# Patient Record
Sex: MALE | Race: WHITE | NOT HISPANIC OR LATINO | Employment: OTHER | URBAN - METROPOLITAN AREA
[De-identification: names, ages, dates, MRNs, and addresses within clinical notes are randomized per-mention and may not be internally consistent; named-entity substitution may affect disease eponyms.]

---

## 2017-01-05 ENCOUNTER — RESOLUTE PROFESSIONAL BILLING HOSPITAL PROF FEE (OUTPATIENT)
Dept: HOSPITALIST | Facility: MEDICAL CENTER | Age: 57
End: 2017-01-05
Payer: COMMERCIAL

## 2017-01-05 ENCOUNTER — HOSPITAL ENCOUNTER (INPATIENT)
Facility: MEDICAL CENTER | Age: 57
LOS: 4 days | DRG: 853 | End: 2017-01-09
Attending: HOSPITALIST | Admitting: HOSPITALIST
Payer: COMMERCIAL

## 2017-01-05 ENCOUNTER — APPOINTMENT (OUTPATIENT)
Dept: RADIOLOGY | Facility: MEDICAL CENTER | Age: 57
DRG: 853 | End: 2017-01-05
Attending: SURGERY
Payer: COMMERCIAL

## 2017-01-05 LAB
ALBUMIN SERPL BCP-MCNC: 2.7 G/DL (ref 3.2–4.9)
ALBUMIN/GLOB SERPL: 1 G/DL
ALP SERPL-CCNC: 115 U/L (ref 30–99)
ALT SERPL-CCNC: 25 U/L (ref 2–50)
ANION GAP SERPL CALC-SCNC: 6 MMOL/L (ref 0–11.9)
AST SERPL-CCNC: 14 U/L (ref 12–45)
BASOPHILS # BLD AUTO: 0.6 % (ref 0–1.8)
BASOPHILS # BLD: 0.07 K/UL (ref 0–0.12)
BILIRUB SERPL-MCNC: 0.7 MG/DL (ref 0.1–1.5)
BUN SERPL-MCNC: 52 MG/DL (ref 8–22)
CALCIUM SERPL-MCNC: 7.9 MG/DL (ref 8.5–10.5)
CHLORIDE SERPL-SCNC: 107 MMOL/L (ref 96–112)
CO2 SERPL-SCNC: 24 MMOL/L (ref 20–33)
CREAT SERPL-MCNC: 1.67 MG/DL (ref 0.5–1.4)
EOSINOPHIL # BLD AUTO: 0.02 K/UL (ref 0–0.51)
EOSINOPHIL NFR BLD: 0.2 % (ref 0–6.9)
ERYTHROCYTE [DISTWIDTH] IN BLOOD BY AUTOMATED COUNT: 47.8 FL (ref 35.9–50)
GFR SERPL CREATININE-BSD FRML MDRD: 43 ML/MIN/1.73 M 2
GLOBULIN SER CALC-MCNC: 2.8 G/DL (ref 1.9–3.5)
GLUCOSE BLD-MCNC: 158 MG/DL (ref 65–99)
GLUCOSE SERPL-MCNC: 180 MG/DL (ref 65–99)
HCT VFR BLD AUTO: 39 % (ref 42–52)
HGB BLD-MCNC: 13.1 G/DL (ref 14–18)
IMM GRANULOCYTES # BLD AUTO: 0.21 K/UL (ref 0–0.11)
IMM GRANULOCYTES NFR BLD AUTO: 1.8 % (ref 0–0.9)
LYMPHOCYTES # BLD AUTO: 0.54 K/UL (ref 1–4.8)
LYMPHOCYTES NFR BLD: 4.7 % (ref 22–41)
MCH RBC QN AUTO: 30.5 PG (ref 27–33)
MCHC RBC AUTO-ENTMCNC: 33.6 G/DL (ref 33.7–35.3)
MCV RBC AUTO: 90.9 FL (ref 81.4–97.8)
MONOCYTES # BLD AUTO: 0.43 K/UL (ref 0–0.85)
MONOCYTES NFR BLD AUTO: 3.7 % (ref 0–13.4)
NEUTROPHILS # BLD AUTO: 10.29 K/UL (ref 1.82–7.42)
NEUTROPHILS NFR BLD: 89 % (ref 44–72)
NRBC # BLD AUTO: 0 K/UL
NRBC BLD AUTO-RTO: 0 /100 WBC
PLATELET # BLD AUTO: 58 K/UL (ref 164–446)
PMV BLD AUTO: 12.1 FL (ref 9–12.9)
POTASSIUM SERPL-SCNC: 4.2 MMOL/L (ref 3.6–5.5)
PROT SERPL-MCNC: 5.5 G/DL (ref 6–8.2)
RBC # BLD AUTO: 4.29 M/UL (ref 4.7–6.1)
SODIUM SERPL-SCNC: 137 MMOL/L (ref 135–145)
WBC # BLD AUTO: 11.6 K/UL (ref 4.8–10.8)

## 2017-01-05 PROCEDURE — 87040 BLOOD CULTURE FOR BACTERIA: CPT

## 2017-01-05 PROCEDURE — A9270 NON-COVERED ITEM OR SERVICE: HCPCS | Performed by: INTERNAL MEDICINE

## 2017-01-05 PROCEDURE — 82962 GLUCOSE BLOOD TEST: CPT

## 2017-01-05 PROCEDURE — 36415 COLL VENOUS BLD VENIPUNCTURE: CPT

## 2017-01-05 PROCEDURE — 700102 HCHG RX REV CODE 250 W/ 637 OVERRIDE(OP): Performed by: INTERNAL MEDICINE

## 2017-01-05 PROCEDURE — 80053 COMPREHEN METABOLIC PANEL: CPT

## 2017-01-05 PROCEDURE — 770020 HCHG ROOM/CARE - TELE (206)

## 2017-01-05 PROCEDURE — 700112 HCHG RX REV CODE 229: Performed by: INTERNAL MEDICINE

## 2017-01-05 PROCEDURE — 99223 1ST HOSP IP/OBS HIGH 75: CPT | Performed by: INTERNAL MEDICINE

## 2017-01-05 PROCEDURE — 700101 HCHG RX REV CODE 250: Performed by: INTERNAL MEDICINE

## 2017-01-05 PROCEDURE — 700111 HCHG RX REV CODE 636 W/ 250 OVERRIDE (IP): Performed by: INTERNAL MEDICINE

## 2017-01-05 PROCEDURE — 700105 HCHG RX REV CODE 258: Performed by: INTERNAL MEDICINE

## 2017-01-05 PROCEDURE — 85025 COMPLETE CBC W/AUTO DIFF WBC: CPT

## 2017-01-05 RX ORDER — ENEMA 19; 7 G/133ML; G/133ML
1 ENEMA RECTAL
Status: DISCONTINUED | OUTPATIENT
Start: 2017-01-05 | End: 2017-01-09 | Stop reason: HOSPADM

## 2017-01-05 RX ORDER — BISACODYL 10 MG
10 SUPPOSITORY, RECTAL RECTAL
Status: DISCONTINUED | OUTPATIENT
Start: 2017-01-05 | End: 2017-01-09 | Stop reason: HOSPADM

## 2017-01-05 RX ORDER — SODIUM CHLORIDE 9 MG/ML
INJECTION, SOLUTION INTRAVENOUS CONTINUOUS
Status: DISCONTINUED | OUTPATIENT
Start: 2017-01-05 | End: 2017-01-07

## 2017-01-05 RX ORDER — METFORMIN HYDROCHLORIDE 500 MG/1
500 TABLET, EXTENDED RELEASE ORAL DAILY
Status: ON HOLD | COMMUNITY
End: 2017-01-09

## 2017-01-05 RX ORDER — AMOXICILLIN 250 MG
1 CAPSULE ORAL NIGHTLY
Status: DISCONTINUED | OUTPATIENT
Start: 2017-01-05 | End: 2017-01-09 | Stop reason: HOSPADM

## 2017-01-05 RX ORDER — LACTULOSE 20 G/30ML
30 SOLUTION ORAL
Status: DISCONTINUED | OUTPATIENT
Start: 2017-01-05 | End: 2017-01-09 | Stop reason: HOSPADM

## 2017-01-05 RX ORDER — ONDANSETRON 2 MG/ML
4 INJECTION INTRAMUSCULAR; INTRAVENOUS EVERY 4 HOURS PRN
Status: DISCONTINUED | OUTPATIENT
Start: 2017-01-05 | End: 2017-01-09 | Stop reason: HOSPADM

## 2017-01-05 RX ORDER — ONDANSETRON 4 MG/1
4 TABLET, ORALLY DISINTEGRATING ORAL EVERY 4 HOURS PRN
Status: DISCONTINUED | OUTPATIENT
Start: 2017-01-05 | End: 2017-01-09 | Stop reason: HOSPADM

## 2017-01-05 RX ORDER — AMLODIPINE BESYLATE 2.5 MG/1
2.5 TABLET ORAL DAILY
COMMUNITY
End: 2023-11-11

## 2017-01-05 RX ORDER — DOCUSATE SODIUM 100 MG/1
100 CAPSULE, LIQUID FILLED ORAL EVERY MORNING
Status: DISCONTINUED | OUTPATIENT
Start: 2017-01-05 | End: 2017-01-09 | Stop reason: HOSPADM

## 2017-01-05 RX ORDER — PROMETHAZINE HYDROCHLORIDE 25 MG/1
12.5-25 SUPPOSITORY RECTAL EVERY 4 HOURS PRN
Status: DISCONTINUED | OUTPATIENT
Start: 2017-01-05 | End: 2017-01-09 | Stop reason: HOSPADM

## 2017-01-05 RX ORDER — PROMETHAZINE HYDROCHLORIDE 25 MG/1
12.5-25 TABLET ORAL EVERY 4 HOURS PRN
Status: DISCONTINUED | OUTPATIENT
Start: 2017-01-05 | End: 2017-01-09 | Stop reason: HOSPADM

## 2017-01-05 RX ORDER — ATORVASTATIN CALCIUM 20 MG/1
20 TABLET, FILM COATED ORAL NIGHTLY
COMMUNITY
End: 2023-11-11

## 2017-01-05 RX ORDER — ACETAMINOPHEN 325 MG/1
650 TABLET ORAL EVERY 6 HOURS PRN
Status: DISCONTINUED | OUTPATIENT
Start: 2017-01-05 | End: 2017-01-09 | Stop reason: HOSPADM

## 2017-01-05 RX ORDER — AMOXICILLIN 250 MG
1 CAPSULE ORAL
Status: DISCONTINUED | OUTPATIENT
Start: 2017-01-05 | End: 2017-01-09 | Stop reason: HOSPADM

## 2017-01-05 RX ORDER — CIPROFLOXACIN 2 MG/ML
400 INJECTION, SOLUTION INTRAVENOUS EVERY 12 HOURS
Status: DISCONTINUED | OUTPATIENT
Start: 2017-01-05 | End: 2017-01-09

## 2017-01-05 RX ADMIN — STANDARDIZED SENNA CONCENTRATE AND DOCUSATE SODIUM 1 TABLET: 8.6; 5 TABLET, FILM COATED ORAL at 21:32

## 2017-01-05 RX ADMIN — SODIUM CHLORIDE: 9 INJECTION, SOLUTION INTRAVENOUS at 18:51

## 2017-01-05 RX ADMIN — METRONIDAZOLE 500 MG: 500 INJECTION, SOLUTION INTRAVENOUS at 22:39

## 2017-01-05 RX ADMIN — INSULIN HUMAN 2 UNITS: 100 INJECTION, SOLUTION PARENTERAL at 21:32

## 2017-01-05 RX ADMIN — CIPROFLOXACIN 400 MG: 2 INJECTION, SOLUTION INTRAVENOUS at 21:31

## 2017-01-05 RX ADMIN — DOCUSATE SODIUM 100 MG: 100 CAPSULE ORAL at 18:51

## 2017-01-05 ASSESSMENT — PATIENT HEALTH QUESTIONNAIRE - PHQ9
SUM OF ALL RESPONSES TO PHQ9 QUESTIONS 1 AND 2: 0
SUM OF ALL RESPONSES TO PHQ QUESTIONS 1-9: 0
2. FEELING DOWN, DEPRESSED, IRRITABLE, OR HOPELESS: NOT AT ALL
1. LITTLE INTEREST OR PLEASURE IN DOING THINGS: NOT AT ALL

## 2017-01-05 ASSESSMENT — LIFESTYLE VARIABLES
ALCOHOL_USE: NO
DO YOU DRINK ALCOHOL: NO
EVER_SMOKED: NEVER

## 2017-01-05 ASSESSMENT — COPD QUESTIONNAIRES: DURING THE PAST 4 WEEKS HOW MUCH DID YOU FEEL SHORT OF BREATH: NONE/LITTLE OF THE TIME

## 2017-01-05 NOTE — IP AVS SNAPSHOT
Cinexio Access Code: [email or web address]  Expires: 2/8/2017  3:29 PM    Your email address is not on file at [facility].  Email Addresses are required for you to sign up for Cinexio, please contact [phone] to verify your personal information and to provide your email address prior to attempting to register for Cinexio.    Jason MORGAN Tjoa  [address] [facility] [place] [address]    Cinexio  A secure, online tool to manage your health information     [facility]’s Cinexio® is a secure, online tool that connects you to your personalized health information from the privacy of your home -- day or night - making it very easy for you to manage your healthcare. Once the activation process is completed, you can even access your medical information using the Cinexio wilfrido, which is available for free in the Apple Wilfrido store or Google Play store.     To learn more about Cinexio, visit [email or web address]    There are two levels of access available (as shown below):   My Chart Features  [facility] Primary Care Doctor [facility]  Specialists [facility]  Urgent  Care Non-[facility] Primary Care Doctor   Email your healthcare team securely and privately 24/7 X X X    Manage appointments: schedule your next appointment; view details of past/upcoming appointments X      Request prescription refills. X      View recent personal medical records, including lab and immunizations X X X X   View health record, including health history, allergies, medications X X X X   Read reports about your outpatient visits, procedures, consult and ER notes X X X X   See your discharge summary, which is a recap of your hospital and/or ER visit that includes your diagnosis, lab results, and care plan X X  X     How to register for [employer]x Technologiest:  Once your e-mail address has been verified, follow the following steps to sign up for Ad[employer]x Technologiest.     1. Go to  [email or web address]  2. Click on the Sign Up Now box, which takes you to the New Member Sign Up page. You  will need to provide the following information:  a. Enter your [employer]visor Access Code exactly as it appears at the top of this page. (You will not need to use this code after you’ve completed the sign-up process. If you do not sign up before the expiration date, you must request a new code.)   b. Enter your date of birth.   c. Enter your home email address.   d. Click Submit, and follow the next screen’s instructions.  3. Create a Lucky Pait ID. This will be your FutureAdvisor login ID and cannot be changed, so think of one that is secure and easy to remember.  4. Create a FutureAdvisor password. You can change your password at any time.  5. Enter your Password Reset Question and Answer. This can be used at a later time if you forget your password.   6. Enter your e-mail address. This allows you to receive e-mail notifications when new information is available in FutureAdvisor.  7. Click Sign Up. You can now view your health information.    For assistance activating your FutureAdvisor account, call [phone]

## 2017-01-05 NOTE — IP AVS SNAPSHOT
" After Visit Summary                                                                                                                  Name:Jason Lima  Medical Record Number:[id number]  CSN: [id number]    YOB: 1960   Age: 56 y.o.  Sex: male  HT:1.727 m (5' 7.99\") WT: 87.3 kg (192 lb 7.4 oz)          Admit Date: 1/5/2017     Discharge Date:   Today's Date: 1/9/2017  Attending Doctor:  Ezio Cage D.O.                  Allergies:  Penicillins            Discharge Instructions       Discharge Instructions    Discharged to home by car with relative. Discharged via wheelchair, hospital escort: Yes.  Special equipment needed: Not Applicable    Be sure to schedule a follow-up appointment with your primary care doctor or any specialists as instructed.     Discharge Plan:   Diet Plan: Discussed  Activity Level: Discussed  Confirmed Follow up Appointment: Patient to Call and Schedule Appointment  Confirmed Symptoms Management: Discussed  Medication Reconciliation Updated: Yes  Influenza Vaccine Indication: Indicated: 9 to 64 years of age  Influenza Vaccine Given - only chart on this line when given: Influenza Vaccine Given (See MAR)    I understand that a diet low in cholesterol, fat, and sodium is recommended for good health. Unless I have been given specific instructions below for another diet, I accept this instruction as my diet prescription.       Special Instructions: Showers only until surgeon okays bath.     · Is patient discharged on Warfarin / Coumadin?   No     · Is patient Post Blood Transfusion?  No    Depression / Suicide Risk    As you are discharged from this [facility] facility, it is important to learn how to keep safe from harming yourself.    Recognize the warning signs:  · Abrupt changes in personality, positive or negative- including increase in energy   · Giving away possessions  · Change in eating patterns- significant weight changes-  positive or negative  · Change in sleeping patterns- " unable to sleep or sleeping all the time   · Unwillingness or inability to communicate  · Depression  · Unusual sadness, discouragement and loneliness  · Talk of wanting to die  · Neglect of personal appearance   · Rebelliousness- reckless behavior  · Withdrawal from people/activities they love  · Confusion- inability to concentrate     If you or a loved one observes any of these behaviors or has concerns about self-harm, here's what you can do:  · Talk about it- your feelings and reasons for harming yourself  · Remove any means that you might use to hurt yourself (examples: pills, rope, extension cords, firearm)  · Get professional help from the community (Mental Health, Substance Abuse, psychological counseling)  · Do not be alone:Call your Safe Contact- someone whom you trust who will be there for you.  · Call your local CRISIS HOTLINE 101-1483 or 223-781-4715  · Call your local Children's Mobile Crisis Response Team [place] (634) 148-0629 or www.Micropelt  · Call the toll free National Suicide Prevention Hotlines   · National Suicide Prevention Lifeline 439-481-GHHX (4357)  · National Hope Line Network 800-SUICIDE (675-9818)                Follow-up Information     1. Follow up with Humble Garcia D.O.. Schedule an appointment as soon as possible for a visit in 2 weeks.    Specialty:  Family Medicine    Why:  Hospital follow-up appointment with PCP    Contact information    [address] [place] [address]  [phone]          2. Follow up with Garrett Miller M.D. On 1/16/2017.    Specialties:  Surgery, Radiology    Why:  Please arrive at 1:30pm for a 1:45pm appointment. Please bring your photo ID, insurance card and all current medications. Thank you    Contact information    [address] [facility] [address]  [place] [address]  [phone]           Discharge Medication Instructions:    Below are the medications your physician expects you to take upon discharge:    Review all your home medications  and newly ordered medications with your doctor and/or pharmacist. Follow medication instructions as directed by your doctor and/or pharmacist.    Please keep your medication list with you and share with your physician.               Medication List      START taking these medications        Instructions    glipiZIDE SR 2.5 MG Tb24   Commonly known as:  GLUCOTROL    Take 1 Tab by mouth every day.   Dose:  2.5 mg       hydrocodone-acetaminophen 5-325 MG Tabs per tablet   Commonly known as:  NORCO    Take 1 Tab by mouth every 6 hours as needed.   Dose:  1 Tab       levofloxacin 750 MG tablet   Last time this was given:  750 mg on 1/9/2017  2:31 PM   Commonly known as:  LEVAQUIN    Take 1 Tab by mouth every day for 14 days.   Dose:  750 mg       ondansetron 4 MG Tbdp   Commonly known as:  ZOFRAN ODT    Take 1 Tab by mouth every four hours as needed for Nausea/Vomiting (give PO if no IV route available).   Dose:  4 mg         CONTINUE taking these medications        Instructions    amlodipine 2.5 MG Tabs   Commonly known as:  NORVASC    Take 2.5 mg by mouth every day.   Dose:  2.5 mg       atorvastatin 20 MG Tabs   Commonly known as:  LIPITOR    Take 20 mg by mouth every evening.   Dose:  20 mg       cyanocobalamin 500 MCG Tabs   Commonly known as:  VITAMIN B-12    Take 500 mcg by mouth every day.   Dose:  500 mcg       fish oil 1000 MG Caps capsule    Take 1,000 mg by mouth every day.   Dose:  1000 mg       vitamin D 1000 UNIT Tabs   Commonly known as:  cholecalciferol    Take 1,000 Units by mouth every day.   Dose:  1000 Units         STOP taking these medications     metformin  MG Tb24   Commonly known as:  GLUCOPHAGE XR       NON SPECIFIED               Instructions           Diet / Nutrition:    Follow any diet instructions given to you by your doctor or the dietician, including how much salt (sodium) you are allowed each day.    If you are overweight, talk to your doctor about a weight reduction  plan.    Activity:    Remain physically active following your doctor's instructions about exercise and activity.    Rest often.     Any time you become even a little tired or short of breath, SIT DOWN and rest.    Worsening Symptoms:    Report any of the following signs and symptoms to the doctor's office immediately:    *Pain of jaw, arm, or neck  *Chest pain not relieved by medication                               *Dizziness or loss of consciousness  *Difficulty breathing even when at rest   *More tired than usual                                       *Bleeding drainage or swelling of surgical site  *Swelling of feet, ankles, legs or stomach                 *Fever (>100ºF)  *Pink or blood tinged sputum  *Weight gain (3lbs/day or 5lbs /week)           *Shock from internal defibrillator (if applicable)  *Palpitations or irregular heartbeats                *Cool and/or numb extremities    Stroke Awareness    Common Risk Factors for Stroke include:    Age  Atrial Fibrillation  Carotid Artery Stenosis  Diabetes Mellitus  Excessive alcohol consumption  High blood pressure  Overweight   Physical inactivity  Smoking    Warning signs and symptoms of a stroke include:    *Sudden numbness or weakness of the face, arm or leg (especially on one side of the body).  *Sudden confusion, trouble speaking or understanding.  *Sudden trouble seeing in one or both eyes.  *Sudden trouble walking, dizziness, loss of balance or coordination.Sudden severe headache with no known cause.    It is very important to get treatment quickly when a stroke occurs. If you experience any of the above warning signs, call 911 immediately.                   Disclaimer         Quit Smoking / Tobacco Use:    I understand the use of any tobacco products increases my chance of suffering from future heart disease or stroke and could cause other illnesses which may shorten my life. Quitting the use of tobacco products is the single most important thing I can  do to improve my health. For further information on smoking / tobacco cessation call a Toll Free Quit Line at 1-652.792.9072 (*National Cancer [place]) or 1-551.266.4551 (American Lung Association) or you can access the web based program at www.lungusa.org.    [place] Tobacco Users Help Line:  (963) 703-5545       Toll Free: 1-760.133.7110  Quit Tobacco Program [facility] (012)832-1257    Crisis Hotline:    [place] Crisis Hotline:  3-160-WTNBZHL or 1-553.967.7036    [place] Crisis Hotline:    1-825.148.8886 or 799-111-4530    Discharge Survey:   Thank you for choosing [facility]. We hope we did everything we could to make your hospital stay a pleasant one. You may be receiving a phone survey and we would appreciate your time and participation in answering the questions. Your input is very valuable to us in our efforts to improve our service to our patients and their families.        My signature on this form indicates that:    1. I have reviewed and understand the above information.  2. My questions regarding this information have been answered to my satisfaction.  3. I have formulated a plan with my discharge nurse to obtain my prescribed medications for home.                  Disclaimer         __________________________________                     __________       ________                       Patient Signature                                                 Date                    Time

## 2017-01-05 NOTE — IP AVS SNAPSHOT
1/9/2017          Jason Albrightoa  [address]  [place]  [place] [address]    Dear Jason:    [facility] wants to ensure your discharge home is safe and you or your loved ones have had all your questions answered regarding your care after you leave the hospital.    You may receive a telephone call within two days of your discharge.  This call is to make certain you understand your discharge instructions as well as ensure we provided you with the best care possible during your stay with us.     The call will only last approximately 3-5 minutes and will be done by a nurse.    Once again, we want to ensure your discharge home is safe and that you have a clear understanding of any next steps in your care.  If you have any questions or concerns, please do not hesitate to contact us, we are here for you.  Thank you for choosing [facility] for your healthcare needs.    Sincerely,    Jeremy Solis    [facility]

## 2017-01-05 NOTE — IP AVS SNAPSHOT
" <p align=\"LEFT\"><IMG SRC=\"//EMRWB/blob$/[email or web address]\" alt=\"Image\" WIDTH=\"50%\" HEIGHT=\"200\" BORDER=\"\"></p>                   Name:Jason Lima  Medical Record Number:[id number]  CSN: [id number]    YOB: 1960   Age: 56 y.o.  Sex: male  HT:1.727 m (5' 7.99\") WT: 87.3 kg (192 lb 7.4 oz)          Admit Date: 1/5/2017     Discharge Date:   Today's Date: 1/9/2017  Attending Doctor:  Ezio Cage D.O.                  Allergies:  Penicillins          Follow-up Information     1. Follow up with Humble Garcia D.O.. Schedule an appointment as soon as possible for a visit in 2 weeks.    Specialty:  Family Medicine    Why:  Hospital follow-up appointment with PCP    Contact information    [address] [place] [address] [address] [place] [address]  [phone]          2. Follow up with Garrett Miller M.D. On 1/16/2017.    Specialties:  Surgery, Radiology    Why:  Please arrive at 1:30pm for a 1:45pm appointment. Please bring your photo ID, insurance card and all current medications. Thank you    Contact information    [address] [facility] [address]  [place] [address]  [phone]           Medication List      Take these Medications        Instructions    amlodipine 2.5 MG Tabs   Commonly known as:  NORVASC    Take 2.5 mg by mouth every day.   Dose:  2.5 mg       atorvastatin 20 MG Tabs   Commonly known as:  LIPITOR    Take 20 mg by mouth every evening.   Dose:  20 mg       cyanocobalamin 500 MCG Tabs   Commonly known as:  VITAMIN B-12    Take 500 mcg by mouth every day.   Dose:  500 mcg       fish oil 1000 MG Caps capsule    Take 1,000 mg by mouth every day.   Dose:  1000 mg       glipiZIDE SR 2.5 MG Tb24   Commonly known as:  GLUCOTROL    Take 1 Tab by mouth every day.   Dose:  2.5 mg       hydrocodone-acetaminophen 5-325 MG Tabs per tablet   Commonly known as:  NORCO    Take 1 Tab by mouth every 6 hours as needed.   Dose:  1 Tab       levofloxacin 750 MG tablet   Commonly known as:  LEVAQUIN    Take 1 Tab by mouth every day for 14 " days.   Dose:  750 mg       ondansetron 4 MG Tbdp   Commonly known as:  ZOFRAN ODT    Take 1 Tab by mouth every four hours as needed for Nausea/Vomiting (give PO if no IV route available).   Dose:  4 mg       vitamin D 1000 UNIT Tabs   Commonly known as:  cholecalciferol    Take 1,000 Units by mouth every day.   Dose:  1000 Units

## 2017-01-05 NOTE — PROGRESS NOTES
Direct admit from [facility] in [place]. Dr Sears sending; Dr Mayorga accepting.  DX: sepsis.  ADT order entered/held and will need to be released upon arrival to unit.    TC RN received H&P, MAR, labs, CT abd/pelvis from sending facility.  Documents scanned to EPIC media for review and archiving.

## 2017-01-06 ENCOUNTER — APPOINTMENT (OUTPATIENT)
Dept: RADIOLOGY | Facility: MEDICAL CENTER | Age: 57
DRG: 853 | End: 2017-01-06
Attending: SURGERY
Payer: COMMERCIAL

## 2017-01-06 PROBLEM — K81.0 ACUTE CHOLECYSTITIS: Status: ACTIVE | Noted: 2017-01-06

## 2017-01-06 PROBLEM — N28.9 RENAL INSUFFICIENCY: Status: ACTIVE | Noted: 2017-01-06

## 2017-01-06 PROBLEM — D69.6 THROMBOCYTOPENIA (HCC): Status: ACTIVE | Noted: 2017-01-06

## 2017-01-06 PROBLEM — E11.9 DM TYPE 2 (DIABETES MELLITUS, TYPE 2) (HCC): Status: ACTIVE | Noted: 2017-01-06

## 2017-01-06 PROBLEM — R78.81 GRAM-NEGATIVE BACTEREMIA: Status: ACTIVE | Noted: 2017-01-06

## 2017-01-06 PROBLEM — A41.9 SEPSIS (HCC): Status: ACTIVE | Noted: 2017-01-06

## 2017-01-06 PROBLEM — I10 HYPERTENSION: Status: ACTIVE | Noted: 2017-01-06

## 2017-01-06 PROBLEM — E78.5 HYPERLIPIDEMIA: Status: ACTIVE | Noted: 2017-01-06

## 2017-01-06 LAB
ABO GROUP BLD: NORMAL
ABO GROUP BLD: NORMAL
ALBUMIN SERPL BCP-MCNC: 2.4 G/DL (ref 3.2–4.9)
ALBUMIN/GLOB SERPL: 0.9 G/DL
ALP SERPL-CCNC: 104 U/L (ref 30–99)
ALT SERPL-CCNC: 22 U/L (ref 2–50)
ANION GAP SERPL CALC-SCNC: 5 MMOL/L (ref 0–11.9)
AST SERPL-CCNC: 16 U/L (ref 12–45)
BASOPHILS # BLD AUTO: 0.5 % (ref 0–1.8)
BASOPHILS # BLD: 0.05 K/UL (ref 0–0.12)
BILIRUB SERPL-MCNC: 0.7 MG/DL (ref 0.1–1.5)
BLD GP AB SCN SERPL QL: NORMAL
BUN SERPL-MCNC: 48 MG/DL (ref 8–22)
CALCIUM SERPL-MCNC: 7.9 MG/DL (ref 8.5–10.5)
CFT BLD TEG: 7.8 MIN (ref 5–10)
CHLORIDE SERPL-SCNC: 107 MMOL/L (ref 96–112)
CLOT ANGLE BLD TEG: 60.2 DEGREES (ref 53–72)
CLOT LYSIS 30M P MA LENFR BLD TEG: 0 % (ref 0–8)
CO2 SERPL-SCNC: 24 MMOL/L (ref 20–33)
COMPONENT P 8504P: NORMAL
CREAT SERPL-MCNC: 1.58 MG/DL (ref 0.5–1.4)
CT.EXTRINSIC BLD ROTEM: 2.2 MIN (ref 1–3)
EKG IMPRESSION: NORMAL
EOSINOPHIL # BLD AUTO: 0.03 K/UL (ref 0–0.51)
EOSINOPHIL NFR BLD: 0.3 % (ref 0–6.9)
ERYTHROCYTE [DISTWIDTH] IN BLOOD BY AUTOMATED COUNT: 47.8 FL (ref 35.9–50)
GFR SERPL CREATININE-BSD FRML MDRD: 46 ML/MIN/1.73 M 2
GLOBULIN SER CALC-MCNC: 2.6 G/DL (ref 1.9–3.5)
GLUCOSE BLD-MCNC: 123 MG/DL (ref 65–99)
GLUCOSE BLD-MCNC: 130 MG/DL (ref 65–99)
GLUCOSE SERPL-MCNC: 132 MG/DL (ref 65–99)
HCT VFR BLD AUTO: 36.9 % (ref 42–52)
HGB BLD-MCNC: 12.2 G/DL (ref 14–18)
IMM GRANULOCYTES # BLD AUTO: 0.18 K/UL (ref 0–0.11)
IMM GRANULOCYTES NFR BLD AUTO: 1.7 % (ref 0–0.9)
LYMPHOCYTES # BLD AUTO: 0.77 K/UL (ref 1–4.8)
LYMPHOCYTES NFR BLD: 7.3 % (ref 22–41)
MCF BLD TEG: 66.1 MM (ref 50–70)
MCH RBC QN AUTO: 30.4 PG (ref 27–33)
MCHC RBC AUTO-ENTMCNC: 33.1 G/DL (ref 33.7–35.3)
MCV RBC AUTO: 92 FL (ref 81.4–97.8)
MONOCYTES # BLD AUTO: 0.55 K/UL (ref 0–0.85)
MONOCYTES NFR BLD AUTO: 5.2 % (ref 0–13.4)
NEUTROPHILS # BLD AUTO: 8.94 K/UL (ref 1.82–7.42)
NEUTROPHILS NFR BLD: 85 % (ref 44–72)
NRBC # BLD AUTO: 0 K/UL
NRBC BLD AUTO-RTO: 0 /100 WBC
PA AA BLD-ACNC: 9.7 %
PA ADP BLD-ACNC: 59.2 %
PLATELET # BLD AUTO: 55 K/UL (ref 164–446)
PMV BLD AUTO: 12.4 FL (ref 9–12.9)
POTASSIUM SERPL-SCNC: 3.8 MMOL/L (ref 3.6–5.5)
PROT SERPL-MCNC: 5 G/DL (ref 6–8.2)
RBC # BLD AUTO: 4.01 M/UL (ref 4.7–6.1)
RH BLD: NORMAL
SODIUM SERPL-SCNC: 136 MMOL/L (ref 135–145)
TEG ALGORITHM TGALG: NORMAL
WBC # BLD AUTO: 10.5 K/UL (ref 4.8–10.8)

## 2017-01-06 PROCEDURE — A9270 NON-COVERED ITEM OR SERVICE: HCPCS | Performed by: INTERNAL MEDICINE

## 2017-01-06 PROCEDURE — 82962 GLUCOSE BLOOD TEST: CPT | Mod: 91

## 2017-01-06 PROCEDURE — A9270 NON-COVERED ITEM OR SERVICE: HCPCS | Performed by: NURSE PRACTITIONER

## 2017-01-06 PROCEDURE — 86901 BLOOD TYPING SEROLOGIC RH(D): CPT

## 2017-01-06 PROCEDURE — 160048 HCHG OR STATISTICAL LEVEL 1-5: Performed by: SURGERY

## 2017-01-06 PROCEDURE — 160002 HCHG RECOVERY MINUTES (STAT): Performed by: SURGERY

## 2017-01-06 PROCEDURE — 30233R1 TRANSFUSION OF NONAUTOLOGOUS PLATELETS INTO PERIPHERAL VEIN, PERCUTANEOUS APPROACH: ICD-10-PCS | Performed by: INTERNAL MEDICINE

## 2017-01-06 PROCEDURE — 700101 HCHG RX REV CODE 250: Performed by: INTERNAL MEDICINE

## 2017-01-06 PROCEDURE — P9034 PLATELETS, PHERESIS: HCPCS

## 2017-01-06 PROCEDURE — 502594 HCHG SCISSOR HANDLE, HARMONIC ACE: Performed by: SURGERY

## 2017-01-06 PROCEDURE — 502240 HCHG MISC OR SUPPLY RC 0272: Performed by: SURGERY

## 2017-01-06 PROCEDURE — 500697 HCHG HEMOCLIP, LARGE (ORANGE): Performed by: SURGERY

## 2017-01-06 PROCEDURE — 99233 SBSQ HOSP IP/OBS HIGH 50: CPT | Mod: Q6 | Performed by: INTERNAL MEDICINE

## 2017-01-06 PROCEDURE — 700111 HCHG RX REV CODE 636 W/ 250 OVERRIDE (IP)

## 2017-01-06 PROCEDURE — 0F904ZZ DRAINAGE OF LIVER, PERCUTANEOUS ENDOSCOPIC APPROACH: ICD-10-PCS | Performed by: SURGERY

## 2017-01-06 PROCEDURE — 110382 HCHG SHELL REV 271: Performed by: SURGERY

## 2017-01-06 PROCEDURE — 0FT44ZZ RESECTION OF GALLBLADDER, PERCUTANEOUS ENDOSCOPIC APPROACH: ICD-10-PCS | Performed by: SURGERY

## 2017-01-06 PROCEDURE — 80053 COMPREHEN METABOLIC PANEL: CPT

## 2017-01-06 PROCEDURE — 500378 HCHG DRAIN, J-VAC ROUND 19FR: Performed by: SURGERY

## 2017-01-06 PROCEDURE — 36430 TRANSFUSION BLD/BLD COMPNT: CPT

## 2017-01-06 PROCEDURE — 76705 ECHO EXAM OF ABDOMEN: CPT

## 2017-01-06 PROCEDURE — 700101 HCHG RX REV CODE 250

## 2017-01-06 PROCEDURE — 86850 RBC ANTIBODY SCREEN: CPT

## 2017-01-06 PROCEDURE — 86900 BLOOD TYPING SEROLOGIC ABO: CPT

## 2017-01-06 PROCEDURE — 700102 HCHG RX REV CODE 250 W/ 637 OVERRIDE(OP): Performed by: INTERNAL MEDICINE

## 2017-01-06 PROCEDURE — 700111 HCHG RX REV CODE 636 W/ 250 OVERRIDE (IP): Performed by: INTERNAL MEDICINE

## 2017-01-06 PROCEDURE — 93010 ELECTROCARDIOGRAM REPORT: CPT | Performed by: INTERNAL MEDICINE

## 2017-01-06 PROCEDURE — 85025 COMPLETE CBC W/AUTO DIFF WBC: CPT

## 2017-01-06 PROCEDURE — 110371 HCHG SHELL REV 272: Performed by: SURGERY

## 2017-01-06 PROCEDURE — 160039 HCHG SURGERY MINUTES - EA ADDL 1 MIN LEVEL 3: Performed by: SURGERY

## 2017-01-06 PROCEDURE — 700105 HCHG RX REV CODE 258: Performed by: INTERNAL MEDICINE

## 2017-01-06 PROCEDURE — A6402 STERILE GAUZE <= 16 SQ IN: HCPCS | Performed by: SURGERY

## 2017-01-06 PROCEDURE — 500002 HCHG ADHESIVE, DERMABOND: Performed by: SURGERY

## 2017-01-06 PROCEDURE — 500389 HCHG DRAIN, RESERVOIR SUCT JP 100CC: Performed by: SURGERY

## 2017-01-06 PROCEDURE — 93005 ELECTROCARDIOGRAM TRACING: CPT | Performed by: SURGERY

## 2017-01-06 PROCEDURE — 501570 HCHG TROCAR, SEPARATOR: Performed by: SURGERY

## 2017-01-06 PROCEDURE — 770020 HCHG ROOM/CARE - TELE (206)

## 2017-01-06 PROCEDURE — 160035 HCHG PACU - 1ST 60 MINS PHASE I: Performed by: SURGERY

## 2017-01-06 PROCEDURE — 160028 HCHG SURGERY MINUTES - 1ST 30 MINS LEVEL 3: Performed by: SURGERY

## 2017-01-06 PROCEDURE — 501399 HCHG SPECIMAN BAG, ENDO CATC: Performed by: SURGERY

## 2017-01-06 PROCEDURE — 88304 TISSUE EXAM BY PATHOLOGIST: CPT

## 2017-01-06 PROCEDURE — 700102 HCHG RX REV CODE 250 W/ 637 OVERRIDE(OP): Performed by: NURSE PRACTITIONER

## 2017-01-06 PROCEDURE — 502571 HCHG PACK, LAP CHOLE: Performed by: SURGERY

## 2017-01-06 PROCEDURE — 160036 HCHG PACU - EA ADDL 30 MINS PHASE I: Performed by: SURGERY

## 2017-01-06 PROCEDURE — 85384 FIBRINOGEN ACTIVITY: CPT

## 2017-01-06 PROCEDURE — 36415 COLL VENOUS BLD VENIPUNCTURE: CPT

## 2017-01-06 PROCEDURE — 501838 HCHG SUTURE GENERAL: Performed by: SURGERY

## 2017-01-06 PROCEDURE — 700112 HCHG RX REV CODE 229: Performed by: INTERNAL MEDICINE

## 2017-01-06 PROCEDURE — 85347 COAGULATION TIME ACTIVATED: CPT

## 2017-01-06 PROCEDURE — 501583 HCHG TROCAR, THRD CAN&SEAL 5X100: Performed by: SURGERY

## 2017-01-06 PROCEDURE — 501571 HCHG TROCAR, SEPARATOR 12X100: Performed by: SURGERY

## 2017-01-06 PROCEDURE — 500868 HCHG NEEDLE, SURGI(VARES): Performed by: SURGERY

## 2017-01-06 PROCEDURE — 160009 HCHG ANES TIME/MIN: Performed by: SURGERY

## 2017-01-06 PROCEDURE — 85576 BLOOD PLATELET AGGREGATION: CPT | Mod: 91

## 2017-01-06 PROCEDURE — A4606 OXYGEN PROBE USED W OXIMETER: HCPCS | Performed by: SURGERY

## 2017-01-06 RX ORDER — CHOLECALCIFEROL (VITAMIN D3) 125 MCG
500 CAPSULE ORAL DAILY
COMMUNITY
End: 2023-11-11

## 2017-01-06 RX ORDER — OXYCODONE HYDROCHLORIDE AND ACETAMINOPHEN 5; 325 MG/1; MG/1
1 TABLET ORAL EVERY 4 HOURS PRN
Status: DISCONTINUED | OUTPATIENT
Start: 2017-01-06 | End: 2017-01-09 | Stop reason: HOSPADM

## 2017-01-06 RX ORDER — BUPIVACAINE HYDROCHLORIDE AND EPINEPHRINE 5; 5 MG/ML; UG/ML
INJECTION, SOLUTION EPIDURAL; INTRACAUDAL; PERINEURAL
Status: DISCONTINUED | OUTPATIENT
Start: 2017-01-06 | End: 2017-01-06 | Stop reason: HOSPADM

## 2017-01-06 RX ORDER — ONDANSETRON 2 MG/ML
INJECTION INTRAMUSCULAR; INTRAVENOUS
Status: COMPLETED
Start: 2017-01-06 | End: 2017-01-06

## 2017-01-06 RX ORDER — CHLORAL HYDRATE 500 MG
1000 CAPSULE ORAL DAILY
COMMUNITY
End: 2023-11-11

## 2017-01-06 RX ADMIN — ACETAMINOPHEN 650 MG: 325 TABLET, FILM COATED ORAL at 00:24

## 2017-01-06 RX ADMIN — OXYCODONE HYDROCHLORIDE AND ACETAMINOPHEN 1 TABLET: 5; 325 TABLET ORAL at 12:20

## 2017-01-06 RX ADMIN — OXYCODONE HYDROCHLORIDE AND ACETAMINOPHEN 1 TABLET: 5; 325 TABLET ORAL at 21:42

## 2017-01-06 RX ADMIN — METRONIDAZOLE 500 MG: 500 INJECTION, SOLUTION INTRAVENOUS at 05:43

## 2017-01-06 RX ADMIN — METRONIDAZOLE 500 MG: 500 INJECTION, SOLUTION INTRAVENOUS at 22:56

## 2017-01-06 RX ADMIN — STANDARDIZED SENNA CONCENTRATE AND DOCUSATE SODIUM 1 TABLET: 8.6; 5 TABLET, FILM COATED ORAL at 21:42

## 2017-01-06 RX ADMIN — ONDANSETRON 4 MG: 2 INJECTION INTRAMUSCULAR; INTRAVENOUS at 19:00

## 2017-01-06 RX ADMIN — SODIUM CHLORIDE: 9 INJECTION, SOLUTION INTRAVENOUS at 04:40

## 2017-01-06 RX ADMIN — METRONIDAZOLE 500 MG: 500 INJECTION, SOLUTION INTRAVENOUS at 15:11

## 2017-01-06 RX ADMIN — CIPROFLOXACIN 400 MG: 2 INJECTION, SOLUTION INTRAVENOUS at 21:43

## 2017-01-06 RX ADMIN — ONDANSETRON 4 MG: 2 INJECTION, SOLUTION INTRAMUSCULAR; INTRAVENOUS at 19:00

## 2017-01-06 RX ADMIN — OXYCODONE HYDROCHLORIDE AND ACETAMINOPHEN 1 TABLET: 5; 325 TABLET ORAL at 06:30

## 2017-01-06 RX ADMIN — CIPROFLOXACIN 400 MG: 2 INJECTION, SOLUTION INTRAVENOUS at 08:57

## 2017-01-06 RX ADMIN — DOCUSATE SODIUM 100 MG: 100 CAPSULE ORAL at 08:56

## 2017-01-06 RX ADMIN — FENTANYL CITRATE 50 MCG: 50 INJECTION, SOLUTION INTRAMUSCULAR; INTRAVENOUS at 19:15

## 2017-01-06 ASSESSMENT — PATIENT HEALTH QUESTIONNAIRE - PHQ9
2. FEELING DOWN, DEPRESSED, IRRITABLE, OR HOPELESS: NOT AT ALL
SUM OF ALL RESPONSES TO PHQ9 QUESTIONS 1 AND 2: 0
1. LITTLE INTEREST OR PLEASURE IN DOING THINGS: NOT AT ALL
SUM OF ALL RESPONSES TO PHQ QUESTIONS 1-9: 0

## 2017-01-06 ASSESSMENT — PAIN SCALES - GENERAL
PAINLEVEL_OUTOF10: 5
PAINLEVEL_OUTOF10: 6
PAINLEVEL_OUTOF10: 0
PAINLEVEL_OUTOF10: 3
PAINLEVEL_OUTOF10: 4
PAINLEVEL_OUTOF10: 4
PAINLEVEL_OUTOF10: 5
PAINLEVEL_OUTOF10: 3
PAINLEVEL_OUTOF10: 5
PAINLEVEL_OUTOF10: 5

## 2017-01-06 ASSESSMENT — LIFESTYLE VARIABLES: DO YOU DRINK ALCOHOL: NO

## 2017-01-06 ASSESSMENT — ENCOUNTER SYMPTOMS
SHORTNESS OF BREATH: 0
COUGH: 0
NAUSEA: 1

## 2017-01-06 ASSESSMENT — COPD QUESTIONNAIRES: DURING THE PAST 4 WEEKS HOW MUCH DID YOU FEEL SHORT OF BREATH: NONE/LITTLE OF THE TIME

## 2017-01-06 NOTE — CARE PLAN
Problem: Knowledge Deficit  Goal: Knowledge of disease process/condition, treatment plan, diagnostic tests, and medications will improve  Pt and family educated on POC, all questions answered in regards to disease process, treatment and diet. Pt and family verbalize understanding and voice no further questions at this time.

## 2017-01-06 NOTE — PROGRESS NOTES
Report received at bedside from Urbano BOLDEN, patient awake and alert X 4. Lungs clear bilaterally. Heart rate regular. Abdomen with regular bowel sounds. All peripheral pulses are palpable. Skin intact. Left upper arm IV with blood return.

## 2017-01-06 NOTE — PROGRESS NOTES
Pt arrived on floor from [facility] via [facility]. Monitor placed and room called. Rakesh PELAEZ for direct admit. VS taken and weight recorded. Report received and assessment completed. No family at bedside. Pt indicates no distress. Call light placed within reach, bed in lowest position, and bed alarm is set. Will continue to monitor patient.

## 2017-01-06 NOTE — CARE PLAN
Problem: Knowledge Deficit  Goal: Knowledge of disease process/condition, treatment plan, diagnostic tests, and medications will improve  Outcome: PROGRESSING AS EXPECTED  Continue to educate patient regarding low platelet significance    Problem: Pain Management  Goal: Pain level will decrease to patient’s comfort goal  Outcome: PROGRESSING AS EXPECTED  Monitor and manage to keep as free of pain as possible

## 2017-01-06 NOTE — PROGRESS NOTES
Patient c/o pain on back and legs also extending in his right groin Rebecca ARAUJO notified order received for oxycodone 5 mg orally q 4 hours prn.

## 2017-01-06 NOTE — DISCHARGE PLANNING
Care Transition Team Assessment    Information Source  Orientation : Oriented x 4         Elopement Risk  Legal Hold: No  Ambulatory or Self Mobile in Wheelchair: Yes  Disoriented: No  Psychiatric Symptoms: None  History of Wandering: No  Elopement this Admit: No  Vocalizing Wanting to Leave: No  Displays Behaviors, Body Language Wanting to Leave: No-Not at Risk for Elopement  Elopement Risk: Not at Risk for Elopement    Interdisciplinary Discharge Planning  Does Admitting Nurse Feel This Could be a Complex Discharge?: No  Primary Care Physician: Humble Garcia DO  Lives with - Patient's Self Care Capacity: Alone and Able to Care For Self  Patient or legal guardian wants to designate a caregiver (see row info): No  Support Systems: Spouse / Significant Other, Friends / Neighbors  Housing / Facility: [address]  Do You Take your Prescribed Medications Regularly: Yes  Able to Return to Previous ADL's: Yes  Mobility Issues: No  Prior Services: None  Patient Expects to be Discharged to:: home  Assistance Needed: No  Durable Medical Equipment: Not Applicable    Discharge Preparedness  [facility] resources needed?: None  What is your plan after discharge?: Home with help  Who will help you at home?: Other (comment) (significant other)  Do you have concerns about your hospital stay or care after discharge?: Yes  Difficulity with ADLs: None  Difficulity with IADLs: None  Pharmacy: [facility]  Prescription Coverage: Yes         Finances  Source of Income: Employed  Medical Insurance Coverage: Private insurance    Vision / Hearing Impairment  Vision Impairment : Yes  Hearing Impairment : No    Values / Beliefs / Concerns  Values / Beliefs Concerns : No         Domestic Abuse  Have you ever been the victim of abuse or violence?: No  Physical Abuse or Sexual Abuse: No  Verbal Abuse or Emotional Abuse: No  Possible Abuse Reported to:: Not Applicable    Psychological Assessment  Suspect Abuse: No  Suspect Domestic Violence:  No  History of Substance Abuse: None  History of Psychiatric Problems: No  Non-compliant with Treatment: No  Newly Diagnosed Catastrophic Illness: No  Needs Assistance Dealing with Catastrophic Illness: No  Cancer Diagnosis per Medical Record: No  Facing Drastic Life Change: No  No Needs at this Time: No Needs at This Time         Anticipated Discharge Information  Anticipated discharge disposition: Home  Discharge Address: [address] [place] [id number]  Discharge Contact Phone Number: [phone]

## 2017-01-06 NOTE — PROGRESS NOTES
Report received at bedside from Urbano, patient awake , alert and oriented x 4. Denies pain. Family members at bedside. Seen by dr JOYCELYN Miller GI surgeon no surgical intervention planned at this time at this time. Patient stable.   Patient aware of Ultrasound of the gall bladder scheduled in AM and will be NPO after midnight except for meds with sips of water.

## 2017-01-06 NOTE — PROGRESS NOTES
Patient aware of NPO status after he was seen by Dr Garrett Miller early in the night. Medicated for mild back and leg pain with Tylenol with relief expressed.

## 2017-01-06 NOTE — PROGRESS NOTES
Shift report received and assessment completed. No family at bedside. Pt indicates no distress. Call light placed within reach, bed in lowest position, and pt educated to call. Will continue to monitor patient.

## 2017-01-06 NOTE — CONSULTS
"HISTORY AND PHYSICAL UPDATE    CHIEF COMPLAINT: Abdominal pain.     HISTORY OF PRESENT ILLNESS: The patient is a 56 y.o. male, who was previously seen in the [place] for abdominal pain.   The patient had his worst symptoms about 5-6 days ago in [place].    This resolved over two days. He presented to his primary doctor who referred him to the ER.    He was noted by CT scan to have cholecystitis and was transferred to [facility] due to his thrombocytopenia (platelets of 40k).   General surgery was consulted for evaluation and management.    He currently describes no pain.   There are no new labs or radiologic studies at present.               PAST MEDICAL HISTORY:   none reported    PAST SURGICAL HISTORY: patient denies any surgical history     ALLERGIES:   Allergies   Allergen Reactions   • Penicillins         CURRENT MEDICATIONS:   Home Medications     **Home medications have not yet been reviewed for this encounter**          FAMILY HISTORY: non contributory     SOCIAL HISTORY:   Denies smoking or drinking    REVIEW OF SYSTEMS: Comprehensive review of systems was negative aside from the above hpi.     PHYSICAL EXAMINATION:     GENERAL: The patient is in no distress, alert and communicative.   VITAL SIGNS: Blood pressure 120/86, pulse 83, temperature 36.1 °C (96.9 °F), resp. rate 18, height 1.727 m (5' 8\"), weight 84.9 kg (187 lb 2.7 oz), SpO2 91 %.  HEAD AND NECK: Demonstrates symmetric, reactive pupils. Extraocular muscles   are intact. Nares and oropharynx are clear.   NECK: Supple. No adenopathy.  CHEST:no respiratory distress  CARDIOVASCULAR: Regular rate    ABDOMEN: soft.  Nontender at  Present.  No scarring..   EXTREMITIES: Examination of the upper and lower extremities demonstrates no cyanosis edema or clubbing.  NEUROLOGIC: Alert & oriented x 3, Normal motor function, Normal sensory function, No focal deficits noted.    LABORATORY VALUES:                      IMAGING:   No orders to display       IMPRESSION AND " PLAN:     1.   Acute cholecystitis    I have ordered an US, WBC and LFT's to confirm this diagnosis.   Should it be positive, I have counseled the patient for a laparoscopic cholecystectomy.   I have discussed the procedure, potential complications, and expected outcomes at length with the patient today.   His questions were answered and he agreed to proceed fully informed.     Should he become acutely ill prior to correcting his platelet count, would recommend IR drainage due to low platelets.    2.   Thrombocytopenia    Presumably related to sepsis.   I have ordered a repeat CBC tonight and would like it repeated in am.   If trending up, will take to OR when greater than 100k.    Otherwise, recommend work up for thrombocytopenia.   Appreciate medicine assistance in this matter.          ____________________________________   Garrett Miller M.D.    DD: 1/5/2017 DT: 8:05 PM

## 2017-01-06 NOTE — CARE PLAN
Problem: Safety  Goal: Will remain free from injury  Fall risk assessed, fall precautions in place, bed alarm set, pt verbalizes understanding of fall risk.

## 2017-01-06 NOTE — PROGRESS NOTES
Surgery  Platelets still low  Will order TEG  Possible lap sada later today with platelets ready to transfuse in OR  Remain NPO please

## 2017-01-06 NOTE — PROGRESS NOTES
Pt is resting with no indications of distress. Call light placed within reach, bed alarm is active, and bed in the lowest position. Will continue to monitor.

## 2017-01-06 NOTE — H&P
IDENTIFICATION:  The patient is a 56-year-old male patient has transferred   from [facility].    CHIEF COMPLAINT:  Right upper quadrant abdominal pain for 1 week.    HISTORY OF PRESENT ILLNESS:  The patient is a 56-year-old male who states that   a week ago he overate and was having severe abdominal pain and indigestion.    He got some Tums tablets from the store and states that his symptoms seem   better after taking this; however, he has had recurrent episodes   intermittently after eating and states sometimes the pain will be very   constant and sharp in nature.  He will have belching associated with it and   then it will resolve.  He describes acid reflux symptoms with that as well and   has had nausea.  He denies any vomiting.  He did have a couple of episodes of   diarrhea that has since resolved.  He denies any cough or shortness of   breath.  He has had some back pain and fevers and chills associated with it.    Patient was admitted to [facility] on 01/04/2017   and he does have blood cultures positive for Gram-negative rods.  He was   treated with Flagyl, ciprofloxacin and gentamicin and did have acute kidney   injury with creatinine of 3, which was improving during the hospital stay;   however platelets were also noted to be low at 40, so patient was transferred   to our facility for possible surgical intervention.  A CT scan did show acute   cholecystitis.  Currently, patient states he is feeling better.  He denies any   abdominal pain.  He denies any recurrent fevers or chills.  He does feel   diffusely weak however and states this weakness seemed to be getting worse   over the week prior to presentation to the hospital.    REVIEW OF SYSTEMS:  A 10-point review of systems reviewed and otherwise   negative.    PAST MEDICAL HISTORY:  Non-insulin-dependent type 2 diabetes mellitus,   dyslipidemia and essential hypertension.    PAST SURGICAL HISTORY:   None.    DRUG ALLERGIES:  PENICILLIN CAUSES DIFFICULTY BREATHING.    OUTPATIENT MEDICATIONS:  Norvasc 2.5 mg daily, metformin 500 mg daily and   Lipitor 20 mg daily.    SOCIAL HISTORY:  Patient denies any tobacco or alcohol use and lives   independently.    FAMILY HISTORY:  Patient has multiple family members have diabetes.    PHYSICAL EXAMINATION:  VITAL SIGNS:  Temperature 96.9, blood pressure 120/86, heart rate 83,   respiratory rate 18 and oxygen saturation 91% on 2 liters nasal cannula.  GENERAL:  Patient is a very pleasant gentleman.  He is sitting comfortably in   no apparent distress.  HEENT:  Head atraumatic.  Extraocular movements intact.  Pupils equal, round   and reactive to light.  Sclerae clear.  Conjunctivae pink.  Mucous membranes   moist.  NECK:  Supple.  No jugular venous distention.  Trachea midline.  No anterior,   posterior cervical or supraclavicular lymphadenopathy.  HEART:  Regular rate and rhythm.  No murmur.  Point of maximal impulse   nondisplaced.  LUNGS:  Clear to auscultation bilaterally.  Good breath sounds to bases.  CHEST:  Symmetric with respiration.  ABDOMEN:  Soft, nontender and nondistended.  Normoactive bowel sounds.  No   palpable hepatosplenomegaly.  EXTREMITIES:  No clubbing, cyanosis or edema.  Pedal pulses 2+ bilaterally.  NEUROLOGIC:  Patient is alert and oriented x3.  Moving all 4 extremities   equally.    LABORATORY DATA:  From 01/05/2017 WBC is 11.8, hemoglobin 13.1 and platelets   of 46.  Sodium 138, potassium 3.6, chloride 106, BUN 63 and creatinine 2.2.    IMAGING:  CT scan of abdomen and pelvis dos show acute cholecystitis and one   blood culture was positive for Gram-negative rods.  CT scan of abdomen shows   an 11 mm calcified gallstone at the neck with gallbladder wall thickening and   trace amount of free fluid.    ASSESSMENT AND PLAN:  1.  Gram-negative bacteremia.  Patient will be admitted to the hospital, treat   him with ciprofloxacin given his PENICILLIN  allergy as well as Flagyl.  We   will order for repeat blood cultures here.  I did discuss the case with Dr. Miller from general surgery who will see the patien.    The patient does have thrombocytopenia and is at high   risk for bleeding; therefore, if his platelet count on repeat labs is not   improving, he may need to have a drain placed by radiology rather than   surgical intervention.  2.  Type 2 non-insulin dependent diabetes.  We will order for fingerstick   blood sugars and insulin as needed for elevated blood sugar readings.  3.  Patient is full code.  He will need greater than 2 midnight stay for   treatment of his bacteremia.       ____________________________________     MD ABHIJEET VU / QUE    DD:  01/05/2017 19:13:52  DT:  01/05/2017 19:52:16    D#:  [id number]  Job#:  [id number]

## 2017-01-07 LAB
ALBUMIN SERPL BCP-MCNC: 2.5 G/DL (ref 3.2–4.9)
ALBUMIN/GLOB SERPL: 0.9 G/DL
ALP SERPL-CCNC: 107 U/L (ref 30–99)
ALT SERPL-CCNC: 27 U/L (ref 2–50)
ANION GAP SERPL CALC-SCNC: 10 MMOL/L (ref 0–11.9)
AST SERPL-CCNC: 32 U/L (ref 12–45)
BASOPHILS # BLD AUTO: 0.7 % (ref 0–1.8)
BASOPHILS # BLD: 0.12 K/UL (ref 0–0.12)
BILIRUB SERPL-MCNC: 0.8 MG/DL (ref 0.1–1.5)
BUN SERPL-MCNC: 34 MG/DL (ref 8–22)
CALCIUM SERPL-MCNC: 8 MG/DL (ref 8.5–10.5)
CHLORIDE SERPL-SCNC: 106 MMOL/L (ref 96–112)
CO2 SERPL-SCNC: 23 MMOL/L (ref 20–33)
CREAT SERPL-MCNC: 1.43 MG/DL (ref 0.5–1.4)
EOSINOPHIL # BLD AUTO: 0 K/UL (ref 0–0.51)
EOSINOPHIL NFR BLD: 0 % (ref 0–6.9)
ERYTHROCYTE [DISTWIDTH] IN BLOOD BY AUTOMATED COUNT: 44.9 FL (ref 35.9–50)
GFR SERPL CREATININE-BSD FRML MDRD: 51 ML/MIN/1.73 M 2
GLOBULIN SER CALC-MCNC: 2.8 G/DL (ref 1.9–3.5)
GLUCOSE BLD-MCNC: 114 MG/DL (ref 65–99)
GLUCOSE BLD-MCNC: 125 MG/DL (ref 65–99)
GLUCOSE BLD-MCNC: 131 MG/DL (ref 65–99)
GLUCOSE BLD-MCNC: 137 MG/DL (ref 65–99)
GLUCOSE BLD-MCNC: 138 MG/DL (ref 65–99)
GLUCOSE SERPL-MCNC: 181 MG/DL (ref 65–99)
HCT VFR BLD AUTO: 38.2 % (ref 42–52)
HGB BLD-MCNC: 12.8 G/DL (ref 14–18)
IMM GRANULOCYTES # BLD AUTO: 0.28 K/UL (ref 0–0.11)
IMM GRANULOCYTES NFR BLD AUTO: 1.5 % (ref 0–0.9)
LYMPHOCYTES # BLD AUTO: 0.52 K/UL (ref 1–4.8)
LYMPHOCYTES NFR BLD: 2.8 % (ref 22–41)
MCH RBC QN AUTO: 30 PG (ref 27–33)
MCHC RBC AUTO-ENTMCNC: 33.5 G/DL (ref 33.7–35.3)
MCV RBC AUTO: 89.7 FL (ref 81.4–97.8)
MONOCYTES # BLD AUTO: 0.45 K/UL (ref 0–0.85)
MONOCYTES NFR BLD AUTO: 2.4 % (ref 0–13.4)
NEUTROPHILS # BLD AUTO: 17.06 K/UL (ref 1.82–7.42)
NEUTROPHILS NFR BLD: 92.6 % (ref 44–72)
NRBC # BLD AUTO: 0 K/UL
NRBC BLD AUTO-RTO: 0 /100 WBC
PLATELET # BLD AUTO: 109 K/UL (ref 164–446)
PMV BLD AUTO: 11.4 FL (ref 9–12.9)
POTASSIUM SERPL-SCNC: 4.4 MMOL/L (ref 3.6–5.5)
PROT SERPL-MCNC: 5.3 G/DL (ref 6–8.2)
RBC # BLD AUTO: 4.26 M/UL (ref 4.7–6.1)
SODIUM SERPL-SCNC: 139 MMOL/L (ref 135–145)
WBC # BLD AUTO: 18.4 K/UL (ref 4.8–10.8)

## 2017-01-07 PROCEDURE — 700101 HCHG RX REV CODE 250: Performed by: INTERNAL MEDICINE

## 2017-01-07 PROCEDURE — A9270 NON-COVERED ITEM OR SERVICE: HCPCS | Performed by: INTERNAL MEDICINE

## 2017-01-07 PROCEDURE — A9270 NON-COVERED ITEM OR SERVICE: HCPCS | Performed by: NURSE PRACTITIONER

## 2017-01-07 PROCEDURE — 36415 COLL VENOUS BLD VENIPUNCTURE: CPT

## 2017-01-07 PROCEDURE — 82962 GLUCOSE BLOOD TEST: CPT | Mod: 91

## 2017-01-07 PROCEDURE — 700105 HCHG RX REV CODE 258: Performed by: INTERNAL MEDICINE

## 2017-01-07 PROCEDURE — 87070 CULTURE OTHR SPECIMN AEROBIC: CPT

## 2017-01-07 PROCEDURE — 700112 HCHG RX REV CODE 229: Performed by: INTERNAL MEDICINE

## 2017-01-07 PROCEDURE — 85025 COMPLETE CBC W/AUTO DIFF WBC: CPT

## 2017-01-07 PROCEDURE — 700111 HCHG RX REV CODE 636 W/ 250 OVERRIDE (IP): Performed by: INTERNAL MEDICINE

## 2017-01-07 PROCEDURE — 99233 SBSQ HOSP IP/OBS HIGH 50: CPT | Mod: Q6 | Performed by: INTERNAL MEDICINE

## 2017-01-07 PROCEDURE — 700102 HCHG RX REV CODE 250 W/ 637 OVERRIDE(OP): Performed by: INTERNAL MEDICINE

## 2017-01-07 PROCEDURE — 87205 SMEAR GRAM STAIN: CPT

## 2017-01-07 PROCEDURE — 80053 COMPREHEN METABOLIC PANEL: CPT

## 2017-01-07 PROCEDURE — 3E0234Z INTRODUCTION OF SERUM, TOXOID AND VACCINE INTO MUSCLE, PERCUTANEOUS APPROACH: ICD-10-PCS | Performed by: INTERNAL MEDICINE

## 2017-01-07 PROCEDURE — 90686 IIV4 VACC NO PRSV 0.5 ML IM: CPT | Performed by: INTERNAL MEDICINE

## 2017-01-07 PROCEDURE — 90471 IMMUNIZATION ADMIN: CPT

## 2017-01-07 PROCEDURE — 700102 HCHG RX REV CODE 250 W/ 637 OVERRIDE(OP): Performed by: NURSE PRACTITIONER

## 2017-01-07 PROCEDURE — 770020 HCHG ROOM/CARE - TELE (206)

## 2017-01-07 RX ADMIN — SODIUM CHLORIDE: 9 INJECTION, SOLUTION INTRAVENOUS at 13:01

## 2017-01-07 RX ADMIN — METRONIDAZOLE 500 MG: 500 INJECTION, SOLUTION INTRAVENOUS at 16:20

## 2017-01-07 RX ADMIN — STANDARDIZED SENNA CONCENTRATE AND DOCUSATE SODIUM 1 TABLET: 8.6; 5 TABLET, FILM COATED ORAL at 21:01

## 2017-01-07 RX ADMIN — OXYCODONE HYDROCHLORIDE AND ACETAMINOPHEN 1 TABLET: 5; 325 TABLET ORAL at 21:01

## 2017-01-07 RX ADMIN — DOCUSATE SODIUM 100 MG: 100 CAPSULE ORAL at 08:07

## 2017-01-07 RX ADMIN — OXYCODONE HYDROCHLORIDE AND ACETAMINOPHEN 1 TABLET: 5; 325 TABLET ORAL at 02:53

## 2017-01-07 RX ADMIN — METRONIDAZOLE 500 MG: 500 INJECTION, SOLUTION INTRAVENOUS at 05:56

## 2017-01-07 RX ADMIN — CIPROFLOXACIN 400 MG: 2 INJECTION, SOLUTION INTRAVENOUS at 21:02

## 2017-01-07 RX ADMIN — METRONIDAZOLE 500 MG: 500 INJECTION, SOLUTION INTRAVENOUS at 22:10

## 2017-01-07 RX ADMIN — SODIUM CHLORIDE: 9 INJECTION, SOLUTION INTRAVENOUS at 02:53

## 2017-01-07 RX ADMIN — INFLUENZA A VIRUS A/CALIFORNIA/7/2009 X-179A (H1N1) ANTIGEN (FORMALDEHYDE INACTIVATED), INFLUENZA A VIRUS A/HONG KONG/4801/2014 X-263B (H3N2) ANTIGEN (FORMALDEHYDE INACTIVATED), INFLUENZA B VIRUS B/PHUKET/3073/2013 ANTIGEN (FORMALDEHYDE INACTIVATED), AND INFLUENZA B VIRUS B/BRISBANE/60/2008 ANTIGEN (FORMALDEHYDE INACTIVATED) 0.5 ML: 15; 15; 15; 15 INJECTION, SUSPENSION INTRAMUSCULAR at 04:42

## 2017-01-07 RX ADMIN — CIPROFLOXACIN 400 MG: 2 INJECTION, SOLUTION INTRAVENOUS at 08:07

## 2017-01-07 ASSESSMENT — PAIN SCALES - GENERAL
PAINLEVEL_OUTOF10: 7
PAINLEVEL_OUTOF10: 3
PAINLEVEL_OUTOF10: 7
PAINLEVEL_OUTOF10: 3
PAINLEVEL_OUTOF10: 2
PAINLEVEL_OUTOF10: 1
PAINLEVEL_OUTOF10: 3
PAINLEVEL_OUTOF10: 3

## 2017-01-07 ASSESSMENT — PATIENT HEALTH QUESTIONNAIRE - PHQ9
SUM OF ALL RESPONSES TO PHQ9 QUESTIONS 1 AND 2: 0
SUM OF ALL RESPONSES TO PHQ QUESTIONS 1-9: 0
1. LITTLE INTEREST OR PLEASURE IN DOING THINGS: NOT AT ALL
2. FEELING DOWN, DEPRESSED, IRRITABLE, OR HOPELESS: NOT AT ALL

## 2017-01-07 ASSESSMENT — ENCOUNTER SYMPTOMS
NAUSEA: 1
COUGH: 0
SHORTNESS OF BREATH: 0

## 2017-01-07 ASSESSMENT — LIFESTYLE VARIABLES: DO YOU DRINK ALCOHOL: NO

## 2017-01-07 NOTE — PROGRESS NOTES
Shift report received and assessment completed. Family at bedside. Pt indicates no distress. Call light placed within reach, bed in lowest position, and bed alarm is set. Will continue to monitor patient.

## 2017-01-07 NOTE — CARE PLAN
Problem: Safety  Goal: Will remain free from injury  Fall risk assessed, fall precautions in place, bed alarm set, pt verbalizes understanding of fall risk.    Problem: Knowledge Deficit  Goal: Knowledge of disease process/condition, treatment plan, diagnostic tests, and medications will improve  Pt and family educated on POC, all questions answered in regards to disease process, treatment and diet. Pt and family verbalize understanding and voice no further questions at this time.

## 2017-01-07 NOTE — CARE PLAN
Problem: Knowledge Deficit  Goal: Knowledge of disease process/condition, treatment plan, diagnostic tests, and medications will improve  Outcome: PROGRESSING AS EXPECTED  Encourage patient to ambulate

## 2017-01-07 NOTE — OR SURGEON
Immediate Post-Operative Note      PreOp Diagnosis: acute sada    PostOp Diagnosis: gangrenous sada with intrahepatic abscess    Procedure(s):  SADA BY LAPAROSCOPY  Drainage intra hepatic abscess    Surgeon(s):  DYLAN Collier M.D.    Anesthesiologist/Type of Anesthesia:  Anesthesiologist: Mya Helms M.D./* No anesthesia type entered *    Surgical Staff:  Circulator: Mason Heart R.N.  Scrub Person: Jama Hinton Bullard: Deanna Alatorre R.N.    Specimen: gb    Estimated Blood Loss: 50 cc    Findings: abscess, dead gb    Complications: none noted        1/6/2017 6:17 PM Garrett Miller

## 2017-01-07 NOTE — PROGRESS NOTES
Hospital Medicine Progress Note, Adult, Complex               Author: Ezio Cage Date & Time created: 1/6/2017  7:49 PM     Interval History:  Patient reports nausea and abdominal pain.  Patient transfer from [place] to [place] for general surgery evaluation.    Review of Systems:  Review of Systems   Respiratory: Negative for cough and shortness of breath.    Cardiovascular: Negative for chest pain.   Gastrointestinal: Positive for nausea.       Physical Exam:  Physical Exam   Constitutional: He is oriented to person, place, and time. He appears well-developed and well-nourished.   HENT:   Head: Normocephalic.   Eyes: EOM are normal. Pupils are equal, round, and reactive to light.   Neck: Normal range of motion. No JVD present.   Cardiovascular: Normal rate and regular rhythm.    Pulmonary/Chest: Effort normal. No respiratory distress. He exhibits no tenderness.   Abdominal: Soft. Bowel sounds are normal. He exhibits no distension. There is tenderness.   Neurological: He is alert and oriented to person, place, and time. No cranial nerve deficit.   Skin: Skin is warm and dry.   Psychiatric: He has a normal mood and affect.       Labs:        Invalid input(s): [id number]      Recent Labs      01/05/17 2001 01/06/17 0152   SODIUM  137  136   POTASSIUM  4.2  3.8   CHLORIDE  107  107   CO2  24  24   BUN  52*  48*   CREATININE  1.67*  1.58*   CALCIUM  7.9*  7.9*     Recent Labs      01/05/17 2001 01/06/17   0152   ALTSGPT  25  22   ASTSGOT  14  16   ALKPHOSPHAT  115*  104*   TBILIRUBIN  0.7  0.7   GLUCOSE  180*  132*     Recent Labs      01/05/17 2001 01/06/17 0152   RBC  4.29*  4.01*   HEMOGLOBIN  13.1*  12.2*   HEMATOCRIT  39.0*  36.9*   PLATELETCT  58*  55*     Recent Labs      01/05/17 2001 01/06/17   0152   WBC  11.6*  10.5   NEUTSPOLYS  89.00*  85.00*   LYMPHOCYTES  4.70*  7.30*   MONOCYTES  3.70  5.20   EOSINOPHILS  0.20  0.30   BASOPHILS  0.60  0.50   ASTSGOT  14  16   ALTSGPT  25  22   ALKPHOSPHAT   115*  104*   TBILIRUBIN  0.7  0.7           Hemodynamics:  Temp (24hrs), Av.8 °C (98.2 °F), Min:36.2 °C (97.2 °F), Max:37.4 °C (99.3 °F)  Temperature: 36.8 °C (98.2 °F)  Pulse  Av.7  Min: 74  Max: 94Heart Rate (Monitored): 82  Blood Pressure: 136/88 mmHg, NIBP: 122/82 mmHg     Respiratory:    Respiration: 16, Pulse Oximetry: 93 %           Fluids:    Intake/Output Summary (Last 24 hours) at 17  Last data filed at 17 1900   Gross per 24 hour   Intake   3100 ml   Output   2690 ml   Net    410 ml     Weight: 84.9 kg (187 lb 2.7 oz)  GI/Nutrition:  Orders Placed This Encounter   Procedures   • DIET NPO     Standing Status: Standing      Number of Occurrences: 1      Standing Expiration Date:      Order Specific Question:  Restrict to:     Answer:  Sips with Medications [3]     Medical Decision Making, by Problem:  Active Hospital Problems    Diagnosis   • Acute cholecystitis [K81.0]  -- general surgery consulted.  Likely cholecystectomy today.  Continue empiric antibiotic.   • Gram-negative bacteremia [R78.81]  -- blood culture result pending.   • Hypertension [I10]  -- monitor bp.  Npo status.     • Hyperlipidemia [E78.5]  -- resume statin therapy when stable.   • DM type 2 (diabetes mellitus, type 2) (HCC) [E11.9]  -- continue sliding scale insulin therapy   • Renal insufficiency [N28.9]  -- baseline renal function unknown.  Continue hydration.  Check renal ultrasound.   • Thrombocytopenia (HCC) [D69.6]  -- monitor platelet count.  May need platelet transfusion during surgery.    • Sepsis (HCC) [A41.9]  - continue antibiotic and hydration.       Labs reviewed          DVT prophylaxis - mechanical: SCDs            Patient reports that he has no previous lab works during examination.   He states that his pain is controlled.

## 2017-01-07 NOTE — CARE PLAN
Problem: Pain Management  Goal: Pain level will decrease to patient’s comfort goal  Outcome: PROGRESSING AS EXPECTED  Medicate prn for pain as needed ancourage patient out of bed this morning.

## 2017-01-07 NOTE — OR NURSING
Report called to Crystal BOLDEN. Patient to return to T [id number]. Plan of care discussed. Patient currently resting in bed with eyes closed, even and unlabored respirations noted. 4L NC in place. Patient states pain is tolerable at this time. No acute s/s of distress currently noted. VSS at this time.

## 2017-01-07 NOTE — OP REPORT
DATE OF SERVICE:  01/06/2017    PREOPERATIVE DIAGNOSIS:  Acute cholecystitis.    POSTOPERATIVE DIAGNOSES:  1.  Gangrenous cholecystitis.  2.  Intrahepatic abscess.    PROCEDURES PERFORMED:  1.  Laparoscopic cholecystectomy.  2.  Drainage of intrahepatic abscess.    SURGEON:  Garrett Miller M.D.    FIRST ASSISTANT:  Ezio Peralta M.D.    ANESTHESIOLOGIST:  Mya Helms M.D.    INDICATION FOR PROCEDURE:  Patient is a 56-year-old male transferred from [place]   with cholecystitis and thrombocytopenia.  Patient was counseled extensively   as to the risks versus benefits of the surgery and agreed to proceed fully   informed.    DESCRIPTION OF THE PROCEDURE:  Patient was prepped and draped in the standard   sterile surgical fashion after induction of general anesthesia.  An   appropriate timeout had been performed, antibiotics delivered.  An   infraumbilical Veress insertion was performed and the abdomen was insufflated   to 15 mmHg with CO2.  A subxiphoid 12 mm and 2 right upper quadrant 5 mm   trocars were placed under direct visualization.  The right upper quadrant was   inspected.  The gallbladder was cradled by the omentum and liver.  As soon as we   peeled the omentum, there was an obvious pericystic abscess.  We retracted the   gallbladder superiorly and laterally.  The contents of Calot triangle were   then dissected clearly free.  There was significant inflammation and   induration of the gallbladder.  Once critical view of safety was obtained, the   cystic duct was triply clipped proximally, singly clipped distally and   ligated.  Cystic artery was taken with the Harmonic scalpel.  The Harmonic   scalpel was then used to detach the gallbladder from its attachments of liver   bed.  During the course of this dissection, we noted an intrahepatic abscess   in the gallbladder fossa.  This was drained using the suction .  Once   the gallbladder was detached, it was removed via the subxiphoid trocar with    an Endoclose.  I then copiously irrigated the right upper quadrant until   clear.  Secondary to the abscess I elected to place a YOUNG drain, which was   19-[place] in the gallbladder fossa.  This was tied in with 3-0 nylon suture.    An Endoclose was used to close the subxiphoid trocar.  Hemostasis was   meticulously achieved.  The clips were in place.  There was no biliary   spillage.  The skin edges were reapproximated with Monocryl.  Dermabond was   applied as a dressing.  Patient was extubated to recovery in satisfactory   condition.    DISPOSITION:  To the PACU for recovery.    COMPLICATIONS:  None noted.    ESTIMATED BLOOD LOSS:  50 mL.    SPECIMEN TO LAB:  Gallbladder and contents.       ____________________________________     MD GRABIEL Reyes / QUE    DD:  01/06/2017 18:20:58  DT:  01/06/2017 19:23:05    D#:  [id number]  Job#:  [id number]    cc: ARNOL WALDEN MD

## 2017-01-07 NOTE — PROGRESS NOTES
Patient received from PACU via bed on monitor and oxygen; awake, alert and oriented x 4.with a cup of water in hand. Placed on monitor and monitor room notified of patient's arrival.Afebrile sat 98% , /76. Incision around umbilical and mid upper abdominal area with dermabond ; ice pack on right side of abdomen and r mid abd quadrant dressing with YOUNG with serosanguinous drainage and small amount of drainage on dressing will continue to monitor. Family members saw patient

## 2017-01-07 NOTE — PROGRESS NOTES
Hospital Medicine Progress Note, Adult, Complex               Author: Ezio Cage Date & Time created: 1/7/2017  2:39 PM     Interval History:  Patient reports nausea and abdominal pain.  Patient transfer from [place] to [place] for general surgery evaluation.    1/7/17: patient states that he is feeling better.  Patient still has slight right upper quadrant pain.    Review of Systems:  Review of Systems   Respiratory: Negative for cough and shortness of breath.    Cardiovascular: Negative for chest pain.   Gastrointestinal: Positive for nausea.       Physical Exam:  Physical Exam   Constitutional: He is oriented to person, place, and time. He appears well-developed and well-nourished.   HENT:   Head: Normocephalic.   Eyes: EOM are normal. Pupils are equal, round, and reactive to light.   Neck: Normal range of motion. No JVD present.   Cardiovascular: Normal rate and regular rhythm.    Pulmonary/Chest: Effort normal. No respiratory distress. He exhibits no tenderness.   Abdominal: Soft. Bowel sounds are normal. He exhibits no distension. There is no tenderness.   maribeth drain in place.   Musculoskeletal: Normal range of motion. He exhibits no edema.   Neurological: He is alert and oriented to person, place, and time. No cranial nerve deficit.   Skin: Skin is warm and dry.   Psychiatric: He has a normal mood and affect.       Labs:        Invalid input(s): [id number]      Recent Labs      01/05/17 2001 01/06/17 0152 01/07/17 0151   SODIUM  137  136  139   POTASSIUM  4.2  3.8  4.4   CHLORIDE  107  107  106   CO2  24  24  23   BUN  52*  48*  34*   CREATININE  1.67*  1.58*  1.43*   CALCIUM  7.9*  7.9*  8.0*     Recent Labs      01/05/17 2001 01/06/17 0152 01/07/17 0151   ALTSGPT  25  22  27   ASTSGOT  14  16  32   ALKPHOSPHAT  115*  104*  107*   TBILIRUBIN  0.7  0.7  0.8   GLUCOSE  180*  132*  181*     Recent Labs      01/05/17 2001 01/06/17 0152 01/07/17 0151   RBC  4.29*  4.01*  4.26*   HEMOGLOBIN   13.1*  12.2*  12.8*   HEMATOCRIT  39.0*  36.9*  38.2*   PLATELETCT  58*  55*  109*     Recent Labs      17   015   WBC  11.6*  10.5  18.4*   NEUTSPOLYS  89.00*  85.00*  92.60*   LYMPHOCYTES  4.70*  7.30*  2.80*   MONOCYTES  3.70  5.20  2.40   EOSINOPHILS  0.20  0.30  0.00   BASOPHILS  0.60  0.50  0.70   ASTSGOT  14  16  32   ALTSGPT  25  22  27   ALKPHOSPHAT  115*  104*  107*   TBILIRUBIN  0.7  0.7  0.8           Hemodynamics:  Temp (24hrs), Av °C (98.6 °F), Min:36.6 °C (97.8 °F), Max:37.7 °C (99.8 °F)  Temperature: 37 °C (98.6 °F)  Pulse  Av  Min: 74  Max: 97Heart Rate (Monitored): 84  Blood Pressure: 116/88 mmHg, NIBP: 126/80 mmHg     Respiratory:    Respiration: 18, Pulse Oximetry: 96 %           Fluids:    Intake/Output Summary (Last 24 hours) at 17 1439  Last data filed at 17 1400   Gross per 24 hour   Intake   3594 ml   Output   3315 ml   Net    279 ml     Weight: 85 kg (187 lb 6.3 oz)  GI/Nutrition:  Orders Placed This Encounter   Procedures   • DIET ORDER     Standing Status: Standing      Number of Occurrences: 1      Standing Expiration Date:      Order Specific Question:  Diet:     Answer:  Low Fiber(GI Soft) [2]     Order Specific Question:  Macronutrient modifications:     Answer:  Low Fat [5]     Medical Decision Making, by Problem:  Active Hospital Problems    Diagnosis   • Acute cholecystitis [K81.0]  -- status post cholecystectomy Continue empiric antibiotic.   • Gram-negative bacteremia [R78.81]  -- blood culture result pending.   • Hypertension [I10]  -- monitor bp.  Npo status.     • Hyperlipidemia [E78.5]  -- resume statin therapy   • DM type 2 (diabetes mellitus, type 2) (HCC) [E11.9]  -- continue sliding scale insulin therapy   • Renal insufficiency [N28.9]   -- baseline renal function unknown. Renal function improving.   • Thrombocytopenia (HCC) [D69.6]  -- monitor platelet count. Patient platelet much improved.   • Sepsis (HCC)  [A41.9]  - continue antibiotic and hydration.   hepatic abscess drained during surgery   -- collect drain fluid and send for culture studies    Labs reviewed          DVT prophylaxis - mechanical: SCDs

## 2017-01-08 LAB
ANION GAP SERPL CALC-SCNC: 7 MMOL/L (ref 0–11.9)
BASOPHILS # BLD AUTO: 0.2 % (ref 0–1.8)
BASOPHILS # BLD: 0.03 K/UL (ref 0–0.12)
BUN SERPL-MCNC: 31 MG/DL (ref 8–22)
CALCIUM SERPL-MCNC: 8.2 MG/DL (ref 8.5–10.5)
CHLORIDE SERPL-SCNC: 103 MMOL/L (ref 96–112)
CO2 SERPL-SCNC: 24 MMOL/L (ref 20–33)
CREAT SERPL-MCNC: 1.44 MG/DL (ref 0.5–1.4)
EOSINOPHIL # BLD AUTO: 0.08 K/UL (ref 0–0.51)
EOSINOPHIL NFR BLD: 0.5 % (ref 0–6.9)
ERYTHROCYTE [DISTWIDTH] IN BLOOD BY AUTOMATED COUNT: 43.4 FL (ref 35.9–50)
GFR SERPL CREATININE-BSD FRML MDRD: 51 ML/MIN/1.73 M 2
GLUCOSE BLD-MCNC: 112 MG/DL (ref 65–99)
GLUCOSE BLD-MCNC: 113 MG/DL (ref 65–99)
GLUCOSE BLD-MCNC: 121 MG/DL (ref 65–99)
GLUCOSE SERPL-MCNC: 176 MG/DL (ref 65–99)
GRAM STN SPEC: NORMAL
HCT VFR BLD AUTO: 38.8 % (ref 42–52)
HGB BLD-MCNC: 12.7 G/DL (ref 14–18)
IMM GRANULOCYTES # BLD AUTO: 0.39 K/UL (ref 0–0.11)
IMM GRANULOCYTES NFR BLD AUTO: 2.6 % (ref 0–0.9)
LYMPHOCYTES # BLD AUTO: 0.86 K/UL (ref 1–4.8)
LYMPHOCYTES NFR BLD: 5.6 % (ref 22–41)
MCH RBC QN AUTO: 29.3 PG (ref 27–33)
MCHC RBC AUTO-ENTMCNC: 32.7 G/DL (ref 33.7–35.3)
MCV RBC AUTO: 89.4 FL (ref 81.4–97.8)
MONOCYTES # BLD AUTO: 0.55 K/UL (ref 0–0.85)
MONOCYTES NFR BLD AUTO: 3.6 % (ref 0–13.4)
NEUTROPHILS # BLD AUTO: 13.37 K/UL (ref 1.82–7.42)
NEUTROPHILS NFR BLD: 87.5 % (ref 44–72)
NRBC # BLD AUTO: 0 K/UL
NRBC BLD AUTO-RTO: 0 /100 WBC
PLATELET # BLD AUTO: 141 K/UL (ref 164–446)
PMV BLD AUTO: 10.8 FL (ref 9–12.9)
POTASSIUM SERPL-SCNC: 4.2 MMOL/L (ref 3.6–5.5)
RBC # BLD AUTO: 4.34 M/UL (ref 4.7–6.1)
SIGNIFICANT IND 70042: NORMAL
SITE SITE: NORMAL
SODIUM SERPL-SCNC: 134 MMOL/L (ref 135–145)
SOURCE SOURCE: NORMAL
WBC # BLD AUTO: 15.3 K/UL (ref 4.8–10.8)

## 2017-01-08 PROCEDURE — A9270 NON-COVERED ITEM OR SERVICE: HCPCS | Performed by: INTERNAL MEDICINE

## 2017-01-08 PROCEDURE — 770020 HCHG ROOM/CARE - TELE (206)

## 2017-01-08 PROCEDURE — A9270 NON-COVERED ITEM OR SERVICE: HCPCS | Performed by: NURSE PRACTITIONER

## 2017-01-08 PROCEDURE — 85025 COMPLETE CBC W/AUTO DIFF WBC: CPT

## 2017-01-08 PROCEDURE — 700111 HCHG RX REV CODE 636 W/ 250 OVERRIDE (IP): Performed by: INTERNAL MEDICINE

## 2017-01-08 PROCEDURE — 82962 GLUCOSE BLOOD TEST: CPT | Mod: 91

## 2017-01-08 PROCEDURE — 80048 BASIC METABOLIC PNL TOTAL CA: CPT

## 2017-01-08 PROCEDURE — 700102 HCHG RX REV CODE 250 W/ 637 OVERRIDE(OP): Performed by: NURSE PRACTITIONER

## 2017-01-08 PROCEDURE — 36415 COLL VENOUS BLD VENIPUNCTURE: CPT

## 2017-01-08 PROCEDURE — 700112 HCHG RX REV CODE 229: Performed by: INTERNAL MEDICINE

## 2017-01-08 PROCEDURE — 700101 HCHG RX REV CODE 250: Performed by: INTERNAL MEDICINE

## 2017-01-08 PROCEDURE — 99233 SBSQ HOSP IP/OBS HIGH 50: CPT | Mod: Q6 | Performed by: INTERNAL MEDICINE

## 2017-01-08 RX ADMIN — OXYCODONE HYDROCHLORIDE AND ACETAMINOPHEN 1 TABLET: 5; 325 TABLET ORAL at 06:59

## 2017-01-08 RX ADMIN — METRONIDAZOLE 500 MG: 500 INJECTION, SOLUTION INTRAVENOUS at 07:03

## 2017-01-08 RX ADMIN — METRONIDAZOLE 500 MG: 500 INJECTION, SOLUTION INTRAVENOUS at 13:21

## 2017-01-08 RX ADMIN — OXYCODONE HYDROCHLORIDE AND ACETAMINOPHEN 1 TABLET: 5; 325 TABLET ORAL at 02:37

## 2017-01-08 RX ADMIN — DOCUSATE SODIUM 100 MG: 100 CAPSULE ORAL at 09:29

## 2017-01-08 RX ADMIN — CIPROFLOXACIN 400 MG: 2 INJECTION, SOLUTION INTRAVENOUS at 21:17

## 2017-01-08 RX ADMIN — CIPROFLOXACIN 400 MG: 2 INJECTION, SOLUTION INTRAVENOUS at 09:29

## 2017-01-08 RX ADMIN — METRONIDAZOLE 500 MG: 500 INJECTION, SOLUTION INTRAVENOUS at 22:49

## 2017-01-08 ASSESSMENT — ENCOUNTER SYMPTOMS
COUGH: 0
NAUSEA: 0
FEVER: 0
VOMITING: 0
SHORTNESS OF BREATH: 0

## 2017-01-08 ASSESSMENT — PAIN SCALES - GENERAL
PAINLEVEL_OUTOF10: 0

## 2017-01-08 NOTE — CARE PLAN
Problem: Knowledge Deficit  Goal: Knowledge of disease process/condition, treatment plan, diagnostic tests, and medications will improve  Patient educated on uncontrolled blood sugars and their damaging effects on small vessel organs such as his kidneys which at this time his GFR is at 51    Problem: Risk for Impaired Mobility--Activity Intolerance  Goal: Mobilize and/or Transfer Safely with Maximum [place]  Patient ambulated hallway X 15 minutes this shift. Patient tolerated well without any complaints.

## 2017-01-08 NOTE — PROGRESS NOTES
Patient up in chair this morning. Drain emptied with sanguinous drainage. Medicated twice for pain with relief expressed.

## 2017-01-08 NOTE — PROGRESS NOTES
Assessment complete. Patient in bed resting. No C/O discomfort at this time. VSS. Patient requesting to ambulate hallway. Family assisting patient with ambulation as patient is steady on his feet.

## 2017-01-08 NOTE — PROGRESS NOTES
Faxed consent for medical records for culture and sensitivities for Positive culture in [place]. Provided [facility] fax for report from [facility]. Consent is in chart.

## 2017-01-08 NOTE — PROGRESS NOTES
YOUNG sero sang   AVSS   Platelets normalizing   Renal function stable   PO tolerated   Wbc better

## 2017-01-08 NOTE — PROGRESS NOTES
Patient ambulated on room air with significant other and RN around unit; patient became very tired and slightly short of breath upon completion, complained of right leg pain that was resolved with oxy 5 mg and had a good night rest; YOUNG with minimal serosanguinous drainage. Afebrile with stable vital signs and encourage to use incentive spirometer.

## 2017-01-08 NOTE — PROGRESS NOTES
Report at [facility] patient awake, alert and oriented x 4 visiting with family. No complaints at this time.

## 2017-01-08 NOTE — CARE PLAN
Problem: Safety  Goal: Will remain free from injury  Outcome: PROGRESSING AS EXPECTED    Problem: Pain Management  Goal: Pain level will decrease to patient’s comfort goal  Outcome: PROGRESSING AS EXPECTED  Encourage to ambulate tid

## 2017-01-09 ENCOUNTER — APPOINTMENT (OUTPATIENT)
Dept: RADIOLOGY | Facility: MEDICAL CENTER | Age: 57
DRG: 853 | End: 2017-01-09
Attending: INTERNAL MEDICINE
Payer: COMMERCIAL

## 2017-01-09 VITALS
RESPIRATION RATE: 16 BRPM | HEIGHT: 68 IN | TEMPERATURE: 97.4 F | DIASTOLIC BLOOD PRESSURE: 91 MMHG | BODY MASS INDEX: 29.17 KG/M2 | WEIGHT: 192.46 LBS | HEART RATE: 83 BPM | SYSTOLIC BLOOD PRESSURE: 137 MMHG | OXYGEN SATURATION: 95 %

## 2017-01-09 PROBLEM — A41.9 SEPSIS (HCC): Status: RESOLVED | Noted: 2017-01-06 | Resolved: 2017-01-09

## 2017-01-09 PROBLEM — D69.6 THROMBOCYTOPENIA (HCC): Status: RESOLVED | Noted: 2017-01-06 | Resolved: 2017-01-09

## 2017-01-09 PROBLEM — K81.0 ACUTE CHOLECYSTITIS: Status: RESOLVED | Noted: 2017-01-06 | Resolved: 2017-01-09

## 2017-01-09 LAB
ALBUMIN SERPL BCP-MCNC: 2.6 G/DL (ref 3.2–4.9)
ALP SERPL-CCNC: 102 U/L (ref 30–99)
ALT SERPL-CCNC: 24 U/L (ref 2–50)
AST SERPL-CCNC: 23 U/L (ref 12–45)
BASOPHILS # BLD AUTO: 0.3 % (ref 0–1.8)
BASOPHILS # BLD: 0.03 K/UL (ref 0–0.12)
BILIRUB CONJ SERPL-MCNC: 0.2 MG/DL (ref 0.1–0.5)
BILIRUB INDIRECT SERPL-MCNC: 0.6 MG/DL (ref 0–1)
BILIRUB SERPL-MCNC: 0.8 MG/DL (ref 0.1–1.5)
BUN SERPL-MCNC: 26 MG/DL (ref 8–22)
CALCIUM SERPL-MCNC: 8.6 MG/DL (ref 8.5–10.5)
CHLORIDE SERPL-SCNC: 104 MMOL/L (ref 96–112)
CO2 SERPL-SCNC: 25 MMOL/L (ref 20–33)
CREAT SERPL-MCNC: 1.33 MG/DL (ref 0.5–1.4)
EOSINOPHIL # BLD AUTO: 0.1 K/UL (ref 0–0.51)
EOSINOPHIL NFR BLD: 0.8 % (ref 0–6.9)
ERYTHROCYTE [DISTWIDTH] IN BLOOD BY AUTOMATED COUNT: 42 FL (ref 35.9–50)
GFR SERPL CREATININE-BSD FRML MDRD: 56 ML/MIN/1.73 M 2
GLUCOSE BLD-MCNC: 118 MG/DL (ref 65–99)
GLUCOSE BLD-MCNC: 121 MG/DL (ref 65–99)
GLUCOSE BLD-MCNC: 138 MG/DL (ref 65–99)
GLUCOSE SERPL-MCNC: 136 MG/DL (ref 65–99)
HCT VFR BLD AUTO: 38.1 % (ref 42–52)
HGB BLD-MCNC: 12.7 G/DL (ref 14–18)
IMM GRANULOCYTES # BLD AUTO: 0.51 K/UL (ref 0–0.11)
IMM GRANULOCYTES NFR BLD AUTO: 4.3 % (ref 0–0.9)
LV EJECT FRACT  99904: 65
LV EJECT FRACT MOD 2C 99903: 71.98
LV EJECT FRACT MOD 4C 99902: 66.18
LV EJECT FRACT MOD BP 99901: 68.58
LYMPHOCYTES # BLD AUTO: 1.02 K/UL (ref 1–4.8)
LYMPHOCYTES NFR BLD: 8.5 % (ref 22–41)
MCH RBC QN AUTO: 29.7 PG (ref 27–33)
MCHC RBC AUTO-ENTMCNC: 33.3 G/DL (ref 33.7–35.3)
MCV RBC AUTO: 89 FL (ref 81.4–97.8)
MONOCYTES # BLD AUTO: 0.64 K/UL (ref 0–0.85)
MONOCYTES NFR BLD AUTO: 5.4 % (ref 0–13.4)
NEUTROPHILS # BLD AUTO: 9.66 K/UL (ref 1.82–7.42)
NEUTROPHILS NFR BLD: 80.7 % (ref 44–72)
NRBC # BLD AUTO: 0 K/UL
NRBC BLD AUTO-RTO: 0 /100 WBC
PHOSPHATE SERPL-MCNC: 3.8 MG/DL (ref 2.5–4.5)
PLATELET # BLD AUTO: 160 K/UL (ref 164–446)
PMV BLD AUTO: 10.6 FL (ref 9–12.9)
POTASSIUM SERPL-SCNC: 4.2 MMOL/L (ref 3.6–5.5)
PROT SERPL-MCNC: 5.4 G/DL (ref 6–8.2)
RBC # BLD AUTO: 4.28 M/UL (ref 4.7–6.1)
SODIUM SERPL-SCNC: 138 MMOL/L (ref 135–145)
WBC # BLD AUTO: 12 K/UL (ref 4.8–10.8)

## 2017-01-09 PROCEDURE — 80076 HEPATIC FUNCTION PANEL: CPT

## 2017-01-09 PROCEDURE — 82962 GLUCOSE BLOOD TEST: CPT

## 2017-01-09 PROCEDURE — A9270 NON-COVERED ITEM OR SERVICE: HCPCS | Performed by: INTERNAL MEDICINE

## 2017-01-09 PROCEDURE — 93306 TTE W/DOPPLER COMPLETE: CPT

## 2017-01-09 PROCEDURE — 700102 HCHG RX REV CODE 250 W/ 637 OVERRIDE(OP)

## 2017-01-09 PROCEDURE — 85025 COMPLETE CBC W/AUTO DIFF WBC: CPT

## 2017-01-09 PROCEDURE — 80069 RENAL FUNCTION PANEL: CPT

## 2017-01-09 PROCEDURE — 700111 HCHG RX REV CODE 636 W/ 250 OVERRIDE (IP): Performed by: INTERNAL MEDICINE

## 2017-01-09 PROCEDURE — 700102 HCHG RX REV CODE 250 W/ 637 OVERRIDE(OP): Performed by: INTERNAL MEDICINE

## 2017-01-09 PROCEDURE — 76775 US EXAM ABDO BACK WALL LIM: CPT

## 2017-01-09 PROCEDURE — 36415 COLL VENOUS BLD VENIPUNCTURE: CPT

## 2017-01-09 PROCEDURE — 93306 TTE W/DOPPLER COMPLETE: CPT | Mod: 26 | Performed by: INTERNAL MEDICINE

## 2017-01-09 PROCEDURE — 99239 HOSP IP/OBS DSCHRG MGMT >30: CPT | Mod: Q6 | Performed by: INTERNAL MEDICINE

## 2017-01-09 PROCEDURE — 700101 HCHG RX REV CODE 250: Performed by: INTERNAL MEDICINE

## 2017-01-09 PROCEDURE — A9270 NON-COVERED ITEM OR SERVICE: HCPCS

## 2017-01-09 RX ORDER — METRONIDAZOLE 500 MG/1
500 TABLET ORAL EVERY 8 HOURS
Status: DISCONTINUED | OUTPATIENT
Start: 2017-01-09 | End: 2017-01-09 | Stop reason: HOSPADM

## 2017-01-09 RX ORDER — CIPROFLOXACIN 500 MG/1
500 TABLET, FILM COATED ORAL EVERY 12 HOURS
Status: DISCONTINUED | OUTPATIENT
Start: 2017-01-09 | End: 2017-01-09

## 2017-01-09 RX ORDER — LEVOFLOXACIN 750 MG/1
750 TABLET, FILM COATED ORAL DAILY
Qty: 14 TAB | Refills: 0 | Status: SHIPPED | OUTPATIENT
Start: 2017-01-09 | End: 2017-01-23

## 2017-01-09 RX ORDER — HYDROCODONE BITARTRATE AND ACETAMINOPHEN 5; 325 MG/1; MG/1
1 TABLET ORAL EVERY 6 HOURS PRN
Qty: 20 TAB | Refills: 0 | Status: SHIPPED | OUTPATIENT
Start: 2017-01-09 | End: 2023-11-11

## 2017-01-09 RX ORDER — LEVOFLOXACIN 750 MG/1
750 TABLET, FILM COATED ORAL DAILY
Status: DISCONTINUED | OUTPATIENT
Start: 2017-01-09 | End: 2017-01-09 | Stop reason: HOSPADM

## 2017-01-09 RX ORDER — ONDANSETRON 4 MG/1
4 TABLET, ORALLY DISINTEGRATING ORAL EVERY 4 HOURS PRN
Qty: 20 TAB | Refills: 0 | Status: SHIPPED | OUTPATIENT
Start: 2017-01-09 | End: 2023-11-11

## 2017-01-09 RX ORDER — GLIPIZIDE 2.5 MG/1
2.5 TABLET, EXTENDED RELEASE ORAL DAILY
Qty: 30 TAB | Refills: 0 | Status: SHIPPED | OUTPATIENT
Start: 2017-01-09 | End: 2023-11-11

## 2017-01-09 RX ADMIN — CIPROFLOXACIN 400 MG: 2 INJECTION, SOLUTION INTRAVENOUS at 10:03

## 2017-01-09 RX ADMIN — METRONIDAZOLE 500 MG: 500 INJECTION, SOLUTION INTRAVENOUS at 05:41

## 2017-01-09 RX ADMIN — LEVOFLOXACIN 750 MG: 750 TABLET, FILM COATED ORAL at 14:31

## 2017-01-09 RX ADMIN — METRONIDAZOLE 500 MG: 500 TABLET ORAL at 14:31

## 2017-01-09 ASSESSMENT — PAIN SCALES - GENERAL
PAINLEVEL_OUTOF10: 0

## 2017-01-09 NOTE — CARE PLAN
Problem: Safety  Goal: Will remain free from falls  Outcome: PROGRESSING AS EXPECTED  Intervention: Implement fall precautions  Pt educated on safety measures and use of the call light. Call light within reach. Bed in low, locked position.      Problem: Risk for Impaired Mobility--Activity Intolerance  Goal: Mobilize and/or Transfer Safely with [facility]  Outcome: PROGRESSING AS EXPECTED  Pt encouraged to ambulate as tolerated and rest as needed.   Intervention: Pain Management adequate to allow progressive mobilization  Pt assessed for pain and encouraged to ambulate.

## 2017-01-09 NOTE — DOCUMENTATION QUERY
DOCUMENTATION QUERY    PROVIDERS: Please select “Cosign w/ note”to reply to query.    To better represent the severity of illness of your patient, please review the following information and exercise your independent professional judgment in responding to this query.     Per ED documentation patient was transferred to [facility] with a diagnosis of sepsis and per progress notes patient has a diagnosis of acute cholecystitis and sepsis. Based upon the clinical findings, risk factors, and treatment, please specify if this condition was present on admission or developed after admission    Please select all that apply:   • Sepsis present on admission (specify causative organism if known and any associated organ dysfunction if known)  • Sepsis developed after admission  • Other explanation of clinical findings  • Findings of no clinical significance  • Unable to determine      The medical record reflects the following:   Clinical Findings Per ED documentation patient was transferred to [facility] with a diagnosis of sepsis and per progress notes patient has a diagnosis of acute cholecystitis and sepsis; gram negative bacteremia   Treatment Antibiotics; IVF; cholecystectomy   Risk Factors Sepsis; acute cholecystitis; laparoscopic cholecystectomy; bacteremia; thrombocytopenia; diabetes   Location within medical record History and Physical; Progress Notes; ED note      Thank you,   Shanti Ocampo RN  Clinical   [phone]

## 2017-01-09 NOTE — PROGRESS NOTES
Received bedside report. Assumed care of the pt. Pt A&Ox4, denies pain. Pt educated on POC, safety and use of call light. Pt educated about importance of wearing SCDs for DVT prophylaxis, pt refusing to wear at this time. Call light within reach. Bed in low, locked position.

## 2017-01-09 NOTE — PROGRESS NOTES
Order to d/c YOUNG drain. Sutures removed. Attempted to remove YOUNG drain however resistance met. Dr. Miller paged an informed.

## 2017-01-09 NOTE — PROGRESS NOTES
Surgery  Looks and feels well  Had a bm  Wounds healing well  Drain serous  A/P  1.  D/c YOUNG drain  2.  Surgery signing off

## 2017-01-09 NOTE — PROGRESS NOTES
Maryam daughter & mother Leyda (same last names as patient) both need doctor's excuse for flying in so their separate work knows they weren't here on vacation - both will be here tomorrow, will alert pt's primary RN & try to let Dr. Cage know before shift ends

## 2017-01-09 NOTE — PROGRESS NOTES
Hospital Medicine Progress Note, Adult, Complex               Author: Ezio Cage Date & Time created: 1/8/2017  5:06 PM     Interval History:  Patient reports nausea and abdominal pain.  Patient transfer from [place] to [place] for general surgery evaluation.    1/7/17: patient states that he is feeling better.  Patient still has slight right upper quadrant pain.  1/8/17:  Patient states that he does not have much pain any more.  Patient able to tolerate his diet and had a bowel movement.  Patient is actively ambulating in the hallway.    Review of Systems:  Review of Systems   Constitutional: Negative for fever.   Respiratory: Negative for cough and shortness of breath.    Cardiovascular: Negative for chest pain.   Gastrointestinal: Negative for nausea and vomiting.       Physical Exam:  Physical Exam   Constitutional: He is oriented to person, place, and time. He appears well-developed and well-nourished. No distress.   HENT:   Head: Normocephalic.   Eyes: EOM are normal. Pupils are equal, round, and reactive to light. Right eye exhibits no discharge. Left eye exhibits no discharge.   Neck: Normal range of motion. No JVD present.   Cardiovascular: Normal rate and regular rhythm.    No murmur heard.  Pulmonary/Chest: Effort normal. No respiratory distress. He exhibits no tenderness.   Abdominal: Soft. Bowel sounds are normal. He exhibits no distension. There is no tenderness.   maribeth drain in place.   Musculoskeletal: Normal range of motion. He exhibits no edema.   Neurological: He is alert and oriented to person, place, and time. No cranial nerve deficit.   Skin: Skin is warm and dry. He is not diaphoretic.   Psychiatric: He has a normal mood and affect.       Labs:        Invalid input(s): [id number]      Recent Labs      01/06/17   0152  01/07/17   0151  01/08/17   0826   SODIUM  136  139  134*   POTASSIUM  3.8  4.4  4.2   CHLORIDE  107  106  103   CO2  24  23  24   BUN  48*  34*  31*   CREATININE  1.58*  1.43*   1.44*   CALCIUM  7.9*  8.0*  8.2*     Recent Labs      17   ALTSGPT  25  22  27   --    ASTSGOT  14  16  32   --    ALKPHOSPHAT  115*  104*  107*   --    TBILIRUBIN  0.7  0.7  0.8   --    GLUCOSE  180*  132*  181*  176*     Recent Labs      17   RBC  4.01*  4.26*  4.34*   HEMOGLOBIN  12.2*  12.8*  12.7*   HEMATOCRIT  36.9*  38.2*  38.8*   PLATELETCT  55*  109*  141*     Recent Labs      17   WBC  11.6*  10.5  18.4*  15.3*   NEUTSPOLYS  89.00*  85.00*  92.60*  87.50*   LYMPHOCYTES  4.70*  7.30*  2.80*  5.60*   MONOCYTES  3.70  5.20  2.40  3.60   EOSINOPHILS  0.20  0.30  0.00  0.50   BASOPHILS  0.60  0.50  0.70  0.20   ASTSGOT  14  16  32   --    ALTSGPT  25  22  27   --    ALKPHOSPHAT  115*  104*  107*   --    TBILIRUBIN  0.7  0.7  0.8   --            Hemodynamics:  Temp (24hrs), Av.3 °C (97.3 °F), Min:36.1 °C (96.9 °F), Max:36.9 °C (98.5 °F)  Temperature: 36.2 °C (97.2 °F)  Pulse  Av.3  Min: 70  Max: 97   Blood Pressure: 155/92 mmHg     Respiratory:    Respiration: 18, Pulse Oximetry: 96 %           Fluids:    Intake/Output Summary (Last 24 hours) at 17 1706  Last data filed at 17 1300   Gross per 24 hour   Intake   2540 ml   Output   2145 ml   Net    395 ml     Weight: 88.3 kg (194 lb 10.7 oz)  GI/Nutrition:  Orders Placed This Encounter   Procedures   • DIET ORDER     Standing Status: Standing      Number of Occurrences: 1      Standing Expiration Date:      Order Specific Question:  Diet:     Answer:  Low Fiber(GI Soft) [2]     Order Specific Question:  Macronutrient modifications:     Answer:  Low Fat [5]     Medical Decision Making, by Problem:  Active Hospital Problems    Diagnosis   • Acute cholecystitis [K81.0]  -- status post cholecystectomy Continue empiric antibiotics.   • Gram-negative bacteremia [R78.81]  --  Obtained patient's blood cultures result from [facility] in [place].  Per blood culture result, patient has E. Coli which is sensitive to all antibiotics tested. Likely source of infection is from acute cholecystitis. Anticipate 14 days of antibiotic treatment.   • Hypertension [I10]  -- monitor bp.    • Hyperlipidemia [E78.5]  -- resume statin therapy   • DM type 2 (diabetes mellitus, type 2) (HCC) [E11.9]  -- continue sliding scale insulin therapy   • Renal insufficiency [N28.9]   -- baseline renal function unknown. Renal function stabilized. Check renal ultrasound.  Recommend outpatient follow up.   • Thrombocytopenia ([facility]) [D69.6]  -- monitor platelet count. Patient platelet increased.      • Sepsis ([facility]) [A41.9]  - continue antibiotic and hydration.  -- patient bp is not hypotensive.   hepatic abscess drained during surgery   -- fluid from maribeth drain sent for culture studies    Anticipate discharge home in 1 to 2 days, if no worsening of infection and if okay with surgeon.    Labs reviewed          DVT prophylaxis - mechanical: SCDs

## 2017-01-09 NOTE — PROGRESS NOTES
Pt sleeping comfortably, NPO for renal ultrasound in am, no changes in assessment. Call light within reach. Bed in low, locked position.

## 2017-01-09 NOTE — DISCHARGE INSTRUCTIONS
Discharge Instructions    Discharged to home by car with relative. Discharged via wheelchair, hospital escort: Yes.  Special equipment needed: Not Applicable    Be sure to schedule a follow-up appointment with your primary care doctor or any specialists as instructed.     Discharge Plan:   Diet Plan: Discussed  Activity Level: Discussed  Confirmed Follow up Appointment: Patient to Call and Schedule Appointment  Confirmed Symptoms Management: Discussed  Medication Reconciliation Updated: Yes  Influenza Vaccine Indication: Indicated: 9 to 64 years of age  Influenza Vaccine Given - only chart on this line when given: Influenza Vaccine Given (See MAR)    I understand that a diet low in cholesterol, fat, and sodium is recommended for good health. Unless I have been given specific instructions below for another diet, I accept this instruction as my diet prescription.       Special Instructions: Showers only until surgeon okays bath.     · Is patient discharged on Warfarin / Coumadin?   No     · Is patient Post Blood Transfusion?  No    Depression / Suicide Risk    As you are discharged from this [facility] facility, it is important to learn how to keep safe from harming yourself.    Recognize the warning signs:  · Abrupt changes in personality, positive or negative- including increase in energy   · Giving away possessions  · Change in eating patterns- significant weight changes-  positive or negative  · Change in sleeping patterns- unable to sleep or sleeping all the time   · Unwillingness or inability to communicate  · Depression  · Unusual sadness, discouragement and loneliness  · Talk of wanting to die  · Neglect of personal appearance   · Rebelliousness- reckless behavior  · Withdrawal from people/activities they love  · Confusion- inability to concentrate     If you or a loved one observes any of these behaviors or has concerns about self-harm, here's what you can do:  · Talk about it- your feelings and reasons  for harming yourself  · Remove any means that you might use to hurt yourself (examples: pills, rope, extension cords, firearm)  · Get professional help from the community (Mental Health, Substance Abuse, psychological counseling)  · Do not be alone:Call your Safe Contact- someone whom you trust who will be there for you.  · Call your local CRISIS HOTLINE 390-4801 or 061-939-3408  · Call your local Children's Mobile Crisis Response Team [place] (434) 107-8418 or www.[employer]  · Call the toll free National Suicide Prevention Hotlines   · National Suicide Prevention Lifeline 604-939-ETNX (6149)  · National Hope Line Network 800-SUICIDE (331-7842)

## 2017-01-10 LAB
BACTERIA BLD CULT: NORMAL
BACTERIA BLD CULT: NORMAL
BACTERIA WND AEROBE CULT: NORMAL
GRAM STN SPEC: NORMAL
SIGNIFICANT IND 70042: NORMAL
SITE SITE: NORMAL
SOURCE SOURCE: NORMAL

## 2017-01-10 NOTE — DISCHARGE SUMMARY
DISCHARGE DIAGNOSES:  Include the following,  1.  Acute cholecystitis with specifically patient has intrahepatic abscess   that was drained and patient also has gangrenous gallbladder per surgeon's   diagnosis.  2.   Gram-negative bacteremia which turned out to be   E. coli, likely source is acute cholecystitis.  3.  acute thrombocytopenia that resolved, it was thought that   may be secondary to sepsis that the patient was experiencing at the time of   arrival.  4.   diabetes mellitus, type 2, non-insulin dependent.  5.  acute kidney injury and on possible chronic kidney disease.  6.  hypertension.  7.  Hyperlipidemia.    CONSULTANT ON THIS CASE:  General surgeon, Dr. Garrett Miller.    PERTINENT IMAGING AND PROCEDURES:  Here in the hospital, patient had the   following imaging done.  1.  Patient had an ultrasound of the gallbladder that was performed on   01/06/2017, findings were stones lodged within the gallbladder neck and with   gallbladder wall edema, possible pericholecystic fluid consistent with   cholecystitis.  The patient has no common bile duct dilatation.  Patient had   hepatomegaly and hepatic steatosis.  2.  Patient then had a renal ultrasound.  The renal ultrasound findings were,   patient had unremarkable renal ultrasound and there was no hydronephrosis and   no abnormal calcifications.  3.  Patient then had a cardiac echocardiogram done.  The interpretation was   the patient had normal chamber size and normal left ventricular systolic   function, mild concentric left ventricular hypertrophy with prominent sigmoid   septum and please note the patient's left ventricular ejection fraction was   estimated to be 65% and normal regional wall motions, grade I diastolic   dysfunctions.  4.  Pathology report from the cholecystectomy.  Please note that the pathology   report currently is still pending.  5.  Microbiology.  Here, the patient had 2 sets of blood cultures that were   repeated which were both  negative and patient had wound culture that was from   the YOUNG drain and was negative, only showed a few white blood cells.  Please   note that I was able to obtain patient's blood culture result from [facility] and that grew out E. coli which is actually sensitive to all   antibiotics that were tested against the blood culture.    HOSPITAL COURSE:  Patient is a 56-year-old male who came to the hospital as a   transfer from [place], [place] and patient apparently was at the [facility] where patient had the diagnosis of acute   cholecystitis and patient was noted to have gram-negative rods growing in 1   out of 2 set blood cultures and patient there also was noted to have low   platelet count apparently in the 40s and so there is no surgery available at   that facility and so patient was subsequently transported here to [facility] for surgical evaluation and here in the hospital patient was seen and   evaluated by surgeon, then subsequently taken to operating room with platelet   cell transfusion and the procedure the patient had was a cholecystectomy by   laparoscopic method and the patient had drainage of the intrahepatic abscess   and surgeon performed this was Dr. Garrett Miller and the surgery was performed   on 01/06/2017.  Postoperatively, the patient had a very elevated white blood   cell count.  The patient was on empiric antibiotics, levofloxacin and Flagyl   and patient's white blood cell count was gradually trending down.  Please note   that the patient's platelet gordon up to above 100,000 on transfusion and then   gradually kept on going upward, now at the time of discharge, patient's   platelet count was 160,000 .  Please note that the patient was also   evaluated by infectious disease Dr. Nix and recommendations are the patient   continue on antibiotics levofloxacin 750 mg for another 2 weeks and please   note that patient was also cleared by surgery and  patient had YOUNG drain pulled   today and patient is able to tolerate his diet without any difficulty, had   bowel movement and his white blood cell count is trending down.  At the time   of discharge, patient's white blood cell count was 12, neutrophils 8.7% and   patient also had hemoglobin of 12.7, hematocrit 38.1 and platelet count 160.    Of note, please note that patient was noted to have an acute kidney injury and   patient's creatinine on arrival was 1.67 and then with  rehydration, the   patient's creatinine gradually trended down to 1.33.  Patient had renal ultrasound and the   results are as mentioned above.  Given the patient had diabetes mellitus, it   is possible, patient may have a component of chronic kidney disease.  I   advised patient not to take metformin until he follows up with his primary   care provider.  I prescribed patient with glipizide and that is the medication   for hyperglycemic control.  Patient did reveal to me that he does not follow   up with his primary care provider on a regular basis and I advised patient   that given these multiple comorbidities, recommend the patient to have a   regular followup.  Patient expressed understanding.    DISCHARGE CONDITION:  Stable.    DISPOSITION:  Home.    ACTIVITIES:  As tolerated.    DIET:  GI soft diet and cardiac diabetic diet.    DISCHARGE MEDICATIONS:  Including the following:  Patient to continue on   levofloxacin 750 mg p.o. q. daily for 14 days and patient to take pain   medications as needed Norco 5/325 mg 1 tablet by mouth every 6 hours as needed   for pain and only short duration was prescribed to patient and I have   reviewed with patient [place] narcotic database and I have reviewed patient's   data and [place] prescription monitoring program database.    Patient to continue glipizide 2.5 mg sustained release tablets 1 tablet by   mouth q. daily.  Patient has Zofran 4 mg oral disintegrating tablets 1 tablet   by mouth q. 4 hours  p.r.n. for nausea and vomiting.    Resume on home medications:  1.  Lipitor 20 mg 1 tablet at bedtime.  2.  Norvasc 12.5 mg by mouth q. daily.  3.  Vitamin B12 at 500 mcg q. daily.  4.  Omega 3 fatty acid 1000 mg q. daily.  5.  Vitamin D cholecalciferol 1000 units by mouth q. daily.    FOLLOWUP INSTRUCTIONS:  Patient to follow up with his primary care provider   which is Dr. Humble Garcia who is in [place], [place] and patient to follow   up with general surgeon, Dr. Garrett Miller in 1 week.  I have advised patient   not to lift objects more than 15 pounds.    Total discharge time is 32 minutes.       ____________________________________     DO BALJEET Rudd / QUE    DD:  01/09/2017 15:43:39  DT:  01/10/2017 01:08:03    D#:  [id number]  Job#:  [id number]

## 2017-01-10 NOTE — CONSULTS
"DATE OF SERVICE:  01/09/2017    CONSULTING PHYSICIAN:  Ezio Cage DO.    REASON FOR CONSULTATION:  Cholecystitis with biliary abscess.    HISTORY OF PRESENT ILLNESS:  This is a 56-year-old male with history of   non-insulin dependent diabetes who was in his usual state of health until was   admitted on 01/05/2017, after a one-week history of severe abdominal pain and   \"indigestion.\"  He had attempted to self treat with TUMS with some   improvement.  However, due to the chronicity and recurrence of his symptoms he   went to the doctor for further evaluation and management.  Pain was described   as sharp and constant when it occurred.  It was associated with acid reflux   symptoms and nausea, but no vomiting.  He did have a couple episodes of   diarrhea, but those resolved on their own.  Once he developed fever and chills   he went to [facility].  Blood cultures were   positive for E. coli that was pan-susceptible.  He was started on   ciprofloxacin, gentamicin, and Flagyl, sent to [facility] for further   evaluation and management.  On admission there, he was noted to have a   creatinine of 3, but improved with hydration, as well as thrombocytopenia.    Once he was transferred here he was seen by surgery.  He was found to have   gangrenous cholecystitis with associated intrahepatic abscess.  He is status   post cholecystectomy and drainage of the abscess on January 6th.  As of today,   his drain has been removed.  He has been receiving ciprofloxacin and Flagyl,   and infectious disease is consulted for antibiotic recommendations and   management.    REVIEW OF SYSTEMS:  Negative except for stated in the HPI.  He has had   significant improvement in his abdominal pain and is not tolerating p.o. well.    ALLERGIES:  HE IS ALLERGIC TO PENICILLIN, WHICH CAUSES SWELLING.    PAST MEDICAL HISTORY:  Diabetes, hyperlipidemia, and hypertension.    FAMILY HISTORY:  Diabetes.    SOCIAL HISTORY:  " Does not drink, smoke, or use illicit drugs.    PHYSICAL EXAMINATION:  GENERAL:  He is a well-nourished, well-developed male in no acute distress,   pleasant and cooperative.  VITAL SIGNS:  T-max since admission is 99.8, current temperature is 97.4,   blood pressure 137/91, pulse of 83, respiratory rate 16, oxygen saturation 95%   on room air, and he weighs 87 kilos.  HEENT:  Normocephalic and atraumatic.  Pupils equal, round, and reactive to   light.  Extraocular movements intact.  Oropharynx is clear.  NECK:  Supple.  CARDIOVASCULAR:  Regular rate and rhythm.  CHEST:  Grossly clear to auscultation bilaterally, unlabored.  ABDOMEN:  Soft and nontender.  He has multiple laparoscopic, sites which are   well healed.  A drain has been removed.  There is no rebound or guarding.  EXTREMITIES:  Show no cyanosis, clubbing, or edema.  NEUROLOGICAL:  He is awake, alert, and oriented.  Speech is fluent without   dysarthria.  Cranial nerves are intact.    LABORATORY DATA AND DIAGNOSTIC IMAGING:  White blood cell count here is   decreased from 18.4 down to 12, H and H of 12.7 and 38, and platelets of   160,000.  Sodium 138, potassium 4.2, chloride 104, glucose 136, BUN 26, and   creatinine 1.33.  Creatinine on admission was 1.67.  AST 23, ALT 24, and   alkaline phosphatase 102.  Pathology from January 6th is still pending.  Blood   cultures x2 on admission here were negative.  YOUNG drainage showed few WBCs, no   organisms.  Cultures are negative.  He did have a renal ultrasound here that   was negative.  Operative report showed a pericystic abscess and a   significantly inflamed and indurated gallbladder, all of which were removed.    ASSESSMENT AND PLAN:  A 56-year-old male presented with gangrenous   cholecystitis with associated abscess.  He had prior Gram-negative sepsis with   acute renal failure and marked leukocytosis from outlying facility.  Repeat   cultures here were negative from the 5th.  He has been afebrile.  His  drain   has since been removed.  He is tolerating p.o. well.  So, I do recommend that   he should do well on oral quinolone as is bactericidal and 100% oral bioavailable   availability along with Flagyl to complete a 14-day course of antimicrobial   therapy.  Follow closely, potential side effects and complications discussed.    Discussed with internal medicine.    Thank you and we will follow with you.       ____________________________________     MD CASEY CALIXTO / QUE    DD:  01/09/2017 14:42:50  DT:  01/09/2017 17:36:26    D#:  [id number]  Job#:  [id number]

## 2017-01-12 NOTE — DOCUMENTATION QUERY
DOCUMENTATION QUERY    PROVIDERS: Please select “Cosign w/ note”to reply to query.    To better represent the severity of illness of your patient, please review the following information and exercise your independent professional judgment in responding to this query.     Sepsis and acute kidney failure is documented in the Progress Notes and Discharge Summary. Per coding guidelines the clinical indicator for severe sepsis is a major organ dysfunction/failure DUE to the sepsis. Coders are required to query if organ dysfunction/failure is not linked to sepsis.   Based upon the clinical findings, risk factors, and treatment, can the relationship between these two conditions be further specified? Is there a link between Sepsis and organ failure?      • Acute renal failure is related to sepsis  • Acute renal failure is not related to sepsis  • Other explanation of clinical findings  • Unable to determine      The medical record reflects the following:   Clinical Findings  sepsis, cholecystis, acute renal failure   Treatment IV antibiotics, cholecystectomy   Risk Factors     Location within medical record  History and Physical, Progress Notes and Discharge Summary     Thank you,   Malinda Rodriguez, CCS

## 2017-01-20 ENCOUNTER — TELEPHONE (OUTPATIENT)
Dept: INFECTIOUS DISEASES | Facility: MEDICAL CENTER | Age: 57
End: 2017-01-20

## 2017-01-20 NOTE — TELEPHONE ENCOUNTER
Phoned patient to schedule a hospital follow up appointment.  However, he states is doing well, has seen his PCP who says everything is looking good, there is no more infection.  He will continue his care with PCP for now.

## 2019-11-20 NOTE — PROGRESS NOTES
Med rec completed per pt with family at bedside  Allergies reviewed  No ABX in last 30 days      (0) Alert; keenly responsive

## 2021-05-05 ENCOUNTER — APPOINTMENT (RX ONLY)
Dept: URBAN - NONMETROPOLITAN AREA CLINIC 12 | Facility: CLINIC | Age: 61
Setting detail: DERMATOLOGY
End: 2021-05-05

## 2021-05-05 VITALS — TEMPERATURE: 97.7 F

## 2021-05-05 DIAGNOSIS — D22 MELANOCYTIC NEVI: ICD-10-CM

## 2021-05-05 DIAGNOSIS — L82.1 OTHER SEBORRHEIC KERATOSIS: ICD-10-CM

## 2021-05-05 DIAGNOSIS — L82.0 INFLAMED SEBORRHEIC KERATOSIS: ICD-10-CM

## 2021-05-05 DIAGNOSIS — D18.0 HEMANGIOMA: ICD-10-CM

## 2021-05-05 DIAGNOSIS — L81.4 OTHER MELANIN HYPERPIGMENTATION: ICD-10-CM

## 2021-05-05 DIAGNOSIS — Z71.89 OTHER SPECIFIED COUNSELING: ICD-10-CM

## 2021-05-05 DIAGNOSIS — L91.8 OTHER HYPERTROPHIC DISORDERS OF THE SKIN: ICD-10-CM

## 2021-05-05 PROBLEM — D22.39 MELANOCYTIC NEVI OF OTHER PARTS OF FACE: Status: ACTIVE | Noted: 2021-05-05

## 2021-05-05 PROBLEM — D22.5 MELANOCYTIC NEVI OF TRUNK: Status: ACTIVE | Noted: 2021-05-05

## 2021-05-05 PROBLEM — D18.01 HEMANGIOMA OF SKIN AND SUBCUTANEOUS TISSUE: Status: ACTIVE | Noted: 2021-05-05

## 2021-05-05 PROCEDURE — ? LIQUID NITROGEN

## 2021-05-05 PROCEDURE — ? COUNSELING

## 2021-05-05 PROCEDURE — ? OBSERVATION

## 2021-05-05 PROCEDURE — 99203 OFFICE O/P NEW LOW 30 MIN: CPT | Mod: 25

## 2021-05-05 PROCEDURE — ? SKIN TAG REMOVAL (COSMETIC)

## 2021-05-05 PROCEDURE — 17110 DESTRUCTION B9 LES UP TO 14: CPT

## 2021-05-05 ASSESSMENT — LOCATION ZONE DERM
LOCATION ZONE: NECK
LOCATION ZONE: TRUNK
LOCATION ZONE: ARM
LOCATION ZONE: LEG
LOCATION ZONE: FACE

## 2021-05-05 ASSESSMENT — LOCATION SIMPLE DESCRIPTION DERM
LOCATION SIMPLE: RIGHT THIGH
LOCATION SIMPLE: LEFT FOREARM
LOCATION SIMPLE: LEFT UPPER ARM
LOCATION SIMPLE: RIGHT FOREHEAD
LOCATION SIMPLE: CHEST
LOCATION SIMPLE: RIGHT CHEEK
LOCATION SIMPLE: ABDOMEN
LOCATION SIMPLE: POSTERIOR NECK
LOCATION SIMPLE: RIGHT FOREARM

## 2021-05-05 ASSESSMENT — LOCATION DETAILED DESCRIPTION DERM
LOCATION DETAILED: RIGHT LATERAL TRAPEZIAL NECK
LOCATION DETAILED: RIGHT RIB CAGE
LOCATION DETAILED: LEFT PROXIMAL DORSAL FOREARM
LOCATION DETAILED: LEFT ANTERIOR PROXIMAL UPPER ARM
LOCATION DETAILED: RIGHT FOREHEAD
LOCATION DETAILED: RIGHT LATERAL INFERIOR CHEST
LOCATION DETAILED: LEFT LATERAL TRAPEZIAL NECK
LOCATION DETAILED: RIGHT SUPERIOR CENTRAL MALAR CHEEK
LOCATION DETAILED: RIGHT PROXIMAL DORSAL FOREARM
LOCATION DETAILED: RIGHT ANTERIOR PROXIMAL THIGH
LOCATION DETAILED: UPPER STERNUM

## 2021-05-05 NOTE — PROCEDURE: SKIN TAG REMOVAL (COSMETIC)
Consent: Written consent obtained and the risks of skin tag removal was reviewed with the patient including but not limited to bleeding, pigmentary change, infection, pain, and remote possibility of scarring.
Removed With: scissors
Detail Level: Detailed
Anesthesia Volume In Cc: 2
Price (Use Numbers Only, No Special Characters Or $): 125
Hemostasis: Pressure

## 2021-05-05 NOTE — PROCEDURE: LIQUID NITROGEN
Render Post-Care Instructions In Note?: yes
Include Z78.9 (Other Specified Conditions Influencing Health Status) As An Associated Diagnosis?: No
Duration Of Freeze Thaw-Cycle (Seconds): 3
Number Of Freeze-Thaw Cycles: 3 freeze-thaw cycles
Post-Care Instructions: I reviewed with the patient in detail post-care instructions. Patient is to wear sunprotection, and avoid picking at any of the treated lesions. Pt may apply Vaseline to crusted or scabbing areas.
Medical Necessity Clause: This procedure was medically necessary because the lesions that were treated were:
Medical Necessity Information: It is in your best interest to select a reason for this procedure from the list below. All of these items fulfill various CMS LCD requirements except the new and changing color options.
Consent: The patient's consent was obtained including but not limited to risks of crusting, scabbing, blistering, scarring, darker or lighter pigmentary change, recurrence, incomplete removal and infection.
Detail Level: Detailed

## 2021-07-22 ENCOUNTER — APPOINTMENT (RX ONLY)
Dept: URBAN - NONMETROPOLITAN AREA CLINIC 12 | Facility: CLINIC | Age: 61
Setting detail: DERMATOLOGY
End: 2021-07-22

## 2021-07-22 DIAGNOSIS — L57.8 OTHER SKIN CHANGES DUE TO CHRONIC EXPOSURE TO NONIONIZING RADIATION: ICD-10-CM

## 2021-07-22 DIAGNOSIS — L82.0 INFLAMED SEBORRHEIC KERATOSIS: ICD-10-CM

## 2021-07-22 DIAGNOSIS — Z71.89 OTHER SPECIFIED COUNSELING: ICD-10-CM

## 2021-07-22 PROCEDURE — ? COUNSELING

## 2021-07-22 PROCEDURE — 99212 OFFICE O/P EST SF 10 MIN: CPT | Mod: 25

## 2021-07-22 PROCEDURE — ? LIQUID NITROGEN

## 2021-07-22 PROCEDURE — 17111 DESTRUCTION B9 LESIONS 15/>: CPT

## 2021-07-22 PROCEDURE — ? PRESCRIPTION

## 2021-07-22 PROCEDURE — ? MEDICATION COUNSELING

## 2021-07-22 RX ORDER — HYDROQUINONE 4 %
CREAM (GRAM) TOPICAL
Qty: 1 | Refills: 2 | Status: ERX | COMMUNITY
Start: 2021-07-22

## 2021-07-22 RX ADMIN — Medication 1: at 00:00

## 2021-07-22 ASSESSMENT — LOCATION SIMPLE DESCRIPTION DERM
LOCATION SIMPLE: LEFT EYEBROW
LOCATION SIMPLE: RIGHT ZYGOMA
LOCATION SIMPLE: LEFT FOREHEAD
LOCATION SIMPLE: LEFT TEMPLE
LOCATION SIMPLE: LEFT CHEEK
LOCATION SIMPLE: RIGHT CHEEK

## 2021-07-22 ASSESSMENT — LOCATION DETAILED DESCRIPTION DERM
LOCATION DETAILED: LEFT CENTRAL MALAR CHEEK
LOCATION DETAILED: LEFT LATERAL EYEBROW
LOCATION DETAILED: RIGHT SUPERIOR CENTRAL MALAR CHEEK
LOCATION DETAILED: LEFT MEDIAL MALAR CHEEK
LOCATION DETAILED: RIGHT MEDIAL ZYGOMA
LOCATION DETAILED: LEFT SUPERIOR LATERAL MALAR CHEEK
LOCATION DETAILED: LEFT INFERIOR LATERAL MALAR CHEEK
LOCATION DETAILED: LEFT INFERIOR TEMPLE
LOCATION DETAILED: LEFT SUPERIOR FOREHEAD
LOCATION DETAILED: LEFT INFERIOR CENTRAL MALAR CHEEK
LOCATION DETAILED: RIGHT SUPERIOR LATERAL MALAR CHEEK
LOCATION DETAILED: RIGHT CENTRAL ZYGOMA

## 2021-07-22 ASSESSMENT — LOCATION ZONE DERM: LOCATION ZONE: FACE

## 2021-07-22 NOTE — PROCEDURE: LIQUID NITROGEN
Medical Necessity Clause: This procedure was medically necessary because the lesions that were treated were:
Show Topical Anesthesia Variable?: Yes
Medical Necessity Information: It is in your best interest to select a reason for this procedure from the list below. All of these items fulfill various CMS LCD requirements except the new and changing color options.
Post-Care Instructions: I reviewed with the patient in detail post-care instructions. Patient is to wear sunprotection, and avoid picking at any of the treated lesions. Pt may apply Vaseline to crusted or scabbing areas.
Detail Level: Detailed
Consent: The patient's consent was obtained including but not limited to risks of crusting, scabbing, blistering, scarring, darker or lighter pigmentary change, recurrence, incomplete removal and infection.
Number Of Freeze-Thaw Cycles: 3 freeze-thaw cycles
Add 52 Modifier (Optional): no
Duration Of Freeze Thaw-Cycle (Seconds): 3

## 2021-07-22 NOTE — PROCEDURE: MEDICATION COUNSELING
Benzoyl Peroxide Counseling: Patient counseled that medicine may cause skin irritation and bleach clothing.  In the event of skin irritation, the patient was advised to reduce the amount of the drug applied or use it less frequently.   The patient verbalized understanding of the proper use and possible adverse effects of benzoyl peroxide.  All of the patient's questions and concerns were addressed.
Skyrizi Counseling: I discussed with the patient the risks of risankizumab-rzaa including but not limited to immunosuppression, and serious infections.  The patient understands that monitoring is required including a PPD at baseline and must alert us or the primary physician if symptoms of infection or other concerning signs are noted.
Cyclophosphamide Pregnancy And Lactation Text: This medication is Pregnancy Category D and it isn't considered safe during pregnancy. This medication is excreted in breast milk.
Nsaids Counseling: NSAID Counseling: I discussed with the patient that NSAIDs should be taken with food. Prolonged use of NSAIDs can result in the development of stomach ulcers.  Patient advised to stop taking NSAIDs if abdominal pain occurs.  The patient verbalized understanding of the proper use and possible adverse effects of NSAIDs.  All of the patient's questions and concerns were addressed.
Metronidazole Pregnancy And Lactation Text: This medication is Pregnancy Category B and considered safe during pregnancy.  It is also excreted in breast milk.
Cosentyx Counseling:  I discussed with the patient the risks of Cosentyx including but not limited to worsening of Crohn's disease, immunosuppression, allergic reactions and infections.  The patient understands that monitoring is required including a PPD at baseline and must alert us or the primary physician if symptoms of infection or other concerning signs are noted.
Tranexamic Acid Counseling:  Patient advised of the small risk of bleeding problems with tranexamic acid. They were also instructed to call if they developed any nausea, vomiting or diarrhea. All of the patient's questions and concerns were addressed.
Thalidomide Pregnancy And Lactation Text: This medication is Pregnancy Category X and is absolutely contraindicated during pregnancy. It is unknown if it is excreted in breast milk.
Erivedge Counseling- I discussed with the patient the risks of Erivedge including but not limited to nausea, vomiting, diarrhea, constipation, weight loss, changes in the sense of taste, decreased appetite, muscle spasms, and hair loss.  The patient verbalized understanding of the proper use and possible adverse effects of Erivedge.  All of the patient's questions and concerns were addressed.
Topical Sulfur Applications Counseling: Topical Sulfur Counseling: Patient counseled that this medication may cause skin irritation or allergic reactions.  In the event of skin irritation, the patient was advised to reduce the amount of the drug applied or use it less frequently.   The patient verbalized understanding of the proper use and possible adverse effects of topical sulfur application.  All of the patient's questions and concerns were addressed.
Itraconazole Pregnancy And Lactation Text: This medication is Pregnancy Category C and it isn't know if it is safe during pregnancy. It is also excreted in breast milk.
Minoxidil Counseling: Minoxidil is a topical medication which can increase blood flow where it is applied. It is uncertain how this medication increases hair growth. Side effects are uncommon and include stinging and allergic reactions.
Minocycline Counseling: Patient advised regarding possible photosensitivity and discoloration of the teeth, skin, lips, tongue and gums.  Patient instructed to avoid sunlight, if possible.  When exposed to sunlight, patients should wear protective clothing, sunglasses, and sunscreen.  The patient was instructed to call the office immediately if the following severe adverse effects occur:  hearing changes, easy bruising/bleeding, severe headache, or vision changes.  The patient verbalized understanding of the proper use and possible adverse effects of minocycline.  All of the patient's questions and concerns were addressed.
Topical Sulfur Applications Pregnancy And Lactation Text: This medication is Pregnancy Category C and has an unknown safety profile during pregnancy. It is unknown if this topical medication is excreted in breast milk.
Include Pregnancy/Lactation Warning?: No
Minoxidil Pregnancy And Lactation Text: This medication has not been assigned a Pregnancy Risk Category but animal studies failed to show danger with the topical medication. It is unknown if the medication is excreted in breast milk.
Nsaids Pregnancy And Lactation Text: These medications are considered safe up to 30 weeks gestation. It is excreted in breast milk.
Benzoyl Peroxide Pregnancy And Lactation Text: This medication is Pregnancy Category C. It is unknown if benzoyl peroxide is excreted in breast milk.
Tranexamic Acid Pregnancy And Lactation Text: It is unknown if this medication is safe during pregnancy or breast feeding.
Cyclosporine Counseling:  I discussed with the patient the risks of cyclosporine including but not limited to hypertension, gingival hyperplasia,myelosuppression, immunosuppression, liver damage, kidney damage, neurotoxicity, lymphoma, and serious infections. The patient understands that monitoring is required including baseline blood pressure, CBC, CMP, lipid panel and uric acid, and then 1-2 times monthly CMP and blood pressure.
Cosentyx Pregnancy And Lactation Text: This medication is Pregnancy Category B and is considered safe during pregnancy. It is unknown if this medication is excreted in breast milk.
Finasteride Male Counseling: Finasteride Counseling:  I discussed with the patient the risks of use of finasteride including but not limited to decreased libido, decreased ejaculate volume, gynecomastia, and depression. Women should not handle medication.  All of the patient's questions and concerns were addressed.
Dupixent Counseling: I discussed with the patient the risks of dupilumab including but not limited to eye infection and irritation, cold sores, injection site reactions, worsening of asthma, allergic reactions and increased risk of parasitic infection.  Live vaccines should be avoided while taking dupilumab. Dupilumab will also interact with certain medications such as warfarin and cyclosporine. The patient understands that monitoring is required and they must alert us or the primary physician if symptoms of infection or other concerning signs are noted.
Ketoconazole Counseling:   Patient counseled regarding improving absorption with orange juice.  Adverse effects include but are not limited to breast enlargement, headache, diarrhea, nausea, upset stomach, liver function test abnormalities, taste disturbance, and stomach pain.  There is a rare possibility of liver failure that can occur when taking ketoconazole. The patient understands that monitoring of LFTs may be required, especially at baseline. The patient verbalized understanding of the proper use and possible adverse effects of ketoconazole.  All of the patient's questions and concerns were addressed.
Skyrizi Pregnancy And Lactation Text: The risk during pregnancy and breastfeeding is uncertain with this medication.
Cyclosporine Pregnancy And Lactation Text: This medication is Pregnancy Category C and it isn't know if it is safe during pregnancy. This medication is excreted in breast milk.
Stelara Counseling:  I discussed with the patient the risks of ustekinumab including but not limited to immunosuppression, malignancy, posterior leukoencephalopathy syndrome, and serious infections.  The patient understands that monitoring is required including a PPD at baseline and must alert us or the primary physician if symptoms of infection or other concerning signs are noted.
Wartpeel Counseling:  I discussed with the patient the risks of Wartpeel including but not limited to erythema, scaling, itching, weeping, crusting, and pain.
Minocycline Pregnancy And Lactation Text: This medication is Pregnancy Category D and not consider safe during pregnancy. It is also excreted in breast milk.
Mirvaso Counseling: Mirvaso is a topical medication which can decrease superficial blood flow where applied. Side effects are uncommon and include stinging, redness and allergic reactions.
Odomzo Counseling- I discussed with the patient the risks of Odomzo including but not limited to nausea, vomiting, diarrhea, constipation, weight loss, changes in the sense of taste, decreased appetite, muscle spasms, and hair loss.  The patient verbalized understanding of the proper use and possible adverse effects of Odomzo.  All of the patient's questions and concerns were addressed.
Valtrex Counseling: I discussed with the patient the risks of valacyclovir including but not limited to kidney damage, nausea, vomiting and severe allergy.  The patient understands that if the infection seems to be worsening or is not improving, they are to call.
Carac Counseling:  I discussed with the patient the risks of Carac including but not limited to erythema, scaling, itching, weeping, crusting, and pain.
Terbinafine Counseling: Patient counseling regarding adverse effects of terbinafine including but not limited to headache, diarrhea, rash, upset stomach, liver function test abnormalities, itching, taste/smell disturbance, nausea, abdominal pain, and flatulence.  There is a rare possibility of liver failure that can occur when taking terbinafine.  The patient understands that a baseline LFT and kidney function test may be required. The patient verbalized understanding of the proper use and possible adverse effects of terbinafine.  All of the patient's questions and concerns were addressed.
Ketoconazole Pregnancy And Lactation Text: This medication is Pregnancy Category C and it isn't know if it is safe during pregnancy. It is also excreted in breast milk and breast feeding isn't recommended.
Dupixent Pregnancy And Lactation Text: This medication likely crosses the placenta but the risk for the fetus is uncertain. This medication is excreted in breast milk.
Doxepin Counseling:  Patient advised that the medication is sedating and not to drive a car after taking this medication. Patient informed of potential adverse effects including but not limited to dry mouth, urinary retention, and blurry vision.  The patient verbalized understanding of the proper use and possible adverse effects of doxepin.  All of the patient's questions and concerns were addressed.
Carac Pregnancy And Lactation Text: This medication is Pregnancy Category X and contraindicated in pregnancy and in women who may become pregnant. It is unknown if this medication is excreted in breast milk.
Quinolones Counseling:  I discussed with the patient the risks of fluoroquinolones including but not limited to GI upset, allergic reaction, drug rash, diarrhea, dizziness, photosensitivity, yeast infections, liver function test abnormalities, tendonitis/tendon rupture.
Valtrex Pregnancy And Lactation Text: this medication is Pregnancy Category B and is considered safe during pregnancy. This medication is not directly found in breast milk but it's metabolite acyclovir is present.
Methotrexate Counseling:  Patient counseled regarding adverse effects of methotrexate including but not limited to nausea, vomiting, abnormalities in liver function tests. Patients may develop mouth sores, rash, diarrhea, and abnormalities in blood counts. The patient understands that monitoring is required including LFT's and blood counts.  There is a rare possibility of scarring of the liver and lung problems that can occur when taking methotrexate. Persistent nausea, loss of appetite, pale stools, dark urine, cough, and shortness of breath should be reported immediately. Patient advised to discontinue methotrexate treatment at least three months before attempting to become pregnant.  I discussed the need for folate supplements while taking methotrexate.  These supplements can decrease side effects during methotrexate treatment. The patient verbalized understanding of the proper use and possible adverse effects of methotrexate.  All of the patient's questions and concerns were addressed.
Mirvaso Pregnancy And Lactation Text: This medication has not been assigned a Pregnancy Risk Category. It is unknown if the medication is excreted in breast milk.
Gabapentin Counseling: I discussed with the patient the risks of gabapentin including but not limited to dizziness, somnolence, fatigue and ataxia.
Taltz Counseling: I discussed with the patient the risks of ixekizumab including but not limited to immunosuppression, serious infections, worsening of inflammatory bowel disease and drug reactions.  The patient understands that monitoring is required including a PPD at baseline and must alert us or the primary physician if symptoms of infection or other concerning signs are noted.
Zyclara Counseling:  I discussed with the patient the risks of imiquimod including but not limited to erythema, scaling, itching, weeping, crusting, and pain.  Patient understands that the inflammatory response to imiquimod is variable from person to person and was educated regarded proper titration schedule.  If flu-like symptoms develop, patient knows to discontinue the medication and contact us.
Finasteride Pregnancy And Lactation Text: This medication is absolutely contraindicated during pregnancy. It is unknown if it is excreted in breast milk.
Enbrel Counseling:  I discussed with the patient the risks of etanercept including but not limited to myelosuppression, immunosuppression, autoimmune hepatitis, demyelinating diseases, lymphoma, and infections.  The patient understands that monitoring is required including a PPD at baseline and must alert us or the primary physician if symptoms of infection or other concerning signs are noted.
Otezla Counseling: The side effects of Otezla were discussed with the patient, including but not limited to worsening or new depression, weight loss, diarrhea, nausea, upper respiratory tract infection, and headache. Patient instructed to call the office should any adverse effect occur.  The patient verbalized understanding of the proper use and possible adverse effects of Otezla.  All the patient's questions and concerns were addressed.
Doxepin Pregnancy And Lactation Text: This medication is Pregnancy Category C and it isn't known if it is safe during pregnancy. It is also excreted in breast milk and breast feeding isn't recommended.
Picato Counseling:  I discussed with the patient the risks of Picato including but not limited to erythema, scaling, itching, weeping, crusting, and pain.
Calcipotriene Counseling:  I discussed with the patient the risks of calcipotriene including but not limited to erythema, scaling, itching, and irritation.
Gabapentin Pregnancy And Lactation Text: This medication is Pregnancy Category C and isn't considered safe during pregnancy. It is excreted in breast milk.
Methotrexate Pregnancy And Lactation Text: This medication is Pregnancy Category X and is known to cause fetal harm. This medication is excreted in breast milk.
Calcipotriene Pregnancy And Lactation Text: This medication has not been proven safe during pregnancy. It is unknown if this medication is excreted in breast milk.
Prednisone Counseling:  I discussed with the patient the risks of prolonged use of prednisone including but not limited to weight gain, insomnia, osteoporosis, mood changes, diabetes, susceptibility to infection, glaucoma and high blood pressure.  In cases where prednisone use is prolonged, patients should be monitored with blood pressure checks, serum glucose levels and an eye exam.  Additionally, the patient may need to be placed on GI prophylaxis, PCP prophylaxis, and calcium and vitamin D supplementation and/or a bisphosphonate.  The patient verbalized understanding of the proper use and the possible adverse effects of prednisone.  All of the patient's questions and concerns were addressed.
Terbinafine Pregnancy And Lactation Text: This medication is Pregnancy Category B and is considered safe during pregnancy. It is also excreted in breast milk and breast feeding isn't recommended.
Zyclara Pregnancy And Lactation Text: This medication is Pregnancy Category C. It is unknown if this medication is excreted in breast milk.
Cimetidine Counseling:  I discussed with the patient the risks of Cimetidine including but not limited to gynecomastia, headache, diarrhea, nausea, drowsiness, arrhythmias, pancreatitis, skin rashes, psychosis, bone marrow suppression and kidney toxicity.
Hydroxyzine Counseling: Patient advised that the medication is sedating and not to drive a car after taking this medication.  Patient informed of potential adverse effects including but not limited to dry mouth, urinary retention, and blurry vision.  The patient verbalized understanding of the proper use and possible adverse effects of hydroxyzine.  All of the patient's questions and concerns were addressed.
Protopic Counseling: Patient may experience a mild burning sensation during topical application. Protopic is not approved in children less than 2 years of age. There have been case reports of hematologic and skin malignancies in patients using topical calcineurin inhibitors although causality is questionable.
Azithromycin Counseling:  I discussed with the patient the risks of azithromycin including but not limited to GI upset, allergic reaction, drug rash, diarrhea, and yeast infections.
5-Fu Counseling: 5-Fluorouracil Counseling:  I discussed with the patient the risks of 5-fluorouracil including but not limited to erythema, scaling, itching, weeping, crusting, and pain.
Rifampin Counseling: I discussed with the patient the risks of rifampin including but not limited to liver damage, kidney damage, red-orange body fluids, nausea/vomiting and severe allergy.
Otezla Pregnancy And Lactation Text: This medication is Pregnancy Category C and it isn't known if it is safe during pregnancy. It is unknown if it is excreted in breast milk.
Tremfya Counseling: I discussed with the patient the risks of guselkumab including but not limited to immunosuppression, serious infections, worsening of inflammatory bowel disease and drug reactions.  The patient understands that monitoring is required including a PPD at baseline and must alert us or the primary physician if symptoms of infection or other concerning signs are noted.
Humira Counseling:  I discussed with the patient the risks of adalimumab including but not limited to myelosuppression, immunosuppression, autoimmune hepatitis, demyelinating diseases, lymphoma, and serious infections.  The patient understands that monitoring is required including a PPD at baseline and must alert us or the primary physician if symptoms of infection or other concerning signs are noted.
Rifampin Pregnancy And Lactation Text: This medication is Pregnancy Category C and it isn't know if it is safe during pregnancy. It is also excreted in breast milk and should not be used if you are breast feeding.
Hydroxyzine Pregnancy And Lactation Text: This medication is not safe during pregnancy and should not be taken. It is also excreted in breast milk and breast feeding isn't recommended.
Oxybutynin Pregnancy And Lactation Text: This medication is Pregnancy Category B and is considered safe during pregnancy. It is unknown if it is excreted in breast milk.
Azithromycin Pregnancy And Lactation Text: This medication is considered safe during pregnancy and is also secreted in breast milk.
Oxybutynin Counseling:  I discussed with the patient the risks of oxybutynin including but not limited to skin rash, drowsiness, dry mouth, difficulty urinating, and blurred vision.
Opioid Counseling: I discussed with the patient the potential side effects of opioids including but not limited to addiction, altered mental status, and depression. I stressed avoiding alcohol, benzodiazepines, muscle relaxants and sleep aids unless specifically okayed by a physician. The patient verbalized understanding of the proper use and possible adverse effects of opioids. All of the patient's questions and concerns were addressed. They were instructed to flush the remaining pills down the toilet if they did not need them for pain.
Protopic Pregnancy And Lactation Text: This medication is Pregnancy Category C. It is unknown if this medication is excreted in breast milk when applied topically.
Drysol Counseling:  I discussed with the patient the risks of drysol/aluminum chloride including but not limited to skin rash, itching, irritation, burning.
Ilumya Counseling: I discussed with the patient the risks of tildrakizumab including but not limited to immunosuppression, malignancy, posterior leukoencephalopathy syndrome, and serious infections.  The patient understands that monitoring is required including a PPD at baseline and must alert us or the primary physician if symptoms of infection or other concerning signs are noted.
Propranolol Counseling:  I discussed with the patient the risks of propranolol including but not limited to low heart rate, low blood pressure, low blood sugar, restlessness and increased cold sensitivity. They should call the office if they experience any of these side effects.
Albendazole Counseling:  I discussed with the patient the risks of albendazole including but not limited to cytopenia, kidney damage, nausea/vomiting and severe allergy.  The patient understands that this medication is being used in an off-label manner.
Acitretin Counseling:  I discussed with the patient the risks of acitretin including but not limited to hair loss, dry lips/skin/eyes, liver damage, hyperlipidemia, depression/suicidal ideation, photosensitivity.  Serious rare side effects can include but are not limited to pancreatitis, pseudotumor cerebri, bony changes, clot formation/stroke/heart attack.  Patient understands that alcohol is contraindicated since it can result in liver toxicity and significantly prolong the elimination of the drug by many years.
Xeljanz Counseling: I discussed with the patient the risks of Xeljanz therapy including increased risk of infection, liver issues, headache, diarrhea, or cold symptoms. Live vaccines should be avoided. They were instructed to call if they have any problems.
Sarecycline Counseling: Patient advised regarding possible photosensitivity and discoloration of the teeth, skin, lips, tongue and gums.  Patient instructed to avoid sunlight, if possible.  When exposed to sunlight, patients should wear protective clothing, sunglasses, and sunscreen.  The patient was instructed to call the office immediately if the following severe adverse effects occur:  hearing changes, easy bruising/bleeding, severe headache, or vision changes.  The patient verbalized understanding of the proper use and possible adverse effects of sarecycline.  All of the patient's questions and concerns were addressed.
Bactrim Counseling:  I discussed with the patient the risks of sulfa antibiotics including but not limited to GI upset, allergic reaction, drug rash, diarrhea, dizziness, photosensitivity, and yeast infections.  Rarely, more serious reactions can occur including but not limited to aplastic anemia, agranulocytosis, methemoglobinemia, blood dyscrasias, liver or kidney failure, lung infiltrates or desquamative/blistering drug rashes.
Opioid Pregnancy And Lactation Text: These medications can lead to premature delivery and should be avoided during pregnancy. These medications are also present in breast milk in small amounts.
Rhofade Counseling: Rhofade is a topical medication which can decrease superficial blood flow where applied. Side effects are uncommon and include stinging, redness and allergic reactions.
Cephalexin Counseling: I counseled the patient regarding use of cephalexin as an antibiotic for prophylactic and/or therapeutic purposes. Cephalexin (commonly prescribed under brand name Keflex) is a cephalosporin antibiotic which is active against numerous classes of bacteria, including most skin bacteria. Side effects may include nausea, diarrhea, gastrointestinal upset, rash, hives, yeast infections, and in rare cases, hepatitis, kidney disease, seizures, fever, confusion, neurologic symptoms, and others. Patients with severe allergies to penicillin medications are cautioned that there is about a 10% incidence of cross-reactivity with cephalosporins. When possible, patients with penicillin allergies should use alternatives to cephalosporins for antibiotic therapy.
Bactrim Pregnancy And Lactation Text: This medication is Pregnancy Category D and is known to cause fetal risk.  It is also excreted in breast milk.
Albendazole Pregnancy And Lactation Text: This medication is Pregnancy Category C and it isn't known if it is safe during pregnancy. It is also excreted in breast milk.
Drysol Pregnancy And Lactation Text: This medication is considered safe during pregnancy and breast feeding.
Acitretin Pregnancy And Lactation Text: This medication is Pregnancy Category X and should not be given to women who are pregnant or may become pregnant in the future. This medication is excreted in breast milk.
Xelludyz Pregnancy And Lactation Text: This medication is Pregnancy Category D and is not considered safe during pregnancy.  The risk during breast feeding is also uncertain.
Bexarotene Counseling:  I discussed with the patient the risks of bexarotene including but not limited to hair loss, dry lips/skin/eyes, liver abnormalities, hyperlipidemia, pancreatitis, depression/suicidal ideation, photosensitivity, drug rash/allergic reactions, hypothyroidism, anemia, leukopenia, infection, cataracts, and teratogenicity.  Patient understands that they will need regular blood tests to check lipid profile, liver function tests, white blood cell count, thyroid function tests and pregnancy test if applicable.
Infliximab Counseling:  I discussed with the patient the risks of infliximab including but not limited to myelosuppression, immunosuppression, autoimmune hepatitis, demyelinating diseases, lymphoma, and serious infections.  The patient understands that monitoring is required including a PPD at baseline and must alert us or the primary physician if symptoms of infection or other concerning signs are noted.
Xolair Counseling:  Patient informed of potential adverse effects including but not limited to fever, muscle aches, rash and allergic reactions.  The patient verbalized understanding of the proper use and possible adverse effects of Xolair.  All of the patient's questions and concerns were addressed.
Arava Counseling:  Patient counseled regarding adverse effects of Arava including but not limited to nausea, vomiting, abnormalities in liver function tests. Patients may develop mouth sores, rash, diarrhea, and abnormalities in blood counts. The patient understands that monitoring is required including LFTs and blood counts.  There is a rare possibility of scarring of the liver and lung problems that can occur when taking methotrexate. Persistent nausea, loss of appetite, pale stools, dark urine, cough, and shortness of breath should be reported immediately. Patient advised to discontinue Arava treatment and consult with a physician prior to attempting conception. The patient will have to undergo a treatment to eliminate Arava from the body prior to conception.
Solaraze Counseling:  I discussed with the patient the risks of Solaraze including but not limited to erythema, scaling, itching, weeping, crusting, and pain.
Glycopyrrolate Counseling:  I discussed with the patient the risks of glycopyrrolate including but not limited to skin rash, drowsiness, dry mouth, difficulty urinating, and blurred vision.
Cephalexin Pregnancy And Lactation Text: This medication is Pregnancy Category B and considered safe during pregnancy.  It is also excreted in breast milk but can be used safely for shorter doses.
Birth Control Pills Counseling: Birth Control Pill Counseling: I discussed with the patient the potential side effects of OCPs including but not limited to increased risk of stroke, heart attack, thrombophlebitis, deep venous thrombosis, hepatic adenomas, breast changes, GI upset, headaches, and depression.  The patient verbalized understanding of the proper use and possible adverse effects of OCPs. All of the patient's questions and concerns were addressed.
Propranolol Pregnancy And Lactation Text: This medication is Pregnancy Category C and it isn't known if it is safe during pregnancy. It is excreted in breast milk.
Tetracycline Counseling: Patient counseled regarding possible photosensitivity and increased risk for sunburn.  Patient instructed to avoid sunlight, if possible.  When exposed to sunlight, patients should wear protective clothing, sunglasses, and sunscreen.  The patient was instructed to call the office immediately if the following severe adverse effects occur:  hearing changes, easy bruising/bleeding, severe headache, or vision changes.  The patient verbalized understanding of the proper use and possible adverse effects of tetracycline.  All of the patient's questions and concerns were addressed. Patient understands to avoid pregnancy while on therapy due to potential birth defects.
Ivermectin Counseling:  Patient instructed to take medication on an empty stomach with a full glass of water.  Patient informed of potential adverse effects including but not limited to nausea, diarrhea, dizziness, itching, and swelling of the extremities or lymph nodes.  The patient verbalized understanding of the proper use and possible adverse effects of ivermectin.  All of the patient's questions and concerns were addressed.
Elidel Counseling: Patient may experience a mild burning sensation during topical application. Elidel is not approved in children less than 2 years of age. There have been case reports of hematologic and skin malignancies in patients using topical calcineurin inhibitors although causality is questionable.
Clindamycin Counseling: I counseled the patient regarding use of clindamycin as an antibiotic for prophylactic and/or therapeutic purposes. Clindamycin is active against numerous classes of bacteria, including skin bacteria. Side effects may include nausea, diarrhea, gastrointestinal upset, rash, hives, yeast infections, and in rare cases, colitis.
Solaraze Pregnancy And Lactation Text: This medication is Pregnancy Category B and is considered safe. There is some data to suggest avoiding during the third trimester. It is unknown if this medication is excreted in breast milk.
Glycopyrrolate Pregnancy And Lactation Text: This medication is Pregnancy Category B and is considered safe during pregnancy. It is unknown if it is excreted breast milk.
Bexarotene Pregnancy And Lactation Text: This medication is Pregnancy Category X and should not be given to women who are pregnant or may become pregnant. This medication should not be used if you are breast feeding.
Birth Control Pills Pregnancy And Lactation Text: This medication should be avoided if pregnant and for the first 30 days post-partum.
Xolair Pregnancy And Lactation Text: This medication is Pregnancy Category B and is considered safe during pregnancy. This medication is excreted in breast milk.
Rituxan Counseling:  I discussed with the patient the risks of Rituxan infusions. Side effects can include infusion reactions, severe drug rashes including mucocutaneous reactions, reactivation of latent hepatitis and other infections and rarely progressive multifocal leukoencephalopathy.  All of the patient's questions and concerns were addressed.
Clofazimine Counseling:  I discussed with the patient the risks of clofazimine including but not limited to skin and eye pigmentation, liver damage, nausea/vomiting, gastrointestinal bleeding and allergy.
Spironolactone Counseling: Patient advised regarding risks of diarrhea, abdominal pain, hyperkalemia, birth defects (for female patients), liver toxicity and renal toxicity. The patient may need blood work to monitor liver and kidney function and potassium levels while on therapy. The patient verbalized understanding of the proper use and possible adverse effects of spironolactone.  All of the patient's questions and concerns were addressed.
Topical Retinoid counseling:  Patient advised to apply a pea-sized amount only at bedtime and wait 30 minutes after washing their face before applying.  If too drying, patient may add a non-comedogenic moisturizer. The patient verbalized understanding of the proper use and possible adverse effects of retinoids.  All of the patient's questions and concerns were addressed.
Eucrisa Counseling: Patient may experience a mild burning sensation during topical application. Eucrisa is not approved in children less than 2 years of age.
Isotretinoin Counseling: Patient should get monthly blood tests, not donate blood, not drive at night if vision affected, not share medication, and not undergo elective surgery for 6 months after tx completed. Side effects reviewed, pt to contact office should one occur.
Fluconazole Counseling:  Patient counseled regarding adverse effects of fluconazole including but not limited to headache, diarrhea, nausea, upset stomach, liver function test abnormalities, taste disturbance, and stomach pain.  There is a rare possibility of liver failure that can occur when taking fluconazole.  The patient understands that monitoring of LFTs and kidney function test may be required, especially at baseline. The patient verbalized understanding of the proper use and possible adverse effects of fluconazole.  All of the patient's questions and concerns were addressed.
Isotretinoin Pregnancy And Lactation Text: This medication is Pregnancy Category X and is considered extremely dangerous during pregnancy. It is unknown if it is excreted in breast milk.
Rituxan Pregnancy And Lactation Text: This medication is Pregnancy Category C and it isn't know if it is safe during pregnancy. It is unknown if this medication is excreted in breast milk but similar antibodies are known to be excreted.
Hydroxychloroquine Counseling:  I discussed with the patient that a baseline ophthalmologic exam is needed at the start of therapy and every year thereafter while on therapy. A CBC may also be warranted for monitoring.  The side effects of this medication were discussed with the patient, including but not limited to agranulocytosis, aplastic anemia, seizures, rashes, retinopathy, and liver toxicity. Patient instructed to call the office should any adverse effect occur.  The patient verbalized understanding of the proper use and possible adverse effects of Plaquenil.  All the patient's questions and concerns were addressed.
Clindamycin Pregnancy And Lactation Text: This medication can be used in pregnancy if certain situations. Clindamycin is also present in breast milk.
Hydroxychloroquine Pregnancy And Lactation Text: This medication has been shown to cause fetal harm but it isn't assigned a Pregnancy Risk Category. There are small amounts excreted in breast milk.
Doxycycline Counseling:  Patient counseled regarding possible photosensitivity and increased risk for sunburn.  Patient instructed to avoid sunlight, if possible.  When exposed to sunlight, patients should wear protective clothing, sunglasses, and sunscreen.  The patient was instructed to call the office immediately if the following severe adverse effects occur:  hearing changes, easy bruising/bleeding, severe headache, or vision changes.  The patient verbalized understanding of the proper use and possible adverse effects of doxycycline.  All of the patient's questions and concerns were addressed.
Azathioprine Counseling:  I discussed with the patient the risks of azathioprine including but not limited to myelosuppression, immunosuppression, hepatotoxicity, lymphoma, and infections.  The patient understands that monitoring is required including baseline LFTs, Creatinine, possible TPMP genotyping and weekly CBCs for the first month and then every 2 weeks thereafter.  The patient verbalized understanding of the proper use and possible adverse effects of azathioprine.  All of the patient's questions and concerns were addressed.
Colchicine Counseling:  Patient counseled regarding adverse effects including but not limited to stomach upset (nausea, vomiting, stomach pain, or diarrhea).  Patient instructed to limit alcohol consumption while taking this medication.  Colchicine may reduce blood counts especially with prolonged use.  The patient understands that monitoring of kidney function and blood counts may be required, especially at baseline. The patient verbalized understanding of the proper use and possible adverse effects of colchicine.  All of the patient's questions and concerns were addressed.
Siliq Counseling:  I discussed with the patient the risks of Siliq including but not limited to new or worsening depression, suicidal thoughts and behavior, immunosuppression, malignancy, posterior leukoencephalopathy syndrome, and serious infections.  The patient understands that monitoring is required including a PPD at baseline and must alert us or the primary physician if symptoms of infection or other concerning signs are noted. There is also a special program designed to monitor depression which is required with Siliq.
Tazorac Counseling:  Patient advised that medication is irritating and drying.  Patient may need to apply sparingly and wash off after an hour before eventually leaving it on overnight.  The patient verbalized understanding of the proper use and possible adverse effects of tazorac.  All of the patient's questions and concerns were addressed.
Hydroquinone Counseling:  Patient advised that medication may result in skin irritation, lightening (hypopigmentation), dryness, and burning.  In the event of skin irritation, the patient was advised to reduce the amount of the drug applied or use it less frequently.  Rarely, spots that are treated with hydroquinone can become darker (pseudoochronosis).  Should this occur, patient instructed to stop medication and call the office. The patient verbalized understanding of the proper use and possible adverse effects of hydroquinone.  All of the patient's questions and concerns were addressed.
Spironolactone Pregnancy And Lactation Text: This medication can cause feminization of the male fetus and should be avoided during pregnancy. The active metabolite is also found in breast milk.
Griseofulvin Counseling:  I discussed with the patient the risks of griseofulvin including but not limited to photosensitivity, cytopenia, liver damage, nausea/vomiting and severe allergy.  The patient understands that this medication is best absorbed when taken with a fatty meal (e.g., ice cream or french fries).
High Dose Vitamin A Counseling: Side effects reviewed, pt to contact office should one occur.
Azathioprine Pregnancy And Lactation Text: This medication is Pregnancy Category D and isn't considered safe during pregnancy. It is unknown if this medication is excreted in breast milk.
High Dose Vitamin A Pregnancy And Lactation Text: High dose vitamin A therapy is contraindicated during pregnancy and breast feeding.
Cellcept Counseling:  I discussed with the patient the risks of mycophenolate mofetil including but not limited to infection/immunosuppression, GI upset, hypokalemia, hypercholesterolemia, bone marrow suppression, lymphoproliferative disorders, malignancy, GI ulceration/bleed/perforation, colitis, interstitial lung disease, kidney failure, progressive multifocal leukoencephalopathy, and birth defects.  The patient understands that monitoring is required including a baseline creatinine and regular CBC testing. In addition, patient must alert us immediately if symptoms of infection or other concerning signs are noted.
SSKI Counseling:  I discussed with the patient the risks of SSKI including but not limited to thyroid abnormalities, metallic taste, GI upset, fever, headache, acne, arthralgias, paraesthesias, lymphadenopathy, easy bleeding, arrhythmias, and allergic reaction.
Tazorac Pregnancy And Lactation Text: This medication is not safe during pregnancy. It is unknown if this medication is excreted in breast milk.
Doxycycline Pregnancy And Lactation Text: This medication is Pregnancy Category D and not consider safe during pregnancy. It is also excreted in breast milk but is considered safe for shorter treatment courses.
Libtayo Counseling- I discussed with the patient the risks of Libtayo including but not limited to nausea, vomiting, diarrhea, and bone or muscle pain.  The patient verbalized understanding of the proper use and possible adverse effects of Libtayo.  All of the patient's questions and concerns were addressed.
Cimzia Counseling:  I discussed with the patient the risks of Cimzia including but not limited to immunosuppression, allergic reactions and infections.  The patient understands that monitoring is required including a PPD at baseline and must alert us or the primary physician if symptoms of infection or other concerning signs are noted.
Imiquimod Counseling:  I discussed with the patient the risks of imiquimod including but not limited to erythema, scaling, itching, weeping, crusting, and pain.  Patient understands that the inflammatory response to imiquimod is variable from person to person and was educated regarded proper titration schedule.  If flu-like symptoms develop, patient knows to discontinue the medication and contact us.
Simponi Counseling:  I discussed with the patient the risks of golimumab including but not limited to myelosuppression, immunosuppression, autoimmune hepatitis, demyelinating diseases, lymphoma, and serious infections.  The patient understands that monitoring is required including a PPD at baseline and must alert us or the primary physician if symptoms of infection or other concerning signs are noted.
Libtayo Pregnancy And Lactation Text: This medication is contraindicated in pregnancy and when breast feeding.
Erythromycin Counseling:  I discussed with the patient the risks of erythromycin including but not limited to GI upset, allergic reaction, drug rash, diarrhea, increase in liver enzymes, and yeast infections.
Dapsone Counseling: I discussed with the patient the risks of dapsone including but not limited to hemolytic anemia, agranulocytosis, rashes, methemoglobinemia, kidney failure, peripheral neuropathy, headaches, GI upset, and liver toxicity.  Patients who start dapsone require monitoring including baseline LFTs and weekly CBCs for the first month, then every month thereafter.  The patient verbalized understanding of the proper use and possible adverse effects of dapsone.  All of the patient's questions and concerns were addressed.
Sski Pregnancy And Lactation Text: This medication is Pregnancy Category D and isn't considered safe during pregnancy. It is excreted in breast milk.
Topical Clindamycin Counseling: Patient counseled that this medication may cause skin irritation or allergic reactions.  In the event of skin irritation, the patient was advised to reduce the amount of the drug applied or use it less frequently.   The patient verbalized understanding of the proper use and possible adverse effects of clindamycin.  All of the patient's questions and concerns were addressed.
Griseofulvin Pregnancy And Lactation Text: This medication is Pregnancy Category X and is known to cause serious birth defects. It is unknown if this medication is excreted in breast milk but breast feeding should be avoided.
Itraconazole Counseling:  I discussed with the patient the risks of itraconazole including but not limited to liver damage, nausea/vomiting, neuropathy, and severe allergy.  The patient understands that this medication is best absorbed when taken with acidic beverages such as non-diet cola or ginger ale.  The patient understands that monitoring is required including baseline LFTs and repeat LFTs at intervals.  The patient understands that they are to contact us or the primary physician if concerning signs are noted.
Cimzia Pregnancy And Lactation Text: This medication crosses the placenta but can be considered safe in certain situations. Cimzia may be excreted in breast milk.
Metronidazole Counseling:  I discussed with the patient the risks of metronidazole including but not limited to seizures, nausea/vomiting, a metallic taste in the mouth, nausea/vomiting and severe allergy.
Detail Level: Simple
Niacinamide Pregnancy And Lactation Text: These medications are considered safe during pregnancy.
Niacinamide Counseling: I recommended taking niacin or niacinamide, also know as vitamin B3, twice daily. Recent evidence suggests that taking vitamin B3 (500 mg twice daily) can reduce the risk of actinic keratoses and non-melanoma skin cancers. Side effects of vitamin B3 include flushing and headache.
Erythromycin Pregnancy And Lactation Text: This medication is Pregnancy Category B and is considered safe during pregnancy. It is also excreted in breast milk.
Thalidomide Counseling: I discussed with the patient the risks of thalidomide including but not limited to birth defects, anxiety, weakness, chest pain, dizziness, cough and severe allergy.
Dapsone Pregnancy And Lactation Text: This medication is Pregnancy Category C and is not considered safe during pregnancy or breast feeding.
Cyclophosphamide Counseling:  I discussed with the patient the risks of cyclophosphamide including but not limited to hair loss, hormonal abnormalities, decreased fertility, abdominal pain, diarrhea, nausea and vomiting, bone marrow suppression and infection. The patient understands that monitoring is required while taking this medication.

## 2021-07-27 ENCOUNTER — HOSPITAL ENCOUNTER (OUTPATIENT)
Dept: RADIOLOGY | Facility: MEDICAL CENTER | Age: 61
End: 2021-07-27
Payer: COMMERCIAL

## 2021-09-30 ENCOUNTER — APPOINTMENT (RX ONLY)
Dept: URBAN - NONMETROPOLITAN AREA CLINIC 12 | Facility: CLINIC | Age: 61
Setting detail: DERMATOLOGY
End: 2021-09-30

## 2021-09-30 DIAGNOSIS — L57.8 OTHER SKIN CHANGES DUE TO CHRONIC EXPOSURE TO NONIONIZING RADIATION: ICD-10-CM

## 2021-09-30 DIAGNOSIS — Z71.89 OTHER SPECIFIED COUNSELING: ICD-10-CM

## 2021-09-30 DIAGNOSIS — L82.0 INFLAMED SEBORRHEIC KERATOSIS: ICD-10-CM

## 2021-09-30 PROCEDURE — ? COUNSELING

## 2021-09-30 PROCEDURE — ? LIQUID NITROGEN

## 2021-09-30 PROCEDURE — 17110 DESTRUCTION B9 LES UP TO 14: CPT

## 2021-09-30 PROCEDURE — 99212 OFFICE O/P EST SF 10 MIN: CPT | Mod: 25

## 2021-09-30 PROCEDURE — ? MEDICATION COUNSELING

## 2021-09-30 ASSESSMENT — LOCATION DETAILED DESCRIPTION DERM
LOCATION DETAILED: LEFT CENTRAL MALAR CHEEK
LOCATION DETAILED: LEFT SUPERIOR CENTRAL MALAR CHEEK
LOCATION DETAILED: RIGHT INFERIOR CENTRAL MALAR CHEEK
LOCATION DETAILED: RIGHT CENTRAL MALAR CHEEK
LOCATION DETAILED: LEFT SUPERIOR FOREHEAD
LOCATION DETAILED: RIGHT LATERAL MALAR CHEEK
LOCATION DETAILED: RIGHT MEDIAL MALAR CHEEK
LOCATION DETAILED: LEFT INFERIOR CENTRAL MALAR CHEEK
LOCATION DETAILED: RIGHT INFERIOR LATERAL MALAR CHEEK

## 2021-09-30 ASSESSMENT — LOCATION ZONE DERM: LOCATION ZONE: FACE

## 2021-09-30 ASSESSMENT — LOCATION SIMPLE DESCRIPTION DERM
LOCATION SIMPLE: LEFT CHEEK
LOCATION SIMPLE: RIGHT CHEEK
LOCATION SIMPLE: LEFT FOREHEAD

## 2021-09-30 NOTE — PROCEDURE: LIQUID NITROGEN
Medical Necessity Information: It is in your best interest to select a reason for this procedure from the list below. All of these items fulfill various CMS LCD requirements except the new and changing color options.
Consent: The patient's consent was obtained including but not limited to risks of crusting, scabbing, blistering, scarring, darker or lighter pigmentary change, recurrence, incomplete removal and infection.
Detail Level: Detailed
Include Z78.9 (Other Specified Conditions Influencing Health Status) As An Associated Diagnosis?: No
Show Applicator Variable?: Yes
Medical Necessity Clause: This procedure was medically necessary because the lesions that were treated were:
Post-Care Instructions: I reviewed with the patient in detail post-care instructions. Patient is to wear sunprotection, and avoid picking at any of the treated lesions. Pt may apply Vaseline to crusted or scabbing areas.
Number Of Freeze-Thaw Cycles: 3 freeze-thaw cycles
Duration Of Freeze Thaw-Cycle (Seconds): 3

## 2021-12-01 ENCOUNTER — APPOINTMENT (RX ONLY)
Dept: URBAN - NONMETROPOLITAN AREA CLINIC 12 | Facility: CLINIC | Age: 61
Setting detail: DERMATOLOGY
End: 2021-12-01

## 2021-12-01 DIAGNOSIS — D22 MELANOCYTIC NEVI: ICD-10-CM

## 2021-12-01 DIAGNOSIS — L91.8 OTHER HYPERTROPHIC DISORDERS OF THE SKIN: ICD-10-CM

## 2021-12-01 DIAGNOSIS — L81.4 OTHER MELANIN HYPERPIGMENTATION: ICD-10-CM

## 2021-12-01 DIAGNOSIS — L82.0 INFLAMED SEBORRHEIC KERATOSIS: ICD-10-CM

## 2021-12-01 DIAGNOSIS — Z71.89 OTHER SPECIFIED COUNSELING: ICD-10-CM

## 2021-12-01 DIAGNOSIS — L82.1 OTHER SEBORRHEIC KERATOSIS: ICD-10-CM

## 2021-12-01 PROBLEM — D22.9 MELANOCYTIC NEVI, UNSPECIFIED: Status: ACTIVE | Noted: 2021-12-01

## 2021-12-01 PROCEDURE — ? LIQUID NITROGEN

## 2021-12-01 PROCEDURE — ? COUNSELING

## 2021-12-01 PROCEDURE — 17111 DESTRUCTION B9 LESIONS 15/>: CPT

## 2021-12-01 PROCEDURE — 99213 OFFICE O/P EST LOW 20 MIN: CPT | Mod: 25

## 2021-12-01 ASSESSMENT — LOCATION DETAILED DESCRIPTION DERM
LOCATION DETAILED: LEFT SUPERIOR FOREHEAD
LOCATION DETAILED: LEFT MEDIAL MALAR CHEEK
LOCATION DETAILED: LEFT LATERAL MALAR CHEEK
LOCATION DETAILED: RIGHT CENTRAL POSTERIOR NECK
LOCATION DETAILED: RIGHT SUPERIOR LATERAL BUCCAL CHEEK
LOCATION DETAILED: RIGHT CENTRAL MALAR CHEEK
LOCATION DETAILED: LEFT CENTRAL LATERAL NECK
LOCATION DETAILED: RIGHT CENTRAL LATERAL NECK
LOCATION DETAILED: LEFT SUPERIOR LATERAL BUCCAL CHEEK
LOCATION DETAILED: RIGHT INFERIOR LATERAL MALAR CHEEK
LOCATION DETAILED: RIGHT SUPERIOR LATERAL NECK
LOCATION DETAILED: LEFT INFERIOR CENTRAL MALAR CHEEK

## 2021-12-01 ASSESSMENT — LOCATION SIMPLE DESCRIPTION DERM
LOCATION SIMPLE: LEFT CHEEK
LOCATION SIMPLE: RIGHT CHEEK
LOCATION SIMPLE: NECK
LOCATION SIMPLE: LEFT FOREHEAD

## 2021-12-01 ASSESSMENT — LOCATION ZONE DERM
LOCATION ZONE: NECK
LOCATION ZONE: FACE

## 2021-12-01 NOTE — PROCEDURE: LIQUID NITROGEN
Medical Necessity Information: It is in your best interest to select a reason for this procedure from the list below. All of these items fulfill various CMS LCD requirements except the new and changing color options.
Show Applicator Variable?: Yes
Post-Care Instructions: I reviewed with the patient in detail post-care instructions. Patient is to wear sunprotection, and avoid picking at any of the treated lesions. Pt may apply Vaseline to crusted or scabbing areas.
Consent: The patient's consent was obtained including but not limited to risks of crusting, scabbing, blistering, scarring, darker or lighter pigmentary change, recurrence, incomplete removal and infection.
Detail Level: Detailed
Duration Of Freeze Thaw-Cycle (Seconds): 3
Render Note In Bullet Format When Appropriate: No
Number Of Freeze-Thaw Cycles: 3 freeze-thaw cycles
Medical Necessity Clause: This procedure was medically necessary because the lesions that were treated were:

## 2021-12-01 NOTE — PROCEDURE: MIPS QUALITY
Quality 402: Tobacco Use And Help With Quitting Among Adolescents: Patient screened for tobacco and never smoked
Quality 130: Documentation Of Current Medications In The Medical Record: Current Medications Documented
Quality 110: Preventive Care And Screening: Influenza Immunization: Influenza Immunization Administered during Influenza season
Quality 226: Preventive Care And Screening: Tobacco Use: Screening And Cessation Intervention: Patient screened for tobacco use and is an ex/non-smoker
Detail Level: Detailed

## 2023-11-11 ENCOUNTER — APPOINTMENT (OUTPATIENT)
Dept: RADIOLOGY | Facility: MEDICAL CENTER | Age: 63
End: 2023-11-11
Attending: STUDENT IN AN ORGANIZED HEALTH CARE EDUCATION/TRAINING PROGRAM
Payer: COMMERCIAL

## 2023-11-11 ENCOUNTER — HOSPITAL ENCOUNTER (OUTPATIENT)
Dept: RADIOLOGY | Facility: MEDICAL CENTER | Age: 63
End: 2023-11-11

## 2023-11-11 ENCOUNTER — APPOINTMENT (OUTPATIENT)
Dept: CARDIOLOGY | Facility: MEDICAL CENTER | Age: 63
End: 2023-11-11
Attending: STUDENT IN AN ORGANIZED HEALTH CARE EDUCATION/TRAINING PROGRAM
Payer: COMMERCIAL

## 2023-11-11 ENCOUNTER — HOSPITAL ENCOUNTER (OUTPATIENT)
Facility: MEDICAL CENTER | Age: 63
End: 2023-11-12
Attending: STUDENT IN AN ORGANIZED HEALTH CARE EDUCATION/TRAINING PROGRAM | Admitting: STUDENT IN AN ORGANIZED HEALTH CARE EDUCATION/TRAINING PROGRAM
Payer: COMMERCIAL

## 2023-11-11 DIAGNOSIS — R53.1 ACUTE LEFT-SIDED WEAKNESS: ICD-10-CM

## 2023-11-11 DIAGNOSIS — I10 HYPERTENSION, UNSPECIFIED TYPE: ICD-10-CM

## 2023-11-11 DIAGNOSIS — I69.30 SEQUELA, POST-STROKE: ICD-10-CM

## 2023-11-11 DIAGNOSIS — F51.01 PRIMARY INSOMNIA: ICD-10-CM

## 2023-11-11 DIAGNOSIS — I61.0 THALAMIC HEMORRHAGE (HCC): ICD-10-CM

## 2023-11-11 PROBLEM — I16.0 HYPERTENSIVE URGENCY: Status: ACTIVE | Noted: 2023-11-11

## 2023-11-11 PROBLEM — N28.9 RENAL INSUFFICIENCY: Status: RESOLVED | Noted: 2017-01-06 | Resolved: 2023-11-11

## 2023-11-11 PROBLEM — N17.9 AKI (ACUTE KIDNEY INJURY) (HCC): Status: ACTIVE | Noted: 2023-11-11

## 2023-11-11 LAB
ALBUMIN SERPL BCP-MCNC: 4.6 G/DL (ref 3.2–4.9)
ALBUMIN/GLOB SERPL: 1.5 G/DL
ALP SERPL-CCNC: 85 U/L (ref 30–99)
ALT SERPL-CCNC: 30 U/L (ref 2–50)
ANION GAP SERPL CALC-SCNC: 13 MMOL/L (ref 7–16)
APTT PPP: 42.2 SEC (ref 24.7–36)
AST SERPL-CCNC: 18 U/L (ref 12–45)
BASOPHILS # BLD AUTO: 0.3 % (ref 0–1.8)
BASOPHILS # BLD: 0.02 K/UL (ref 0–0.12)
BILIRUB SERPL-MCNC: 0.4 MG/DL (ref 0.1–1.5)
BUN SERPL-MCNC: 81 MG/DL (ref 8–22)
CALCIUM ALBUM COR SERPL-MCNC: 9.3 MG/DL (ref 8.5–10.5)
CALCIUM SERPL-MCNC: 9.8 MG/DL (ref 8.5–10.5)
CHLORIDE SERPL-SCNC: 96 MMOL/L (ref 96–112)
CHOLEST SERPL-MCNC: 178 MG/DL (ref 100–199)
CK SERPL-CCNC: 131 U/L (ref 0–154)
CO2 SERPL-SCNC: 25 MMOL/L (ref 20–33)
CREAT SERPL-MCNC: 3.59 MG/DL (ref 0.5–1.4)
CREAT UR-MCNC: 110.14 MG/DL
EOSINOPHIL # BLD AUTO: 0.08 K/UL (ref 0–0.51)
EOSINOPHIL NFR BLD: 1.3 % (ref 0–6.9)
ERYTHROCYTE [DISTWIDTH] IN BLOOD BY AUTOMATED COUNT: 38.7 FL (ref 35.9–50)
EST. AVERAGE GLUCOSE BLD GHB EST-MCNC: 131 MG/DL
GFR SERPLBLD CREATININE-BSD FMLA CKD-EPI: 18 ML/MIN/1.73 M 2
GLOBULIN SER CALC-MCNC: 3.1 G/DL (ref 1.9–3.5)
GLUCOSE BLD STRIP.AUTO-MCNC: 131 MG/DL (ref 65–99)
GLUCOSE BLD STRIP.AUTO-MCNC: 143 MG/DL (ref 65–99)
GLUCOSE SERPL-MCNC: 149 MG/DL (ref 65–99)
HBA1C MFR BLD: 6.2 % (ref 4–5.6)
HCT VFR BLD AUTO: 42.6 % (ref 42–52)
HDLC SERPL-MCNC: 31 MG/DL
HGB BLD-MCNC: 14.7 G/DL (ref 14–18)
IMM GRANULOCYTES # BLD AUTO: 0.02 K/UL (ref 0–0.11)
IMM GRANULOCYTES NFR BLD AUTO: 0.3 % (ref 0–0.9)
LDLC SERPL CALC-MCNC: 102 MG/DL
LV EJECT FRACT  99904: 60
LYMPHOCYTES # BLD AUTO: 1.1 K/UL (ref 1–4.8)
LYMPHOCYTES NFR BLD: 18.1 % (ref 22–41)
MCH RBC QN AUTO: 30.6 PG (ref 27–33)
MCHC RBC AUTO-ENTMCNC: 34.5 G/DL (ref 32.3–36.5)
MCV RBC AUTO: 88.6 FL (ref 81.4–97.8)
MONOCYTES # BLD AUTO: 0.41 K/UL (ref 0–0.85)
MONOCYTES NFR BLD AUTO: 6.7 % (ref 0–13.4)
NEUTROPHILS # BLD AUTO: 4.46 K/UL (ref 1.82–7.42)
NEUTROPHILS NFR BLD: 73.3 % (ref 44–72)
NRBC # BLD AUTO: 0 K/UL
NRBC BLD-RTO: 0 /100 WBC (ref 0–0.2)
NT-PROBNP SERPL IA-MCNC: 49 PG/ML (ref 0–125)
PLATELET # BLD AUTO: 240 K/UL (ref 164–446)
PMV BLD AUTO: 10.6 FL (ref 9–12.9)
POTASSIUM SERPL-SCNC: 4.8 MMOL/L (ref 3.6–5.5)
PROT SERPL-MCNC: 7.7 G/DL (ref 6–8.2)
PROT UR-MCNC: 15 MG/DL (ref 0–15)
PROT/CREAT UR: 136 MG/G (ref 15–68)
RBC # BLD AUTO: 4.81 M/UL (ref 4.7–6.1)
SODIUM SERPL-SCNC: 134 MMOL/L (ref 135–145)
SODIUM UR-SCNC: 42 MMOL/L
TRIGL SERPL-MCNC: 227 MG/DL (ref 0–149)
WBC # BLD AUTO: 6.1 K/UL (ref 4.8–10.8)

## 2023-11-11 PROCEDURE — 700102 HCHG RX REV CODE 250 W/ 637 OVERRIDE(OP): Performed by: STUDENT IN AN ORGANIZED HEALTH CARE EDUCATION/TRAINING PROGRAM

## 2023-11-11 PROCEDURE — 82962 GLUCOSE BLOOD TEST: CPT | Mod: 91

## 2023-11-11 PROCEDURE — 82570 ASSAY OF URINE CREATININE: CPT

## 2023-11-11 PROCEDURE — 82550 ASSAY OF CK (CPK): CPT

## 2023-11-11 PROCEDURE — 83036 HEMOGLOBIN GLYCOSYLATED A1C: CPT

## 2023-11-11 PROCEDURE — 93306 TTE W/DOPPLER COMPLETE: CPT

## 2023-11-11 PROCEDURE — 85730 THROMBOPLASTIN TIME PARTIAL: CPT

## 2023-11-11 PROCEDURE — 99205 OFFICE O/P NEW HI 60 MIN: CPT | Mod: 25 | Performed by: PHYSICAL MEDICINE & REHABILITATION

## 2023-11-11 PROCEDURE — A9270 NON-COVERED ITEM OR SERVICE: HCPCS | Performed by: NURSE PRACTITIONER

## 2023-11-11 PROCEDURE — G0378 HOSPITAL OBSERVATION PER HR: HCPCS

## 2023-11-11 PROCEDURE — 92523 SPEECH SOUND LANG COMPREHEN: CPT

## 2023-11-11 PROCEDURE — 80061 LIPID PANEL: CPT

## 2023-11-11 PROCEDURE — 99244 OFF/OP CNSLTJ NEW/EST MOD 40: CPT | Performed by: STUDENT IN AN ORGANIZED HEALTH CARE EDUCATION/TRAINING PROGRAM

## 2023-11-11 PROCEDURE — 99285 EMERGENCY DEPT VISIT HI MDM: CPT

## 2023-11-11 PROCEDURE — 97166 OT EVAL MOD COMPLEX 45 MIN: CPT

## 2023-11-11 PROCEDURE — 700102 HCHG RX REV CODE 250 W/ 637 OVERRIDE(OP): Performed by: PHYSICAL MEDICINE & REHABILITATION

## 2023-11-11 PROCEDURE — 97163 PT EVAL HIGH COMPLEX 45 MIN: CPT

## 2023-11-11 PROCEDURE — 700111 HCHG RX REV CODE 636 W/ 250 OVERRIDE (IP): Performed by: STUDENT IN AN ORGANIZED HEALTH CARE EDUCATION/TRAINING PROGRAM

## 2023-11-11 PROCEDURE — 80053 COMPREHEN METABOLIC PANEL: CPT

## 2023-11-11 PROCEDURE — 700102 HCHG RX REV CODE 250 W/ 637 OVERRIDE(OP): Performed by: NURSE PRACTITIONER

## 2023-11-11 PROCEDURE — 93306 TTE W/DOPPLER COMPLETE: CPT | Mod: 26 | Performed by: INTERNAL MEDICINE

## 2023-11-11 PROCEDURE — 85025 COMPLETE CBC W/AUTO DIFF WBC: CPT

## 2023-11-11 PROCEDURE — 99417 PROLNG OP E/M EACH 15 MIN: CPT | Performed by: PHYSICAL MEDICINE & REHABILITATION

## 2023-11-11 PROCEDURE — 76775 US EXAM ABDO BACK WALL LIM: CPT

## 2023-11-11 PROCEDURE — 70450 CT HEAD/BRAIN W/O DYE: CPT

## 2023-11-11 PROCEDURE — 84300 ASSAY OF URINE SODIUM: CPT

## 2023-11-11 PROCEDURE — 84155 ASSAY OF PROTEIN SERUM: CPT

## 2023-11-11 PROCEDURE — 84165 PROTEIN E-PHORESIS SERUM: CPT

## 2023-11-11 PROCEDURE — 99223 1ST HOSP IP/OBS HIGH 75: CPT | Mod: AI | Performed by: STUDENT IN AN ORGANIZED HEALTH CARE EDUCATION/TRAINING PROGRAM

## 2023-11-11 PROCEDURE — 93005 ELECTROCARDIOGRAM TRACING: CPT | Performed by: STUDENT IN AN ORGANIZED HEALTH CARE EDUCATION/TRAINING PROGRAM

## 2023-11-11 PROCEDURE — A9270 NON-COVERED ITEM OR SERVICE: HCPCS | Performed by: STUDENT IN AN ORGANIZED HEALTH CARE EDUCATION/TRAINING PROGRAM

## 2023-11-11 PROCEDURE — 700105 HCHG RX REV CODE 258: Performed by: NURSE PRACTITIONER

## 2023-11-11 PROCEDURE — 83880 ASSAY OF NATRIURETIC PEPTIDE: CPT

## 2023-11-11 PROCEDURE — 96374 THER/PROPH/DIAG INJ IV PUSH: CPT

## 2023-11-11 PROCEDURE — 92610 EVALUATE SWALLOWING FUNCTION: CPT

## 2023-11-11 PROCEDURE — A9270 NON-COVERED ITEM OR SERVICE: HCPCS | Performed by: PHYSICAL MEDICINE & REHABILITATION

## 2023-11-11 PROCEDURE — 84156 ASSAY OF PROTEIN URINE: CPT

## 2023-11-11 PROCEDURE — 36415 COLL VENOUS BLD VENIPUNCTURE: CPT

## 2023-11-11 RX ORDER — ACETAMINOPHEN 650 MG/1
650 SUPPOSITORY RECTAL EVERY 4 HOURS PRN
Status: DISCONTINUED | OUTPATIENT
Start: 2023-11-11 | End: 2023-11-12 | Stop reason: HOSPADM

## 2023-11-11 RX ORDER — HYDRALAZINE HYDROCHLORIDE 25 MG/1
25 TABLET, FILM COATED ORAL 2 TIMES DAILY
Status: DISCONTINUED | OUTPATIENT
Start: 2023-11-11 | End: 2023-11-12 | Stop reason: HOSPADM

## 2023-11-11 RX ORDER — ACETAMINOPHEN 325 MG/1
650 TABLET ORAL EVERY 4 HOURS PRN
Status: DISCONTINUED | OUTPATIENT
Start: 2023-11-11 | End: 2023-11-12 | Stop reason: HOSPADM

## 2023-11-11 RX ORDER — BISACODYL 10 MG
10 SUPPOSITORY, RECTAL RECTAL
Status: DISCONTINUED | OUTPATIENT
Start: 2023-11-11 | End: 2023-11-12 | Stop reason: HOSPADM

## 2023-11-11 RX ORDER — BACLOFEN 5 MG/1
5 TABLET ORAL
Status: DISCONTINUED | OUTPATIENT
Start: 2023-11-11 | End: 2023-11-12 | Stop reason: HOSPADM

## 2023-11-11 RX ORDER — POLYETHYLENE GLYCOL 3350 17 G/17G
1 POWDER, FOR SOLUTION ORAL
Status: DISCONTINUED | OUTPATIENT
Start: 2023-11-11 | End: 2023-11-12 | Stop reason: HOSPADM

## 2023-11-11 RX ORDER — LABETALOL HYDROCHLORIDE 5 MG/ML
10 INJECTION, SOLUTION INTRAVENOUS EVERY 4 HOURS PRN
Status: DISCONTINUED | OUTPATIENT
Start: 2023-11-11 | End: 2023-11-12 | Stop reason: HOSPADM

## 2023-11-11 RX ORDER — POLYETHYLENE GLYCOL 3350 17 G/17G
1 POWDER, FOR SOLUTION ORAL
Status: DISCONTINUED | OUTPATIENT
Start: 2023-11-11 | End: 2023-11-11

## 2023-11-11 RX ORDER — BISACODYL 10 MG
10 SUPPOSITORY, RECTAL RECTAL
Status: DISCONTINUED | OUTPATIENT
Start: 2023-11-11 | End: 2023-11-11

## 2023-11-11 RX ORDER — ATORVASTATIN CALCIUM 20 MG/1
20 TABLET, FILM COATED ORAL NIGHTLY
Status: DISCONTINUED | OUTPATIENT
Start: 2023-11-11 | End: 2023-11-11

## 2023-11-11 RX ORDER — ZOLPIDEM TARTRATE 5 MG/1
5 TABLET ORAL NIGHTLY PRN
Status: DISCONTINUED | OUTPATIENT
Start: 2023-11-11 | End: 2023-11-12 | Stop reason: HOSPADM

## 2023-11-11 RX ORDER — VALSARTAN 160 MG/1
160 TABLET ORAL DAILY
Status: SHIPPED | COMMUNITY
Start: 2023-09-25 | End: 2023-11-11

## 2023-11-11 RX ORDER — VALSARTAN 80 MG/1
160 TABLET ORAL DAILY
Status: DISCONTINUED | OUTPATIENT
Start: 2023-11-11 | End: 2023-11-11

## 2023-11-11 RX ORDER — HYDRALAZINE HYDROCHLORIDE 20 MG/ML
10 INJECTION INTRAMUSCULAR; INTRAVENOUS EVERY 4 HOURS PRN
Status: DISCONTINUED | OUTPATIENT
Start: 2023-11-11 | End: 2023-11-12 | Stop reason: HOSPADM

## 2023-11-11 RX ORDER — SODIUM CHLORIDE 9 MG/ML
INJECTION, SOLUTION INTRAVENOUS CONTINUOUS
Status: DISCONTINUED | OUTPATIENT
Start: 2023-11-11 | End: 2023-11-12 | Stop reason: HOSPADM

## 2023-11-11 RX ORDER — SITAGLIPTIN 100 MG/1
100 TABLET, FILM COATED ORAL DAILY
Status: SHIPPED | COMMUNITY
Start: 2023-09-25 | End: 2023-11-11

## 2023-11-11 RX ORDER — AMLODIPINE BESYLATE 5 MG/1
5 TABLET ORAL DAILY
Status: DISCONTINUED | OUTPATIENT
Start: 2023-11-12 | End: 2023-11-11

## 2023-11-11 RX ORDER — AMOXICILLIN 250 MG
2 CAPSULE ORAL 2 TIMES DAILY
Status: DISCONTINUED | OUTPATIENT
Start: 2023-11-11 | End: 2023-11-11

## 2023-11-11 RX ORDER — LABETALOL HYDROCHLORIDE 5 MG/ML
10 INJECTION, SOLUTION INTRAVENOUS EVERY 4 HOURS PRN
Status: DISCONTINUED | OUTPATIENT
Start: 2023-11-11 | End: 2023-11-11

## 2023-11-11 RX ORDER — AMLODIPINE BESYLATE 10 MG/1
10 TABLET ORAL DAILY
Status: DISCONTINUED | OUTPATIENT
Start: 2023-11-12 | End: 2023-11-12 | Stop reason: HOSPADM

## 2023-11-11 RX ORDER — ONDANSETRON 4 MG/1
4 TABLET, ORALLY DISINTEGRATING ORAL EVERY 4 HOURS PRN
Status: DISCONTINUED | OUTPATIENT
Start: 2023-11-11 | End: 2023-11-12 | Stop reason: HOSPADM

## 2023-11-11 RX ORDER — HYDRALAZINE HYDROCHLORIDE 25 MG/1
25 TABLET, FILM COATED ORAL EVERY 8 HOURS
Status: DISCONTINUED | OUTPATIENT
Start: 2023-11-11 | End: 2023-11-11

## 2023-11-11 RX ORDER — ATORVASTATIN CALCIUM 40 MG/1
40 TABLET, FILM COATED ORAL NIGHTLY
Status: DISCONTINUED | OUTPATIENT
Start: 2023-11-11 | End: 2023-11-12 | Stop reason: HOSPADM

## 2023-11-11 RX ORDER — AMLODIPINE BESYLATE 5 MG/1
2.5 TABLET ORAL DAILY
Status: DISCONTINUED | OUTPATIENT
Start: 2023-11-11 | End: 2023-11-11

## 2023-11-11 RX ORDER — ENALAPRILAT 1.25 MG/ML
1.25 INJECTION INTRAVENOUS EVERY 6 HOURS PRN
Status: DISCONTINUED | OUTPATIENT
Start: 2023-11-11 | End: 2023-11-11

## 2023-11-11 RX ORDER — LORAZEPAM 2 MG/ML
2 INJECTION INTRAMUSCULAR
Status: DISCONTINUED | OUTPATIENT
Start: 2023-11-11 | End: 2023-11-12 | Stop reason: HOSPADM

## 2023-11-11 RX ORDER — AMOXICILLIN 250 MG
2 CAPSULE ORAL 2 TIMES DAILY
Status: DISCONTINUED | OUTPATIENT
Start: 2023-11-11 | End: 2023-11-12 | Stop reason: HOSPADM

## 2023-11-11 RX ORDER — ONDANSETRON 2 MG/ML
4 INJECTION INTRAMUSCULAR; INTRAVENOUS EVERY 4 HOURS PRN
Status: DISCONTINUED | OUTPATIENT
Start: 2023-11-11 | End: 2023-11-12 | Stop reason: HOSPADM

## 2023-11-11 RX ADMIN — DOCUSATE SODIUM 50 MG AND SENNOSIDES 8.6 MG 2 TABLET: 8.6; 5 TABLET, FILM COATED ORAL at 09:41

## 2023-11-11 RX ADMIN — BACLOFEN 5 MG: 5 TABLET ORAL at 22:13

## 2023-11-11 RX ADMIN — DOCUSATE SODIUM 50 MG AND SENNOSIDES 8.6 MG 2 TABLET: 8.6; 5 TABLET, FILM COATED ORAL at 22:13

## 2023-11-11 RX ADMIN — ATORVASTATIN CALCIUM 40 MG: 40 TABLET, FILM COATED ORAL at 22:08

## 2023-11-11 RX ADMIN — LABETALOL HYDROCHLORIDE 10 MG: 5 INJECTION, SOLUTION INTRAVENOUS at 09:54

## 2023-11-11 RX ADMIN — SODIUM CHLORIDE: 9 INJECTION, SOLUTION INTRAVENOUS at 12:41

## 2023-11-11 RX ADMIN — AMLODIPINE BESYLATE 2.5 MG: 5 TABLET ORAL at 09:42

## 2023-11-11 RX ADMIN — ZOLPIDEM TARTRATE 5 MG: 5 TABLET ORAL at 22:12

## 2023-11-11 ASSESSMENT — LIFESTYLE VARIABLES
TOTAL SCORE: 0
SUBSTANCE_ABUSE: 0
EVER FELT BAD OR GUILTY ABOUT YOUR DRINKING: NO
HAVE PEOPLE ANNOYED YOU BY CRITICIZING YOUR DRINKING: NO
HOW MANY TIMES IN THE PAST YEAR HAVE YOU HAD 5 OR MORE DRINKS IN A DAY: 0
ALCOHOL_USE: NO
TOTAL SCORE: 0
AVERAGE NUMBER OF DAYS PER WEEK YOU HAVE A DRINK CONTAINING ALCOHOL: 0
ON A TYPICAL DAY WHEN YOU DRINK ALCOHOL HOW MANY DRINKS DO YOU HAVE: 0
DOES PATIENT WANT TO STOP DRINKING: NO
HAVE YOU EVER FELT YOU SHOULD CUT DOWN ON YOUR DRINKING: NO
TOTAL SCORE: 0
CONSUMPTION TOTAL: NEGATIVE
EVER HAD A DRINK FIRST THING IN THE MORNING TO STEADY YOUR NERVES TO GET RID OF A HANGOVER: NO

## 2023-11-11 ASSESSMENT — ENCOUNTER SYMPTOMS
CHILLS: 0
DEPRESSION: 0
HEARTBURN: 0
NAUSEA: 0
MYALGIAS: 0
SPEECH CHANGE: 1
DOUBLE VISION: 0
ABDOMINAL PAIN: 0
BRUISES/BLEEDS EASILY: 1
WEAKNESS: 1
FOCAL WEAKNESS: 1
VOMITING: 0
SHORTNESS OF BREATH: 0
BLURRED VISION: 0
HEADACHES: 1
DIZZINESS: 0
COUGH: 0
FEVER: 0

## 2023-11-11 ASSESSMENT — PATIENT HEALTH QUESTIONNAIRE - PHQ9
2. FEELING DOWN, DEPRESSED, IRRITABLE, OR HOPELESS: NOT AT ALL
1. LITTLE INTEREST OR PLEASURE IN DOING THINGS: NOT AT ALL
1. LITTLE INTEREST OR PLEASURE IN DOING THINGS: NOT AT ALL
SUM OF ALL RESPONSES TO PHQ9 QUESTIONS 1 AND 2: 0
SUM OF ALL RESPONSES TO PHQ9 QUESTIONS 1 AND 2: 0
2. FEELING DOWN, DEPRESSED, IRRITABLE, OR HOPELESS: NOT AT ALL

## 2023-11-11 ASSESSMENT — COGNITIVE AND FUNCTIONAL STATUS - GENERAL
TURNING FROM BACK TO SIDE WHILE IN FLAT BAD: UNABLE
HELP NEEDED FOR BATHING: A LOT
WALKING IN HOSPITAL ROOM: A LOT
DRESSING REGULAR LOWER BODY CLOTHING: A LOT
DAILY ACTIVITIY SCORE: 14
PERSONAL GROOMING: A LITTLE
SUGGESTED CMS G CODE MODIFIER MOBILITY: CM
TOILETING: A LOT
MOVING FROM LYING ON BACK TO SITTING ON SIDE OF FLAT BED: UNABLE
STANDING UP FROM CHAIR USING ARMS: A LOT
MOBILITY SCORE: 8
SUGGESTED CMS G CODE MODIFIER DAILY ACTIVITY: CK
DRESSING REGULAR UPPER BODY CLOTHING: A LOT
CLIMB 3 TO 5 STEPS WITH RAILING: TOTAL
MOVING TO AND FROM BED TO CHAIR: UNABLE
EATING MEALS: A LITTLE

## 2023-11-11 ASSESSMENT — COPD QUESTIONNAIRES
DO YOU EVER COUGH UP ANY MUCUS OR PHLEGM?: YES, A FEW DAYS A WEEK OR MONTH
HAVE YOU SMOKED AT LEAST 100 CIGARETTES IN YOUR ENTIRE LIFE: NO/DON'T KNOW
DURING THE PAST 4 WEEKS HOW MUCH DID YOU FEEL SHORT OF BREATH: NONE/LITTLE OF THE TIME
COPD SCREENING SCORE: 3

## 2023-11-11 ASSESSMENT — ACTIVITIES OF DAILY LIVING (ADL): TOILETING: INDEPENDENT

## 2023-11-11 ASSESSMENT — GAIT ASSESSMENTS: GAIT LEVEL OF ASSIST: UNABLE TO PARTICIPATE

## 2023-11-11 ASSESSMENT — FIBROSIS 4 INDEX: FIB4 SCORE: 0.84

## 2023-11-11 NOTE — CONSULTS
Hospital Medicine Consultation    Date of Service  11/11/2023    Referring Physician  Larry Key M.D.    Consulting Physician  Ivan Velazquez M.D.    Reason for Consultation  Placement for rehab     History of Presenting Illness  62 y.o. male past medical history of diabetes, hyperlipidemia, hypertension, hemorrhagic CVA 2 weeks ago while in [place], CKD in [place] who presented 11/11/2023 presents here for rehab placement.  Patient currently is alert, awake, oriented x3.  He has residual left-sided facial droop and left sided weakness.  I discussed with patient's soon-to-be Vielka olivo over the phone.  My colleague has discussed with rehab along with Corinne Nichols. PMR order referral has been placed. Dr. Watkins is to evaluate patient.     In the ER, ER physician evaluated patient and we ordered repeat labs and CT of the head without contrast.  CT head without contrast here today showing decreased right thalamic hemorrhage since prior study.  Discussed with case management and ER physician both agreed patient appropriate for ED observation for rehab placement.  PT/OT have been ordered.  I discussed the case with case management.        Review of Systems  Review of Systems   Neurological:  Positive for speech change, focal weakness and weakness.       Past Medical History   has a past medical history of Diabetes (HCC), High cholesterol, Hypertension, and Stroke (HCC).    Surgical History   has a past surgical history that includes sada by laparoscopy (N/A, 1/6/2017) and abdominal abscess drainage (N/A, 1/6/2017).    Family History  family history is not on file.    Social History   reports that he has never smoked. He has never used smokeless tobacco. He reports that he does not drink alcohol and does not use drugs.    Medications  Current Facility-Administered Medications   Medication Dose Route Frequency Provider Last Rate Last Admin    senna-docusate (Pericolace Or Senokot S) 8.6-50 MG per tablet 2 Tablet  2 Tablet  Oral BID Ivan Velazquez M.D.        And    polyethylene glycol/lytes (Miralax) PACKET 1 Packet  1 Packet Oral QDAY PRN Ivan Velazquez M.D.        And    bisacodyl (Dulcolax) suppository 10 mg  10 mg Rectal QDAY PRN Ivan Velazquez M.D.        labetalol (Normodyne/Trandate) injection 10 mg  10 mg Intravenous Q4HRS PRN Ivan Velazquez M.D.        amLODIPine (Norvasc) tablet 2.5 mg  2.5 mg Oral DAILY Ivan Velazquez M.D.        atorvastatin (Lipitor) tablet 20 mg  20 mg Oral Nightly Ivan Velazquez M.D.        valsartan (Diovan) tablet 160 mg  160 mg Oral DAILY Ivan Velazquez M.D.         Current Outpatient Medications   Medication Sig Dispense Refill    valsartan (DIOVAN) 160 MG Tab Take 160 mg by mouth every day.      JANUVIA 100 MG Tab Take 100 mg by mouth every day.      metFORMIN (GLUCOPHAGE) 500 MG Tab Take 1,000 mg by mouth 2 times a day.      ondansetron (ZOFRAN ODT) 4 MG TABLET DISPERSIBLE Take 1 Tab by mouth every four hours as needed for Nausea/Vomiting (give PO if no IV route available). 20 Tab 0    glipiZIDE SR (GLUCOTROL) 2.5 MG TABLET SR 24 HR Take 1 Tab by mouth every day. 30 Tab 0    hydrocodone-acetaminophen (NORCO) 5-325 MG Tab per tablet Take 1 Tab by mouth every 6 hours as needed. 20 Tab 0    vitamin D (CHOLECALCIFEROL) 1000 UNIT Tab Take 1,000 Units by mouth every day.      cyanocobalamin (VITAMIN B-12) 500 MCG Tab Take 500 mcg by mouth every day.      Omega-3 Fatty Acids (FISH OIL) 1000 MG Cap capsule Take 1,000 mg by mouth every day.      amlodipine (NORVASC) 2.5 MG Tab Take 2.5 mg by mouth every day.      atorvastatin (LIPITOR) 20 MG Tab Take 20 mg by mouth every evening.         Allergies  Allergies   Allergen Reactions    Penicillins        Physical Exam  Temp:  [36.3 °C (97.3 °F)] 36.3 °C (97.3 °F)  Pulse:  [86-92] 86  Resp:  [16] 16  BP: (111-159)/() 136/78  SpO2:  [93 %-98 %] 93 %    Physical Exam  Vitals and nursing note reviewed.   Constitutional:       Appearance: Normal  appearance.   HENT:      Head: Normocephalic and atraumatic.      Right Ear: Tympanic membrane normal.      Left Ear: Tympanic membrane normal.      Nose: Nose normal.      Mouth/Throat:      Mouth: Mucous membranes are moist.      Pharynx: Oropharynx is clear.   Eyes:      Extraocular Movements: Extraocular movements intact.      Pupils: Pupils are equal, round, and reactive to light.   Cardiovascular:      Rate and Rhythm: Normal rate and regular rhythm.      Pulses: Normal pulses.      Heart sounds: Normal heart sounds.   Pulmonary:      Effort: Pulmonary effort is normal.      Breath sounds: Normal breath sounds.   Abdominal:      General: Bowel sounds are normal. There is no distension.      Palpations: Abdomen is soft. There is no mass.   Musculoskeletal:      Cervical back: Neck supple.   Skin:     General: Skin is warm.      Capillary Refill: Capillary refill takes less than 2 seconds.   Neurological:      Mental Status: He is alert and oriented to person, place, and time.      Cranial Nerves: Cranial nerve deficit present.      Sensory: Sensory deficit present.      Motor: Weakness present.      Comments: Left facial droop  Left upper extremity weakness 2 out of 5  Left lower extremity 5 out of 5  Right upper extremity 5 out of 5  Right lower extremity 5 out of 5   Psychiatric:         Mood and Affect: Mood normal.         Behavior: Behavior normal.         Fluids      Laboratory  Recent Labs     11/11/23 0220   WBC 6.1   RBC 4.81   HEMOGLOBIN 14.7   HEMATOCRIT 42.6   MCV 88.6   MCH 30.6   MCHC 34.5   RDW 38.7   PLATELETCT 240   MPV 10.6     Recent Labs     11/11/23 0220   SODIUM 134*   POTASSIUM 4.8   CHLORIDE 96   CO2 25   GLUCOSE 149*   BUN 81*   CREATININE 3.59*   CALCIUM 9.8     Recent Labs     11/11/23 0220   APTT 42.2*                 Imaging  CT-HEAD W/O   Final Result         1.  Right thalamic hemorrhage, decreased in density since prior study   2.  Stranding vasogenic edema appears stable.    3.  Slight asymmetric dilatation of the left ventricular system including the left temporal horn, consider component of hydrocephalus.   4.  Low-density fluid collection in the left temporal fossa, appearance most compatible with arachnoid cyst.   5.  Nonspecific white matter changes, commonly associated with small vessel ischemic disease.  Associated mild cerebral atrophy is noted.   6.  Atherosclerosis.         OUTSIDE IMAGES-US ABDOMEN   Final Result      OUTSIDE IMAGES-CT HEAD   Final Result      OUTSIDE IMAGES-CT HEAD   Final Result      OUTSIDE IMAGES-CT HEAD   Final Result          Assessment/Plan  * Thalamic hemorrhage (HCC)  Assessment & Plan  Patient had stroke 3 weeks ago in [place] and has traveled here for rehab placement  Repeat CT imaging showing decrease in thalamic hemorrhage.   Continue with neurochecks every 4 hours  Continue with blood pressure control keep SBP less than 140  IV medications have been ordered  Discussed with case management patient to remain in ED labs with PT/OT and PMNR consultation  Fall precautions     Renal insufficiency- (present on admission)  Assessment & Plan  Has baseline CKD  Avoid nephrotoxic agents  Renally adjust all medications    DM type 2 (diabetes mellitus, type 2) (HCC)- (present on admission)  Assessment & Plan  Continue with sliding scale and Accu-Cheks hypoglycemia protocol    Hyperlipidemia- (present on admission)  Assessment & Plan  Continue with atorvastatin    Hypertension- (present on admission)  Assessment & Plan  Continue with amlodipine and losartan  IV antihypertensives have been ordered         Please note that this dictation was created using voice recognition software. I have made every reasonable attempt to correct obvious errors, but I expect that there are errors of grammar and possibly context that I did not discover before finalizing the note.

## 2023-11-11 NOTE — ED TRIAGE NOTES
Chief Complaint   Patient presents with    Other     Pt had a stroke while in [place] 3 weeks ago and was told to come directly to the hospital when he landed in [place]; left side deficits from stroke     Pt to triage via wheelchair with brother for above complaint. Pt was hospitalized in ICU in [place] x8 days and has paperwork with him but it is all in Mong[place]ian.     Pt is alert/oriented and follows commands. Pt speaking in full sentences and responds appropriately to questions. No acute distress noted in triage and respirations are even and unlabored.     Charge RN notified of pt and room requested. Will transport pt to [facility] via wheelchair.

## 2023-11-11 NOTE — H&P
"Hospital Medicine History & Physical Note    Date of Service  11/11/2023    Primary Care Physician  Humble Garcia D.O.    Consultants  None    Code Status  Full Code    Chief Complaint  Chief Complaint   Patient presents with    Other     Pt had a stroke while in [facility] 3 weeks ago and was told to come directly to the hospital when he landed in [place]; left side deficits from stroke       History of Presenting Illness  Jason Lima is a 62 y.o. male with history of hypertension, diabetes, hyperlipidemia presented 11/11/2023 with evaluation for right-sided thalamic hemorrhagic CVA. History obtained from patient's fiancé who was at bedside as patient is poor historian.  Fiancé showed me hospitalization paperwork from [facility] dated 10/23/2023.  At that time, patient complained of headache which required to lay down and rest, then followed by weakness of left side.  Due to concern of CVA, patient was taken to hospital in [facility].    Included paperwork on 10/23/2023 revealed:  NIHSS score: 7  \"Radiologica:  -TC craneal: Hematoma vee talamo-capsular derecho de 3cm, presumiblemente de origen hipertensivo, abierto al sistema ventricular\"    /100  Glucose 160  Cr 3.2\"    Patient returned to [facility] approximately 01:00 on 11/11/2023, came immediate to [facility] ED. CT head notable right thalamic hemorrhage, decrease in density since prior study, stranding vasogenic edema appears stable.  SBP in emergency room at some point above 180.  Initially plan for ED observation with plan for PMR/rehab admission, however, due to uncontrolled hypertension, admission requested by ERP and SNF provider.  I peripherally discussed with neurology given concern for subacute hemorrhagic CVA, no further recommendation made from neurology standpoint.  OF note, he does have notable ROMÁN with creatinine of 3.59, previously 3.2 when he was in [facility].  Admitted to medicine service for further evaluation and treatment.    I discussed the " plan of care with patient and bedside RN.    Review of Systems  Review of Systems   Constitutional:  Negative for chills and fever.   HENT:  Negative for hearing loss and tinnitus.    Eyes:  Negative for blurred vision and double vision.   Respiratory:  Negative for cough and shortness of breath.    Cardiovascular:  Negative for chest pain and leg swelling.   Gastrointestinal:  Negative for abdominal pain, heartburn, nausea and vomiting.   Genitourinary:  Negative for dysuria and hematuria.   Musculoskeletal:  Negative for joint pain and myalgias.   Skin:  Negative for itching and rash.   Neurological:  Positive for focal weakness (left) and headaches. Negative for dizziness.   Endo/Heme/Allergies:  Negative for environmental allergies. Bruises/bleeds easily.   Psychiatric/Behavioral:  Negative for depression and substance abuse.    All other systems reviewed and are negative.      Past Medical History   has a past medical history of Diabetes (HCC), High cholesterol, Hypertension, and Stroke (HCC).    Surgical History   has a past surgical history that includes sada by laparoscopy (N/A, 1/6/2017) and abdominal abscess drainage (N/A, 1/6/2017).     Family History  family history is not on file.   Family history reviewed with patient. There is family history that is pertinent to the chief complaint.   FH of CVA in sister    Social History   reports that he has never smoked. He has never used smokeless tobacco. He reports that he does not drink alcohol and does not use drugs.    Allergies  Allergies   Allergen Reactions    Penicillins        Medications  None       Physical Exam  Temp:  [36.3 °C (97.3 °F)-36.6 °C (97.9 °F)] 36.6 °C (97.9 °F)  Pulse:  [64-95] 64  Resp:  [16-18] 18  BP: (111-187)/() 129/74  SpO2:  [93 %-99 %] 98 %  Blood Pressure: (!) 180/112   Temperature: 36.3 °C (97.3 °F)   Pulse: 86   Respiration: 16   Pulse Oximetry: 93 %       Physical Exam  Vitals and nursing note reviewed.  "  Constitutional:       General: He is not in acute distress.  HENT:      Head: Normocephalic and atraumatic.      Nose: Nose normal.      Mouth/Throat:      Mouth: Mucous membranes are moist.      Pharynx: Oropharynx is clear.   Eyes:      General: No scleral icterus.     Extraocular Movements: Extraocular movements intact.   Cardiovascular:      Rate and Rhythm: Normal rate and regular rhythm.      Pulses: Normal pulses.      Heart sounds:      No friction rub.   Pulmonary:      Effort: Pulmonary effort is normal. No respiratory distress.      Breath sounds: No wheezing or rales.   Chest:      Chest wall: No tenderness.   Abdominal:      General: There is no distension.      Tenderness: There is no abdominal tenderness. There is no guarding or rebound.   Musculoskeletal:         General: Normal range of motion.      Cervical back: Neck supple. No tenderness.      Right lower leg: No edema.      Left lower leg: No edema.   Skin:     General: Skin is warm.   Neurological:      Mental Status: He is alert and oriented to person, place, and time.      Motor: Weakness (left) present.      Comments: No facial droop  No dysarthria  Notable left-sided hemiparesis   Psychiatric:         Mood and Affect: Mood normal.         Laboratory:  Recent Labs     11/11/23 0220   WBC 6.1   RBC 4.81   HEMOGLOBIN 14.7   HEMATOCRIT 42.6   MCV 88.6   MCH 30.6   MCHC 34.5   RDW 38.7   PLATELETCT 240   MPV 10.6     Recent Labs     11/11/23 0220   SODIUM 134*   POTASSIUM 4.8   CHLORIDE 96   CO2 25   GLUCOSE 149*   BUN 81*   CREATININE 3.59*   CALCIUM 9.8     Recent Labs     11/11/23 0220   ALTSGPT 30   ASTSGOT 18   ALKPHOSPHAT 85   TBILIRUBIN 0.4   GLUCOSE 149*     Recent Labs     11/11/23 0220   APTT 42.2*     Recent Labs     11/11/23 0220   NTPROBNP 49     Recent Labs     11/11/23 0220   TRIGLYCERIDE 227*   HDL 31*   *     No results for input(s): \"TROPONINT\" in the last 72 hours.    Imaging:  CT-HEAD W/O   Final Result       " "  1.  Right thalamic hemorrhage, decreased in density since prior study   2.  Stranding vasogenic edema appears stable.   3.  Slight asymmetric dilatation of the left ventricular system including the left temporal horn, consider component of hydrocephalus.   4.  Low-density fluid collection in the left temporal fossa, appearance most compatible with arachnoid cyst.   5.  Nonspecific white matter changes, commonly associated with small vessel ischemic disease.  Associated mild cerebral atrophy is noted.   6.  Atherosclerosis.         OUTSIDE IMAGES-US ABDOMEN   Final Result      OUTSIDE IMAGES-CT HEAD   Final Result      OUTSIDE IMAGES-CT HEAD   Final Result      OUTSIDE IMAGES-CT HEAD   Final Result      EC-ECHOCARDIOGRAM COMPLETE W/O CONT    (Results Pending)   US-RENAL    (Results Pending)       EKG:  I have personally reviewed the images and compared with prior images.    Assessment/Plan:  Justification for Admission Status  I anticipate this patient is appropriate for observation status at this time      * Thalamic hemorrhage (HCC)  Assessment & Plan  Subacute, likely secondary to uncontrolled hypertension  Presented to hospital in [facility] on 10/23/2023 with headache and left-sided weakness.  NIH score 7, /100  -\"Radiologica:  -TC craneal: Hematoma vee talamo-capsular derecho de 3cm, presumiblemente de origen hipertensivo, abierto al sistema ventricular\"    Target SBP<140  Increase home amlodipine from 5 mg to 10 mg daily  Added hydralazine PO 25 mg twice daily-titrate up as tolerated  As needed IV hydralazine, IV labetalol  Statin  PT/OT/ST  Discussed peripherally with on-call neurology, no further recommendation made given subacute    Acute left-sided weakness  Assessment & Plan  Secondary to hemorrhagic CVA  Plan as above    ROMÁN (acute kidney injury) (HCC)  Assessment & Plan  IVF  Check FeNa, renal US  Minimize nephrotoxic agents, renally dose meds    Hypertensive urgency  Assessment & " Plan  Increased home amlodipine to 10mg  Added hydralazine 25mg PO BID -- titrate up as tolerated  As needed IV labetalol, IV hydralazine    DM (diabetes mellitus) (HCC)- (present on admission)  Assessment & Plan  ISS  statin    Hyperlipidemia- (present on admission)  Assessment & Plan  Statin  Target LDL below 70 or as low as tolerated        VTE prophylaxis: SCDs/TEDs

## 2023-11-11 NOTE — ASSESSMENT & PLAN NOTE
Increased home amlodipine to 10mg  Added hydralazine 25mg PO BID -- titrate up as tolerated  As needed IV labetalol, IV hydralazine

## 2023-11-11 NOTE — ED NOTES
Med rec completed per patient and patient home pharmacy.   Per patient, has not taken any medications in >6 weeks.   Per pharmacy, picked up medications on 09/25 for 90 day supplies.   - Atorvastatin 10mg QD   - Januvia 100mg QD   - Metformin 500mg x2 BID   - Valsartan 160mg QD   Maryam Melendez, Pharmacy Intern

## 2023-11-11 NOTE — DISCHARGE SUMMARY
"  ED Observation Discharge Summary    Patient:Jason Lima  Patient : 1960  Patient MRN: [id number]  Patient PCP: Humble Garcia D.O.    Admit Date: 2023  Discharge Date and Time: 23 11:15 AM  Discharge Diagnosis: Post stroke sequela  Discharge Attending: Matteo Vazquez M.D.  Discharge Service: ED Observation    ED Course  Jason is a 62 y.o. male who was evaluated at [facility] earlier this morning as the patient require placement after he unfortunately had a stroke 2 weeks ago while in [facility].  He just returned to [place] and needs placement for rehabilitation.  He does have left-sided facial droop and left-sided weakness.  Initially he can attempt to get the patient in to rehabilitation from the emergency department but this has been unsuccessful.  They want the patient's blood pressure under better control before acceptance.  The case has been difficult as all the records are from [facility] and will require some translation before he can fully get acceptance into the skilled nursing care facility.  We did repeat a CT scan of the head and there is no interval hemorrhage and it seems to be improving.  PT and OT has been ordered as the patient will require rehabilitation.  At the time my last exam the patient's blood pressure is 120 systolic therefore he would not require any further acute management.    Discharge Exam:  BP (!) 180/112   Pulse 86   Temp 36.3 °C (97.3 °F) (Temporal)   Resp 16   Ht 1.727 m (5' 8\")   Wt 83.9 kg (185 lb)   SpO2 93%   BMI 28.13 kg/m² .    Constitutional: Awake and alert. Nontoxic  HENT:  Grossly normal  Eyes: Grossly normal  Neck: Normal range of motion  Cardiovascular: Normal heart rate   Thorax & Lungs: No respiratory distress  Abdomen: Nontender  Skin:  No pathologic rash.   Extremities: Well perfused  Psychiatric: Affect normal    Labs  Results for orders placed or performed during the hospital encounter of 23   CBC WITH DIFFERENTIAL   Result Value Ref Range "    WBC 6.1 4.8 - 10.8 K/uL    RBC 4.81 4.70 - 6.10 M/uL    Hemoglobin 14.7 14.0 - 18.0 g/dL    Hematocrit 42.6 42.0 - 52.0 %    MCV 88.6 81.4 - 97.8 fL    MCH 30.6 27.0 - 33.0 pg    MCHC 34.5 32.3 - 36.5 g/dL    RDW 38.7 35.9 - 50.0 fL    Platelet Count 240 164 - 446 K/uL    MPV 10.6 9.0 - 12.9 fL    Neutrophils-Polys 73.30 (H) 44.00 - 72.00 %    Lymphocytes 18.10 (L) 22.00 - 41.00 %    Monocytes 6.70 0.00 - 13.40 %    Eosinophils 1.30 0.00 - 6.90 %    Basophils 0.30 0.00 - 1.80 %    Immature Granulocytes 0.30 0.00 - 0.90 %    Nucleated RBC 0.00 0.00 - 0.20 /100 WBC    Neutrophils (Absolute) 4.46 1.82 - 7.42 K/uL    Lymphs (Absolute) 1.10 1.00 - 4.80 K/uL    Monos (Absolute) 0.41 0.00 - 0.85 K/uL    Eos (Absolute) 0.08 0.00 - 0.51 K/uL    Baso (Absolute) 0.02 0.00 - 0.12 K/uL    Immature Granulocytes (abs) 0.02 0.00 - 0.11 K/uL    NRBC (Absolute) 0.00 K/uL   Comp Metabolic Panel   Result Value Ref Range    Sodium 134 (L) 135 - 145 mmol/L    Potassium 4.8 3.6 - 5.5 mmol/L    Chloride 96 96 - 112 mmol/L    Co2 25 20 - 33 mmol/L    Anion Gap 13.0 7.0 - 16.0    Glucose 149 (H) 65 - 99 mg/dL    Bun 81 (H) 8 - 22 mg/dL    Creatinine 3.59 (H) 0.50 - 1.40 mg/dL    Calcium 9.8 8.5 - 10.5 mg/dL    Correct Calcium 9.3 8.5 - 10.5 mg/dL    AST(SGOT) 18 12 - 45 U/L    ALT(SGPT) 30 2 - 50 U/L    Alkaline Phosphatase 85 30 - 99 U/L    Total Bilirubin 0.4 0.1 - 1.5 mg/dL    Albumin 4.6 3.2 - 4.9 g/dL    Total Protein 7.7 6.0 - 8.2 g/dL    Globulin 3.1 1.9 - 3.5 g/dL    A-G Ratio 1.5 g/dL   proBrain Natriuretic Peptide, NT   Result Value Ref Range    NT-proBNP 49 0 - 125 pg/mL   APTT   Result Value Ref Range    APTT 42.2 (H) 24.7 - 36.0 sec   ESTIMATED GFR   Result Value Ref Range    GFR (CKD-EPI) 18 (A) >60 mL/min/1.73 m 2       Radiology  CT-HEAD W/O   Final Result         1.  Right thalamic hemorrhage, decreased in density since prior study   2.  Stranding vasogenic edema appears stable.   3.  Slight asymmetric dilatation of the  left ventricular system including the left temporal horn, consider component of hydrocephalus.   4.  Low-density fluid collection in the left temporal fossa, appearance most compatible with arachnoid cyst.   5.  Nonspecific white matter changes, commonly associated with small vessel ischemic disease.  Associated mild cerebral atrophy is noted.   6.  Atherosclerosis.         OUTSIDE IMAGES-US ABDOMEN   Final Result      OUTSIDE IMAGES-CT HEAD   Final Result      OUTSIDE IMAGES-CT HEAD   Final Result      OUTSIDE IMAGES-CT HEAD   Final Result          Medications:   New Prescriptions    No medications on file       My final assessment includes status post hemorrhagic thalamic CVA  Upon Reevaluation, the patient's condition has: not improved; and will be escalated to hospitalization.    Patient discharged from ED Observation status at 1120 (Time) 11/11/23 (Date).     Total time spent on this ED Observation discharge encounter is > 30 Minutes    Electronically signed by: Matteo Vazquez M.D., 11/11/2023 11:15 AM

## 2023-11-11 NOTE — ED PROVIDER NOTES
ED Provider Note    CHIEF COMPLAINT  Chief Complaint   Patient presents with    Other     Pt had a stroke while in [place] 3 weeks ago and was told to come directly to the hospital when he landed in [place]; left side deficits from stroke       EXTERNAL RECORDS REVIEWED  Inpatient Notes patient with a right-sided neuralgic thalamic stroke 3 weeks ago in [place]    HPI/ROS  LIMITATION TO HISTORY   Select: : None  OUTSIDE HISTORIAN(S):  Family patient's brother traveled with the patient from [facility] after 3-week hospital admission, looking for rehab    Jason Lima is a 62 y.o. male who presents with request for postacute care for hemorrhagic stroke.  Patient denies any runny nose, sore throat, cough, fevers, chills, bodies, sweats, nausea or vomiting, chest pain or shortness of breath.  Patient reportedly had an improving CT scan at the outside hospital prior to transportation here.  Patient was on a flight in between.  Patient denies worsening headache.  Patient remains with deficits to the left upper, left lower extremity, left facial droop, right-sided gaze deviation although all are improving.    PAST MEDICAL HISTORY   has a past medical history of Diabetes (HCC), High cholesterol, Hypertension, and Stroke (HCC).    SURGICAL HISTORY   has a past surgical history that includes sada by laparoscopy (N/A, 1/6/2017) and abdominal abscess drainage (N/A, 1/6/2017).    FAMILY HISTORY  History reviewed. No pertinent family history.    SOCIAL HISTORY  Social History     Tobacco Use    Smoking status: Never    Smokeless tobacco: Never   Vaping Use    Vaping Use: Never used   Substance and Sexual Activity    Alcohol use: Never    Drug use: Never    Sexual activity: Not on file       CURRENT MEDICATIONS  Home Medications       Reviewed by Ayse Joiner R.N. (Registered Nurse) on 11/11/23 at 0105  Med List Status: Not Addressed     Medication Last Dose Status   amlodipine (NORVASC) 2.5 MG Tab  Active   atorvastatin (LIPITOR) 20 MG  "Tab  Active   cyanocobalamin (VITAMIN B-12) 500 MCG Tab  Active   glipiZIDE SR (GLUCOTROL) 2.5 MG TABLET SR 24 HR  Active   hydrocodone-acetaminophen (NORCO) 5-325 MG Tab per tablet  Active   Omega-3 Fatty Acids (FISH OIL) 1000 MG Cap capsule  Active   ondansetron (ZOFRAN ODT) 4 MG TABLET DISPERSIBLE  Active   vitamin D (CHOLECALCIFEROL) 1000 UNIT Tab  Active                    ALLERGIES  Allergies   Allergen Reactions    Penicillins        PHYSICAL EXAM  VITAL SIGNS: BP (!) 156/98   Pulse 90   Temp 36.3 °C (97.3 °F) (Temporal)   Resp 16   Ht 1.727 m (5' 8\")   Wt 83.9 kg (185 lb)   SpO2 94%   BMI 28.13 kg/m²    Vitals and nursing note reviewed.   Constitutional:       Comments: Patient is lying in bed supine, pleasant, conversant, speaking in complete sentences   HENT:      Head: Normocephalic and atraumatic.   No posterior pharyngeal erythema or exudates  Eyes:      Extraocular Movements: Extraocular movements intact.      Conjunctiva/sclera: Conjunctivae normal.      Pupils: Pupils are equal, round, and reactive to light.   Cardiovascular:      Pulses: Normal pulses.      Comments: HR 90  Pulmonary:      Effort: Pulmonary effort is normal. No respiratory distress.   Abdominal:      Comments: Abdomen is soft, non-tender, non-distended, non-rigid, no rebound, guarding, masses, no McBurney's point tenderness, no peritoneal signs, negative Rovsing sign, negative Sheppard sign.  No CVA tenderness to palpation. Benign abdomen.   Musculoskeletal:         General: No swelling. Normal range of motion.      Cervical back: Normal range of motion. No rigidity.   Skin:     General: Skin is warm and dry.      Capillary Refill: Capillary refill takes less than 2 seconds.   Neurological:      Mental Status: Alert.  Left-sided facial droop, right-sided gaze deviation, left upper extremity strength 2 out of 5, left lower extremity strength 3 out of 5.  Decree sensation light touch in the left face, left upper and left lower " extremity.      DIAGNOSTIC STUDIES / PROCEDURES    LABS  Elevated creatinine    RADIOLOGY  I have independently interpreted the diagnostic imaging associated with this visit and am waiting the final reading from the radiologist.   My preliminary interpretation is as follows: CT head pending  Radiologist interpretation: CT head pending    COURSE & MEDICAL DECISION MAKING    INITIAL ASSESSMENT, COURSE AND PLAN  Care Narrative: I do not believe patient is suffering from an acute stroke or decompensation from his previous stroke.  I do believe that the patient is stable at this time.  CT imaging to compare to prior CT to ensure that bleeding is not worsening.  Basic lab work ordered as well.  No evidence of acute infectious etiology at this time.  No evidence of acute cardiopulmonary process at this time patient will be admitted to hospital medicine for management possible placement to rehab or skilled nursing facility.    Electronically signed by: Russ Key M.D., 11/11/2023 2:31 AM    Patient lab work remarkable for significantly elevated creatinine.  Patient without anemia or leukocytosis.  No signs of infection.  BNP within normal limits.  Patient pending CT imaging, will be admitted once CT imaging returns without any acute process.  Based on clinical exam decompensation related to patient's intracranial hemorrhage is highly unlikely.    This dictation has been created using voice recognition software. I am continuously working with the software to minimize the number of voice recognition errors and I have made every attempt to manually correct the errors within my dictation. However errors  related to this voice recognition software may still exist and should be interpreted within the appropriate context.     Electronically signed by: Russ Key M.D., 11/11/2023 4:01 AM        FINAL DIAGNOSIS  1. Sequela, post-stroke           Electronically signed by: Russ Key M.D., 11/11/2023  2:25 AM

## 2023-11-11 NOTE — ED NOTES
Bedside report received from off going RN/tech: Liz, assumed care of patient.  POC discussed with patient. Call light within reach, all needs addressed at this time.       Fall risk interventions in place: Move the patient closer to the nurse's station, Patient's personal possessions are with in their safe reach, Place fall risk sign on patient's door, Give patient urinal if applicable, and Keep floor surfaces clean and dry (all applicable per [facility] Fall risk assessment)   Continuous monitoring: Cardiac Leads, Pulse Ox, or Blood Pressure  IVF/IV medications: Not Applicable   Oxygen: Room Air  Bedside sitter: Not Applicable   Isolation: Not Applicable

## 2023-11-11 NOTE — ED NOTES
Pt assisted with urinal, pt provided with additional blankets and pt resting in bed with call light within reach  Pt denies any additional needs at this moment

## 2023-11-11 NOTE — ED NOTES
Bedside report received from YUAN Arshad. Assumed care of patient and  is resting, denies any pain or needs. Bed locked and in lowest position. Call light available and within reach. No oxygen requirements at this time. Appropriate fall precautions in place. Patient A&Ox4, GCS 15. Patient on cardiac monitoring, automatic BP and pulse oximeter. See MAR for medications and infusions. No distress noted.

## 2023-11-11 NOTE — THERAPY
Physical Therapy   Initial Evaluation     Patient Name: Jason Lima  Age:  62 y.o., Sex:  male  Medical Record #: [id number]  Today's Date: 11/11/2023     Precautions  Precautions: Fall Risk  Comments: left jade    Assessment  Pt presents with impaired activity tolerance, dynamic balance and strength associated with delayed presentation for right thalamic hemorrhage in setting HTN, DM II. Pt is most limited by delayed onset for rehab, per dylon at bedside pt did not received much therapy of any kind and was very difficult to get pt back to [place]. Pt is very motivated with 24/7 support from extensive family and finance is a labor and delivery RN; pt has at least trace movement in all major muscle groups on left side and cognition intact without neglect noted. Good physical therapy prognosis and excellent acute rehab candidate if qualifies; will follow to progress.       Plan    Physical Therapy Initial Treatment Plan   Treatment Plan : Equipment, Bed Mobility, Gait Training, Manual Therapy, Neuro Re-Education / Balance, Self Care / Home Evaluation, Stair Training, Therapeutic Activities, Therapeutic Exercise  Treatment Frequency: 5 Times per Week  Duration: Until Therapy Goals Met    DC Equipment Recommendations: Unable to determine at this time  Discharge Recommendations: acute rehab if qualifies        Subjective/Objective     11/11/23 1032   Prior Living Situation   Prior Services Home-Independent   Housing / Facility 1 Story House   Steps Into Home 1   Bathroom Set up Walk In Shower   Equipment Owned Wheelchair   Lives with - Patient's Self Care Capacity Alone and Able to Care For Self   Comments lives in [place] but brother and sister and dylon will be provideing 24/7 care as needed post rehab; minimal rehab in [facility] x 3 weeks, dylon at bedside was doing 'PT' with him as he only had one session; her and his brother lifted him for transfers on plane etc and EMT almost dropped him in [place]; autumnemmanuel is a labor  and delivery RN   Prior Level of Functional Mobility   Bed Mobility Independent   Transfer Status Independent   Ambulation Independent   Ambulation Distance ~ 60 min morning walks daily   Assistive Devices Used None   Wheelchair Dependent   Stairs Independent   Comments prior to 3weeks ago was independent and 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[employer]; on the day they were to come back had a severe headache and resulting in presentation;   Cognition    Cognition / Consciousness WDL   Level of Consciousness Alert   Comments reports feeling appropriate and intact cognitively; finance at bedside reports possible short term memory deficits; both have traveled extensively over last 3 days   Strength Upper Body   Upper Body Strength  X   Comments left thumb extension against gravity 2-/5 finger extension, bicep 2-/5, triceps 2-/5l shoulder shrug intact;   Upper Body Muscle Tone   Lt Upper Extremity Muscle Tone Hypertonic   Comments flexor tone and grasp tone noted, finance reporting resting with a ball in hand since CVA, discussed neutral positioning   Strength Lower Body   Lower Body Strength  X   Comments left toe extension: 2-/5, DF 2-/5, knee extension 3-/5, hip flexion 1/5   Sensation Lower Body   Lower Extremity Sensation   WDL   Comments altered with tingling but intact to light touch; endorses right facial numbness no tongue bitting   Vision   Vision Comments assessed extensively in OT eval, none reported to this therapist; did have right cervical preference but could easily scan to left field of vision   Balance Assessment   Sitting Balance (Static) Fair -   Sitting Balance (Dynamic) Poor +   Standing Balance (Static) Poor -   Standing Balance (Dynamic) Trace +   Weight Shift Sitting Fair   Weight Shift Standing Fair   Comments right UE support in sitting/standing, needs right hand wide outside of base of support then can hold own balance during MMT; able to pull strongly on rigth side for standindgd and hold weight with  facilitated left knee; able to weigth shift for side stepping with max assist   Bed Mobility    Supine to Sit Minimal Assist  (log rolling to right side)   Sit to Supine Minimal Assist  (for LE managemetn)   Gait Analysis   Gait Level Of Assist Unable to Participate   Functional Mobility   Sit to Stand Moderate Assist   Bed, Chair, Wheelchair Transfer Maximal Assist   Transfer Method Stand Pivot   Short Term Goals    Short Term Goal # 1 Pt will perform supine<>sit from flat HOB/no railing with supervision within 6 visits to progress to independence.   Short Term Goal # 2 Pt will perform sit<>stand with FWW and supervision within 6 visits to ensure independent mobility at home.   Short Term Goal # 3 Pt will ambulate x 50ft with B UE support and min A within 6 visits to progress to independence.   Education Group   Role of Physical Therapist Patient Response Patient;Acceptance;Demonstration;Explanation;Action Demonstration;Verbal Demonstration   Exercises - Supine Patient Response Patient;Acceptance;Explanation;Demonstration;Verbal Demonstration;Action Demonstration  (ankle pumps, heel slides, knee flexion/extension)   Additional Comments protein/water in diet; timeline of recovery

## 2023-11-11 NOTE — THERAPY
"Occupational Therapy   Initial Evaluation     Patient Name: Jason Lima  Age:  62 y.o., Sex:  male  Medical Record #: [id number]  Today's Date: 11/11/2023     Precautions  Precautions: Fall Risk  Comments: left hemiparesis    Assessment  Patient is a 62 y.o. male with a history of diabetes, hyperlipidemia, hypertension, and hemorrhagic stroke 2 weeks ago while vacationing in [facility]. Pt with significant ADL deficits related to left-sided hemiplegia, left UE hypertonicity, impaired balance, and vision impairment. At baseline he is fully independent and working. Pt would benefit from OT services to maximize his function.     Plan    Occupational Therapy Initial Treatment Plan   Treatment Interventions: Self Care / Activities of Daily Living, Adaptive Equipment, Manual Therapy Techniques, Neuro Re-Education / Balance, Therapeutic Exercises, Therapeutic Activity  Treatment Frequency: 4 Times per Week  Duration: Until Therapy Goals Met    DC Equipment Recommendations: Unable to determine at this time  Discharge Recommendations: Recommend post-acute placement for additional occupational therapy services prior to discharge home     Subjective    \"I'm so thirsty. Can you get me water or a cup of tea?\"      Objective       11/11/23 0809   Prior Living Situation   Prior Services Home-Independent   Housing / Facility [facility]   Bathroom Set up Walk In Shower   Equipment Owned Wheelchair   Lives with - Patient's Self Care Capacity Alone and Able to Care For Self   Comments Pt lives in [place]. 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)   Precautions   Precautions Fall Risk   Comments left hemiparesis   Cognition    Cognition / Consciousness WDL   Level of Consciousness Alert   Passive ROM Upper Body   Passive ROM Upper Body X   Comments tightness developing in left elbow flexion, left shoulder ER, flexion and abduction   Active ROM Upper Body   Active ROM Upper Body  X   Dominant Hand Right   Comments RUE WDL, LUE with trace movement in shoulder   Strength Upper Body   Upper Body Strength  X   Comments RUE 5/5, LUE with trace movement in left shoulder   Sensation Upper Body   Comments Pt able to feel light touch to left hand and arm without looking   Upper Body Muscle Tone   Upper Body Muscle Tone  X   Lt Upper Extremity Muscle Tone Hypertonic   Neurological Concerns   Neurological Concerns Yes  (some inattention to the left)   Balance Assessment   Sitting Balance (Static) Poor +   Sitting Balance (Dynamic) Poor -   Weight Shift Sitting Fair   Bed Mobility    Supine to Sit Minimal Assist  (assist for trunk)   Sit to Supine Minimal Assist  (assist for LLE)   Scooting Minimal Assist   Comments extra time and effort required for bed mobility   ADL Assessment   Eating   (pt with no diet ordered, discussed with RN)   Grooming Minimal Assist;Seated  (oral care, set-up and assist for balance when RUE not available for balance)   Upper Body Dressing Moderate Assist   Lower Body Dressing Maximal Assist  (sock, due to impaired balance EOB)   6 Clicks Daily Activity Score 14   mRS Prior to admission   Prior to admission mRS 0   Modified Terell (mRS)   Modified Terell Score 4   Visual Perception   Visual Perception  X   Comments Pt reports he has been experiencing double vision, but during vision assessment with objects in various visual  "fields and with near and far vision he never saw double. Pt also reports blurry vision   Activity Tolerance   Sitting Edge of Bed 10 min for ADLs   Patient / Family Goals   Patient / Family Goal #1 to have something to drink \"I'm so thirsty\"   Short Term Goals   Short Term Goal # 1 Pt will maintain fair balance seated EOB without UE support in preparation for seated ADLs   Short Term Goal # 2 Pt will demonstrate self-PROM to left hand, wrist, forearm and elbow with minimal verbal cues   Short Term Goal # 3 Pt will groom seated with set-up   Short Term Goal # 4 Pt will transfer to/from [facility] with [facility]   Education Group   Education Provided Role of Occupational Therapist   Role of Occupational Therapist Patient Response Patient;Acceptance;Explanation;Verbal Demonstration   Occupational Therapy Initial Treatment Plan    Treatment Interventions Self Care / Activities of Daily Living;Adaptive Equipment;Manual Therapy Techniques;Neuro Re-Education / Balance;Therapeutic Exercises;Therapeutic Activity   Treatment Frequency 4 Times per Week   Duration Until Therapy Goals Met   Problem List   Problem List Decreased Active Daily Living Skills;Decreased Homemaking Skills;Decreased Upper Extremity Strength Left;Decreased Upper Extremity AROM Left;Decreased Upper Extremity PROM Left;Decreased Functional Mobility;Decreased Activity Tolerance;Impaired Upper Extremity Tone Left;Impaired Vision;Impaired Postural Control / Balance                 "

## 2023-11-11 NOTE — ASSESSMENT & PLAN NOTE
"Subacute, likely secondary to uncontrolled hypertension  Presented to hospital in [facility] on 10/23/2023 with headache and left-sided weakness.  NIH score 7, /100  -\"Radiologica:  -TC craneal: Hematoma vee talamo-capsular derecho de 3cm, presumiblemente de origen hipertensivo, abierto al sistema ventricular\"    Target SBP<140  Increase home amlodipine from 5 mg to 10 mg daily  Added hydralazine PO 25 mg twice daily-titrate up as tolerated  As needed IV hydralazine, IV labetalol  Statin  PT/OT/ST  Discussed peripherally with on-call neurology, no further recommendation made given subacute  "

## 2023-11-11 NOTE — DISCHARGE PLANNING
[facility] Transitional Care Coordination    Referral from: Dr Tejada  Insurance Provider on Facesheet: [employer]  Potential Rehab Diagnosis: Stroke    Chart review indicates patient may have on going medical management and may have therapy needs to possibly meet inpatient rehab facility criteria with the goal of returning to community.    D/C support: Brother     Physiatry consultation forwarded per protocol.     Physiatry to review, TCC will follow.     Thank you for the referral.

## 2023-11-11 NOTE — DISCHARGE PLANNING
Hospitalist requests assistance with the expedition of rehab placement for this patient arriving early AM 11/11/2023 from [facility]. Post hemorrhagic CVA while traveling abroad.     Discussed case with Corinne Nichols and hospitalist concurrently. PMR referral placed. Dr. Watkins to evaluate patient tomorrow. We also discuss PT/OT evaluation.     Corinne and I also expediting of PT/OT orders. Those orders will also be placed. Messaged PT Mgr and I am told to reach out to Antonella Gomez. She is messaged re eval and pending PMR referral.  Orders placed per discussion with hospitalist.

## 2023-11-11 NOTE — ED NOTES
Unable to complete med rec at this time.   Patient keeps list on phone but does not have phone at this time.

## 2023-11-11 NOTE — DISCHARGE PLANNING
Per physiatry BP needs to be stable under 140 and cr improved, tentatively plan for admit tmrw. Transport set for Sunday 11/12 @10/1030 [facility] reservation #[phone]. Provided update to CHRISTINE delgado.

## 2023-11-11 NOTE — DISCHARGE PLANNING
MSW received notification from [facility] stating their MD is wanting pt t to have a stable BP.Admission to [facility] tomorrow. Ren[facility] scheduled GMT for tomorrow at  by GMT to admit to [facility]. ERP admitting pt at this time.    COBRA and d/c summary just needs be completed when pt is ready for transfer.     Pt moved to CDU.

## 2023-11-12 ENCOUNTER — HOSPITAL ENCOUNTER (INPATIENT)
Facility: REHABILITATION | Age: 63
LOS: 51 days | DRG: 057 | End: 2024-01-02
Attending: PHYSICAL MEDICINE & REHABILITATION | Admitting: PHYSICAL MEDICINE & REHABILITATION
Payer: COMMERCIAL

## 2023-11-12 VITALS
BODY MASS INDEX: 26.93 KG/M2 | HEART RATE: 78 BPM | WEIGHT: 177.69 LBS | RESPIRATION RATE: 16 BRPM | SYSTOLIC BLOOD PRESSURE: 159 MMHG | DIASTOLIC BLOOD PRESSURE: 96 MMHG | HEIGHT: 68 IN | TEMPERATURE: 97 F | OXYGEN SATURATION: 95 %

## 2023-11-12 DIAGNOSIS — I69.30 SEQUELA, POST-STROKE: ICD-10-CM

## 2023-11-12 DIAGNOSIS — R25.2 SPASTICITY: ICD-10-CM

## 2023-11-12 DIAGNOSIS — F32.9 REACTIVE DEPRESSION (SITUATIONAL): ICD-10-CM

## 2023-11-12 DIAGNOSIS — G47.9 DIFFICULTY SLEEPING: ICD-10-CM

## 2023-11-12 DIAGNOSIS — I10 HYPERTENSION, UNSPECIFIED TYPE: ICD-10-CM

## 2023-11-12 DIAGNOSIS — I61.9 HEMORRHAGIC STROKE (HCC): ICD-10-CM

## 2023-11-12 DIAGNOSIS — H91.93 HEARING PROBLEM OF BOTH EARS: ICD-10-CM

## 2023-11-12 DIAGNOSIS — I63.9 ACUTE CEREBROVASCULAR ACCIDENT (CVA) (HCC): ICD-10-CM

## 2023-11-12 DIAGNOSIS — E11.69 TYPE 2 DIABETES MELLITUS WITH OTHER SPECIFIED COMPLICATION, WITHOUT LONG-TERM CURRENT USE OF INSULIN (HCC): ICD-10-CM

## 2023-11-12 DIAGNOSIS — I61.9 HEMORRHAGIC CEREBROVASCULAR ACCIDENT (CVA) (HCC): ICD-10-CM

## 2023-11-12 DIAGNOSIS — N18.9 CHRONIC KIDNEY DISEASE, UNSPECIFIED CKD STAGE: ICD-10-CM

## 2023-11-12 DIAGNOSIS — H54.7 VISION PROBLEM: ICD-10-CM

## 2023-11-12 DIAGNOSIS — I61.0 THALAMIC HEMORRHAGE (HCC): ICD-10-CM

## 2023-11-12 PROBLEM — N17.9 AKI (ACUTE KIDNEY INJURY) (HCC): Status: RESOLVED | Noted: 2023-11-11 | Resolved: 2023-11-12

## 2023-11-12 PROBLEM — I16.0 HYPERTENSIVE URGENCY: Status: RESOLVED | Noted: 2023-11-11 | Resolved: 2023-11-12

## 2023-11-12 LAB
ALBUMIN SERPL BCP-MCNC: 4.3 G/DL (ref 3.2–4.9)
ANION GAP SERPL CALC-SCNC: 10 MMOL/L (ref 7–16)
BASOPHILS # BLD AUTO: 0.5 % (ref 0–1.8)
BASOPHILS # BLD: 0.03 K/UL (ref 0–0.12)
BUN SERPL-MCNC: 61 MG/DL (ref 8–22)
CALCIUM ALBUM COR SERPL-MCNC: 8.7 MG/DL (ref 8.5–10.5)
CALCIUM SERPL-MCNC: 8.9 MG/DL (ref 8.5–10.5)
CHLORIDE SERPL-SCNC: 103 MMOL/L (ref 96–112)
CO2 SERPL-SCNC: 26 MMOL/L (ref 20–33)
CREAT SERPL-MCNC: 2.84 MG/DL (ref 0.5–1.4)
EKG IMPRESSION: NORMAL
EOSINOPHIL # BLD AUTO: 0.15 K/UL (ref 0–0.51)
EOSINOPHIL NFR BLD: 2.6 % (ref 0–6.9)
ERYTHROCYTE [DISTWIDTH] IN BLOOD BY AUTOMATED COUNT: 38.1 FL (ref 35.9–50)
GFR SERPLBLD CREATININE-BSD FMLA CKD-EPI: 24 ML/MIN/1.73 M 2
GLUCOSE BLD STRIP.AUTO-MCNC: 119 MG/DL (ref 65–99)
GLUCOSE BLD STRIP.AUTO-MCNC: 136 MG/DL (ref 65–99)
GLUCOSE BLD STRIP.AUTO-MCNC: 143 MG/DL (ref 65–99)
GLUCOSE SERPL-MCNC: 124 MG/DL (ref 65–99)
HCT VFR BLD AUTO: 39.9 % (ref 42–52)
HGB BLD-MCNC: 13.5 G/DL (ref 14–18)
IMM GRANULOCYTES # BLD AUTO: 0.02 K/UL (ref 0–0.11)
IMM GRANULOCYTES NFR BLD AUTO: 0.4 % (ref 0–0.9)
LYMPHOCYTES # BLD AUTO: 1.27 K/UL (ref 1–4.8)
LYMPHOCYTES NFR BLD: 22.2 % (ref 22–41)
MAGNESIUM SERPL-MCNC: 2.2 MG/DL (ref 1.5–2.5)
MCH RBC QN AUTO: 29.9 PG (ref 27–33)
MCHC RBC AUTO-ENTMCNC: 33.8 G/DL (ref 32.3–36.5)
MCV RBC AUTO: 88.3 FL (ref 81.4–97.8)
MONOCYTES # BLD AUTO: 0.39 K/UL (ref 0–0.85)
MONOCYTES NFR BLD AUTO: 6.8 % (ref 0–13.4)
NEUTROPHILS # BLD AUTO: 3.85 K/UL (ref 1.82–7.42)
NEUTROPHILS NFR BLD: 67.5 % (ref 44–72)
NRBC # BLD AUTO: 0 K/UL
NRBC BLD-RTO: 0 /100 WBC (ref 0–0.2)
PHOSPHATE SERPL-MCNC: 3.3 MG/DL (ref 2.5–4.5)
PLATELET # BLD AUTO: 225 K/UL (ref 164–446)
PMV BLD AUTO: 10.8 FL (ref 9–12.9)
POTASSIUM SERPL-SCNC: 4.1 MMOL/L (ref 3.6–5.5)
RBC # BLD AUTO: 4.52 M/UL (ref 4.7–6.1)
SODIUM SERPL-SCNC: 139 MMOL/L (ref 135–145)
WBC # BLD AUTO: 5.7 K/UL (ref 4.8–10.8)

## 2023-11-12 PROCEDURE — 700102 HCHG RX REV CODE 250 W/ 637 OVERRIDE(OP): Performed by: STUDENT IN AN ORGANIZED HEALTH CARE EDUCATION/TRAINING PROGRAM

## 2023-11-12 PROCEDURE — 82962 GLUCOSE BLOOD TEST: CPT

## 2023-11-12 PROCEDURE — 700105 HCHG RX REV CODE 258: Performed by: NURSE PRACTITIONER

## 2023-11-12 PROCEDURE — A9270 NON-COVERED ITEM OR SERVICE: HCPCS | Performed by: PHYSICAL MEDICINE & REHABILITATION

## 2023-11-12 PROCEDURE — 83735 ASSAY OF MAGNESIUM: CPT

## 2023-11-12 PROCEDURE — 94760 N-INVAS EAR/PLS OXIMETRY 1: CPT

## 2023-11-12 PROCEDURE — 700111 HCHG RX REV CODE 636 W/ 250 OVERRIDE (IP): Performed by: STUDENT IN AN ORGANIZED HEALTH CARE EDUCATION/TRAINING PROGRAM

## 2023-11-12 PROCEDURE — 36415 COLL VENOUS BLD VENIPUNCTURE: CPT

## 2023-11-12 PROCEDURE — A9270 NON-COVERED ITEM OR SERVICE: HCPCS | Performed by: STUDENT IN AN ORGANIZED HEALTH CARE EDUCATION/TRAINING PROGRAM

## 2023-11-12 PROCEDURE — 85025 COMPLETE CBC W/AUTO DIFF WBC: CPT

## 2023-11-12 PROCEDURE — 93010 ELECTROCARDIOGRAM REPORT: CPT | Performed by: INTERNAL MEDICINE

## 2023-11-12 PROCEDURE — 94660 CPAP INITIATION&MGMT: CPT

## 2023-11-12 PROCEDURE — 770010 HCHG ROOM/CARE - REHAB SEMI PRIVAT*

## 2023-11-12 PROCEDURE — G0378 HOSPITAL OBSERVATION PER HR: HCPCS

## 2023-11-12 PROCEDURE — 99223 1ST HOSP IP/OBS HIGH 75: CPT | Performed by: PHYSICAL MEDICINE & REHABILITATION

## 2023-11-12 PROCEDURE — 700102 HCHG RX REV CODE 250 W/ 637 OVERRIDE(OP): Performed by: PHYSICAL MEDICINE & REHABILITATION

## 2023-11-12 PROCEDURE — 90471 IMMUNIZATION ADMIN: CPT

## 2023-11-12 PROCEDURE — 80069 RENAL FUNCTION PANEL: CPT

## 2023-11-12 PROCEDURE — 99239 HOSP IP/OBS DSCHRG MGMT >30: CPT | Mod: FS | Performed by: INTERNAL MEDICINE

## 2023-11-12 PROCEDURE — 96376 TX/PRO/DX INJ SAME DRUG ADON: CPT

## 2023-11-12 PROCEDURE — 90686 IIV4 VACC NO PRSV 0.5 ML IM: CPT | Performed by: STUDENT IN AN ORGANIZED HEALTH CARE EDUCATION/TRAINING PROGRAM

## 2023-11-12 RX ORDER — HYDRALAZINE HYDROCHLORIDE 25 MG/1
25 TABLET, FILM COATED ORAL EVERY 8 HOURS PRN
Status: DISCONTINUED | OUTPATIENT
Start: 2023-11-12 | End: 2024-01-02 | Stop reason: HOSPADM

## 2023-11-12 RX ORDER — HYDRALAZINE HYDROCHLORIDE 25 MG/1
25 TABLET, FILM COATED ORAL 2 TIMES DAILY
Status: CANCELLED | OUTPATIENT
Start: 2023-11-12

## 2023-11-12 RX ORDER — BACLOFEN 5 MG/1
5 TABLET ORAL
Qty: 90 TABLET | Status: ON HOLD
Start: 2023-11-12 | End: 2024-01-02

## 2023-11-12 RX ORDER — LANOLIN ALCOHOL/MO/W.PET/CERES
3 CREAM (GRAM) TOPICAL
Status: DISCONTINUED | OUTPATIENT
Start: 2023-11-12 | End: 2023-11-13

## 2023-11-12 RX ORDER — AMLODIPINE BESYLATE 5 MG/1
10 TABLET ORAL DAILY
Status: DISCONTINUED | OUTPATIENT
Start: 2023-11-13 | End: 2023-11-29

## 2023-11-12 RX ORDER — ATORVASTATIN CALCIUM 40 MG/1
40 TABLET, FILM COATED ORAL NIGHTLY
Status: DISCONTINUED | OUTPATIENT
Start: 2023-11-12 | End: 2024-01-02 | Stop reason: HOSPADM

## 2023-11-12 RX ORDER — BACLOFEN 10 MG/1
5 TABLET ORAL
Status: DISCONTINUED | OUTPATIENT
Start: 2023-11-12 | End: 2023-11-24

## 2023-11-12 RX ORDER — HYDRALAZINE HYDROCHLORIDE 25 MG/1
25 TABLET, FILM COATED ORAL 2 TIMES DAILY
Qty: 90 TABLET | Status: ON HOLD
Start: 2023-11-12 | End: 2024-01-02

## 2023-11-12 RX ORDER — OMEPRAZOLE 20 MG/1
20 CAPSULE, DELAYED RELEASE ORAL DAILY
Status: DISCONTINUED | OUTPATIENT
Start: 2023-11-12 | End: 2024-01-02 | Stop reason: HOSPADM

## 2023-11-12 RX ORDER — ATORVASTATIN CALCIUM 40 MG/1
40 TABLET, FILM COATED ORAL NIGHTLY
Qty: 30 TABLET | Status: ON HOLD
Start: 2023-11-12 | End: 2024-01-02

## 2023-11-12 RX ORDER — BACLOFEN 5 MG/1
5 TABLET ORAL
Status: CANCELLED | OUTPATIENT
Start: 2023-11-12

## 2023-11-12 RX ORDER — OXYCODONE HYDROCHLORIDE 5 MG/1
2.5 TABLET ORAL
Status: DISCONTINUED | OUTPATIENT
Start: 2023-11-12 | End: 2024-01-02 | Stop reason: HOSPADM

## 2023-11-12 RX ORDER — HYDRALAZINE HYDROCHLORIDE 25 MG/1
25 TABLET, FILM COATED ORAL 2 TIMES DAILY
Status: DISCONTINUED | OUTPATIENT
Start: 2023-11-12 | End: 2023-11-13

## 2023-11-12 RX ORDER — ZOLPIDEM TARTRATE 5 MG/1
5 TABLET ORAL NIGHTLY PRN
Qty: 30 TABLET | Refills: 0 | Status: ON HOLD
Start: 2023-11-12 | End: 2024-01-02

## 2023-11-12 RX ORDER — DEXTROSE MONOHYDRATE 25 G/50ML
25 INJECTION, SOLUTION INTRAVENOUS
Status: DISCONTINUED | OUTPATIENT
Start: 2023-11-12 | End: 2023-11-13

## 2023-11-12 RX ORDER — AMLODIPINE BESYLATE 10 MG/1
10 TABLET ORAL DAILY
Status: CANCELLED | OUTPATIENT
Start: 2023-11-13

## 2023-11-12 RX ORDER — AMLODIPINE BESYLATE 10 MG/1
10 TABLET ORAL DAILY
Qty: 30 TABLET | Status: ON HOLD
Start: 2023-11-13 | End: 2024-01-02

## 2023-11-12 RX ORDER — ECHINACEA PURPUREA EXTRACT 125 MG
2 TABLET ORAL PRN
Status: DISCONTINUED | OUTPATIENT
Start: 2023-11-12 | End: 2024-01-02 | Stop reason: HOSPADM

## 2023-11-12 RX ORDER — BISACODYL 10 MG
10 SUPPOSITORY, RECTAL RECTAL
Status: DISCONTINUED | OUTPATIENT
Start: 2023-11-12 | End: 2024-01-02 | Stop reason: HOSPADM

## 2023-11-12 RX ORDER — OXYCODONE HYDROCHLORIDE 5 MG/1
5 TABLET ORAL
Status: DISCONTINUED | OUTPATIENT
Start: 2023-11-12 | End: 2024-01-02 | Stop reason: HOSPADM

## 2023-11-12 RX ORDER — ATORVASTATIN CALCIUM 40 MG/1
40 TABLET, FILM COATED ORAL NIGHTLY
Status: CANCELLED | OUTPATIENT
Start: 2023-11-12

## 2023-11-12 RX ORDER — ONDANSETRON 4 MG/1
4 TABLET, ORALLY DISINTEGRATING ORAL 4 TIMES DAILY PRN
Status: DISCONTINUED | OUTPATIENT
Start: 2023-11-12 | End: 2024-01-02 | Stop reason: HOSPADM

## 2023-11-12 RX ORDER — POLYETHYLENE GLYCOL 3350 17 G/17G
1 POWDER, FOR SOLUTION ORAL
Status: DISCONTINUED | OUTPATIENT
Start: 2023-11-12 | End: 2024-01-02 | Stop reason: HOSPADM

## 2023-11-12 RX ORDER — TRAZODONE HYDROCHLORIDE 50 MG/1
50 TABLET ORAL
Status: DISCONTINUED | OUTPATIENT
Start: 2023-11-12 | End: 2023-11-28

## 2023-11-12 RX ORDER — ACETAMINOPHEN 500 MG
1000 TABLET ORAL EVERY 6 HOURS
Status: DISPENSED | OUTPATIENT
Start: 2023-11-12 | End: 2023-11-17

## 2023-11-12 RX ORDER — ACETAMINOPHEN 500 MG
1000 TABLET ORAL EVERY 6 HOURS PRN
Status: DISCONTINUED | OUTPATIENT
Start: 2023-11-17 | End: 2024-01-02 | Stop reason: HOSPADM

## 2023-11-12 RX ORDER — ONDANSETRON 2 MG/ML
4 INJECTION INTRAMUSCULAR; INTRAVENOUS 4 TIMES DAILY PRN
Status: DISCONTINUED | OUTPATIENT
Start: 2023-11-12 | End: 2024-01-02 | Stop reason: HOSPADM

## 2023-11-12 RX ORDER — AMOXICILLIN 250 MG
2 CAPSULE ORAL 2 TIMES DAILY
Status: DISCONTINUED | OUTPATIENT
Start: 2023-11-12 | End: 2024-01-02 | Stop reason: HOSPADM

## 2023-11-12 RX ORDER — ALUMINA, MAGNESIA, AND SIMETHICONE 2400; 2400; 240 MG/30ML; MG/30ML; MG/30ML
20 SUSPENSION ORAL
Status: DISCONTINUED | OUTPATIENT
Start: 2023-11-12 | End: 2024-01-02 | Stop reason: HOSPADM

## 2023-11-12 RX ADMIN — ACETAMINOPHEN 1000 MG: 500 TABLET ORAL at 23:56

## 2023-11-12 RX ADMIN — DOCUSATE SODIUM 50 MG AND SENNOSIDES 8.6 MG 2 TABLET: 8.6; 5 TABLET, FILM COATED ORAL at 05:41

## 2023-11-12 RX ADMIN — OMEPRAZOLE 20 MG: 20 CAPSULE, DELAYED RELEASE ORAL at 13:18

## 2023-11-12 RX ADMIN — ATORVASTATIN CALCIUM 40 MG: 40 TABLET, FILM COATED ORAL at 21:42

## 2023-11-12 RX ADMIN — HYDRALAZINE HYDROCHLORIDE 25 MG: 25 TABLET, FILM COATED ORAL at 05:41

## 2023-11-12 RX ADMIN — SENNOSIDES AND DOCUSATE SODIUM 2 TABLET: 50; 8.6 TABLET ORAL at 21:41

## 2023-11-12 RX ADMIN — LABETALOL HYDROCHLORIDE 10 MG: 5 INJECTION, SOLUTION INTRAVENOUS at 08:23

## 2023-11-12 RX ADMIN — Medication 3 MG: at 21:42

## 2023-11-12 RX ADMIN — BACLOFEN 5 MG: 10 TABLET ORAL at 21:41

## 2023-11-12 RX ADMIN — ACETAMINOPHEN 1000 MG: 500 TABLET ORAL at 13:18

## 2023-11-12 RX ADMIN — HYDRALAZINE HYDROCHLORIDE 25 MG: 25 TABLET, FILM COATED ORAL at 21:42

## 2023-11-12 RX ADMIN — AMLODIPINE BESYLATE 10 MG: 10 TABLET ORAL at 05:41

## 2023-11-12 RX ADMIN — SODIUM CHLORIDE: 9 INJECTION, SOLUTION INTRAVENOUS at 00:25

## 2023-11-12 RX ADMIN — INFLUENZA A VIRUS A/VICTORIA/4897/2022 IVR-238 (H1N1) ANTIGEN (FORMALDEHYDE INACTIVATED), INFLUENZA A VIRUS A/DARWIN/9/2021 SAN-010 (H3N2) ANTIGEN (FORMALDEHYDE INACTIVATED), INFLUENZA B VIRUS B/PHUKET/3073/2013 ANTIGEN (FORMALDEHYDE INACTIVATED), AND INFLUENZA B VIRUS B/MICHIGAN/01/2021 ANTIGEN (FORMALDEHYDE INACTIVATED) 0.5 ML: 15; 15; 15; 15 INJECTION, SUSPENSION INTRAMUSCULAR at 08:24

## 2023-11-12 ASSESSMENT — PATIENT HEALTH QUESTIONNAIRE - PHQ9
7. TROUBLE CONCENTRATING ON THINGS, SUCH AS READING THE NEWSPAPER OR WATCHING TELEVISION: NOT AT ALL
6. FEELING BAD ABOUT YOURSELF - OR THAT YOU ARE A FAILURE OR HAVE LET YOURSELF OR YOUR FAMILY DOWN: NOT AL ALL
2. FEELING DOWN, DEPRESSED, IRRITABLE, OR HOPELESS: SEVERAL DAYS
SUM OF ALL RESPONSES TO PHQ9 QUESTIONS 1 AND 2: 1
5. POOR APPETITE OR OVEREATING: NOT AT ALL
8. MOVING OR SPEAKING SO SLOWLY THAT OTHER PEOPLE COULD HAVE NOTICED. OR THE OPPOSITE, BEING SO FIGETY OR RESTLESS THAT YOU HAVE BEEN MOVING AROUND A LOT MORE THAN USUAL: NOT AT ALL
SUM OF ALL RESPONSES TO PHQ QUESTIONS 1-9: 2
4. FEELING TIRED OR HAVING LITTLE ENERGY: SEVERAL DAYS
9. THOUGHTS THAT YOU WOULD BE BETTER OFF DEAD, OR OF HURTING YOURSELF: NOT AT ALL
3. TROUBLE FALLING OR STAYING ASLEEP OR SLEEPING TOO MUCH: NOT AT ALL
1. LITTLE INTEREST OR PLEASURE IN DOING THINGS: NOT AT ALL

## 2023-11-12 ASSESSMENT — FIBROSIS 4 INDEX: FIB4 SCORE: 0.89

## 2023-11-12 ASSESSMENT — LIFESTYLE VARIABLES: EVER_SMOKED: NEVER

## 2023-11-12 NOTE — DISCHARGE SUMMARY
Discharge Summary    CHIEF COMPLAINT ON ADMISSION  Chief Complaint   Patient presents with    Other     Pt had a stroke while in [facility] 3 weeks ago and was told to come directly to the hospital when he landed in [place]; left side deficits from stroke       Reason for Admission  Other     Admission Date  11/11/2023    CODE STATUS  Full Code    HPI & HOSPITAL COURSE    Mr. Jason Lima is a 62 y.o. male with history of hypertension, diabetes, hyperlipidemia presented 11/11/2023 with evaluation for right-sided thalamic hemorrhagic CVA.     History obtained from patient's fiancé who was at bedside as patient is poor historian.  Fiancé showed me hospitalization paperwork from [facility] dated 10/23/2023.  At that time, patient complained of headache which required to lay down and rest, then followed by weakness of left side.  Due to concern of CVA, patient was taken to hospital in [facility].     Patient came back to [place] as he resides in [place].  Patient went straight to ER in our facility for need for reevaluation along with possible placement to rehab for continued therapy.  Rehab physician Dr. Watkins contacted for admission.    Patient seen and examined prior to transfer.  Patient to continue all established medications specifically blood pressure control.  Patient was treated for ROÁMN prior to transfer with IV fluids.  Discussed with patient recommendations and for need to follow-up with outpatient neurology/[facility] postdischarge.  Patient to continue all other home medications.  All questions and concerns answered prior to discharge.  Patient discharged to inpatient rehab.    Therefore, he is discharged in good and stable condition to home with close outpatient follow-up.    The patient recovered much more quickly than anticipated on admission.    Discharge Date  11/12/23      FOLLOW UP ITEMS POST DISCHARGE  Please call [phone] to schedule PCP appointment for patient.    Required  specialty appointments include:       Discharge Instructions per CHITO Dodd    -Transfer to inpatient rehab for continued therapy   -Continue blood pressure management and treatment for ROMÁN at rehab  -Patient to follow-up with stroke [facility] once discharged from rehab for management of stroke postdischarge    DIET: Cardiac    ACTIVITY: As tolerated    DIAGNOSIS: Recent CVA     Return to ER if symptoms persist, chest pain, palpitations, shortness of breath, numbness, tingling, weakness, and high fevers.      DISCHARGE DIAGNOSES  Principal Problem:    Thalamic hemorrhage (HCC) (POA: Unknown)  Active Problems:    Hyperlipidemia (POA: Yes)    DM (diabetes mellitus) (HCC) (POA: Yes)    Acute left-sided weakness (POA: Unknown)  Resolved Problems:    Hypertension (POA: Yes)    Renal insufficiency (POA: Yes)    Hypertensive urgency (POA: Unknown)    ROMÁN (acute kidney injury) (HCC) (POA: Unknown)      FOLLOW UP  No future appointments.  Humble Garcia D.O.  [address] [place] [address] [id number] [phone]    Schedule an appointment as soon as possible for a visit in 1 week(s)        MEDICATIONS ON DISCHARGE     Medication List        START taking these medications        Instructions   baclofen 5 MG Tabs  Commonly known as: Lioresal   Take 1 Tablet by mouth at bedtime.  Dose: 5 mg     hydrALAZINE 25 MG Tabs  Commonly known as: Apresoline   Take 1 Tablet by mouth 2 times a day.  Dose: 25 mg     zolpidem 5 MG Tabs  Commonly known as: Ambien   Take 1 Tablet by mouth at bedtime as needed for Sleep for up to 30 days.  Dose: 5 mg            CHANGE how you take these medications        Instructions   amLODIPine 10 MG Tabs  Start taking on: November 13, 2023  What changed:   medication strength  how much to take  Commonly known as: Norvasc   Take 1 Tablet by mouth every day.  Dose: 10 mg     atorvastatin 40 MG Tabs  What changed:   medication strength  how much to take  Commonly known as: Lipitor    Take 1 Tablet by mouth every evening.  Dose: 40 mg              Allergies  Allergies   Allergen Reactions    Penicillins        DIET  Orders Placed This Encounter   Procedures    Diet Order Diet: Level 6 - Soft and Bite Sized; Liquid level: Level 0 - Thin; Second Modifier: (optional): Consistent CHO (Diabetic)     Standing Status:   Standing     Number of Occurrences:   1     Order Specific Question:   Diet:     Answer:   Level 6 - Soft and Bite Sized [23]     Order Specific Question:   Liquid level     Answer:   Level 0 - Thin     Order Specific Question:   Second Modifier: (optional)     Answer:   Consistent CHO (Diabetic) [4]       ACTIVITY  As tolerated.  Weight bearing as tolerated    CONSULTATIONS  Rehab       PROCEDURES  NONE    IMAGING    EC-ECHOCARDIOGRAM COMPLETE W/O CONT   Final Result      US-RENAL   Final Result      1.  No hydronephrosis.      2.  Increased renal echogenicity could indicate medical renal disease.      CT-HEAD W/O   Final Result         1.  Right thalamic hemorrhage, decreased in density since prior study   2.  Stranding vasogenic edema appears stable.   3.  Slight asymmetric dilatation of the left ventricular system including the left temporal horn, consider component of hydrocephalus.   4.  Low-density fluid collection in the left temporal fossa, appearance most compatible with arachnoid cyst.   5.  Nonspecific white matter changes, commonly associated with small vessel ischemic disease.  Associated mild cerebral atrophy is noted.   6.  Atherosclerosis.         OUTSIDE IMAGES-US ABDOMEN   Final Result      OUTSIDE IMAGES-CT HEAD   Final Result      OUTSIDE IMAGES-CT HEAD   Final Result      OUTSIDE IMAGES-CT HEAD   Final Result            LABORATORY  Lab Results   Component Value Date    SODIUM 134 (L) 11/11/2023    POTASSIUM 4.8 11/11/2023    CHLORIDE 96 11/11/2023    CO2 25 11/11/2023    GLUCOSE 149 (H) 11/11/2023    BUN 81 (H) 11/11/2023    CREATININE 3.59 (H) 11/11/2023        Lab  Results   Component Value Date    WBC 5.7 11/12/2023    HEMOGLOBIN 13.5 (L) 11/12/2023    HEMATOCRIT 39.9 (L) 11/12/2023    PLATELETCT 225 11/12/2023

## 2023-11-12 NOTE — DISCHARGE PLANNING
Case Management Discharge Planning    Admission Date: 11/11/2023  GMLOS:    ALOS: 0    6-Clicks ADL Score: 14  6-Clicks Mobility Score: 8  PT and/or OT Eval ordered: Yes  Post-acute Referrals Ordered: Yes  Post-acute Choice Obtained: Yes  Has referral(s) been sent to post-acute provider:  Yes      Anticipated Discharge Dispo:  [facility]    DME Needed: No    Action(s) Taken: Updated Provider/Nurse on Discharge Plan    Escalations Completed: None    Medically Clear: Yes    Next Steps: Patient scheduled by [facility] for [facility] transport @ 10- 10:30.    Barriers to Discharge: None    Is the patient up for discharge tomorrow: No Today.    COBRA completed, d/c summary in process. Transport packet given to bedside RN.

## 2023-11-12 NOTE — CONSULTS
Physical Medicine and Rehabilitation Consultation              Date of initial consultation: 11/11/2023  Requesting provider: ordered by Manuel Tejada M.D. at 11/11/23 1129    Consulting provider: Peyton Watkins D.O.  Reason for consultation: assess for acute inpatient rehab appropriateness  LOS: 0 Day(s)    Chief complaint: Left-sided weakness    HPI: The patient is a 62 y.o. male with a past medical history of hypertension, diabetes, hyperlipidemia and questionable history of CKD;  who presented on 11/11/2023  1:07 AM with left-sided weakness after stroke in [facility] 3 weeks ago.  Per documentation, patient was visiting [facility] when patient had a sudden headache followed by left-sided weakness on 10/23/2023.  Initial work-up done at the hospital in [place] revealed hyper tension with /100 and creatinine 3.2.  Patient was deemed medically cleared to leave the hospital admitted after the stroke, he completed a flight to [place] and was admitted to the emergency department in preparation for work-up for admit to rehab.  Evaluation completed in the emergency department prior to rehab reveals a CT head notable for right thalamic hemorrhage.  At time of evaluation at the [facility] creatinine 3.59 and hypertension of 187/119, patient required rescue labetalol to decrease blood pressures.  Patient is now on amlodipine and hydralazine.    Patient seen and examined at bedside.  Patient is very fatigued from his journey over the last 2 days.  Patient reports he feels okay he is just fatigued.  Patient reports having muscle spasms on his left side.  Otherwise patient denies shortness of breath, chest pain, any changes in numbness tingling or weakness or lightheadedness.  Discussed plan to start baclofen this evening to reset his sleep cycle, resolve his jet lag and improve his spasticity.    Social Hx:  Patient lives alone in [place] however patient's brother and sister and fiancé will be providing 24/7 care after  rehab  1 АНДРЕЙ  At prior level of function patient was Independent with mobility and ADLs.     Tobacco: Denies  Alcohol: Denies  Drugs: Denies    THERAPY:  Restrictions: Fall risk  PT: Functional mobility   11/11 mod assist sit to stand, max assist transfers, unable to participate in functional gait    OT: ADLs  11/11 max assist lower body dressing, mod assist upper body dressing    SLP:   11/11 soft and bite-size solids with thin liquids    IMAGING:  CT-HEAD W/O  Narrative:   11/11/2023 3:52 AM    HISTORY/REASON FOR EXAM:   Left deficit, history of recent stroke    TECHNIQUE/EXAM DESCRIPTION:  CT of the head without contrast.    Sequential axial images were obtained from the vertex to the skull base without contrast.    Up to date radiation dose reduction adjustments have been utilized to meet ALARA standards for radiation dose reduction.    COMPARISON: November 6, 2023    FINDINGS:    There is slight hazy hyperdensity in the region of the right thalamus with surrounding low-density vasogenic edema. There is mild diffuse parenchymal volume loss observed. Periventricular and subcortical white matter low-attenuation changes are seen,   most commonly associated with small vessel ischemic disease. Slight asymmetric dilatation of the left ventricular system including the left temporal horn. No space occupying lesions or areas of acute vascular territory infarctions are identified.   Low-density fluid collection in the anterior lateral left temporal fossa is seen measuring 4.7 x 2.5 cm.    The visualized paranasal sinuses and mastoid air cells are well aerated bilaterally. Postsurgical changes of right parieto-occipital craniotomy are seen. No depressed calvarial fractures are identified. The visualized globes and retrobulbar soft tissues   appear within normal limits.  Atherosclerotic intracranial calcifications are seen.  Impression: 1.  Right thalamic hemorrhage, decreased in density since prior study  2.  Stranding  vasogenic edema appears stable.  3.  Slight asymmetric dilatation of the left ventricular system including the left temporal horn, consider component of hydrocephalus.  4.  Low-density fluid collection in the left temporal fossa, appearance most compatible with arachnoid cyst.  5.  Nonspecific white matter changes, commonly associated with small vessel ischemic disease.  Associated mild cerebral atrophy is noted.  6.  Atherosclerosis.        PROCEDURES:  None    PMH:  Past Medical History:   Diagnosis Date    Diabetes (HCC)     High cholesterol     Hypertension     Stroke (HCC)     3 weeks ago in [place] (as of 11/11/2023)       PSH:  Past Surgical History:   Procedure Laterality Date    CHELO BY LAPAROSCOPY N/A 1/6/2017    Procedure: CHELO BY LAPAROSCOPY;  Surgeon: Garrett Miller M.D.;  Location: SURGERY [facility];  Service:     ABDOMINAL ABSCESS DRAINAGE N/A 1/6/2017    Procedure: ABDOMINAL ABSCESS DRAINAGE;  Surgeon: Garrett Miller M.D.;  Location: SURGERY [facility];  Service:        FHX:  History reviewed. No pertinent family history.    Medications:  Current Facility-Administered Medications   Medication Dose    Respiratory Therapy Consult      NS infusion      ondansetron (Zofran ODT) dispertab 4 mg  4 mg    Or    ondansetron (Zofran) syringe/vial injection 4 mg  4 mg    acetaminophen (Tylenol) tablet 650 mg  650 mg    Or    acetaminophen (Tylenol) suppository 650 mg  650 mg    LORazepam (Ativan) injection 2 mg  2 mg    senna-docusate (Pericolace Or Senokot S) 8.6-50 MG per tablet 2 Tablet  2 Tablet    And    polyethylene glycol/lytes (Miralax) PACKET 1 Packet  1 Packet    And    bisacodyl (Dulcolax) suppository 10 mg  10 mg    labetalol (Normodyne/Trandate) injection 10 mg  10 mg    hydrALAZINE (Apresoline) injection 10 mg  10 mg    atorvastatin (Lipitor) tablet 40 mg  40 mg    insulin regular (HumuLIN R,NovoLIN R) injection  2-9 Units    And    dextrose 10 % BOLUS 25 g  25 g    hydrALAZINE  "(Apresoline) tablet 25 mg  25 mg    [START ON 11/12/2023] amLODIPine (Norvasc) tablet 10 mg  10 mg    [START ON 11/12/2023] influenza vaccine quad (Fluzone/Fluarix) injection 0.5 mL  0.5 mL    zolpidem (Ambien) tablet 5 mg  5 mg       Allergies:  Allergies   Allergen Reactions    Penicillins        Physical Exam:  Vitals: /74   Pulse 64   Temp 36.6 °C (97.9 °F) (Temporal)   Resp 18   Ht 1.727 m (5' 8\")   Wt 80.6 kg (177 lb 11.1 oz)   SpO2 98%   Gen: NAD, laying comfortably in bed, no visitors present, sleeping but awakens to voice  Head:  NC/AT  Eyes/ Nose/ Mouth: PERRLA, moist mucous membranes  Cardio: RRR, good distal perfusion, warm extremities  Pulm: normal respiratory effort, no cyanosis, on room air  Abd: Soft NTND, negative borborygmi   Ext: No peripheral edema. No calf tenderness. No clubbing.    Mental status: answers questions appropriately follows commands  Speech: fluent, no aphasia or dysarthria    CRANIAL NERVES:  2,3: visual acuity grossly intact, PERRL  3,4,6: EOMI bilaterally, no nystagmus or diplopia  5: sensation intact to light touch bilaterally and symmetric  7: no facial asymmetry  8: hearing grossly intact      Motor:      Upper Extremity  Myotome R L   Shoulder flexion C5 5/5 0/5   Elbow flexion C5 5/5 0/5   Wrist extension C6 5/5 0/5   Elbow extension C7 5/5 0/5   Finger flexion C8 5/5 1/5   Finger abduction T1 5/5 0/5     Lower Extremity Myotome R L   Hip flexion L2 5/5 1/5   Knee extension L3 5/5 2/5   Ankle dorsiflexion L4 5/5 2/5   Toe extension L5 5/5 2/5   Ankle plantarflexion S1 5/5 2/5       Negative Pronator drift bilaterally    Sensory:   intact to light touch through out    2 beat clonus left ankle    Tone: Increased spasticity left bicep and left finger flexors    Coordination:   intact fine motor with right upper extremity      Labs: Reviewed and significant for   Recent Labs     11/11/23  0220   RBC 4.81   HEMOGLOBIN 14.7   HEMATOCRIT 42.6   PLATELETCT 240   APTT " 42.2*     Recent Labs     11/11/23  0220   SODIUM 134*   POTASSIUM 4.8   CHLORIDE 96   CO2 25   GLUCOSE 149*   BUN 81*   CREATININE 3.59*   CALCIUM 9.8     Recent Results (from the past 24 hour(s))   CBC WITH DIFFERENTIAL    Collection Time: 11/11/23  2:20 AM   Result Value Ref Range    WBC 6.1 4.8 - 10.8 K/uL    RBC 4.81 4.70 - 6.10 M/uL    Hemoglobin 14.7 14.0 - 18.0 g/dL    Hematocrit 42.6 42.0 - 52.0 %    MCV 88.6 81.4 - 97.8 fL    MCH 30.6 27.0 - 33.0 pg    MCHC 34.5 32.3 - 36.5 g/dL    RDW 38.7 35.9 - 50.0 fL    Platelet Count 240 164 - 446 K/uL    MPV 10.6 9.0 - 12.9 fL    Neutrophils-Polys 73.30 (H) 44.00 - 72.00 %    Lymphocytes 18.10 (L) 22.00 - 41.00 %    Monocytes 6.70 0.00 - 13.40 %    Eosinophils 1.30 0.00 - 6.90 %    Basophils 0.30 0.00 - 1.80 %    Immature Granulocytes 0.30 0.00 - 0.90 %    Nucleated RBC 0.00 0.00 - 0.20 /100 WBC    Neutrophils (Absolute) 4.46 1.82 - 7.42 K/uL    Lymphs (Absolute) 1.10 1.00 - 4.80 K/uL    Monos (Absolute) 0.41 0.00 - 0.85 K/uL    Eos (Absolute) 0.08 0.00 - 0.51 K/uL    Baso (Absolute) 0.02 0.00 - 0.12 K/uL    Immature Granulocytes (abs) 0.02 0.00 - 0.11 K/uL    NRBC (Absolute) 0.00 K/uL   Comp Metabolic Panel    Collection Time: 11/11/23  2:20 AM   Result Value Ref Range    Sodium 134 (L) 135 - 145 mmol/L    Potassium 4.8 3.6 - 5.5 mmol/L    Chloride 96 96 - 112 mmol/L    Co2 25 20 - 33 mmol/L    Anion Gap 13.0 7.0 - 16.0    Glucose 149 (H) 65 - 99 mg/dL    Bun 81 (H) 8 - 22 mg/dL    Creatinine 3.59 (H) 0.50 - 1.40 mg/dL    Calcium 9.8 8.5 - 10.5 mg/dL    Correct Calcium 9.3 8.5 - 10.5 mg/dL    AST(SGOT) 18 12 - 45 U/L    ALT(SGPT) 30 2 - 50 U/L    Alkaline Phosphatase 85 30 - 99 U/L    Total Bilirubin 0.4 0.1 - 1.5 mg/dL    Albumin 4.6 3.2 - 4.9 g/dL    Total Protein 7.7 6.0 - 8.2 g/dL    Globulin 3.1 1.9 - 3.5 g/dL    A-G Ratio 1.5 g/dL   proBrain Natriuretic Peptide, NT    Collection Time: 11/11/23  2:20 AM   Result Value Ref Range    NT-proBNP 49 0 - 125 pg/mL    APTT    Collection Time: 23  2:20 AM   Result Value Ref Range    APTT 42.2 (H) 24.7 - 36.0 sec   ESTIMATED GFR    Collection Time: 23  2:20 AM   Result Value Ref Range    GFR (CKD-EPI) 18 (A) >60 mL/min/1.73 m 2   Lipid Profile - Fasting    Collection Time: 23  2:20 AM   Result Value Ref Range    Cholesterol,Tot 178 100 - 199 mg/dL    Triglycerides 227 (H) 0 - 149 mg/dL    HDL 31 (A) >=40 mg/dL     (H) <100 mg/dL   HEMOGLOBIN A1C    Collection Time: 23  2:20 AM   Result Value Ref Range    Glycohemoglobin 6.2 (H) 4.0 - 5.6 %    Est Avg Glucose 131 mg/dL   CREATINE KINASE    Collection Time: 23  2:20 AM   Result Value Ref Range    CPK Total 131 0 - 154 U/L   POCT glucose device results    Collection Time: 23 12:40 PM   Result Value Ref Range    POC Glucose, Blood 143 (H) 65 - 99 mg/dL   Urine Sodium Random    Collection Time: 23  1:50 PM   Result Value Ref Range    Sodium, Urine -per volume 42 mmol/L   PROTEIN/CREAT RATIO URINE    Collection Time: 23  1:50 PM   Result Value Ref Range    Total Protein, Urine 15.0 0.0 - 15.0 mg/dL    Creatinine, Random Urine 110.14 mg/dL    Protein Creatinine Ratio 136 (H) 15 - 68 mg/g   EKG    Collection Time: 23  2:03 PM   Result Value Ref Range    Report       [facility]    Test Date:  2023  Pt Name:    MAXIME PIKE                  Department: ER  MRN:        [id number]                      Room:       07  Gender:     Male                         Technician: IRIS  :        1960                   Requested By:FELICIA GARDUNO  Order #:    [id number]                    Reading MD:    Measurements  Intervals                                Axis  Rate:       79                           P:          9  KS:         198                          QRS:        54  QRSD:       102                          T:          1  QT:         380  QTc:        436    Interpretive Statements  Sinus rhythm  Borderline T  abnormalities, inferior leads  Compared to ECG 01/06/2017 14:08:31  T-wave abnormality now present           ASSESSMENT:  Patient is a 62 y.o. male admitted with left-sided weakness, right thalamic hemorrhage    [facility] Code / Diagnosis to Support: 0001.1 - Stroke: Left Body Involvement (Right Brain)    Rehabilitation: Impaired ADLs and mobility  Patient is a good candidate for inpatient rehab based on needs for PT, OT, and speech therapy    Barriers to transfer include: Insurance authorization, TCCs to verify disposition, medical clearance and bed availability     Additional Recommendations:  Right thalamic hemorrhagic stroke  - Originally presented with a headache and left-sided weakness to hospital in [facility]  - Work-up at the outside hospital in [facility] revealed a right thalamic hemorrhagic stroke  - Repeat CT head done after return flight to the , 11/11 CT head shows right thalamic hemorrhage, decrease in density since prior studies from the outside hospital, slight asymmetric dilation of the left ventricular system including the left temporal horn with a possible component of hydrocephalus  - Planning for BP less than 140, avoiding any kind of anticoagulation  - Continue with PT/OT    Spasticity  - Left upper extremity increased tone, initiating baclofen 5 mg nightly to start tonight  - If beneficial, can increase to 3 times daily tomorrow    CKD  -History of creatinine up to 3.2 while in [place]  - Upon evaluation in the emergency department patient's creatinine is 3.59  - On IV fluids  - Primary team monitoring continuation of improvement of creatinine    Hypertension  -Originally found to have blood pressure 170/100 at time of hemorrhagic stroke  - Blood pressure upon evaluation in the emergency department is found to be 187/119  - Has required rescue labetalol  - Goal SBP less than 140  - Home dose amlodipine increased from 5 mg to 10 mg  - Newly on hydralazine 25 mg p.o. twice daily    Hyponatremia  -  11/11 sodium 134  - Primary team monitoring electrolytes    Dispo:  - patient is currently functioning below their level of baseline, recommend post acute rehab  - recommend IRF level therapy with 3hr of therapy 5 days per week  - piror to acceptance to IRF, will need blood pressure improved, and creatinine trending down  -Anticipate admission to rehab tomorrow morning      Medical Complexity:  Right thalamic hemorrhagic stroke  CKD  Hypertension  Hyponatremia  Impaired mobility and ADLs        DVT PPX: SCDs      Thank you for allowing us to participate in the care of this patient.     Patient was seen for 81 minutes on unit/floor of which > 50% of time was spent on counseling and coordination of care regarding the above, including prognosis, risk reduction, benefits of treatment, and options for next stage of care.    Peyton Watkins D.O.   Physical Medicine and Rehabilitation     Please note that this dictation was created using voice recognition software. I have made every reasonable attempt to correct obvious errors, but there may be errors of grammar and possibly content that I did not discover before finalizing the note.

## 2023-11-12 NOTE — CARE PLAN
The patient is Stable - Low risk of patient condition declining or worsening    Shift Goals  Clinical Goals: Monitor v/s & blood sugar, neuro checks  Patient Goals: Sleep comfortably  Family Goals: MARY    Progress made toward(s) clinical / shift goals:    Problem: Optimal Care of the Stroke Patient  Goal: Optimal emergency care for the stroke patient  Outcome: Progressing  Goal: Optimal acute care for the stroke patient  Outcome: Progressing     Problem: Neuro Status  Goal: Neuro status will remain stable or improve  Outcome: Progressing     Problem: Hemodynamic Monitoring  Goal: Patient's hemodynamics, fluid balance and neurologic status will be stable or improve  Outcome: Progressing         No new neuro changes. Discussed stroke education and follow up. PIV removed. Patient changed into clothes .Ready for discharge to [facility].     Patient is not progressing towards the following goals:

## 2023-11-12 NOTE — PROGRESS NOTES
[facility] Follow Up  Note      Total Time 20 minutes, exclusive of time spent during teaching or procedures. Including assessment, review of the available laboratory/radiographic data, multidisciplinary discussion, and the development and documentation of the above assessment and plan, attending to this patient's  care needs for the aforementioned issues with complex decision making and high risk of physiologic decline.       ASSESSMENT and PLAN:     Active Hospital Problems    Diagnosis   • Coronary artery disease involving native coronary artery of native heart with angina pectoris (CMS/HCC)     Continue Beta blocker's and statin Rx and ASA. Last stress test reviewed. Pt informed of notifying MD for exertional CP, SOB, fatigue.       • S/P AVR (aortic valve replacement)     1/8/2021  1. Aortic valve replacement (#23 Inspiris).     • Paroxysmal atrial fibrillation (CMS/HCC)     [id number]  D/W patient of indication risk and benefits of noninvasive evaluation, possible need for invasive w/u PCI, risk of CVA with atrial fibrillation and life threatening bleeding complications with anticoagulation Rx.Indication, risk and benefits of cardioversion including risk of anesthesia and CVA d/w pt. Possible use of Sotalol, Tikosyn and Amiodarone its side effects and limitations were discussed. Anticoagulation medication education done.Possible EP evaluation for paroxysmal uncontrolled Atrial fibrillation ablation informed.       • S/P CABG (coronary artery bypass graft)     1/8/2021   A sequential vein graft was performed by constructing a side-to-side anastomosis to the high diagonal branch and an end-to-side anastomosis to the obtuse marginal branch.  The left internal mammary artery was then anastomosed to the left anterior descending artery.  Note should be made that all of the epicardial vessels were diffusely diseased, and it was somewhat difficult to find a \"soft\" spot.      • Bifascicular block   •  Stroke bridge clinic coordinator called to schedule follow up appointment for patient. Stroke booklet education given to patient and discussed.    Essential hypertension     controlled.   Patient will continue beta-blocker, angiotensin converting enzyme  inhibitor (ACE)/Angiotensin Receptor Blocker (ARB), and statins.Patient will monitor home blood pressure and call us if blood pressure is trending up.       • Mixed hyperlipidemia     Moderate to high dose statin Rx  Diet low cholesterol, Diet for health.    Low Carb/Low Fat/Average protein Dietary education, regular exercise,  continue smoking cessation education done. Regular f/u and lipid f/u to keep LDL ,  with primary M.D.       • Aortic valve stenosis          Kirt Richards MD, FACC, CPE    History and Physical:  No chief complaint on file.     Kamaljit Camacho is a 78 year old male with a history of  has a past medical history of Atrial fibrillation (CMS/HCC), CAD (coronary artery disease), Heart disease, High cholesterol, Hyperlipemia, Hypertension, MI (mitral incompetence), TIA (transient ischemic attack), and Urinary incontinence.  S/p CABG and aortic valve replacement bioprosthetic on 1/8/2021.  Monitor review reveals normal sinus rhythm.  Patient still has significant chest tube drainage.  Blood pressure has been elevated for which amlodipine was started this morning.  Left ventricular ejection fraction 50%.  Chronic dyspnea on exertion with daily activity. No exertional typical chest pain, neck, jaw, or arm pain.  Taking medicine regularly and compliant with diet.    Review Of Symptoms:     CVS: No orthopnea, paroxysmal nocturnal dyspnea, or edema.  No palpitations, dizziness, syncope, or diaphoresis.  Peripheral Vascular disease: Denies intermittent claudications, restless leg syndrome, painful varicose veins.  GI: No history of epigastric pain, nausea, vomiting, hematemesis, or melena. No diarrhea or constipation.  Respiratory: No history of recent fever with chills, purulent expectoration, or hemoptysis.  No history of excessive snoring or day time sleepiness.  CNS: No history of  headache, focal weakness, or visual disturbances.  MS: No joint pain, redness, or swelling.  Constitutional: No history of weight loss, loss of appetite, or weight gain.  : No history of flank pain or hematuria.  Endocrinology: No history of polyuria, polydipsia, polyphagia, or hot flashes.  ENT: No sore throat, difficulty swallowing, or other significant abnormalities.  Psych:  Adult Depression Screening: Result- Negative. Denies feeling down, depressed, or hopeless. Denies feeling little pleasure or interest in doing activities over last 2 weeks.   Skin: no new rashes, lesions, or pruritic.    Cholesterol Status: No recent labs done.      Social History     Tobacco Use   Smoking Status Never Smoker   Smokeless Tobacco Never Used       Current Facility-Administered Medications   Medication Dose Route Frequency Provider Last Rate Last Admin   • amLODIPine (NORVASC) tablet 2.5 mg  2.5 mg Oral Daily Kirt Richards MD       • furosemide (LASIX) tablet 20 mg  20 mg Oral Daily Kirt Richards MD       • melatonin tablet 6 mg  6 mg Oral Nightly Deric Alcazar DO   6 mg at 01/10/21 2047   • QUEtiapine (SEROquel) tablet 25 mg  25 mg Oral QHS PRN Deric Alcazar DO       • losartan (COZAAR) tablet 25 mg  25 mg Oral Daily Ninoska Davis MD   25 mg at 01/10/21 1224   • atorvastatin (LIPITOR) tablet 40 mg  40 mg Oral Nightly Ninoska Davis MD   40 mg at 01/10/21 2047   • metoPROLOL tartrate (LOPRESSOR) tablet 25 mg  25 mg Oral 2 times per day Mirza HERRMANN MD   25 mg at 01/10/21 2047   • sodium chloride 0.9% infusion   Intravenous Continuous PRN Mirza HERRMANN MD       • lactated ringers infusion   Intravenous Continuous Sandeep Ramirez MD   Stopped at 01/08/21 2142   • sodium chloride 0.9% infusion   Intravenous Continuous Sandeep Ramirez MD       • dextrose 5 % infusion   Intravenous Continuous PRN Sandeep Ramirez MD       • PHENYLephrine (DONG-SYNEPHRINE) 50 mg/250 mL in sodium chloride 0.9 % infusion  0-100  mcg/min Intravenous Continuous Mirza HERRMANN MD       • dextrose 50 % injection 25 g  25 g Intravenous PRN Carolynn Goff CNP       • heparin (porcine) injection 5,000 Units  5,000 Units Subcutaneous 2 times per day Carolynn Goff CNP   Stopped at 01/10/21 2100   • acetaminophen (TYLENOL) tablet 650 mg  650 mg Oral Q4H PRN Carolynn Goff CNP   650 mg at 01/10/21 2056   • HYDROcodone-acetaminophen (NORCO) 5-325 MG per tablet 1 tablet  1 tablet Oral Q4H PRN aCrolynn Goff CNP   1 tablet at 01/11/21 0627   • ondansetron (ZOFRAN ODT) disintegrating tablet 4 mg  4 mg Oral Q12H PRN Carolynn Goff CNP        Or   • ondansetron (ZOFRAN) injection 4 mg  4 mg Intravenous Q12H PRN Carolynn Goff CNP       • niCARdipine (CARDENE) 40 mg/200 mL in NaCl 0.83 % infusion  0-15 mg/hr Intravenous Continuous PRN Carolynn Goff CNP   Stopped at 01/11/21 0600   • PHENYLephrine (DONG-SYNEPHRINE) 50 mg/250 mL in sodium chloride 0.9 % infusion  0-200 mcg/min Intravenous Continuous PRN Carolynn Goff CNP       • aspirin (ECOTRIN) EC tablet 325 mg  325 mg Oral Daily Carolynn Goff CNP   325 mg at 01/10/21 0847   • aluminum-magnesium hydroxide-simethicone (MAALOX) 200-200-20 MG/5ML suspension 30 mL  30 mL Oral Q4H PRN Carolynn Goff CNP       • polyethylene glycol (MIRALAX) packet 17 g  17 g Oral Daily PRN Carolynn Goff CNP       • docusate sodium-sennosides (SENOKOT S) 50-8.6 MG 2 tablet  2 tablet Oral BID Carolynn Goff CNP   2 tablet at 01/10/21 0846   • bisacodyl (DULCOLAX) suppository 10 mg  10 mg Rectal Daily PRN Carolynn Goff CNP       • magnesium hydroxide (MILK OF MAGNESIA) 400 MG/5ML suspension 30 mL  30 mL Oral Daily PRN Carolynn Goff CNP       • sodium chloride 0.9 % flush bag 25 mL  25 mL Intravenous PRN Carolynn Goff CNP       • sodium chloride (PF) 0.9 % injection 2 mL  2 mL Intracatheter 2 times per day Carolynn Goff, CNP   2  mL at 01/10/21 2100   • sodium chloride 0.9% infusion   Intravenous Continuous PRN Carolynn Goff CNP 10 mL/hr at 01/11/21 0200 Rate Verify at 01/11/21 0200   • sodium chloride 0.9% infusion   Intravenous Continuous PRN Carolynn Goff CNP   Stopped at 01/09/21 1142   • sodium chloride 0.9% infusion   Intravenous Continuous PRN Carolynn Goff CNP   Stopped at 01/09/21 1142   • Potassium Standard Replacement Protocol   Does not apply See Admin Instructions Carolynn Goff CNP       • Magnesium Standard Replacement Protocol   Does not apply See Admin Instructions Carolynn Goff CNP       • traMADol (ULTRAM) tablet 50 mg  50 mg Oral Q6H PRN Carolynn Goff CNP       • insulin lispro (HumaLOG) injection 0-9 Units  0-9 Units Subcutaneous TID AC Carolynn Goff CNP       • dextrose 50 % injection 25 g  25 g Intravenous PRN Carolynn Goff CNP       • dextrose 50 % injection 12.5 g  12.5 g Intravenous PRN Carolynn Goff CNP       • glucagon (GLUCAGEN) injection 1 mg  1 mg Intramuscular PRN Carolynn Goff CNP       • dextrose (GLUTOSE) 40 % gel 15 g  15 g Oral PRN Carolynn Goff CNP       • dextrose (GLUTOSE) 40 % gel 30 g  30 g Oral PRN Carolynn Goff CNP       • influenza virus quadrivalent vaccine inactivated (PRESERVATIVE FREE) injection 0.5 mL  0.5 mL Intramuscular Once Mirza HERRMANN MD       • sodium chloride 0.9% infusion   Intravenous Continuous PRN Mirza HERRMANN MD           Past Medical History:   Diagnosis Date   • Atrial fibrillation (CMS/HCC)    • CAD (coronary artery disease)    • Heart disease    • High cholesterol    • Hyperlipemia    • Hypertension    • MI (mitral incompetence)    • TIA (transient ischemic attack)     pt. states suspected TIA many years ago   • Urinary incontinence        Physical Exam:    Visit Vitals  /56 (BP Location: RUE - Right upper extremity, Patient Position: Semi-Gomez's)   Pulse 72   Temp 97.7 °F  (36.5 °C) (Temporal)   Resp 18   Ht 5' 8\" (1.727 m)   Wt 83 kg (182 lb 15.7 oz)   SpO2 100%   BMI 27.82 kg/m²       HEENT: No icterus. No cyanosis. No thyromegaly. Normal carotid upstroke.  Bilateral carotid bruit.  JVD 4 cm above angle of Hang in 45 degree position.  RESPIRATORY:      Air entry bilaterally equal. No rales, rub or wheezing.  CARDIO VASCULAR :     PMI in the 5th left intercostals space 9 cm from the midsternal line.  S1 N, A2 N, P2 N, S3 No, S4 No. No mid systolic click.  II/VI ESM Mitral area present, Tricuspid area present, Aortic area present, Pulmonary area -.  Has pericardial rub. No chest pain on arm Movement or deep breathing.  Abdomen:     Soft. No hepatosplenomegaly. No palpable or pulsatile mass felt. Bowel sound present.  CNS:    Alert and Oriented x3. No focal motor deficit.              Psych: Normal mood.  Musculoskeletal:    No significant Kyphosis/Scoliosis. Gait not tested.  EXTREMITIES:    1+ Edema. No clubbing or cyanosis. DP/PT 1/2 Bilat. Femoral 2/2 Bilat. No Bruit.  No Brachial femoral delay.  Skin:  No Xanthelasma.     INTAKE/OUTPUT LAST 3 SHIFTS:  I/O last 3 completed shifts:  In: 1505 [P.O.:1000; I.V.:505]  Out: 1470 [Urine:580; Chest Tube:890]    VENT SETTINGS LAST 24 HOURS:       HEMODYNAMIC SETTINGS LAST 24 HOURS:       LABORATORY DATA:  Recent Results (from the past 24 hour(s))   GLUCOSE, BEDSIDE - POINT OF CARE    Collection Time: 01/10/21  8:53 AM   Result Value Ref Range    GLUCOSE, BEDSIDE - POINT OF CARE 108 (H) 70 - 99 mg/dL   GLUCOSE, BEDSIDE - POINT OF CARE    Collection Time: 01/10/21 10:29 AM   Result Value Ref Range    GLUCOSE, BEDSIDE - POINT OF CARE 124 (H) 70 - 99 mg/dL   GLUCOSE, BEDSIDE - POINT OF CARE    Collection Time: 01/10/21 12:15 PM   Result Value Ref Range    GLUCOSE, BEDSIDE - POINT OF CARE 108 (H) 70 - 99 mg/dL   GLUCOSE, BEDSIDE - POINT OF CARE    Collection Time: 01/10/21  3:54 PM   Result Value Ref Range    GLUCOSE, BEDSIDE - POINT OF CARE 124  (H) 70 - 99 mg/dL   GLUCOSE, BEDSIDE - POINT OF CARE    Collection Time: 01/10/21  5:51 PM   Result Value Ref Range    GLUCOSE, BEDSIDE - POINT OF CARE 64 (L) 70 - 99 mg/dL   GLUCOSE, BEDSIDE - POINT OF CARE    Collection Time: 01/10/21  6:30 PM   Result Value Ref Range    GLUCOSE, BEDSIDE - POINT OF CARE 91 70 - 99 mg/dL   GLUCOSE, BEDSIDE - POINT OF CARE    Collection Time: 01/10/21  8:13 PM   Result Value Ref Range    GLUCOSE, BEDSIDE - POINT OF CARE 118 (H) 70 - 99 mg/dL   GLUCOSE, BEDSIDE - POINT OF CARE    Collection Time: 01/10/21  9:54 PM   Result Value Ref Range    GLUCOSE, BEDSIDE - POINT OF CARE 147 (H) 70 - 99 mg/dL   GLUCOSE, BEDSIDE - POINT OF CARE    Collection Time: 01/11/21 12:15 AM   Result Value Ref Range    GLUCOSE, BEDSIDE - POINT OF CARE 242 (H) 70 - 99 mg/dL   GLUCOSE, BEDSIDE - POINT OF CARE    Collection Time: 01/11/21  1:02 AM   Result Value Ref Range    GLUCOSE, BEDSIDE - POINT OF CARE 114 (H) 70 - 99 mg/dL   GLUCOSE, BEDSIDE - POINT OF CARE    Collection Time: 01/11/21  2:29 AM   Result Value Ref Range    GLUCOSE, BEDSIDE - POINT OF CARE 247 (H) 70 - 99 mg/dL   GLUCOSE, BEDSIDE - POINT OF CARE    Collection Time: 01/11/21  2:30 AM   Result Value Ref Range    GLUCOSE, BEDSIDE - POINT OF CARE 48 (LL) 70 - 99 mg/dL   GLUCOSE, BEDSIDE - POINT OF CARE    Collection Time: 01/11/21  2:32 AM   Result Value Ref Range    GLUCOSE, BEDSIDE - POINT OF CARE 40 (LL) 70 - 99 mg/dL   GLUCOSE, BEDSIDE - POINT OF CARE    Collection Time: 01/11/21  2:43 AM   Result Value Ref Range    GLUCOSE, BEDSIDE - POINT OF CARE 46 (LL) 70 - 99 mg/dL   GLUCOSE, BEDSIDE - POINT OF CARE    Collection Time: 01/11/21  2:59 AM   Result Value Ref Range    GLUCOSE, BEDSIDE - POINT OF CARE 66 (L) 70 - 99 mg/dL   GLUCOSE, BEDSIDE - POINT OF CARE    Collection Time: 01/11/21  3:15 AM   Result Value Ref Range    GLUCOSE, BEDSIDE - POINT OF CARE 92 70 - 99 mg/dL   GLUCOSE, BEDSIDE - POINT OF CARE    Collection Time: 01/11/21  4:11  AM   Result Value Ref Range    GLUCOSE, BEDSIDE - POINT OF CARE 106 (H) 70 - 99 mg/dL   Magnesium    Collection Time: 01/11/21  4:37 AM   Result Value Ref Range    Magnesium 2.3 1.7 - 2.4 mg/dL   Basic Metabolic Panel    Collection Time: 01/11/21  4:37 AM   Result Value Ref Range    Fasting Status      Sodium 138 135 - 145 mmol/L    Potassium 4.0 3.4 - 5.1 mmol/L    Chloride 106 98 - 107 mmol/L    Carbon Dioxide 24 21 - 32 mmol/L    Anion Gap 12 10 - 20 mmol/L    Glucose 128 (H) 65 - 99 mg/dL    BUN 43 (H) 6 - 20 mg/dL    Creatinine 1.09 0.67 - 1.17 mg/dL    Glomerular Filtration Rate 65 (L) >90 mL/min/1.73m2    BUN/ Creatinine Ratio 39 (H) 7 - 25    Calcium 8.1 (L) 8.4 - 10.2 mg/dL   CBC with Automated Differential (performable only)    Collection Time: 01/11/21  4:37 AM   Result Value Ref Range    WBC 10.1 4.2 - 11.0 K/mcL    RBC 2.85 (L) 4.50 - 5.90 mil/mcL    HGB 8.7 (L) 13.0 - 17.0 g/dL    HCT 26.4 (L) 39.0 - 51.0 %    MCV 92.6 78.0 - 100.0 fl    MCH 30.5 26.0 - 34.0 pg    MCHC 33.0 32.0 - 36.5 g/dL    RDW-CV 14.4 11.0 - 15.0 %    RDW-SD 49.1 39.0 - 50.0 fL    PLT 91 (L) 140 - 450 K/mcL    NRBC 0 <=0 /100 WBC    Neutrophil, Percent 86 %    Lymphocytes, Percent 5 %    Mono, Percent 8 %    Eosinophils, Percent 0 %    Basophils, Percent 0 %    Immature Granulocytes 1 %    Absolute Neutrophils 8.7 (H) 1.8 - 7.7 K/mcL    Absolute Lymphocytes 0.5 (L) 1.0 - 4.0 K/mcL    Absolute Monocytes 0.8 0.3 - 0.9 K/mcL    Absolute Eosinophils  0.0 0.0 - 0.5 K/mcL    Absolute Basophils 0.0 0.0 - 0.3 K/mcL    Absolute Immmature Granulocytes 0.1 0.0 - 0.2 K/mcL   GLUCOSE, BEDSIDE - POINT OF CARE    Collection Time: 01/11/21  6:11 AM   Result Value Ref Range    GLUCOSE, BEDSIDE - POINT OF CARE 121 (H) 70 - 99 mg/dL        IMAGING STUDIES:  XR CHEST PA OR AP 1 VIEW   Final Result   FINDINGS AND IMPRESSION:      1 cm right apical pneumothorax.  6 mm left apical pneumothorax.       Evidence of median sternotomy and valve replacement.   Heart size prominent   unchanged.  Calcification of aortic arch.  Mediastinal drain and left-sided   chest tubes unchanged.  Mild elevation of the left hemidiaphragm.  Patchy   atelectasis left lung base.  Small sided pleural effusion not excluded.         Electronically Signed by: AUSTEN ALONZO M.D.    Signed on: 1/11/2021 6:32 AM          XR CHEST PA OR AP 1 VIEW   Final Result   FINDINGS AND IMPRESSION:      Median sternotomy wires and mediastinal clips.  Interval removal of pulmonary arterial catheter with persistent vascular sheath.  Stable position of mediastinal drain and left-sided chest tube.      Stable enlarged cardiac silhouette.  Aortic arch calcifications.  Stable left retrocardiac opacity.  Similar-appearing small left pleural effusion.  No large pneumothorax.      Electronically Signed by: NADIA MCKEON M.D.    Signed on: 1/10/2021 10:12 AM          XR CHEST PA OR AP 1 VIEW   Final Result   FINDINGS/IMPRESSION:    Stable position of pulmonary arterial catheter with tip projected over the main pulmonary artery.  Interval removal of endotracheal and enteric tubes.  Stable position of mediastinal drain.  Additional left-sided chest tube with tip near the left    lateral chest wall.  Sternotomy wires and mediastinal clips present.         Enlarged cardiac silhouette, similar to prior examination.  Calcification of the aortic arch.  New blunting of the left costophrenic angle possibly represent small left pleural effusion.  Dense left retrocardiac opacity similar to prior examination,    likely atelectasis with infectious process not excluded.  Clinical correlation advised.  No definite pneumothorax.               I, ARMOND DANIEL M.D., have reviewed the images and report and concur with these findings interpreted by JAVI GAYTAN MD.      Electronically Signed by: ARMOND DANIEL M.D.    Signed on: 1/9/2021 9:14 AM          XR CHEST PA OR AP 1 VIEW   Final Result   Addendum 1 of 1   Addendum:      Per  Carolynn Denver, patient has OG tube.  OG tube is likely    malpositioned and may be extending into the left main bronchus.     Additional perfect serve text message sent to Carolynn Liao with read    receipt at 2:53 PM 01/08/2021.      Electronically Signed by: BRAYAN LIAO M.D.    Signed on: 1/8/2021 2:54 PM          Final      XR CHEST PA AND LATERAL 2 VIEWS   Final Result   FINDINGS AND IMPRESSION:       Heart size top normal.  Coronary stent.  Calcification of the aortic arch.  Elongated aorta.  No infiltrate, pleural effusion or pneumothorax.  Mild hyperinflation.      Electronically Signed by: AUSTEN ALONZO M.D.    Signed on: 1/8/2021 6:23 AM               LAST EKG:    Encounter Date: 01/08/21   Electrocardiogram 12-Lead   Result Value    Ventricular Rate EKG/Min (BPM) 82    Atrial Rate (BPM) 82    WI-Interval (MSEC) 180    QRS-Interval (MSEC) 140    QT-Interval (MSEC) 452    QTc 528    P Axis (Degrees) 64    R Axis (Degrees) -64    T Axis (Degrees) 95    REPORT TEXT      Normal sinus rhythm  Right bundle branch block  Left anterior fascicular block   Bifascicular block  Left ventricular hypertrophy  with repolarization abnormality  Cannot rule out  Inferior infarct  (masked by fascicular block?)  , age undetermined  Abnormal ECG  No previous ECGs available  Confirmed by OCTAVIA WILL MD ([id number]) on 1/9/2021 1:36:53 PM           Kirt Richards MD, FACC, CPE

## 2023-11-12 NOTE — HOSPITAL COURSE
Mr. Jason Lima is a 62 y.o. male with history of hypertension, diabetes, hyperlipidemia presented 11/11/2023 with evaluation for right-sided thalamic hemorrhagic CVA.     History obtained from patient's fiancé who was at bedside as patient is poor historian.  Fiancé showed me hospitalization paperwork from [facility] dated 10/23/2023.  At that time, patient complained of headache which required to lay down and rest, then followed by weakness of left side.  Due to concern of CVA, patient was taken to hospital in [facility].     Patient came back to [place] as he resides in [place].  Patient went straight to ER in our facility for need for reevaluation along with possible placement to rehab for continued therapy.  Rehab physician Dr. Watkins contacted for admission.    Patient seen and examined prior to transfer.  Patient to continue all established medications specifically blood pressure control.  Patient was treated for ROMÁN prior to transfer with IV fluids.  Discussed with patient recommendations and for need to follow-up with outpatient neurology/stroke Bridge clinic postdischarge.  Patient to continue all other home medications.  All questions and concerns answered prior to discharge.  Patient discharged to inpatient rehab.

## 2023-11-12 NOTE — H&P
"  Physical Medicine & Rehabilitation  History and Physical (H&P)  &     Post Admission Physician Evaluation (RENETTA)       Date of Admission: 11/12/2023  Date of Service: 11/12/2023   Jason Lima  [id number]    [facility] Code to Support Admission: 0001.1 - Stroke: Left Body Involvement (Right Brain)  Etiologic Diagnosis: Thalamic hemorrhage (HCC)    _______________________________________________    Chief Complaint: Left-sided weakness    History of Present Illness:  Per my consult note from 11/11:  The patient is a 62 y.o. male with a past medical history of hypertension, diabetes, hyperlipidemia and questionable history of CKD;  who presented on 11/11/2023  1:07 AM with left-sided weakness after stroke in [place] 3 weeks ago.  Per documentation, patient was visiting [facility] when patient had a sudden headache followed by left-sided weakness on 10/23/2023.  Initial work-up done at the hospital in [place] revealed hyper tension with /100 and creatinine 3.2.  Patient was deemed medically cleared to leave the hospital admitted after the stroke, he completed a flight to [place] and was admitted to the emergency department in preparation for work-up for admit to rehab.  Evaluation completed in the emergency department prior to rehab reveals a CT head notable for right thalamic hemorrhage.  At time of evaluation at the [facility] ED creatinine 3.59 and hypertension of 187/119, patient required rescue labetalol to decrease blood pressures.  Patient is now on amlodipine and hydralazine.\"  Patient was newly started on baclofen on 11/11, patient's blood pressures improved since original presentation to the emergency department from the airport.    Patient seen and examined at bedside.  Patient reports he feels okay.  Patient is fatigued, has a component of jet lag.  Patient reports he has not slept well in a couple of days.  Otherwise patient denies headache, lightheadedness, chest pain or shortness of breath.  Patient does report having " some blurry vision since his stroke.  Patient has spasticity in his left upper extremity and left lower extremity.  Patient has been able to void without the use of a E[place]ols.  Denies constipation.  Is looking forward to starting rehab, discussed plan to improve his sleep-wake cycles.  Would like to start baclofen today for spasticity however that can affect his fatigue.  We will plan to start tomorrow.  Discussed with patient plan to stay awake during the day today so that he can sleep well tonight.    Review of Systems:     Comprehensive 14 point ROS was reviewed and all were negative except as noted elsewhere in this document.     Past Medical History:  Past Medical History:   Diagnosis Date    Diabetes (HCC)     High cholesterol     Hypertension     Stroke (HCC)     3 weeks ago in [place] (as of 11/11/2023)       Past Surgical History:  Past Surgical History:   Procedure Laterality Date    CHELO BY LAPAROSCOPY N/A 1/6/2017    Procedure: CHELO BY LAPAROSCOPY;  Surgeon: Garrett Miller M.D.;  Location: SURGERY [facility];  Service:     ABDOMINAL ABSCESS DRAINAGE N/A 1/6/2017    Procedure: ABDOMINAL ABSCESS DRAINAGE;  Surgeon: Garrett Miller M.D.;  Location: SURGERY [facility];  Service:        Family History:  No family history on file.    Medications:  Current Facility-Administered Medications   Medication Dose    Respiratory Therapy Consult      senna-docusate (Pericolace Or Senokot S) 8.6-50 MG per tablet 2 Tablet  2 Tablet    And    polyethylene glycol/lytes (Miralax) PACKET 1 Packet  1 Packet    And    magnesium hydroxide (Milk Of Magnesia) suspension 30 mL  30 mL    And    bisacodyl (Dulcolax) suppository 10 mg  10 mg    omeprazole (PriLOSEC) capsule 20 mg  20 mg    mag hydrox-al hydrox-simeth (Maalox Plus Es Or Mylanta Ds) suspension 20 mL  20 mL    ondansetron (Zofran ODT) dispertab 4 mg  4 mg    Or    ondansetron (Zofran) syringe/vial injection 4 mg  4 mg    traZODone (Desyrel) tablet 50 mg  50 mg  "   sodium chloride (Ocean) 0.65 % nasal spray 2 Spray  2 Spray    acetaminophen (Tylenol) tablet 1,000 mg  1,000 mg    Followed by    [START ON 11/17/2023] acetaminophen (Tylenol) tablet 1,000 mg  1,000 mg    oxyCODONE immediate-release (Roxicodone) tablet 2.5 mg  2.5 mg    Or    oxyCODONE immediate-release (Roxicodone) tablet 5 mg  5 mg    hydrALAZINE (Apresoline) tablet 25 mg  25 mg    [START ON 11/13/2023] amLODIPine (Norvasc) tablet 10 mg  10 mg    atorvastatin (Lipitor) tablet 40 mg  40 mg    baclofen (Lioresal) tablet 5 mg  5 mg    insulin regular (HumuLIN R,NovoLIN R) injection  2-9 Units    And    dextrose 50% (D50W) injection 25 g  25 g    hydrALAZINE (Apresoline) tablet 25 mg  25 mg       Allergies:  Penicillins    Psychosocial History:  Patient lives alone in [place] however patient's brother and sister and fiancé will be providing 24/7 care after rehab  1 АНДРЕЙ  At prior level of function patient was Independent with mobility and ADLs.      Tobacco: Denies  Alcohol: Denies  Drugs: Denies    Level of Function Prior to Disability:  At prior level of function patient was Independent with mobility and ADLs.      Level of Function Prior to Admission to [facility]:  PT: Functional mobility   11/11 mod assist sit to stand, max assist transfers, unable to participate in functional gait     OT: ADLs  11/11 max assist lower body dressing, mod assist upper body dressing     SLP:   11/11 soft and bite-size solids with thin liquids    CURRENT LEVEL OF FUNCTION:   Same as level of function prior to admission to [facility]    Physical Examination:   Physical Exam:  Vitals: /89   Pulse 75   Temp 36.8 °C (98.3 °F) (Oral)   Resp 18   Ht 1.727 m (5' 8\")   Wt 86 kg (189 lb 9.5 oz)   SpO2 97%   Gen: NAD, laying comfortably in bed  Head:  NC/AT  Eyes/ Nose/ Mouth: PERRLA, moist mucous membranes  Cardio: RRR, good distal perfusion, warm extremities  Pulm: normal respiratory " effort, no cyanosis, on room air  Abd: Soft NTND, negative borborygmi   Ext: No peripheral edema. No calf tenderness. No clubbing.    Mental status:  A&Ox4 (person, place, date, situation) answers questions appropriately follows commands  Speech: fluent, no aphasia or dysarthria    CRANIAL NERVES:  2,3: visual acuity grossly intact, PERRL  3,4,6: EOMI bilaterally, no nystagmus or diplopia  5: sensation intact to light touch bilaterally and symmetric  7: no facial asymmetry  8: hearing grossly intact      Motor:      Upper Extremity  Myotome R L   Shoulder flexion C5 5/5 0/5   Elbow flexion C5 5/5 0/5   Wrist extension C6 5/5 0/5   Elbow extension C7 5/5 0/5   Finger flexion C8 5/5 1/5   Finger abduction T1 5/5 0/5     Lower Extremity Myotome R L   Hip flexion L2 5/5 1/5   Knee extension L3 5/5 2/5   Ankle dorsiflexion L4 5/5 1/5   Toe extension L5 5/5 2/5   Ankle plantarflexion S1 5/5 2/5       Negative Pronator drift bilaterally    Sensory:   intact to light touch through out right side, reports that his left side feels heavy    2 beat clonus at right ankle    Tone: MAS 3, biceps and finger flexors increased spasticity of left upper extremity and biceps as well as finger flexors, increased spasticity and gastroc    Coordination:   intact fine motor with fingers right upper extremity          Radiology:  11/11/2023 CT head  Impression: 1.  Right thalamic hemorrhage, decreased in density since prior study  2.  Stranding vasogenic edema appears stable.  3.  Slight asymmetric dilatation of the left ventricular system including the left temporal horn, consider component of hydrocephalus.  4.  Low-density fluid collection in the left temporal fossa, appearance most compatible with arachnoid cyst.  5.  Nonspecific white matter changes, commonly associated with small vessel ischemic disease.  Associated mild cerebral atrophy is noted.  6.  Atherosclerosis.    Laboratory Values:  Recent Labs     11/11/23  0220  11/12/23 0813   SODIUM 134* 139   POTASSIUM 4.8 4.1   CHLORIDE 96 103   CO2 25 26   GLUCOSE 149* 124*   BUN 81* 61*   CREATININE 3.59* 2.84*   CALCIUM 9.8 8.9     Recent Labs     11/11/23 0220 11/12/23 0813   WBC 6.1 5.7   RBC 4.81 4.52*   HEMOGLOBIN 14.7 13.5*   HEMATOCRIT 42.6 39.9*   MCV 88.6 88.3   MCH 30.6 29.9   MCHC 34.5 33.8   RDW 38.7 38.1   PLATELETCT 240 225   MPV 10.6 10.8     Recent Labs     11/11/23 0220   APTT 42.2*       Primary Rehabilitation Diagnosis:    This patient is a 62 y.o. male admitted for acute inpatient rehabilitation with Thalamic hemorrhage (HCC).    Impairments:   ADLs/IADLs  Mobility  Swallow    Secondary Diagnosis/Medical Co-morbidities Affecting Function  Hypertension  Diabetes  CKD    Relevant Changes Since Preadmission Evaluation:    Status unchanged    The patient's rehabilitation potential is Very Good  The patient's medical prognosis is good    Rehabilitation Plan:   Discussion and Recommendations:   1. The patient requires an acute inpatient rehabilitation program with a coordinated program of care at an intensity and frequency not available at a lower level of care. This recommendation is substantiated by the patient's medical physicians who recommend that the patient's intervention and assessment of medical issues needs to be done at an acute level of care for patient's safety and maximum outcome.   2. A coordinated program of care will be supplied by an interdisciplinary team of physical therapy, occupational therapy, rehab physician, rehab nursing, and, if needed, speech therapy and rehab psychology. Rehab team presents a patient-specific rehabilitation and education program concentrating on prevention of future problems related to accessibility, mobility, skin, bowel, bladder, sexuality, and psychosocial and medical/surgical problems.   3. Need for Rehabilitation Physician: The rehab physician will be evaluating the patient on a multi-weekly basis to help coordinate  the program of care. The rehab physician communicates between medical physicians, therapists, and nurses to maximize the patient's potential outcome. Specific areas in which the rehab physician will be providing daily assessment include the following:   A. Assessing the patient's heart rate and blood pressure response (vitals monitoring) to activity and making adjustments in medications or conservative measures as needed.   B. The rehab physician will be assessing the frequency at which the program can be increased to allow the patient to reach optimal functional outcome.   C. The rehab physician will also provide assessments in daily skin care, especially in light of patient's impairments in mobility.   D. The rehab physician will provide special expertise in understanding how to work with functional impairment and recommend appropriate interventions, compensatory techniques, and education that will facilitate the patient's outcome.   4. Rehab R.N.   The rehab RN will be working with patient to carry over in room mobility and activities of daily living when the patient is not in 3 hours of skilled therapy. Rehab nursing will be working in conjunction with rehab physician to address all the medical issues above and continue to assess laboratory work and discuss abnormalities with the treating physicians, assess vitals, and response to activity, and discuss and report abnormalities with the rehab physician. Rehab RN will also continue daily skin care, supervise bladder/bowel program, instruct in medication administration, and ensure patient safety.   5. Rehab Therapy: Therapies to treat at intensity and frequency of (may change after completion of evaluation by all therapeutic disciplines):       PT:  Physical therapy to address mobility, transfer, gait training and evaluation for adaptive equipment needs 1 hour/day at least 5 days/week for the duration of the ELOS (see below)       OT:  Occupational therapy to  address ADLs, self-care, home management training, functional mobility/transfers and assistive device evaluation, and community re-integration 1  hour/day at least 5 days/week for the duration of the ELOS (see below).        ST/Dysphagia:  Speech therapy to address speech, language, and cognitive deficits as well as swallowing difficulties with retraining/dysphagia management and community re-integration with comprehension, expression, cognitive training 1  hour/day at least 5 days/week for the duration of the ELOS (see below).     Medical management / Rehabilitation Issues/ Adverse Potential as part of rehabilitation plan     Rehabilitation Issues/Adverse Potential  1.  CVA (Cerebrovascular Accident): Cont  lipid and blood pressure management. Patient demonstrates functional deficits in strength, balance, coordination, and ADL's. Patient is admitted to [facility] for comprehensive rehabilitation therapy as described below.   Rehabilitation nursing monitors bowel and bladder control, educates on medication administration, co-morbidities and monitors patient safety.      2.  Neurostimulants: None at this time but continue to assess daily for need to initiate should status change.    3.  DVT prophylaxis:  Patient is on IAN hose for anticoagulation upon transfer, chemical DVT prophylaxis contraindicated after hemorrhagic stroke. Encourage OOB. Monitor daily for signs and symptoms of DVT including but not limited to swelling and pain to prevent the development of DVT that may interfere with therapies.    4.  GI prophylaxis:  On prilosec to prevent gastritis/dyspepsia which may interfere with therapies.    5.  Pain: No issues with pain currently / Controlled with Tylenol and as needed oxycodone    6.  Nutrition/Dysphagia: Dietician monitors nutrient intake, recommend supplements prn and provide nutrition education to pt/family to promote optimal nutrition for wound healing/recovery.     7.   Bladder/bowel:  Start bowel and bladder program as described below, to prevent constipation, urinary retention (which may lead to UTI), and urinary incontinence (which will impact upon pt's functional independence).   - TV Q3h while awake with post void bladder scans, I&O cath for PVRs >400  - up to commode after meal     8.  Skin/dermal ulcer prophylaxis: Monitor for new skin conditions with q.2 h. turns as required to prevent the development of skin breakdown.     9.  Cognition/Behavior: As needed psychologist provides adjustment counseling to illness and psychosocial barriers that may be potential barriers to rehabilitation.     10. Respiratory therapy: RT performs O2 management prn, breathing retraining, pulmonary hygiene and bronchospasm management prn to optimize participation in therapies.     Medical Co-Morbidities/Adverse Potential Affecting Function:  Right thalamic hemorrhagic stroke  - Originally presented with a headache and left-sided weakness to hospital in [facility]  - Work[place] [facility] [place] revealed a right thalamic hemorrhagic stroke  - Repeat CT head done after return flight to the , 11/11 CT head shows right thalamic hemorrhage, decrease in density since prior studies from the outside hospital, slight asymmetric dilation of the left ventricular system including the left temporal horn with a possible component of hydrocephalus  - Planning for BP less than 140, avoiding any kind of anticoagulation  - Initiate comprehensive IRF level therapy with 3 days of therapy 5 days a week with services from PT/OT/SLP     Spasticity  - Continue baclofen 5 mg nightly, started on 11/11  - Can increase baclofen 5 mg 3 times daily, will hold off on starting daytime baclofen today in order to reset patient's circadian rhythm, sleep is disrupted due to jet lag  - PRAFO ordered for right lower extremity to prevent further plantarflexion contracture     CKD  -History of creatinine up to 3.2 while  in [place]  - 11/11 creatinine 3.59 at time of initial evaluation in ED after arrival from airport  - Creatinine improving, 11/12 creatinine 2.84  - Continue to trend, repeat labs tomorrow     Hypertension  -Originally found to have blood pressure 170/100 at time of hemorrhagic stroke  - Blood pressure upon evaluation in the emergency department is found to be 187/119  - Has required rescue labetalol  - Goal SBP less than 140  - Amlodipine 10 mg   - Newly on hydralazine 25 mg p.o. twice daily  - Admission blood pressure /89    Prediabetes  - History of hyperglycemia, not on home medications  - Continue sliding scale insulin    HLD  - continue home dose satin      Bowel  - Last BM 11/11  - Continue with scheduled Colace and senna, as needed stool softeners    Insomnia  - Secondary to recent jet lag, melatonin scheduled  - Utilize trazodone as needed insomnia continues      Pain -as needed Tylenol and oxycodone    Skin - Patient at risk for skin breakdown due to debility in areas including sacrum, achilles, elbows and head in addition to other sites. Nursing to assess skin daily.     GI Ppx - Patient on Prilosec for GERD prophylaxis.       DVT Ppx -IAN hose, chemical DVT prophylaxis contraindicated after hemorrhagic stroke    I personally performed a complete drug regimen review and no potential clinically significant medication issues were identified.     Goals/Expected Level of Function Based on Current Medical and Functional Status:  (may change based on patient's medical status and rate of impairment recovery)  Transfers:   Supervision  Mobility/Gait:   Supervision  ADL's:   Supervision    DISPOSITION: Discharge to pre-morbid independent living setting with the supportive care of patient's family.    ELOS: 14 days   ____________________________________    Peyton Watkins D.O.    ____________________________________    Pt was seen today for 75 min, and entire time spent in face-to-face contact was >50% in  counseling and coordination of care as detailed in A/P above.

## 2023-11-12 NOTE — THERAPY
"Speech Language Pathology   Clinical Swallow Evaluation     Patient Name: Jason Lima  AGE:  62 y.o., SEX:  male  Medical Record #: [id number]  Date of Service: 11/11/2023      History of Present Illness  62 y.o. male who precented on 11/11 for rehab placement.    PMHx: DM, hyperlipidemia, HTN, hemorrhagic CVA 2 weeks ago while in [facility] with residual Left-sided facial droop and left-sided weakness, CKD    CT-Head:  1.  Right thalamic hemorrhage, decreased in density since prior study  2.  Stranding vasogenic edema appears stable.  3.  Slight asymmetric dilatation of the left ventricular system including the left temporal horn, consider component of hydrocephalus.  4.  Low-density fluid collection in the left temporal fossa, appearance most compatible with arachnoid cyst.  5.  Nonspecific white matter changes, commonly associated with small vessel ischemic disease.  Associated mild cerebral atrophy is noted.  6.  Atherosclerosis.         General Information:  Vitals  O2 Delivery Device: None - Room Air  Level of Consciousness: Alert, Awake  Orientation: Oriented x 4  Follows Directives: Yes      Prior Living Situation & Level of Function:  Housing / Facility: [address]  Lives with - Patient's Self Care Capacity: Spouse  Communication: WFL  Swallowing: Placed on SB6/TN0 at hospital post CVA in [facility]       Oral Mechanism Evaluation:  Dentition: Good   Facial Symmetry:  (Left facial droop)    Labial Observations: WFL   Lingual Observations: Midline  Motor Speech: Dysarthric, >90% intelligibile            Laryngeal Function:  Secretion Management: Adequate  Voice Quality: Harsh  Cough: Perceptually WNL         Subjective  RN cleared patient for clinical swallow evaluation. Reported pt had an MBSS at hospital in [facility], which \"showed some aspiration but they placed him on a diet of soft&bite sized solids and thin liquids.\" Pt endorsed eating and drinking without difficulty the past couple of weeks. Agreeable to PO " "trials.        Assessment  Current Method of Nutrition: Oral diet (Soft&bite-sized solids/thin liquids)  Positioning: Escoto's (60-90 degrees)  Bolus Administration: SLP, Patient   O2 Delivery Device: None - Room Air  Factor(s) Affecting Performance: None  Tracheostomy : No       Swallowing Trials:  Swallowing Trials  Thin Liquid (TN0): WFL  Liquidised (LQ3): WFL  Soft & Bite Sized (SB6): WFL  Regular (RG7): WFL      Comments: Pt able to bring cup and feeding tools to oral cavity without difficulty; however required some assistance with holding food containers while eating d/t L-sided weakness. Demo'd adequate oral bolus acceptance, labial assimilation to feeding tools, and oral bolus containment. Presumed complete AP transfer and timely swallow trigger per palpation. No oral bolus residue upon oral inspection. Slightly prolonged mastication of regular solids; pt endorsed \"hard to chew on the left side.\" No cough or change in vocal quality appreciated during oral intake. After completion of oral intake, intermittent throat clear noted; pt reported 2/2 having a dry throat. Provided education regarding general aspiration precautions and signs, pt stated understanding.       Clinical Impressions  Patient presents without overt s/sx of aspiration during oral intake this date. Throat clear appreciated following oral intake possibly concerning for airway invasion/penetration. As pt cleared for oral diet s/p MBSS in [place] and has been tolerating diet without difficulty for the past couple of weeks, and WBC within normal limits, recommend continuation of diet of soft&bite sized solids and thin liquids. Pt may benefit from a repeat MBSS if throat clear persists with oral intake or with any overt s/sx of aspiration.         Recommendations  Diet Consistency: Soft&bite sized solids/thin liquids  Instrumentation: Instrumental swallow study pending clinical progress  Medication: As tolerated  Supervision: Assist with meal tray " "set up, Direct supervision during meals (Feeding assistance as needed)  Positioning: Fully upright and midline during oral intake, Meals sitting upright in a chair, as tolerated  Risk Management : Alternate bites and sips, Slow rate of intake, Physical mobility, as tolerated  Oral Care: BID         SLP Treatment Plan  Treatment Plan: Dysphagia Treatment  SLP Frequency: 3x Per Week  Estimated Duration: Until Therapy Goals Met      Anticipated Discharge Needs  Discharge Recommendations: Recommend post-acute placement for additional speech therapy services prior to discharge home   Therapy Recommendations Upon DC: Dysphagia Training, Patient / Family / Caregiver Education        Patient / Family Goals  Patient / Family Goal #1: \"I've been eating okay\"  Short Term Goals  Short Term Goal # 1: Pt will consume a diet of soft&bite sized solids and thin liquids with no overt s/sx of aspiration or decline in respiratory statua      Artis Hammond, SLP   "

## 2023-11-12 NOTE — CARE PLAN
The patient is Stable - Low risk of patient condition declining or worsening    Shift Goals  Clinical Goals: Monitor v/s & blood sugar, neuro checks  Patient Goals: Sleep comfortably  Family Goals: MARY    Progress made toward(s) clinical / shift goals:  No changes with nueor checks.    Patient is not progressing towards the following goals:      Problem: Knowledge Deficit - Standard  Goal: Patient and family/care givers will demonstrate understanding of plan of care, disease process/condition, diagnostic tests and medications  11/12/2023 0205 by Tanisha Craig, R.N.  Outcome: Progressing  11/12/2023 0205 by Tanisha Craig R.N.  Outcome: Progressing     Problem: Skin Integrity  Goal: Skin integrity is maintained or improved  11/12/2023 0205 by Tanisha Craig, R.N.  Outcome: Progressing  11/12/2023 0205 by Tanisha Carig, R.N.  Outcome: Progressing     Problem: Fall Risk  Goal: Patient will remain free from falls  11/12/2023 0205 by Tanisha Craig, R.N.  Outcome: Progressing  11/12/2023 0205 by Tanisha Craig, R.N.  Outcome: Progressing     Problem: Discharge Barriers/Planning  Goal: Patient's continuum of care needs are met  Outcome: Progressing     Problem: Hemodynamics  Goal: Patient's hemodynamics, fluid balance and neurologic status will be stable or improve  Outcome: Progressing

## 2023-11-12 NOTE — PROGRESS NOTES
NURSING DAILY NOTE    Name: Jason Lima   Date of Admission: 11/12/2023   Admitting Diagnosis: Thalamic hemorrhage (HCC)  Attending Physician: Peyton Watkins D.o.  Allergies: Penicillins    Safety  Patient Assist     Patient Precautions     Precaution Comments     Bed Transfer Status     Toilet Transfer Status      Assistive Devices     Oxygen  Room air w/o2 available  Diet/Therapeutic Dining  Current Diet Order   Procedures    Diet Order Diet: Level 6 - Soft and Bite Sized; Liquid level: Level 0 - Thin; Second Modifier: (optional): Consistent CHO (Diabetic)     Pill Administration  whole  Agitated Behavioral Scale     ABS Level of Severity       Fall Risk  Has the patient had a fall this admission?      Shellie Hamilton Fall Risk Scoring  11, MODERATE RISK  Fall Risk Safety Measures  bed alarm, chair alarm, and seatbelt alarm    Vitals  Temperature: 36.8 °C (98.3 °F)  Temp src: Oral  Pulse: 75  Respiration: 18  Blood Pressure: 137/89  Blood Pressure MAP (Calculated): 105 MM HG  BP Location: Right, Upper Arm  Patient BP Position: Supine     Oxygen  Pulse Oximetry: 97 %  O2 Delivery Device: Room air w/o2 available    Bowel and Bladder  Last Bowel Movement   (11/12 per report)  Stool Type     Bowel Device     Continent  Bladder: Continent void   Bowel: Continent movement  Bladder Function     Genitourinary Assessment        Skin  Polo Score   17  Sensory Interventions   Bed Types: Standard/Trauma Mattress  Skin Preventative Measures: Pillows in Use for Support / Positioning  Moisture Interventions         Pain  Pain Rating Scale     Pain Location     Pain Location Orientation     Pain Interventions        ADLs    Bathing      Linen Change      Personal Hygiene     Chlorhexidine Bath      Oral Care     Teeth/Dentures     Shave     Nutrition Percentage Eaten     Environmental Precautions     Patient Turns/Positioning     Patient Turns Assistance/Tolerance     Bed  Positions     Head of Bed Elevated         Psychosocial/Neurologic Assessment  Psychosocial Assessment  Psychosocial (WDL):  Within Defined Limits  Neurologic Assessment  Neuro (WDL): Exceptions to WDL  Level of Consciousness: Alert  Orientation Level: Oriented X4  Cognition: Appropriate judgement  Speech: Clear  EENT (WDL):  Within Defined Limits    Cardio/Pulmonary Assessment  Edema      Respiratory Breath Sounds  RUL Breath Sounds: Clear  RML Breath Sounds: Clear  RLL Breath Sounds: Diminished  MOHAMUD Breath Sounds: Clear  LLL Breath Sounds: Diminished  Cardiac Assessment

## 2023-11-12 NOTE — CARE PLAN
Problem: Knowledge Deficit - Standard  Goal: Patient and family/care givers will demonstrate understanding of plan of care, disease process/condition, diagnostic tests and medications  Outcome: Progressing  Patient and family oriented to  hospital routine, rehab process.     Problem: Fall Risk - Rehab  Goal: Patient will remain free from falls  Outcome: Progressing  Patient oriented to use of call light for assist with needs/transfers to prevent falls.   The patient is Stable - Low risk of patient condition declining or worsening    Shift Goals  Clinical Goals: safety

## 2023-11-12 NOTE — PROGRESS NOTES
Bedside report received from YUAN Garay. Patient resting in bed. Call bell within reach, bed alarm on and in place. No further needs at this time.

## 2023-11-12 NOTE — THERAPY
"Speech Language Pathology   Cognitive Evaluation     Patient Name: Jason Lima  AGE:  62 y.o., SEX:  male  Medical Record #: [id number]  Date of Service: 11/11/2023      History of Present Illness  62 y.o. male who precented on 11/11 for rehab placement.    PMHx: DM, hyperlipidemia, HTN, hemorrhagic CVA 2 weeks ago while in [place] with residual Left-sided facial droop and left-sided weakness, CKD    CT-Head:  1.  Right thalamic hemorrhage, decreased in density since prior study  2.  Stranding vasogenic edema appears stable.  3.  Slight asymmetric dilatation of the left ventricular system including the left temporal horn, consider component of hydrocephalus.  4.  Low-density fluid collection in the left temporal fossa, appearance most compatible with arachnoid cyst.  5.  Nonspecific white matter changes, commonly associated with small vessel ischemic disease.  Associated mild cerebral atrophy is noted.  6.  Atherosclerosis.       General Information  Vitals  O2 Delivery Device: None - Room Air  Level of Consciousness: Alert, Awake  Orientation: Oriented x 4  Follows Directives: Yes      Prior Living Situation & Level of Function  Prior Services: Home-Independent  Housing / Facility: [address] [place] [address]  Lives with - Patient's Self Care Capacity: Spouse  Communication: WFL  Swallowing: Placed on SB6/TN0 at hospital post CVA in [facility]       Oral Mechanism Evaluation  Dentition: Good  Facial Symmetry:  (Left facial droop)  Labial Observations: WFL  Lingual Observations: Midline  Motor Speech: Dysarthric, >90% intelligible      Laryngeal Function Exam  Secretion Management: Adequate  Voice Quality: Harsh  Cough: Perceptually WNL      Subjective  Patient endorsed change in speech, \"sounds different.\" However, denied any concerns regarding cognition and language s/p thalamic hemorrhage. Agreeable to SLP tasks.        Communication Domain(s)  Expressive Language: WFL  Receptive Language: WFL  Cognitive-Linguistic: WFL      " "  Assessment  The patient was seen this date for a cognitive-linguistic evaluation. Portions of the COGNISTAT (The Neurobehavioral Cognitive Status Examination), as well as non-standardized assessments were utilized. Results are as follows:       Cognistat  Orientation: Average  Attention: Average  Comprehension: Average  Repetition: Average  Naming: Average  Memory: Mild   - Pt independently recalled 2/4 words after delay. Accuracy increased to 3/4 with category       prompt, 4/4 with list prompt.   Calculations: Average  Similarities: Mild   - Pt provided abstract response x1, and imprecisely abstract responses x3.   Judgement: Average      Medication Management  Medication Management: Provided adequate routine and memory-enhancing strategy for taking daily medications. Answered question regarding mg amount per dose correctly; however unable to answer temporal reasoning questions correctly.      Pt attended to conversation and structured tasks without redirection required. No anomia, perseveration, or paraphasias appreciated. Occasional slower processing with delayed response time appreciated, pt expressed \"my brain is working hard.\"        Clinical Impressions  Patient presents with mild cognitive deficits. Standardized assessments revealed weaknesses in the areas of  memory and similarities/reasoning. Informal assessment revealed concern for pt returning to independence with medication management upon discharge. Thus pt would benefit from skilled speech therapy services at next level of care.       NOTE: It is not within the scope of practice of Speech-Language Pathologists to determine patient capacity. Please defer to the physician or psych to complete this assessment.       Recommendations  Supervision Needs Upons Discharge: Intermittent assistance with IADLs (see below)  IADLs: Medication management       SLP Treatment Plan  Treatment Plan: Dysphagia Treatment  SLP Frequency: 3x Per Week  Estimated Duration: " "Until Therapy Goals Met      Anticipated Discharge Needs  Discharge Recommendations: Recommend post-acute placement for additional speech therapy services prior to discharge home  Therapy Recommendations Upon DC: Dysphagia Training, Cognitive-Linguistic Training      Patient / Family Goals  Patient / Family Goal #1: \"I've been eating okay\"  Short Term Goal # 1: Pt will consume a diet of soft&bite sized solids and thin liquids with no overt s/sx of aspiration or decline in respiratory statua      Artis Hammond, SLP  "

## 2023-11-12 NOTE — FLOWSHEET NOTE
11/12/23 1415   Events/Summary/Plan   Events/Summary/Plan Pulse ox check while on CPAP   Vital Signs   Pulse 68   Respiration 18   Pulse Oximetry 96 %   $ Pulse Oximetry (Spot Check) Yes   Respiratory Assessment   Level of Consciousness Responds to voice  (Taking a nap)   Chest Exam   Work Of Breathing / Effort Within Normal Limits   Oxygen   O2 Delivery Device CPAP

## 2023-11-12 NOTE — PROGRESS NOTES
Monitor Summary:  Rhythm: SR  Rate: 70-84  Ectopy: None    0.20/0.10/0.36    Per monitor room interpretation

## 2023-11-12 NOTE — FLOWSHEET NOTE
11/12/23 1036   Events/Summary/Plan   Events/Summary/Plan RT Consult   Vital Signs   Pulse 75   Respiration 18   Pulse Oximetry 97 %   $ Pulse Oximetry (Spot Check) Yes   Respiratory Assessment   Level of Consciousness Alert   Chest Exam   Work Of Breathing / Effort Within Normal Limits   Breath Sounds   RUL Breath Sounds Clear   RML Breath Sounds Clear   RLL Breath Sounds Diminished   MOHAMUD Breath Sounds Clear   LLL Breath Sounds Diminished   Oxygen   O2 Delivery Device Room air w/o2 available   Non-Invasive Ventilation [employer]   Nocturnal CPAP or BIPAP CPAP - [facility]   $ System Evaluation Yes   Settings (If Known) Placed on [facility]'s protocol settings  (VAUTO IP 20, EP5, PS 5)   FiO2 or LPM 0   Smoking History   Have you ever smoked Never

## 2023-11-12 NOTE — PROGRESS NOTES
Admitted per kristie via [facility] transport from [facility] a/ox4 s/p stroke with left sided weakness, slight slurred speech.  Oriented to hospital routine, rehab process, call light within reach, admission care done.

## 2023-11-13 ENCOUNTER — APPOINTMENT (OUTPATIENT)
Dept: SPEECH THERAPY | Facility: REHABILITATION | Age: 63
DRG: 057 | End: 2023-11-13
Attending: PHYSICAL MEDICINE & REHABILITATION
Payer: COMMERCIAL

## 2023-11-13 ENCOUNTER — APPOINTMENT (OUTPATIENT)
Dept: PHYSICAL THERAPY | Facility: REHABILITATION | Age: 63
DRG: 057 | End: 2023-11-13
Attending: PHYSICAL MEDICINE & REHABILITATION
Payer: COMMERCIAL

## 2023-11-13 ENCOUNTER — APPOINTMENT (OUTPATIENT)
Dept: OCCUPATIONAL THERAPY | Facility: REHABILITATION | Age: 63
DRG: 057 | End: 2023-11-13
Attending: PHYSICAL MEDICINE & REHABILITATION
Payer: COMMERCIAL

## 2023-11-13 LAB
25(OH)D3 SERPL-MCNC: 59 NG/ML (ref 30–100)
ALBUMIN SERPL BCP-MCNC: 3.9 G/DL (ref 3.2–4.9)
ALBUMIN/GLOB SERPL: 1.6 G/DL
ALP SERPL-CCNC: 71 U/L (ref 30–99)
ALT SERPL-CCNC: 22 U/L (ref 2–50)
ANION GAP SERPL CALC-SCNC: 11 MMOL/L (ref 7–16)
AST SERPL-CCNC: 14 U/L (ref 12–45)
BASOPHILS # BLD AUTO: 0.4 % (ref 0–1.8)
BASOPHILS # BLD: 0.02 K/UL (ref 0–0.12)
BILIRUB SERPL-MCNC: 0.5 MG/DL (ref 0.1–1.5)
BUN SERPL-MCNC: 51 MG/DL (ref 8–22)
CALCIUM ALBUM COR SERPL-MCNC: 9.2 MG/DL (ref 8.5–10.5)
CALCIUM SERPL-MCNC: 9.1 MG/DL (ref 8.5–10.5)
CHLORIDE SERPL-SCNC: 103 MMOL/L (ref 96–112)
CO2 SERPL-SCNC: 25 MMOL/L (ref 20–33)
CREAT SERPL-MCNC: 2.81 MG/DL (ref 0.5–1.4)
EOSINOPHIL # BLD AUTO: 0.15 K/UL (ref 0–0.51)
EOSINOPHIL NFR BLD: 3.1 % (ref 0–6.9)
ERYTHROCYTE [DISTWIDTH] IN BLOOD BY AUTOMATED COUNT: 38.3 FL (ref 35.9–50)
EST. AVERAGE GLUCOSE BLD GHB EST-MCNC: 137 MG/DL
GFR SERPLBLD CREATININE-BSD FMLA CKD-EPI: 25 ML/MIN/1.73 M 2
GLOBULIN SER CALC-MCNC: 2.5 G/DL (ref 1.9–3.5)
GLUCOSE BLD STRIP.AUTO-MCNC: 122 MG/DL (ref 65–99)
GLUCOSE BLD STRIP.AUTO-MCNC: 135 MG/DL (ref 65–99)
GLUCOSE BLD STRIP.AUTO-MCNC: 142 MG/DL (ref 65–99)
GLUCOSE BLD STRIP.AUTO-MCNC: 149 MG/DL (ref 65–99)
GLUCOSE SERPL-MCNC: 134 MG/DL (ref 65–99)
HBA1C MFR BLD: 6.4 % (ref 4–5.6)
HCT VFR BLD AUTO: 37.7 % (ref 42–52)
HGB BLD-MCNC: 13.5 G/DL (ref 14–18)
IMM GRANULOCYTES # BLD AUTO: 0.01 K/UL (ref 0–0.11)
IMM GRANULOCYTES NFR BLD AUTO: 0.2 % (ref 0–0.9)
LYMPHOCYTES # BLD AUTO: 1.09 K/UL (ref 1–4.8)
LYMPHOCYTES NFR BLD: 22.6 % (ref 22–41)
MCH RBC QN AUTO: 31.7 PG (ref 27–33)
MCHC RBC AUTO-ENTMCNC: 35.8 G/DL (ref 32.3–36.5)
MCV RBC AUTO: 88.5 FL (ref 81.4–97.8)
MONOCYTES # BLD AUTO: 0.35 K/UL (ref 0–0.85)
MONOCYTES NFR BLD AUTO: 7.2 % (ref 0–13.4)
NEUTROPHILS # BLD AUTO: 3.21 K/UL (ref 1.82–7.42)
NEUTROPHILS NFR BLD: 66.5 % (ref 44–72)
NRBC # BLD AUTO: 0 K/UL
NRBC BLD-RTO: 0 /100 WBC (ref 0–0.2)
PLATELET # BLD AUTO: 213 K/UL (ref 164–446)
PMV BLD AUTO: 10.7 FL (ref 9–12.9)
POTASSIUM SERPL-SCNC: 4.4 MMOL/L (ref 3.6–5.5)
PROT SERPL-MCNC: 6.4 G/DL (ref 6–8.2)
RBC # BLD AUTO: 4.26 M/UL (ref 4.7–6.1)
SODIUM SERPL-SCNC: 139 MMOL/L (ref 135–145)
WBC # BLD AUTO: 4.8 K/UL (ref 4.8–10.8)

## 2023-11-13 PROCEDURE — 97167 OT EVAL HIGH COMPLEX 60 MIN: CPT

## 2023-11-13 PROCEDURE — 85025 COMPLETE CBC W/AUTO DIFF WBC: CPT

## 2023-11-13 PROCEDURE — 80053 COMPREHEN METABOLIC PANEL: CPT

## 2023-11-13 PROCEDURE — 99232 SBSQ HOSP IP/OBS MODERATE 35: CPT | Performed by: PHYSICAL MEDICINE & REHABILITATION

## 2023-11-13 PROCEDURE — 97163 PT EVAL HIGH COMPLEX 45 MIN: CPT

## 2023-11-13 PROCEDURE — 700102 HCHG RX REV CODE 250 W/ 637 OVERRIDE(OP): Performed by: HOSPITALIST

## 2023-11-13 PROCEDURE — 36415 COLL VENOUS BLD VENIPUNCTURE: CPT

## 2023-11-13 PROCEDURE — 700105 HCHG RX REV CODE 258: Performed by: HOSPITALIST

## 2023-11-13 PROCEDURE — A9270 NON-COVERED ITEM OR SERVICE: HCPCS | Performed by: PHYSICAL MEDICINE & REHABILITATION

## 2023-11-13 PROCEDURE — 700102 HCHG RX REV CODE 250 W/ 637 OVERRIDE(OP): Performed by: PHYSICAL MEDICINE & REHABILITATION

## 2023-11-13 PROCEDURE — 92523 SPEECH SOUND LANG COMPREHEN: CPT

## 2023-11-13 PROCEDURE — 82962 GLUCOSE BLOOD TEST: CPT | Mod: 91

## 2023-11-13 PROCEDURE — 92610 EVALUATE SWALLOWING FUNCTION: CPT

## 2023-11-13 PROCEDURE — 97116 GAIT TRAINING THERAPY: CPT

## 2023-11-13 PROCEDURE — 770010 HCHG ROOM/CARE - REHAB SEMI PRIVAT*

## 2023-11-13 PROCEDURE — 99253 IP/OBS CNSLTJ NEW/EST LOW 45: CPT | Performed by: HOSPITALIST

## 2023-11-13 PROCEDURE — A9270 NON-COVERED ITEM OR SERVICE: HCPCS | Performed by: HOSPITALIST

## 2023-11-13 PROCEDURE — 83036 HEMOGLOBIN GLYCOSYLATED A1C: CPT

## 2023-11-13 PROCEDURE — 97535 SELF CARE MNGMENT TRAINING: CPT

## 2023-11-13 PROCEDURE — 82306 VITAMIN D 25 HYDROXY: CPT

## 2023-11-13 RX ORDER — DEXTROSE MONOHYDRATE 25 G/50ML
25 INJECTION, SOLUTION INTRAVENOUS
Status: DISCONTINUED | OUTPATIENT
Start: 2023-11-13 | End: 2024-01-02 | Stop reason: HOSPADM

## 2023-11-13 RX ORDER — LANOLIN ALCOHOL/MO/W.PET/CERES
9 CREAM (GRAM) TOPICAL
Status: DISCONTINUED | OUTPATIENT
Start: 2023-11-13 | End: 2023-11-15

## 2023-11-13 RX ORDER — HYDRALAZINE HYDROCHLORIDE 25 MG/1
25 TABLET, FILM COATED ORAL EVERY 8 HOURS
Status: DISCONTINUED | OUTPATIENT
Start: 2023-11-13 | End: 2023-11-15

## 2023-11-13 RX ORDER — SODIUM CHLORIDE 9 MG/ML
INJECTION, SOLUTION INTRAVENOUS ONCE
Status: COMPLETED | OUTPATIENT
Start: 2023-11-13 | End: 2023-11-13

## 2023-11-13 RX ADMIN — HYDRALAZINE HYDROCHLORIDE 25 MG: 25 TABLET, FILM COATED ORAL at 21:39

## 2023-11-13 RX ADMIN — ACETAMINOPHEN 1000 MG: 500 TABLET ORAL at 17:10

## 2023-11-13 RX ADMIN — ATORVASTATIN CALCIUM 40 MG: 40 TABLET, FILM COATED ORAL at 21:40

## 2023-11-13 RX ADMIN — OMEPRAZOLE 20 MG: 20 CAPSULE, DELAYED RELEASE ORAL at 08:08

## 2023-11-13 RX ADMIN — OXYCODONE 5 MG: 5 TABLET ORAL at 09:29

## 2023-11-13 RX ADMIN — ACETAMINOPHEN 1000 MG: 500 TABLET ORAL at 12:01

## 2023-11-13 RX ADMIN — HYDRALAZINE HYDROCHLORIDE 25 MG: 25 TABLET, FILM COATED ORAL at 08:11

## 2023-11-13 RX ADMIN — SENNOSIDES AND DOCUSATE SODIUM 2 TABLET: 50; 8.6 TABLET ORAL at 08:10

## 2023-11-13 RX ADMIN — HYDRALAZINE HYDROCHLORIDE 25 MG: 25 TABLET, FILM COATED ORAL at 14:52

## 2023-11-13 RX ADMIN — Medication 9 MG: at 21:39

## 2023-11-13 RX ADMIN — ACETAMINOPHEN 1000 MG: 500 TABLET ORAL at 05:27

## 2023-11-13 RX ADMIN — BACLOFEN 5 MG: 10 TABLET ORAL at 21:39

## 2023-11-13 RX ADMIN — AMLODIPINE BESYLATE 10 MG: 5 TABLET ORAL at 08:11

## 2023-11-13 RX ADMIN — SODIUM CHLORIDE: 9 INJECTION, SOLUTION INTRAVENOUS at 15:14

## 2023-11-13 SDOH — ECONOMIC STABILITY: TRANSPORTATION INSECURITY
IN THE PAST 12 MONTHS, HAS LACK OF RELIABLE TRANSPORTATION KEPT YOU FROM MEDICAL APPOINTMENTS, MEETINGS, WORK OR FROM GETTING THINGS NEEDED FOR DAILY LIVING?: NO

## 2023-11-13 SDOH — ECONOMIC STABILITY: TRANSPORTATION INSECURITY
IN THE PAST 12 MONTHS, HAS THE LACK OF TRANSPORTATION KEPT YOU FROM MEDICAL APPOINTMENTS OR FROM GETTING MEDICATIONS?: NO

## 2023-11-13 ASSESSMENT — BRIEF INTERVIEW FOR MENTAL STATUS (BIMS)
WHAT MONTH IS IT: ACCURATE WITHIN 5 DAYS
BIMS SUMMARY SCORE: 14
WHAT DAY OF THE WEEK IS IT: CORRECT
INITIAL REPETITION OF BED BLUE SOCK - FIRST ATTEMPT: 3
WHAT DAY OF THE WEEK IS IT: CORRECT
ASKED TO RECALL BLUE: YES, NO CUE REQUIRED
ASKED TO RECALL BED: YES, AFTER CUEING (A PIECE OF FURNITURE")"
WHAT YEAR IS IT: CORRECT
ASKED TO RECALL SOCK: YES, NO CUE REQUIRED
ASKED TO RECALL BLUE: YES, NO CUE REQUIRED
ASKED TO RECALL SOCK: YES, NO CUE REQUIRED
BIMS SUMMARY SCORE: 14
ASKED TO RECALL BED: YES, AFTER CUEING (A PIECE OF FURNITURE")"
INITIAL REPETITION OF BED BLUE SOCK - FIRST ATTEMPT: 3
WHAT YEAR IS IT: CORRECT
WHAT MONTH IS IT: ACCURATE WITHIN 5 DAYS

## 2023-11-13 ASSESSMENT — ENCOUNTER SYMPTOMS
FEVER: 0
CHILLS: 0
ABDOMINAL PAIN: 0
MUSCULOSKELETAL NEGATIVE: 1
EYES NEGATIVE: 1
NAUSEA: 0
POLYDIPSIA: 0
SHORTNESS OF BREATH: 0
BRUISES/BLEEDS EASILY: 0
FOCAL WEAKNESS: 1
COUGH: 0
PALPITATIONS: 0
VOMITING: 0

## 2023-11-13 ASSESSMENT — PAIN DESCRIPTION - PAIN TYPE: TYPE: ACUTE PAIN

## 2023-11-13 ASSESSMENT — ACTIVITIES OF DAILY LIVING (ADL): TOILETING: INDEPENDENT

## 2023-11-13 ASSESSMENT — GAIT ASSESSMENTS
DEVIATION: DECREASED BASE OF SUPPORT;SHUFFLED GAIT;DECREASED HEEL STRIKE;DECREASED TOE OFF
DISTANCE (FEET): 30
GAIT LEVEL OF ASSIST: TOTAL ASSIST
ASSISTIVE DEVICE: OTHER (COMMENTS)

## 2023-11-13 NOTE — DISCHARGE PLANNING
CASE MANAGEMENT INITIAL ASSESSMENT    Admit Date:  11/12/2023     CM to meet with patient and family to discuss role of case management / discharge planning / team conference.   Patient is a  62 y.o. male transferred from [facility].    Diagnosis: Hemorrhagic stroke (HCC) [I61.9]    Co-morbidities:   Patient Active Problem List    Diagnosis Date Noted    Spasticity 11/12/2023    Hemorrhagic stroke (HCC) 11/12/2023    Thalamic hemorrhage (HCC) 11/11/2023    Acute left-sided weakness 11/11/2023    Gram-negative bacteremia 01/06/2017    Hyperlipidemia 01/06/2017    DM (diabetes mellitus) (HCC) 01/06/2017     Prior Living Situation:       Prior Level of Function:       Support Systems:  Primary : Vielka-partner: [phone] or Cristobal-brother: [phone].       Previous Services Utilized:        Other Information:        Primary Payor Source:  (Karl)  Primary Care Practitioner : Humble Garcia D.O.    Patient / Family Goal:  Patient / Family Goal: Return home    Plan:  1. Continue to follow patient through hospitalization and provide discharge planning in collaboration with patient, family, physicians and ancillary services.     2. Utilize community resources to ensure a safe discharge.

## 2023-11-13 NOTE — CARE PLAN
"  Problem: Fall Risk - Rehab  Goal: Patient will remain free from falls  Note: Shellie Hamilton Fall risk Assessment Score: 11      Moderate fall risk Interventions  - Bed and strip alarm   - Yellow sign by the door   - Yellow wrist band \"Fall risk\"  - Room near to the nurse station  - Do not leave patient unattended in the bathroom  - Fall risk education provided      Problem: Diabetes Management  Goal: Patient's ability to maintain appropriate glucose levels will be maintained or improve  Note: FS ac and hs.         The patient is Watcher - Medium risk of patient condition declining or worsening    Shift Goals  Clinical Goals: safety    "

## 2023-11-13 NOTE — PROGRESS NOTES
4 Eyes Skin Assessment Completed by Sarah, RN and YUAN Fuller.    Head WDL  Ears WDL  Nose WDL  Mouth WDL  Neck WDL  Breast/Chest WDL  Shoulder Blades WDL  Spine WDL  (R) Arm/Elbow/Hand WDL  (L) Arm/Elbow/Hand WDL  Abdomen WDL  Groin WDL  Scrotum/Coccyx/Buttocks WDL  (R) Leg WDL  (L) Leg WDL  (R) Heel/Foot/Toe WDL  (L) Heel/Foot/Toe WDL          Devices In Places Blood Pressure Cuff and Bipap      Interventions In Place Pillows and Q2 Turns    Possible Skin Injury No    Pictures Uploaded Into Epic N/A  Wound Consult Placed N/A  RN Wound Prevention Protocol Ordered No

## 2023-11-13 NOTE — ASSESSMENT & PLAN NOTE
On max doses of Norvasc and Hydralazine  Avoiding B-Bl due to bradycardic tendencies  Avoiding ACE-I/ARB and diuretics due to CKD  Has occasional BP readings in the 140's that may be related to activity  Outpt meds include Norvasc

## 2023-11-13 NOTE — DIETARY
"Nutrition services: Day 1 of admit.  Jason Lima is a 62 y.o. male with admitting DX of Hemorrhagic stroke, Left Body Right Brain     Consult received for MST of 2 on nutrition screen due to report of unsure wt loss. No report of poor PO PTA.      Assessment:  Height: 172.7 cm (5' 8\")  Weight: 86 kg (189 lb 9.5 oz)  Body mass index is 28.83 kg/m²., BMI classification: overweight  Diet/Intake: Level 6-soft and bite sized, consistent CHO (diabetic) diet/ PO % of dinner last night    Evaluation:   Pt continues to have elevated blood pressures. MD is adjusting BP meds. Pt is not on a low sodium diet currently. RD will monitor. If recent BP changes do not control blood pressure, pt may benefit from low sodium diet. Pt also has dx of pre-diabetes. Current glucose levels are acceptable.   Labs: 11/13/23: A1c= 6.4. POC glucose: 119-143.   Meds: SSI, Norvasc, and hydralazine.   Wt hx reviewed in Epic. Pt's current wt is 5.4 kg > [facility] Pt + 1 liter of fluids prior to transfer to Rehab. This would not account for a 5.4 kg wt gain. No report of edema or current order for diuretic. Pt also has a wt in Epic from 1/6/23 of 81.6 kg. This wt is similar to pt's wt at [facility]. RD will monitor for wt trend. Admit weight at Rehab may be inaccurate.     Malnutrition Risk: ASPEN criteria for malnutrition no met    Recommendations/Plan:   Encourage intake of >50%  Document intake of all meals  as % taken in ADL's to provide interdisciplinary communication across all shifts.   Monitor weight.  Nutrition rep will continue to see patient for ongoing meal and snack preferences.     RD will follow.   "

## 2023-11-13 NOTE — ASSESSMENT & PLAN NOTE
U/S medical renal disease, no hydronephrosis  Avoid nephrotoxins  Renal dose all meds  Monitor electrolytes  Outpt Nephrology F/U

## 2023-11-13 NOTE — PROGRESS NOTES
NURSING DAILY NOTE    Name: Jason Lima   Date of Admission: 11/12/2023   Admitting Diagnosis: Thalamic hemorrhage (HCC)  Attending Physician: Peytno Watkins D.o.  Allergies: Penicillins    Safety  Patient Assist     Patient Precautions     Precaution Comments     Bed Transfer Status     Toilet Transfer Status      Assistive Devices     Oxygen  CPAP  Diet/Therapeutic Dining  Current Diet Order   Procedures    Diet Order Diet: Level 6 - Soft and Bite Sized; Liquid level: Level 0 - Thin; Second Modifier: (optional): Consistent CHO (Diabetic)     Pill Administration  whole and one at a time   Agitated Behavioral Scale     ABS Level of Severity       Fall Risk  Has the patient had a fall this admission?   No  Shellie Hamilton Fall Risk Scoring  11, MODERATE RISK  Fall Risk Safety Measures  bed alarm and low vision/ hearing    Vitals  Temperature: 36.8 °C (98.2 °F)  Temp src: Oral  Pulse: 76  Respiration: 20  Blood Pressure: (!) 150/95  Blood Pressure MAP (Calculated): 113 MM HG  BP Location: Right, Upper Arm  Patient BP Position: Supine     Oxygen  Pulse Oximetry: 96 %  O2 (LPM): 0  O2 Delivery Device: CPAP    Bowel and Bladder  Last Bowel Movement   (11/12 per report)  Stool Type     Bowel Device     Continent  Bladder: Continent void   Bowel: Continent movement  Bladder Function  Urine Void (mL): 250 ml  Number of Times Voided: 1  Urine Color: Yellow  Genitourinary Assessment   Bladder Assessment (WDL):  Within Defined Limits  Urine Color: Yellow  Bladder Device: Urinal  Time Void: Yes  Bladder Scan: Post Void  $ Bladder Scan Results (mL): 29    Skin  Polo Score   17  Sensory Interventions   Bed Types: Standard/Trauma Mattress  Skin Preventative Measures: Pillows in Use for Support / Positioning  Moisture Interventions         Pain  Pain Rating Scale  0 - No Pain  Pain Location     Pain Location Orientation     Pain Interventions   Declines    ADLs    Bathing       Linen Change      Personal Hygiene     Chlorhexidine Bath      Oral Care     Teeth/Dentures     Shave     Nutrition Percentage Eaten  Dinner, Between % Consumed  Environmental Precautions     Patient Turns/Positioning  Patient Turns Self from Side to Side  Patient Turns Assistance/Tolerance     Bed Positions     Head of Bed Elevated         Psychosocial/Neurologic Assessment  Psychosocial Assessment  Psychosocial (WDL):  Within Defined Limits  Neurologic Assessment  Neuro (WDL): Exceptions to WDL  Level of Consciousness: Alert  Orientation Level: Oriented X4  Cognition: Appropriate judgement  Speech: Clear  EENT (WDL):  Within Defined Limits    Cardio/Pulmonary Assessment  Edema      Respiratory Breath Sounds  RUL Breath Sounds: Clear  RML Breath Sounds: Clear  RLL Breath Sounds: Diminished  MOHAMUD Breath Sounds: Clear  LLL Breath Sounds: Diminished  Cardiac Assessment   Cardiac (WDL):  WDL Except (hx-htn, hld)

## 2023-11-13 NOTE — ASSESSMENT & PLAN NOTE
Pt suffered R thalamic hemorrhage on 10/23/23 while visiting [facility]  Presented there w/ sudden onset HA, L HP, and /100  Now on Zoloft and Trazodone  Management per Physiatry

## 2023-11-13 NOTE — CONSULTS
HOSPITAL MEDICINE CONSULTATION    Requesting Physician:  Dr. Watkins    Reason for Consult:  Hypertension, Diabetes, Renal Failure    History of Present Illness:  The patient is a 62-year-old male with past medical history significant for hypertension, non-insulin dependent diabetes mellitus, and dyslipidemia.  He and his fiancee were vacationing in [place] when the patient suddenly developed headache and left sided weakness.  He was admitted to a hospital in [place] on 10/23/23 and was diagnosed with a right thalamic hemorrhagic stroke.  His presenting blood pressure and creatinine was 170/100 and 3.2, respectively.  He was stabilized and subsequently advised to go to the emergency department upon his return home to facilitate admission to an acute rehabilitation facility.  Due to his ongoing functional debility, the patient was referred to [facility] on 11/12/23.  Hospital Medicine consultation is requested to assist in the management of this patient's HTN, DM, and acute kidney injury versus chronic kidney disease.    Review of Systems:  Review of Systems   Constitutional:  Negative for chills and fever.   HENT: Negative.     Eyes: Negative.    Respiratory:  Negative for cough and shortness of breath.    Cardiovascular:  Negative for chest pain and palpitations.   Gastrointestinal:  Negative for abdominal pain, nausea and vomiting.   Musculoskeletal: Negative.    Skin:  Negative for itching and rash.   Neurological:  Positive for focal weakness.   Endo/Heme/Allergies:  Negative for polydipsia. Does not bruise/bleed easily.   All other systems reviewed and are negative.      Allergies:  Allergies   Allergen Reactions    Penicillins        Medications:    Current Facility-Administered Medications:     insulin regular (HumuLIN R,NovoLIN R) injection, 2-12 Units, Subcutaneous, 4X/DAY ACHS **AND** POC blood glucose manual result, , , Q AC AND BEDTIME(S) **AND** NOTIFY MD and PharmD, , , Once **AND**  Administer 20 grams of glucose (approximately 8 ounces of fruit juice) every 15 minutes PRN FSBG less than 70 mg/dL, , , PRN **AND** dextrose 50% (D50W) injection 25 g, 25 g, Intravenous, Q15 MIN PRN, Sita Grewal M.D.    NS infusion, , Intravenous, Once, Sita Grewal M.D.    hydrALAZINE (Apresoline) tablet 25 mg, 25 mg, Oral, Q8HRS, Sita Grewal M.D.    Respiratory Therapy Consult, , Nebulization, Continuous RT, Peyton Watkins D.O.    senna-docusate (Pericolace Or Senokot S) 8.6-50 MG per tablet 2 Tablet, 2 Tablet, Oral, BID, 2 Tablet at 11/13/23 0810 **AND** polyethylene glycol/lytes (Miralax) PACKET 1 Packet, 1 Packet, Oral, QDAY PRN **AND** magnesium hydroxide (Milk Of Magnesia) suspension 30 mL, 30 mL, Oral, QDAY PRN **AND** bisacodyl (Dulcolax) suppository 10 mg, 10 mg, Rectal, QDAY PRN, Peyton Watkins D.O.    omeprazole (PriLOSEC) capsule 20 mg, 20 mg, Oral, DAILY, MAXWELL DenisO., 20 mg at 11/13/23 0808    mag hydrox-al hydrox-simeth (Maalox Plus Es Or Mylanta Ds) suspension 20 mL, 20 mL, Oral, Q2HRS PRN, Peyton Watkins D.O.    ondansetron (Zofran ODT) dispertab 4 mg, 4 mg, Oral, 4X/DAY PRN **OR** ondansetron (Zofran) syringe/vial injection 4 mg, 4 mg, Intramuscular, 4X/DAY PRN, Peyton Watkins D.O.    traZODone (Desyrel) tablet 50 mg, 50 mg, Oral, QHS PRN, Peyton Watkins D.O.    sodium chloride (Ocean) 0.65 % nasal spray 2 Spray, 2 Spray, Nasal, PRN, MAXWELL DenisOWang    acetaminophen (Tylenol) tablet 1,000 mg, 1,000 mg, Oral, Q6HRS, 1,000 mg at 11/13/23 1201 **FOLLOWED BY** [START ON 11/17/2023] acetaminophen (Tylenol) tablet 1,000 mg, 1,000 mg, Oral, Q6HRS PRN, LEANA Denis.O.    oxyCODONE immediate-release (Roxicodone) tablet 2.5 mg, 2.5 mg, Oral, Q3HRS PRN **OR** oxyCODONE immediate-release (Roxicodone) tablet 5 mg, 5 mg, Oral, Q3HRS PRN, LEANA Denis.O., 5 mg at 11/13/23 0929    hydrALAZINE (Apresoline) tablet 25 mg, 25 mg, Oral, Q8HRS PRN, LEANA Denis.O.    amLODIPine (Norvasc) tablet  10 mg, 10 mg, Oral, DAILY, Peyton Watkins D.O., 10 mg at 11/13/23 0811    atorvastatin (Lipitor) tablet 40 mg, 40 mg, Oral, Nightly, Peyton Watkins, D.O., 40 mg at 11/12/23 2142    baclofen (Lioresal) tablet 5 mg, 5 mg, Oral, QHS, Peyton Watkins, D.O., 5 mg at 11/12/23 2141    melatonin tablet 3 mg, 3 mg, Oral, QHS, Peyton Watkins, D.O., 3 mg at 11/12/23 2142    Past Medical/Surgical History:  Past Medical History:   Diagnosis Date    Diabetes (HCC)     High cholesterol     Hypertension     Stroke (HCC)     3 weeks ago in [place] (as of 11/11/2023)     Past Surgical History:   Procedure Laterality Date    CHELO BY LAPAROSCOPY N/A 1/6/2017    Procedure: CHELO BY LAPAROSCOPY;  Surgeon: Garrett Miller M.D.;  Location: SURGERY [facility];  Service:     ABDOMINAL ABSCESS DRAINAGE N/A 1/6/2017    Procedure: ABDOMINAL ABSCESS DRAINAGE;  Surgeon: Garrett Miller M.D.;  Location: SURGERY [facility];  Service:        Social History:  Social History     Socioeconomic History    Marital status:      Spouse name: Not on file    Number of children: Not on file    Years of education: Not on file    Highest education level: Not on file   Occupational History    Not on file   Tobacco Use    Smoking status: Never    Smokeless tobacco: Never   Vaping Use    Vaping Use: Never used   Substance and Sexual Activity    Alcohol use: Never    Drug use: Never    Sexual activity: Not on file   Other Topics Concern    Not on file   Social History Narrative    Not on file     Social Determinants of Health     Financial Resource Strain: Not on file   Food Insecurity: Not on file   Transportation Needs: No Transportation Needs (11/13/2023)    PRAPARE - Transportation     Lack of Transportation (Medical): No     Lack of Transportation (Non-Medical): No   Physical Activity: Not on file   Stress: Not on file   Social Connections: Not on file   Intimate Partner Violence: Not on file   Housing Stability: Not on file       Family  History:  No family history on file.    Physical Examination:   Vitals:    11/12/23 1036 11/12/23 1415 11/12/23 1920 11/13/23 0608   BP:   (!) 150/95 (!) 143/97   Pulse: 75 68 76 73   Resp: 18 18 20 18   Temp:   36.8 °C (98.2 °F) 36.9 °C (98.5 °F)   TempSrc:   Oral Oral   SpO2: 97% 96%  95%   Weight:       Height:           Physical Exam  Vitals reviewed.   Constitutional:       General: He is not in acute distress.     Appearance: Normal appearance. He is not ill-appearing.   HENT:      Head: Normocephalic and atraumatic.      Right Ear: External ear normal.      Left Ear: External ear normal.      Nose: Nose normal.      Mouth/Throat:      Pharynx: Oropharynx is clear.   Eyes:      General:         Right eye: No discharge.         Left eye: No discharge.      Extraocular Movements: Extraocular movements intact.      Conjunctiva/sclera: Conjunctivae normal.   Cardiovascular:      Rate and Rhythm: Normal rate and regular rhythm.   Pulmonary:      Effort: Pulmonary effort is normal. No respiratory distress.      Breath sounds: Normal breath sounds. No wheezing.   Abdominal:      General: Bowel sounds are normal. There is no distension.      Palpations: Abdomen is soft.      Tenderness: There is no abdominal tenderness.   Musculoskeletal:      Cervical back: Normal range of motion and neck supple.      Right lower leg: No edema.      Left lower leg: No edema.   Skin:     General: Skin is warm and dry.   Neurological:      Mental Status: He is alert and oriented to person, place, and time.         Laboratory Data:  Recent Labs     11/11/23 0220 11/12/23  0813 11/13/23  0525   WBC 6.1 5.7 4.8   RBC 4.81 4.52* 4.26*   HEMOGLOBIN 14.7 13.5* 13.5*   HEMATOCRIT 42.6 39.9* 37.7*   MCV 88.6 88.3 88.5   MCH 30.6 29.9 31.7   MCHC 34.5 33.8 35.8   RDW 38.7 38.1 38.3   PLATELETCT 240 225 213   MPV 10.6 10.8 10.7     Recent Labs     11/11/23 0220 11/12/23  0813 11/13/23  0525   SODIUM 134* 139 139   POTASSIUM 4.8 4.1 4.4    CHLORIDE 96 103 103   CO2 25 26 25   GLUCOSE 149* 124* 134*   BUN 81* 61* 51*   CREATININE 3.59* 2.84* 2.81*   CALCIUM 9.8 8.9 9.1       Imaging:  No orders to display       Impressions/Recommendations:  Hemorrhagic stroke (HCC)  Pt suffered R thalamic hemorrhage on 10/23/23 while visiting [facility]  Presented there w/ sudden onset HA, L HP, and /100  Management per Physiatry    Hyperlipidemia  Continue Lipitor  Outpt meds include Lipitor    Hypertension  On Norvasc and Hydralazine  Increase Hydralazine to optimize blood pressure control  Outpt meds include Norvasc    DM (diabetes mellitus) (HCC)  HbA1c 6.4  Increase SSI  Outpt meds include Metformin    [facility]N (acute kidney injury) (Formerly [facility])  U/S medical renal disease, no hydronephrosis  Suspect may have CKD but unknown stage or baseline BUN/Cr  Will trial fluid challenge  Closely follow renal function    Full Code    Discussed with patient, caterina, Dr. Watkins, and Dr. Lee.  Thank you for the opportunity to assist in this patient's care.  We will continue to follow along with you.

## 2023-11-13 NOTE — PROGRESS NOTES
"  Physical Medicine & Rehabilitation Progress Note    Encounter Date: 11/13/2023    Chief Complaint: Sad this happened    Interval Events (Subjective):  's-150's  VV  BM 11/12    Seen and examined in his room with spouse karen at bedside. Doing ok but depressed this happened and that he has to be here. Some back pain due to chair positioning. Having some trouble sleeping. Uses Melatonin 10mg at night.       ROS: 14 point ROS negative unless otherwise specified in the HPI    Objective:  VITAL SIGNS: BP (!) 143/97   Pulse 73   Temp 36.9 °C (98.5 °F) (Oral)   Resp 18   Ht 1.727 m (5' 8\")   Wt 86 kg (189 lb 9.5 oz)   SpO2 95%   BMI 28.83 kg/m²     GEN: No apparent distress  HEENT: Head normocephalic, atraumatic.  Sclera nonicteric bilaterally, no ocular discharge appreciated bilaterally.  CV: Extremities warm and well-perfused, no peripheral edema appreciated bilaterally.  PULMONARY: Breathing nonlabored on room air, no respiratory accessory muscle use.  Not requiring supplemental oxygen.  SKIN: No appreciable skin breakdown on exposed areas of skin.  PSYCH: Mood and affect within normal limits.  NEURO: Awake alert.  Conversational.  Logical thought content. Dysarthria. Spasticity LUE and LLE. Mild clonus L ankle. Has 2/5 movement finger FL/EXT. Spasticity finger flexors, wrist flexors, bicep 2/4.       Laboratory Values:  Recent Results (from the past 72 hour(s))   CBC WITH DIFFERENTIAL    Collection Time: 11/11/23  2:20 AM   Result Value Ref Range    WBC 6.1 4.8 - 10.8 K/uL    RBC 4.81 4.70 - 6.10 M/uL    Hemoglobin 14.7 14.0 - 18.0 g/dL    Hematocrit 42.6 42.0 - 52.0 %    MCV 88.6 81.4 - 97.8 fL    MCH 30.6 27.0 - 33.0 pg    MCHC 34.5 32.3 - 36.5 g/dL    RDW 38.7 35.9 - 50.0 fL    Platelet Count 240 164 - 446 K/uL    MPV 10.6 9.0 - 12.9 fL    Neutrophils-Polys 73.30 (H) 44.00 - 72.00 %    Lymphocytes 18.10 (L) 22.00 - 41.00 %    Monocytes 6.70 0.00 - 13.40 %    Eosinophils 1.30 0.00 - 6.90 %    Basophils " 0.30 0.00 - 1.80 %    Immature Granulocytes 0.30 0.00 - 0.90 %    Nucleated RBC 0.00 0.00 - 0.20 /100 WBC    Neutrophils (Absolute) 4.46 1.82 - 7.42 K/uL    Lymphs (Absolute) 1.10 1.00 - 4.80 K/uL    Monos (Absolute) 0.41 0.00 - 0.85 K/uL    Eos (Absolute) 0.08 0.00 - 0.51 K/uL    Baso (Absolute) 0.02 0.00 - 0.12 K/uL    Immature Granulocytes (abs) 0.02 0.00 - 0.11 K/uL    NRBC (Absolute) 0.00 K/uL   Comp Metabolic Panel    Collection Time: 11/11/23  2:20 AM   Result Value Ref Range    Sodium 134 (L) 135 - 145 mmol/L    Potassium 4.8 3.6 - 5.5 mmol/L    Chloride 96 96 - 112 mmol/L    Co2 25 20 - 33 mmol/L    Anion Gap 13.0 7.0 - 16.0    Glucose 149 (H) 65 - 99 mg/dL    Bun 81 (H) 8 - 22 mg/dL    Creatinine 3.59 (H) 0.50 - 1.40 mg/dL    Calcium 9.8 8.5 - 10.5 mg/dL    Correct Calcium 9.3 8.5 - 10.5 mg/dL    AST(SGOT) 18 12 - 45 U/L    ALT(SGPT) 30 2 - 50 U/L    Alkaline Phosphatase 85 30 - 99 U/L    Total Bilirubin 0.4 0.1 - 1.5 mg/dL    Albumin 4.6 3.2 - 4.9 g/dL    Total Protein 7.7 6.0 - 8.2 g/dL    Globulin 3.1 1.9 - 3.5 g/dL    A-G Ratio 1.5 g/dL   proBrain Natriuretic Peptide, NT    Collection Time: 11/11/23  2:20 AM   Result Value Ref Range    NT-proBNP 49 0 - 125 pg/mL   APTT    Collection Time: 11/11/23  2:20 AM   Result Value Ref Range    APTT 42.2 (H) 24.7 - 36.0 sec   ESTIMATED GFR    Collection Time: 11/11/23  2:20 AM   Result Value Ref Range    GFR (CKD-EPI) 18 (A) >60 mL/min/1.73 m 2   Lipid Profile - Fasting    Collection Time: 11/11/23  2:20 AM   Result Value Ref Range    Cholesterol,Tot 178 100 - 199 mg/dL    Triglycerides 227 (H) 0 - 149 mg/dL    HDL 31 (A) >=40 mg/dL     (H) <100 mg/dL   HEMOGLOBIN A1C    Collection Time: 11/11/23  2:20 AM   Result Value Ref Range    Glycohemoglobin 6.2 (H) 4.0 - 5.6 %    Est Avg Glucose 131 mg/dL   CREATINE KINASE    Collection Time: 11/11/23  2:20 AM   Result Value Ref Range    CPK Total 131 0 - 154 U/L   POCT glucose device results    Collection Time:  23 12:40 PM   Result Value Ref Range    POC Glucose, Blood 143 (H) 65 - 99 mg/dL   Urine Sodium Random    Collection Time: 23  1:50 PM   Result Value Ref Range    Sodium, Urine -per volume 42 mmol/L   PROTEIN/CREAT RATIO URINE    Collection Time: 23  1:50 PM   Result Value Ref Range    Total Protein, Urine 15.0 0.0 - 15.0 mg/dL    Creatinine, Random Urine 110.14 mg/dL    Protein Creatinine Ratio 136 (H) 15 - 68 mg/g   EKG    Collection Time: 23  2:03 PM   Result Value Ref Range    Report       [facility]    Test Date:  2023  Pt Name:    MAXIME PIKE                  Department: ER  MRN:        [id number]                      Room:       T207  Gender:     Male                         Technician: IRIS  :        1960                   Requested By:FELICIA GARDUNO  Order #:    [id number]                    Reading MD: Russ Wise MD    Measurements  Intervals                                Axis  Rate:       79                           P:          9  NC:         198                          QRS:        54  QRSD:       102                          T:          1  QT:         380  QTc:        436    Interpretive Statements  Sinus rhythm  Borderline T abnormalities, inferior leads  Compared to ECG 2017 14:08:31  NO SIGNIFICANT CHANGES  Electronically Signed On 2023 02:33:46 PST by Russ Wise MD     EC-ECHOCARDIOGRAM COMPLETE W/O CONT    Collection Time: 23  5:13 PM   Result Value Ref Range    Left Ventrical Ejection Fraction 60    POCT glucose device results    Collection Time: 23 10:09 PM   Result Value Ref Range    POC Glucose, Blood 131 (H) 65 - 99 mg/dL   POCT glucose device results    Collection Time: 23  5:39 AM   Result Value Ref Range    POC Glucose, Blood 136 (H) 65 - 99 mg/dL   CBC WITH DIFFERENTIAL    Collection Time: 23  8:13 AM   Result Value Ref Range    WBC 5.7 4.8 - 10.8 K/uL    RBC 4.52 (L) 4.70 - 6.10 M/uL     Hemoglobin 13.5 (L) 14.0 - 18.0 g/dL    Hematocrit 39.9 (L) 42.0 - 52.0 %    MCV 88.3 81.4 - 97.8 fL    MCH 29.9 27.0 - 33.0 pg    MCHC 33.8 32.3 - 36.5 g/dL    RDW 38.1 35.9 - 50.0 fL    Platelet Count 225 164 - 446 K/uL    MPV 10.8 9.0 - 12.9 fL    Neutrophils-Polys 67.50 44.00 - 72.00 %    Lymphocytes 22.20 22.00 - 41.00 %    Monocytes 6.80 0.00 - 13.40 %    Eosinophils 2.60 0.00 - 6.90 %    Basophils 0.50 0.00 - 1.80 %    Immature Granulocytes 0.40 0.00 - 0.90 %    Nucleated RBC 0.00 0.00 - 0.20 /100 WBC    Neutrophils (Absolute) 3.85 1.82 - 7.42 K/uL    Lymphs (Absolute) 1.27 1.00 - 4.80 K/uL    Monos (Absolute) 0.39 0.00 - 0.85 K/uL    Eos (Absolute) 0.15 0.00 - 0.51 K/uL    Baso (Absolute) 0.03 0.00 - 0.12 K/uL    Immature Granulocytes (abs) 0.02 0.00 - 0.11 K/uL    NRBC (Absolute) 0.00 K/uL   Basic Metabolic Panel    Collection Time: 11/12/23  8:13 AM   Result Value Ref Range    Sodium 139 135 - 145 mmol/L    Potassium 4.1 3.6 - 5.5 mmol/L    Chloride 103 96 - 112 mmol/L    Co2 26 20 - 33 mmol/L    Glucose 124 (H) 65 - 99 mg/dL    Bun 61 (H) 8 - 22 mg/dL    Creatinine 2.84 (H) 0.50 - 1.40 mg/dL    Calcium 8.9 8.5 - 10.5 mg/dL    Anion Gap 10.0 7.0 - 16.0   MAGNESIUM    Collection Time: 11/12/23  8:13 AM   Result Value Ref Range    Magnesium 2.2 1.5 - 2.5 mg/dL   Renal Function Panel    Collection Time: 11/12/23  8:13 AM   Result Value Ref Range    Correct Calcium 8.7 8.5 - 10.5 mg/dL    Phosphorus 3.3 2.5 - 4.5 mg/dL    Albumin 4.3 3.2 - 4.9 g/dL   ESTIMATED GFR    Collection Time: 11/12/23  8:13 AM   Result Value Ref Range    GFR (CKD-EPI) 24 (A) >60 mL/min/1.73 m 2   POCT glucose device results    Collection Time: 11/12/23  5:02 PM   Result Value Ref Range    POC Glucose, Blood 143 (H) 65 - 99 mg/dL   POCT glucose device results    Collection Time: 11/12/23  7:48 PM   Result Value Ref Range    POC Glucose, Blood 119 (H) 65 - 99 mg/dL   CBC with Differential    Collection Time: 11/13/23  5:25 AM   Result  Value Ref Range    WBC 4.8 4.8 - 10.8 K/uL    RBC 4.26 (L) 4.70 - 6.10 M/uL    Hemoglobin 13.5 (L) 14.0 - 18.0 g/dL    Hematocrit 37.7 (L) 42.0 - 52.0 %    MCV 88.5 81.4 - 97.8 fL    MCH 31.7 27.0 - 33.0 pg    MCHC 35.8 32.3 - 36.5 g/dL    RDW 38.3 35.9 - 50.0 fL    Platelet Count 213 164 - 446 K/uL    MPV 10.7 9.0 - 12.9 fL    Neutrophils-Polys 66.50 44.00 - 72.00 %    Lymphocytes 22.60 22.00 - 41.00 %    Monocytes 7.20 0.00 - 13.40 %    Eosinophils 3.10 0.00 - 6.90 %    Basophils 0.40 0.00 - 1.80 %    Immature Granulocytes 0.20 0.00 - 0.90 %    Nucleated RBC 0.00 0.00 - 0.20 /100 WBC    Neutrophils (Absolute) 3.21 1.82 - 7.42 K/uL    Lymphs (Absolute) 1.09 1.00 - 4.80 K/uL    Monos (Absolute) 0.35 0.00 - 0.85 K/uL    Eos (Absolute) 0.15 0.00 - 0.51 K/uL    Baso (Absolute) 0.02 0.00 - 0.12 K/uL    Immature Granulocytes (abs) 0.01 0.00 - 0.11 K/uL    NRBC (Absolute) 0.00 K/uL   Comp Metabolic Panel (CMP)    Collection Time: 11/13/23  5:25 AM   Result Value Ref Range    Sodium 139 135 - 145 mmol/L    Potassium 4.4 3.6 - 5.5 mmol/L    Chloride 103 96 - 112 mmol/L    Co2 25 20 - 33 mmol/L    Anion Gap 11.0 7.0 - 16.0    Glucose 134 (H) 65 - 99 mg/dL    Bun 51 (H) 8 - 22 mg/dL    Creatinine 2.81 (H) 0.50 - 1.40 mg/dL    Calcium 9.1 8.5 - 10.5 mg/dL    Correct Calcium 9.2 8.5 - 10.5 mg/dL    AST(SGOT) 14 12 - 45 U/L    ALT(SGPT) 22 2 - 50 U/L    Alkaline Phosphatase 71 30 - 99 U/L    Total Bilirubin 0.5 0.1 - 1.5 mg/dL    Albumin 3.9 3.2 - 4.9 g/dL    Total Protein 6.4 6.0 - 8.2 g/dL    Globulin 2.5 1.9 - 3.5 g/dL    A-G Ratio 1.6 g/dL   HEMOGLOBIN A1C    Collection Time: 11/13/23  5:25 AM   Result Value Ref Range    Glycohemoglobin 6.4 (H) 4.0 - 5.6 %    Est Avg Glucose 137 mg/dL   Vitamin D, 25-hydroxy (blood)    Collection Time: 11/13/23  5:25 AM   Result Value Ref Range    25-Hydroxy   Vitamin D 25 59 30 - 100 ng/mL   ESTIMATED GFR    Collection Time: 11/13/23  5:25 AM   Result Value Ref Range    GFR (CKD-EPI) 25 (A)  >60 mL/min/1.73 m 2   POCT glucose device results    Collection Time: 11/13/23  7:20 AM   Result Value Ref Range    POC Glucose, Blood 122 (H) 65 - 99 mg/dL   POCT glucose device results    Collection Time: 11/13/23 11:29 AM   Result Value Ref Range    POC Glucose, Blood 135 (H) 65 - 99 mg/dL       Medications:  Scheduled Medications   Medication Dose Frequency    insulin regular  2-12 Units 4X/DAY ACHS    NS   Once    hydrALAZINE  25 mg Q8HRS    senna-docusate  2 Tablet BID    omeprazole  20 mg DAILY    acetaminophen  1,000 mg Q6HRS    amLODIPine  10 mg DAILY    atorvastatin  40 mg Nightly    baclofen  5 mg QHS    melatonin  3 mg QHS     PRN medications: insulin regular **AND** POC blood glucose manual result **AND** NOTIFY MD and PharmD **AND** Administer 20 grams of glucose (approximately 8 ounces of fruit juice) every 15 minutes PRN FSBG less than 70 mg/dL **AND** dextrose bolus, Respiratory Therapy Consult, senna-docusate **AND** polyethylene glycol/lytes **AND** magnesium hydroxide **AND** bisacodyl, mag hydrox-al hydrox-simeth, ondansetron **OR** ondansetron, traZODone, sodium chloride, acetaminophen **FOLLOWED BY** [START ON 11/17/2023] acetaminophen, oxyCODONE immediate-release **OR** oxyCODONE immediate-release, hydrALAZINE    Diet:  Current Diet Order   Procedures    Diet Order Diet: Level 7 - Easy to Chew (cut up into bite size pieces.); Liquid level: Level 0 - Thin; Second Modifier: (optional): Consistent CHO (Diabetic)       Medical Decision Making and Plan:  Right thalamic hemorrhagic stroke  Originally presented with a headache and left-sided weakness to hospital in [facility]  Work-up at the outside hospital in [place] revealed a right thalamic hemorrhagic stroke  Repeat CT head done after return flight to the , 11/11 CT head shows right thalamic hemorrhage, decrease in density since prior studies from the outside hospital, slight asymmetric dilation of the left ventricular system including the  left temporal horn with a possible component of hydrocephalus  Planning for BP less than 140, avoiding any kind of anticoagulation  Initiate comprehensive IRF level therapy with 3 days of therapy 5 days a week with services from PT/OT/SLP     Spasticity  Continue baclofen 5 mg nightly, started on 11/11 --> increase to TID 11/13/2023   PRAFO ordered for right lower extremity to prevent further plantarflexion contracture     CKD  Has seen nephrology in past  Improving 11/13/2023   F/u OP with nephrology  Getting IVF 11/13/2023      Hypertension  Continue Amlodipine 10mg daily  Continue Hydralazine 25mg TID - increased by hospitalist 11/13/2023      Prediabetes  History of hyperglycemia, not on home medications  Continue sliding scale insulin     HLD  Continue home dose satin      Bowel  Continue with scheduled Colace and senna, as needed stool softeners     Insomnia  Secondary to recent jet lag, melatonin scheduled --> increase to home dose 11/13/2023 9mg  Utilize trazodone as needed insomnia continues     Pain -as needed Tylenol and oxycodone      DVT PROPHYLAXIS: NO - Hemorrhagic stroke    HOSPITALIST FOLLOWING: YES - d/w hospitalist 11/13/2023     CODE STATUS: FULL CODE    DISPO: Home with Spouse support     MARCUS: TBD    MEDS SENT TO: TBD    DISCHARGE FOLLOW UP: Neurology, Nephrology, PCP - needs referral to all.   ____________________________________    Dr. Lisa Lee DO, MS  [facility] - Physical Medicine & Rehabilitation   ____________________________________

## 2023-11-13 NOTE — THERAPY
"Speech Language Pathology   Initial Assessment     Patient Name: Jason Lima  AGE:  62 y.o., SEX:  male  Medical Record #: [id number]  Today's Date: 11/13/2023     Subjective    Patient agreeable to evaluation.     Objective       11/13/23 0801   Evaluation Charges   Charges Yes   SLP Speech Language Evaluation Speech Sound Language Comprehension   SLP Oral Pharyngeal Evaluation Oral Pharyngeal Evaluation   SLP Total Time Spent   SLP Individual Total Time Spent (Mins) 90   Precautions   Precautions Fall Risk   Comments Left hemiparesis, left visual inattention.   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   Written Language Expression   Dominant Hand Right   ABS (Agitated Behavior Scale)   Agitated Behavior Scale Performed No   Brief Interview for Mental Status (BIMS)   Repetition of Three Words (First Attempt) 3   Temporal Orientation: Year Correct   Temporal Orientation: Month Accurate within 5 days   Temporal Orientation: Day Correct   Recall: \"Sock\" Yes, no cue required   Recall: \"Blue\" Yes, no cue required   Recall: \"Bed\" Yes, after cueing (\"a piece of furniture\")   BIMS Summary Score 14   Confusion Assessment Method (CAM)   Is there evidence of an acute change in mental status from the patient's baseline? Yes   Inattention Behavior present, fluctuates (comes and goes, changes in severity)   Disorganized thinking Behavior present, fluctuates (comes and goes, changes in " severity)   Altered level of consciousness Behavior not present   Dysphagia Strategies / Recommendations   Strategies / Interventions Recommended (Yes / No) Yes   Compensatory Strategies Multiple Swallows;Single Sips / Bites;Other (Comment)  (Swallow what is in mouth before adding another bite or sip.)   Diet / Liquid Recommendation Regular - Easy to Chew (7);Thin (0)   Medication Administration  Whole with Liquid Wash   Therapy Interventions Dysphagia Therapy By Speech Language Pathologist;Therapeutic Dining For Meals;Pharyngeal / Laryngeal Exercises;Oral Motor Exercises   Swallowing/Nutritional Status   Swallowing/Nutritional Status Modified food consistency   Eating   Assistance Needed Supervision   Physical Assistance Level No physical assistance   CARE Score - Eating 4   Eating Discharge Goal   Discharge Goal 5   Functional Level of Assist   Comprehension Supervision   Expression Modified Independent   Expression Description Other (comment)  (extra time)   Social Interaction Modified Independent   Problem Solving Minimal Assist   Problem Solving Description Seat belt;Increased time;Bed/chair alarm;Supervision;Therapy schedule;Verbal cueing   Memory Moderate Assist   Memory Description Seat belt;Increased time;Bed/chair alarm;Supervision;Therapy schedule;Verbal cueing   Outcome Measures   Outcome Measures Utilized SCCAN   SCCAN (Scales of Cognitive and Communicative Ability for Neurorehabilitation)   Oral Expression - Raw Score 19   Oral Expression - Scale Performance Score 100   Orientation - Raw Score 11   Orientation - Scale Performance Score 92   Memory - Raw Score 8   Memory - Scale Performance Score 42   Speech Comprehension - Raw Score 12   Speech Comprehension - Scale Performance Score 92   Reading Comprehension - Raw Score 6   Reading Comprehension - Scale Performance Score 50   Writing - Raw Score 7   Writing - Scale Performance Score 100   Attention - Raw Score 11   Attention - Scale Performance Score  69   Problem Solving - Raw Score 17   Problem Solving - Scale Performance Score 74   SCCAN Total Raw Score 70   SCCAN Degree of Severity Mild Impairment   Problem List   Problem List Cognitive-Linguistic Deficits;Dysphagia;Dysarthia;Attention Deficit;Memory Deficit;Visual Perception Deficit;Executive Function Deficit;Impaired Safety;Impaired Judgement;Numerical Function Deficit   Current Discharge Plan   Current Discharge Plan Return to Prior Living Situation   Benefit   Therapy Benefit Patient would benefit from Inpatient Rehab Speech-Language Pathology to address above identified deficits.   Strengths & Barriers   Strengths Effective communication skills;Alert and oriented;Able to follow instructions;Motivated for self care and independence;Pleasant and cooperative;Supportive family;Willingly participates in therapeutic activities   Barriers Aspiration risk;Hemiparesis;Impaired carryover of learning;Impaired insight/denial of deficits;Impaired functional cognition;Impulsive;Visual impairment   Speech Language Pathologist Assigned   Assigned SLP / Treatment Time / Comments LM 60 t-dine sw/cog       Assessment    The patient is a 62 y.o. male with a past medical history of hypertension, diabetes, hyperlipidemia and questionable history of CKD;  who presented on 11/11/2023  1:07 AM with left-sided weakness after stroke in [facility] 3 weeks ago.  Per documentation, patient was visiting [facility] when patient had a sudden headache followed by left-sided weakness on 10/23/2023.  Initial work-up done at the hospital in [place] revealed hyper tension with /100 and creatinine 3.2.  Patient was deemed medically cleared to leave the hospital admitted after the stroke, he completed a flight to [place] and was admitted to the emergency department in preparation for work-up for admit to rehab.  Evaluation completed in the emergency department prior to rehab reveals a CT head notable for right thalamic hemorrhage.  At time of  "evaluation at the [facility] creatinine 3.59 and hypertension of 187/119, patient required rescue labetalol to decrease blood pressures.  Patient is now on amlodipine and hydralazine.\"  Patient was newly started on baclofen on 11/11, patient's blood pressures improved since original presentation to the emergency department from the airport. .  Additional factors influencing patient status/progress (ie: cognitive factors, co-morbidities, social support, etc): supportive spouse, IPLOF.      Patient seen for clinical swallow evaluation.  Patient displayed reduced sensation with minimal gag elicited on the left side of velum.  Patient displays mild reduced retraction of left lips.  He also displays reduced sensation on left lips as evidenced by occasional small amounts of leakage of both solids and liquids.  Patient displayed intermittent throat clearing during meal and out side of meal.  Patient reports that this is his base line.  Spouse later confirmed this.   Patient tolerated a [place] protocol with no cough but the throat clearing previously seen with patient.  Patient assessed with current diet (6) soft and bite sized and (0) thin liquids, and a trial of regular ( slices of raw apple)   Patient displays rapid intake with large bites taken.  Patient displayed small amounts of leakage from the left side of lips for both solids and liquids but no cough or distress.  Overall patient cleared oral residue from mouth but does display some reduced oral sensation on left side.  ( Patient did display one cough post swallow of large sip thin liquid later during lunch).   Recommended advance diet to (7) regular easy chew with large pieces cut up, continue (0) thin liquids, Meals in t-dine, consider MBSS,  and training in use of compensatory swallow strategies including slow rate of eating, one bite or sip at a time.      Patient was seen for cognitive linguistic evaluation.  SCCAN administered.  Patient achieved a total raw " score of 70 characteristic of an overall mild cognitive linguistic impairement ( on lower range of mild).  Patient achieved the following percentage scores for given subtests:  Oral Expression 100, Orientation 92, Memory 42, Speech Comprehension 92, Reading Comprehension 50, Writing 100, Attention 69, Problem solving 74.  Patient displayed moderate to severe impairment of memory and mild to moderate impairment of attention.  He displayed a possible left visual inattention and this in combination with patient reports of blurry and occasional double vision impacted reading comprehension and visual recall.   In writing task patient executed writing content but began in the center of page.   Patient was also noted to start visual scanning on the right side of page.  Patient displayed impaired insight into deficits.            Plan  Recommend Speech Therapy 30-60 minutes per day 5-6 days per week for 2 weeks for the following treatments:  SLP Swallowing Dysfunction Treatment, SLP Video Swallow Evaluation, SLP Self Care / ADL Training , SLP Cognitive Skill Development, and SLP Group Treatment.    Passport items to be completed:  Express basic needs, understand food/liquid recommendations, consistently follow swallow precautions, manage finances, manage medications, arrive to therapy appointments on time, complete daily memory log entries, solve problems related to safety situations, review education related to hospitalization, complete caregiver training     Goals:  Long term and short term goals have been discussed with patient and spouse and they are in agreement.    Speech Therapy Problems (Active)       Problem: Comprehension STGs       Dates: Start:  11/13/23         Goal: STG-Within one week, patient will perform attention for comprehension in functional reading tasks with 75% acc.       Dates: Start:  11/13/23               Problem: Expression STGs       Dates: Start:  11/13/23         Goal: STG-Within one week,  patient will       Dates: Start:  11/13/23               Problem: Memory STGs       Dates: Start:  11/13/23         Goal: STG-Within one week, patient will recall daily events and safety sequencing with 60% acc with use of external memory aids.       Dates: Start:  11/13/23               Problem: Problem Solving STGs       Dates: Start:  11/13/23         Goal: STG-Within one week, patient will perform alternating attention tasks with 75% acc.       Dates: Start:  11/13/23               Problem: Speech/Swallowing LTGs       Dates: Start:  11/13/23         Goal: LTG-By discharge, patient will safely swallow (7)regular diet textures and (0) thin liquids with no overt s/sx of aspiration for 2/2 days.       Dates: Start:  11/13/23            Goal: LTG-By discharge, patient will solve complex problem       Dates: Start:  11/13/23       Description: For safety and self care with 80% acc mod I  for safe discharge home.         Goal: LTG-By discharge, patient will recall new training with 80% acc with min A and use of external memory aids.       Dates: Start:  11/13/23               Problem: Swallowing STGs       Dates: Start:  11/13/23         Goal: STG-Within one week, patient will safely swallow (7) regular easy chew and cut up and (0) thin liquids with no overt s/sx of aspiration        Dates: Start:  11/13/23

## 2023-11-13 NOTE — THERAPY
Occupational Therapy   Initial Evaluation     Patient Name: Jason Lima  Age:  62 y.o., Sex:  male  Medical Record #: [id number]  Today's Date: 11/13/2023     Subjective    Pt was sitting in w/c with spouse present upon arrival. Both  Agreeable for an OT initial eval.     Objective       11/13/23 1031   OT Charge Group   OT Self Care / ADL (Units) 1   OT Evaluation OT Evaluation High   OT Total Time Spent   OT Individual Total Time Spent (Mins) 60   Prior Living Situation   Prior Services Home-Independent   Housing / Facility 1 Story House   Steps Into Home 1   Steps In Home 0   Rail None   Elevator No   Bathroom Set up Walk In Shower   Equipment Owned None   Lives with - Patient's Self Care Capacity Spouse   Prior Level of ADL Function   Self Feeding Independent   Grooming / Hygiene Independent   Bathing Independent   Dressing Independent   Toileting Independent   Prior Level of IADL Function   Medication Management Independent   Laundry Independent   Kitchen Mobility Independent   Finances Independent   Home Management Independent   Shopping Independent   Prior Level Of Mobility Independent Without Device in Community   Driving / Transportation Driving Independent   Occupation (Pre-Hospital Vocational) Other (Comments)  (Semi retired, owns auto body shop.)   Leisure Interests Other (Comments)  (Vacationing.)   Prior Functioning: Everyday Activities   Self Care Independent   Indoor Mobility (Ambulation) Independent   Stairs Independent   Functional Cognition Independent   Prior Device Use None of the given options   Pain   Intervention Declines   Pain 0 - 10 Group   Pain Rating Scale (NPRS) 0   Therapist Pain Assessment Post Activity Pain Same as Prior to Activity   Cognition    Level of Consciousness Alert   ABS (Agitated Behavior Scale)   Agitated Behavior Scale Performed No   Cognitive Pattern Assessment   Cognitive Pattern Assessment Used BIMS   Brief Interview for Mental Status (BIMS)   Repetition of Three  "Words (First Attempt) 3   Temporal Orientation: Year Correct   Temporal Orientation: Month Accurate within 5 days   Temporal Orientation: Day Correct   Recall: \"Sock\" Yes, no cue required   Recall: \"Blue\" Yes, no cue required   Recall: \"Bed\" Yes, after cueing (\"a piece of furniture\")   BIMS Summary Score 14   Confusion Assessment Method (CAM)   Is there evidence of an acute change in mental status from the patient's baseline? No   Inattention Behavior not present   Disorganized thinking Behavior not present   Altered level of consciousness Behavior not present   Vision Screen   Vision Not tested   Passive ROM Upper Body   Passive ROM Upper Body WDL   Active ROM Upper Body   Active ROM Upper Body  X   Dominant Hand Right   Lt Shoulder Flexion Degrees 0   Lt Elbow Flexion Degrees   (Approximately 30 degrees.)   Lt Forearm Supination Degrees   (Approximately 3/4 range.)   Lt Forearm Pronation Degrees   (Approx. 1/2 range.)   Lt Wrist Flexion Degrees   (Approx. 5 degrees.)   ROM Lt Hand Moderate Impaired  (Can close hand but unable to open fully.)   Strength Upper Body   Upper Body Strength  X   Lt Shoulder Flexion Strength 1 (T)   Lt Elbow Flexion Strength 2- (P-)   Lt Forearm Supination Strength 2- (P-)   Lt Forearm Pronation Strength 2- (P-)   Lt Wrist Flexion Strength 2- (P-)   Gross Strength   (L hemiparesis.)   Sensation Upper Body   Upper Extremity Sensation  X   Lt Upper Extremity Light Touch Absent   Upper Body Muscle Tone   Upper Body Muscle Tone  X   Lt Upper Extremity Muscle Tone Spastic;Hypertonic  (Except for shoulder.)   Balance Assessment   Sitting Balance (Static) Poor   Sitting Balance (Dynamic) Trace +   Bed Mobility    Supine to Sit Total Assist   Sit to Stand Total Assist X 2   Coordination Upper Body   Coordination X   Fine Motor Coordination Impaired L hand.   Gross Motor Coordination Impaired LUE.   Oral Hygiene   Assistance Needed Supervision;Set-up / clean-up;Verbal cues   Physical Assistance " Level No physical assistance   CARE Score - Oral Hygiene 4   Oral Hygiene Discharge Goal   Discharge Goal 6   Shower/Bathe Self   Reason if not Attempted Safety concerns   CARE Score - Shower/Bathe Self 88   Upper Body Dressing   Assistance Needed Physical assistance   Physical Assistance Level 76% or more   CARE Score - Upper Body Dressing 2   Upper Body Dressing Discharge Goal   Discharge Goal 6   Lower Body Dressing   Assistance Needed Physical assistance   Physical Assistance Level 76% or more   CARE Score - Lower Body Dressing 2   Lower Body Dressing Discharge Goal   Discharge Goal 2   Putting On/Taking Off Footwear   Assistance Needed Physical assistance   Physical Assistance Level 76% or more   CARE Score - Putting On/Taking Off Footwear 2   Putting On/Taking Off Footwear Discharge Goal   Discharge Goal 6   Toileting Hygiene   Assistance Needed Physical assistance   Physical Assistance Level Total assistance   CARE Score - Toileting Hygiene 1   Toileting Hygiene Discharge Goal   Discharge Goal 6   Toilet Transfer   Assistance Needed Physical assistance   Physical Assistance Level Total assistance   CARE Score - Toilet Transfer 1   Toilet Transfer Discharge Goal   Discharge Goal 6   Hearing, Speech, and Vision   Ability to Hear Adequate   Ability to See in Adequate Light Adequate   Expression of Ideas and Wants Some difficulty   Understanding Verbal and Non-Verbal Content Usually understands   Functional Level of Assist   Grooming Minimal Assist   Grooming Description Seated in wheelchair at sink;Set-up of equipment;Supervision for safety;Verbal cueing;Increased time;Initial preparation for task   Bathing   (Unable to perform at this time due to safety issues.)   Upper Body Dressing Maximal Assist   Upper Body Dressing Description Verbal cueing;Supervision for safety;Set-up of equipment;Increased time;Initial preparation for task;Assit with threading arms through sleeves;Assist with pulling shirt over head    Lower Body Dressing Maximal Assist   Lower Body Dressing Description Verbal cueing;Supervision for safety;Set-up of equipment;Initial preparation for task;Increased time;Assist with threading into pant leg   Toileting Total Assist x 2   Toileting Description Increased time;Initial preparation for task;Set-up of equipment;Supervision for safety;Verbal cueing;Grab bar;Assist for standing balance;Assist to pull pants up;Assist to pull pants down;Assist for hygiene   Bed, Chair, Wheelchair Transfer Total Assist X 2   Bed Chair Wheelchair Transfer Description Assist with two limbs;Increased time;Initial preparation for task;Squat pivot transfer to wheelchair;Supervision for safety;Verbal cueing;Set-up of equipment   Toilet Transfers Total Assist X 2   Toilet Transfer Description Grab bar;Assist with two limbs;Increased time;Initial preparation for task;Set-up of equipment;Supervision for safety;Verbal cueing   Tub / Shower Transfers Unable to Participate   Tub Shower Transfer Description   (Unable to participate due to safety issues.)   Comprehension Supervision   Expression Minimal Assist   Problem Solving Minimal Assist   Memory Modified Independent   Problem List   Problem List Decreased Active Daily Living Skills;Decreased Homemaking Skills;Decreased Upper Extremity Strength Left;Decreased Upper Extremity AROM Left;Decreased Functional Mobility;Decreased Activity Tolerance;Impaired Coordination Left Upper Extremity;Impaired Sensation Left Upper Extremity;Impaired Upper Extremity Tone Left;Impaired Postural Control / Balance   Precautions   Precautions Fall Risk   Comments L hemiparesis.   Current Discharge Plan   Current Discharge Plan Return to Prior Living Situation   Benefit    Therapy Benefit Patient Would Benefit from Inpatient Rehab Occupational Therapy to Maximize [place] with ADLs, IADLs and Functional Mobility.   Interdisciplinary Plan of Care Collaboration   IDT Collaboration with  Family /  "Caregiver;Nursing;Certified Nursing Assistant   Patient Position at End of Therapy Seated;Chair Alarm On;Family / Friend in Room  (Pt left with RN.)   OT DME Recommendations   Bathroom Equipment 3 in 1 Commode   Strengths & Barriers   Strengths Able to follow instructions;Independent prior level of function;Motivated for self care and independence;Pleasant and cooperative;Supportive family;Alert and oriented   Barriers Decreased endurance;Fatigue;Generalized weakness;Hemiparesis;Impaired activity tolerance;Impaired balance;Limited mobility;Spasticity       Assessment  he patient is a 62 y.o. male with a past medical history of hypertension, diabetes, hyperlipidemia and questionable history of CKD;  who presented on 11/11/2023  1:07 AM with left-sided weakness after stroke in [facility] 3 weeks ago.  Per documentation, patient was visiting [facility] when patient had a sudden headache followed by left-sided weakness on 10/23/2023.  Initial work-up done at the hospital in [place] revealed hyper tension with /100 and creatinine 3.2.  Patient was deemed medically cleared to leave the hospital admitted after the stroke, he completed a flight to [place] and was admitted to the emergency department in preparation for work-up for admit to rehab.  Evaluation completed in the emergency department prior to rehab reveals a CT head notable for right thalamic hemorrhage.  At time of evaluation at the [facility] ED creatinine 3.59 and hypertension of 187/119, patient required rescue labetalol to decrease blood pressures.  Patient is now on amlodipine and hydralazine.\"  Patient was newly started on baclofen on 11/11, patient's blood pressures improved since original presentation to the emergency department from the airport.     Patient seen and examined at bedside.  Patient reports he feels okay.  Patient is fatigued, has a component of jet lag.  Patient reports he has not slept well in a couple of days.  Otherwise patient denies headache, " lightheadedness, chest pain or shortness of breath.  Patient does report having some blurry vision since his stroke.  Patient has spasticity in his left upper extremity and left lower extremity.    Additional factors influencing patient status / progress (ie: cognitive factors, co-morbidities, social support, etc).      Plan  Recommend Occupational Therapy  30/60/90  minutes per day 5-7 days per week for 3 weeks for the following treatments:  OT E Stim Attended, OT Self Care/ADL, OT Manual Ther Technique, OT Neuro Re-Ed/Balance, OT Sensory Int Techniques, OT Therapeutic Activity, OT Evaluation, and OT Therapeutic Exercise.    Passport items to be completed:  Perform bathroom transfers, complete dressing, complete feeding, get ready for the day, prepare a simple meal, participate in household tasks, adapt home for safety needs, demonstrate home exercise program, complete caregiver training     Goals:  Long term and short term goals have been discussed with patient and spouse and they are in agreement.    Occupational Therapy Goals (Active)       Problem: Bathing       Dates: Start:  11/13/23         Goal: STG-Within one week, will assess bathing.       Dates: Start:  11/13/23               Problem: Dressing       Dates: Start:  11/13/23         Goal: STG-Within one week, patient will dress UB with Mod A.       Dates: Start:  11/13/23            Goal: STG-Within one week, patient will dress LB with Mod A.       Dates: Start:  11/13/23               Problem: Functional Transfers       Dates: Start:  11/13/23         Goal: STG-Within one week, patient will transfer to toilet with Max/Mod A.       Dates: Start:  11/13/23            Goal: STG-Within one week, patient will transfer to step in shower with Max A.       Dates: Start:  11/13/23               Problem: OT Long Term Goals       Dates: Start:  11/13/23         Goal: LTG-By discharge, patient will complete basic self care tasks at Mod Independent level.        Dates: Start:  11/13/23            Goal: LTG-By discharge, patient will perform bathroom transfers at Mod Independent level.       Dates: Start:  11/13/23

## 2023-11-14 ENCOUNTER — APPOINTMENT (OUTPATIENT)
Dept: PHYSICAL THERAPY | Facility: REHABILITATION | Age: 63
DRG: 057 | End: 2023-11-14
Attending: PHYSICAL MEDICINE & REHABILITATION
Payer: COMMERCIAL

## 2023-11-14 ENCOUNTER — APPOINTMENT (OUTPATIENT)
Dept: OCCUPATIONAL THERAPY | Facility: REHABILITATION | Age: 63
DRG: 057 | End: 2023-11-14
Attending: PHYSICAL MEDICINE & REHABILITATION
Payer: COMMERCIAL

## 2023-11-14 ENCOUNTER — APPOINTMENT (OUTPATIENT)
Dept: SPEECH THERAPY | Facility: REHABILITATION | Age: 63
DRG: 057 | End: 2023-11-14
Attending: PHYSICAL MEDICINE & REHABILITATION
Payer: COMMERCIAL

## 2023-11-14 LAB
BUN SERPL-MCNC: 41 MG/DL (ref 8–22)
CREAT SERPL-MCNC: 2.66 MG/DL (ref 0.5–1.4)
GFR SERPLBLD CREATININE-BSD FMLA CKD-EPI: 26 ML/MIN/1.73 M 2
GLUCOSE BLD STRIP.AUTO-MCNC: 124 MG/DL (ref 65–99)
GLUCOSE BLD STRIP.AUTO-MCNC: 133 MG/DL (ref 65–99)
GLUCOSE BLD STRIP.AUTO-MCNC: 134 MG/DL (ref 65–99)
GLUCOSE BLD STRIP.AUTO-MCNC: 139 MG/DL (ref 65–99)
GLUCOSE BLD STRIP.AUTO-MCNC: 144 MG/DL (ref 65–99)

## 2023-11-14 PROCEDURE — 99232 SBSQ HOSP IP/OBS MODERATE 35: CPT | Performed by: HOSPITALIST

## 2023-11-14 PROCEDURE — 92526 ORAL FUNCTION THERAPY: CPT

## 2023-11-14 PROCEDURE — 99232 SBSQ HOSP IP/OBS MODERATE 35: CPT | Performed by: PHYSICAL MEDICINE & REHABILITATION

## 2023-11-14 PROCEDURE — 97112 NEUROMUSCULAR REEDUCATION: CPT

## 2023-11-14 PROCEDURE — 36415 COLL VENOUS BLD VENIPUNCTURE: CPT

## 2023-11-14 PROCEDURE — A9270 NON-COVERED ITEM OR SERVICE: HCPCS | Performed by: HOSPITALIST

## 2023-11-14 PROCEDURE — 700102 HCHG RX REV CODE 250 W/ 637 OVERRIDE(OP): Performed by: PHYSICAL MEDICINE & REHABILITATION

## 2023-11-14 PROCEDURE — 84520 ASSAY OF UREA NITROGEN: CPT

## 2023-11-14 PROCEDURE — 770010 HCHG ROOM/CARE - REHAB SEMI PRIVAT*

## 2023-11-14 PROCEDURE — 97530 THERAPEUTIC ACTIVITIES: CPT

## 2023-11-14 PROCEDURE — 97535 SELF CARE MNGMENT TRAINING: CPT

## 2023-11-14 PROCEDURE — 82962 GLUCOSE BLOOD TEST: CPT | Mod: 91

## 2023-11-14 PROCEDURE — A9270 NON-COVERED ITEM OR SERVICE: HCPCS | Performed by: PHYSICAL MEDICINE & REHABILITATION

## 2023-11-14 PROCEDURE — 700105 HCHG RX REV CODE 258: Performed by: HOSPITALIST

## 2023-11-14 PROCEDURE — 82565 ASSAY OF CREATININE: CPT

## 2023-11-14 PROCEDURE — 700102 HCHG RX REV CODE 250 W/ 637 OVERRIDE(OP): Performed by: HOSPITALIST

## 2023-11-14 PROCEDURE — 94760 N-INVAS EAR/PLS OXIMETRY 1: CPT

## 2023-11-14 RX ORDER — SODIUM CHLORIDE 9 MG/ML
INJECTION, SOLUTION INTRAVENOUS ONCE
Status: COMPLETED | OUTPATIENT
Start: 2023-11-14 | End: 2023-11-15

## 2023-11-14 RX ORDER — INSULIN LISPRO 100 [IU]/ML
2-12 INJECTION, SOLUTION INTRAVENOUS; SUBCUTANEOUS
Status: DISCONTINUED | OUTPATIENT
Start: 2023-11-14 | End: 2023-11-15

## 2023-11-14 RX ADMIN — ATORVASTATIN CALCIUM 40 MG: 40 TABLET, FILM COATED ORAL at 20:15

## 2023-11-14 RX ADMIN — ACETAMINOPHEN 1000 MG: 500 TABLET ORAL at 04:25

## 2023-11-14 RX ADMIN — SENNOSIDES AND DOCUSATE SODIUM 2 TABLET: 50; 8.6 TABLET ORAL at 20:17

## 2023-11-14 RX ADMIN — AMLODIPINE BESYLATE 10 MG: 5 TABLET ORAL at 08:15

## 2023-11-14 RX ADMIN — BACLOFEN 5 MG: 10 TABLET ORAL at 20:16

## 2023-11-14 RX ADMIN — HYDRALAZINE HYDROCHLORIDE 25 MG: 25 TABLET, FILM COATED ORAL at 04:24

## 2023-11-14 RX ADMIN — SENNOSIDES AND DOCUSATE SODIUM 2 TABLET: 50; 8.6 TABLET ORAL at 08:15

## 2023-11-14 RX ADMIN — HYDRALAZINE HYDROCHLORIDE 25 MG: 25 TABLET, FILM COATED ORAL at 14:36

## 2023-11-14 RX ADMIN — HYDRALAZINE HYDROCHLORIDE 25 MG: 25 TABLET, FILM COATED ORAL at 17:12

## 2023-11-14 RX ADMIN — SODIUM CHLORIDE 83 ML: 9 INJECTION, SOLUTION INTRAVENOUS at 10:18

## 2023-11-14 RX ADMIN — OMEPRAZOLE 20 MG: 20 CAPSULE, DELAYED RELEASE ORAL at 08:15

## 2023-11-14 RX ADMIN — Medication 9 MG: at 20:17

## 2023-11-14 RX ADMIN — HYDRALAZINE HYDROCHLORIDE 25 MG: 25 TABLET, FILM COATED ORAL at 20:16

## 2023-11-14 ASSESSMENT — ENCOUNTER SYMPTOMS
ABDOMINAL PAIN: 0
CHILLS: 0
SHORTNESS OF BREATH: 0
DIARRHEA: 0
NERVOUS/ANXIOUS: 0
NAUSEA: 0
VOMITING: 0
FEVER: 0

## 2023-11-14 ASSESSMENT — PAIN DESCRIPTION - PAIN TYPE
TYPE: ACUTE PAIN
TYPE: ACUTE PAIN

## 2023-11-14 NOTE — THERAPY
"Physical Therapy   Initial Evaluation     Patient Name: Jason Lima  Age:  62 y.o., Sex:  male  Medical Record #: [id number]  Today's Date: 11/13/2023     Subjective    \"I feel heavy on the left side.\" Pt in bed at arrival, agreeable to PT evaluation.      Objective       11/13/23 1331   PT Charge Group   PT Gait Training (Units) 1   PT Evaluation PT Evaluation High   PT Total Time Spent   PT Individual Total Time Spent (Mins) 60   Prior Living Situation   Prior Services Home-Independent   Housing / Facility 1 Story House   Steps Into Home 1   Steps In Home 0   Rail None   Elevator No   Bathroom Set up Walk In Shower   Equipment Owned None   Lives with - Patient's Self Care Capacity Alone and Able to Care For Self   Comments reports brother and sister live in [place]   Prior Level of Functional Mobility   Bed Mobility Independent   Transfer Status Independent   Ambulation Independent   Distance Ambulation (Feet) 5500  (community ambulator)   Assistive Devices Used None   Wheelchair   (N/A)   Stairs Independent   Comments semi-retired, owns a body shop in [place]   Prior Functioning: Everyday Activities   Self Care Independent   Indoor Mobility (Ambulation) Independent   Stairs Independent   Functional Cognition Independent   Prior Device Use None of the given options   Vitals   Pulse 91   Patient BP Position Sitting   Blood Pressure (!) 167/100   O2 Delivery Device None - Room Air   Vitals Comments seated EOB   Cognition    Cognition / Consciousness X   Speech/ Communication Delayed Responses   Orientation Level Not Oriented to Day;Not Oriented to Time   Level of Consciousness Alert   Ability To Follow Commands 1 Step  (c/ repetition and redirection)   Attention Impaired   Sequencing Impaired   Comments limited by visual deficits, sensory losses on L hemibody   Passive ROM Lower Body   Passive ROM Lower Body X   Lt Hip Flexion Degrees 100  (limited by tone)   Lt Hip Abduction Degrees   (tone limited)   Lt Hip Adduction " Degrees   (tone limited)   Lt Ankle Dorsiflexion Degrees (!) -15  (if sustained stretch, can improve to -5)   Active ROM Lower Body    Active ROM Lower Body  X   Lt Knee Extension Degrees -10   Comments unable to move LLE actively c/ exception of knee extension even c/ modified positioning (gravity eliminated)   Strength Lower Body   Lt Hip Extension Strength 1 (T)   Lt Hip Flexion Strength 1 (T)   Lt Hip Abduction Strength 1 (T)   Lt Hip Adduction Strength 1 (T)   Lt Knee Flexion Strength 1 (T)   Lt Knee Extension Strength 2- (P-)   Lt Ankle Dorsiflexion Strength 1 (T)   Lt Ankle Plantar Flexion Strength 1 (T)   Gross Strength   (RLE grossly 4/5 or better)   Comments RUE: WFL; LUE: 1/5 deltoid, 2/5 trap, 2- fingers, wrist, 2-/5 biceps/triceps   Sensation Lower Body   Lower Extremity Sensation   X   Lt Lower Extremity Light Touch Absent   Lt Lower Extremity Proprioception Impaired  (multiple errors at toe, ankle, knee, intact at hip; LUE: absent: finger, wrist; impaired c/ multiple errors @ elbow, shoulder (trace))   Lower Body Muscle Tone   Lower Body Muscle Tone  X   Lt Lower Extremity Muscle Tone Hypertonic   Modified Eve Scale Left Lower Extremity 1+  (knee ext)   Comments 1+-2: L biceps, triceps; stiff in LUE/LLE grossly at all joints   Balance Assessment   Sitting Balance (Static) Trace   Sitting Balance (Dynamic) Trace   Standing Balance (Static) Trace +   Standing Balance (Dynamic) Trace +   Weight Shift Sitting Poor   Weight Shift Standing Poor   Comments needs support at trunk and hands on facilitation for weightshift to prevent L side lean, poor ability to correct c/ trunk, uses RUE to pull self to midline, holds head down and left of midline   Bed Mobility    Supine to Sit Moderate Assist  (help at trunk and LLE)   Sit to Supine Total Assist  (help at trunk and BLE)   Sit to Stand Maximal Assist  (heavy L lean c/ initial attempt, able to correct c/ cues to lean over therapist's L shoulder)    Scooting Total Assist  (ModA for steadying at trunk + blocking at L knee for scooting EOB, 2 person for safety)   Rolling Minimal Assist to Rt.  (c/ bedrail, help for LLE)   Neurological Concerns   Neurological Concerns Yes   Sitting Posture During ADL's Lateral Lean Left   Equilibrium Reaction Impaired   Footdrop Present  (LLE)   Comments midline awareness altered to L of vertical, altered proprioception noted in standing to L of midline; see note for preliminary visual screen   Coordination Lower Body    Comments Impaired: unable to cross ankles without maximal cues to located LLE in space   Roll Left and Right   Assistance Needed Physical assistance   Physical Assistance Level 25% or less   CARE Score - Roll Left and Right 3   Roll Left and Right Discharge Goal   Discharge Goal 6   Sit to Lying   Assistance Needed Physical assistance   Physical Assistance Level Total assistance   CARE Score - Sit to Lying 1   Sit to Lying Discharge Goal   Discharge Goal 4   Lying to Sitting on Side of Bed   Assistance Needed Physical assistance   Physical Assistance Level 26%-50%   CARE Score - Lying to Sitting on Side of Bed 3   Lying to Sitting on Side of Bed Discharge Goal   Discharge Goal 4   Sit to Stand   Assistance Needed Physical assistance   Physical Assistance Level Total assistance   CARE Score - Sit to Stand 1   Sit to Stand Discharge Goal   Discharge Goal 4   Chair/Bed-to-Chair Transfer   Assistance Needed Physical assistance   Physical Assistance Level Total assistance   CARE Score - Chair/Bed-to-Chair Transfer 1   Chair/Bed-to-Chair Transfer Discharge Goal   Discharge Goal 4   Car Transfer   Reason if not Attempted Safety concerns   CARE Score - Car Transfer 88   Car Transfer Discharge Goal   Discharge Goal 3   Walk 10 Feet   Assistance Needed Physical assistance   Physical Assistance Level Total assistance   CARE Score - Walk 10 Feet 1   Walk 10 Feet Discharge Goal   Discharge Goal 4   Walk 50 Feet with Two  Turns   Reason if not Attempted Safety concerns   CARE Score - Walk 50 Feet with Two Turns 88   Walk 50 Feet with Two Turns Discharge Goal   Discharge Goal 4   Walk 150 Feet   Reason if not Attempted Safety concerns   CARE Score - Walk 150 Feet 88   Walk 150 Feet Discharge Goal   Discharge Goal 4   Walking 10 Feet on Uneven Surfaces   Reason if not Attempted Safety concerns   CARE Score - Walking 10 Feet on Uneven Surfaces 88   Walking 10 Feet on Uneven Surfaces Discharge Goal   Discharge Goal 4   1 Step (Curb)   Reason if not Attempted Safety concerns   CARE Score - 1 Step (Curb) 88   1 Step (Curb) Discharge Goal   Discharge Goal 4   4 Steps   Reason if not Attempted Safety concerns   CARE Score - 4 Steps 88   4 Steps Discharge Goal   Discharge Goal 3   12 Steps   Reason if not Attempted Safety concerns   CARE Score - 12 Steps 88   12 Steps Discharge Goal   Discharge Goal 3   Picking Up Object   Reason if not Attempted Safety concerns   CARE Score - Picking Up Object 88   Picking Up Object Discharge Goal   Discharge Goal 6   Wheel 50 Feet with Two Turns   Assistance Needed Physical assistance   Physical Assistance Level Total assistance   CARE Score - Wheel 50 Feet with Two Turns 1   Wheel 50 Feet with Two Turns Discharge Goal   Discharge Goal 6   Wheel 150 Feet   Assistance Needed Physical assistance   Physical Assistance Level Total assistance   CARE Score - Wheel 150 Feet 1   Wheel 150 Feet Discharge Goal   Discharge Goal 6   Gait Functional Level of Assist    Gait Level Of Assist Total Assist  (MaxA<>TotalA for trunk control, swing limb advancement LLE, functional WS, L knee stance control, VC/MC for step length/sequencing, and w/c follow for safety)   Assistive Device Other (Comments)  (RHR)   Distance (Feet) 30   # of Times Distance was Traveled 2   Deviation Decreased Base Of Support;Shuffled Gait;Decreased Heel Strike;Decreased Toe Off  (see above, no ability to advance LLE without assist, blocking at L  "knee, functional WS to stance and to RLE stance due to tendency for L lean)   Wheelchair Functional Level of Assist   Wheelchair Assist Total Assist   Distance Wheelchair (Feet or Distance) 120   Wheelchair Description Assistance with steering;Leg rest management;Impaired coordination  (unable to contact foot to floor for hemimethod; unable to use LUE to propel)   Stairs Functional Level of Assist   Level of Assist with Stairs Unable to Participate   Transfer Functional Level of Assist   Bed, Chair, Wheelchair Transfer Total Assist   Bed Chair Wheelchair Transfer Description Assist with two limbs;Increased time;Set-up of equipment;Supervision for safety;Verbal cueing  (stand pivot; heavy L lateral lean and extensor tone exacerbate thrust to L side; 2nd person for safety)   Precautions   Precautions Fall Risk;Other (See Comments)  (hypertension, visual impairment, L hemiparesis, absent proprioception and light touch to L hemibody, altered sense of midline)   Current Discharge Plan   Current Discharge Plan Return to Prior Living Situation   Interdisciplinary Plan of Care Collaboration   IDT Collaboration with  Family / Caregiver;Physician;Therapy Tech;Certified Nursing Assistant;Nursing   Patient Position at End of Therapy In Bed;Call Light within Reach;Tray Table within Reach;Phone within Reach;Family / Friend in Room;Bed Alarm On   Collaboration Comments fall risk; safe transfers   PT DME Recommendations   Wheelchair 18\" Width   Cushion Specialty (See other comments)  (TBD pending ambulation progress)   Assistive Device Tanvir Walker  (TBD pending progress, currently 2 person assist for gait)   Additional Equipment   (TBD)   Physical Therapist Assigned   Assigned PT / Treatment Time / Comments Vivian/Nicki. Therapy tech for all sessions.   Benefit   Therapy Benefit Patient Would Benefit from Inpatient Rehabilitation Physical Therapy to Maximize Functional Lamb with ADLs, IADLs and Mobility.   Strengths & " "Barriers   Strengths Able to follow instructions;Independent prior level of function;Motivated for self care and independence;Pleasant and cooperative;Supportive family;Willingly participates in therapeutic activities   Barriers Decreased endurance;Hemiparesis;Difficulty following instructions;Fatigue;Hypertension;Impaired activity tolerance;Impaired balance;Impaired insight/denial of deficits;Impaired functional cognition;Impaired sleep pattern;Impulsive;Limited mobility;Visual impairment;Poor family support  (lives alone)     Brief visual screen (unilateral testing)    Sx: + blurring and diplopia, can read clock on wall, not board in room  Posture: head held off midline, preference toward left and downward head orientation (lateral tilt and cervical flexion)    VOMS  Findings Notes   Gaze stability (midline) abnormal Poor fixation on target   Eccentric Gaze abnormal + nystagmus at end range B eyes   Vergence abnormal 6\" error, decreased adduction R eye   Saccades Horizontal not examined    Saccades Vertical not examined    Smooth Pursuits abnormal Slow, effortful, saccadic intrusions, loss of vertical ROM B eyes, L more profound than R   VOR not examined    VOR Cancel not examined    HIT not examined      Neglect: negative to extinction testing peripersonal fields c/ simultaneous visual stim; note pt subjective visual vertical altered when using mirror for postural cues, stating \"I'm going to hit the wall\" when patient > 12\" from wall.     Patient with assisted fall during transfer back to bed; see fall note dated 11/13/23 for details, care team aware. Sister arrived at end of session.     Patient education: reviewed PT plan of care, rehab expectations, mobility needs, orientation to unit, role of PT, fall risk and use of call light. Also discussed visual deficits and plan for further resting, prognosis for improvement, therapy POC. Pt and sister report understanding.     Assessment    The patient is a 62 y.o. " "right hand dominant male admitted to [facility] acute inpatient rehabilitation 11/12/2023 with severe functional debility after R thalamic hemorrhagic CVA.     Per Dr. Watkins's consult note from 11/11:  The patient is a 62 y.o. male with a past medical history of hypertension, diabetes, hyperlipidemia and questionable history of CKD;  who presented on 11/11/2023  1:07 AM with left-sided weakness after stroke in [place] 3 weeks ago.  Per documentation, patient was visiting [facility] when patient had a sudden headache followed by left-sided weakness on 10/23/2023.  Initial work-up done at the hospital in [place] revealed hyper tension with /100 and creatinine 3.2.  Patient was deemed medically cleared to leave the hospital admitted after the stroke, he completed a flight to [place] and was admitted to the emergency department in preparation for work-up for admit to rehab.  Evaluation completed in the emergency department prior to rehab reveals a CT head notable for right thalamic hemorrhage.  At time of evaluation at [facility] ED creatinine 3.59 and hypertension of 187/119, patient required rescue labetalol to decrease blood pressures.  Patient is now on amlodipine and hydralazine.\"  Patient was newly started on baclofen on 11/11, patient's blood pressures improved since original presentation to the emergency department from the airport.    Patient's sister reports he was in ICU for 8 days in [place], pt reports very limited mobilization during his acute hospitalization.     Patient's PMH includes:  Past Medical History:   Diagnosis Date    Diabetes (HCC)     High cholesterol     Hypertension     Stroke (HCC)     3 weeks ago in [place] (as of 11/11/2023)         PT evaluation performed today; functional performance at today's assessment is as above. Pt with some motor recover on L side, LUE more than LLE, with distal AROM on LUE more present than proximal. Trace mm activity noted at LLE grossly, with exception of knee extension, " though patient is stiff and lacking PROM at L ankle. PRAFO fitted to pt today.     Patient with mixed presentation of L side lean with probable pushing component, increased extensor tone, and altered subjective midline resulting in strong L leaning presentation which requires substantial cueing to correct to facilitate standing. Final transfer attempt at end of evaluation was unsuccessful in controlling his left postural lean even with patient pre-positioned and oriented toward his intact R side and resulted in an assisted fall to the floor. Recommend thorough visual assessment and intervention to address visual impairments and support safety/functional transfers.     Additional factors influencing patient status and prognosis include: prior level of function and the above comorbidities. Patient lives alone in 1 story house in [place] and is independent without devices or modifications in all ADLs/iADLs at baseline. He has some local support from his brother and sister here in [place], but reports his goal is to be as independent as possible at d/c. Prognosis for discharging home independently is guarded given extent of his impairments. He was able to initiate gait training today and will benefit from an AFO for LLE vs FES for L foot drop.     The patient is performing well below their baseline level of function and will benefit from an interdisciplinary high intensity rehabilitation program to maximize functional independence, decrease burden of care, and support a safe return to home with limited family support.     Plan  Recommend Physical Therapy  minutes per day 5-7 days per week for 4-6 weeks for the following treatments:  PT Group Therapy, PT E Stim Attended, PT Orthotics Training, PT Gait Training, PT Self Care/Home Eval, PT Therapeutic Exercises, PT TENS Application, PT Neuro Re-Ed/Balance, PT Aquatic Therapy, PT Therapeutic Activity, PT Manual Therapy, and PT Evaluation.    Passport items to be  completed:  Get in/out of bed safely, in/out of a vehicle, safely use mobility device, walk or wheel around home/community, navigate up and down stairs, show how to get up/down from the ground, ensure home is accessible, demonstrate HEP, complete caregiver training    Goals:  Long term and short term goals have been discussed with patient & family and they are in agreement.      Physical Therapy Problems (Active)       Problem: Balance       Dates: Start:  11/13/23         Goal: STG-Within one week, patient will maintain static standing c/ RUE support on rail at ModA or better.        Dates: Start:  11/13/23               Problem: Mobility       Dates: Start:  11/13/23         Goal: STG-Within one week, patient will propel wheelchair household distances c/ hemitechnique at Renata or better.        Dates: Start:  11/13/23            Goal: STG-Within one week, patient will ambulate distances >/= 30' c/ RHR and ModA or better.        Dates: Start:  11/13/23               Problem: Mobility Transfers       Dates: Start:  11/13/23         Goal: STG-Within one week, patient will perform bed mobility c/ ModA or better.        Dates: Start:  11/13/23            Goal: STG-Within one week, patient will transfer bed to chair c/ MaxA x 1 or better.        Dates: Start:  11/13/23               Problem: PT-Long Term Goals       Dates: Start:  11/13/23         Goal: LTG-By discharge, patient will propel wheelchair >/= 300' at [place] or better.        Dates: Start:  11/13/23            Goal: LTG-By discharge, patient will ambulate >/= 50' c/ LRAD at Renata or better.        Dates: Start:  11/13/23            Goal: LTG-By discharge, patient will transfer one surface to another c/ Renata or better.        Dates: Start:  11/13/23            Goal: LTG-By discharge, patient will ambulate up/down 1 step c/ LRAD at Renata or better.        Dates: Start:  11/13/23            Goal: LTG-By discharge, patient will transfer in/out of a car c/ ModA or  better.        Dates: Start:  11/13/23

## 2023-11-14 NOTE — PROGRESS NOTES
Rehab Fall Note    Date of fall: 11/13/2023     Time of incident/discovery? 1430      Witnessed or Unwitnessed: witnessed     Assisted or unassisted?: assisted     Staff member present? Therapist, Therapy tech     Head strike?: no    What is the patient's orientation status? disoriented to time    Location of fall: patient's room     Was this a fall with therapy? Yes     Patient had a fall from: chair/wheelchair     Injury from fall: none     Persons contacted regarding the fall: family/caregiver, physician, and charge nurse     Post fall huddle called: no; met c/ RN, CNA, and MD regarding pt status     Persons present at post fall huddle: N/A     Interventions to prevent another fall: alarms on and call light within reach     Was the call light used? No     Did the bed or chair alarm sound? No      If no alarm sounded, do the alarms work? Yes     If alarm malfunctioned, was a maintenance request submitted? N/A     Was the pt. wearing a seatbelt prior to the fall? No (with explanation) occurred during transfer     If wearing, did the pt. take off the seatbelt? N/A     What is the patient's transfer status? Total assist      Other fall details: PT and therapy tech had set up patient for a pivot transfer back to bed at end of the physical therapy evaluation toward intact side. As patient came to standing, patient exhibited heavy left side lean, increased L extensor tone, and rotated toward hemiside. Therapist attempted to adjust to patient lean but patient became caught in bedside curtain, at which point therapist lowered patient to the floor. Therapist visually checked and interviewed patient for injury, identified none. Therapist and therapy tech assisted patient to pivot on floor in order to place patient's back toward bed, then performed a dependent chair lift to assist patient back to edge of bed. Patient and staff then performed a lateral scoot and transitioned into supine c/ 2-person assist at trunk and BLE.  MD, RN, CNA informed. Family member present in room during floor recovery, and PT provided fall review and fall risk education to family and patient.

## 2023-11-14 NOTE — PROGRESS NOTES
NURSING DAILY NOTE    Name: Jason Lima   Date of Admission: 11/12/2023   Admitting Diagnosis: Thalamic hemorrhage (HCC)  Attending Physician: Lisa Lee D.o.  Allergies: Penicillins    Safety  Patient Assist     Patient Precautions  Fall Risk, Other (See Comments) (hypertension, visual impairment, L hemiparesis, absent proprioception and light touch to L hemibody, altered sense of midline)  Precaution Comments  L hemiparesis.  Bed Transfer Status  Total Assist  Toilet Transfer Status   Total Assist X 2  Assistive Devices  Rails, Wheelchair  Oxygen  CPAP  Diet/Therapeutic Dining  Current Diet Order   Procedures    Diet Order Diet: Level 7 - Easy to Chew (cut up into bite size pieces.); Liquid level: Level 0 - Thin; Second Modifier: (optional): Consistent CHO (Diabetic)     Pill Administration  whole and one at a time   Agitated Behavioral Scale     ABS Level of Severity       Fall Risk  Has the patient had a fall this admission?   Yes  Shellie Hamilton Fall Risk Scoring  12, MODERATE RISK  Fall Risk Safety Measures  bed alarm, chair alarm, fall during this hospitalization, and poor balance    Vitals  Temperature: 36.8 °C (98.2 °F)  Temp src: Oral  Pulse: 76  Respiration: 18  Blood Pressure: (!) 129/91  Blood Pressure MAP (Calculated): 104 MM HG  BP Location: Left, Upper Arm  Patient BP Position: Supine     Oxygen  Pulse Oximetry: 96 %  O2 (LPM): 0  O2 Delivery Device: CPAP    Bowel and Bladder  Last Bowel Movement  11/13/23  Stool Type  Type 6: Fluffy pieces with ragged edges, a mushy stool  Bowel Device     Continent  Bladder: Continent void   Bowel: Continent movement  Bladder Function  Urine Void (mL): 200 ml  Number of Times Voided: 1  Urine Color: Yellow  Genitourinary Assessment   Bladder Assessment (WDL):  Within Defined Limits  Echols Catheter: Not Applicable  Urine Color: Yellow  Bladder Device: Urinal  Time Void: No  Bladder Scan: Post Void  $  Bladder Scan Results (mL): 99    Skin  Polo Score   15  Sensory Interventions   Bed Types: Standard/Trauma Mattress (refuses waffle and q2 turns at this time)  Skin Preventative Measures: Pillows in Use for Support / Positioning  Moisture Interventions         Pain  Pain Rating Scale  1 - Hardly Notice Pain  Pain Location  Back  Pain Location Orientation  Upper  Pain Interventions   Declines    ADLs    Bathing      Linen Change      Personal Hygiene     Chlorhexidine Bath      Oral Care     Teeth/Dentures     Shave     Nutrition Percentage Eaten  Dinner, Between % Consumed  Environmental Precautions  Treaded Slipper Socks on Patient, Personal Belongings, Wastebasket, Call Bell etc. in Easy Reach, Transferred to Stronger Side, Report Given to Other Health Care Providers Regarding Fall Risk, Bed in Low Position, Communication Sign for Patients & Families, Mobility Assessed & Appropriate Sign Placed  Patient Turns/Positioning  Patient Declines and Understands Importance  Patient Turns Assistance/Tolerance  Tolerates Well, Assistance of Two or More  Bed Positions  Bed Controls On, Bed Locked  Head of Bed Elevated  Self regulated      Psychosocial/Neurologic Assessment  Psychosocial Assessment  Psychosocial (WDL):  Within Defined Limits  Neurologic Assessment  Neuro (WDL): Exceptions to WDL  Level of Consciousness: Alert  Orientation Level: Oriented X4  Cognition: Appropriate judgement, Follows commands, Appropriate safety awareness  Speech: Clear, Slurred (some words are slurred)  Facial Symmetry: Left facial drooping  Pupil Assesment: Yes  R Pupil Size (mm): 3  R Pupil Shape / Description: Round  R Pupil Reaction: Brisk  L Pupil Size (mm): 3  L Pupil Shape / Description: Round  L Pupil Reaction: Brisk  Reflexes: Cough  Cough Reflex: Present  Motor Function/Sensation Assessment: Dorsiflexion, Motor response, Sensation, Motor strength  R Foot Dorsiflexion: Strong  L Foot Dorsiflexion: Absent  RUE Motor Response:  Responds to commands, Normal extension, Normal flexion  RUE Sensation: Full sensation  Muscle Strength Right Arm: Normal Strength Against Gravity and Full Resistance  LUE Motor Response: Flaccid, Movement to painful stimulus  LUE Sensation: Decreased  Muscle Strength Left Arm: Trace Contraction but No Movement  RLE Motor Response: Responds to commands, Movement to painful stimulus  RLE Sensation: Full sensation  Muscle Strength Right Leg: Good Strength Against Gravity and Moderate Resistance  LLE Motor Response: Movement to painful stimulus  LLE Sensation: Decreased  Muscle Strength Left Leg: Trace Contraction but No Movement  EENT (WDL):  WDL Except    Cardio/Pulmonary Assessment  Edema      Respiratory Breath Sounds  RUL Breath Sounds: Clear  RML Breath Sounds: Clear  RLL Breath Sounds: Diminished  MOHAMUD Breath Sounds: Clear  LLL Breath Sounds: Diminished  Cardiac Assessment   Cardiac (WDL):  Within Defined Limits

## 2023-11-14 NOTE — PROGRESS NOTES
[facility] Medicine Daily Progress Note    Date of Service  11/14/2023    Chief Complaint:  Hypertension  Diabetes  ROMÁN    Interval History:  Discussed about being on insulin instead of Metformin 2nd to his renal failure.    Review of Systems  Review of Systems   Constitutional:  Negative for chills and fever.   Respiratory:  Negative for shortness of breath.    Cardiovascular:  Negative for chest pain.   Gastrointestinal:  Negative for abdominal pain, diarrhea, nausea and vomiting.   Psychiatric/Behavioral:  The patient is not nervous/anxious.         Physical Exam  Temp:  [36.8 °C (98.2 °F)-37 °C (98.6 °F)] 37 °C (98.6 °F)  Pulse:  [67-91] 67  Resp:  [16-18] 18  BP: (129-167)/() 151/94  SpO2:  [94 %-96 %] 94 %    Physical Exam  Vitals and nursing note reviewed.   Constitutional:       Appearance: Normal appearance.   HENT:      Head: Atraumatic.   Eyes:      Conjunctiva/sclera: Conjunctivae normal.      Pupils: Pupils are equal, round, and reactive to light.   Cardiovascular:      Rate and Rhythm: Normal rate and regular rhythm.      Heart sounds: No murmur heard.  Pulmonary:      Effort: Pulmonary effort is normal.      Breath sounds: No stridor. No wheezing or rales.   Abdominal:      General: There is no distension.      Palpations: Abdomen is soft.      Tenderness: There is no abdominal tenderness.   Musculoskeletal:      Cervical back: Normal range of motion and neck supple.      Right lower leg: No edema.      Left lower leg: No edema.   Skin:     General: Skin is warm and dry.      Findings: No rash.   Neurological:      Mental Status: He is alert and oriented to person, place, and time.   Psychiatric:         Mood and Affect: Mood normal.         Behavior: Behavior normal.         Fluids    Intake/Output Summary (Last 24 hours) at 11/14/2023 0918  Last data filed at 11/14/2023 0850  Gross per 24 hour   Intake 860 ml   Output 500 ml   Net 360 ml       Laboratory  Recent Labs     11/12/23  0813  11/13/23  0525   WBC 5.7 4.8   RBC 4.52* 4.26*   HEMOGLOBIN 13.5* 13.5*   HEMATOCRIT 39.9* 37.7*   MCV 88.3 88.5   MCH 29.9 31.7   MCHC 33.8 35.8   RDW 38.1 38.3   PLATELETCT 225 213   MPV 10.8 10.7     Recent Labs     11/12/23  0813 11/13/23  0525 11/14/23  0533   SODIUM 139 139  --    POTASSIUM 4.1 4.4  --    CHLORIDE 103 103  --    CO2 26 25  --    GLUCOSE 124* 134*  --    BUN 61* 51* 41*   CREATININE 2.84* 2.81* 2.66*   CALCIUM 8.9 9.1  --                    Imaging    Assessment/Plan  Hemorrhagic stroke (HCC)- (present on admission)  Assessment & Plan  Right thalamic hemorrhage on 10/23 while visiting [place]  Presented there with sudden onset HA, L HP, and /100    ROMÁN (acute kidney injury) (HCC)- (present on admission)  Assessment & Plan  Bun/Cr: 61/2.84 --> 51/2.81 --> 41/2.66 (11/14)  US: medical renal disease, no hydronephrosis  Suspect may have CKD but unknown stage or baseline bun/cr  S/P IVF's  Will give another IVF's NS x 1 liter  Cont to monitor    DM (diabetes mellitus) (HCC)- (present on admission)  Assessment & Plan  Hba1c: 6.4  -149  Will change SS coverage to Lispro  Note: home meds include Metformin (but now has renal failure)  Cont to monitor    Hyperlipidemia- (present on admission)  Assessment & Plan  Cont Lipitor    Hypertension- (present on admission)  Assessment & Plan  BP a little labile and elevated  Cont Norvasc  Cont Hydralazine: dose increased 11/13  Will monitor another day since meds were recently adjuted

## 2023-11-14 NOTE — CARE PLAN
Per benefits investigation: Will need PA. Deductible- $0. Out of pocket max $4,950 (316.39 met). Est co-pay is $47. Please advise.   PA via [employer] [phone] The patient is Stable - Low risk of patient condition declining or worsening    Shift Goals  Clinical Goals: No decline in neuro status  Patient Goals: rest  Family Goals: kirit    Progress made toward(s) clinical / shift goals:  Pt's neuro is assessed throughout the shift with no changes/decline. Neuro remains stable.     Patient is not progressing towards the following goals:

## 2023-11-14 NOTE — PROGRESS NOTES
Discussed with patient the importance of frequent turning in bed to relieve pressure off of hips. Low Polo score indicates the need for waffle overlay and q2 turns due to pt's L sided flaccidity. Pt refused. Will escalate skin breakdown concern to CRN.

## 2023-11-14 NOTE — THERAPY
Occupational Therapy  Daily Treatment     Patient Name: Jason Lima  Age:  62 y.o., Sex:  male  Medical Record #: [id number]  Today's Date: 11/14/2023     Precautions  Precautions: Fall Risk (Fall Risk; Other (See Comments)  hypertension, visual impairment, L hemiparesis, absent proprioception and light touch to L hemibody, altered sense of midline)  Comments: Left hemiparesis, left visual inattention.         Subjective    Pt was supine in bed and agreeable for OT tx session.     Objective       11/14/23 0701   OT Charge Group   OT Self Care / ADL (Units) 4   OT Total Time Spent   OT Individual Total Time Spent (Mins) 60   Precautions   Precautions Fall Risk  (Fall Risk; Other (See Comments)  hypertension, visual impairment, L hemiparesis, absent proprioception and light touch to L hemibody, altered sense of midline)   Comments Left hemiparesis, left visual inattention.   Pain   Pain Scales 0 to 10 Scale    Intervention Declines   Pain 0 - 10 Group   Pain Rating Scale (NPRS) 0   Functional Level of Assist   Grooming Minimal Assist   Grooming Description Seated in wheelchair at sink;Set-up of equipment;Supervision for safety;Verbal cueing;Increased time;Initial preparation for task   Bathing Maximal Assist   Bathing Description Grab bar;Hand held shower;Assit wtih lower extremities;Assit with back;Assit with perineal;Increased time;Initial preparation for task;Set-up of equipment;Set up for shower sleeve;Supervision for safety;Verbal cueing  (Pt was seated in a rolling shower chair with seat belts due to poor postural control/balance as pt leans to L.)   Upper Body Dressing Maximal Assist   Upper Body Dressing Description Assit with threading arms through sleeves;Assist with pulling shirt over head;Verbal cueing;Increased time;Initial preparation for task;Supervision for safety;Set-up of equipment   Lower Body Dressing Maximal Assist   Lower Body Dressing Description Assist with threading into pant leg;Verbal  cueing;Increased time;Supervision for safety;Initial preparation for task;Set-up of equipment;Grab bar  (2 person assist for standing balance and for pulling up undergarment and pants. Assist also to tamir L sock.)   Bed, Chair, Wheelchair Transfer Total Assist X 2   Bed Chair Wheelchair Transfer Description Assist with two limbs;Verbal cueing;Increased time;Initial preparation for task;Set-up of equipment;Squat pivot transfer to wheelchair;Supervision for safety   Tub Shower Transfer Description   (Used rolling shower chair due to severe L postural lean/poor balance. Safety concerns as pt was impulsive.)   Balance   Sitting Balance (Static) Trace   Sitting Balance (Dynamic) Trace   Standing Balance (Static) Trace +   Skilled Intervention Verbal Cuing;Tactile Cuing   Bed Mobility    Supine to Sit Maximal Assist   Scooting Total Assist   Skilled Intervention Verbal Cuing;Sequencing;Compensatory Strategies   Interdisciplinary Plan of Care Collaboration   IDT Collaboration with  Therapy Tech;Certified Nursing Assistant;Nursing   Patient Position at End of Therapy Seated;Chair Alarm On  (Patient taken to [employer] program for breakfast.)     Pt with poor balance, impulsive at times,needed v.c.'s for safety during shower task and standing to pull up undergarment and pants.    Assessment    Pt tolerated OT tx session fine with v.c.s. Pt is motivated to return to prior level. No c/o pain or discomfort during session.  Strengths: Able to follow instructions, Independent prior level of function, Motivated for self care and independence, Pleasant and cooperative, Supportive family, Alert and oriented  Barriers: Decreased endurance, Fatigue, Generalized weakness, Hemiparesis, Impaired activity tolerance, Impaired balance, Limited mobility, Spasticity    Plan   OT E Stim Attended, OT Self Care/ADL, OT Manual Ther Technique, OT Neuro Re-Ed/Balance, OT Sensory Int Techniques, OT Therapeutic Activity, OT Evaluation, and OT Therapeutic  Exercise.       DME  OT DME Recommendations  Bathroom Equipment: 3 in 1 Commode  Additional Equipment:  (TBD)    Passport items to be completed:      Occupational Therapy Goals (Active)       Problem: Bathing       Dates: Start:  11/13/23         Goal: STG-Within one week, will assess bathing.       Dates: Start:  11/13/23               Problem: Dressing       Dates: Start:  11/13/23         Goal: STG-Within one week, patient will dress UB with Mod A.       Dates: Start:  11/13/23            Goal: STG-Within one week, patient will dress LB with Mod A.       Dates: Start:  11/13/23               Problem: Functional Transfers       Dates: Start:  11/13/23         Goal: STG-Within one week, patient will transfer to toilet with Max/Mod A.       Dates: Start:  11/13/23            Goal: STG-Within one week, patient will transfer to step in shower with Max A.       Dates: Start:  11/13/23               Problem: OT Long Term Goals       Dates: Start:  11/13/23         Goal: LTG-By discharge, patient will complete basic self care tasks at Mod Independent level.       Dates: Start:  11/13/23            Goal: LTG-By discharge, patient will perform bathroom transfers at Mod Independent level.       Dates: Start:  11/13/23

## 2023-11-14 NOTE — CARE PLAN
The patient is Stable - Low risk of patient condition declining or worsening    Shift Goals  Clinical Goals: safety    Progress made toward(s) clinical / shift goals:      Problem: Knowledge Deficit - Standard  Goal: Patient and family/care givers will demonstrate understanding of plan of care, disease process/condition, diagnostic tests and medications  Outcome: Progressing  Patient c/o back pain. Medicated him with oxycodone for pain with good result. Will continue to monitor.      Problem: Fall Risk - Rehab  Goal: Patient will remain free from falls  Outcome: Progressing  Patient had an assisted fall with therapy this afternoon. Dr Lee, Charge Nurse Maggi, patient's sister notified. No injury noted.        Patient is not progressing towards the following goals:

## 2023-11-14 NOTE — PROGRESS NOTES
"  Physical Medicine & Rehabilitation Progress Note    Encounter Date: 11/14/2023    Chief Complaint: Doing ok this AM.     Interval Events (Subjective):  BP still elevated - hospitalist following  Voiding low PVRs  BM 11/13    Seen and examined in bathroom with SLP. He is doing better this AM. Had better sleep last night with the increased dose of Melatonin. No specific systemic complaints at this time. Back pain is better from sitting in the chair.       ROS: 14 point ROS negative unless otherwise specified in the HPI    Objective:  VITAL SIGNS: BP (!) 151/94   Pulse 67   Temp 37 °C (98.6 °F) (Oral)   Resp 18   Ht 1.727 m (5' 8\")   Wt 86 kg (189 lb 9.5 oz)   SpO2 94%   BMI 28.83 kg/m²     GEN: No apparent distress  HEENT: Head normocephalic, atraumatic.  Sclera nonicteric bilaterally, no ocular discharge appreciated bilaterally.  CV: Extremities warm and well-perfused, no peripheral edema appreciated bilaterally.  PULMONARY: Breathing nonlabored on room air, no respiratory accessory muscle use.  Not requiring supplemental oxygen.  SKIN: No appreciable skin breakdown on exposed areas of skin.  PSYCH: Mood and affect within normal limits.  NEURO: Awake alert.  Conversational.  Logical thought content. Dysarthria. Spasticity LUE and LLE. Mild clonus L ankle. Has 2/5 movement finger FL/EXT. Spasticity finger flexors, wrist flexors, bicep 2/4.       Laboratory Values:  Recent Results (from the past 72 hour(s))   POCT glucose device results    Collection Time: 11/11/23 12:40 PM   Result Value Ref Range    POC Glucose, Blood 143 (H) 65 - 99 mg/dL   Urine Sodium Random    Collection Time: 11/11/23  1:50 PM   Result Value Ref Range    Sodium, Urine -per volume 42 mmol/L   PROTEIN/CREAT RATIO URINE    Collection Time: 11/11/23  1:50 PM   Result Value Ref Range    Total Protein, Urine 15.0 0.0 - 15.0 mg/dL    Creatinine, Random Urine 110.14 mg/dL    Protein Creatinine Ratio 136 (H) 15 - 68 mg/g   EKG    Collection " Time: 23  2:03 PM   Result Value Ref Range    Report       [facility]    Test Date:  2023  Pt Name:    MAXIME PIKE                  Department: ER  MRN:        [id number]                      Room:       T207  Gender:     Male                         Technician: IRIS  :        1960                   Requested By:FELICIA GARDUNO  Order #:    [id number]                    Reading MD: Russ Wise MD    Measurements  Intervals                                Axis  Rate:       79                           P:          9  NM:         198                          QRS:        54  QRSD:       102                          T:          1  QT:         380  QTc:        436    Interpretive Statements  Sinus rhythm  Borderline T abnormalities, inferior leads  Compared to ECG 2017 14:08:31  NO SIGNIFICANT CHANGES  Electronically Signed On 2023 02:33:46 PST by Russ Wise MD     EC-ECHOCARDIOGRAM COMPLETE W/O CONT    Collection Time: 23  5:13 PM   Result Value Ref Range    Left Ventrical Ejection Fraction 60    POCT glucose device results    Collection Time: 23 10:09 PM   Result Value Ref Range    POC Glucose, Blood 131 (H) 65 - 99 mg/dL   POCT glucose device results    Collection Time: 23  5:39 AM   Result Value Ref Range    POC Glucose, Blood 136 (H) 65 - 99 mg/dL   CBC WITH DIFFERENTIAL    Collection Time: 23  8:13 AM   Result Value Ref Range    WBC 5.7 4.8 - 10.8 K/uL    RBC 4.52 (L) 4.70 - 6.10 M/uL    Hemoglobin 13.5 (L) 14.0 - 18.0 g/dL    Hematocrit 39.9 (L) 42.0 - 52.0 %    MCV 88.3 81.4 - 97.8 fL    MCH 29.9 27.0 - 33.0 pg    MCHC 33.8 32.3 - 36.5 g/dL    RDW 38.1 35.9 - 50.0 fL    Platelet Count 225 164 - 446 K/uL    MPV 10.8 9.0 - 12.9 fL    Neutrophils-Polys 67.50 44.00 - 72.00 %    Lymphocytes 22.20 22.00 - 41.00 %    Monocytes 6.80 0.00 - 13.40 %    Eosinophils 2.60 0.00 - 6.90 %    Basophils 0.50 0.00 - 1.80 %    Immature Granulocytes 0.40 0.00 -  0.90 %    Nucleated RBC 0.00 0.00 - 0.20 /100 WBC    Neutrophils (Absolute) 3.85 1.82 - 7.42 K/uL    Lymphs (Absolute) 1.27 1.00 - 4.80 K/uL    Monos (Absolute) 0.39 0.00 - 0.85 K/uL    Eos (Absolute) 0.15 0.00 - 0.51 K/uL    Baso (Absolute) 0.03 0.00 - 0.12 K/uL    Immature Granulocytes (abs) 0.02 0.00 - 0.11 K/uL    NRBC (Absolute) 0.00 K/uL   Basic Metabolic Panel    Collection Time: 11/12/23  8:13 AM   Result Value Ref Range    Sodium 139 135 - 145 mmol/L    Potassium 4.1 3.6 - 5.5 mmol/L    Chloride 103 96 - 112 mmol/L    Co2 26 20 - 33 mmol/L    Glucose 124 (H) 65 - 99 mg/dL    Bun 61 (H) 8 - 22 mg/dL    Creatinine 2.84 (H) 0.50 - 1.40 mg/dL    Calcium 8.9 8.5 - 10.5 mg/dL    Anion Gap 10.0 7.0 - 16.0   MAGNESIUM    Collection Time: 11/12/23  8:13 AM   Result Value Ref Range    Magnesium 2.2 1.5 - 2.5 mg/dL   Renal Function Panel    Collection Time: 11/12/23  8:13 AM   Result Value Ref Range    Correct Calcium 8.7 8.5 - 10.5 mg/dL    Phosphorus 3.3 2.5 - 4.5 mg/dL    Albumin 4.3 3.2 - 4.9 g/dL   ESTIMATED GFR    Collection Time: 11/12/23  8:13 AM   Result Value Ref Range    GFR (CKD-EPI) 24 (A) >60 mL/min/1.73 m 2   POCT glucose device results    Collection Time: 11/12/23  5:02 PM   Result Value Ref Range    POC Glucose, Blood 143 (H) 65 - 99 mg/dL   POCT glucose device results    Collection Time: 11/12/23  7:48 PM   Result Value Ref Range    POC Glucose, Blood 119 (H) 65 - 99 mg/dL   CBC with Differential    Collection Time: 11/13/23  5:25 AM   Result Value Ref Range    WBC 4.8 4.8 - 10.8 K/uL    RBC 4.26 (L) 4.70 - 6.10 M/uL    Hemoglobin 13.5 (L) 14.0 - 18.0 g/dL    Hematocrit 37.7 (L) 42.0 - 52.0 %    MCV 88.5 81.4 - 97.8 fL    MCH 31.7 27.0 - 33.0 pg    MCHC 35.8 32.3 - 36.5 g/dL    RDW 38.3 35.9 - 50.0 fL    Platelet Count 213 164 - 446 K/uL    MPV 10.7 9.0 - 12.9 fL    Neutrophils-Polys 66.50 44.00 - 72.00 %    Lymphocytes 22.60 22.00 - 41.00 %    Monocytes 7.20 0.00 - 13.40 %    Eosinophils 3.10 0.00 -  6.90 %    Basophils 0.40 0.00 - 1.80 %    Immature Granulocytes 0.20 0.00 - 0.90 %    Nucleated RBC 0.00 0.00 - 0.20 /100 WBC    Neutrophils (Absolute) 3.21 1.82 - 7.42 K/uL    Lymphs (Absolute) 1.09 1.00 - 4.80 K/uL    Monos (Absolute) 0.35 0.00 - 0.85 K/uL    Eos (Absolute) 0.15 0.00 - 0.51 K/uL    Baso (Absolute) 0.02 0.00 - 0.12 K/uL    Immature Granulocytes (abs) 0.01 0.00 - 0.11 K/uL    NRBC (Absolute) 0.00 K/uL   Comp Metabolic Panel (CMP)    Collection Time: 11/13/23  5:25 AM   Result Value Ref Range    Sodium 139 135 - 145 mmol/L    Potassium 4.4 3.6 - 5.5 mmol/L    Chloride 103 96 - 112 mmol/L    Co2 25 20 - 33 mmol/L    Anion Gap 11.0 7.0 - 16.0    Glucose 134 (H) 65 - 99 mg/dL    Bun 51 (H) 8 - 22 mg/dL    Creatinine 2.81 (H) 0.50 - 1.40 mg/dL    Calcium 9.1 8.5 - 10.5 mg/dL    Correct Calcium 9.2 8.5 - 10.5 mg/dL    AST(SGOT) 14 12 - 45 U/L    ALT(SGPT) 22 2 - 50 U/L    Alkaline Phosphatase 71 30 - 99 U/L    Total Bilirubin 0.5 0.1 - 1.5 mg/dL    Albumin 3.9 3.2 - 4.9 g/dL    Total Protein 6.4 6.0 - 8.2 g/dL    Globulin 2.5 1.9 - 3.5 g/dL    A-G Ratio 1.6 g/dL   HEMOGLOBIN A1C    Collection Time: 11/13/23  5:25 AM   Result Value Ref Range    Glycohemoglobin 6.4 (H) 4.0 - 5.6 %    Est Avg Glucose 137 mg/dL   Vitamin D, 25-hydroxy (blood)    Collection Time: 11/13/23  5:25 AM   Result Value Ref Range    25-Hydroxy   Vitamin D 25 59 30 - 100 ng/mL   ESTIMATED GFR    Collection Time: 11/13/23  5:25 AM   Result Value Ref Range    GFR (CKD-EPI) 25 (A) >60 mL/min/1.73 m 2   POCT glucose device results    Collection Time: 11/13/23  7:20 AM   Result Value Ref Range    POC Glucose, Blood 122 (H) 65 - 99 mg/dL   POCT glucose device results    Collection Time: 11/13/23 11:29 AM   Result Value Ref Range    POC Glucose, Blood 135 (H) 65 - 99 mg/dL   POCT glucose device results    Collection Time: 11/13/23  5:09 PM   Result Value Ref Range    POC Glucose, Blood 142 (H) 65 - 99 mg/dL   POCT glucose device results     Collection Time: 11/13/23  9:50 PM   Result Value Ref Range    POC Glucose, Blood 149 (H) 65 - 99 mg/dL   POCT glucose device results    Collection Time: 11/14/23  4:27 AM   Result Value Ref Range    POC Glucose, Blood 139 (H) 65 - 99 mg/dL   BUN    Collection Time: 11/14/23  5:33 AM   Result Value Ref Range    Bun 41 (H) 8 - 22 mg/dL   CREATININE    Collection Time: 11/14/23  5:33 AM   Result Value Ref Range    Creatinine 2.66 (H) 0.50 - 1.40 mg/dL   ESTIMATED GFR    Collection Time: 11/14/23  5:33 AM   Result Value Ref Range    GFR (CKD-EPI) 26 (A) >60 mL/min/1.73 m 2   POCT glucose device results    Collection Time: 11/14/23  8:10 AM   Result Value Ref Range    POC Glucose, Blood 144 (H) 65 - 99 mg/dL       Medications:  Scheduled Medications   Medication Dose Frequency    insulin lispro  2-12 Units 4X/DAY ACHS    NS   Once    hydrALAZINE  25 mg Q8HRS    melatonin  9 mg QHS    senna-docusate  2 Tablet BID    omeprazole  20 mg DAILY    acetaminophen  1,000 mg Q6HRS    amLODIPine  10 mg DAILY    atorvastatin  40 mg Nightly    baclofen  5 mg QHS     PRN medications: [DISCONTINUED] insulin regular **AND** POC blood glucose manual result **AND** NOTIFY MD and PharmD **AND** Administer 20 grams of glucose (approximately 8 ounces of fruit juice) every 15 minutes PRN FSBG less than 70 mg/dL **AND** dextrose bolus, Respiratory Therapy Consult, senna-docusate **AND** polyethylene glycol/lytes **AND** magnesium hydroxide **AND** bisacodyl, mag hydrox-al hydrox-simeth, ondansetron **OR** ondansetron, traZODone, sodium chloride, acetaminophen **FOLLOWED BY** [START ON 11/17/2023] acetaminophen, oxyCODONE immediate-release **OR** oxyCODONE immediate-release, hydrALAZINE    Diet:  Current Diet Order   Procedures    Diet Order Diet: Level 7 - Easy to Chew (cut up into bite size pieces.); Liquid level: Level 0 - Thin; Second Modifier: (optional): Consistent CHO (Diabetic)       Medical Decision Making and Plan:  Right thalamic  hemorrhagic stroke  Originally presented with a headache and left-sided weakness to hospital in [facility]  Work-up at the outside hospital in [facility] revealed a right thalamic hemorrhagic stroke  Repeat CT head done after return flight to the [place], 11/11 CT head shows right thalamic hemorrhage, decrease in density since prior studies from the outside hospital, slight asymmetric dilation of the left ventricular system including the left temporal horn with a possible component of hydrocephalus  Planning for BP less than 140, avoiding any kind of anticoagulation  Initiate comprehensive IRF level therapy with 3 days of therapy 5 days a week with services from PT/OT/SLP     Spasticity  Continue baclofen 5 mg nightly, started on 11/11 --> increase to TID 11/13/2023 --> continue 11/14/2023, stable on higher dose.   PRAFO ordered for right lower extremity to prevent further plantarflexion contracture     CKD  Has seen nephrology in past  Improving 11/13/2023   F/u OP with nephrology  Getting IVF 11/13/2023 11/14/2023 BUN and Cr improved compared to prior     Hypertension  Continue Amlodipine 10mg daily  Continue Hydralazine 25mg TID - increased by hospitalist 11/13/2023 from BID to TID  11/14/2023 Still elevated, hospitalist managing     Prediabetes  History of hyperglycemia, not on home medications  Continue sliding scale insulin     HLD  Continue home dose satin      Bowel  Continue with scheduled Colace and senna, as needed stool softeners     Insomnia  Secondary to recent jet lag, melatonin scheduled --> increase to home dose 11/13/2023 9mg  Utilize trazodone as needed insomnia continues     Pain -as needed Tylenol and oxycodone      DVT PROPHYLAXIS: NO - Hemorrhagic stroke    HOSPITALIST FOLLOWING: YES - d/w hospitalist 11/14    CODE STATUS: FULL CODE    DISPO: Home with Spouse, Vielka, support     MARCUS: TBD    MEDS SENT TO: TBD    DISCHARGE FOLLOW UP: Neurology, Nephrology, PCP - needs referral to all.    ____________________________________    Dr. Lisa Lee DO MS  [facility] - Physical Medicine & Rehabilitation   ____________________________________

## 2023-11-14 NOTE — CARE PLAN
"  Problem: Fall Risk - Rehab  Goal: Patient will remain free from falls  Note: Shellie Hamilton Fall risk Assessment Score: 21    High fall risk Interventions   - Bed and strip alarm   - Yellow sign by the door   - Yellow wrist band \"Fall risk\"  - Room near to the nurse station  - Do not leave patient unattended in the bathroom  - Fall risk education provided    Patient had a assisted fall during this hospitalization.  Left side neglect.    The patient is Stable - Low risk of patient condition declining or worsening    Shift Goals  Clinical Goals: No decline in neuro status  Patient Goals: rest  Family Goals: kirit  "

## 2023-11-14 NOTE — PROGRESS NOTES
NURSING DAILY NOTE    Name: Jason Lima   Date of Admission: 11/12/2023   Admitting Diagnosis: Thalamic hemorrhage (HCC)  Attending Physician: Lisa Lee D.o.  Allergies: Penicillins    Safety  Patient Assist     Patient Precautions  Fall Risk, Other (See Comments) (hypertension, visual impairment, L hemiparesis, absent proprioception and light touch to L hemibody, altered sense of midline)  Precaution Comments  L hemiparesis.  Bed Transfer Status  Total Assist  Toilet Transfer Status   Total Assist X 2  Assistive Devices     Oxygen  None - Room Air  Diet/Therapeutic Dining  Current Diet Order   Procedures    Diet Order Diet: Level 7 - Easy to Chew (cut up into bite size pieces.); Liquid level: Level 0 - Thin; Second Modifier: (optional): Consistent CHO (Diabetic)     Pill Administration  whole and one at a time   Agitated Behavioral Scale     ABS Level of Severity       Fall Risk  Has the patient had a fall this admission?   Yes  Shellie Hamilton Fall Risk Scoring  11, MODERATE RISK  Fall Risk Safety Measures  bed alarm, chair alarm, fall during this hospitalization, and poor balance    Vitals  Temperature: 36.8 °C (98.2 °F)  Temp src: Oral  Pulse: 76  Respiration: 16  Blood Pressure: (!) 144/88  Blood Pressure MAP (Calculated): 107 MM HG  BP Location: Right, Upper Arm  Patient BP Position: Supine     Oxygen  Pulse Oximetry: 94 %  O2 (LPM): 0  O2 Delivery Device: None - Room Air    Bowel and Bladder  Last Bowel Movement  11/13/23  Stool Type  Type 6: Fluffy pieces with ragged edges, a mushy stool  Bowel Device     Continent  Bladder: Continent void   Bowel: Continent movement  Bladder Function  Urine Void (mL): 250 ml  Number of Times Voided: 1  Urine Color: Unable To Evaluate  Genitourinary Assessment   Bladder Assessment (WDL):  Within Defined Limits  Urine Color: Unable To Evaluate  Bladder Device: Urinal  Time Void: Yes  Bladder Scan: Post Void  $  Bladder Scan Results (mL): 29    Skin  Polo Score   17  Sensory Interventions   Bed Types: Standard/Trauma Mattress  Skin Preventative Measures: Pillows in Use for Support / Positioning  Moisture Interventions         Pain  Pain Rating Scale  0 - No Pain  Pain Location  Back  Pain Location Orientation  Upper  Pain Interventions   Declines    ADLs    Bathing      Linen Change      Personal Hygiene     Chlorhexidine Bath      Oral Care     Teeth/Dentures     Shave     Nutrition Percentage Eaten  Dinner, Between % Consumed  Environmental Precautions     Patient Turns/Positioning  Patient Turns Self from Side to Side  Patient Turns Assistance/Tolerance     Bed Positions     Head of Bed Elevated         Psychosocial/Neurologic Assessment  Psychosocial Assessment  Psychosocial (WDL):  Within Defined Limits  Neurologic Assessment  Neuro (WDL): Exceptions to WDL  Level of Consciousness: Alert  Orientation Level: Oriented X4  Cognition: Appropriate judgement  Speech: Clear  EENT (WDL):  WDL Except    Cardio/Pulmonary Assessment  Edema      Respiratory Breath Sounds  RUL Breath Sounds: Clear  RML Breath Sounds: Clear  RLL Breath Sounds: Diminished  MOHAMUD Breath Sounds: Clear  LLL Breath Sounds: Diminished  Cardiac Assessment   Cardiac (WDL):  WDL Except

## 2023-11-14 NOTE — THERAPY
"Occupational Therapy  Daily Treatment     Patient Name: Jason Lima  Age:  62 y.o., Sex:  male  Medical Record #: [id number]  Today's Date: 11/14/2023     Precautions  Precautions: (P) Fall Risk  Comments: (P) Left hemiparesis, left visual inattention.         Subjective    Pt encountered for OT sitting in WC in room. Pleasant and agreeable to participate. \"I can see myself, but it is blurry,\" during sitting balance activity in front of a mirror (see notes below).     Objective       11/14/23 1031   OT Charge Group   OT Self Care / ADL (Units) 2   OT Total Time Spent   OT Individual Total Time Spent (Mins) 30   Precautions   Precautions Fall Risk   Comments Left hemiparesis, left visual inattention.   Vision Screen   Visual Acuity Able to read employee name badge without difficulty  (from 6 inches away from face; no glassed donned; unable to read from > 6 inches away from face; \"blurry\"; able to self in mirror from 10 feet away, but \"blurry\")   Functional Level of Assist   Bed, Chair, Wheelchair Transfer Total Assist X 2   Bed Chair Wheelchair Transfer Description Assist with one limb;Increased time;Initial preparation for task;Requires lift;Set-up of equipment;Squat pivot transfer to wheelchair;Supervision for safety;Verbal cueing  (Squat pivot from WC <> therapy mat. VC provided prior and during transfer to support understanding/motor planning)   Balance   Sitting Balance (Static) Poor   Skilled Intervention Sequencing;Tactile Cuing;Verbal Cuing;Other (See Comments)  (Sitting at EOM inc front of mirror. Tactile/verbal cues provided for proper hand placement to maintain an upright siting posture. Pt able to maintain self supported sitting for up to 3 min; 2x.)   Interdisciplinary Plan of Care Collaboration   IDT Collaboration with  Therapy Tech   Patient Position at End of Therapy Seated;Bed Alarm On;Call Light within Reach;Tray Table within Reach;Phone within Reach   Collaboration Comments tech support for fall " risk; safe transfers     Significant left lateral lean noted during functional transfers and sitting balance.      Pt reports lack of sensation on the LUE; impaired touch sensation.      Assessment    Overall, steady improvements towards self-supported sitting balance at EOM to increase core strength and balance needed to engage in safe functional mobility tasks. Would benefit to continue from balance activity. Further testing needed to assess LUE sensation.     Strengths: Able to follow instructions, Independent prior level of function, Motivated for self care and independence, Pleasant and cooperative, Supportive family, Alert and oriented  Barriers: Decreased endurance, Fatigue, Generalized weakness, Hemiparesis, Impaired activity tolerance, Impaired balance, Limited mobility, Spasticity    Plan    Cont ADLs, functional transfers, and thera act/ex to maximize functional recovery for safe DC home. Assess LUE sensation.     DME  OT DME Recommendations  Bathroom Equipment: 3 in 1 Commode  Additional Equipment:  (TBD)    Passport items to be completed:  Perform bathroom transfers, complete dressing, complete feeding, get ready for the day, prepare a simple meal, participate in household tasks, adapt home for safety needs, demonstrate home exercise program, complete caregiver training     Occupational Therapy Goals (Active)       Problem: Bathing       Dates: Start:  11/13/23         Goal: STG-Within one week, will assess bathing.       Dates: Start:  11/13/23               Problem: Dressing       Dates: Start:  11/13/23         Goal: STG-Within one week, patient will dress UB with Mod A.       Dates: Start:  11/13/23            Goal: STG-Within one week, patient will dress LB with Mod A.       Dates: Start:  11/13/23               Problem: Functional Transfers       Dates: Start:  11/13/23         Goal: STG-Within one week, patient will transfer to toilet with Max/Mod A.       Dates: Start:  11/13/23            Goal:  STG-Within one week, patient will transfer to step in shower with Max A.       Dates: Start:  11/13/23               Problem: OT Long Term Goals       Dates: Start:  11/13/23         Goal: LTG-By discharge, patient will complete basic self care tasks at Mod Independent level.       Dates: Start:  11/13/23            Goal: LTG-By discharge, patient will perform bathroom transfers at Mod Independent level.       Dates: Start:  11/13/23

## 2023-11-14 NOTE — THERAPY
Speech Language Pathology  Daily Treatment     Patient Name: Jason Lima  Age:  62 y.o., Sex:  male  Medical Record #: [id number]  Today's Date: 11/14/2023     Precautions  Precautions: Fall Risk (Fall Risk; Other (See Comments)  hypertension, visual impairment, L hemiparesis, absent proprioception and light touch to L hemibody, altered sense of midline)  Comments: Left hemiparesis, left visual inattention.    Subjective    Patient was willing to participate in ST. Arrived on time with assistance.     Objective       11/14/23 0802   Treatment Charges   SLP Swallowing Dysfunction Treatment Swallowing Dysfunction Treatment   SLP Total Time Spent   SLP Individual Total Time Spent (Mins) 60   Dysphagia    Diet / Liquid Recommendation Regular - Easy to Chew (7);Thin (0)   Nutritional Liquid Intake Rating Scale Non thickened beverages   Nutritional Food Intake Rating Scale Total oral diet with no restrictions   Interdisciplinary Plan of Care Collaboration   Patient Position at End of Therapy Seated;Chair Alarm On;Self Releasing Lap Belt Applied;Tray Table within Reach;Call Light within Reach         Assessment    Patient was assessed with current diet textures. Required min-mod verbal cues for use of safe swallow strategies especially monitoring bite size. Occasional anterior spillage noted from left side. Cough x1 post sip of thin liquid from cup. Patient reports taking too large of sip. Ongoing education related to swallow strategies.   Oral care completed after meal.     Strengths: Effective communication skills, Alert and oriented, Able to follow instructions, Motivated for self care and independence, Pleasant and cooperative, Supportive family, Willingly participates in therapeutic activities  Barriers: Aspiration risk, Hemiparesis, Impaired carryover of learning, Impaired insight/denial of deficits, Impaired functional cognition, Impulsive, Visual impairment    Plan    Reinforce use of swallow strategies, cog tx.        Speech Therapy Problems (Active)       Problem: Comprehension STGs       Dates: Start:  11/13/23         Goal: STG-Within one week, patient will perform attention for comprehension in functional reading tasks with 75% acc.       Dates: Start:  11/13/23               Problem: Expression STGs       Dates: Start:  11/13/23         Goal: STG-Within one week, patient will       Dates: Start:  11/13/23               Problem: Memory STGs       Dates: Start:  11/13/23         Goal: STG-Within one week, patient will recall daily events and safety sequencing with 60% acc with use of external memory aids.       Dates: Start:  11/13/23               Problem: Problem Solving STGs       Dates: Start:  11/13/23         Goal: STG-Within one week, patient will perform alternating attention tasks with 75% acc.       Dates: Start:  11/13/23               Problem: Speech/Swallowing LTGs       Dates: Start:  11/13/23         Goal: LTG-By discharge, patient will safely swallow (7)regular diet textures and (0) thin liquids with no overt s/sx of aspiration for 2/2 days.       Dates: Start:  11/13/23            Goal: LTG-By discharge, patient will solve complex problem       Dates: Start:  11/13/23       Description: For safety and self care with 80% acc mod I  for safe discharge home.         Goal: LTG-By discharge, patient will recall new training with 80% acc with min A and use of external memory aids.       Dates: Start:  11/13/23               Problem: Swallowing STGs       Dates: Start:  11/13/23         Goal: STG-Within one week, patient will safely swallow (7) regular easy chew and cut up and (0) thin liquids with no overt s/sx of aspiration        Dates: Start:  11/13/23

## 2023-11-15 ENCOUNTER — APPOINTMENT (OUTPATIENT)
Dept: PHYSICAL THERAPY | Facility: REHABILITATION | Age: 63
DRG: 057 | End: 2023-11-15
Attending: PHYSICAL MEDICINE & REHABILITATION
Payer: COMMERCIAL

## 2023-11-15 ENCOUNTER — APPOINTMENT (OUTPATIENT)
Dept: RADIOLOGY | Facility: REHABILITATION | Age: 63
DRG: 057 | End: 2023-11-15
Attending: PHYSICAL MEDICINE & REHABILITATION
Payer: COMMERCIAL

## 2023-11-15 ENCOUNTER — APPOINTMENT (OUTPATIENT)
Dept: OCCUPATIONAL THERAPY | Facility: REHABILITATION | Age: 63
DRG: 057 | End: 2023-11-15
Attending: PHYSICAL MEDICINE & REHABILITATION
Payer: COMMERCIAL

## 2023-11-15 ENCOUNTER — APPOINTMENT (OUTPATIENT)
Dept: SPEECH THERAPY | Facility: REHABILITATION | Age: 63
DRG: 057 | End: 2023-11-15
Attending: PHYSICAL MEDICINE & REHABILITATION
Payer: COMMERCIAL

## 2023-11-15 LAB
ALBUMIN SERPL ELPH-MCNC: 4.49 G/DL (ref 3.75–5.01)
ALPHA1 GLOB SERPL ELPH-MCNC: 0.26 G/DL (ref 0.19–0.46)
ALPHA2 GLOB SERPL ELPH-MCNC: 0.88 G/DL (ref 0.48–1.05)
B-GLOBULIN SERPL ELPH-MCNC: 0.77 G/DL (ref 0.48–1.1)
GAMMA GLOB SERPL ELPH-MCNC: 1.1 G/DL (ref 0.62–1.51)
INTERPRETATION SERPL IFE-IMP: NORMAL
MONOCLON BAND OBS SERPL: NORMAL
MONOCLONAL PROTEIN NL11656: NORMAL G/DL
PATHOLOGY STUDY: NORMAL
PROT SERPL-MCNC: 7.5 G/DL (ref 6.3–8.2)

## 2023-11-15 PROCEDURE — 700102 HCHG RX REV CODE 250 W/ 637 OVERRIDE(OP): Performed by: HOSPITALIST

## 2023-11-15 PROCEDURE — A9270 NON-COVERED ITEM OR SERVICE: HCPCS | Performed by: PHYSICAL MEDICINE & REHABILITATION

## 2023-11-15 PROCEDURE — 97760 ORTHOTIC MGMT&TRAING 1ST ENC: CPT

## 2023-11-15 PROCEDURE — 700102 HCHG RX REV CODE 250 W/ 637 OVERRIDE(OP): Performed by: PHYSICAL MEDICINE & REHABILITATION

## 2023-11-15 PROCEDURE — 94760 N-INVAS EAR/PLS OXIMETRY 1: CPT

## 2023-11-15 PROCEDURE — 97032 APPL MODALITY 1+ESTIM EA 15: CPT

## 2023-11-15 PROCEDURE — 97129 THER IVNTJ 1ST 15 MIN: CPT

## 2023-11-15 PROCEDURE — A9270 NON-COVERED ITEM OR SERVICE: HCPCS | Performed by: HOSPITALIST

## 2023-11-15 PROCEDURE — 97130 THER IVNTJ EA ADDL 15 MIN: CPT

## 2023-11-15 PROCEDURE — 74230 X-RAY XM SWLNG FUNCJ C+: CPT

## 2023-11-15 PROCEDURE — 97112 NEUROMUSCULAR REEDUCATION: CPT

## 2023-11-15 PROCEDURE — 99233 SBSQ HOSP IP/OBS HIGH 50: CPT | Performed by: PHYSICAL MEDICINE & REHABILITATION

## 2023-11-15 PROCEDURE — 770010 HCHG ROOM/CARE - REHAB SEMI PRIVAT*

## 2023-11-15 PROCEDURE — 99232 SBSQ HOSP IP/OBS MODERATE 35: CPT | Performed by: HOSPITALIST

## 2023-11-15 PROCEDURE — 97530 THERAPEUTIC ACTIVITIES: CPT

## 2023-11-15 PROCEDURE — 97535 SELF CARE MNGMENT TRAINING: CPT

## 2023-11-15 RX ORDER — HYDRALAZINE HYDROCHLORIDE 50 MG/1
50 TABLET, FILM COATED ORAL EVERY 8 HOURS
Status: DISCONTINUED | OUTPATIENT
Start: 2023-11-15 | End: 2023-11-16

## 2023-11-15 RX ORDER — LANOLIN ALCOHOL/MO/W.PET/CERES
9 CREAM (GRAM) TOPICAL
Status: DISCONTINUED | OUTPATIENT
Start: 2023-11-15 | End: 2023-12-08

## 2023-11-15 RX ORDER — TRAZODONE HYDROCHLORIDE 50 MG/1
50 TABLET ORAL
Status: DISCONTINUED | OUTPATIENT
Start: 2023-11-15 | End: 2023-11-17

## 2023-11-15 RX ADMIN — HYDRALAZINE HYDROCHLORIDE 50 MG: 50 TABLET, FILM COATED ORAL at 14:51

## 2023-11-15 RX ADMIN — TRAZODONE HYDROCHLORIDE 50 MG: 50 TABLET ORAL at 20:40

## 2023-11-15 RX ADMIN — AMLODIPINE BESYLATE 10 MG: 5 TABLET ORAL at 08:17

## 2023-11-15 RX ADMIN — HYDRALAZINE HYDROCHLORIDE 50 MG: 50 TABLET, FILM COATED ORAL at 20:40

## 2023-11-15 RX ADMIN — HYDRALAZINE HYDROCHLORIDE 25 MG: 25 TABLET, FILM COATED ORAL at 05:18

## 2023-11-15 RX ADMIN — ACETAMINOPHEN 1000 MG: 500 TABLET ORAL at 05:18

## 2023-11-15 RX ADMIN — SENNOSIDES AND DOCUSATE SODIUM 2 TABLET: 50; 8.6 TABLET ORAL at 08:17

## 2023-11-15 RX ADMIN — SENNOSIDES AND DOCUSATE SODIUM 2 TABLET: 50; 8.6 TABLET ORAL at 20:40

## 2023-11-15 RX ADMIN — BACLOFEN 5 MG: 10 TABLET ORAL at 20:40

## 2023-11-15 RX ADMIN — ATORVASTATIN CALCIUM 40 MG: 40 TABLET, FILM COATED ORAL at 20:40

## 2023-11-15 RX ADMIN — MAGNESIUM HYDROXIDE 30 ML: 400 SUSPENSION ORAL at 12:03

## 2023-11-15 RX ADMIN — OMEPRAZOLE 20 MG: 20 CAPSULE, DELAYED RELEASE ORAL at 08:17

## 2023-11-15 ASSESSMENT — ENCOUNTER SYMPTOMS
PALPITATIONS: 0
DIZZINESS: 0
HEADACHES: 0
NAUSEA: 0
BLURRED VISION: 0
VOMITING: 0
HALLUCINATIONS: 0
SHORTNESS OF BREATH: 0
FEVER: 0

## 2023-11-15 ASSESSMENT — PAIN DESCRIPTION - PAIN TYPE: TYPE: ACUTE PAIN

## 2023-11-15 ASSESSMENT — GAIT ASSESSMENTS
GAIT LEVEL OF ASSIST: TOTAL ASSIST X 2
DISTANCE (FEET): 3
ASSISTIVE DEVICE: PARALLEL BARS

## 2023-11-15 NOTE — PROGRESS NOTES
NURSING DAILY NOTE    Name: Jason Lima   Date of Admission: 11/12/2023   Admitting Diagnosis: Thalamic hemorrhage (HCC)  Attending Physician: Lisa Lee D.o.  Allergies: Penicillins    Safety  Patient Assist     Patient Precautions  Fall Risk  Precaution Comments  Left hemiparesis, left visual inattention.  Bed Transfer Status  Total Assist X 2  Toilet Transfer Status   Total Assist X 2  Assistive Devices  Rails, Wheelchair  Oxygen  Room air w/o2 available  Diet/Therapeutic Dining  Current Diet Order   Procedures    Diet Order Diet: Level 7 - Easy to Chew (cut up into bite size pieces.); Liquid level: Level 0 - Thin; Second Modifier: (optional): Consistent CHO (Diabetic)     Pill Administration  whole  Agitated Behavioral Scale     ABS Level of Severity       Fall Risk  Has the patient had a fall this admission?   Yes  Shellie Hamilton Fall Risk Scoring  21, HIGH RISK  Fall Risk Safety Measures  bed alarm and chair alarm    Vitals  Temperature: (P) 36.7 °C (98.1 °F)  Temp src: (P) Oral  Pulse: (P) 73  Respiration: (P) 18  Blood Pressure: (!) 145/96  Blood Pressure MAP (Calculated): 112 MM HG  BP Location: Left, Upper Arm  Patient BP Position: Sitting     Oxygen  Pulse Oximetry: 94 %  O2 (LPM):  (2 l prn)  O2 Delivery Device: Room air w/o2 available    Bowel and Bladder  Last Bowel Movement  11/13/23  Stool Type  Type 6: Fluffy pieces with ragged edges, a mushy stool  Bowel Device     Continent  Bladder: Continent void   Bowel: Continent movement  Bladder Function  Urine Void (mL): 420 ml (urinal)  Number of Times Voided: 1  Urine Color: Yellow  Genitourinary Assessment   Bladder Assessment (WDL):  Within Defined Limits  Echols Catheter: Not Applicable  Urine Color: Yellow  Bladder Device: Urinal  Time Void: Yes  Bladder Scan: Post Void  $ Bladder Scan Results (mL): 26    Skin  Polo Score   15  Sensory Interventions   Bed Types: Standard/Trauma  Mattress  Skin Preventative Measures: Pillows in Use for Support / Positioning  Moisture Interventions         Pain  Pain Rating Scale  0 - No Pain  Pain Location  Back  Pain Location Orientation  Upper  Pain Interventions   Declines    ADLs    Bathing   Shower, Staff  Linen Change   Complete  Personal Hygiene     Chlorhexidine Bath      Oral Care  Brushed Teeth (assist)  Teeth/Dentures     Shave     Nutrition Percentage Eaten  Breakfast, Between % Consumed  Environmental Precautions  Treaded Slipper Socks on Patient, Personal Belongings, Wastebasket, Call Bell etc. in Easy Reach, Transferred to Stronger Side, Report Given to Other Health Care Providers Regarding Fall Risk, Bed in Low Position, Communication Sign for Patients & Families, Mobility Assessed & Appropriate Sign Placed  Patient Turns/Positioning  Patient Declines and Understands Importance  Patient Turns Assistance/Tolerance  Tolerates Well, Assistance of Two or More  Bed Positions  Bed Controls On, Bed Locked  Head of Bed Elevated  Self regulated      Psychosocial/Neurologic Assessment  Psychosocial Assessment  Psychosocial (WDL):  Within Defined Limits  Neurologic Assessment  Neuro (WDL): Exceptions to WDL  Level of Consciousness: Alert  Orientation Level: Oriented X4  Cognition: Appropriate judgement, Follows commands, Appropriate safety awareness  Speech: Clear, Slurred (some words are slurred)  Facial Symmetry: Left facial drooping  Pupil Assesment: Yes  R Pupil Size (mm): 3  R Pupil Shape / Description: Round  R Pupil Reaction: Brisk  L Pupil Size (mm): 3  L Pupil Shape / Description: Round  L Pupil Reaction: Brisk  Reflexes: Cough  Cough Reflex: Present  Motor Function/Sensation Assessment: Dorsiflexion, Motor response, Sensation, Motor strength  R Foot Dorsiflexion: Strong  L Foot Dorsiflexion: Absent  RUE Motor Response: Responds to commands, Normal extension, Normal flexion  RUE Sensation: Full sensation  Muscle Strength Right Arm: Normal  Strength Against Gravity and Full Resistance  LUE Motor Response: Flaccid, Movement to painful stimulus  LUE Sensation: Decreased  Muscle Strength Left Arm: Trace Contraction but No Movement  RLE Motor Response: Responds to commands, Movement to painful stimulus  RLE Sensation: Full sensation  Muscle Strength Right Leg: Good Strength Against Gravity and Moderate Resistance  LLE Motor Response: Movement to painful stimulus  LLE Sensation: Decreased  Muscle Strength Left Leg: Trace Contraction but No Movement  EENT (WDL):  WDL Except    Cardio/Pulmonary Assessment  Edema      Respiratory Breath Sounds  RUL Breath Sounds: Clear  RML Breath Sounds: Clear  RLL Breath Sounds: Diminished  MOHAMUD Breath Sounds: Clear  LLL Breath Sounds: Diminished  Cardiac Assessment   Cardiac (WDL):  Within Defined Limits

## 2023-11-15 NOTE — FLOWSHEET NOTE
11/15/23 0635   Events/Summary/Plan   Events/Summary/Plan RA pulse ox check   Vital Signs   Pulse 69   Respiration 16   Pulse Oximetry 95 %   $ Pulse Oximetry (Spot Check) Yes   Respiratory Assessment   Level of Consciousness Alert   Chest Exam   Work Of Breathing / Effort Within Normal Limits   Oxygen   O2 Delivery Device Room air w/o2 available

## 2023-11-15 NOTE — DISCHARGE PLANNING
CM spoke with patients significant other Vielka to update on IDT and DC date of 12/4/23.  Answered questions.  Vielka stated she's planning on helping at least part time with patient at his home at [place].  She stated patient proposed to her while they were on their vacation in [place].  Patients brother is only in town two weeks at a time and travels the rest of the time for his job.  Vielka is working on DC plan still.  She lives/works in [place] as a nurse at [employer].  CM will continue to monitor for DC needs.      4:20pm- CM met with patient to discuss above.  Answered questions.  CM will continue to monitor for DC needs.

## 2023-11-15 NOTE — PROGRESS NOTES
[facility] Daily Progress Note    Date of Service  11/15/2023    Chief Complaint:  Hypertension  Diabetes  ROMÁN    Interval History:  Discussed about his BP still elevated and have increased his Hydralazine med.    Review of Systems  Review of Systems   Constitutional:  Negative for fever.   Eyes:  Negative for blurred vision.   Respiratory:  Negative for shortness of breath.    Cardiovascular:  Negative for palpitations.   Gastrointestinal:  Negative for nausea and vomiting.   Neurological:  Negative for dizziness and headaches.   Psychiatric/Behavioral:  Negative for hallucinations.         Physical Exam  Temp:  [36.6 °C (97.9 °F)-37.1 °C (98.7 °F)] 37.1 °C (98.7 °F)  Pulse:  [67-84] 84  Resp:  [16-18] 16  BP: (145-166)/() 156/92  SpO2:  [93 %-95 %] 95 %    Physical Exam  Vitals and nursing note reviewed.   Constitutional:       General: He is not in acute distress.  HENT:      Mouth/Throat:      Mouth: Mucous membranes are moist.      Pharynx: Oropharynx is clear.   Eyes:      General: No scleral icterus.  Cardiovascular:      Rate and Rhythm: Normal rate and regular rhythm.      Heart sounds: No murmur heard.  Pulmonary:      Effort: Pulmonary effort is normal.      Breath sounds: Normal breath sounds. No stridor.   Abdominal:      General: There is no distension.      Palpations: Abdomen is soft.      Tenderness: There is no abdominal tenderness.   Musculoskeletal:      Cervical back: No rigidity.      Right lower leg: No edema.      Left lower leg: No edema.   Skin:     General: Skin is warm and dry.      Findings: No rash.   Neurological:      Mental Status: He is alert and oriented to person, place, and time.   Psychiatric:         Mood and Affect: Mood normal.         Behavior: Behavior normal.         Fluids    Intake/Output Summary (Last 24 hours) at 11/15/2023 0916  Last data filed at 11/15/2023 0800  Gross per 24 hour   Intake 730 ml   Output 1620 ml   Net -890 ml         Laboratory  Recent  Labs     11/13/23  0525   WBC 4.8   RBC 4.26*   HEMOGLOBIN 13.5*   HEMATOCRIT 37.7*   MCV 88.5   MCH 31.7   MCHC 35.8   RDW 38.3   PLATELETCT 213   MPV 10.7       Recent Labs     11/13/23  0525 11/14/23  0533   SODIUM 139  --    POTASSIUM 4.4  --    CHLORIDE 103  --    CO2 25  --    GLUCOSE 134*  --    BUN 51* 41*   CREATININE 2.81* 2.66*   CALCIUM 9.1  --                      Imaging    Assessment/Plan  Hemorrhagic stroke (HCC)- (present on admission)  Assessment & Plan  Right thalamic hemorrhage on 10/23 while visiting [place]  Presented there with sudden onset HA, L HP, and /100    ROMÁN (acute kidney injury) (HCC)- (present on admission)  Assessment & Plan  Bun/Cr: 61/2.84 --> 51/2.81 --> 41/2.66 (11/14)  US: medical renal disease, no hydronephrosis  Appears to have CKD (bun/cr elevated in 2017)  S/P IVF's x 2 runs  Will check am levels  Cont to monitor    DM (diabetes mellitus) (HCC)- (present on admission)  Assessment & Plan  Hba1c: 6.4  BS have been ok  Currently not on any diabetic meds  Will stop accuchecks  Note: home meds include Metformin (but now has renal failure)  Cont to monitor    Hyperlipidemia- (present on admission)  Assessment & Plan  Cont Lipitor    Hypertension- (present on admission)  Assessment & Plan  BP elevated  Cont Norvasc  Cont Hydralazine --> will increased dose  Cont to monitor

## 2023-11-15 NOTE — FLOWSHEET NOTE
11/14/23 1637   Events/Summary/Plan   Events/Summary/Plan SpO2 check   Vital Signs   Pulse 70   Respiration 16   Pulse Oximetry 95 %   $ Pulse Oximetry (Spot Check) Yes   Respiratory Assessment   Respiratory Pattern Within Normal Limits   Level of Consciousness Alert   Chest Exam   Work Of Breathing / Effort Within Normal Limits   Oxygen   O2 Delivery Device None - Room Air   Non-Invasive Ventilation [employer]   Nocturnal CPAP or BIPAP CPAP - [facility]  (7:12 hrs CPAP use last night)

## 2023-11-15 NOTE — PROGRESS NOTES
NURSING DAILY NOTE    Name: Jason Lima   Date of Admission: 11/12/2023   Admitting Diagnosis: Thalamic hemorrhage (HCC)  Attending Physician: Lisa Lee D.o.  Allergies: Penicillins    Safety  Patient Assist     Patient Precautions  Fall Risk  Precaution Comments  Left hemiparesis, left visual inattention.  Bed Transfer Status  Total Assist X 2  Toilet Transfer Status   Total Assist X 2  Assistive Devices  Rails, Wheelchair  Oxygen  None - Room Air  Diet/Therapeutic Dining  Current Diet Order   Procedures    Diet Order Diet: Level 7 - Easy to Chew (cut up into bite size pieces.); Liquid level: Level 0 - Thin; Second Modifier: (optional): Consistent CHO (Diabetic)     Pill Administration  whole  Agitated Behavioral Scale     ABS Level of Severity       Fall Risk  Has the patient had a fall this admission?   Yes  Shellie Hamilton Fall Risk Scoring  21, HIGH RISK  Fall Risk Safety Measures  bed alarm, chair alarm, seatbelt alarm, and poor balance    Vitals  Temperature: 36.7 °C (98.1 °F)  Temp src: Oral  Pulse: 70  Respiration: 16  Blood Pressure: (!) 146/92  Blood Pressure MAP (Calculated): 110 MM HG  BP Location: Right, Upper Arm  Patient BP Position: Supine     Oxygen  Pulse Oximetry: 94 %  O2 (LPM):  (2 l prn)  O2 Delivery Device: None - Room Air    Bowel and Bladder  Last Bowel Movement  11/13/23  Stool Type  Type 6: Fluffy pieces with ragged edges, a mushy stool  Bowel Device     Continent  Bladder: Continent void   Bowel: Continent movement  Bladder Function  Urine Void (mL): 300 ml  Number of Times Voided: 1  Urine Color: Yellow  Genitourinary Assessment   Bladder Assessment (WDL):  Within Defined Limits  Echols Catheter: Not Applicable  Urine Color: Yellow  Bladder Device: Urinal  Time Void: No  Bladder Scan: Post Void  $ Bladder Scan Results (mL): 26    Skin  Polo Score   15  Sensory Interventions   Bed Types: Standard/Trauma Mattress  Skin  Preventative Measures: Pillows in Use for Support / Positioning  Moisture Interventions         Pain  Pain Rating Scale  0 - No Pain  Pain Location  Back  Pain Location Orientation  Upper  Pain Interventions   Declines    ADLs    Bathing   Shower, Staff  Linen Change   Complete  Personal Hygiene     Chlorhexidine Bath      Oral Care  Brushed Teeth (assist)  Teeth/Dentures     Shave     Nutrition Percentage Eaten  *  * Meal *  *, Dinner, Between % Consumed  Environmental Precautions  Treaded Slipper Socks on Patient, Personal Belongings, Wastebasket, Call Bell etc. in Easy Reach, Transferred to Stronger Side, Report Given to Other Health Care Providers Regarding Fall Risk, Bed in Low Position, Communication Sign for Patients & Families, Mobility Assessed & Appropriate Sign Placed  Patient Turns/Positioning  Patient Declines and Understands Importance  Patient Turns Assistance/Tolerance  Tolerates Well, Assistance of Two or More  Bed Positions  Bed Controls On, Bed Locked  Head of Bed Elevated  Self regulated      Psychosocial/Neurologic Assessment  Psychosocial Assessment  Psychosocial (WDL):  Within Defined Limits  Neurologic Assessment  Neuro (WDL): Exceptions to WDL  Level of Consciousness: Alert  Orientation Level: Oriented X4  Cognition: Appropriate judgement, Follows commands, Appropriate safety awareness  Speech: Clear, Slurred (some words are slurred)  Facial Symmetry: Left facial drooping  Pupil Assesment: Yes  R Pupil Size (mm): 3  R Pupil Shape / Description: Round  R Pupil Reaction: Brisk  L Pupil Size (mm): 3  L Pupil Shape / Description: Round  L Pupil Reaction: Brisk  Reflexes: Cough  Cough Reflex: Present  Motor Function/Sensation Assessment: Dorsiflexion, Motor response, Sensation, Motor strength  R Foot Dorsiflexion: Strong  L Foot Dorsiflexion: Absent  RUE Motor Response: Responds to commands, Normal extension, Normal flexion  RUE Sensation: Full sensation  Muscle Strength Right Arm: Normal  Strength Against Gravity and Full Resistance  LUE Motor Response: Flaccid, Movement to painful stimulus  LUE Sensation: Decreased  Muscle Strength Left Arm: Trace Contraction but No Movement  RLE Motor Response: Responds to commands, Movement to painful stimulus  RLE Sensation: Full sensation  Muscle Strength Right Leg: Good Strength Against Gravity and Moderate Resistance  LLE Motor Response: Movement to painful stimulus  LLE Sensation: Decreased  Muscle Strength Left Leg: Trace Contraction but No Movement  EENT (WDL):  WDL Except    Cardio/Pulmonary Assessment  Edema      Respiratory Breath Sounds  RUL Breath Sounds: Clear  RML Breath Sounds: Clear  RLL Breath Sounds: Diminished  MOHAMUD Breath Sounds: Clear  LLL Breath Sounds: Diminished  Cardiac Assessment   Cardiac (WDL):  Within Defined Limits

## 2023-11-15 NOTE — THERAPY
"Recreational Therapy   Initial Evaluation     Patient Name: Jason Lima  Age:  62 y.o., Sex:  male  Medical Record #: [id number]  Today's Date: 11/15/2023     Subjective    \"Why did this happen to me?\"    Objective       11/15/23 1101   Procedural Tracking   Procedural Tracking Community Re-Integration;Leisure Skills Awareness;Cognitive Skills Training;Gross Motor Functional Leisure Skills;Group Treatment;Fine Motor Functional Leisure Skills;Social Skills Training;Leisure Skills Development;Community Skills Development   Treatment Time   Total Time Spent (mins) 30   Total Time Missed 0   Leisure History   Leisure Interests Other (Comments);Hobbies;Travel;Family  (son and gradn children in [place], house in [place] ([place] loves to travel)   Pre-Morbid Leisure Lifestyle Individual;Occasionally Active   Prior Living Arrangements   Lives with - Patient's Self Care Capacity Alone and Able to Care For Self   Steps Into Home 1   Steps In Home 0   Ambulation Independent   Assistive Devices Used None   Driving / Transportation Driving Independent   Functional Ability Status - Physical   Endurance Good    Right  Strong   Left  Weak   Right Arm Strong   Left Arm Weak   Right Leg Strong   Left Leg Weak   Functional Ability Status - Cognitive   Attention Span Remains on Task   Comprehension Follows One Step Commands   Judgment Needs Assistance;Impaired   Functional Ability Status - Emotional    Affect Sad   Mood Appropriate;Depressed   Behavior Appropriate   Leisure Competence Measure   Leisure Awareness Maximal Assist   Leisure Attitude Maximal Assist   Leisure Skills Maximal Assist   Cultural / Social Behaviors Minimal Assist   Interpersonal Skills Moderate Assist   Community Integration Skills Maximal Assist   Social Contact Maximal Assist   Community Participation Maximal Assist   Clinical Impression   Clinical Impression Impaired Fine Motor Leisure Functioning;Impaired Gross Motor Leisure Functioning;Impaired " Endurance;Impaired Cognitive Leisure Functioning;Impaired Communication Skills;Impaired Relaxation and Coping Skills;Impaired Community Skills;Impaired Leisure Skills;Impaired Group Interaction Skills;Impaired Social Skills   Current Discharge Plan   Current Discharge Plan Return to Prior Living Situation   Benefit    Benefit Patient would Benefit from Inpatient Recreational Therapy to Maximize Independent Leisure Functioning    Interdisciplinary Plan of Care Collaboration   IDT Collaboration with  Nursing;Hospitalist RN;Therapy Tech   Patient Position at End of Therapy Seated;Other (Comments)  (with therapy tech for tdine)   Strengths & Barriers   Strengths Able to follow instructions;Alert and oriented;Willingly participates in therapeutic activities;Effective communication skills;Manages pain appropriately;Independent prior level of function   Barriers Decreased endurance;Emotional lability;Generalized weakness;Fatigue;Impaired activity tolerance;Impaired balance;Impaired carryover of learning;Impaired insight/denial of deficits;Lack of motivation;Limited mobility;Pain     Patient agreeable to new leisure activities. Patient's primary leisure activities includes traveling. He owns a home in [place] he states is accessible. Patient demonstrating feelings of frustration and depression regarding current situation. Patient feels as though he will have to retire because of this.    Patient open to trying yoga - love your brain yoga techniques will be incorporated. Patient having trouble sleeping. Introduced using Insight and Audiobooks to aid with sleep.     Assessment  Patient is 62 y.o. male with a diagnosis of thalamic hemorrhage.  Additional factors influencing patient status / progress (ie: cognitive factors, co-morbidities, social support, etc): past medical history of hypertension, diabetes, hyperlipidemia and questionable history of CKD;  who presented on 11/11/2023  1:07 AM with left-sided weakness after stroke  "in [place] 3 weeks ago.  Per documentation, patient was visiting [facility] when patient had a sudden headache followed by left-sided weakness on 10/23/2023.  Initial work-up done at the hospital in [place] revealed hyper tension with /100 and creatinine 3.2.  Patient was deemed medically cleared to leave the hospital admitted after the stroke, he completed a flight to [place] and was admitted to the emergency department in preparation for work-up for admit to rehab.  Evaluation completed in the emergency department prior to rehab reveals a CT head notable for right thalamic hemorrhage.  At time of evaluation at the [facility] ED creatinine 3.59 and hypertension of 187/119, patient required rescue labetalol to decrease blood pressures.  Patient is now on amlodipine and hydralazine.\"  Patient was newly started on baclofen on 11/11, patient's blood pressures improved since original presentation to the emergency department from the airport.        Plan  Recommend Recreational Therapy 30-60 minutes per day 3-4 days per week for 21 days for the following treatments:  Community Re-Integration, Community Skills Development, Leisure Skills Awareness, Leisure Skills Development, Social Skills Training, Cognitive Skills Training, Gross Motor Functional Leisure Skills, Fine Motor Functional Leisure Skills, and Group Treatment    Passport items to be completed:  Verbalize two positive leisure activities, discuss returning to work, hobbies, community groups or volunteer activities, explore community resources     Goals:  Long term and short term goals have been discussed with patient and they are in agreement.    Recreation Therapy Problems (Active)       Problem: Recreation Therapy       Dates: Start:  11/15/23         Goal: STG-Within one week, patient will identify two new leisure skills.        Dates: Start:  11/15/23            Goal: STG-Within one week, patient will identify two new coping skills.        Dates: Start:  11/15/23    "         Goal: LTG-By discharge, patient will identify and demonstrate three new leisure skills.        Dates: Start:  11/15/23            Goal: LTG-By discharge, patient will identify and demonstrate three new coping skills.        Dates: Start:  11/15/23

## 2023-11-15 NOTE — CARE PLAN
Problem: Balance  Goal: STG-Within one week, patient will maintain static standing c/ RUE support on rail at ModA or better.   Outcome: Not Met     Problem: Mobility  Goal: STG-Within one week, patient will ambulate distances >/= 30' c/ RHR and ModA or better.   Outcome: Not Met     Problem: Mobility Transfers  Goal: STG-Within one week, patient will perform bed mobility c/ ModA or better.   Outcome: Not Met     Problem: Mobility Transfers  Goal: STG-Within one week, patient will transfer bed to chair c/ MaxA x 1 or better.   Outcome: Progressing  Note: Inconsistent performance, recommend slide board for safety at this time     Problem: Mobility  Goal: STG-Within one week, patient will propel wheelchair household distances c/ hemitechnique at [place] or better.   Outcome: Met

## 2023-11-15 NOTE — CARE PLAN
Problem: Swallowing STGs  Goal: STG-Within one week, patient will safely swallow (7) regular easy chew and cut up and (0) thin liquids with no overt s/sx of aspiration   Outcome: Not Met  Note: Patient displays occasional coughing on thin liquids displays occasional anterior leakage, but is overall tolerating without distress.  MBSS scheduled for today.     Problem: Problem Solving STGs  Goal: STG-Within one week, patient will perform alternating attention tasks with 75% acc.  Outcome: Not Met  Note: Patient has preformed alternating attention tasks with 40% acc.     Problem: Memory STGs  Goal: STG-Within one week, patient will recall daily events and safety sequencing with 60% acc with use of external memory aids.  Outcome: Not Met  Note: Educated patient on use of memory log and memory strategies this date.  Min-mod A needed for 60% acc.

## 2023-11-15 NOTE — THERAPY
Physical Therapy   Daily Treatment     Patient Name: Jason Lima  Age:  62 y.o., Sex:  male  Medical Record #: [id number]  Today's Date: 11/14/2023     Precautions  Precautions: Fall Risk  Comments: Left hemiparesis, left visual inattention.    Subjective    Pt was agreeable to therapeutic interventions and rationales. No complaints.      Objective       11/14/23 1431   PT Charge Group   PT Neuromuscular Re-Education / Balance (Units) 2   PT Therapeutic Activities (Units) 2   PT Total Time Spent   PT Individual Total Time Spent (Mins) 60   Precautions   Precautions Fall Risk   Comments Left hemiparesis, left visual inattention.   Wheelchair Functional Level of Assist   Wheelchair Assist Minimal Assist   Distance Wheelchair (Feet or Distance) 600  (+ 200)   Wheelchair Description Assistance with steering;Extra time;Adaptive equipment;Requires incidental assist;Safety concerns;Supervision for safety;Verbal cueing  (jade technique, vc to name items to patient's left for enviornmental scanning/ awareness)   Transfer Functional Level of Assist   Bed, Chair, Wheelchair Transfer Total Assist X 2   Bed Chair Wheelchair Transfer Description Set-up of equipment;Slideboard transfer from bed to wheelchair;Increased time;Initial preparation for task;Adaptive equipment;Verbal cueing  (slide board transfer training to right/ left 3x, emphasis on wt shifting head to hip relationship, moving slowly, motor planning, and safety. Mod/max x1, SBA x1 d/t inconsistencies)   Bed Mobility    Supine to Sit Maximal Assist   Scooting Maximal Assist   Rolling Moderate Assist to Lt.   Neuro-Muscular Treatments   Neuro-Muscular Treatments Weight Shift Left;Weight Shift Right;Verbal Cuing;Tactile Cuing;Sequencing;Postural Facilitation;Postural Changes;Joint Approximation;Facilitation;Compensatory Strategies;Anterior weight shift;Co-Contraction   Comments EOM sitting balance x 15 min during core stabilization/ PNF using rhythmic stabilization and  overflow/ irradition, emphasis on head righting and midline orientation (use of enviornmental cues known to be vertical. Seated in wc with mirror ant to pt, and head righting, gentle stretching to lengthen left c spine >30 sec x 3.   Interdisciplinary Plan of Care Collaboration   IDT Collaboration with  Family / Caregiver;Therapy Tech;Certified Nursing Assistant   Patient Position at End of Therapy Seated;Family / Friend in Room;Wrist Restraints Applied  (arm tray added for L shoulder/ elbow support)   Collaboration Comments Partner/ sig other present for education regarding forced use principles (touching LUE), sitting to patient's left, jade wc mobility, with cues to scan left. CNA encouraged to use SB for transfers, with 2nd person at this time for pt and staff safety.         Assessment    Pt participated in slide board transfers to right and left for safety and weight shifting principles. Pt was able to improve within the session, on head righting, and midline orientation through use of irradiation/ overflow techniques, and use of environmental cues. Pt participated in jade wc mobility, with emphasis on left sided environmental scanning, to promote left awareness, and compensate for tendency to only look right.     Strengths: Able to follow instructions, Independent prior level of function, Motivated for self care and independence, Pleasant and cooperative, Supportive family, Willingly participates in therapeutic activities  Barriers: Decreased endurance, Hemiparesis, Difficulty following instructions, Fatigue, Hypertension, Impaired activity tolerance, Impaired balance, Impaired insight/denial of deficits, Impaired functional cognition, Impaired sleep pattern, Impulsive, Limited mobility, Visual impairment, Poor family support (lives alone)    Plan    Head righting/ midline orientation  Bed mobility/ PNF rolling  Transfers with SB, emphasis on motor planning  Seated EOB balance  Wc mob with jade technique and  "emphasis on left environmental scanning/ awareness  Initiate HEP  Forced use principles for tanvir body    DME  PT DME Recommendations  Wheelchair: 18\" Width  Cushion: Specialty (See other comments) (TBD pending ambulation progress)  Assistive Device: Tanvir Walker (TBD pending progress, currently 2 person assist for gait)  Additional Equipment:  (TBD)    Passport items to be completed:  Get in/out of bed safely, in/out of a vehicle, safely use mobility device, walk or wheel around home/community, navigate up and down stairs, show how to get up/down from the ground, ensure home is accessible, demonstrate HEP, complete caregiver training    Physical Therapy Problems (Active)       Problem: Balance       Dates: Start:  11/13/23         Goal: STG-Within one week, patient will maintain static standing c/ RUE support on rail at ModA or better.        Dates: Start:  11/13/23               Problem: Mobility       Dates: Start:  11/13/23         Goal: STG-Within one week, patient will propel wheelchair household distances c/ hemitechnique at Renata or better.        Dates: Start:  11/13/23            Goal: STG-Within one week, patient will ambulate distances >/= 30' c/ RHR and ModA or better.        Dates: Start:  11/13/23               Problem: Mobility Transfers       Dates: Start:  11/13/23         Goal: STG-Within one week, patient will perform bed mobility c/ ModA or better.        Dates: Start:  11/13/23            Goal: STG-Within one week, patient will transfer bed to chair c/ MaxA x 1 or better.        Dates: Start:  11/13/23               Problem: PT-Long Term Goals       Dates: Start:  11/13/23         Goal: LTG-By discharge, patient will propel wheelchair >/= 300' at Neto or better.        Dates: Start:  11/13/23            Goal: LTG-By discharge, patient will ambulate >/= 50' c/ LRAD at Renata or better.        Dates: Start:  11/13/23            Goal: LTG-By discharge, patient will transfer one surface to another c/ " Renata or better.        Dates: Start:  11/13/23            Goal: LTG-By discharge, patient will ambulate up/down 1 step c/ LRAD at Renata or better.        Dates: Start:  11/13/23            Goal: LTG-By discharge, patient will transfer in/out of a car c/ ModA or better.        Dates: Start:  11/13/23

## 2023-11-15 NOTE — CARE PLAN
Problem: Dressing  Goal: STG-Within one week, patient will dress UB with Mod A.  Outcome: Not Met  Goal: STG-Within one week, patient will dress LB with Mod A.  Outcome: Not Met     Problem: Functional Transfers  Goal: STG-Within one week, patient will transfer to toilet with Max/Mod A.  Outcome: Not Met  Goal: STG-Within one week, patient will transfer to step in shower with Max A.  Outcome: Not Met     Problem: Bathing  Goal: STG-Within one week, will assess bathing.  Outcome: Met

## 2023-11-15 NOTE — CARE PLAN
The patient is Stable - Low risk of patient condition declining or worsening    Shift Goals  Clinical Goals: No decline in neuro status  Patient Goals: rest  Family Goals: kirit    Problem: Knowledge Deficit - Standard  Goal: Patient and family/care givers will demonstrate understanding of plan of care, disease process/condition, diagnostic tests and medications  Outcome: Progressing  Note: Pt, alert and oriented 4, verbalize concerns and needs. Pt will remain free from falls, Call light within reach, bed in lowest position. Oxycodone and Tylenol for pain are available as needed.

## 2023-11-15 NOTE — THERAPY
Occupational Therapy  Daily Treatment     Patient Name: Jason Lima  Age:  62 y.o., Sex:  male  Medical Record #: [id number]  Today's Date: 11/15/2023     Precautions  Precautions: (P) Fall Risk  Comments: (P) Left hemiparesis, left visual inattention.         Subjective    Pt encountered for OT supine in bed. Pt reported he felt tired and that his CPAP was not working last night. Nursing informed.      Objective       11/15/23 0801   OT Charge Group   OT Orthotics Treatment - Initial (Units) 1   OT Self Care / ADL (Units) 3   OT Total Time Spent   OT Individual Total Time Spent (Mins) 60   Precautions   Precautions Fall Risk   Comments Left hemiparesis, left visual inattention.   Functional Level of Assist   Upper Body Dressing Maximal Assist   Upper Body Dressing Description Assit with threading arms through sleeves;Assist with pulling shirt over head;Increased time;Supervision for safety  (MaxA for dynamic sitting balance at EOM; VC to maintain an upright posture (L lateral lean during dynamic movements). MaxA for UB dressing using hemiparetic dressing techniques for donning a loose tshirt and zip-up sweater and doffing a zip-up sweater)   Bed, Chair, Wheelchair Transfer Total Assist X 2   Bed Chair Wheelchair Transfer Description Increased time;Assist with one limb;Set-up of equipment;Supervision for safety;Squat pivot transfer to wheelchair;Verbal cueing  (pt impulsive during transfers requiring VC for safe sequencing. TAx2 squat pivot from WC <> EOM)   Bed Mobility    Supine to Sit Maximal Assist   Scooting Maximal Assist   Skilled Intervention Verbal Cuing;Sequencing;Compensatory Strategies  (Assistance needed d/t left lateral lean)   Interdisciplinary Plan of Care Collaboration   IDT Collaboration with  Nursing   Patient Position at End of Therapy Seated;Chair Alarm On  (Handoff to rehab aide to bring to breakfast)   Collaboration Comments Collobortated regarding CPAP not working, per pt report   OT DME  Recommendations   Bathroom Equipment 3 in 1 Commode;Tub Transfer Bench (Medicaid Only)   Strengths & Barriers   Strengths Able to follow instructions;Independent prior level of function;Motivated for self care and independence;Pleasant and cooperative;Supportive family;Alert and oriented   Barriers Decreased endurance;Fatigue;Generalized weakness;Hemiparesis;Impaired activity tolerance;Impaired balance;Limited mobility;Spasticity     Dynamic sitting balance at the EOM in front of a mirror to facilitate core strength and sitting balance required for seated ADLs. Mod to min VC to maintain an upright posture. MaxA to regain sitting balance following left-sided dynamic movement.     Assessment    Overall, steady progress with functional transfers; however, pt's impulsiveness and L lateral lean puts him at high risk for falls during transfers. Pt limited by fatigue and poor dynamic sitting balance this session.      Strengths: (P) Able to follow instructions, Independent prior level of function, Motivated for self care and independence, Pleasant and cooperative, Supportive family, Alert and oriented  Barriers: (P) Decreased endurance, Fatigue, Generalized weakness, Hemiparesis, Impaired activity tolerance, Impaired balance, Limited mobility, Spasticity    Plan    Cont ADLs, functional transfers, and thera act/ex to maximize functional recovery for safe DC home. Set-up  next session if time permits.       DME  OT DME Recommendations  Bathroom Equipment: (P) 3 in 1 Commode, Tub Transfer Bench (Medicaid Only)  Additional Equipment:  (TBD)    Passport items to be completed:  Perform bathroom transfers, complete dressing, complete feeding, get ready for the day, prepare a simple meal, participate in household tasks, adapt home for safety needs, demonstrate home exercise program, complete caregiver training     Occupational Therapy Goals (Active)       Problem: Bathing       Dates: Start:  11/13/23         Goal:  STG-Within one week, will assess bathing.       Dates: Start:  11/13/23               Problem: Dressing       Dates: Start:  11/13/23         Goal: STG-Within one week, patient will dress UB with Mod A.       Dates: Start:  11/13/23            Goal: STG-Within one week, patient will dress LB with Mod A.       Dates: Start:  11/13/23               Problem: Functional Transfers       Dates: Start:  11/13/23         Goal: STG-Within one week, patient will transfer to toilet with Max/Mod A.       Dates: Start:  11/13/23            Goal: STG-Within one week, patient will transfer to step in shower with Max A.       Dates: Start:  11/13/23               Problem: OT Long Term Goals       Dates: Start:  11/13/23         Goal: LTG-By discharge, patient will complete basic self care tasks at Mod Independent level.       Dates: Start:  11/13/23            Goal: LTG-By discharge, patient will perform bathroom transfers at Mod Independent level.       Dates: Start:  11/13/23

## 2023-11-15 NOTE — THERAPY
"Speech Language Pathology  Video Swallow     Patient Name:  Jason Lima  AGE:  62 y.o., SEX:  male  Medical Record #:  [id number]  Today's Date: 11/15/2023     Objective    MBSS completed at this time, pt pleasant and cooperative throughout.     Discussed the risks, benefits, and alternatives of the VFSS procedure. Patient/family acknowledged and agreed to proceed.    Assessment       11/15/23 1303   SLP Total Time Spent   SLP Individual Total Time Spent (Mins) 30   History / Background Information   Prior Level of Function for Eating / Swallowing WFL   Diagnosis CVA   Dysphagia Symptoms Warranting Video Swallow cough with intake   General Anatomy / Physiology WFL   \"Dry\" / Saliva Swallow Observations not tested   Dentition Other (See Comments)  (implants)   Procedure   Patient Seated in  wc   Seated at (Degrees) 90   Views Completed Lateral   Fluoroscopy Time 65   Consistency   Consistency Regular Solid;Thin Liquid;Barium Tablet   Thin Liquid   PAS Score 2   Timing During swallow   Vallecular Residue Trace (1%-5%)   Pyriform Sinus Residue None (0%)   Regular Solid   PAS Score 1   Timing N/A   Vallecular Residue Trace (1%-5%)   Pyriform Sinus Residue None (0%)   Oral Phase   Oral Phase WDL   Pharyngeal Phase   Pharyngeal Phase WDL   Esophageal Phase   Esophageal Phase WDL   Compensatory Strategies Attempted   Multiple Swallows pt indep implementing sencond swallow which clears oral and pharyngeal residue   Severity Rating   Severity Rating EAGLE   EAGLE Normal   Impression   Dysphagia Present No   Prognosis   Prognosis for Improvement Excellent   Barriers to Improvement impulsivity   Positive Indicators for Improvement no aspiration   Recommendations   Diet / Liquid Recommendation Regular - Easy to Chew (7);Thin (0)   Medication Administration  Whole with Liquid Wash   Strategies / Precautions Multiple Swallows;Small Sips;Small Bites   Oral Care Q2h   SLP Contact Information (Name / Extension)  ext [phone] "   Interdisciplinary Plan of Care Collaboration   IDT Collaboration with  Speech Therapist;Nursing   Patient Position at End of Therapy Seated;Chair Alarm On;Call Light within Reach;Tray Table within Reach;Phone within Reach   Collaboration Comments results of mbss and swallow WFL         Consistency PAS Score Timing Residue Comments   Thin Liquid (P) 2 (P) During swallow Vallecular Residue: (P) Trace (1%-5%)  Pyriform Sinus Residue: (P) None (0%)    Mildly Thick             Liquidised             Pudding             Soft & Bite Sized             Regular Solid (P) 1 (P) N/A Vallecular Residue: (P) Trace (1%-5%)  Pyriform Sinus Residue: (P) None (0%)    Mixed             Barium Tablet               Penetration-Aspiration Scale (PAS)  1     No contrast enters airway  2     Contrast enters the airway, remains above the vocal folds, and is ejected from the airway (not seen in the airway at the end of the swallow).  3     Contrast enters the airway, remains above the vocal folds, and is not ejected from the airway (is seen in the airway after the swallow).  4     Contrast enters the airway, contacts the vocal folds, and is ejected from the airway.  5     Contrast enters the airway, contacts the vocal folds, and is not ejected from the airway  6     Contrast enters the airway, crosses the plane of the vocal folds, and is ejected from the airway.  7     Contrast enters the airway, crosses the plane of the vocal folds, and is not ejected from the airway despite effort.  8     Contrast enters the airway, crosses the plane of the vocal folds, is not ejected from the airway and there is no response to aspiration.     Plan    MBSS was completed at this time, swallow function determined to be WFL. Pt with flash penetration of thin liquids on 1/3 trials, no instances of aspiration noted throughout study. Pt indep implements second swallow on all trials/consistencies which clears trace oral and pharyngeal residue. Pt to cont  regular easy to chew diet with thin liquids, medications whole one at a time with thin liquid wash. Per primary SLP rec: cont enrollment into therapeutic dining to reinforce safe swallow strategies and reduce impulsivity.

## 2023-11-15 NOTE — PROGRESS NOTES
"  Physical Medicine & Rehabilitation Progress Note    Encounter Date: 11/15/2023    Chief Complaint: Tired from therapy    Interval Events (Subjective):  BP still elevated - Hydralazine increased today  Voiding low PVRs  BM 11/13    Seen and examined in his room. Resting from therapy. Didn't sleep well last night, had a lot on his mind. Would like to try something stronger. No pain today. Spasticity improved on higher dose Baclofen. Wife not at bedside at time of visit.       ROS: 14 point ROS negative unless otherwise specified in the HPI    Objective:  VITAL SIGNS: BP (!) 156/92   Pulse 84   Temp 37.1 °C (98.7 °F) (Oral)   Resp 16   Ht 1.727 m (5' 8\")   Wt 86 kg (189 lb 9.5 oz)   SpO2 95%   BMI 28.83 kg/m²     GEN: No apparent distress  HEENT: Head normocephalic, atraumatic.  Sclera nonicteric bilaterally, no ocular discharge appreciated bilaterally.  CV: Extremities warm and well-perfused, no peripheral edema appreciated bilaterally.  PULMONARY: Breathing nonlabored on room air, no respiratory accessory muscle use.  Not requiring supplemental oxygen.  SKIN: No appreciable skin breakdown on exposed areas of skin.  PSYCH: Mood and affect within normal limits.  NEURO: Awake alert.  Conversational.  Logical thought content. Dysarthria. Spasticity LUE and LLE. Mild clonus L ankle. Has 2/5 movement finger FL/EXT. Spasticity finger flexors, wrist flexors, bicep 2/4.       Laboratory Values:  Recent Results (from the past 72 hour(s))   POCT glucose device results    Collection Time: 11/12/23  5:02 PM   Result Value Ref Range    POC Glucose, Blood 143 (H) 65 - 99 mg/dL   POCT glucose device results    Collection Time: 11/12/23  7:48 PM   Result Value Ref Range    POC Glucose, Blood 119 (H) 65 - 99 mg/dL   CBC with Differential    Collection Time: 11/13/23  5:25 AM   Result Value Ref Range    WBC 4.8 4.8 - 10.8 K/uL    RBC 4.26 (L) 4.70 - 6.10 M/uL    Hemoglobin 13.5 (L) 14.0 - 18.0 g/dL    Hematocrit 37.7 (L) 42.0 " - 52.0 %    MCV 88.5 81.4 - 97.8 fL    MCH 31.7 27.0 - 33.0 pg    MCHC 35.8 32.3 - 36.5 g/dL    RDW 38.3 35.9 - 50.0 fL    Platelet Count 213 164 - 446 K/uL    MPV 10.7 9.0 - 12.9 fL    Neutrophils-Polys 66.50 44.00 - 72.00 %    Lymphocytes 22.60 22.00 - 41.00 %    Monocytes 7.20 0.00 - 13.40 %    Eosinophils 3.10 0.00 - 6.90 %    Basophils 0.40 0.00 - 1.80 %    Immature Granulocytes 0.20 0.00 - 0.90 %    Nucleated RBC 0.00 0.00 - 0.20 /100 WBC    Neutrophils (Absolute) 3.21 1.82 - 7.42 K/uL    Lymphs (Absolute) 1.09 1.00 - 4.80 K/uL    Monos (Absolute) 0.35 0.00 - 0.85 K/uL    Eos (Absolute) 0.15 0.00 - 0.51 K/uL    Baso (Absolute) 0.02 0.00 - 0.12 K/uL    Immature Granulocytes (abs) 0.01 0.00 - 0.11 K/uL    NRBC (Absolute) 0.00 K/uL   Comp Metabolic Panel (CMP)    Collection Time: 11/13/23  5:25 AM   Result Value Ref Range    Sodium 139 135 - 145 mmol/L    Potassium 4.4 3.6 - 5.5 mmol/L    Chloride 103 96 - 112 mmol/L    Co2 25 20 - 33 mmol/L    Anion Gap 11.0 7.0 - 16.0    Glucose 134 (H) 65 - 99 mg/dL    Bun 51 (H) 8 - 22 mg/dL    Creatinine 2.81 (H) 0.50 - 1.40 mg/dL    Calcium 9.1 8.5 - 10.5 mg/dL    Correct Calcium 9.2 8.5 - 10.5 mg/dL    AST(SGOT) 14 12 - 45 U/L    ALT(SGPT) 22 2 - 50 U/L    Alkaline Phosphatase 71 30 - 99 U/L    Total Bilirubin 0.5 0.1 - 1.5 mg/dL    Albumin 3.9 3.2 - 4.9 g/dL    Total Protein 6.4 6.0 - 8.2 g/dL    Globulin 2.5 1.9 - 3.5 g/dL    A-G Ratio 1.6 g/dL   HEMOGLOBIN A1C    Collection Time: 11/13/23  5:25 AM   Result Value Ref Range    Glycohemoglobin 6.4 (H) 4.0 - 5.6 %    Est Avg Glucose 137 mg/dL   Vitamin D, 25-hydroxy (blood)    Collection Time: 11/13/23  5:25 AM   Result Value Ref Range    25-Hydroxy   Vitamin D 25 59 30 - 100 ng/mL   ESTIMATED GFR    Collection Time: 11/13/23  5:25 AM   Result Value Ref Range    GFR (CKD-EPI) 25 (A) >60 mL/min/1.73 m 2   POCT glucose device results    Collection Time: 11/13/23  7:20 AM   Result Value Ref Range    POC Glucose, Blood 122 (H)  65 - 99 mg/dL   POCT glucose device results    Collection Time: 11/13/23 11:29 AM   Result Value Ref Range    POC Glucose, Blood 135 (H) 65 - 99 mg/dL   POCT glucose device results    Collection Time: 11/13/23  5:09 PM   Result Value Ref Range    POC Glucose, Blood 142 (H) 65 - 99 mg/dL   POCT glucose device results    Collection Time: 11/13/23  9:50 PM   Result Value Ref Range    POC Glucose, Blood 149 (H) 65 - 99 mg/dL   POCT glucose device results    Collection Time: 11/14/23  4:27 AM   Result Value Ref Range    POC Glucose, Blood 139 (H) 65 - 99 mg/dL   BUN    Collection Time: 11/14/23  5:33 AM   Result Value Ref Range    Bun 41 (H) 8 - 22 mg/dL   CREATININE    Collection Time: 11/14/23  5:33 AM   Result Value Ref Range    Creatinine 2.66 (H) 0.50 - 1.40 mg/dL   ESTIMATED GFR    Collection Time: 11/14/23  5:33 AM   Result Value Ref Range    GFR (CKD-EPI) 26 (A) >60 mL/min/1.73 m 2   POCT glucose device results    Collection Time: 11/14/23  8:10 AM   Result Value Ref Range    POC Glucose, Blood 144 (H) 65 - 99 mg/dL   POCT glucose device results    Collection Time: 11/14/23 11:13 AM   Result Value Ref Range    POC Glucose, Blood 134 (H) 65 - 99 mg/dL   POCT glucose device results    Collection Time: 11/14/23  5:06 PM   Result Value Ref Range    POC Glucose, Blood 133 (H) 65 - 99 mg/dL   POCT glucose device results    Collection Time: 11/14/23  8:22 PM   Result Value Ref Range    POC Glucose, Blood 124 (H) 65 - 99 mg/dL       Medications:  Scheduled Medications   Medication Dose Frequency    hydrALAZINE  50 mg Q8HRS    melatonin  9 mg QHS    senna-docusate  2 Tablet BID    omeprazole  20 mg DAILY    acetaminophen  1,000 mg Q6HRS    amLODIPine  10 mg DAILY    atorvastatin  40 mg Nightly    baclofen  5 mg QHS     PRN medications: [DISCONTINUED] insulin regular **AND** [CANCELED] POC blood glucose manual result **AND** NOTIFY MD and PharmD **AND** Administer 20 grams of glucose (approximately 8 ounces of fruit juice)  every 15 minutes PRN FSBG less than 70 mg/dL **AND** dextrose bolus, Respiratory Therapy Consult, senna-docusate **AND** polyethylene glycol/lytes **AND** magnesium hydroxide **AND** bisacodyl, mag hydrox-al hydrox-simeth, ondansetron **OR** ondansetron, traZODone, sodium chloride, acetaminophen **FOLLOWED BY** [START ON 11/17/2023] acetaminophen, oxyCODONE immediate-release **OR** oxyCODONE immediate-release, hydrALAZINE    Diet:  Current Diet Order   Procedures    Diet Order Diet: Level 7 - Easy to Chew (cut up into bite size pieces.); Liquid level: Level 0 - Thin; Second Modifier: (optional): Consistent CHO (Diabetic)       Medical Decision Making and Plan:  Right thalamic hemorrhagic stroke  Originally presented with a headache and left-sided weakness to hospital in [facility]  Work-up at the outside hospital in [facility] revealed a right thalamic hemorrhagic stroke  Repeat CT head done after return flight to the , 11/11 CT head shows right thalamic hemorrhage, decrease in density since prior studies from the outside hospital, slight asymmetric dilation of the left ventricular system including the left temporal horn with a possible component of hydrocephalus  Planning for BP less than 140, avoiding any kind of anticoagulation  Initiate comprehensive IRF level therapy with 3 days of therapy 5 days a week with services from PT/OT/SLP     Spasticity  Continue baclofen 5 mg nightly, started on 11/11 --> increase to TID 11/13/2023 --> continue 11/14/2023, stable on higher dose.   PRAFO ordered for right lower extremity to prevent further plantarflexion contracture  Spasticity better 11/15/2023 continue Baclofen at current dose     CKD  Has seen nephrology in past  Improving 11/13/2023   F/u OP with nephrology  S/p IVF 11/13-11/14 11/14/2023 BUN and Cr improved compared to prior  BMP 11/16     Hypertension  Continue Amlodipine 10mg daily  Continue Hydralazine 25mg TID - increased by hospitalist 11/13/2023 from BID to  TID--> increased to 50mg q8hrs 11/15  11/14/2023 Still elevated, hospitalist managing     Prediabetes  History of hyperglycemia, not on home medications  Continue sliding scale insulin     HLD  Continue home dose satin      Bowel  Continue with scheduled Colace and senna, as needed stool softeners     Insomnia  Secondary to recent jet lag, melatonin scheduled --> increase to home dose 11/13/2023 9mg  Utilize trazodone as needed insomnia continues  Schedule Trazodone 11/15/2023      Pain -as needed Tylenol and oxycodone    Post-Stroke Depression  Consult Pyschiatry  Consider anti-depressant       DVT PROPHYLAXIS: NO - Hemorrhagic stroke    HOSPITALIST FOLLOWING: YES - d/w hospitalist 11/15    CODE STATUS: FULL CODE    DISPO: Home with Spouse, Vielka, support     MARCUS: 12/4/23    MEDS SENT TO: TBD    DISCHARGE FOLLOW UP: Neurology, Nephrology, PCP - needs referral to all.   ____________________________________    Dr. Lisa Lee DO, MS  [facility] - Physical Medicine & Rehabilitation   ____________________________________    _____________________________________  Interdisciplinary Team Conference   Most recent IDT on 11/15/2023    I, Dr. Lisa Lee DO, MS, was present and led the interdisciplinary team conference on 11/15/2023.  I led the IDT conference and agree with the IDT conference documentation and plan of care as noted below.     Nursing:  Diet Current Diet Order   Procedures    Diet Order Diet: Level 7 - Easy to Chew (cut up into bite size pieces.); Liquid level: Level 0 - Thin; Second Modifier: (optional): Consistent CHO (Diabetic)       Eating ADL        % of Last Meal  Oral Nutrition: Breakfast, Between % Consumed (80%)   Sleep    Bowel Last BM: 11/13/23   Bladder    BS check d/c today.   CPAP not working.     Physical Therapy:  Bed Mobility    Transfers Total Assist X 2  Increased time, Assist with one limb, Set-up of equipment, Supervision for safety, Squat pivot transfer to wheelchair,  Verbal cueing (pt impulsive during transfers requiring VC for safe sequencing. TAx2 squat pivot from WC <> EOM)   Mobility Total Assist (MaxA<>TotalA for trunk control, swing limb advancement LLE, functional WS, L knee stance control, VC/MC for step length/sequencing, and w/c follow for safety)   Stairs    Slide board transfers for safety. Working on functional transfers.   Has profile for .     Occupational Therapy:  Grooming Minimal Assist   Bathing Maximal Assist   UB Dressing Maximal Assist   LB Dressing Maximal Assist   Toileting Total Assist x 2   Shower & Transfer      Speech-Language Pathology:  Comprehension:  Supervision  Comprehension Description:     Expression:  Minimal Assist  Expression Description:  Other (comment) (extra time)  Social Interaction:  Modified Independent  Social Interaction Description:     Problem Solving:  Minimal Assist  Problem Solving Description:  Seat belt, Increased time, Bed/chair alarm, Supervision, Therapy schedule, Verbal cueing  Memory:  Modified Independent  Memory Description:  Seat belt, Increased time, Bed/chair alarm, Supervision, Therapy schedule, Verbal cueing    Close to meeting 1 of his goals. Coughing intermittently. Has anterior leakage and can't feel when liquids are getting away from home. Fast eater. Stay in T dine for little while.     Vision is a barrier for him, he has moderate impairment for carry over of new information.     Respiratory Therapy:  O2 (LPM):  (2 l prn)  O2 Delivery Device: Room air w/o2 available    Case Management:  Continues to work on disposition and DME needs.       Discharge Date/Disposition:  12/4/23  _____________________________________    Total time:  51 minutes. Time spent included pre-rounding review of vitals and tests, unit/floor time, face-to-face time with the patient including physical examination, care coordination, counseling of patient and/or family, ordering medications/procedures/tests, discussion with CM, PT,  OT, SLP and/or other healthcare providers, and documentation in the electronic medical record.

## 2023-11-15 NOTE — THERAPY
Speech Language Pathology  Daily Treatment     Patient Name: Jason Lima  Age:  62 y.o., Sex:  male  Medical Record #: [id number]  Today's Date: 11/15/2023     Precautions  Precautions: Fall Risk  Comments: Left hemiparesis, left visual inattention.    Subjective    Patient agreeable to therapy.  He reported that he was very tired and didn't sleep well last night, because he was thinking and grieving the changes in his life that this stroke has brought him.  He reports feeling depressed.      Objective       11/15/23 0831   Treatment Charges   SLP Cognitive Skill Development First 15 Minutes 1   SLP Cognitive Skill Development Additional 15 Minutes 3   SLP Total Time Spent   SLP Individual Total Time Spent (Mins) 60   Cognition   Simple Attention Moderate (3)   Prospective Memory Moderate (3)   Functional Memory Activities Moderate (3)   Functional Level of Assist   Eating Supervision   Eating Description Modified diet;Set-up of equipment or meal/tube feeding;Supervision for safety   Comprehension Supervision   Expression Modified Independent   Expression Description Other (comment)  (Extra time)   Social Interaction Modified Independent   Social Interaction Description Increased time;Verbal cues   Problem Solving Minimal Assist   Problem Solving Description Supervision;Seat belt;Medication;Bed/chair alarm;Therapy schedule;Verbal cueing   Memory Moderate Assist   Memory Description Supervision;Seat belt;Medication;Bed/chair alarm;Therapy schedule;Verbal cueing         Assessment    Patient worked on alternating attention in the context of Who has more coins task. Patient achieved 8/20 calculations correct ( 40%)  Errors were characterized by impaired recall for mental manipulation of the numbers and impaired vision.  Patient reported his vision was blurry with his own glasses.  He did try loan of +2.5 magnifiers and initially reported that they helped but with fatigue he reported that they did not help enough.   Recommended patient use a strategy to write down the coin amounts so that he didn't have to  hold this information in memory with a slight improvement noted.  Patient educated [facility] use of [facility] and [facility] to help recall new training and daily events.  Assisted patient to identify skills he is working on to use a slide board.    Strengths: Effective communication skills, Alert and oriented, Able to follow instructions, Motivated for self care and independence, Pleasant and cooperative, Supportive family, Willingly participates in therapeutic activities  Barriers: Aspiration risk, Hemiparesis, Impaired carryover of learning, Impaired insight/denial of deficits, Impaired functional cognition, Impulsive, Visual impairment    Plan    Target attention, recall, safety sequencing and problem solving.    Passport items to be completed:  Express basic needs, understand food/liquid recommendations, consistently follow swallow precautions, manage finances, manage medications, arrive to therapy appointments on time, complete daily memory log entries, solve problems related to safety situations, review education related to hospitalization, complete caregiver training     Speech Therapy Problems (Active)       Problem: Comprehension STGs       Dates: Start:  11/13/23         Goal: STG-Within one week, patient will perform attention for comprehension in functional reading tasks with 75% acc.       Dates: Start:  11/13/23         Goal Note filed on 11/15/23 1301 by Tawny Forbes MS,CCC-SLP       To be addresssed.                 Problem: Expression STGs       Dates: Start:  11/13/23         Goal: STG-Within one week, patient will       Dates: Start:  11/13/23               Problem: Memory STGs       Dates: Start:  11/13/23         Goal: STG-Within one week, patient will recall daily events and safety sequencing with 60% acc with use of external memory aids.       Dates: Start:  11/13/23         Goal Note filed  on 11/15/23 1301 by Tawny Forbes MS,CCC-SLP       Educated patient on use of memory log and memory strategies this date.  Min-mod A needed for 60% acc.                 Problem: Problem Solving STGs       Dates: Start:  11/13/23         Goal: STG-Within one week, patient will perform alternating attention tasks with 75% acc.       Dates: Start:  11/13/23         Goal Note filed on 11/15/23 1301 by Tawny Forbes MS,CCC-SLP       Patient has preformed alternating attention tasks with 40% acc.                 Problem: Speech/Swallowing LTGs       Dates: Start:  11/13/23         Goal: LTG-By discharge, patient will safely swallow (7)regular diet textures and (0) thin liquids with no overt s/sx of aspiration for 2/2 days.       Dates: Start:  11/13/23            Goal: LTG-By discharge, patient will solve complex problem       Dates: Start:  11/13/23       Description: For safety and self care with 80% acc mod I  for safe discharge home.         Goal: LTG-By discharge, patient will recall new training with 80% acc with min A and use of external memory aids.       Dates: Start:  11/13/23               Problem: Swallowing STGs       Dates: Start:  11/13/23         Goal: STG-Within one week, patient will safely swallow (7) regular easy chew and cut up and (0) thin liquids with no overt s/sx of aspiration        Dates: Start:  11/13/23         Goal Note filed on 11/15/23 1301 by Tawny Forbes MS,CCC-SLP       Patient displays occasional coughing on thin liquids displays occasional anterior leakage, but is overall tolerating without distress.  MBSS scheduled for today.

## 2023-11-16 ENCOUNTER — APPOINTMENT (OUTPATIENT)
Dept: OCCUPATIONAL THERAPY | Facility: REHABILITATION | Age: 63
DRG: 057 | End: 2023-11-16
Attending: PHYSICAL MEDICINE & REHABILITATION
Payer: COMMERCIAL

## 2023-11-16 ENCOUNTER — APPOINTMENT (OUTPATIENT)
Dept: SPEECH THERAPY | Facility: REHABILITATION | Age: 63
DRG: 057 | End: 2023-11-16
Attending: PHYSICAL MEDICINE & REHABILITATION
Payer: COMMERCIAL

## 2023-11-16 ENCOUNTER — APPOINTMENT (OUTPATIENT)
Dept: PHYSICAL THERAPY | Facility: REHABILITATION | Age: 63
DRG: 057 | End: 2023-11-16
Attending: PHYSICAL MEDICINE & REHABILITATION
Payer: COMMERCIAL

## 2023-11-16 LAB
ANION GAP SERPL CALC-SCNC: 9 MMOL/L (ref 7–16)
BUN SERPL-MCNC: 32 MG/DL (ref 8–22)
CALCIUM SERPL-MCNC: 9.2 MG/DL (ref 8.5–10.5)
CHLORIDE SERPL-SCNC: 104 MMOL/L (ref 96–112)
CO2 SERPL-SCNC: 27 MMOL/L (ref 20–33)
CREAT SERPL-MCNC: 2.34 MG/DL (ref 0.5–1.4)
GFR SERPLBLD CREATININE-BSD FMLA CKD-EPI: 31 ML/MIN/1.73 M 2
GLUCOSE SERPL-MCNC: 133 MG/DL (ref 65–99)
POTASSIUM SERPL-SCNC: 3.9 MMOL/L (ref 3.6–5.5)
SODIUM SERPL-SCNC: 140 MMOL/L (ref 135–145)

## 2023-11-16 PROCEDURE — 97112 NEUROMUSCULAR REEDUCATION: CPT

## 2023-11-16 PROCEDURE — 99232 SBSQ HOSP IP/OBS MODERATE 35: CPT | Performed by: PHYSICAL MEDICINE & REHABILITATION

## 2023-11-16 PROCEDURE — A9270 NON-COVERED ITEM OR SERVICE: HCPCS | Performed by: PHYSICAL MEDICINE & REHABILITATION

## 2023-11-16 PROCEDURE — 80048 BASIC METABOLIC PNL TOTAL CA: CPT

## 2023-11-16 PROCEDURE — 36415 COLL VENOUS BLD VENIPUNCTURE: CPT

## 2023-11-16 PROCEDURE — 99232 SBSQ HOSP IP/OBS MODERATE 35: CPT | Performed by: HOSPITALIST

## 2023-11-16 PROCEDURE — 700102 HCHG RX REV CODE 250 W/ 637 OVERRIDE(OP): Performed by: HOSPITALIST

## 2023-11-16 PROCEDURE — 97760 ORTHOTIC MGMT&TRAING 1ST ENC: CPT

## 2023-11-16 PROCEDURE — 99253 IP/OBS CNSLTJ NEW/EST LOW 45: CPT | Performed by: STUDENT IN AN ORGANIZED HEALTH CARE EDUCATION/TRAINING PROGRAM

## 2023-11-16 PROCEDURE — 97129 THER IVNTJ 1ST 15 MIN: CPT

## 2023-11-16 PROCEDURE — 97530 THERAPEUTIC ACTIVITIES: CPT

## 2023-11-16 PROCEDURE — A9270 NON-COVERED ITEM OR SERVICE: HCPCS | Performed by: HOSPITALIST

## 2023-11-16 PROCEDURE — 700105 HCHG RX REV CODE 258: Performed by: HOSPITALIST

## 2023-11-16 PROCEDURE — 97130 THER IVNTJ EA ADDL 15 MIN: CPT

## 2023-11-16 PROCEDURE — 700102 HCHG RX REV CODE 250 W/ 637 OVERRIDE(OP): Performed by: PHYSICAL MEDICINE & REHABILITATION

## 2023-11-16 PROCEDURE — 770010 HCHG ROOM/CARE - REHAB SEMI PRIVAT*

## 2023-11-16 PROCEDURE — 94760 N-INVAS EAR/PLS OXIMETRY 1: CPT

## 2023-11-16 RX ORDER — HYDRALAZINE HYDROCHLORIDE 10 MG/1
10 TABLET, FILM COATED ORAL ONCE
Status: COMPLETED | OUTPATIENT
Start: 2023-11-16 | End: 2023-11-16

## 2023-11-16 RX ORDER — SODIUM CHLORIDE 9 MG/ML
INJECTION, SOLUTION INTRAVENOUS ONCE
Status: COMPLETED | OUTPATIENT
Start: 2023-11-16 | End: 2023-11-16

## 2023-11-16 RX ADMIN — HYDRALAZINE HYDROCHLORIDE 75 MG: 50 TABLET, FILM COATED ORAL at 13:25

## 2023-11-16 RX ADMIN — SENNOSIDES AND DOCUSATE SODIUM 2 TABLET: 50; 8.6 TABLET ORAL at 19:59

## 2023-11-16 RX ADMIN — ACETAMINOPHEN 1000 MG: 500 TABLET ORAL at 05:13

## 2023-11-16 RX ADMIN — BACLOFEN 5 MG: 10 TABLET ORAL at 20:00

## 2023-11-16 RX ADMIN — TRAZODONE HYDROCHLORIDE 50 MG: 50 TABLET ORAL at 19:59

## 2023-11-16 RX ADMIN — POLYETHYLENE GLYCOL 3350 1 PACKET: 17 POWDER, FOR SOLUTION ORAL at 05:11

## 2023-11-16 RX ADMIN — MAGNESIUM HYDROXIDE 30 ML: 400 SUSPENSION ORAL at 12:11

## 2023-11-16 RX ADMIN — HYDRALAZINE HYDROCHLORIDE 10 MG: 10 TABLET, FILM COATED ORAL at 10:05

## 2023-11-16 RX ADMIN — HYDRALAZINE HYDROCHLORIDE 50 MG: 50 TABLET, FILM COATED ORAL at 05:13

## 2023-11-16 RX ADMIN — HYDRALAZINE HYDROCHLORIDE 75 MG: 50 TABLET, FILM COATED ORAL at 19:59

## 2023-11-16 RX ADMIN — ACETAMINOPHEN 1000 MG: 500 TABLET ORAL at 12:11

## 2023-11-16 RX ADMIN — OMEPRAZOLE 20 MG: 20 CAPSULE, DELAYED RELEASE ORAL at 08:01

## 2023-11-16 RX ADMIN — SENNOSIDES AND DOCUSATE SODIUM 2 TABLET: 50; 8.6 TABLET ORAL at 08:01

## 2023-11-16 RX ADMIN — SODIUM CHLORIDE: 9 INJECTION, SOLUTION INTRAVENOUS at 10:03

## 2023-11-16 RX ADMIN — AMLODIPINE BESYLATE 10 MG: 5 TABLET ORAL at 08:00

## 2023-11-16 RX ADMIN — ATORVASTATIN CALCIUM 40 MG: 40 TABLET, FILM COATED ORAL at 20:00

## 2023-11-16 ASSESSMENT — ENCOUNTER SYMPTOMS
NERVOUS/ANXIOUS: 0
BLURRED VISION: 0
COUGH: 0
FEVER: 0
DIARRHEA: 0
DIZZINESS: 0

## 2023-11-16 ASSESSMENT — PATIENT HEALTH QUESTIONNAIRE - PHQ9
1. LITTLE INTEREST OR PLEASURE IN DOING THINGS: NOT AT ALL
SUM OF ALL RESPONSES TO PHQ9 QUESTIONS 1 AND 2: 1
2. FEELING DOWN, DEPRESSED, IRRITABLE, OR HOPELESS: SEVERAL DAYS

## 2023-11-16 NOTE — THERAPY
"Physical Therapy   Daily Treatment     Patient Name: Jason Lima  Age:  62 y.o., Sex:  male  Medical Record #: [id number]  Today's Date: 11/15/2023     Precautions  Precautions: (P) Fall Risk  Comments: (P) Left hemiparesis, left visual inattention.    Subjective    \"I can't believe this happened\", repeated throughout.  Pt reported that he really enjoyed the FES cycle, and was eager to perform it again.        Objective       11/15/23 1431   PT Charge Group   PT Electrical Stimulation Attended (Units) 1   PT Neuromuscular Re-Education / Balance (Units) 2   PT Therapeutic Activities (Units) 1   PT Total Time Spent   PT Individual Total Time Spent (Mins) 60   Precautions   Precautions Fall Risk   Comments Left hemiparesis, left visual inattention.   Cognition    Comments Dec left attention   Gait Functional Level of Assist    Gait Level Of Assist Total Assist X 2   Assistive Device Parallel Bars  (After ; likely too fatigued. Wc follow, total A to advance LLE, step to RLE/ short step, unable to manage midline, left lateral lean. Blocking L knee for stability.)   Distance (Feet) 3   # of Times Distance was Traveled 1   Deviation Decreased Heel Strike;Decreased Toe Off;Bradykinetic;Ataxic   Bed Mobility    Sit to Stand Moderate Assist  (// bars, emphasis on midline, while placing electrodes on ham/ glutes, and removing them, 2 min x 2. Pt leaning left, difficulty self correcting, mild/mod improvement with facilitation, unable to maintain.)   Neuro-Muscular Treatments   Neuro-Muscular Treatments Weight Shift Right;Weight Shift Left;Verbal Cuing;Tactile Cuing;Sequencing;Postural Facilitation;Postural Changes;Joint Approximation;Facilitation   Comments  LE FES bike with electrodes placed on L quad, glute, ham, ant tib, and gastroc.   Interdisciplinary Plan of Care Collaboration   IDT Collaboration with  Therapy Tech;Family / Caregiver   Patient Position at End of Therapy Seated;Self Releasing Lap Belt Applied;Call " "Light within Reach;Phone within Reach   Collaboration Comments Sister present at the beginning of session- POC,  rationale. Therapy tech participated in electrode set up, wc follow.         Assessment    Pt participated in 25.5 min of  LE cycling, for a total of 2.79 miles, and L asymmetry of 2%. Pt produced a power output of 0.8kCal/hour, and 0.9W. Pt completed ~8 min off motor/ AROM while on FES. Palpation was performed to ensure optimal contractions, per muscle, to patient tolerance. Pt was unable to demonstrate functional change post  during pregait assessment, continuing to require total A + wc follow at this time. Pt was signed up for ongoing tech assisted  tx.      Strengths: Able to follow instructions, Independent prior level of function, Motivated for self care and independence, Pleasant and cooperative, Supportive family, Willingly participates in therapeutic activities  Barriers: Decreased endurance, Hemiparesis, Difficulty following instructions, Fatigue, Hypertension, Impaired activity tolerance, Impaired balance, Impaired insight/denial of deficits, Impaired functional cognition, Impaired sleep pattern, Impulsive, Limited mobility, Visual impairment, Poor family support (lives alone)    Plan    Head righting/ midline orientation  Bed mobility/ PNF rolling  Transfers with SB, emphasis on motor planning  Seated EOB balance  Wc mob with tanvir technique and emphasis on left environmental scanning/ awareness  Initiate HEP  Forced use principles for tanvir body   - ID [id number], pin [id number]    DME  PT DME Recommendations  Wheelchair: 18\" Width  Cushion: Specialty (See other comments) (TBD pending ambulation progress)  Assistive Device: Tanvir Walker (TBD pending progress, currently 2 person assist for gait)  Additional Equipment:  (TBD)    Passport items to be completed:  Get in/out of bed safely, in/out of a vehicle, safely use mobility device, walk or wheel around home/community, navigate " up and down stairs, show how to get up/down from the ground, ensure home is accessible, demonstrate HEP, complete caregiver training    Physical Therapy Problems (Active)       Problem: Balance       Dates: Start:  11/13/23         Goal: STG-Within one week, patient will maintain static standing c/ RUE support on rail at ModA or better.        Dates: Start:  11/13/23               Problem: Mobility       Dates: Start:  11/13/23         Goal: STG-Within one week, patient will ambulate distances >/= 30' c/ RHR and ModA or better.        Dates: Start:  11/13/23               Problem: Mobility Transfers       Dates: Start:  11/13/23         Goal: STG-Within one week, patient will perform bed mobility c/ ModA or better.        Dates: Start:  11/13/23            Goal: STG-Within one week, patient will transfer bed to chair c/ MaxA x 1 or better.        Dates: Start:  11/13/23         Goal Note filed on 11/15/23 0923 by SELMA JamaT       Inconsistent performance, recommend slide board for safety at this time                 Problem: PT-Long Term Goals       Dates: Start:  11/13/23         Goal: LTG-By discharge, patient will propel wheelchair >/= 300' at [place] or better.        Dates: Start:  11/13/23            Goal: LTG-By discharge, patient will ambulate >/= 50' c/ LRAD at [place] or better.        Dates: Start:  11/13/23            Goal: LTG-By discharge, patient will transfer one surface to another c/ Renata or better.        Dates: Start:  11/13/23            Goal: LTG-By discharge, patient will ambulate up/down 1 step c/ LRAD at Renata or better.        Dates: Start:  11/13/23            Goal: LTG-By discharge, patient will transfer in/out of a car c/ ModA or better.        Dates: Start:  11/13/23

## 2023-11-16 NOTE — PROGRESS NOTES
"  Physical Medicine & Rehabilitation Progress Note    Encounter Date: 11/16/2023    Chief Complaint: Doing better    Interval Events (Subjective):  BP elevated  Voiding  BM 11/136    Seen and examined in his room. Back from outing to main hospital to go to [facility]. Still feels down about all of this happening. He otherwise is doing well. Thinks he slept well last night.       ROS: 14 point ROS negative unless otherwise specified in the HPI    Objective:  VITAL SIGNS: BP (!) 160/59   Pulse 67   Temp 36.4 °C (97.6 °F) (Oral)   Resp 16   Ht 1.727 m (5' 8\")   Wt 86 kg (189 lb 9.5 oz)   SpO2 94%   BMI 28.83 kg/m²     GEN: No apparent distress  HEENT: Head normocephalic, atraumatic.  Sclera nonicteric bilaterally, no ocular discharge appreciated bilaterally.  CV: Extremities warm and well-perfused, no peripheral edema appreciated bilaterally.  PULMONARY: Breathing nonlabored on room air, no respiratory accessory muscle use.  Not requiring supplemental oxygen.  SKIN: No appreciable skin breakdown on exposed areas of skin.  PSYCH: Mood and affect within normal limits.  NEURO: Awake alert.  Conversational.  Logical thought content. Dysarthria. Spasticity LUE and LLE. Mild clonus L ankle. Has 2/5 movement finger FL/EXT. Spasticity finger flexors, wrist flexors, bicep 2/4.       Laboratory Values:  Recent Results (from the past 72 hour(s))   POCT glucose device results    Collection Time: 11/13/23 11:29 AM   Result Value Ref Range    POC Glucose, Blood 135 (H) 65 - 99 mg/dL   POCT glucose device results    Collection Time: 11/13/23  5:09 PM   Result Value Ref Range    POC Glucose, Blood 142 (H) 65 - 99 mg/dL   POCT glucose device results    Collection Time: 11/13/23  9:50 PM   Result Value Ref Range    POC Glucose, Blood 149 (H) 65 - 99 mg/dL   POCT glucose device results    Collection Time: 11/14/23  4:27 AM   Result Value Ref Range    POC Glucose, Blood 139 (H) 65 - 99 mg/dL   BUN    Collection Time: 11/14/23  5:33 " AM   Result Value Ref Range    Bun 41 (H) 8 - 22 mg/dL   CREATININE    Collection Time: 11/14/23  5:33 AM   Result Value Ref Range    Creatinine 2.66 (H) 0.50 - 1.40 mg/dL   ESTIMATED GFR    Collection Time: 11/14/23  5:33 AM   Result Value Ref Range    GFR (CKD-EPI) 26 (A) >60 mL/min/1.73 m 2   POCT glucose device results    Collection Time: 11/14/23  8:10 AM   Result Value Ref Range    POC Glucose, Blood 144 (H) 65 - 99 mg/dL   POCT glucose device results    Collection Time: 11/14/23 11:13 AM   Result Value Ref Range    POC Glucose, Blood 134 (H) 65 - 99 mg/dL   POCT glucose device results    Collection Time: 11/14/23  5:06 PM   Result Value Ref Range    POC Glucose, Blood 133 (H) 65 - 99 mg/dL   POCT glucose device results    Collection Time: 11/14/23  8:22 PM   Result Value Ref Range    POC Glucose, Blood 124 (H) 65 - 99 mg/dL   Basic Metabolic Panel    Collection Time: 11/16/23  5:44 AM   Result Value Ref Range    Sodium 140 135 - 145 mmol/L    Potassium 3.9 3.6 - 5.5 mmol/L    Chloride 104 96 - 112 mmol/L    Co2 27 20 - 33 mmol/L    Glucose 133 (H) 65 - 99 mg/dL    Bun 32 (H) 8 - 22 mg/dL    Creatinine 2.34 (H) 0.50 - 1.40 mg/dL    Calcium 9.2 8.5 - 10.5 mg/dL    Anion Gap 9.0 7.0 - 16.0   ESTIMATED GFR    Collection Time: 11/16/23  5:44 AM   Result Value Ref Range    GFR (CKD-EPI) 31 (A) >60 mL/min/1.73 m 2       Medications:  Scheduled Medications   Medication Dose Frequency    hydrALAZINE  75 mg Q8HRS    traZODone  50 mg QHS    senna-docusate  2 Tablet BID    omeprazole  20 mg DAILY    acetaminophen  1,000 mg Q6HRS    amLODIPine  10 mg DAILY    atorvastatin  40 mg Nightly    baclofen  5 mg QHS     PRN medications: melatonin, [DISCONTINUED] insulin regular **AND** [CANCELED] POC blood glucose manual result **AND** NOTIFY MD and PharmD **AND** Administer 20 grams of glucose (approximately 8 ounces of fruit juice) every 15 minutes PRN FSBG less than 70 mg/dL **AND** dextrose bolus, Respiratory Therapy Consult,  senna-docusate **AND** polyethylene glycol/lytes **AND** magnesium hydroxide **AND** bisacodyl, mag hydrox-al hydrox-simeth, ondansetron **OR** ondansetron, traZODone, sodium chloride, acetaminophen **FOLLOWED BY** [START ON 11/17/2023] acetaminophen, oxyCODONE immediate-release **OR** oxyCODONE immediate-release, hydrALAZINE    Diet:  Current Diet Order   Procedures    Diet Order Diet: Level 7 - Easy to Chew (cut up into bite size pieces.); Liquid level: Level 0 - Thin; Second Modifier: (optional): Consistent CHO (Diabetic)       Medical Decision Making and Plan:  Right thalamic hemorrhagic stroke  Originally presented with a headache and left-sided weakness to hospital in [facility]  Work-up at the outside hospital in [facility] revealed a right thalamic hemorrhagic stroke  Repeat CT head done after return flight to the , 11/11 CT head shows right thalamic hemorrhage, decrease in density since prior studies from the outside hospital, slight asymmetric dilation of the left ventricular system including the left temporal horn with a possible component of hydrocephalus  Planning for BP less than 140, avoiding any kind of anticoagulation  Initiate comprehensive IRF level therapy with 3 days of therapy 5 days a week with services from PT/OT/SLP     Spasticity  Continue baclofen 5 mg nightly, started on 11/11 --> increase to TID 11/13/2023 --> continue 11/14/2023, stable on higher dose.   PRAFO ordered for right lower extremity to prevent further plantarflexion contracture  Spasticity better 11/16/2023 continue Baclofen at current dose     CKD  Has seen nephrology in past  Improving 11/13/2023   F/u OP with nephrology  S/p IVF 11/13-11/14 11/14/2023 BUN and Cr improved compared to prior  BMP 11/16 - improved - currently receiving IVF     Hypertension  Continue Amlodipine 10mg daily  Continue Hydralazine 25mg TID - increased by hospitalist 11/13/2023 from BID to TID--> increased to 50mg q8hrs 11/15  11/16 Hydralazine  increased to 75mg q8hrs and 1x Hydralazine given     Prediabetes  History of hyperglycemia, not on home medications  Continue sliding scale insulin     HLD  Continue home dose satin      Bowel  Continue with scheduled Colace and senna, as needed stool softeners     Insomnia  Secondary to recent jet lag, melatonin scheduled --> increase to home dose 11/13/2023 9mg  Utilize trazodone as needed insomnia continues  Schedule Trazodone 11/15/2023   11/16/2023 continue Trazodone as is. Thinks he slept better last night     Pain -as needed Tylenol and oxycodone    Post-Stroke Depression  Consult Pyschiatry - directly spoke with Psychology APRN  Consider anti-depressant       DVT PROPHYLAXIS: NO - Hemorrhagic stroke    HOSPITALIST FOLLOWING: YES - d/w hospitalist 11/16    CODE STATUS: FULL CODE    DISPO: Home with Spouse, Vielka, support     MARCUS: 12/4/23    MEDS SENT TO: TBD    DISCHARGE FOLLOW UP: Neurology, Nephrology, PCP - needs referral to all.   ____________________________________    Dr. Lisa Lee DO, MS  [facility] - Physical Medicine & Rehabilitation   ____________________________________

## 2023-11-16 NOTE — PROGRESS NOTES
Rapid response called in dining room. Per CNA, patient was having coughing episodes but then resolved. Patient appears to be in no distress. Made MD aware.

## 2023-11-16 NOTE — DISCHARGE PLANNING
CM met with patients significant other to discuss DC needs.  Offered support.  CM will continue to monitor for DC needs.

## 2023-11-16 NOTE — CARE PLAN
The patient is Stable - Low risk of patient condition declining or worsening    Shift Goals  Clinical Goals: No decline in neuro status  Patient Goals: rest  Family Goals: kirit    Progress made toward(s) clinical / shift goals:    Problem: Knowledge Deficit - Standard  Goal: Patient and family/care givers will demonstrate understanding of plan of care, disease process/condition, diagnostic tests and medications  Outcome: Progressing   Patient educated on the POC and the medications administered  Problem: Fall Risk - Rehab  Goal: Patient will remain free from falls  Outcome: Progressing   Patient calls appropriately for assistance. Call light and belongings within reach  Problem: Risk for Aspiration  Goal: Patient's risk for aspiration will be absent or decrease  Outcome: Progressing   Patient was able to swallow medications one by one with water. No signs of aspiration noted    Patient is not progressing towards the following goals:

## 2023-11-16 NOTE — THERAPY
Recreational Therapy  Daily Treatment     Patient Name: Jason Lima  AGE:  62 y.o., SEX:  male  Medical Record #: [id number]  Today's Date: 11/16/2023       Subjective    Patient ready and agreeable to recreation therapy/PT outing.      Objective       11/16/23 0831   Procedural Tracking   Procedural Tracking Community Re-Integration;Community Skills Development;Leisure Skills Development;Leisure Skills Awareness;Cognitive Skills Training;Fine Motor Functional Leisure Skills;Gross Motor Functional Leisure Skills;Social Skills Training;Group Treatment   Treatment Time   Total Time Spent (mins) 30   Total Time Missed 0   Functional Ability Status - Cognitive   Attention Span Remains on Task   Comprehension Follows One Step Commands;Follows Two Step Commands   Judgment Needs Assistance   Functional Ability Status - Emotional    Affect Appropriate   Mood Appropriate   Behavior Appropriate;Cooperative   Skilled Intervention    Skilled Intervention Gross Motor Leisure;Fine Motor Leisure;Cognitive Leisure;Social Skills;Relaxation / Coping Skills;Community Skills   Skilled Intervention Comments community reintegration outing   Interdisciplinary Plan of Care Collaboration   IDT Collaboration with  Therapy Tech;Physical Therapist   Patient Position at End of Therapy Seated;Phone within Reach;Tray Table within Reach;Call Light within Reach   Strengths & Barriers   Strengths Able to follow instructions;Alert and oriented;Effective communication skills;Willingly participates in therapeutic activities;Pleasant and cooperative;Motivated for self care and independence;Independent prior level of function   Barriers Decreased endurance;Fatigue;Emotional lability;Generalized weakness;Impaired activity tolerance;Impaired balance;Impaired carryover of learning;Hemiparesis;Limited mobility;Visual impairment         Assessment    Patient participated in community reintegration outing to [facility] as PT/RecTx cotx. Patient self  propelled wc with RUE and RLE while in [place]. Patient demonstrating severe L neglect, requiring mod cues to scan to L side to find [employer] signage. Patient ordered his drink with min cues for voicing loudly. Patient states lights/sounds/stimulation throughout activity did not bother him.     Strengths: (P) Able to follow instructions, Alert and oriented, Effective communication skills, Willingly participates in therapeutic activities, Pleasant and cooperative, Motivated for self care and independence, Independent prior level of function  Barriers: (P) Decreased endurance, Fatigue, Emotional lability, Generalized weakness, Impaired activity tolerance, Impaired balance, Impaired carryover of learning, Hemiparesis, Limited mobility, Visual impairment    Plan    Patient will benefit from continued recreation therapy sessions.     Passport items to be completed:  Verbalize two positive leisure activities, discuss returning to work, hobbies, community groups or volunteer activities, explore community resources    CONGESTION

## 2023-11-16 NOTE — THERAPY
Occupational Therapy  Daily Treatment     Patient Name: Jason Lima  Age:  62 y.o., Sex:  male  Medical Record #: [id number]  Today's Date: 11/16/2023     Precautions  Precautions: (P) Fall Risk  Comments: (P) Left hemiparesis, left visual inattention.         Subjective    Pt encountered for OT in room with SO present. Pt pleasant and agreeable to participate.      Objective       11/16/23 1331   OT Charge Group   OT Orthotics Treatment - Initial (Units) 1   OT Neuromuscular Re-education / Balance (Units) 3   OT Total Time Spent   OT Individual Total Time Spent (Mins) 60   Precautions   Precautions Fall Risk   Comments Left hemiparesis, left visual inattention.   Interdisciplinary Plan of Care Collaboration   IDT Collaboration with  Family / Caregiver   Patient Position at End of Therapy Seated;Chair Alarm On;Call Light within Reach;Tray Table within Reach;Phone within Reach;Family / Friend in Room  (SO present throughout session.)         Pt set up on RT-300 FES for leftUE neuro re-ed, ROM and sensory input.  Electrodes applied to left scap stabilizers, shoulder, biceps, triceps, and wrist flexors/extensors. left UE placed in arm trough with ace wrap applied at left wrist in boxers wrap style for increased stability while allowing for finger flex/ext with stimulation. Adjusted  to improved overall positioning and posture for improved performance and comfort. Muscle stimulation parameters assessed for each muscle group to pt tolerance to e-stim and muscle contraction observed. Pt tolerated well with no complaints of pain.      Assessment    Overall, good tolerance to set-up of LUE on . Would benefit to cont with therapist next treatment session to ensure parameters are optimal for pt comfort.     Strengths: Able to follow instructions, Independent prior level of function, Motivated for self care and independence, Pleasant and cooperative, Supportive family, Alert and oriented  Barriers: Decreased  endurance, Fatigue, Generalized weakness, Hemiparesis, Impaired activity tolerance, Impaired balance, Limited mobility, Spasticity    Plan    Cont RT-300. Place pt on NFC list for cont treatment.     DME  OT DME Recommendations  Bathroom Equipment: 3 in 1 Commode, Tub Transfer Bench (Medicaid Only)  Additional Equipment:  (TBD)    Passport items to be completed:  Perform bathroom transfers, complete dressing, complete feeding, get ready for the day, prepare a simple meal, participate in household tasks, adapt home for safety needs, demonstrate home exercise program, complete caregiver training     Occupational Therapy Goals (Active)       Problem: Bathing       Dates: Start:  11/15/23         Goal: STG-Within one week, patient will bathe at modA using DME/AE PRN       Dates: Start:  11/15/23               Problem: Dressing       Dates: Start:  11/13/23         Goal: STG-Within one week, patient will dress UB with Mod A.       Dates: Start:  11/13/23            Goal: STG-Within one week, patient will dress LB with Mod A.       Dates: Start:  11/13/23               Problem: Functional Transfers       Dates: Start:  11/13/23         Goal: STG-Within one week, patient will transfer to toilet with Max/Mod A.       Dates: Start:  11/13/23            Goal: STG-Within one week, patient will transfer to step in shower with Max A.       Dates: Start:  11/13/23               Problem: OT Long Term Goals       Dates: Start:  11/13/23         Goal: LTG-By discharge, patient will complete basic self care tasks at Mod Independent level.       Dates: Start:  11/13/23            Goal: LTG-By discharge, patient will perform bathroom transfers at Mod Independent level.       Dates: Start:  11/13/23

## 2023-11-16 NOTE — FLOWSHEET NOTE
11/16/23 0653   Events/Summary/Plan   Events/Summary/Plan RA pulse ox check   Vital Signs   Pulse 67   Respiration 16   Pulse Oximetry 94 %   $ Pulse Oximetry (Spot Check) Yes   Respiratory Assessment   Level of Consciousness Alert   Chest Exam   Work Of Breathing / Effort Within Normal Limits   Oxygen   O2 Delivery Device Room air w/o2 available

## 2023-11-16 NOTE — PROGRESS NOTES
Assumed care of patient. Bedside report received from Cecile BOLDEN. Patient is in bed. Fall precautions in place. Call light and belongings within reach. Updated on POC, all questions and concerns answered at this time. No other needs at this time.

## 2023-11-16 NOTE — CARE PLAN
Problem: Hemodynamics  Goal: Patient's hemodynamics, fluid balance and neurologic status will be stable or improve  Outcome: Not Met  Note: BP continues elevated (see flowchart). Pt. denies symptoms. Hospitalist notified.      Problem: Self Care  Goal: Patient will have the ability to perform ADLs independently or with assistance  Note: Per patient, he was unable to sleep last night due to CPAP no properly working. RT was notified and she review / troubleshoot machine. Per RT, machine has been fixed.      The patient is Stable - Low risk of patient condition declining or worsening    Shift Goals  Clinical Goals: No decline in neuro status  Patient Goals: rest  Family Goals: kirit

## 2023-11-16 NOTE — PROGRESS NOTES
[facility] Medicine Daily Progress Note    Date of Service  11/16/2023    Chief Complaint:  Hypertension  Diabetes  ROMÁN    Interval History:  Discussed about his kidney function is better but still needs another IVF run.  Discussed about his BP being elevated and have increased his Hydralazine.    Review of Systems  Review of Systems   Constitutional:  Negative for fever.   Eyes:  Negative for blurred vision.   Respiratory:  Negative for cough.    Cardiovascular:  Negative for chest pain.   Gastrointestinal:  Negative for diarrhea.   Musculoskeletal:  Negative for joint pain.   Neurological:  Negative for dizziness.   Psychiatric/Behavioral:  The patient is not nervous/anxious.         Physical Exam  Temp:  [36.4 °C (97.6 °F)-36.8 °C (98.3 °F)] 36.4 °C (97.6 °F)  Pulse:  [67-85] 67  Resp:  [16-18] 16  BP: (121-160)/() 160/59  SpO2:  [94 %-98 %] 94 %    Physical Exam  Vitals and nursing note reviewed.   Constitutional:       Appearance: He is not diaphoretic.   HENT:      Mouth/Throat:      Pharynx: No oropharyngeal exudate or posterior oropharyngeal erythema.   Eyes:      Extraocular Movements: Extraocular movements intact.   Neck:      Vascular: No carotid bruit.   Cardiovascular:      Rate and Rhythm: Normal rate and regular rhythm.      Heart sounds: No murmur heard.  Pulmonary:      Effort: Pulmonary effort is normal.      Breath sounds: Normal breath sounds. No stridor.   Abdominal:      General: Bowel sounds are normal.      Palpations: Abdomen is soft.   Musculoskeletal:      Right lower leg: No edema.      Left lower leg: No edema.   Skin:     General: Skin is warm and dry.      Findings: No rash.   Neurological:      Mental Status: He is alert and oriented to person, place, and time.   Psychiatric:         Mood and Affect: Mood normal.         Behavior: Behavior normal.         Fluids    Intake/Output Summary (Last 24 hours) at 11/16/2023 0936  Last data filed at 11/16/2023 0922  Gross per 24 hour    Intake 1080 ml   Output 1250 ml   Net -170 ml         Laboratory        Recent Labs     11/14/23  0533 11/16/23  0544   SODIUM  --  140   POTASSIUM  --  3.9   CHLORIDE  --  104   CO2  --  27   GLUCOSE  --  133*   BUN 41* 32*   CREATININE 2.66* 2.34*   CALCIUM  --  9.2                     Imaging    Assessment/Plan  Hemorrhagic stroke (HCC)- (present on admission)  Assessment & Plan  Right thalamic hemorrhage on 10/23 while visiting [place]  Presented there with sudden onset HA, L HP, and /100    ROMÁN (acute kidney injury) (HCC)- (present on admission)  Assessment & Plan  Bun/Cr: 61/2.84 --> 51/2.81 --> 41/2.66 (11/14) --> 32/2.34 (11/16)  US: medical renal disease, no hydronephrosis  Appears to have CKD (bun/cr elevated in 2017)  S/P IVF's x 2 runs  Will give another IVF's NS x 1 liter  Encouraging fluid intake  Cont to monitor    DM (diabetes mellitus) (HCC)- (present on admission)  Assessment & Plan  Hba1c: 6.4  BS have been ok  Currently not on any diabetic meds  Off accuchecks  Note: home meds include Metformin (but now has renal failure)  Cont to monitor    Hyperlipidemia- (present on admission)  Assessment & Plan  Cont Lipitor    Hypertension- (present on admission)  Assessment & Plan  BP still elevated  Cont Norvasc  Cont Hydralazine --> will increased dose again  Cont to monitor

## 2023-11-16 NOTE — THERAPY
Physical Therapy   Daily Treatment     Patient Name: Jason Lima  Age:  62 y.o., Sex:  male  Medical Record #: [id number]  Today's Date: 11/16/2023     Precautions  Precautions: Fall Risk  Comments: Left hemiparesis, left visual inattention.    Subjective    Rec therapy and PT co-treat to go to [employer] and assist in lifting pt's mood. Pt agreeable and eager to get out of the hospital.      Objective       11/16/23 0856   Cognition    Level of Consciousness Alert   ABS (Agitated Behavior Scale)   Agitated Behavior Scale Performed No   Sleep/Wake Cycle   Sleep & Rest Awake   Sitting Lower Body Exercises   Long Arc Quad 1 set of 10;Bilateral  (cues for larger amplitude with LLE, target of PT's hand with cues for knee flexion between movements)   Marching Reciprocal;1 set of 10  (active assist for LLE)   Neuro-Muscular Treatments   Comments way finding at [facility] hospital to find elevators and then follow signs to [employer], pt provided with increased time and min-mod cues for scanning (not just to the L); however, good carryover of scanning to the L after first couple cues.   Interdisciplinary Plan of Care Collaboration   IDT Collaboration with  Recreation Therapist;Therapy Tech   Patient Position at End of Therapy Seated;Chair Alarm On;Call Light within Reach;Tray Table within Reach       Throughout the session, pt navigated busy, loud, and crowded hallways with a higher energy environment, as well as dual task with conversation throughout the session. Therapists attempted to stand to his L side to encourage L sided attention.     Assessment    Pt thankful for visit and reports an improvement in overall mood.     Strengths: Able to follow instructions, Independent prior level of function, Motivated for self care and independence, Pleasant and cooperative, Supportive family, Willingly participates in therapeutic activities  Barriers: Decreased endurance, Hemiparesis, Difficulty following instructions, Fatigue,  "Hypertension, Impaired activity tolerance, Impaired balance, Impaired insight/denial of deficits, Impaired functional cognition, Impaired sleep pattern, Impulsive, Limited mobility, Visual impairment, Poor family support (lives alone)    Plan    Continue PT POC    DME  PT DME Recommendations  Wheelchair: 18\" Width  Cushion: Specialty (See other comments) (TBD pending ambulation progress)  Assistive Device: Tanvir Walker (TBD pending progress, currently 2 person assist for gait)  Additional Equipment:  (TBD)    Passport items to be completed:  Get in/out of bed safely, in/out of a vehicle, safely use mobility device, walk or wheel around home/community, navigate up and down stairs, show how to get up/down from the ground, ensure home is accessible, demonstrate HEP, complete caregiver training    Physical Therapy Problems (Active)       Problem: Balance       Dates: Start:  11/13/23         Goal: STG-Within one week, patient will maintain static standing c/ RUE support on rail at ModA or better.        Dates: Start:  11/13/23               Problem: Mobility       Dates: Start:  11/13/23         Goal: STG-Within one week, patient will ambulate distances >/= 30' c/ RHR and ModA or better.        Dates: Start:  11/13/23               Problem: Mobility Transfers       Dates: Start:  11/13/23         Goal: STG-Within one week, patient will perform bed mobility c/ ModA or better.        Dates: Start:  11/13/23            Goal: STG-Within one week, patient will transfer bed to chair c/ MaxA x 1 or better.        Dates: Start:  11/13/23         Goal Note filed on 11/15/23 0923 by Vivian Mcdermott DPT       Inconsistent performance, recommend slide board for safety at this time                 Problem: PT-Long Term Goals       Dates: Start:  11/13/23         Goal: LTG-By discharge, patient will propel wheelchair >/= 300' at Neto or better.        Dates: Start:  11/13/23            Goal: LTG-By discharge, patient will ambulate >/= " 50' c/ LRAD at Renata or better.        Dates: Start:  11/13/23            Goal: LTG-By discharge, patient will transfer one surface to another c/ Renata or better.        Dates: Start:  11/13/23            Goal: LTG-By discharge, patient will ambulate up/down 1 step c/ LRAD at Renata or better.        Dates: Start:  11/13/23            Goal: LTG-By discharge, patient will transfer in/out of a car c/ ModA or better.        Dates: Start:  11/13/23

## 2023-11-16 NOTE — PROGRESS NOTES
.                                                         NURSING DAILY NOTE    Name: Jason Lima   Date of Admission: 11/12/2023   Admitting Diagnosis: Thalamic hemorrhage (HCC)  Attending Physician: Lisa Lee D.o.  Allergies: Penicillins    Safety  Patient Assist     Patient Precautions  Fall Risk  Precaution Comments  Left hemiparesis, left visual inattention.  Bed Transfer Status  Total Assist X 2  Toilet Transfer Status   Total Assist X 2  Assistive Devices  Rails, Wheelchair  Oxygen  None - Room Air  Diet/Therapeutic Dining  Current Diet Order   Procedures    Diet Order Diet: Level 7 - Easy to Chew (cut up into bite size pieces.); Liquid level: Level 0 - Thin; Second Modifier: (optional): Consistent CHO (Diabetic)     Pill Administration  whole  Agitated Behavioral Scale     ABS Level of Severity       Fall Risk  Has the patient had a fall this admission?   Yes  Shellie Hamilton Fall Risk Scoring  21, HIGH RISK  Fall Risk Safety Measures  bed alarm and chair alarm    Vitals  Temperature: 36.8 °C (98.3 °F)  Temp src: Oral  Pulse: 84  Respiration: 16  Blood Pressure: (!) 121/104  Blood Pressure MAP (Calculated): 110 MM HG  BP Location: Right, Upper Arm  Patient BP Position: Sitting     Oxygen  Pulse Oximetry: 95 %  O2 (LPM):  (2 l prn)  O2 Delivery Device: None - Room Air    Bowel and Bladder  Last Bowel Movement  11/13/23  Stool Type  Type 6: Fluffy pieces with ragged edges, a mushy stool  Bowel Device     Continent  Bladder: Continent void   Bowel: Continent movement  Bladder Function  Urine Void (mL): 300 ml  Number of Times Voided: 1  Urine Color: Yellow  Genitourinary Assessment   Bladder Assessment (WDL):  Within Defined Limits  Echols Catheter: Not Applicable  Urine Color: Yellow  Bladder Device: Urinal  Time Void: No  Bladder Scan: Post Void  $ Bladder Scan Results (mL): 26    Skin  Polo Score   15  Sensory Interventions   Bed Types: Standard/Trauma Mattress  Skin Preventative Measures: Pillows  in Use for Support / Positioning  Moisture Interventions         Pain  Pain Rating Scale  0 - No Pain  Pain Location  Back  Pain Location Orientation  Upper  Pain Interventions   Declines    ADLs    Bathing   Shower, Staff  Linen Change   Complete  Personal Hygiene     Chlorhexidine Bath      Oral Care  Brushed Teeth (assist)  Teeth/Dentures     Shave     Nutrition Percentage Eaten  Lunch, Between % Consumed (95%)  Environmental Precautions  Treaded Slipper Socks on Patient, Personal Belongings, Wastebasket, Call Bell etc. in Easy Reach, Transferred to Stronger Side, Report Given to Other Health Care Providers Regarding Fall Risk, Bed in Low Position, Communication Sign for Patients & Families, Mobility Assessed & Appropriate Sign Placed  Patient Turns/Positioning  Patient Declines and Understands Importance  Patient Turns Assistance/Tolerance  Tolerates Well, Assistance of Two or More  Bed Positions  Bed Controls On, Bed Locked  Head of Bed Elevated  Self regulated      Psychosocial/Neurologic Assessment  Psychosocial Assessment  Psychosocial (WDL):  WDL Except  Patient Behaviors: Flat Affect  Neurologic Assessment  Neuro (WDL): Exceptions to WDL  Level of Consciousness: Alert  Orientation Level: Oriented X4  Cognition: Appropriate judgement, Follows commands, Appropriate safety awareness  Speech: Clear, Slurred (some words are slurred)  Facial Symmetry: Left facial drooping  Pupil Assesment: Yes  R Pupil Size (mm): 3  R Pupil Shape / Description: Round  R Pupil Reaction: Brisk  L Pupil Size (mm): 3  L Pupil Shape / Description: Round  L Pupil Reaction: Brisk  Reflexes: Cough  Cough Reflex: Present  Motor Function/Sensation Assessment: Dorsiflexion, Motor response, Sensation, Motor strength  R Foot Dorsiflexion: Strong  L Foot Dorsiflexion: Absent  RUE Motor Response: Responds to commands, Normal extension, Normal flexion  RUE Sensation: Full sensation  Muscle Strength Right Arm: Normal Strength Against  Gravity and Full Resistance  LUE Motor Response: Flaccid, Movement to painful stimulus  LUE Sensation: Decreased  Muscle Strength Left Arm: Trace Contraction but No Movement  RLE Motor Response: Responds to commands, Movement to painful stimulus  RLE Sensation: Full sensation  Muscle Strength Right Leg: Good Strength Against Gravity and Moderate Resistance  LLE Motor Response: Movement to painful stimulus  LLE Sensation: Decreased  Muscle Strength Left Leg: Trace Contraction but No Movement  EENT (WDL):  WDL Except    Cardio/Pulmonary Assessment  Edema      Respiratory Breath Sounds  RUL Breath Sounds: Clear  RML Breath Sounds: Clear  RLL Breath Sounds: Diminished  MOHAMUD Breath Sounds: Clear  LLL Breath Sounds: Diminished  Cardiac Assessment   Cardiac (WDL):  Within Defined Limits

## 2023-11-16 NOTE — CARE PLAN
Problem: Fall Risk - Rehab  Goal: Patient will remain free from falls  Note: Patient remains free from falls this shift. Patient was educated on using the call light to prevent falls. Patients bed is in the lowest position. The patients belongings are placed in near proximity to the patient.      Problem: Pain - Standard  Goal: Alleviation of pain or a reduction in pain to the patient’s comfort goal  Flowsheets (Taken 11/16/2023 0800)  Non Verbal Scale:   Calm   Unlabored Breathing  Pain Rating Scale (NPRS): 0   The patient is Stable - Low risk of patient condition declining or worsening

## 2023-11-16 NOTE — PROGRESS NOTES
NURSING DAILY NOTE    Name: Jason Lima   Date of Admission: 11/12/2023   Admitting Diagnosis: Thalamic hemorrhage (HCC)  Attending Physician: Lisa Lee D.o.  Allergies: Penicillins    Safety  Patient Assist     Patient Precautions  Fall Risk  Precaution Comments  Left hemiparesis, left visual inattention.  Bed Transfer Status  Total Assist X 2  Toilet Transfer Status   Total Assist X 2  Assistive Devices  Rails, Wheelchair  Oxygen  CPAP  Diet/Therapeutic Dining  Current Diet Order   Procedures    Diet Order Diet: Level 7 - Easy to Chew (cut up into bite size pieces.); Liquid level: Level 0 - Thin; Second Modifier: (optional): Consistent CHO (Diabetic)     Pill Administration  whole and one at a time   Agitated Behavioral Scale     ABS Level of Severity       Fall Risk  Has the patient had a fall this admission?   Yes  Shellie Hamilton Fall Risk Scoring  21, HIGH RISK  Fall Risk Safety Measures  bed alarm, chair alarm, fall during this hospitalization, and poor balance    Vitals  Temperature: 36.4 °C (97.6 °F)  Temp src: Oral  Pulse: 70  Respiration: 18  Blood Pressure: (!) 153/91  Blood Pressure MAP (Calculated): 112 MM HG  BP Location: Right, Upper Arm  Patient BP Position: Supine     Oxygen  Pulse Oximetry: 98 %  O2 (LPM):  (2 l prn)  O2 Delivery Device: CPAP    Bowel and Bladder  Last Bowel Movement  11/13/23  Stool Type  Type 6: Fluffy pieces with ragged edges, a mushy stool  Bowel Device     Continent  Bladder: Continent void   Bowel: Continent movement  Bladder Function  Urine Void (mL): 250 ml (urinal)  Number of Times Voided: 1  Urine Color: Yellow  Genitourinary Assessment   Bladder Assessment (WDL):  Within Defined Limits  Echols Catheter: Not Applicable  Urine Color: Yellow  Bladder Device: Urinal  Time Void: No  Bladder Scan: Post Void  $ Bladder Scan Results (mL): 26    Skin  Polo Score   15  Sensory Interventions   Bed Types:  Standard/Trauma Mattress  Skin Preventative Measures: Pillows in Use for Support / Positioning  Moisture Interventions         Pain  Pain Rating Scale  0 - No Pain  Pain Location  Back  Pain Location Orientation  Upper  Pain Interventions   Declines    ADLs    Bathing   Shower, Staff  Linen Change   Complete  Personal Hygiene  Moist Deja Wipes  Chlorhexidine Bath      Oral Care  Brushed Teeth (assist)  Teeth/Dentures     Shave     Nutrition Percentage Eaten  *  * Meal *  *, Dinner, 0% Consumed  Environmental Precautions  Treaded Slipper Socks on Patient, Personal Belongings, Wastebasket, Call Bell etc. in Easy Reach, Transferred to Stronger Side, Report Given to Other Health Care Providers Regarding Fall Risk, Communication Sign for Patients & Families, Bed in Low Position, Mobility Assessed & Appropriate Sign Placed  Patient Turns/Positioning  Supine  Patient Turns Assistance/Tolerance  Tolerates Well  Bed Positions  Bed Controls On, Bed Locked  Head of Bed Elevated  Self regulated      Psychosocial/Neurologic Assessment  Psychosocial Assessment  Psychosocial (WDL):  WDL Except  Patient Behaviors: Flat Affect  Neurologic Assessment  Neuro (WDL): Exceptions to WDL  Level of Consciousness: Alert  Orientation Level: Oriented X4  Cognition: Appropriate judgement, Follows commands, Appropriate safety awareness  Speech: Clear, Slurred  Facial Symmetry: Left facial drooping  Pupil Assesment: Yes  R Pupil Size (mm): 3  R Pupil Shape / Description: Round  R Pupil Reaction: Brisk  L Pupil Size (mm): 3  L Pupil Shape / Description: Round  L Pupil Reaction: Brisk  Reflexes: Cough  Cough Reflex: Present  Motor Function/Sensation Assessment: Dorsiflexion, Motor response, Sensation, Motor strength  R Foot Dorsiflexion: Strong  L Foot Dorsiflexion: Absent  RUE Motor Response: Responds to commands, Normal extension, Normal flexion  RUE Sensation: Full sensation  Muscle Strength Right Arm: Normal Strength Against Gravity and Full  Resistance  LUE Motor Response: Flaccid, Movement to painful stimulus  LUE Sensation: Decreased  Muscle Strength Left Arm: Trace Contraction but No Movement  RLE Motor Response: Responds to commands, Movement to painful stimulus  RLE Sensation: Full sensation  Muscle Strength Right Leg: Good Strength Against Gravity and Moderate Resistance  LLE Motor Response: Movement to painful stimulus  LLE Sensation: Decreased  Muscle Strength Left Leg: Trace Contraction but No Movement  EENT (WDL):  WDL Except    Cardio/Pulmonary Assessment  Edema      Respiratory Breath Sounds  RUL Breath Sounds: Clear  RML Breath Sounds: Clear  RLL Breath Sounds: Diminished  MOHAMUD Breath Sounds: Clear  LLL Breath Sounds: Diminished  Cardiac Assessment   Cardiac (WDL):  Within Defined Limits

## 2023-11-17 ENCOUNTER — APPOINTMENT (OUTPATIENT)
Dept: OCCUPATIONAL THERAPY | Facility: REHABILITATION | Age: 63
DRG: 057 | End: 2023-11-17
Attending: HOSPITALIST
Payer: COMMERCIAL

## 2023-11-17 ENCOUNTER — APPOINTMENT (OUTPATIENT)
Dept: OCCUPATIONAL THERAPY | Facility: REHABILITATION | Age: 63
DRG: 057 | End: 2023-11-17
Attending: PHYSICAL MEDICINE & REHABILITATION
Payer: COMMERCIAL

## 2023-11-17 ENCOUNTER — APPOINTMENT (OUTPATIENT)
Dept: PHYSICAL THERAPY | Facility: REHABILITATION | Age: 63
DRG: 057 | End: 2023-11-17
Attending: PHYSICAL MEDICINE & REHABILITATION
Payer: COMMERCIAL

## 2023-11-17 ENCOUNTER — APPOINTMENT (OUTPATIENT)
Dept: SPEECH THERAPY | Facility: REHABILITATION | Age: 63
DRG: 057 | End: 2023-11-17
Attending: PHYSICAL MEDICINE & REHABILITATION
Payer: COMMERCIAL

## 2023-11-17 PROCEDURE — A9270 NON-COVERED ITEM OR SERVICE: HCPCS | Performed by: PHYSICAL MEDICINE & REHABILITATION

## 2023-11-17 PROCEDURE — 700102 HCHG RX REV CODE 250 W/ 637 OVERRIDE(OP): Performed by: PHYSICAL MEDICINE & REHABILITATION

## 2023-11-17 PROCEDURE — 97530 THERAPEUTIC ACTIVITIES: CPT

## 2023-11-17 PROCEDURE — 97130 THER IVNTJ EA ADDL 15 MIN: CPT

## 2023-11-17 PROCEDURE — 90837 PSYTX W PT 60 MINUTES: CPT | Performed by: SOCIAL WORKER

## 2023-11-17 PROCEDURE — 99232 SBSQ HOSP IP/OBS MODERATE 35: CPT | Performed by: PHYSICAL MEDICINE & REHABILITATION

## 2023-11-17 PROCEDURE — 700102 HCHG RX REV CODE 250 W/ 637 OVERRIDE(OP): Performed by: HOSPITALIST

## 2023-11-17 PROCEDURE — 97112 NEUROMUSCULAR REEDUCATION: CPT

## 2023-11-17 PROCEDURE — 97129 THER IVNTJ 1ST 15 MIN: CPT

## 2023-11-17 PROCEDURE — A9270 NON-COVERED ITEM OR SERVICE: HCPCS | Performed by: HOSPITALIST

## 2023-11-17 PROCEDURE — 92526 ORAL FUNCTION THERAPY: CPT

## 2023-11-17 PROCEDURE — 99232 SBSQ HOSP IP/OBS MODERATE 35: CPT | Performed by: HOSPITALIST

## 2023-11-17 PROCEDURE — 770010 HCHG ROOM/CARE - REHAB SEMI PRIVAT*

## 2023-11-17 PROCEDURE — 97032 APPL MODALITY 1+ESTIM EA 15: CPT

## 2023-11-17 PROCEDURE — 97535 SELF CARE MNGMENT TRAINING: CPT

## 2023-11-17 PROCEDURE — 94760 N-INVAS EAR/PLS OXIMETRY 1: CPT

## 2023-11-17 RX ORDER — HYDRALAZINE HYDROCHLORIDE 50 MG/1
100 TABLET, FILM COATED ORAL EVERY 8 HOURS
Status: DISCONTINUED | OUTPATIENT
Start: 2023-11-17 | End: 2023-11-17

## 2023-11-17 RX ORDER — TRAZODONE HYDROCHLORIDE 50 MG/1
75 TABLET ORAL
Status: DISCONTINUED | OUTPATIENT
Start: 2023-11-17 | End: 2024-01-02 | Stop reason: HOSPADM

## 2023-11-17 RX ADMIN — HYDRALAZINE HYDROCHLORIDE 75 MG: 50 TABLET, FILM COATED ORAL at 15:04

## 2023-11-17 RX ADMIN — HYDRALAZINE HYDROCHLORIDE 75 MG: 50 TABLET, FILM COATED ORAL at 05:30

## 2023-11-17 RX ADMIN — ACETAMINOPHEN 1000 MG: 500 TABLET ORAL at 05:30

## 2023-11-17 RX ADMIN — ATORVASTATIN CALCIUM 40 MG: 40 TABLET, FILM COATED ORAL at 20:03

## 2023-11-17 RX ADMIN — SENNOSIDES AND DOCUSATE SODIUM 2 TABLET: 50; 8.6 TABLET ORAL at 08:39

## 2023-11-17 RX ADMIN — POLYETHYLENE GLYCOL 3350 1 PACKET: 17 POWDER, FOR SOLUTION ORAL at 06:02

## 2023-11-17 RX ADMIN — HYDRALAZINE HYDROCHLORIDE 75 MG: 50 TABLET, FILM COATED ORAL at 21:41

## 2023-11-17 RX ADMIN — OMEPRAZOLE 20 MG: 20 CAPSULE, DELAYED RELEASE ORAL at 08:39

## 2023-11-17 RX ADMIN — SENNOSIDES AND DOCUSATE SODIUM 2 TABLET: 50; 8.6 TABLET ORAL at 20:03

## 2023-11-17 RX ADMIN — AMLODIPINE BESYLATE 10 MG: 5 TABLET ORAL at 08:39

## 2023-11-17 RX ADMIN — BACLOFEN 5 MG: 10 TABLET ORAL at 20:03

## 2023-11-17 RX ADMIN — TRAZODONE HYDROCHLORIDE 75 MG: 50 TABLET ORAL at 20:03

## 2023-11-17 ASSESSMENT — ENCOUNTER SYMPTOMS
NERVOUS/ANXIOUS: 0
DIARRHEA: 0
VOMITING: 0
CHILLS: 0
ABDOMINAL PAIN: 0
SHORTNESS OF BREATH: 0
FEVER: 0
NAUSEA: 0

## 2023-11-17 ASSESSMENT — PATIENT HEALTH QUESTIONNAIRE - PHQ9
7. TROUBLE CONCENTRATING ON THINGS, SUCH AS READING THE NEWSPAPER OR WATCHING TELEVISION: NOT AT ALL
2. FEELING DOWN, DEPRESSED, IRRITABLE, OR HOPELESS: SEVERAL DAYS
3. TROUBLE FALLING OR STAYING ASLEEP OR SLEEPING TOO MUCH: NOT AT ALL
SUM OF ALL RESPONSES TO PHQ9 QUESTIONS 1 AND 2: 1
1. LITTLE INTEREST OR PLEASURE IN DOING THINGS: NOT AT ALL
8. MOVING OR SPEAKING SO SLOWLY THAT OTHER PEOPLE COULD HAVE NOTICED. OR THE OPPOSITE, BEING SO FIGETY OR RESTLESS THAT YOU HAVE BEEN MOVING AROUND A LOT MORE THAN USUAL: NOT AT ALL
SUM OF ALL RESPONSES TO PHQ QUESTIONS 1-9: 2
4. FEELING TIRED OR HAVING LITTLE ENERGY: SEVERAL DAYS
9. THOUGHTS THAT YOU WOULD BE BETTER OFF DEAD, OR OF HURTING YOURSELF: NOT AT ALL
5. POOR APPETITE OR OVEREATING: NOT AT ALL
6. FEELING BAD ABOUT YOURSELF - OR THAT YOU ARE A FAILURE OR HAVE LET YOURSELF OR YOUR FAMILY DOWN: NOT AL ALL

## 2023-11-17 ASSESSMENT — PAIN DESCRIPTION - PAIN TYPE: TYPE: ACUTE PAIN

## 2023-11-17 ASSESSMENT — GAIT ASSESSMENTS
GAIT LEVEL OF ASSIST: TOTAL ASSIST X 2
ASSISTIVE DEVICE: PARALLEL BARS

## 2023-11-17 NOTE — CONSULTS
Brief Behavioral Health Care Note      Consultation received for Behavioral Health Care psychotherapy session. Clinician went to meet with patient however he was meeting with another provider. Will attempt again on Friday, 11/17/23.    Thank you,      Kriss Sanabria, Ph.D.

## 2023-11-17 NOTE — PROGRESS NOTES
Patient has no BM x 4 days and was given miralax and MOM yesterday with no results. Offered suppository but refused. Informed unit manager and made aware.  Given miralax as patient request.

## 2023-11-17 NOTE — THERAPY
Speech Language Pathology  Daily Treatment     Patient Name: Jason Lima  Age:  62 y.o., Sex:  male  Medical Record #: [id number]  Today's Date: 11/16/2023     Precautions  Precautions: Fall Risk  Comments: Left hemiparesis, left visual inattention.    Subjective    Patient agreeable to therapy. Receiving IV.  He reported to therapist that he coughed/choked last night on a piece of lettuce/salad, but was able to clear it.     Objective       11/16/23 1101   Treatment Charges   SLP Cognitive Skill Development First 15 Minutes 1   SLP Cognitive Skill Development Additional 15 Minutes 3   SLP Total Time Spent   SLP Individual Total Time Spent (Mins) 60   Cognition   Simple Attention Moderate (3)   Visual Scanning / Cancellation Skills Minimal (4)   Prospective Memory Moderate (3)   Functional Memory Activities Moderate (3)   Dysphagia    Diet / Liquid Recommendation Regular - Easy to Chew (7)   Nutritional Liquid Intake Rating Scale Non thickened beverages   Nutritional Food Intake Rating Scale Total oral diet with no restrictions         Assessment     He reported to therapist that he coughed/choked last night on a piece of lettuce/salad, but was able to clear it.  Patient reported that he recognized that he eats too fast and needs to slow down. Patient continued alternating visual attention counting coins with 5/12 values added correctly.  Patient continues to report vision to be blurry.   Completed 2 sets of (2 target)single letter visual scans with left side landmark.   Patient completed with 90% acc errors d/t left inattention.   Educated patient to use systematic ( light house or typewritter) scanning  technique, starting on left side for each line, and avoid jumping to easy targets.  Patient did display some errors on second set which were foils.  ( Circling O or Q when target was D).     Patient seen with meal with current diet (7) regular easy chew and (0) thin liquids.  Patient slowed rate of intake with  review of strategies and min cues to start.  He tolerated diet well overall but did need his large pieces cut up.  He also had a cough at end of meal which occurred on fresh honeydew melon.  Patient reported that some of the juice made him cough.  Educated to ensure bites are small and try chewing on the right side of mouth.    Strengths: Effective communication skills, Alert and oriented, Able to follow instructions, Motivated for self care and independence, Pleasant and cooperative, Supportive family, Willingly participates in therapeutic activities  Barriers: Aspiration risk, Hemiparesis, Impaired carryover of learning, Impaired insight/denial of deficits, Impaired functional cognition, Impulsive, Visual impairment    Plan    Target use of compensatory swallow strategies, suggest ex. swishing  small water boluses without leakage to improve  lip seal, target visual scanning, alternating attention, recall of daily events and safety sequencing.     Passport items to be completed:  Express basic needs, understand food/liquid recommendations, consistently follow swallow precautions, manage finances, manage medications, arrive to therapy appointments on time, complete daily memory log entries, solve problems related to safety situations, review education related to hospitalization, complete caregiver training     Speech Therapy Problems (Active)       Problem: Comprehension STGs       Dates: Start:  11/13/23         Goal: STG-Within one week, patient will perform attention for comprehension in functional reading tasks with 75% acc.       Dates: Start:  11/13/23         Goal Note filed on 11/15/23 1301 by Tawny Forbes MS,CCC-SLP       To be addresssed.                 Problem: Expression STGs       Dates: Start:  11/13/23         Goal: STG-Within one week, patient will       Dates: Start:  11/13/23               Problem: Memory STGs       Dates: Start:  11/13/23         Goal: STG-Within one week, patient will recall  daily events and safety sequencing with 60% acc with use of external memory aids.       Dates: Start:  11/13/23         Goal Note filed on 11/15/23 1301 by Tawny Forbes MS,CCC-SLP       Educated patient on use of memory log and memory strategies this date.  Min-mod A needed for 60% acc.                 Problem: Problem Solving STGs       Dates: Start:  11/13/23         Goal: STG-Within one week, patient will perform alternating attention tasks with 75% acc.       Dates: Start:  11/13/23         Goal Note filed on 11/15/23 1301 by Tawny Forbes MS,CCC-SLP       Patient has preformed alternating attention tasks with 40% acc.                 Problem: Speech/Swallowing LTGs       Dates: Start:  11/13/23         Goal: LTG-By discharge, patient will safely swallow (7)regular diet textures and (0) thin liquids with no overt s/sx of aspiration for 2/2 days.       Dates: Start:  11/13/23            Goal: LTG-By discharge, patient will solve complex problem       Dates: Start:  11/13/23       Description: For safety and self care with 80% acc mod I  for safe discharge home.         Goal: LTG-By discharge, patient will recall new training with 80% acc with min A and use of external memory aids.       Dates: Start:  11/13/23               Problem: Swallowing STGs       Dates: Start:  11/13/23         Goal: STG-Within one week, patient will safely swallow (7) regular easy chew and cut up and (0) thin liquids with no overt s/sx of aspiration        Dates: Start:  11/13/23         Goal Note filed on 11/15/23 1301 by Tawny Forbes MS,CCC-SLP       Patient displays occasional coughing on thin liquids displays occasional anterior leakage, but is overall tolerating without distress.  MBSS scheduled for today.

## 2023-11-17 NOTE — CARE PLAN
"   The patient is Stable - Low risk of patient condition declining or worsening    Shift Goals  Clinical Goals: safety  Patient Goals: safety, sleep/rest  Family Goals: kirit      Problem: Fall Risk - Rehab  Goal: Patient will remain free from falls  Outcome: Progressing  Note: Shellie Hamilton Fall risk Assessment Score: 28    High fall risk Interventions   - Alarming seatbelt  - Bed and strip alarm   - Yellow sign by the door   - Yellow wrist band \"Fall risk\"  - Room near to the nurse station  - Do not leave patient unattended in the bathroom  - Fall risk education provided  Patient uses call light consistently and appropriately this shift.  Waits for assistance when needed and does not attempt self transfer.  Able to verbalize needs.  Will continue to monitor.     Problem: Hemodynamics  Goal: Patient's hemodynamics, fluid balance and neurologic status will be stable or improve  Outcome: Progressing     "

## 2023-11-17 NOTE — PROGRESS NOTES
NURSING DAILY NOTE    Name: Jason Lima   Date of Admission: 11/12/2023   Admitting Diagnosis: Thalamic hemorrhage (HCC)  Attending Physician: Lisa Lee D.o.  Allergies: Penicillins    Safety  Patient Assist  max2  Patient Precautions  Fall Risk  Precaution Comments  Left hemiparesis, left visual inattention.  Bed Transfer Status  Maximal Assist  Toilet Transfer Status   Total Assist X 2  Assistive Devices  Rails, Wheelchair  Oxygen  None - Room Air  Diet/Therapeutic Dining  Current Diet Order   Procedures    Diet Order Diet: Level 6 - Soft and Bite Sized (medications whole with thin liquids); Liquid level: Level 0 - Thin; Second Modifier: (optional): Consistent CHO (Diabetic)     Pill Administration  whole  Agitated Behavioral Scale     ABS Level of Severity       Fall Risk  Has the patient had a fall this admission?   Yes  Shellie Hamilton Fall Risk Scoring  28, HIGH RISK  Fall Risk Safety Measures  bed alarm, chair alarm, and seatbelt alarm    Vitals  Temperature: 36.8 °C (98.3 °F)  Temp src: Oral  Pulse: (!) 101 (RN AWARE)  Respiration: 16  Blood Pressure: (!) 152/89 (RN aware)  Blood Pressure MAP (Calculated): 110 MM HG  BP Location: Right, Upper Arm  Patient BP Position: Sitting     Oxygen  Pulse Oximetry: 94 %  O2 (LPM): 0  O2 Delivery Device: None - Room Air    Bowel and Bladder  Last Bowel Movement  11/13/23  Stool Type  Type 6: Fluffy pieces with ragged edges, a mushy stool  Bowel Device     Continent  Bladder: Continent void   Bowel: Continent movement  Bladder Function  Urine Void (mL): 450 ml (Urinal)  Number of Times Voided: 1  Urine Color: Yellow  Genitourinary Assessment   Bladder Assessment (WDL):  Within Defined Limits  Echols Catheter: Not Applicable  Urine Color: Yellow  Bladder Device: Urinal  Time Void: No  Bladder Scan: Post Void  $ Bladder Scan Results (mL): 26    Skin  Polo Score   15  Sensory Interventions   Bed Types:  Standard/Trauma Mattress  Skin Preventative Measures: Pillows in Use for Support / Positioning  Moisture Interventions  Moisturizers/Barriers: Barrier Wipes      Pain  Pain Rating Scale  0 - No Pain  Pain Location  Back  Pain Location Orientation  Upper  Pain Interventions   Declines    ADLs    Bathing   Shower, Staff  Linen Change   Complete  Personal Hygiene  Moist Deja Wipes  Chlorhexidine Bath      Oral Care  Brushed Teeth (assist)  Teeth/Dentures     Shave     Nutrition Percentage Eaten  Lunch, Between % Consumed (95%)  Environmental Precautions  Treaded Slipper Socks on Patient  Patient Turns/Positioning  Sitting Up in Wheelchair  Patient Turns Assistance/Tolerance  Tolerates Well  Bed Positions  Bed Controls On, Bed Locked  Head of Bed Elevated  Self regulated      Psychosocial/Neurologic Assessment  Psychosocial Assessment  Psychosocial (WDL):  Within Defined Limits  Patient Behaviors: Flat Affect  Neurologic Assessment  Neuro (WDL): Exceptions to WDL  Level of Consciousness: Alert  Orientation Level: Oriented X4  Cognition: Appropriate judgement  Speech: Clear, Slurred  Facial Symmetry: Left facial drooping  Pupil Assesment: Yes  R Pupil Size (mm): 3  R Pupil Shape / Description: Round  R Pupil Reaction: Brisk  L Pupil Size (mm): 3  L Pupil Shape / Description: Round  L Pupil Reaction: Brisk  Reflexes: Cough  Cough Reflex: Present  Motor Function/Sensation Assessment: Dorsiflexion, Motor response  R Foot Dorsiflexion: Strong  L Foot Dorsiflexion: Absent  RUE Motor Response: Responds to commands  RUE Sensation: Full sensation  Muscle Strength Right Arm: Normal Strength Against Gravity and Full Resistance  LUE Motor Response: Movement to painful stimulus  LUE Sensation: Tingling, Numbness  Muscle Strength Left Arm: Weak Movement but Not Against Gravity or Resistance  RLE Motor Response: Responds to commands  RLE Sensation: Full sensation  Muscle Strength Right Leg: Good Strength Against Gravity and  Moderate Resistance  LLE Motor Response: Movement to painful stimulus  LLE Sensation: Tingling, Numbness  Muscle Strength Left Leg: Weak Movement but Not Against Gravity or Resistance  EENT (WDL):  WDL Except    Cardio/Pulmonary Assessment  Edema      Respiratory Breath Sounds  RUL Breath Sounds: Clear  RML Breath Sounds: Clear  RLL Breath Sounds: Diminished  MOHAMUD Breath Sounds: Clear  LLL Breath Sounds: Diminished  Cardiac Assessment   Cardiac (WDL):  Within Defined Limits

## 2023-11-17 NOTE — THERAPY
Physical Therapy   Daily Treatment     Patient Name: Jason Lima  Age:  62 y.o., Sex:  male  Medical Record #: [id number]  Today's Date: 11/17/2023     Precautions  Precautions: Fall Risk  Comments: Left hemiparesis, left visual inattention.    Subjective    Patient appreciative of therapy     Objective       11/17/23 1301   PT Charge Group   PT Neuromuscular Re-Education / Balance (Units) 2   PT Therapeutic Activities (Units) 2   PT Total Time Spent   PT Individual Total Time Spent (Mins) 60   Precautions   Precautions Fall Risk   Comments Left hemiparesis, left visual inattention.   Gait Functional Level of Assist    Gait Level Of Assist Total Assist X 2   Assistive Device Parallel Bars  (pregait work 3 steps in place with total assist x 2)   Wheelchair Functional Level of Assist   Wheelchair Assist Moderate Assist   Distance Wheelchair (Feet or Distance) 50  (bilateral LE propulsion for endurance)   Transfer Functional Level of Assist   Bed, Chair, Wheelchair Transfer Moderate Assist  (mod assist x 1 front, min assist x 1 behind)   Bed Chair Wheelchair Transfer Description   (lateral squat pivot towards right)   Toilet Transfers Maximal Assist   Toilet Transfer Description Grab bar;Set-up of equipment;Increased time;Verbal cueing;Supervision for safety  (significant left lean in w/c, on toilet)   Sitting Lower Body Exercises   Sitting Lower Body Exercises   (encouraged throughout the day)   Ankle Pumps 1 set of 10;Bilateral  (gait belt assist)   Long Arc Quad 1 set of 10;Bilateral   Marching 1 set of 10;Reciprocal   Bed Mobility    Sit to Supine Moderate Assist   Interdisciplinary Plan of Care Collaboration   IDT Collaboration with  Therapy Tech   Collaboration Comments pregait assist, assist with transfers     3 trials of pregait standing in parallel bars, added mirror for feedback    Assessment    Patient able to demonstrate weight-shift in parallel bars, requires max assist in foot glider to lift left leg due  "to balance    Strengths: Able to follow instructions, Independent prior level of function, Motivated for self care and independence, Pleasant and cooperative, Supportive family, Willingly participates in therapeutic activities  Barriers: Decreased endurance, Hemiparesis, Difficulty following instructions, Fatigue, Hypertension, Impaired activity tolerance, Impaired balance, Impaired insight/denial of deficits, Impaired functional cognition, Impaired sleep pattern, Impulsive, Limited mobility, Visual impairment, Poor family support (lives alone)    Plan    Head righting/ midline orientation  Bed mobility/ PNF rolling  Transfers with SB, emphasis on motor planning  Seated EOB balance  Wc mob with tanvir technique and emphasis on left environmental scanning/ awareness  Initiate HEP  Forced use principles for tanvir body     DME  PT DME Recommendations  Wheelchair: 18\" Width  Cushion: Specialty (See other comments) (TBD pending ambulation progress)  Assistive Device: Tanvir Walker (TBD pending progress, currently 2 person assist for gait)  Additional Equipment:  (TBD)     Passport items to be completed:  Get in/out of bed safely, in/out of a vehicle, safely use mobility device, walk or wheel around home/community, navigate up and down stairs, show how to get up/down from the ground, ensure home is accessible, demonstrate HEP, complete caregiver training    Physical Therapy Problems (Active)       Problem: Balance       Dates: Start:  11/13/23         Goal: STG-Within one week, patient will maintain static standing c/ RUE support on rail at ModA or better.        Dates: Start:  11/13/23               Problem: Mobility       Dates: Start:  11/13/23         Goal: STG-Within one week, patient will ambulate distances >/= 30' c/ RHR and ModA or better.        Dates: Start:  11/13/23               Problem: Mobility Transfers       Dates: Start:  11/13/23         Goal: STG-Within one week, patient will perform bed mobility c/ ModA or " better.        Dates: Start:  11/13/23            Goal: STG-Within one week, patient will transfer bed to chair c/ MaxA x 1 or better.        Dates: Start:  11/13/23         Goal Note filed on 11/15/23 0923 by Vivian Mcdermott DPT       Inconsistent performance, recommend slide board for safety at this time                 Problem: PT-Long Term Goals       Dates: Start:  11/13/23         Goal: LTG-By discharge, patient will propel wheelchair >/= 300' at Neto or better.        Dates: Start:  11/13/23            Goal: LTG-By discharge, patient will ambulate >/= 50' c/ LRAD at Renata or better.        Dates: Start:  11/13/23            Goal: LTG-By discharge, patient will transfer one surface to another c/ Renata or better.        Dates: Start:  11/13/23            Goal: LTG-By discharge, patient will ambulate up/down 1 step c/ LRAD at Renata or better.        Dates: Start:  11/13/23            Goal: LTG-By discharge, patient will transfer in/out of a car c/ ModA or better.        Dates: Start:  11/13/23

## 2023-11-17 NOTE — PROGRESS NOTES
"  Physical Medicine & Rehabilitation Progress Note    Encounter Date: 11/17/2023    Chief Complaint: Difficulty sleeping    Interval Events (Subjective):  BP elevated  Voiding  BM 11/16    Patient seen and examined in the hallway. Vielka present as well. Doesn't think he slept well last night. He has no pain at this time.       ROS: 14 point ROS negative unless otherwise specified in the HPI    Objective:  VITAL SIGNS: BP (!) 152/89 Comment: RN aware  Pulse (!) 101 Comment: RN AWARE  Temp 36.8 °C (98.3 °F) (Oral)   Resp 16   Ht 1.727 m (5' 8\")   Wt 86 kg (189 lb 9.5 oz)   SpO2 94%   BMI 28.83 kg/m²     GEN: No apparent distress  HEENT: Head normocephalic, atraumatic.  Sclera nonicteric bilaterally, no ocular discharge appreciated bilaterally.  CV: Extremities warm and well-perfused, no peripheral edema appreciated bilaterally.  PULMONARY: Breathing nonlabored on room air, no respiratory accessory muscle use.  Not requiring supplemental oxygen.  SKIN: No appreciable skin breakdown on exposed areas of skin.  PSYCH: Flat affect  NEURO: Awake alert.  Conversational.  Logical thought content. Dysarthria.       Laboratory Values:  Recent Results (from the past 72 hour(s))   POCT glucose device results    Collection Time: 11/14/23  5:06 PM   Result Value Ref Range    POC Glucose, Blood 133 (H) 65 - 99 mg/dL   POCT glucose device results    Collection Time: 11/14/23  8:22 PM   Result Value Ref Range    POC Glucose, Blood 124 (H) 65 - 99 mg/dL   Basic Metabolic Panel    Collection Time: 11/16/23  5:44 AM   Result Value Ref Range    Sodium 140 135 - 145 mmol/L    Potassium 3.9 3.6 - 5.5 mmol/L    Chloride 104 96 - 112 mmol/L    Co2 27 20 - 33 mmol/L    Glucose 133 (H) 65 - 99 mg/dL    Bun 32 (H) 8 - 22 mg/dL    Creatinine 2.34 (H) 0.50 - 1.40 mg/dL    Calcium 9.2 8.5 - 10.5 mg/dL    Anion Gap 9.0 7.0 - 16.0   ESTIMATED GFR    Collection Time: 11/16/23  5:44 AM   Result Value Ref Range    GFR (CKD-EPI) 31 (A) >60 " mL/min/1.73 m 2       Medications:  Scheduled Medications   Medication Dose Frequency    hydrALAZINE  75 mg Q8HRS    traZODone  50 mg QHS    senna-docusate  2 Tablet BID    omeprazole  20 mg DAILY    amLODIPine  10 mg DAILY    atorvastatin  40 mg Nightly    baclofen  5 mg QHS     PRN medications: melatonin, [DISCONTINUED] insulin regular **AND** [CANCELED] POC blood glucose manual result **AND** NOTIFY MD and PharmD **AND** Administer 20 grams of glucose (approximately 8 ounces of fruit juice) every 15 minutes PRN FSBG less than 70 mg/dL **AND** dextrose bolus, Respiratory Therapy Consult, senna-docusate **AND** polyethylene glycol/lytes **AND** magnesium hydroxide **AND** bisacodyl, mag hydrox-al hydrox-simeth, ondansetron **OR** ondansetron, traZODone, sodium chloride, [] acetaminophen **FOLLOWED BY** acetaminophen, oxyCODONE immediate-release **OR** oxyCODONE immediate-release, hydrALAZINE    Diet:  Current Diet Order   Procedures    Diet Order Diet: Level 6 - Soft and Bite Sized (medications whole with thin liquids); Liquid level: Level 0 - Thin; Second Modifier: (optional): Consistent CHO (Diabetic)       Medical Decision Making and Plan:  Right thalamic hemorrhagic stroke  Originally presented with a headache and left-sided weakness to hospital in [facility]  Work-up at the outside hospital in [facility] revealed a right thalamic hemorrhagic stroke  Repeat CT head done after return flight to the ,  CT head shows right thalamic hemorrhage, decrease in density since prior studies from the outside hospital, slight asymmetric dilation of the left ventricular system including the left temporal horn with a possible component of hydrocephalus  Planning for BP less than 140, avoiding any kind of anticoagulation  Initiate comprehensive IRF level therapy with 3 days of therapy 5 days a week with services from PT/OT/SLP     Spasticity  Continue baclofen 5 mg nightly, started on  --> increase to TID  11/13/2023 --> continue 11/14/2023, stable on higher dose.   PRAFO ordered for right lower extremity to prevent further plantarflexion contracture  Spasticity better 11/16/2023 continue Baclofen at current dose     CKD  Has seen nephrology in past  Improving 11/13/2023   F/u OP with nephrology  S/p IVF 11/13-11/14 11/14/2023 BUN and Cr improved compared to prior  BMP 11/16 - improved - currently receiving IVF     Hypertension  Continue Amlodipine 10mg daily  Continue Hydralazine 25mg TID - increased by hospitalist 11/13/2023 from BID to TID--> increased to 50mg q8hrs 11/15  11/16 Hydralazine increased to 75mg q8hrs and 1x Hydralazine given  11/17 BP still elevated but hydralazine recently increased. Monitor     Prediabetes  History of hyperglycemia, not on home medications  Continue sliding scale insulin     HLD  Continue home dose satin      Bowel  Continue with scheduled Colace and senna, as needed stool softeners     Insomnia  Secondary to recent jet lag, melatonin scheduled --> increase to home dose 11/13/2023 9mg  Utilize trazodone as needed insomnia continues  Schedule Trazodone 11/15/2023   11/16/2023 continue Trazodone as is. Thinks he slept better last night  11/17/2023 Still not sleeping well. Increase to 75mg nightly.      Pain -as needed Tylenol and oxycodone    Post-Stroke Depression  Consulted Pyschiatry   Consider anti-depressant       DVT PROPHYLAXIS: NO - Hemorrhagic stroke    HOSPITALIST FOLLOWING: YES - d/w hospitalist 11/17    CODE STATUS: FULL CODE    DISPO: Home alone. Vielka and patient not , she cannot take off FMLA. D/w CM to give resources for private care giving.     MARCUS: 12/4/23    MEDS SENT TO: TBD    DISCHARGE FOLLOW UP: Neurology, Nephrology, PCP - needs referral to all.   ____________________________________    Dr. Lisa Lee DO, MS  [facility] - Physical Medicine & Rehabilitation   ____________________________________

## 2023-11-17 NOTE — CONSULTS
"PSYCHIATRIC CONSULTATION:  *Reason for admission:   right thalamic hemorrhage  *Reason for consult:depression   *Requesting Physician:Lisa Lee  Supervising Physician:Rachele Colon        Legal status:  not applicable    *Chief Complaint: \"Thoughts have just been running around in my head since my stroke.\"    *HPI:     Mr. Lima is a 61 yo male consulted for evaluation for treatment of depression. 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 He cannot identify any other major stressors other than that. He felt at that time depression was still maangeable and was not significantly interfering with functioning.He did not seek mental health treatment for depression this year.   He does state he has become more anxious since his stroke and at times feels overwhelmed but does not feel very depressed. He states he knows he has a good family, he is still young and he still has a lot of things he can enjoy (such as traveling to his homes in [place] and [facility]). He thinks overall he has a good life he is just struggling with thoughts of how he is going to adjust know that he has had a stroke. 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 He states the biggest issue he is having is that when he is alone or when he is about to go to sleep he will ruminate on worries about how he is going to manage once he is discharged from rehab. The worries do make it more difficult to go to sleep. He feels if he were sleeping better his mood and energy level would be better. He states at this time the anxiety is manageable. Does not endorse panic attacks. Reports he thinks his CPAP is also negatively affecting sleep as it is blowing too much " "air and also causes him to awaken from sleep.  He states he feels the trazodone must have helped with sleep because he did not wake up at all last night until nursing came and woke him this morning. Denies SE such as grogginess or dizziness today.      *Psychiatric Review of Systems:    ROS:  Mood: \"I wouldn't say I feel depressed but I do get hopeless sometimes when I think about how my life has changed now.\" However he also states he has hope for the future and still thinks he could find ways to enjoy life again. States he has also still been able to experience pleasure/holly. He states he likes the staff here and they can often put him in a good mood.   Sleep: no problems falling asleep but was having trouble staying asleep, see HPI  Appetite: \"slightly decreased, but its the food here, I don't like it.\"  Energy: tires easily but states he generally feels he has enough motivation to participate in PT  SI/HI: denies, reports int he beginning he had thoughts he would rather not be alive because of the stroke but now thinks he can improve and has not had any thoughts of self harm/suicide. He states his family is a reason for living as well as the fact that \"I;m still young and there is a lot I can enjoy.\"  Hallucinations: denies  Bipolar: no hx of manic symptoms  Trauma: denies    *Medical Review of Systems: as reported by pt. All systems reviewed. Only those found to be + are noted below. All others are negative.   Pt did not endorse any physical complaints    *Past Medical Hx:   Patient Active Problem List   Diagnosis    Gram-negative bacteremia    Hypertension    Hyperlipidemia    DM (diabetes mellitus) (HCC)    Thalamic hemorrhage (HCC)    ROMÁN (acute kidney injury) (HCC)    Acute left-sided weakness    Spasticity    Hemorrhagic stroke (HCC)        *Family Medical/Psychiatric Hx:  Mother-none  Father-none  Siblings-none, reports sister also had a stroke 2 years ago but recovered " "well  Children-none  Other:-none  Family hx of suicide:-none    *Past Psychiatric Hx:  IP treatment:none  OP treatment: none   Meds tried: none   Suicide attempts: none      *Social Hx:  Living situation:Patient was living in his own home [place] by himself. He reports he has multiple properties in [place], [facility] and [facility] as well  Childhood: raised in [facility], biological parents are decreased. He has a sister and brother who live locally.  Education/Employment: college education. He owns an [employer] shop.  Relationships (estrangements?): , currently engaged. Brandon is supportive. He has 1 daughter and 2 grandchildren who live in [place]  Hobbies/Activity level (if frank, include ADLS): prior to stroke managed all ADLS and IADLS indepedently      *Drug/Alcohol/Tobacco Hx:   Denies alcohol, tobacco or illicit drug use      *Psychiatric (Physical) Examination: observed phenomenon:  Vitals:    11/16/23 1627   BP: 135/62   Pulse: 64   Resp: 18   Temp: 36.9 °C (98.4 °F)   SpO2: 96%     General: Awake, alert in no acute distress  Patient Appearance: Appropriate, fairly groomed, wearing glasses and jewelry  Behavior: Appropriate Behavior, Calm, Cooperative  Speech.: Spontaneous, Normal rate and rhythm, Answers appropriately, normal  volume  Mood Comments: \"overwhelemed\"  Affect: appears euthymic  Thought Content: Appropriate, No suicidal ideation, No thoughts of self harm,  No homicidal ideation, Contracts for safety, Feels life is worth living  Thought Process: Logical and goal directed, No loosening of associations  Psychosis: No delusions, No hallucinations  Insight: Good insight  Judgment: good judgment  Cognition: Memory appeared intact in interview., Concentration is intact for age  and education and Fully oriented to person, place, time and circumstance.  Language: Is able to repeat phrases. and Is able to name objects.  Fund of Knowledge: AS expected for age and level of education  Neuro: no tics, " tremors, dyskinesias. no dystonias. Gait not observed  GAD7-1  PHQ9-total 10     Allergies:   Allergies   Allergen Reactions    Penicillins         Medications (currently prescribed at [facility]):    Current Facility-Administered Medications:     hydrALAZINE (Apresoline) tablet 75 mg, 75 mg, Oral, Q8HRS, Urbano Xiong M.D., 75 mg at 11/16/23 1325    traZODone (Desyrel) tablet 50 mg, 50 mg, Oral, QHS, Lisa Lee D.O., 50 mg at 11/15/23 2040    melatonin tablet 9 mg, 9 mg, Oral, QHS PRN, Lisa Lee D.O.    [DISCONTINUED] insulin regular (HumuLIN R,NovoLIN R) injection, 2-12 Units, Subcutaneous, 4X/DAY ACHS **AND** [CANCELED] POC blood glucose manual result, , , Q AC AND BEDTIME(S) **AND** NOTIFY MD and PharmD, , , Once **AND** Administer 20 grams of glucose (approximately 8 ounces of fruit juice) every 15 minutes PRN FSBG less than 70 mg/dL, , , PRN **AND** dextrose 50% (D50W) injection 25 g, 25 g, Intravenous, Q15 MIN PRN, Sita Grewal M.D.    Respiratory Therapy Consult, , Nebulization, Continuous RT, Peyton Watkins D.O.    senna-docusate (Pericolace Or Senokot S) 8.6-50 MG per tablet 2 Tablet, 2 Tablet, Oral, BID, 2 Tablet at 11/16/23 0801 **AND** polyethylene glycol/lytes (Miralax) PACKET 1 Packet, 1 Packet, Oral, QDAY PRN, 1 Packet at 11/16/23 0511 **AND** magnesium hydroxide (Milk Of Magnesia) suspension 30 mL, 30 mL, Oral, QDAY PRN **AND** bisacodyl (Dulcolax) suppository 10 mg, 10 mg, Rectal, QDAY PRN, Peyton Watkins D.O.    omeprazole (PriLOSEC) capsule 20 mg, 20 mg, Oral, DAILY, Peyton Watkins D.O., 20 mg at 11/16/23 0801    mag hydrox-al hydrox-simeth (Maalox Plus Es Or Mylanta Ds) suspension 20 mL, 20 mL, Oral, Q2HRS PRN, Peyton Watkins D.O.    ondansetron (Zofran ODT) dispertab 4 mg, 4 mg, Oral, 4X/DAY PRN **OR** ondansetron (Zofran) syringe/vial injection 4 mg, 4 mg, Intramuscular, 4X/DAY PRN, Peyton Watkins D.O.    traZODone (Desyrel) tablet 50 mg, 50 mg, Oral, QHS PRN, Peyton Watkins,  D.O.    sodium chloride (Ocean) 0.65 % nasal spray 2 Spray, 2 Spray, Nasal, PRN, LEANA Denis.O.    acetaminophen (Tylenol) tablet 1,000 mg, 1,000 mg, Oral, Q6HRS, 1,000 mg at 11/16/23 1211 **FOLLOWED BY** [START ON 11/17/2023] acetaminophen (Tylenol) tablet 1,000 mg, 1,000 mg, Oral, Q6HRS PRN, LEANA Denis.O.    oxyCODONE immediate-release (Roxicodone) tablet 2.5 mg, 2.5 mg, Oral, Q3HRS PRN **OR** oxyCODONE immediate-release (Roxicodone) tablet 5 mg, 5 mg, Oral, Q3HRS PRN, LEANA Denis.O., 5 mg at 11/13/23 0929    hydrALAZINE (Apresoline) tablet 25 mg, 25 mg, Oral, Q8HRS PRN, LEANA Denis.O., 25 mg at 11/14/23 1712    amLODIPine (Norvasc) tablet 10 mg, 10 mg, Oral, DAILY, Peyton Watkins D.O., 10 mg at 11/16/23 0800    atorvastatin (Lipitor) tablet 40 mg, 40 mg, Oral, Nightly, Peyton Watkins D.O., 40 mg at 11/15/23 2040    baclofen (Lioresal) tablet 5 mg, 5 mg, Oral, QHS, Peyton Watkins D.O., 5 mg at 11/15/23 2040        *Labs:  Lab Results   Component Value Date/Time    SODIUM 140 11/16/2023 05:44 AM    POTASSIUM 3.9 11/16/2023 05:44 AM    CHLORIDE 104 11/16/2023 05:44 AM    CO2 27 11/16/2023 05:44 AM    GLUCOSE 133 (H) 11/16/2023 05:44 AM    BUN 32 (H) 11/16/2023 05:44 AM    CREATININE 2.34 (H) 11/16/2023 05:44 AM     Recent Labs     11/11/23  0220 11/13/23  0525   ASTSGOT 18 14   ALTSGPT 30 22   TBILIRUBIN 0.4 0.5   GLOBULIN 3.1 2.5        Lab Results   Component Value Date/Time    WBC 4.8 11/13/2023 05:25 AM    RBC 4.26 (L) 11/13/2023 05:25 AM    HEMOGLOBIN 13.5 (L) 11/13/2023 05:25 AM    HEMATOCRIT 37.7 (L) 11/13/2023 05:25 AM    MCV 88.5 11/13/2023 05:25 AM    MCH 31.7 11/13/2023 05:25 AM    MCHC 35.8 11/13/2023 05:25 AM    MPV 10.7 11/13/2023 05:25 AM    NEUTSPOLYS 66.50 11/13/2023 05:25 AM    LYMPHOCYTES 22.60 11/13/2023 05:25 AM    MONOCYTES 7.20 11/13/2023 05:25 AM    EOSINOPHILS 3.10 11/13/2023 05:25 AM    BASOPHILS 0.40 11/13/2023 05:25 AM        EKG 11/11 NSR QTC-436  Imaging  MRI  11/11/23  1.  Right thalamic hemorrhage, decreased in density since prior study  2.  Stranding vasogenic edema appears stable.  3.  Slight asymmetric dilatation of the left ventricular system including the left temporal horn, consider component of hydrocephalus.  4.  Low-density fluid collection in the left temporal fossa, appearance most compatible with arachnoid cyst.  5.  Nonspecific white matter changes, commonly associated with small vessel ischemic disease.  Associated mild cerebral atrophy is noted.  6.  Atherosclerosis.      *ASSESSMENT:   R/O Adjustment disorder  -Patient endorses increased anxiety and occasional feeling of hopelessness that began after his stroke. He has noted more disrupted sleep that he feels affects his energy levels and anxiety. However he has been participating in OT/PT and has been complaint with recommended medications and treatments. Denies SI. Sleep may also be affected my discomfort from CPAP      *Plan/Further Workup:   Legal status: not applicable    *Medication Managment:       -continue trazodone 50 mg QHS for insomnia with an additional 50 mg QHS PRN for insomnia. Patient would like to see how mood/anxiety with improved sleep (felt trazodone was helpful) before considering further antidepressant treatment  -patient is open to therapy, consult therapist informed and states she will see him tomorrow  *Labs Reviewed/Ordered:  Recommend B12 and TSH be ordered for workup of depression/anxiety  *Imaging Reviewed  *Discussed with another provider: Dr. Lee via Vo[employer]           Will follow  Thank you for the consult.

## 2023-11-17 NOTE — THERAPY
Occupational Therapy  Daily Treatment     Patient Name: Jason Lima  Age:  62 y.o., Sex:  male  Medical Record #: [id number]  Today's Date: 11/17/2023     Precautions  Precautions: Fall Risk  Comments: Left hemiparesis, left visual inattention.         Subjective    Pt encountered for OT sitting in WC, pleasant and agreeable to participate. Fiance present throughout this session and showed this therapist pictures of the inside of his home.      Objective       11/17/23 1031   OT Charge Group   OT Electrical Stimulation Attended (Units) 2   OT Neuromuscular Re-education / Balance (Units) 2   OT Total Time Spent   OT Individual Total Time Spent (Mins) 60   Precautions   Precautions Fall Risk   Comments Left hemiparesis, left visual inattention.   Interdisciplinary Plan of Care Collaboration   IDT Collaboration with  Family / Caregiver   Patient Position at End of Therapy Seated;Chair Alarm On;Family / Friend in Room  (placed in T-dine)   Collaboration Comments Fiance present throughout session.     ..Pt set up on RT-300 FES for leftUE neuro re-ed, ROM and sensory input.  Electrodes applied to left scap stabilizers, shoulder, biceps, triceps, and wrist flexors/extensors. left UE placed in arm trough with ace wrap applied at left hand in boxers wrap style for increased stability while allowing for finger flex/ext with stimulation. Adjusted  to improved overall positioning and posture for improved performance and comfort. Muscle stimulation parameters assessed for each muscle group to pt tolerance to e-stim and muscle contraction observed. Pt tolerated well with no complaints of pain.    Minutes of Cycling   *including warmup and cool down 30:19 minutes   Distance Traveled 3.07 miles   Energy Expended 0.2 kCal   Energy Per Hour 0.5 kCal/hr   Avg Power 0.5 W   Avg Stimulation N/A   Avg Asymmetry 0 %     Total Therapy time: 30:19  Time active (off motor): 04:57  Time passive (on motor): 25:22    Pt added to NFC list  for 3x week for 30 min session to cont LUE neuro-re, ROM, and sensory input.      Home assessment via pictures:     Master bed room: Step-in walk-in shower with glass door; threshold (no shower chair, no GB); toilet no GB    Bed appeared tall (>/= hip height)    Threshold to enter the home; home single story    Assessment    Overall, good tolerance to 2nd session on  with no adjustments needed to accommodate pt comfort. Moderate VC needed to correct L lateral lean during sitting; however, pt able to self correct.    Strengths: Able to follow instructions, Independent prior level of function, Motivated for self care and independence, Pleasant and cooperative, Supportive family, Alert and oriented  Barriers: Decreased endurance, Fatigue, Generalized weakness, Hemiparesis, Impaired activity tolerance, Impaired balance, Limited mobility, Spasticity    Plan    Cont ADLs, functional transfers, and thera act/ex to maximize functional recovery for safe DC home. Pt will cont  for NFC, 3x/wk for 30 min.       DME  OT DME Recommendations  Bathroom Equipment: 3 in 1 Commode, Tub Transfer Bench (Medicaid Only)  Additional Equipment:  (TBD)    Passport items to be completed:  Perform bathroom transfers, complete dressing, complete feeding, get ready for the day, prepare a simple meal, participate in household tasks, adapt home for safety needs, demonstrate home exercise program, complete caregiver training     Occupational Therapy Goals (Active)       Problem: Bathing       Dates: Start:  11/15/23         Goal: STG-Within one week, patient will bathe at modA using DME/AE PRN       Dates: Start:  11/15/23               Problem: Dressing       Dates: Start:  11/13/23         Goal: STG-Within one week, patient will dress UB with Mod A.       Dates: Start:  11/13/23            Goal: STG-Within one week, patient will dress LB with Mod A.       Dates: Start:  11/13/23               Problem: Functional Transfers       Dates:  Start:  11/13/23         Goal: STG-Within one week, patient will transfer to toilet with Max/Mod A.       Dates: Start:  11/13/23            Goal: STG-Within one week, patient will transfer to step in shower with Max A.       Dates: Start:  11/13/23               Problem: OT Long Term Goals       Dates: Start:  11/13/23         Goal: LTG-By discharge, patient will complete basic self care tasks at Mod Independent level.       Dates: Start:  11/13/23            Goal: LTG-By discharge, patient will perform bathroom transfers at Mod Independent level.       Dates: Start:  11/13/23

## 2023-11-17 NOTE — PROGRESS NOTES
NURSING DAILY NOTE    Name: Jason Lima   Date of Admission: 11/12/2023   Admitting Diagnosis: Thalamic hemorrhage (HCC)  Attending Physician: Lisa Lee D.o.  Allergies: Penicillins    Safety  Patient Assist  max 2  Patient Precautions  Fall Risk  Precaution Comments  Left hemiparesis, left visual inattention.  Bed Transfer Status  Total Assist X 2  Toilet Transfer Status   Total Assist X 2  Assistive Devices  Rails, Wheelchair  Oxygen  Room air w/o2 available  Diet/Therapeutic Dining  Current Diet Order   Procedures    Diet Order Diet: Level 7 - Easy to Chew (cut up into bite size pieces.); Liquid level: Level 0 - Thin; Second Modifier: (optional): Consistent CHO (Diabetic)     Pill Administration  whole  Agitated Behavioral Scale     ABS Level of Severity       Fall Risk  Has the patient had a fall this admission?   Yes  Shellie Hamilton Fall Risk Scoring  24, HIGH RISK  Fall Risk Safety Measures  bed alarm, chair alarm, fall during this hospitalization, poor balance, and low vision/ hearing    Vitals  Temperature: 36.7 °C (98 °F)  Temp src: Oral  Pulse: 66  Respiration: 18  Blood Pressure: 135/60  Blood Pressure MAP (Calculated): 85 MM HG  BP Location: Right, Upper Arm  Patient BP Position: Supine     Oxygen  Pulse Oximetry: 95 %  O2 (LPM): 0  O2 Delivery Device: Room air w/o2 available    Bowel and Bladder  Last Bowel Movement  11/13/23  Stool Type  Type 6: Fluffy pieces with ragged edges, a mushy stool  Bowel Device     Continent  Bladder: Continent void   Bowel: Continent movement  Bladder Function  Urine Void (mL): 200 ml  Number of Times Voided: 1  Urine Color: Yellow  Genitourinary Assessment   Bladder Assessment (WDL):  Within Defined Limits  Echols Catheter: Not Applicable  Urine Color: Yellow  Bladder Device: Urinal  Time Void: No  Bladder Scan: Post Void  $ Bladder Scan Results (mL): 26    Skin  Polo Score   15  Sensory Interventions   Bed  Types: Standard/Trauma Mattress  Skin Preventative Measures: Pillows in Use for Support / Positioning  Moisture Interventions  Moisturizers/Barriers: Barrier Wipes      Pain  Pain Rating Scale  0 - No Pain  Pain Location  Back  Pain Location Orientation  Upper  Pain Interventions   Declines    ADLs    Bathing   Shower, Staff  Linen Change   Complete  Personal Hygiene  Moist Deja Wipes  Chlorhexidine Bath      Oral Care  Brushed Teeth (assist)  Teeth/Dentures     Shave     Nutrition Percentage Eaten  Lunch, Dinner, Between % Consumed (80%)  Environmental Precautions  Treaded Slipper Socks on Patient, Personal Belongings, Wastebasket, Call Bell etc. in Easy Reach, Transferred to Stronger Side, Report Given to Other Health Care Providers Regarding Fall Risk, Communication Sign for Patients & Families, Bed in Low Position, Mobility Assessed & Appropriate Sign Placed  Patient Turns/Positioning  Sitting Up in Wheelchair  Patient Turns Assistance/Tolerance  Tolerates Well  Bed Positions  Bed Controls On  Head of Bed Elevated  Self regulated      Psychosocial/Neurologic Assessment  Psychosocial Assessment  Psychosocial (WDL):  Within Defined Limits  Patient Behaviors: Flat Affect  Neurologic Assessment  Neuro (WDL): Exceptions to WDL  Level of Consciousness: Alert  Orientation Level: Oriented X4  Cognition: Appropriate judgement, Follows commands, Appropriate safety awareness  Speech: Clear, Slurred  Facial Symmetry: Left facial drooping  Pupil Assesment: Yes  R Pupil Size (mm): 3  R Pupil Shape / Description: Round  R Pupil Reaction: Brisk  L Pupil Size (mm): 3  L Pupil Shape / Description: Round  L Pupil Reaction: Brisk  Reflexes: Cough  Cough Reflex: Present  Motor Function/Sensation Assessment: Dorsiflexion, Motor response, Sensation, Motor strength  R Foot Dorsiflexion: Strong  L Foot Dorsiflexion: Absent  RUE Motor Response: Responds to commands  RUE Sensation: Full sensation  Muscle Strength Right Arm: Normal  Strength Against Gravity and Full Resistance  LUE Motor Response: Movement to painful stimulus  LUE Sensation: Tingling, Numbness, Decreased  Muscle Strength Left Arm: Weak Movement but Not Against Gravity or Resistance  RLE Motor Response: Responds to commands  RLE Sensation: Full sensation  Muscle Strength Right Leg: Good Strength Against Gravity and Moderate Resistance  LLE Motor Response: Movement to painful stimulus  LLE Sensation: Tingling, Numbness, Decreased  Muscle Strength Left Leg: Weak Movement but Not Against Gravity or Resistance  EENT (WDL):  WDL Except    Cardio/Pulmonary Assessment  Edema      Respiratory Breath Sounds  RUL Breath Sounds: Clear  RML Breath Sounds: Clear  RLL Breath Sounds: Diminished  MOHAMUD Breath Sounds: Clear  LLL Breath Sounds: Diminished  Cardiac Assessment   Cardiac (WDL):  Within Defined Limits

## 2023-11-17 NOTE — CARE PLAN
Problem: Skin Integrity  Goal: Skin integrity is maintained or improved  Outcome: Progressing  Patient reminded to turn to sides to prevent pressure areas.     Problem: Fall Risk - Rehab  Goal: Patient will remain free from falls  Outcome: Progressing  Patient reminded to use call light for assist with needs/transfers to prevent falls.   The patient is Stable - Low risk of patient condition declining or worsening    Shift Goals  Clinical Goals: safety  Patient Goals: safety, sleep/rest  Family Goals: kirit

## 2023-11-17 NOTE — THERAPY
"Speech Language Pathology  Daily Treatment     Patient Name: Jason Lima  Age:  62 y.o., Sex:  male  Medical Record #: [id number]  Today's Date: 11/17/2023     Precautions  Precautions: Fall Risk  Comments: Left hemiparesis, left visual inattention.    Subjective    Pt was seen for ST session from 9193-2082 and 7905-4917, pt was pleasant and cooperative throughout.       Objective       11/17/23 0801   Treatment Charges   SLP Swallowing Dysfunction Treatment Swallowing Dysfunction Treatment   SLP Cognitive Skill Development First 15 Minutes 1   SLP Cognitive Skill Development Additional 15 Minutes 1   SLP Total Time Spent   SLP Individual Total Time Spent (Mins) 60         Assessment    2119-2214: Pt was seen for breakfast with his current diet of 7- Regular Textures and 0-Thin liquids.  Pt reported that breakfast usually \"goes ok\", but \"dinner is always hard to swallow\".  Pt reported having pork chops last night and it was very difficult to chew and to swallow.  Discussed the difference between a 7- Regular Texture diet and a 6- Soft & Bite Sized diet (specifically that a SBS diet restricts bread, salads, and food is cut into small pieces).  Pt expressed that he would like his diet downgraded to 6- Soft & Bite Sized textures stating \"I like the soft food better, its much easier\".  Pt consumed all textures (7- Regular Textures cut into small bite sized pieces) during breakfast with only one cough noted on a cup sip of thin liquids at the end of this meal.  Min cues were still required for pt to slow his rate throughout this meal.  Recommend downgrading pt's diet to 6- Soft & Bite Sized textures and 0-Thin liquids per pt preference.      5515-5289: Pt completed several visual scanning tasks on the [employer] ipad application \"Visual Scanning\" targeting left neglect and impulsivity.  Pt completed the following tasks:     FO24, 1 target- 100%  FO48, 1 target dissimilar shapes- 100%  FO48, 1 target similar shapes- 68%, 100%, " 93%  FO48, 2 targets dissimilar shapes- 93%, 81%, 75%, 87%, 93%, 93%  FO48, 2 targets similar shapes- 93%, 87%, 81%    Pt benefited from cues to slow down throughout the entire session.  Min-mod cues were required for pt to study the shapes he will be scanning for before initiating the task and stopping to reassess when errors were made instead of continuing on (this application makes a specific sound when errors are made, several times pt continuously tapped the same shape when he heard the error sound instead of stopping and reassessing where the error occurred).      Strengths: Effective communication skills, Alert and oriented, Able to follow instructions, Motivated for self care and independence, Pleasant and cooperative, Supportive family, Willingly participates in therapeutic activities  Barriers: Aspiration risk, Hemiparesis, Impaired carryover of learning, Impaired insight/denial of deficits, Impaired functional cognition, Impulsive, Visual impairment    Plan    Recommend downgrading pt's diet to 6- Soft & Bite Sized textures and 0-Thin liquids per pt preference. Continue in t-dine to monitor tolerance of 6- Soft & Bite Sized diet and reinforce eating slowly.  Target visual scanning and task attention.          Speech Therapy Problems (Active)       Problem: Comprehension STGs       Dates: Start:  11/13/23         Goal: STG-Within one week, patient will perform attention for comprehension in functional reading tasks with 75% acc.       Dates: Start:  11/13/23         Goal Note filed on 11/15/23 1301 by Tawny Forbes MS,CCC-SLP       To be addresssed.                 Problem: Expression STGs       Dates: Start:  11/13/23         Goal: STG-Within one week, patient will       Dates: Start:  11/13/23               Problem: Memory STGs       Dates: Start:  11/13/23         Goal: STG-Within one week, patient will recall daily events and safety sequencing with 60% acc with use of external memory aids.        Dates: Start:  11/13/23         Goal Note filed on 11/15/23 1301 by Tawny Forbes MS,CCC-SLP       Educated patient on use of memory log and memory strategies this date.  Min-mod A needed for 60% acc.                 Problem: Problem Solving STGs       Dates: Start:  11/13/23         Goal: STG-Within one week, patient will perform alternating attention tasks with 75% acc.       Dates: Start:  11/13/23         Goal Note filed on 11/15/23 1301 by Tawny Forbes MS,CCC-SLP       Patient has preformed alternating attention tasks with 40% acc.                 Problem: Speech/Swallowing LTGs       Dates: Start:  11/13/23         Goal: LTG-By discharge, patient will safely swallow (7)regular diet textures and (0) thin liquids with no overt s/sx of aspiration for 2/2 days.       Dates: Start:  11/13/23            Goal: LTG-By discharge, patient will solve complex problem       Dates: Start:  11/13/23       Description: For safety and self care with 80% acc mod I  for safe discharge home.         Goal: LTG-By discharge, patient will recall new training with 80% acc with min A and use of external memory aids.       Dates: Start:  11/13/23               Problem: Swallowing STGs       Dates: Start:  11/13/23         Goal: STG-Within one week, patient will safely swallow (7) regular easy chew and cut up and (0) thin liquids with no overt s/sx of aspiration        Dates: Start:  11/13/23         Goal Note filed on 11/15/23 1301 by Tawny Forbes MS,CCC-SLP       Patient displays occasional coughing on thin liquids displays occasional anterior leakage, but is overall tolerating without distress.  MBSS scheduled for today.

## 2023-11-17 NOTE — PROGRESS NOTES
[facility] Medicine Daily Progress Note    Date of Service  11/17/2023    Chief Complaint:  Hypertension  Diabetes  ROMÁN    Interval History:  Discussed about his BP being better today but still need to monitor.    Review of Systems  Review of Systems   Constitutional:  Negative for chills and fever.   Respiratory:  Negative for shortness of breath.    Cardiovascular:  Negative for chest pain.   Gastrointestinal:  Negative for abdominal pain, diarrhea, nausea and vomiting.   Psychiatric/Behavioral:  The patient is not nervous/anxious.         Physical Exam  Temp:  [36.3 °C (97.4 °F)-36.9 °C (98.4 °F)] 36.3 °C (97.4 °F)  Pulse:  [64-78] 67  Resp:  [18] 18  BP: (135-154)/(60-96) 142/87  SpO2:  [95 %-98 %] 98 %    Physical Exam  Vitals and nursing note reviewed.   Constitutional:       Appearance: Normal appearance.   HENT:      Head: Atraumatic.   Eyes:      Conjunctiva/sclera: Conjunctivae normal.      Pupils: Pupils are equal, round, and reactive to light.   Cardiovascular:      Rate and Rhythm: Normal rate and regular rhythm.   Pulmonary:      Effort: Pulmonary effort is normal.      Breath sounds: Normal breath sounds.   Abdominal:      General: Bowel sounds are normal.      Palpations: Abdomen is soft.   Musculoskeletal:      Cervical back: Normal range of motion and neck supple.      Right lower leg: No edema.      Left lower leg: No edema.   Skin:     General: Skin is warm and dry.   Neurological:      Mental Status: He is alert and oriented to person, place, and time.   Psychiatric:         Mood and Affect: Mood normal.         Behavior: Behavior normal.         Fluids    Intake/Output Summary (Last 24 hours) at 11/17/2023 0935  Last data filed at 11/17/2023 0800  Gross per 24 hour   Intake 1040 ml   Output 1100 ml   Net -60 ml         Laboratory        Recent Labs     11/16/23  0544   SODIUM 140   POTASSIUM 3.9   CHLORIDE 104   CO2 27   GLUCOSE 133*   BUN 32*   CREATININE 2.34*   CALCIUM 9.2                      Imaging    Assessment/Plan  Hemorrhagic stroke (HCC)- (present on admission)  Assessment & Plan  Right thalamic hemorrhage on 10/23 while visiting [facility]     ROMÁN (acute kidney injury) (HCC)- (present on admission)  Assessment & Plan  Bun/Cr: 61/2.84 --> 51/2.81 --> 41/2.66 (11/14) --> 32/2.34 (11/16)  US: medical renal disease, no hydronephrosis  Appears to have CKD (bun/cr elevated in 2017)  S/P IVF's x 3 runs  Encouraging fluid intake  Will check am levels  Cont to monitor    DM (diabetes mellitus) (HCC)- (present on admission)  Assessment & Plan  Hba1c: 6.4  BS have been ok  Currently not on any diabetic meds  Off accuchecks  Note: home meds include Metformin (but now has renal failure)  Cont to monitor    Hyperlipidemia- (present on admission)  Assessment & Plan  Cont Lipitor    Hypertension- (present on admission)  Assessment & Plan  BP better recently with -160  Cont Norvasc  Cont Hydralazine --> dose recently increased  Cont to monitor

## 2023-11-17 NOTE — PROGRESS NOTES
NURSING DAILY NOTE    Name: Jason Lima   Date of Admission: 11/12/2023   Admitting Diagnosis: Thalamic hemorrhage (HCC)  Attending Physician: Lisa Lee D.o.  Allergies: Penicillins    Safety  Patient Assist  max2  Patient Precautions  Fall Risk  Precaution Comments  Left hemiparesis, left visual inattention.  Bed Transfer Status  Total Assist X 2  Toilet Transfer Status   Total Assist X 2  Assistive Devices  Rails, Wheelchair  Oxygen  CPAP  Diet/Therapeutic Dining  Current Diet Order   Procedures    Diet Order Diet: Level 7 - Easy to Chew (cut up into bite size pieces.); Liquid level: Level 0 - Thin; Second Modifier: (optional): Consistent CHO (Diabetic)     Pill Administration  whole  Agitated Behavioral Scale     ABS Level of Severity       Fall Risk  Has the patient had a fall this admission?   Yes  Shellie Hamilton Fall Risk Scoring  28, HIGH RISK  Fall Risk Safety Measures  bed alarm, chair alarm, seatbelt alarm, poor balance, and low vision/ hearing    Vitals  Temperature: 36.3 °C (97.4 °F)  Temp src: Oral  Pulse: 67  Respiration: 18  Blood Pressure: (!) 142/87  Blood Pressure MAP (Calculated): 105 MM HG  BP Location: Right, Upper Arm  Patient BP Position: Supine     Oxygen  Pulse Oximetry: 98 %  O2 (LPM): 0  O2 Delivery Device: CPAP    Bowel and Bladder  Last Bowel Movement  11/13/23  Stool Type  Type 6: Fluffy pieces with ragged edges, a mushy stool  Bowel Device     Continent  Bladder: Continent void   Bowel: Continent movement  Bladder Function  Urine Void (mL): 250 ml  Number of Times Voided: 1  Urine Color: Yellow  Genitourinary Assessment   Bladder Assessment (WDL):  Within Defined Limits  Echols Catheter: Not Applicable  Urine Color: Yellow  Bladder Device: Urinal  Time Void: No  Bladder Scan: Post Void  $ Bladder Scan Results (mL): 26    Skin  Polo Score   14  Sensory Interventions   Bed Types: Standard/Trauma Mattress  Skin  Preventative Measures: Pillows in Use for Support / Positioning  Moisture Interventions  Moisturizers/Barriers: Barrier Wipes      Pain  Pain Rating Scale  0 - No Pain  Pain Location  Back  Pain Location Orientation  Upper  Pain Interventions   Declines    ADLs    Bathing   Shower, Staff  Linen Change   Complete  Personal Hygiene  Moist Deja Wipes  Chlorhexidine Bath      Oral Care  Brushed Teeth (assist)  Teeth/Dentures     Shave     Nutrition Percentage Eaten  Between 50-75% Consumed  Environmental Precautions  Treaded Slipper Socks on Patient, Personal Belongings, Wastebasket, Call Bell etc. in Easy Reach, Transferred to Stronger Side, Report Given to Other Health Care Providers Regarding Fall Risk, Communication Sign for Patients & Families, Bed in Low Position, Mobility Assessed & Appropriate Sign Placed  Patient Turns/Positioning  Sitting Up in Wheelchair  Patient Turns Assistance/Tolerance  Tolerates Well  Bed Positions  Bed Controls On, Bed Locked  Head of Bed Elevated  Self regulated      Psychosocial/Neurologic Assessment  Psychosocial Assessment  Psychosocial (WDL):  Within Defined Limits  Patient Behaviors: Flat Affect  Neurologic Assessment  Neuro (WDL): Exceptions to WDL  Level of Consciousness: Alert  Orientation Level: Oriented X4  Cognition: Appropriate judgement, Follows commands, Appropriate safety awareness  Speech: Clear, Slurred  Facial Symmetry: Left facial drooping  Pupil Assesment: Yes  R Pupil Size (mm): 3  R Pupil Shape / Description: Round  R Pupil Reaction: Brisk  L Pupil Size (mm): 3  L Pupil Shape / Description: Round  L Pupil Reaction: Brisk  Reflexes: Cough  Cough Reflex: Present  Motor Function/Sensation Assessment: Dorsiflexion, Motor response, Sensation, Motor strength  R Foot Dorsiflexion: Strong  L Foot Dorsiflexion: Absent  RUE Motor Response: Responds to commands  RUE Sensation: Full sensation  Muscle Strength Right Arm: Normal Strength Against Gravity and Full Resistance  LUE  Motor Response: Movement to painful stimulus  LUE Sensation: Tingling, Numbness, Decreased  Muscle Strength Left Arm: Weak Movement but Not Against Gravity or Resistance  RLE Motor Response: Responds to commands  RLE Sensation: Full sensation  Muscle Strength Right Leg: Good Strength Against Gravity and Moderate Resistance  LLE Motor Response: Movement to painful stimulus  LLE Sensation: Tingling, Numbness, Decreased  Muscle Strength Left Leg: Weak Movement but Not Against Gravity or Resistance  EENT (WDL):  WDL Except    Cardio/Pulmonary Assessment  Edema      Respiratory Breath Sounds  RUL Breath Sounds: Clear  RML Breath Sounds: Clear  RLL Breath Sounds: Diminished  MOHAMUD Breath Sounds: Clear  LLL Breath Sounds: Diminished  Cardiac Assessment   Cardiac (WDL):  Within Defined Limits

## 2023-11-17 NOTE — DISCHARGE PLANNING
"Clinicals faxed to request add'l days.  Awaiting response.      11/20/23: CM called insurance co and spoke w/ Merna.  Clinicals rec'd.  Still needs nurse review.  CM awaiting response.      11/21/23: CM called ins co.  Clinicals still awaiting nurse review.  CM sent clinicals on 11/17/23.  Was told could take up to 5 business days to review.  CM will call again tomorrow.     11/22/23: CM called ins co.  Spoke with Mariya.  She transferred CM to a nurse, \"Herb\".  That nurse transferred to YUAN Meyer.  CM had to  on [employer]  for call back RE: request for additional days.  CM awaiting response.     "

## 2023-11-17 NOTE — THERAPY
Occupational Therapy  Daily Treatment     Patient Name: Jason Lima  Age:  62 y.o., Sex:  male  Medical Record #: [id number]  Today's Date: 11/17/2023     Precautions  Precautions: (P) Fall Risk  Comments: (P) Left hemiparesis, left visual inattention.         Subjective    Pt was asleep in bed upon arrival. Awakened easily and was agreeable for an OT tx session.       Objective       11/17/23 0701   OT Charge Group   OT Self Care / ADL (Units) 2   OT Neuromuscular Re-education / Balance (Units) 2   OT Total Time Spent   OT Individual Total Time Spent (Mins) 60   Precautions   Precautions Fall Risk   Comments Left hemiparesis, left visual inattention.   Pain   Intervention Declines   Pain 0 - 10 Group   Pain Rating Scale (NPRS) 0   Functional Level of Assist   Grooming Minimal Assist   Grooming Description Set-up of equipment;Seated in wheelchair at sink;Initial preparation for task;Increased time;Supervision for safety   Lower Body Dressing Maximal Assist   Lower Body Dressing Description Assist with threading into pant leg;Increased time;Initial preparation for task;Set-up of equipment;Verbal cueing;Supervision for safety;Assist with closures;Other (comment)  (Pt education for jade dressing.)   Bed, Chair, Wheelchair Transfer Maximal Assist   Bed Chair Wheelchair Transfer Description Slideboard transfer from bed to wheelchair;Increased time;Initial preparation for task;Assist with two limbs;Set-up of equipment;Verbal cueing;Supervision for safety;Other (comment)  (2 person assist for safety as pt has L lateral lean/poor balance.)   Neuro-Muscular Treatments   Neuro-Muscular Treatments Tapping;Verbal Cuing;Electrical Stimulation;Postural Facilitation;Joint Approximation   Comments e stim over L wrist, bicep and scapular musculature. Tapping over triceps.   Bed Mobility    Supine to Sit Maximal Assist   Scooting Maximal Assist   Skilled Intervention Verbal Cuing;Sequencing;Compensatory Strategies;Tactile Cuing    Interdisciplinary Plan of Care Collaboration   IDT Collaboration with  Therapy Tech;Certified Nursing Assistant   Patient Position at End of Therapy Seated;Chair Alarm On;Self Releasing Lap Belt Applied;Other (Comments)  (Pt taken to T dine program.)     Pt education on jade dressing tech for donning pants. Pt verbalized understanding, Max A needed due to loss of balance while reaching with R hand to tamir pants. Bed to w/c t/f using slide board with Max A x 2 persons for safety. E stim performed for LUE musculature to provide stimulation and feedback for improved ROM.    Assessment    Pt with no c/o pain or discomfort during session. Tolerated OT session fine.  Strengths: Able to follow instructions, Independent prior level of function, Motivated for self care and independence, Pleasant and cooperative, Supportive family, Alert and oriented  Barriers: Decreased endurance, Fatigue, Generalized weakness, Hemiparesis, Impaired activity tolerance, Impaired balance, Limited mobility, Spasticity    Plan    Will continue with OT  POC.    DME  OT DME Recommendations  Bathroom Equipment: 3 in 1 Commode, Tub Transfer Bench (Medicaid Only)  Additional Equipment:  (TBD)    Passport items to be completed:      Occupational Therapy Goals (Active)       Problem: Bathing       Dates: Start:  11/15/23         Goal: STG-Within one week, patient will bathe at modA using DME/AE PRN       Dates: Start:  11/15/23               Problem: Dressing       Dates: Start:  11/13/23         Goal: STG-Within one week, patient will dress UB with Mod A.       Dates: Start:  11/13/23            Goal: STG-Within one week, patient will dress LB with Mod A.       Dates: Start:  11/13/23               Problem: Functional Transfers       Dates: Start:  11/13/23         Goal: STG-Within one week, patient will transfer to toilet with Max/Mod A.       Dates: Start:  11/13/23            Goal: STG-Within one week, patient will transfer to step in shower with  Mateo NEGRON       Dates: Start:  11/13/23               Problem: OT Long Term Goals       Dates: Start:  11/13/23         Goal: LTG-By discharge, patient will complete basic self care tasks at Mod Independent level.       Dates: Start:  11/13/23            Goal: LTG-By discharge, patient will perform bathroom transfers at Mod Independent level.       Dates: Start:  11/13/23

## 2023-11-18 ENCOUNTER — APPOINTMENT (OUTPATIENT)
Dept: SPEECH THERAPY | Facility: REHABILITATION | Age: 63
DRG: 057 | End: 2023-11-18
Attending: PHYSICAL MEDICINE & REHABILITATION
Payer: COMMERCIAL

## 2023-11-18 ENCOUNTER — APPOINTMENT (OUTPATIENT)
Dept: PHYSICAL THERAPY | Facility: REHABILITATION | Age: 63
DRG: 057 | End: 2023-11-18
Attending: PHYSICAL MEDICINE & REHABILITATION
Payer: COMMERCIAL

## 2023-11-18 LAB
ANION GAP SERPL CALC-SCNC: 9 MMOL/L (ref 7–16)
BUN SERPL-MCNC: 31 MG/DL (ref 8–22)
CALCIUM SERPL-MCNC: 9.4 MG/DL (ref 8.5–10.5)
CHLORIDE SERPL-SCNC: 104 MMOL/L (ref 96–112)
CO2 SERPL-SCNC: 26 MMOL/L (ref 20–33)
CREAT SERPL-MCNC: 2.49 MG/DL (ref 0.5–1.4)
GFR SERPLBLD CREATININE-BSD FMLA CKD-EPI: 28 ML/MIN/1.73 M 2
GLUCOSE SERPL-MCNC: 146 MG/DL (ref 65–99)
MAGNESIUM SERPL-MCNC: 2.4 MG/DL (ref 1.5–2.5)
PHOSPHATE SERPL-MCNC: 3.3 MG/DL (ref 2.5–4.5)
POTASSIUM SERPL-SCNC: 4 MMOL/L (ref 3.6–5.5)
SODIUM SERPL-SCNC: 139 MMOL/L (ref 135–145)

## 2023-11-18 PROCEDURE — 700102 HCHG RX REV CODE 250 W/ 637 OVERRIDE(OP): Performed by: PHYSICAL MEDICINE & REHABILITATION

## 2023-11-18 PROCEDURE — A9270 NON-COVERED ITEM OR SERVICE: HCPCS | Performed by: PHYSICAL MEDICINE & REHABILITATION

## 2023-11-18 PROCEDURE — 80048 BASIC METABOLIC PNL TOTAL CA: CPT

## 2023-11-18 PROCEDURE — 83735 ASSAY OF MAGNESIUM: CPT

## 2023-11-18 PROCEDURE — 94760 N-INVAS EAR/PLS OXIMETRY 1: CPT

## 2023-11-18 PROCEDURE — 36415 COLL VENOUS BLD VENIPUNCTURE: CPT

## 2023-11-18 PROCEDURE — 84100 ASSAY OF PHOSPHORUS: CPT

## 2023-11-18 PROCEDURE — 92526 ORAL FUNCTION THERAPY: CPT

## 2023-11-18 PROCEDURE — 99232 SBSQ HOSP IP/OBS MODERATE 35: CPT | Performed by: HOSPITALIST

## 2023-11-18 PROCEDURE — 770010 HCHG ROOM/CARE - REHAB SEMI PRIVAT*

## 2023-11-18 RX ADMIN — SENNOSIDES AND DOCUSATE SODIUM 2 TABLET: 50; 8.6 TABLET ORAL at 08:34

## 2023-11-18 RX ADMIN — HYDRALAZINE HYDROCHLORIDE 75 MG: 50 TABLET, FILM COATED ORAL at 21:27

## 2023-11-18 RX ADMIN — HYDRALAZINE HYDROCHLORIDE 75 MG: 50 TABLET, FILM COATED ORAL at 05:39

## 2023-11-18 RX ADMIN — HYDRALAZINE HYDROCHLORIDE 75 MG: 50 TABLET, FILM COATED ORAL at 14:12

## 2023-11-18 RX ADMIN — AMLODIPINE BESYLATE 10 MG: 5 TABLET ORAL at 08:34

## 2023-11-18 RX ADMIN — TRAZODONE HYDROCHLORIDE 75 MG: 50 TABLET ORAL at 19:56

## 2023-11-18 RX ADMIN — SENNOSIDES AND DOCUSATE SODIUM 2 TABLET: 50; 8.6 TABLET ORAL at 19:56

## 2023-11-18 RX ADMIN — BACLOFEN 5 MG: 10 TABLET ORAL at 19:56

## 2023-11-18 RX ADMIN — OMEPRAZOLE 20 MG: 20 CAPSULE, DELAYED RELEASE ORAL at 08:34

## 2023-11-18 RX ADMIN — ATORVASTATIN CALCIUM 40 MG: 40 TABLET, FILM COATED ORAL at 19:57

## 2023-11-18 ASSESSMENT — PATIENT HEALTH QUESTIONNAIRE - PHQ9
2. FEELING DOWN, DEPRESSED, IRRITABLE, OR HOPELESS: SEVERAL DAYS
3. TROUBLE FALLING OR STAYING ASLEEP OR SLEEPING TOO MUCH: NOT AT ALL
6. FEELING BAD ABOUT YOURSELF - OR THAT YOU ARE A FAILURE OR HAVE LET YOURSELF OR YOUR FAMILY DOWN: NOT AL ALL
1. LITTLE INTEREST OR PLEASURE IN DOING THINGS: NOT AT ALL
SUM OF ALL RESPONSES TO PHQ9 QUESTIONS 1 AND 2: 1
4. FEELING TIRED OR HAVING LITTLE ENERGY: SEVERAL DAYS
9. THOUGHTS THAT YOU WOULD BE BETTER OFF DEAD, OR OF HURTING YOURSELF: NOT AT ALL
5. POOR APPETITE OR OVEREATING: NOT AT ALL
8. MOVING OR SPEAKING SO SLOWLY THAT OTHER PEOPLE COULD HAVE NOTICED. OR THE OPPOSITE, BEING SO FIGETY OR RESTLESS THAT YOU HAVE BEEN MOVING AROUND A LOT MORE THAN USUAL: NOT AT ALL
SUM OF ALL RESPONSES TO PHQ QUESTIONS 1-9: 2
7. TROUBLE CONCENTRATING ON THINGS, SUCH AS READING THE NEWSPAPER OR WATCHING TELEVISION: NOT AT ALL

## 2023-11-18 ASSESSMENT — ENCOUNTER SYMPTOMS
SHORTNESS OF BREATH: 0
VOMITING: 0
FEVER: 0
NAUSEA: 0
BLURRED VISION: 0
HALLUCINATIONS: 0
HEADACHES: 0
DIZZINESS: 0
PALPITATIONS: 0

## 2023-11-18 NOTE — FLOWSHEET NOTE
11/18/23 0715   Events/Summary/Plan   Events/Summary/Plan RA pulse ox check   Vital Signs   Pulse 69   Respiration 16   Pulse Oximetry 92 %   $ Pulse Oximetry (Spot Check) Yes   Respiratory Assessment   Level of Consciousness Alert   Chest Exam   Work Of Breathing / Effort Within Normal Limits   Oxygen   O2 Delivery Device Room air w/o2 available

## 2023-11-18 NOTE — CARE PLAN
"  Problem: Fall Risk - Rehab  Goal: Patient will remain free from falls  Note: Shellie Hamilton Fall risk Assessment Score: 28    High fall risk Interventions   - Alarming seatbelt  - Wander guard  - Bed and strip alarm   - Yellow sign by the door   - Yellow wrist band \"Fall risk\"  - Room near to the nurse station  - Do not leave patient unattended in the bathroom  - Fall risk education provided      Problem: Respiratory  Goal: Patient will understand use and administration of respiratory medications to improve respiratory function  Note: CPAP every HS.       The patient is Watcher - Medium risk of patient condition declining or worsening    Shift Goals  Clinical Goals: safety  Patient Goals: safety, sleep/rest  Family Goals: kirit        "

## 2023-11-18 NOTE — FLOWSHEET NOTE
11/17/23 1910   Events/Summary/Plan   Events/Summary/Plan Spo2 check   Vital Signs   Pulse 94   Respiration 16   Pulse Oximetry 95 %   $ Pulse Oximetry (Spot Check) Yes   Oxygen   O2 Delivery Device None - Room Air   Non-Invasive Ventilation [facility]   Nocturnal CPAP or BIPAP CPAP - [facility]  (8:44 hrs use last night)

## 2023-11-18 NOTE — PROGRESS NOTES
[facility] Medicine Daily Progress Note    Date of Service  11/18/2023    Chief Complaint:  Hypertension  Diabetes  ROMÁN    Interval History:  No complaints.  Doing ok.    Review of Systems  Review of Systems   Constitutional:  Negative for fever.   Eyes:  Negative for blurred vision.   Respiratory:  Negative for shortness of breath.    Cardiovascular:  Negative for palpitations.   Gastrointestinal:  Negative for nausea and vomiting.   Neurological:  Negative for dizziness and headaches.   Psychiatric/Behavioral:  Negative for hallucinations.         Physical Exam  Temp:  [36.7 °C (98.1 °F)-36.8 °C (98.3 °F)] 36.7 °C (98.1 °F)  Pulse:  [] 69  Resp:  [16-18] 16  BP: (135-152)/(89-91) 135/91  SpO2:  [92 %-95 %] 92 %    Physical Exam  Vitals and nursing note reviewed.   Constitutional:       General: He is not in acute distress.  HENT:      Mouth/Throat:      Mouth: Mucous membranes are moist.      Pharynx: Oropharynx is clear.   Eyes:      General: No scleral icterus.  Cardiovascular:      Rate and Rhythm: Normal rate and regular rhythm.   Pulmonary:      Effort: Pulmonary effort is normal.      Breath sounds: No wheezing or rales.   Abdominal:      General: Bowel sounds are normal.      Palpations: Abdomen is soft.   Musculoskeletal:      Cervical back: No rigidity.      Right lower leg: No edema.      Left lower leg: No edema.   Skin:     General: Skin is warm and dry.   Neurological:      Mental Status: He is alert and oriented to person, place, and time.   Psychiatric:         Mood and Affect: Mood normal.         Behavior: Behavior normal.         Fluids    Intake/Output Summary (Last 24 hours) at 11/18/2023 0932  Last data filed at 11/18/2023 0800  Gross per 24 hour   Intake 900 ml   Output 1950 ml   Net -1050 ml         Laboratory        Recent Labs     11/16/23  0544 11/18/23  0626   SODIUM 140 139   POTASSIUM 3.9 4.0   CHLORIDE 104 104   CO2 27 26   GLUCOSE 133* 146*   BUN 32* 31*   CREATININE 2.34* 2.49*    CALCIUM 9.2 9.4                     Imaging    Assessment/Plan  Hemorrhagic stroke (HCC)- (present on admission)  Assessment & Plan  Right thalamic hemorrhage on 10/23 while visiting [facility]     ROMÁN (acute kidney injury) (HCC)- (present on admission)  Assessment & Plan  Bun/Cr: 61/2.84 --> 51/2.81 --> 41/2.66 (11/14) --> 32/2.34 (11/16) --> 31/2.49 (11/18)  Appears to have CKD (bun/cr elevated in 2017)  ? Baseline -- colleen BARTH: medical renal disease, no hydronephrosis  S/P IVF's x 3 runs  Encouraging fluid intake  Needs follow up with Nephrology as an outpatient  Cont to monitor    DM (diabetes mellitus) (HCC)- (present on admission)  Assessment & Plan  Hba1c: 6.4  BS have been ok  Currently not on any diabetic meds  Off accuchecks  Note: home meds include Metformin (but now has renal failure)  Cont to monitor    Hyperlipidemia- (present on admission)  Assessment & Plan  Cont Lipitor    Hypertension- (present on admission)  Assessment & Plan  BP better recently but a little elevated  Cont Norvasc  Cont Hydralazine --> dose recently increased  Will monitor another day before adjusting meds

## 2023-11-18 NOTE — CONSULTS
[facility]    INPATIENT ASSESSMENT    Name: Jason Lima  MRN: [id number]  : 1960  Age: 62 y.o.  Date of assessment: 2023  PCP: Humble Garcia D.O.  Persons in attendance: Patient    CHIEF COMPLAINT/PRESENTING ISSUE (as stated by Patient): Jason Lima is a 62 year old male seen lying on hospital bed, alert, oriented, speech pronunciation is challenging for patient at times, but he was able to express himself clearly with coherency, there are no signs of a thought disorder. Patient denies suicidal or homicidal ideations. Patient was pleasant and willing to participate in the psychotherapy process.    Psychotherapy session summary  Patient reports feelings of sadness and depression as a result of his current medical condition and limited mobility. Clinician processed with patient his current emotional state. Discussed with patient at length, the importance of being future focused and remaining hopeful. Patient struggled with this concept stating, he has a girlfriend whom he is unclear of her intentions towards him in the future. 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 He has homes and vacation destination locations that he is grieving, feeling his traveling days are over.  Clinician worked with patient on restructuring his thinking and building coping skills that can serve as motivation towards recovery.  Patient agreed to work on this skills and remain focused on regaining as much strength as possible.    CURRENT LIVING SITUATION/SOCIAL SUPPORT: Patient is  and resides alone. He has a girlfriend and support from extended family. 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    BEHAVIORAL HEALTH TREATMENT HISTORY  Does patient/parent report a history of prior behavioral health treatment for patient?       SAFETY ASSESSMENT - SELF  Does patient acknowledge current or past symptoms  of dangerousness to self? no  Does parent/significant other report patient has current or past symptoms of dangerousness to self? N\A  Does presenting problem suggest symptoms of dangerousness to self? No    SAFETY ASSESSMENT - OTHERS  Does patient acknowledge current or past symptoms of aggressive behavior or risk to others? no  Does parent/significant other report patient has current or past symptoms of aggressive behavior or risk to others?  N\A  Does presenting problem suggest symptoms of dangerousness to others? No    Crisis Safety Plan completed and copy given to patient? no    ABUSE/NEGLECT SCREENING  Does patient report feeling “unsafe” in his/her home, or afraid of anyone?  no  Does patient report any history of physical, sexual, or emotional abuse?  no  Does parent or significant other report any of the above? no  Is there evidence of neglect by self?  no  Is there evidence of neglect by a caregiver? no  Does the patient/parent report any history of CPS/APS/police involvement related to suspected abuse/neglect or domestic violence? no  Based on the information provided during the current assessment, is a mandated report of suspected abuse/neglect being made?  No    SUBSTANCE USE SCREENING  No history      MENTAL STATUS              Participation: Active verbal participation  Grooming: Casual  Orientation: Alert  Behavior: Calm  Eye contact: Limited  Mood: Depressed  Affect: Flat  Thought process: Circumstantial  Thought content: Within normal limits  Speech: Latency of response  Perception: Within normal limits  Memory:  Recent:  Adequate  Insight: Adequate  Judgment:  Adequate  Other:    Collateral information:   Source:   Significant other present in person:    Significant other by telephone   [facility]   [facility]   Other:      Unable to complete full assessment due to:   Acute intoxication   Patient declined to participate/engage   Patient verbally  unresponsive   Significant cognitive deficits   Significant perceptual distortions or behavioral disorganization   Other:             CLINICAL IMPRESSIONS:  Primary:  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  Secondary:                                         IDENTIFIED NEEDS/PLAN:  [Trigger DISPOSITION list for any items marked]      Imminent safety risk - self  Imminent safety risk - others     Acute substance withdrawal   Psychosis/Impaired reality testing    x Mood/anxiety   Substance use/Addictive behavior     Maladaptive behavior   Parent/child conflict     Family/Couples conflict   Biomedical     Housing   Financial      Legal  Occupational/Educational     Domestic violence   Other:     Recommendations and Observation Level:  Will continue to follow      Legal Hold: N/A    Thank you,      Kriss Sanabria, Ph.D., [facility]  11/17/2023   Length of intervention: 60 minutes

## 2023-11-18 NOTE — PROGRESS NOTES
NURSING DAILY NOTE    Name: Jason Lima   Date of Admission: 11/12/2023   Admitting Diagnosis: Thalamic hemorrhage (HCC)  Attending Physician: Lisa Lee D.o.  Allergies: Penicillins    Safety  Patient Assist  max2  Patient Precautions  Fall Risk  Precaution Comments  Left hemiparesis, left visual inattention.  Bed Transfer Status  Moderate Assist (mod assist x 1 front, min assist x 1 behind)  Toilet Transfer Status   Maximal Assist  Assistive Devices  Rails  Oxygen  None - Room Air  Diet/Therapeutic Dining  Current Diet Order   Procedures    Diet Order Diet: Level 6 - Soft and Bite Sized (2 gram sodium restriction; medications whole with thin liquids); Liquid level: Level 0 - Thin; Second Modifier: (optional): Consistent CHO (Diabetic)     Pill Administration  whole and one at a time   Agitated Behavioral Scale     ABS Level of Severity       Fall Risk  Has the patient had a fall this admission?   Yes  Shellie Hamilton Fall Risk Scoring  28, HIGH RISK  Fall Risk Safety Measures  bed alarm, chair alarm, and seatbelt alarm    Vitals  Temperature: 36.7 °C (98.1 °F)  Temp src: Oral  Pulse: 71  Respiration: 18  Blood Pressure: (!) 135/91  Blood Pressure MAP (Calculated): 106 MM HG  BP Location: Right, Upper Arm  Patient BP Position: Supine     Oxygen  Pulse Oximetry: 95 %  O2 (LPM): 0  O2 Delivery Device: None - Room Air    Bowel and Bladder  Last Bowel Movement   (11/17 pt stated assisted by PT)  Stool Type  Type 6: Fluffy pieces with ragged edges, a mushy stool  Bowel Device     Continent  Bladder: Continent void   Bowel: Continent movement  Bladder Function  Urine Void (mL): 300 ml  Number of Times Voided: 1  Urine Color: Yellow  Genitourinary Assessment   Bladder Assessment (WDL):  Within Defined Limits  Echols Catheter: Not Applicable  Urine Color: Yellow  Bladder Device: Urinal  Time Void: No  Bladder Scan: Post Void  $ Bladder Scan Results (mL):  26    Skin  Polo Score   15  Sensory Interventions   Bed Types: Standard/Trauma Mattress  Skin Preventative Measures: Pillows in Use for Support / Positioning  Moisture Interventions  Moisturizers/Barriers: Barrier Wipes      Pain  Pain Rating Scale  0 - No Pain  Pain Location  Back  Pain Location Orientation  Upper  Pain Interventions   Declines    ADLs    Bathing   Shower, Staff  Linen Change   Complete  Personal Hygiene  Moist Deja Wipes  Chlorhexidine Bath      Oral Care  Brushed Teeth (assist)  Teeth/Dentures     Shave     Nutrition Percentage Eaten  Lunch, Between % Consumed (95%)  Environmental Precautions  Treaded Slipper Socks on Patient  Patient Turns/Positioning  Patient Turns Self from Side to Side  Patient Turns Assistance/Tolerance  Tolerates Well  Bed Positions  Bed Controls On, Bed Locked  Head of Bed Elevated  Self regulated      Psychosocial/Neurologic Assessment  Psychosocial Assessment  Psychosocial (WDL):  Within Defined Limits  Patient Behaviors: Flat Affect  Neurologic Assessment  Neuro (WDL): Exceptions to WDL  Level of Consciousness: Alert  Orientation Level: Oriented X4  Cognition: Appropriate judgement  Speech: Clear, Slurred  Facial Symmetry: Left facial drooping  Pupil Assesment: Yes  R Pupil Size (mm): 3  R Pupil Shape / Description: Round  R Pupil Reaction: Brisk  L Pupil Size (mm): 3  L Pupil Shape / Description: Round  L Pupil Reaction: Brisk  Reflexes: Cough  Cough Reflex: Present  Motor Function/Sensation Assessment: Dorsiflexion, Motor response  R Foot Dorsiflexion: Strong  L Foot Dorsiflexion: Absent  RUE Motor Response: Responds to commands  RUE Sensation: Full sensation  Muscle Strength Right Arm: Normal Strength Against Gravity and Full Resistance  LUE Motor Response: Movement to painful stimulus  LUE Sensation: Tingling, Numbness  Muscle Strength Left Arm: Weak Movement but Not Against Gravity or Resistance  RLE Motor Response: Responds to commands  RLE Sensation:  Full sensation  Muscle Strength Right Leg: Good Strength Against Gravity and Moderate Resistance  LLE Motor Response: Movement to painful stimulus  LLE Sensation: Tingling, Numbness  Muscle Strength Left Leg: Weak Movement but Not Against Gravity or Resistance  EENT (WDL):  WDL Except    Cardio/Pulmonary Assessment  Edema      Respiratory Breath Sounds  RUL Breath Sounds: Clear  RML Breath Sounds: Clear  RLL Breath Sounds: Diminished  MOHAMUD Breath Sounds: Clear  LLL Breath Sounds: Diminished  Cardiac Assessment   Cardiac (WDL):  Within Defined Limits

## 2023-11-18 NOTE — THERAPY
Speech Language Pathology  Daily Treatment     Patient Name: Jasno Lima  Age:  62 y.o., Sex:  male  Medical Record #: [id number]  Today's Date: 11/18/2023     Precautions  Precautions: Fall Risk  Comments: Left hemiparesis, left visual inattention.    Subjective    Pt pleasant and cooperative during dysphagia management session.     Objective       11/18/23 0801   Treatment Charges   SLP Swallowing Dysfunction Treatment Swallowing Dysfunction Treatment   SLP Total Time Spent   SLP Individual Total Time Spent (Mins) 30   Dysphagia    Diet / Liquid Recommendation Soft & Bite-Sized (6) - (Dysphagia III);Thin (0)   Nutritional Liquid Intake Rating Scale Non thickened beverages   Nutritional Food Intake Rating Scale Total oral diet with multiple consistencies without special preparation but with specific food limitations   Interdisciplinary Plan of Care Collaboration   IDT Collaboration with  Therapy Tech   Patient Position at End of Therapy Seated;Chair Alarm On   Collaboration Comments Transfer of care         Assessment    Pt tolerated level 6 soft and bite size and level 0 thin liquids w/ one instance of immediate cough, likely d/t larger bite size. Pt benefited from intermittent verbal cues to decrease bite size as well as chew and swallow food before taking another bite. Otherwise, tolerated all textures and thin liquids.     Strengths: Effective communication skills, Alert and oriented, Able to follow instructions, Motivated for self care and independence, Pleasant and cooperative, Supportive family, Willingly participates in therapeutic activities  Barriers: Aspiration risk, Hemiparesis, Impaired carryover of learning, Impaired insight/denial of deficits, Impaired functional cognition, Impulsive, Visual impairment    Plan    Continue to address implementation of safe swallow strategies.     Passport items to be completed:  Express basic needs, understand food/liquid recommendations, consistently follow swallow  precautions, manage finances, manage medications, arrive to therapy appointments on time, complete daily memory log entries, solve problems related to safety situations, review education related to hospitalization, complete caregiver training     Speech Therapy Problems (Active)       Problem: Comprehension STGs       Dates: Start:  11/13/23         Goal: STG-Within one week, patient will perform attention for comprehension in functional reading tasks with 75% acc.       Dates: Start:  11/13/23         Goal Note filed on 11/15/23 1301 by Tawny Forbes MS,CCC-SLP       To be addresssed.                 Problem: Expression STGs       Dates: Start:  11/13/23         Goal: STG-Within one week, patient will       Dates: Start:  11/13/23               Problem: Memory STGs       Dates: Start:  11/13/23         Goal: STG-Within one week, patient will recall daily events and safety sequencing with 60% acc with use of external memory aids.       Dates: Start:  11/13/23         Goal Note filed on 11/15/23 1301 by Tawny Forbes MS,CCC-SLP       Educated patient on use of memory log and memory strategies this date.  Min-mod A needed for 60% acc.                 Problem: Problem Solving STGs       Dates: Start:  11/13/23         Goal: STG-Within one week, patient will perform alternating attention tasks with 75% acc.       Dates: Start:  11/13/23         Goal Note filed on 11/15/23 1301 by Tawny Forbes MS,CCC-SLP       Patient has preformed alternating attention tasks with 40% acc.                 Problem: Speech/Swallowing LTGs       Dates: Start:  11/13/23         Goal: LTG-By discharge, patient will safely swallow (7)regular diet textures and (0) thin liquids with no overt s/sx of aspiration for 2/2 days.       Dates: Start:  11/13/23            Goal: LTG-By discharge, patient will solve complex problem       Dates: Start:  11/13/23       Description: For safety and self care with 80% acc mod I  for safe discharge home.          Goal: LTG-By discharge, patient will recall new training with 80% acc with min A and use of external memory aids.       Dates: Start:  11/13/23               Problem: Swallowing STGs       Dates: Start:  11/13/23         Goal: STG-Within one week, patient will safely swallow (7) regular easy chew and cut up and (0) thin liquids with no overt s/sx of aspiration        Dates: Start:  11/13/23         Goal Note filed on 11/15/23 1301 by Tawny Forbes MS,CCC-SLP       Patient displays occasional coughing on thin liquids displays occasional anterior leakage, but is overall tolerating without distress.  MBSS scheduled for today.

## 2023-11-18 NOTE — CARE PLAN
Problem: Skin Integrity  Goal: Skin integrity is maintained or improved  Outcome: Progressing  Patient encouraged to reposition to sides to prevent pressure areas.     Problem: Fall Risk - Rehab  Goal: Patient will remain free from falls  Outcome: Progressing  Patient calls for assist with needs/transfers.   The patient is Stable - Low risk of patient condition declining or worsening    Shift Goals  Clinical Goals: safety  Patient Goals: safety, sleep/rest  Family Goals: kirit

## 2023-11-18 NOTE — PROGRESS NOTES
NURSING DAILY NOTE    Name: Jason Lima   Date of Admission: 11/12/2023   Admitting Diagnosis: Thalamic hemorrhage (HCC)  Attending Physician: Lisa Lee D.o.  Allergies: Penicillins    Safety  Patient Assist  max2  Patient Precautions  Fall Risk  Precaution Comments  Left hemiparesis, left visual inattention.  Bed Transfer Status  Moderate Assist (mod assist x 1 front, min assist x 1 behind)  Toilet Transfer Status   Maximal Assist  Assistive Devices  Rails  Oxygen  Room air w/o2 available  Diet/Therapeutic Dining  Current Diet Order   Procedures    Diet Order Diet: Level 6 - Soft and Bite Sized (2 gram sodium restriction; medications whole with thin liquids); Liquid level: Level 0 - Thin; Second Modifier: (optional): Consistent CHO (Diabetic)     Pill Administration  whole  Agitated Behavioral Scale     ABS Level of Severity       Fall Risk  Has the patient had a fall this admission?   Yes  Shellie Hamilton Fall Risk Scoring  28, HIGH RISK  Fall Risk Safety Measures  bed alarm, chair alarm, and seatbelt alarm    Vitals  Temperature: 36.7 °C (98.1 °F)  Temp src: Oral  Pulse: 72  Respiration: 18  Blood Pressure: 132/86  Blood Pressure MAP (Calculated): 101 MM HG  BP Location: Right, Upper Arm  Patient BP Position: Supine     Oxygen  Pulse Oximetry: 99 %  O2 (LPM): 0  O2 Delivery Device: Room air w/o2 available    Bowel and Bladder  Last Bowel Movement   (11/17 pt stated assisted by PT)  Stool Type  Type 6: Fluffy pieces with ragged edges, a mushy stool  Bowel Device     Continent  Bladder: Continent void   Bowel: Continent movement  Bladder Function  Urine Void (mL): 400 ml  Number of Times Voided: 1  Urine Color: Pale  Genitourinary Assessment   Bladder Assessment (WDL):  Within Defined Limits  Echols Catheter: Not Applicable  Urine Color: Pale  Bladder Device: Urinal  Time Void: No  Bladder Scan: Post Void  $ Bladder Scan Results (mL):  26    Skin  Polo Score   15  Sensory Interventions   Bed Types: Standard/Trauma Mattress  Skin Preventative Measures: Pillows in Use for Support / Positioning  Moisture Interventions  Moisturizers/Barriers: Barrier Wipes      Pain  Pain Rating Scale  0 - No Pain  Pain Location  Back  Pain Location Orientation  Upper  Pain Interventions   Declines    ADLs    Bathing   Shower, Staff  Linen Change   Complete  Personal Hygiene  Moist Deja Wipes  Chlorhexidine Bath      Oral Care  Brushed Teeth (assist)  Teeth/Dentures     Shave     Nutrition Percentage Eaten  Lunch, Between % Consumed (100%)  Environmental Precautions  Treaded Slipper Socks on Patient  Patient Turns/Positioning  Patient Turns Self from Side to Side  Patient Turns Assistance/Tolerance  Tolerates Well  Bed Positions  Bed Controls On, Bed Locked  Head of Bed Elevated  Self regulated      Psychosocial/Neurologic Assessment  Psychosocial Assessment  Psychosocial (WDL):  Within Defined Limits  Patient Behaviors: Flat Affect  Neurologic Assessment  Neuro (WDL): Exceptions to WDL  Level of Consciousness: Alert  Orientation Level: Oriented X4  Cognition: Appropriate judgement  Speech: Clear, Slurred  Facial Symmetry: Left facial drooping  Pupil Assesment: Yes  R Pupil Size (mm): 3  R Pupil Shape / Description: Round  R Pupil Reaction: Brisk  L Pupil Size (mm): 3  L Pupil Shape / Description: Round  L Pupil Reaction: Brisk  Reflexes: Cough  Cough Reflex: Present  Motor Function/Sensation Assessment: Dorsiflexion, Motor response  R Foot Dorsiflexion: Strong  L Foot Dorsiflexion: Absent  RUE Motor Response: Responds to commands  RUE Sensation: Full sensation  Muscle Strength Right Arm: Normal Strength Against Gravity and Full Resistance  LUE Motor Response: Movement to painful stimulus  LUE Sensation: Tingling, Numbness  Muscle Strength Left Arm: Weak Movement but Not Against Gravity or Resistance  RLE Motor Response: Responds to commands  RLE Sensation:  Full sensation  Muscle Strength Right Leg: Good Strength Against Gravity and Moderate Resistance  LLE Motor Response: Movement to painful stimulus  LLE Sensation: Tingling, Numbness  Muscle Strength Left Leg: Weak Movement but Not Against Gravity or Resistance  EENT (WDL):  WDL Except    Cardio/Pulmonary Assessment  Edema      Respiratory Breath Sounds  RUL Breath Sounds: Clear  RML Breath Sounds: Clear  RLL Breath Sounds: Diminished  MOHAMUD Breath Sounds: Clear  LLL Breath Sounds: Diminished  Cardiac Assessment   Cardiac (WDL):  Within Defined Limits

## 2023-11-18 NOTE — DIETARY
Nutrition Services - Brief Note    Pt w/ HTN and elevated BP readings; RD to add 2g Sodium restriction to diet order. Reviewed w/ Dr. Lee.

## 2023-11-19 ENCOUNTER — APPOINTMENT (OUTPATIENT)
Dept: PHYSICAL THERAPY | Facility: REHABILITATION | Age: 63
DRG: 057 | End: 2023-11-19
Attending: PHYSICAL MEDICINE & REHABILITATION
Payer: COMMERCIAL

## 2023-11-19 PROCEDURE — 97116 GAIT TRAINING THERAPY: CPT

## 2023-11-19 PROCEDURE — 94760 N-INVAS EAR/PLS OXIMETRY 1: CPT

## 2023-11-19 PROCEDURE — 700102 HCHG RX REV CODE 250 W/ 637 OVERRIDE(OP): Performed by: PHYSICAL MEDICINE & REHABILITATION

## 2023-11-19 PROCEDURE — A9270 NON-COVERED ITEM OR SERVICE: HCPCS | Performed by: PHYSICAL MEDICINE & REHABILITATION

## 2023-11-19 PROCEDURE — 99232 SBSQ HOSP IP/OBS MODERATE 35: CPT | Performed by: HOSPITALIST

## 2023-11-19 PROCEDURE — 97112 NEUROMUSCULAR REEDUCATION: CPT

## 2023-11-19 PROCEDURE — 770010 HCHG ROOM/CARE - REHAB SEMI PRIVAT*

## 2023-11-19 RX ADMIN — TRAZODONE HYDROCHLORIDE 75 MG: 50 TABLET ORAL at 21:45

## 2023-11-19 RX ADMIN — HYDRALAZINE HYDROCHLORIDE 75 MG: 50 TABLET, FILM COATED ORAL at 21:46

## 2023-11-19 RX ADMIN — HYDRALAZINE HYDROCHLORIDE 75 MG: 50 TABLET, FILM COATED ORAL at 05:11

## 2023-11-19 RX ADMIN — SENNOSIDES AND DOCUSATE SODIUM 2 TABLET: 50; 8.6 TABLET ORAL at 21:45

## 2023-11-19 RX ADMIN — OMEPRAZOLE 20 MG: 20 CAPSULE, DELAYED RELEASE ORAL at 10:06

## 2023-11-19 RX ADMIN — BACLOFEN 5 MG: 10 TABLET ORAL at 21:45

## 2023-11-19 RX ADMIN — SENNOSIDES AND DOCUSATE SODIUM 2 TABLET: 50; 8.6 TABLET ORAL at 10:06

## 2023-11-19 RX ADMIN — ATORVASTATIN CALCIUM 40 MG: 40 TABLET, FILM COATED ORAL at 21:45

## 2023-11-19 RX ADMIN — HYDRALAZINE HYDROCHLORIDE 75 MG: 50 TABLET, FILM COATED ORAL at 15:16

## 2023-11-19 RX ADMIN — AMLODIPINE BESYLATE 10 MG: 5 TABLET ORAL at 10:06

## 2023-11-19 ASSESSMENT — PATIENT HEALTH QUESTIONNAIRE - PHQ9
9. THOUGHTS THAT YOU WOULD BE BETTER OFF DEAD, OR OF HURTING YOURSELF: NOT AT ALL
9. THOUGHTS THAT YOU WOULD BE BETTER OFF DEAD, OR OF HURTING YOURSELF: NOT AT ALL
3. TROUBLE FALLING OR STAYING ASLEEP OR SLEEPING TOO MUCH: NOT AT ALL
8. MOVING OR SPEAKING SO SLOWLY THAT OTHER PEOPLE COULD HAVE NOTICED. OR THE OPPOSITE, BEING SO FIGETY OR RESTLESS THAT YOU HAVE BEEN MOVING AROUND A LOT MORE THAN USUAL: NOT AT ALL
SUM OF ALL RESPONSES TO PHQ9 QUESTIONS 1 AND 2: 0
1. LITTLE INTEREST OR PLEASURE IN DOING THINGS: NOT AT ALL
8. MOVING OR SPEAKING SO SLOWLY THAT OTHER PEOPLE COULD HAVE NOTICED. OR THE OPPOSITE, BEING SO FIGETY OR RESTLESS THAT YOU HAVE BEEN MOVING AROUND A LOT MORE THAN USUAL: NOT AT ALL
2. FEELING DOWN, DEPRESSED, IRRITABLE, OR HOPELESS: NOT AT ALL
1. LITTLE INTEREST OR PLEASURE IN DOING THINGS: NOT AT ALL
6. FEELING BAD ABOUT YOURSELF - OR THAT YOU ARE A FAILURE OR HAVE LET YOURSELF OR YOUR FAMILY DOWN: NOT AL ALL
SUM OF ALL RESPONSES TO PHQ9 QUESTIONS 1 AND 2: 0
5. POOR APPETITE OR OVEREATING: NOT AT ALL
6. FEELING BAD ABOUT YOURSELF - OR THAT YOU ARE A FAILURE OR HAVE LET YOURSELF OR YOUR FAMILY DOWN: NOT AL ALL
SUM OF ALL RESPONSES TO PHQ QUESTIONS 1-9: 0
4. FEELING TIRED OR HAVING LITTLE ENERGY: NOT AT ALL
7. TROUBLE CONCENTRATING ON THINGS, SUCH AS READING THE NEWSPAPER OR WATCHING TELEVISION: NOT AT ALL
5. POOR APPETITE OR OVEREATING: NOT AT ALL
SUM OF ALL RESPONSES TO PHQ QUESTIONS 1-9: 0
2. FEELING DOWN, DEPRESSED, IRRITABLE, OR HOPELESS: NOT AT ALL
3. TROUBLE FALLING OR STAYING ASLEEP OR SLEEPING TOO MUCH: NOT AT ALL
7. TROUBLE CONCENTRATING ON THINGS, SUCH AS READING THE NEWSPAPER OR WATCHING TELEVISION: NOT AT ALL
4. FEELING TIRED OR HAVING LITTLE ENERGY: NOT AT ALL

## 2023-11-19 ASSESSMENT — ENCOUNTER SYMPTOMS
CHILLS: 0
SHORTNESS OF BREATH: 0
FEVER: 0
VOMITING: 0
NAUSEA: 0
NERVOUS/ANXIOUS: 0
DIARRHEA: 0
ABDOMINAL PAIN: 0

## 2023-11-19 ASSESSMENT — GAIT ASSESSMENTS
GAIT LEVEL OF ASSIST: MODERATE ASSIST
DISTANCE (FEET): 10

## 2023-11-19 NOTE — THERAPY
"Physical Therapy   Daily Treatment     Patient Name: Jason Lima  Age:  62 y.o., Sex:  male  Medical Record #: [id number]  Today's Date: 11/19/2023     Precautions  Precautions: (P) Fall Risk  Comments: Left hemiparesis, left visual inattention.    Subjective    Pt is pleasant and cooperative, his fiance is present throughout.      Objective       11/19/23 1400   PT Charge Group   PT Gait Training (Units) 2   PT Neuromuscular Re-Education / Balance (Units) 2   PT Total Time Spent   PT Individual Total Time Spent (Mins) 60   Precautions   Precautions Fall Risk   Pain   Intervention Declines   Gait Functional Level of Assist    Gait Level Of Assist Moderate Assist   Distance (Feet) 10   # of Times Distance was Traveled 6   Transfer Functional Level of Assist   Bed, Chair, Wheelchair Transfer Moderate Assist   Bed Mobility    Supine to Sit Moderate Assist   Sit to Supine Minimal Assist   Sit to Stand Moderate Assist   Interdisciplinary Plan of Care Collaboration   Patient Position at End of Therapy In Bed;Bed Alarm On;Family / Friend in Room;Phone within Reach;Tray Table within Reach;Call Light within Reach     Pt brought to therapy gym via w/c.     As pt demonstrates lateropulsion, first half of session was spent performing lateropulsion activities.     Squat pivot to mat with MOD A.     Pt performed several minutes of R lateral reaching and R lateral forearm prop in sitting with cues for R sided weight shifting. Sit to supine with MIN A.     Pt performed repeated R rolling with TC at pelvis and scapula for improved rolling patterns. Assistance provided at [facility] for \"punching\"/scapular protraction.     Supine to sit with MOD A.     Standing at R handrail pt performed R lateral weight shifting via R shoulder taps to wall with mirror for visual feedback.     Pt ambulated 6 bouts of 10-15' with MOD-MAX A with w/c follow, and DF ACE wrap. Physical assistance provided for swing limb advancement of LLE, trunk stability, " "upright posture, and stance stability of LLE. As ambulation progressed, pt able to advance LLE without physical assistance with step through gait noted.     Squat pivot transfer to bed with MIN A. Pt remained in R sidelying with bed alarm on and all needs in reach with fiance present.     Assessment    Pt tolerated session well with improving lateropulsion and ambulation as session progressed.     Strengths: Able to follow instructions, Independent prior level of function, Motivated for self care and independence, Pleasant and cooperative, Supportive family, Willingly participates in therapeutic activities  Barriers: Decreased endurance, Hemiparesis, Difficulty following instructions, Fatigue, Hypertension, Impaired activity tolerance, Impaired balance, Impaired insight/denial of deficits, Impaired functional cognition, Impaired sleep pattern, Impulsive, Limited mobility, Visual impairment, Poor family support (lives alone)    Plan    Continue to progress pt's functional independence.     DME  PT DME Recommendations  Wheelchair: 18\" Width  Cushion: Specialty (See other comments) (TBD pending ambulation progress)  Assistive Device: Tanvir Walker (TBD pending progress, currently 2 person assist for gait)  Additional Equipment:  (TBD)    Passport items to be completed:  Get in/out of bed safely, in/out of a vehicle, safely use mobility device, walk or wheel around home/community, navigate up and down stairs, show how to get up/down from the ground, ensure home is accessible, demonstrate HEP, complete caregiver training    Physical Therapy Problems (Active)       Problem: Balance       Dates: Start:  11/13/23         Goal: STG-Within one week, patient will maintain static standing c/ RUE support on rail at ModA or better.        Dates: Start:  11/13/23               Problem: Mobility       Dates: Start:  11/13/23         Goal: STG-Within one week, patient will ambulate distances >/= 30' c/ RHR and ModA or better.        " Dates: Start:  11/13/23               Problem: Mobility Transfers       Dates: Start:  11/13/23         Goal: STG-Within one week, patient will perform bed mobility c/ ModA or better.        Dates: Start:  11/13/23            Goal: STG-Within one week, patient will transfer bed to chair c/ MaxA x 1 or better.        Dates: Start:  11/13/23         Goal Note filed on 11/15/23 0923 by SELMA JamaT       Inconsistent performance, recommend slide board for safety at this time                 Problem: PT-Long Term Goals       Dates: Start:  11/13/23         Goal: LTG-By discharge, patient will propel wheelchair >/= 300' at Neto or better.        Dates: Start:  11/13/23            Goal: LTG-By discharge, patient will ambulate >/= 50' c/ LRAD at Renata or better.        Dates: Start:  11/13/23            Goal: LTG-By discharge, patient will transfer one surface to another c/ Renata or better.        Dates: Start:  11/13/23            Goal: LTG-By discharge, patient will ambulate up/down 1 step c/ LRAD at Renata or better.        Dates: Start:  11/13/23            Goal: LTG-By discharge, patient will transfer in/out of a car c/ ModA or better.        Dates: Start:  11/13/23

## 2023-11-19 NOTE — PROGRESS NOTES
NURSING DAILY NOTE    Name: Jason Lima   Date of Admission: 11/12/2023   Admitting Diagnosis: Thalamic hemorrhage (HCC)  Attending Physician: Lisa Lee D.o.  Allergies: Penicillins    Safety  Patient Assist  max 2  Patient Precautions  Fall Risk  Precaution Comments  Left hemiparesis, left visual inattention.  Bed Transfer Status  Moderate Assist (mod assist x 1 front, min assist x 1 behind)  Toilet Transfer Status   Maximal Assist  Assistive Devices  Rails  Oxygen  CPAP  Diet/Therapeutic Dining  Current Diet Order   Procedures    Diet Order Diet: Level 6 - Soft and Bite Sized (2 gram sodium restriction; medications whole with thin liquids); Liquid level: Level 0 - Thin; Second Modifier: (optional): Consistent CHO (Diabetic)     Pill Administration  whole  Agitated Behavioral Scale     ABS Level of Severity       Fall Risk  Has the patient had a fall this admission?   Yes  Shellie Hamilton Fall Risk Scoring  28, HIGH RISK  Fall Risk Safety Measures  bed alarm, chair alarm, seatbelt alarm, poor balance, and low vision/ hearing    Vitals  Temperature: 36.8 °C (98.3 °F)  Temp src: Oral  Pulse: 75  Respiration: 18  Blood Pressure: 135/89  Blood Pressure MAP (Calculated): 104 MM HG  BP Location: Right, Upper Arm  Patient BP Position: Supine     Oxygen  Pulse Oximetry: 96 %  O2 (LPM): 0  O2 Delivery Device: CPAP    Bowel and Bladder  Last Bowel Movement   (11/17 pt stated assisted by PT)  Stool Type  Type 6: Fluffy pieces with ragged edges, a mushy stool  Bowel Device     Continent  Bladder: Continent void   Bowel: Continent movement  Bladder Function  Urine Void (mL): 200 ml (urinals)  Number of Times Voided: 1  Urine Color: Yellow  Genitourinary Assessment   Bladder Assessment (WDL):  Within Defined Limits  Echols Catheter: Not Applicable  Urine Color: Yellow  Bladder Device: Urinal  Time Void: No  Bladder Scan: Post Void  $ Bladder Scan Results (mL):  26    Skin  Polo Score   15  Sensory Interventions   Bed Types: Standard/Trauma Mattress  Skin Preventative Measures: Pillows in Use for Support / Positioning  Moisture Interventions  Moisturizers/Barriers: Barrier Wipes      Pain  Pain Rating Scale  0 - No Pain  Pain Location  Back  Pain Location Orientation  Upper  Pain Interventions   Declines    ADLs    Bathing   Shower, Staff  Linen Change   Complete  Personal Hygiene  Moist Deja Wipes  Chlorhexidine Bath      Oral Care  Brushed Teeth (assist)  Teeth/Dentures     Shave     Nutrition Percentage Eaten  Dinner, Less than 25% Consumed  Environmental Precautions  Bed in Low Position, Treaded Slipper Socks on Patient  Patient Turns/Positioning  Patient Turns Self from Side to Side  Patient Turns Assistance/Tolerance  Tolerates Well  Bed Positions  Bed Controls On  Head of Bed Elevated  Self regulated      Psychosocial/Neurologic Assessment  Psychosocial Assessment  Psychosocial (WDL):  Within Defined Limits  Patient Behaviors: Flat Affect  Neurologic Assessment  Neuro (WDL): Exceptions to WDL  Level of Consciousness: Alert  Orientation Level: Oriented X4  Cognition: Appropriate judgement  Speech: Clear, Slurred  Facial Symmetry: Left facial drooping  Pupil Assesment: Yes  R Pupil Size (mm): 3  R Pupil Shape / Description: Round  R Pupil Reaction: Brisk  L Pupil Size (mm): 3  L Pupil Shape / Description: Round  L Pupil Reaction: Brisk  Reflexes: Cough  Cough Reflex: Present  Motor Function/Sensation Assessment: Dorsiflexion, Motor response  R Foot Dorsiflexion: Strong  L Foot Dorsiflexion: Absent  RUE Motor Response: Responds to commands  RUE Sensation: Full sensation  Muscle Strength Right Arm: Normal Strength Against Gravity and Full Resistance  LUE Motor Response: Movement to painful stimulus  LUE Sensation: Tingling, Numbness  Muscle Strength Left Arm: Weak Movement but Not Against Gravity or Resistance  RLE Motor Response: Responds to commands  RLE Sensation:  Full sensation  Muscle Strength Right Leg: Good Strength Against Gravity and Moderate Resistance  LLE Motor Response: Movement to painful stimulus  LLE Sensation: Tingling, Numbness  Muscle Strength Left Leg: Weak Movement but Not Against Gravity or Resistance  EENT (WDL):  WDL Except    Cardio/Pulmonary Assessment  Edema      Respiratory Breath Sounds  RUL Breath Sounds: Clear  RML Breath Sounds: Clear  RLL Breath Sounds: Diminished  MOHAMUD Breath Sounds: Clear  LLL Breath Sounds: Diminished  Cardiac Assessment   Cardiac (WDL):  Within Defined Limits

## 2023-11-19 NOTE — FLOWSHEET NOTE
11/19/23 1335   Events/Summary/Plan   Events/Summary/Plan spot check done pt on room air using cpap at noc and tolerating it well   Vital Signs   Pulse (!) 110  (notified RN)   Respiration 18   Pulse Oximetry 95 %   $ Pulse Oximetry (Spot Check) Yes   Respiratory Assessment   Respiratory Pattern Within Normal Limits   Level of Consciousness Alert   Chest Exam   Work Of Breathing / Effort Within Normal Limits   Oxygen   O2 (LPM) 0   FiO2% 21 %   O2 Delivery Device Room air w/o2 available   Non-Invasive Ventilation [facility]   [facility]  (wore for 11.34 hours)

## 2023-11-19 NOTE — CARE PLAN
"The patient is Stable - Low risk of patient condition declining or worsening    Shift Goals  Clinical Goals: safety  Patient Goals: safety, sleep/rest  Family Goals: kirit    Problem: Fall Risk - Rehab  Goal: Patient will remain free from falls  Outcome: Progressing  Note: Shellie Hamilton Fall risk Assessment Score: 28    High fall risk Interventions   - Alarming seatbelt  - Bed and strip alarm   - Yellow sign by the door   - Yellow wrist band \"Fall risk\"  - Room near to the nurse station  - Do not leave patient unattended in the bathroom  - Fall risk education provided  Patient uses call light consistently and appropriately this shift.  Waits for assistance when needed and does not attempt self transfer.  Able to verbalize needs.  Will continue to monitor.         Problem: Pain - Standard  Goal: Alleviation of pain or a reduction in pain to the patient’s comfort goal  Outcome: Progressing  Note: Patient able to verbalize pain level and verbalize an acceptable level of pain.     "

## 2023-11-19 NOTE — CARE PLAN
The patient is Stable - Low risk of patient condition declining or worsening    Shift Goals  Clinical Goals: safety  Patient Goals: safety  Family Goals: kirit    Progress made toward(s) clinical / shift goals:      Problem: Knowledge Deficit - Standard  Goal: Patient and family/care givers will demonstrate understanding of plan of care, disease process/condition, diagnostic tests and medications  Outcome: Progressing     Problem: Fall Risk - Rehab  Goal: Patient will remain free from falls  Outcome: Progressing     Problem: Hemodynamics  Goal: Patient's hemodynamics, fluid balance and neurologic status will be stable or improve  Outcome: Progressing     Problem: Mobility  Goal: Patient's capacity to carry out activities will improve  Outcome: Progressing       Patient is not progressing towards the following goals:none

## 2023-11-19 NOTE — PROGRESS NOTES
[facility] Medicine Daily Progress Note    Date of Service  11/19/2023    Chief Complaint:  Hypertension  Diabetes  ROMÁN    Interval History:  Discussed about his BP better recently.    Review of Systems  Review of Systems   Constitutional:  Negative for chills and fever.   Respiratory:  Negative for shortness of breath.    Cardiovascular:  Negative for chest pain.   Gastrointestinal:  Negative for abdominal pain, diarrhea, nausea and vomiting.   Psychiatric/Behavioral:  The patient is not nervous/anxious.         Physical Exam  Temp:  [36.7 °C (98.1 °F)-36.8 °C (98.3 °F)] 36.8 °C (98.3 °F)  Pulse:  [72-77] 75  Resp:  [18] 18  BP: (132-138)/(79-89) 135/89  SpO2:  [96 %-99 %] 96 %    Physical Exam  Vitals and nursing note reviewed.   Constitutional:       Appearance: Normal appearance.   HENT:      Head: Atraumatic.   Eyes:      Conjunctiva/sclera: Conjunctivae normal.      Pupils: Pupils are equal, round, and reactive to light.   Cardiovascular:      Rate and Rhythm: Normal rate and regular rhythm.      Heart sounds: No murmur heard.  Pulmonary:      Effort: Pulmonary effort is normal.      Breath sounds: No stridor. No wheezing or rales.   Abdominal:      General: There is no distension.      Palpations: Abdomen is soft.      Tenderness: There is no abdominal tenderness.   Musculoskeletal:      Cervical back: Normal range of motion and neck supple.      Right lower leg: No edema.      Left lower leg: No edema.   Skin:     General: Skin is warm and dry.      Findings: No rash.   Neurological:      Mental Status: He is alert and oriented to person, place, and time.   Psychiatric:         Mood and Affect: Mood normal.         Behavior: Behavior normal.         Fluids    Intake/Output Summary (Last 24 hours) at 11/19/2023 0922  Last data filed at 11/19/2023 0900  Gross per 24 hour   Intake 1520 ml   Output 1500 ml   Net 20 ml         Laboratory        Recent Labs     11/18/23  0626   SODIUM 139   POTASSIUM 4.0   CHLORIDE  104   CO2 26   GLUCOSE 146*   BUN 31*   CREATININE 2.49*   CALCIUM 9.4                     Imaging    Assessment/Plan  Hemorrhagic stroke (HCC)- (present on admission)  Assessment & Plan  Right thalamic hemorrhage on 10/23 while visiting [facility]     ROMÁN (acute kidney injury) (HCC)- (present on admission)  Assessment & Plan  Bun/Cr: 61/2.84 --> 51/2.81 --> 41/2.66 (11/14) --> 32/2.34 (11/16) --> 31/2.49 (11/18)  Appears to have CKD (bun/cr elevated in 2017)  ? Baseline -- colleen  US: medical renal disease, no hydronephrosis  S/P IVF's x 3 runs  Encouraging fluid intake  Needs follow up with Nephrology as an outpatient  Cont to monitor    DM (diabetes mellitus) (HCC)- (present on admission)  Assessment & Plan  Hba1c: 6.4  BS have been ok  Currently not on any diabetic meds  Off accuchecks  Note: home meds include Metformin (but now has renal failure)  Cont to monitor    Hyperlipidemia- (present on admission)  Assessment & Plan  Cont Lipitor    Hypertension- (present on admission)  Assessment & Plan  BP better recently   Cont Norvasc  Cont Hydralazine --> dose recently increased  Cont to monitor

## 2023-11-20 ENCOUNTER — APPOINTMENT (OUTPATIENT)
Dept: PHYSICAL THERAPY | Facility: REHABILITATION | Age: 63
DRG: 057 | End: 2023-11-20
Attending: HOSPITALIST
Payer: COMMERCIAL

## 2023-11-20 ENCOUNTER — APPOINTMENT (OUTPATIENT)
Dept: OCCUPATIONAL THERAPY | Facility: REHABILITATION | Age: 63
DRG: 057 | End: 2023-11-20
Attending: PHYSICAL MEDICINE & REHABILITATION
Payer: COMMERCIAL

## 2023-11-20 ENCOUNTER — ANCILLARY PROCEDURE (OUTPATIENT)
Dept: CARDIOLOGY | Facility: REHABILITATION | Age: 63
DRG: 057 | End: 2023-11-20
Attending: HOSPITALIST
Payer: COMMERCIAL

## 2023-11-20 ENCOUNTER — APPOINTMENT (OUTPATIENT)
Dept: PHYSICAL THERAPY | Facility: REHABILITATION | Age: 63
DRG: 057 | End: 2023-11-20
Attending: PHYSICAL MEDICINE & REHABILITATION
Payer: COMMERCIAL

## 2023-11-20 ENCOUNTER — APPOINTMENT (OUTPATIENT)
Dept: SPEECH THERAPY | Facility: REHABILITATION | Age: 63
DRG: 057 | End: 2023-11-20
Attending: PHYSICAL MEDICINE & REHABILITATION
Payer: COMMERCIAL

## 2023-11-20 PROCEDURE — 97112 NEUROMUSCULAR REEDUCATION: CPT

## 2023-11-20 PROCEDURE — A9270 NON-COVERED ITEM OR SERVICE: HCPCS | Performed by: PHYSICAL MEDICINE & REHABILITATION

## 2023-11-20 PROCEDURE — 93971 EXTREMITY STUDY: CPT | Mod: 26,LT | Performed by: INTERNAL MEDICINE

## 2023-11-20 PROCEDURE — 97130 THER IVNTJ EA ADDL 15 MIN: CPT

## 2023-11-20 PROCEDURE — 700102 HCHG RX REV CODE 250 W/ 637 OVERRIDE(OP): Performed by: HOSPITALIST

## 2023-11-20 PROCEDURE — 93971 EXTREMITY STUDY: CPT | Mod: LT

## 2023-11-20 PROCEDURE — 92526 ORAL FUNCTION THERAPY: CPT

## 2023-11-20 PROCEDURE — 770010 HCHG ROOM/CARE - REHAB SEMI PRIVAT*

## 2023-11-20 PROCEDURE — A9270 NON-COVERED ITEM OR SERVICE: HCPCS | Performed by: HOSPITALIST

## 2023-11-20 PROCEDURE — 97530 THERAPEUTIC ACTIVITIES: CPT

## 2023-11-20 PROCEDURE — 97535 SELF CARE MNGMENT TRAINING: CPT

## 2023-11-20 PROCEDURE — 99232 SBSQ HOSP IP/OBS MODERATE 35: CPT | Performed by: HOSPITALIST

## 2023-11-20 PROCEDURE — 97129 THER IVNTJ 1ST 15 MIN: CPT

## 2023-11-20 PROCEDURE — 700102 HCHG RX REV CODE 250 W/ 637 OVERRIDE(OP): Performed by: PHYSICAL MEDICINE & REHABILITATION

## 2023-11-20 PROCEDURE — 99232 SBSQ HOSP IP/OBS MODERATE 35: CPT | Performed by: PHYSICAL MEDICINE & REHABILITATION

## 2023-11-20 RX ORDER — HYDRALAZINE HYDROCHLORIDE 50 MG/1
100 TABLET, FILM COATED ORAL EVERY 8 HOURS
Status: DISCONTINUED | OUTPATIENT
Start: 2023-11-20 | End: 2024-01-02 | Stop reason: HOSPADM

## 2023-11-20 RX ADMIN — SENNOSIDES AND DOCUSATE SODIUM 2 TABLET: 50; 8.6 TABLET ORAL at 21:23

## 2023-11-20 RX ADMIN — HYDRALAZINE HYDROCHLORIDE 75 MG: 50 TABLET, FILM COATED ORAL at 05:44

## 2023-11-20 RX ADMIN — AMLODIPINE BESYLATE 10 MG: 5 TABLET ORAL at 10:09

## 2023-11-20 RX ADMIN — BACLOFEN 5 MG: 10 TABLET ORAL at 21:22

## 2023-11-20 RX ADMIN — TRAZODONE HYDROCHLORIDE 75 MG: 50 TABLET ORAL at 21:21

## 2023-11-20 RX ADMIN — ATORVASTATIN CALCIUM 40 MG: 40 TABLET, FILM COATED ORAL at 21:22

## 2023-11-20 RX ADMIN — HYDRALAZINE HYDROCHLORIDE 100 MG: 50 TABLET, FILM COATED ORAL at 14:36

## 2023-11-20 RX ADMIN — OMEPRAZOLE 20 MG: 20 CAPSULE, DELAYED RELEASE ORAL at 10:09

## 2023-11-20 RX ADMIN — Medication 9 MG: at 21:22

## 2023-11-20 RX ADMIN — SENNOSIDES AND DOCUSATE SODIUM 2 TABLET: 50; 8.6 TABLET ORAL at 10:09

## 2023-11-20 RX ADMIN — HYDRALAZINE HYDROCHLORIDE 100 MG: 50 TABLET, FILM COATED ORAL at 21:22

## 2023-11-20 ASSESSMENT — ENCOUNTER SYMPTOMS
FEVER: 0
VOMITING: 0
PALPITATIONS: 0
POLYDIPSIA: 0
BRUISES/BLEEDS EASILY: 0
SHORTNESS OF BREATH: 0
CHILLS: 0
FOCAL WEAKNESS: 1
NAUSEA: 0
MUSCULOSKELETAL NEGATIVE: 1
EYES NEGATIVE: 1
COUGH: 0
ABDOMINAL PAIN: 0

## 2023-11-20 ASSESSMENT — ACTIVITIES OF DAILY LIVING (ADL): TOILET_TRANSFER_DESCRIPTION: GRAB BAR;ADAPTIVE EQUIPMENT

## 2023-11-20 ASSESSMENT — PAIN DESCRIPTION - PAIN TYPE: TYPE: ACUTE PAIN

## 2023-11-20 ASSESSMENT — PATIENT HEALTH QUESTIONNAIRE - PHQ9
1. LITTLE INTEREST OR PLEASURE IN DOING THINGS: NOT AT ALL
2. FEELING DOWN, DEPRESSED, IRRITABLE, OR HOPELESS: NOT AT ALL
SUM OF ALL RESPONSES TO PHQ9 QUESTIONS 1 AND 2: 0

## 2023-11-20 NOTE — THERAPY
Recreational Therapy  Daily Treatment     Patient Name: Jason Lima  AGE:  62 y.o., SEX:  male  Medical Record #: [id number]  Today's Date: 11/20/2023       Subjective    Patient ready and agreeable to recreation therapy session.      Objective       11/20/23 1031   Procedural Tracking   Procedural Tracking Community Re-Integration;Community Skills Development;Leisure Skills Development;Gross Motor Functional Leisure Skills;Group Treatment;Fine Motor Functional Leisure Skills;Cognitive Skills Training;Social Skills Training;Leisure Skills Awareness   Treatment Time   Total Time Spent (mins) 30   Total Time Missed 0   Functional Ability Status - Cognitive   Attention Span Remains on Task   Comprehension Follows One Step Commands;Follows Two Step Commands   Judgment Able to Make Independent Decisions   Functional Ability Status - Emotional    Affect Appropriate   Mood Appropriate   Behavior Appropriate;Cooperative   Skilled Intervention    Skilled Intervention Gross Motor Leisure;Fine Motor Leisure;Cognitive Leisure   Skilled Intervention Comments Phase 10   Interdisciplinary Plan of Care Collaboration   IDT Collaboration with  Family / Caregiver   Patient Position at End of Therapy Seated;Family / Friend in Room;Phone within Reach;Tray Table within Reach;Call Light within Reach   Strengths & Barriers   Strengths Able to follow instructions;Alert and oriented;Effective communication skills;Willingly participates in therapeutic activities;Pleasant and cooperative;Supportive family;Motivated for self care and independence;Making steady progress towards goals   Barriers Decreased endurance;Fatigue;Generalized weakness;Hemiparesis;Impaired activity tolerance;Impaired balance;Limited mobility         Assessment    Patient participated in new game, Phase 10, during recreation therapy session. Patient required min cues for sequencing, recall, attention and dual tasking. Patient benefited from the use of a card mcguire.      Strengths: (P) Able to follow instructions, Alert and oriented, Effective communication skills, Willingly participates in therapeutic activities, Pleasant and cooperative, Supportive family, Motivated for self care and independence, Making steady progress towards goals  Barriers: (P) Decreased endurance, Fatigue, Generalized weakness, Hemiparesis, Impaired activity tolerance, Impaired balance, Limited mobility    Plan    Patient will benefit from continued recreation therapy sessions.     Passport items to be completed:  Verbalize two positive leisure activities, discuss returning to work, hobbies, community groups or volunteer activities, explore community resources

## 2023-11-20 NOTE — FLOWSHEET NOTE
11/20/23 1556   Events/Summary/Plan   Events/Summary/Plan CPAP check   Non-Invasive Ventilation [facility]   Nocturnal CPAP or BIPAP CPAP - Home Unit  (0.0 hrs CPAP use last night)

## 2023-11-20 NOTE — DISCHARGE PLANNING
FMLA p-work completed and faxed to [employer] as directed.  Copy kept for EMR.  Originals back to patients dylon Vora.  CM available as needs arise.

## 2023-11-20 NOTE — PROGRESS NOTES
NURSING DAILY NOTE    Name: Jason Lima   Date of Admission: 11/12/2023   Admitting Diagnosis: Thalamic hemorrhage (HCC)  Attending Physician: Lisa Lee D.o.  Allergies: Penicillins    Safety  Patient Assist  max 2  Patient Precautions  Fall Risk  Precaution Comments  Left hemiparesis, left visual inattention.  Bed Transfer Status  Moderate Assist  Toilet Transfer Status   Maximal Assist  Assistive Devices  Rails  Oxygen  None - Room Air  Diet/Therapeutic Dining  Current Diet Order   Procedures    Diet Order Diet: Level 6 - Soft and Bite Sized (2 gram sodium restriction; medications whole with thin liquids); Liquid level: Level 0 - Thin; Second Modifier: (optional): Consistent CHO (Diabetic)     Pill Administration  whole  Agitated Behavioral Scale     ABS Level of Severity       Fall Risk  Has the patient had a fall this admission?   Yes  Shellie Hamilton Fall Risk Scoring  28, HIGH RISK  Fall Risk Safety Measures  bed alarm, chair alarm, poor balance, and low vision/ hearing    Vitals  Temperature: 36.7 °C (98.1 °F)  Temp src: Oral  Pulse: 68  Respiration: 18  Blood Pressure: 129/83  Blood Pressure MAP (Calculated): 98 MM HG  BP Location: Right, Upper Arm  Patient BP Position: Supine     Oxygen  Pulse Oximetry: 98 %  O2 (LPM): 0  FiO2%: 21 %  O2 Delivery Device: None - Room Air    Bowel and Bladder  Last Bowel Movement  11/19/23  Stool Type  Type 5: Soft blob with clear cut edges (passed easily)  Bowel Device     Continent  Bladder: Continent void   Bowel: Continent movement  Bladder Function  Urine Void (mL): 400 ml (urinals)  Number of Times Voided: 1  Urine Color: Justina  Genitourinary Assessment   Bladder Assessment (WDL):  Within Defined Limits  Echols Catheter: Not Applicable  Urine Color: Justina  Bladder Device: Urinal  Time Void: No  Bladder Scan: Post Void  $ Bladder Scan Results (mL): 26    Skin  Polo Score   15  Sensory Interventions   Bed  Types: Standard/Trauma Mattress  Skin Preventative Measures: Pillows in Use for Support / Positioning  Moisture Interventions  Moisturizers/Barriers: Barrier Wipes      Pain  Pain Rating Scale  0 - No Pain  Pain Location  Back  Pain Location Orientation  Upper  Pain Interventions   Declines    ADLs    Bathing   Shower, Staff  Linen Change   Complete  Personal Hygiene  Moist Deja Wipes  Chlorhexidine Bath      Oral Care  Brushed Teeth (assist)  Teeth/Dentures     Shave     Nutrition Percentage Eaten  Lunch, Between % Consumed (100%)  Environmental Precautions  Bed in Low Position, Treaded Slipper Socks on Patient  Patient Turns/Positioning  Patient Turns Self from Side to Side  Patient Turns Assistance/Tolerance  Assistance of One  Bed Positions  Bed Controls On  Head of Bed Elevated  Self regulated      Psychosocial/Neurologic Assessment  Psychosocial Assessment  Psychosocial (WDL):  Within Defined Limits  Patient Behaviors: Flat Affect  Neurologic Assessment  Neuro (WDL): Exceptions to WDL  Level of Consciousness: Alert  Orientation Level: Oriented X4  Cognition: Appropriate judgement  Speech: Clear, Slurred  Facial Symmetry: Left facial drooping  Pupil Assesment: Yes  R Pupil Size (mm): 3  R Pupil Shape / Description: Round  R Pupil Reaction: Brisk  L Pupil Size (mm): 3  L Pupil Shape / Description: Round  L Pupil Reaction: Brisk  Reflexes: Cough  Cough Reflex: Present  Motor Function/Sensation Assessment: Dorsiflexion, Motor response  R Foot Dorsiflexion: Strong  L Foot Dorsiflexion: Absent  RUE Motor Response: Responds to commands  RUE Sensation: Full sensation  Muscle Strength Right Arm: Normal Strength Against Gravity and Full Resistance  LUE Motor Response: Flaccid  LUE Sensation: Tingling, Numbness  Muscle Strength Left Arm: Weak Movement but Not Against Gravity or Resistance  RLE Motor Response: Responds to commands  RLE Sensation: Full sensation  Muscle Strength Right Leg: Good Strength Against  Gravity and Moderate Resistance  LLE Motor Response: Flaccid  LLE Sensation: Tingling, Numbness  Muscle Strength Left Leg: Weak Movement but Not Against Gravity or Resistance  EENT (WDL):  WDL Except    Cardio/Pulmonary Assessment  Edema      Respiratory Breath Sounds  RUL Breath Sounds: Clear  RML Breath Sounds: Clear  RLL Breath Sounds: Diminished  MOHAMUD Breath Sounds: Clear  LLL Breath Sounds: Diminished  Cardiac Assessment   Cardiac (WDL):  Within Defined Limits

## 2023-11-20 NOTE — THERAPY
Physical Therapy   Daily Treatment     Patient Name: Jason Lima  Age:  62 y.o., Sex:  male  Medical Record #: [id number]  Today's Date: 11/20/2023     Precautions  Precautions: Fall Risk  Comments: (P) Left hemiparesis, left visual inattention.    Subjective    Pt reported needing to have a bowel movement while donning Vector harness.      Objective       11/20/23 0931   PT Charge Group   PT Neuromuscular Re-Education / Balance (Units) 2   PT Therapeutic Activities (Units) 2   PT Total Time Spent   PT Individual Total Time Spent (Mins) 60   Precautions   Precautions Fall Risk   Comments Left hemiparesis, left visual inattention.   Gait Functional Level of Assist    Gait Level Of Assist   (planned to trial Vector after set up, but harness removed for bowel needs)   Wheelchair Functional Level of Assist   Wheelchair Assist Supervised  (jade technique propulsion)   Distance Wheelchair (Feet or Distance) 500   Wheelchair Description Safety concerns;Supervision for safety;Verbal cueing  (Cues to scan environmental to the left, and label objects to his left, cues to veer away from left sided obstacles)   Transfer Functional Level of Assist   Bed, Chair, Wheelchair Transfer Total Assist X 2  (Mod A x 1, 2nd person CGA/min A for safety until consistency is observed)   Bed Chair Wheelchair Transfer Description Slideboard transfer from bed to wheelchair;Squat pivot transfer to wheelchair;Set-up of equipment;Increased time  (Reach pivot left, SB right)   Toilet Transfers Total Assist X 2  (Mod A towards right wc to toilet, 2 person to left toilet to wc)   Toilet Transfer Description Grab bar;Adaptive equipment   Bed Mobility    Sit to Stand Moderate Assist   Neuro-Muscular Treatments   Neuro-Muscular Treatments Weight Shift Left;Weight Shift Right;Verbal Cuing;Tactile Cuing;Sequencing;Postural Facilitation;Postural Changes;Joint Approximation   Comments Vector (West) profile created. Standing with mirror ant to patient and  "blocking LLE, to don Vector harness- 2nd person to assist physically with midline positioning d/t L lateral lean x ~3 min. Bathroom set up with drop arm commode for patient safety due to left lateral lean, and 4\" step stool provided for adequate BLE support while on toilet for positioning and safety. Seated balance on commode SBA.   Interdisciplinary Plan of Care Collaboration   IDT Collaboration with  Therapy Tech;Certified Nursing Assistant;Family / Caregiver   Patient Position at End of Therapy Seated;Family / Friend in Room;Other (Comments);Self Releasing Lap Belt Applied  (Handoff to rec therapy)   Collaboration Comments Bowel needs, transfer assist/ recommendations for safety.     Forced use hand washing/ drying L hand after toileting.    Assessment    Pt continues to demonstrate sad affect, and left lateral lean in standing > sitting. Pt demonstrated fair left environmental awareness, but lacks self awareness of left body.  Pt continues to demonstrate a lack of midline orientation. Pt was set up in vector harness, and profile created before having to abandon that plan for bowel needs. Pt demonstrated mod A x1 to the right, reach pivot, yet left sided transfers remain more challenging at this time. Recommend slide board, or second person remain present at this time, for left sided transfers, d/t inconsistencies and safety considerations.      Strengths: Able to follow instructions, Independent prior level of function, Motivated for self care and independence, Pleasant and cooperative, Supportive family, Willingly participates in therapeutic activities  Barriers: Decreased endurance, Hemiparesis, Difficulty following instructions, Fatigue, Hypertension, Impaired activity tolerance, Impaired balance, Impaired insight/denial of deficits, Impaired functional cognition, Impaired sleep pattern, Impulsive, Limited mobility, Visual impairment, Poor family support (lives alone)    Plan    Head righting/ midline " "orientation  Bed mobility/ PNF rolling  Transfers with SB, emphasis on motor planning  Seated EOB balance  Wc mob with tanvir technique and emphasis on left environmental scanning/ awareness  Initiate HEP  Forced use principles for tanvir body  Vector (West) pregait training    DME  PT DME Recommendations  Wheelchair: 18\" Width  Cushion: Specialty (See other comments) (TBD pending ambulation progress)  Assistive Device: Tanvir Walker (TBD pending progress, currently 2 person assist for gait)  Additional Equipment:  (TBD)    Passport items to be completed:  Get in/out of bed safely, in/out of a vehicle, safely use mobility device, walk or wheel around home/community, navigate up and down stairs, show how to get up/down from the ground, ensure home is accessible, demonstrate HEP, complete caregiver training    Physical Therapy Problems (Active)       Problem: Balance       Dates: Start:  11/13/23         Goal: STG-Within one week, patient will maintain static standing c/ RUE support on rail at ModA or better.        Dates: Start:  11/13/23               Problem: Mobility       Dates: Start:  11/13/23         Goal: STG-Within one week, patient will ambulate distances >/= 30' c/ RHR and ModA or better.        Dates: Start:  11/13/23               Problem: Mobility Transfers       Dates: Start:  11/13/23         Goal: STG-Within one week, patient will perform bed mobility c/ ModA or better.        Dates: Start:  11/13/23            Goal: STG-Within one week, patient will transfer bed to chair c/ MaxA x 1 or better.        Dates: Start:  11/13/23         Goal Note filed on 11/15/23 0923 by Vivian Mcdermott DPT       Inconsistent performance, recommend slide board for safety at this time                 Problem: PT-Long Term Goals       Dates: Start:  11/13/23         Goal: LTG-By discharge, patient will propel wheelchair >/= 300' at Neto or better.        Dates: Start:  11/13/23            Goal: LTG-By discharge, patient will " ambulate >/= 50' c/ LRAD at Renata or better.        Dates: Start:  11/13/23            Goal: LTG-By discharge, patient will transfer one surface to another c/ Renata or better.        Dates: Start:  11/13/23            Goal: LTG-By discharge, patient will ambulate up/down 1 step c/ LRAD at Renata or better.        Dates: Start:  11/13/23            Goal: LTG-By discharge, patient will transfer in/out of a car c/ ModA or better.        Dates: Start:  11/13/23

## 2023-11-20 NOTE — DISCHARGE PLANNING
CM met with patient and his fiance re: DC needs.  Provided them with information for private caregivers and rehab w[facility] information.  Referral sent to rehab w[facility].  [facility] asking for a few more weeks of rehab.  CM explained how insurance works but can try to request some add'l days.  Vielka gave CM some FMLA p-work to complete.  CM available as needs arise.      11/20/23: CM rec'd call from rehab w[facility] ([facility]).  They no longer work with patients insurance.  CM updated attending.

## 2023-11-20 NOTE — PROGRESS NOTES
Hospital Medicine Daily Progress Note      Chief Complaint:  Hypertension  Diabetes  Renal Failure    Interval History:  Pt denies new complaints.    Review of Systems  Review of Systems   Constitutional:  Negative for chills and fever.   HENT: Negative.     Eyes: Negative.    Respiratory:  Negative for cough and shortness of breath.    Cardiovascular:  Negative for chest pain and palpitations.   Gastrointestinal:  Negative for abdominal pain, nausea and vomiting.   Genitourinary: Negative.    Musculoskeletal: Negative.    Skin:  Negative for itching and rash.   Neurological:  Positive for focal weakness.   Endo/Heme/Allergies:  Negative for polydipsia. Does not bruise/bleed easily.        Physical Exam  Temp:  [36.4 °C (97.6 °F)-36.7 °C (98.1 °F)] 36.4 °C (97.6 °F)  Pulse:  [] 95  Resp:  [16-18] 16  BP: (110-154)/(65-98) 154/97  SpO2:  [95 %-98 %] 97 %    Physical Exam  Vitals reviewed.   Constitutional:       General: He is not in acute distress.     Appearance: Normal appearance. He is not ill-appearing.   HENT:      Head: Normocephalic and atraumatic.      Right Ear: External ear normal.      Left Ear: External ear normal.      Nose: Nose normal.      Mouth/Throat:      Pharynx: Oropharynx is clear.   Eyes:      General:         Right eye: No discharge.         Left eye: No discharge.      Extraocular Movements: Extraocular movements intact.      Conjunctiva/sclera: Conjunctivae normal.   Cardiovascular:      Rate and Rhythm: Normal rate and regular rhythm.   Pulmonary:      Effort: Pulmonary effort is normal. No respiratory distress.      Breath sounds: Normal breath sounds. No wheezing.   Abdominal:      General: Bowel sounds are normal. There is no distension.      Palpations: Abdomen is soft.      Tenderness: There is no abdominal tenderness.   Musculoskeletal:      Cervical back: Normal range of motion and neck supple.      Right lower leg: No edema.      Left lower leg: No edema.   Skin:     General:  Skin is warm and dry.   Neurological:      Mental Status: He is alert and oriented to person, place, and time.      Comments: Left sided weakness         Fluids    Intake/Output Summary (Last 24 hours) at 11/20/2023 1333  Last data filed at 11/20/2023 1309  Gross per 24 hour   Intake 630 ml   Output 2300 ml   Net -1670 ml       Laboratory        Recent Labs     11/18/23  0626   SODIUM 139   POTASSIUM 4.0   CHLORIDE 104   CO2 26   GLUCOSE 146*   BUN 31*   CREATININE 2.49*   CALCIUM 9.4                   Assessment/Plan  Hemorrhagic stroke (HCC)- (present on admission)  Assessment & Plan  Pt suffered R thalamic hemorrhage on 10/23/23 while visiting [place]  Presented there w/ sudden onset HA, L HP, and /100  Management per Physiatry  Request Venous Duplex LUE/LLE for swelling    ROMÁN (acute kidney injury) (HCC)- (present on admission)  Assessment & Plan  U/S medical renal disease, no hydronephrosis  Appears to have CKD, probably stage IV  Avoid nephrotoxins  Renal dose all meds  Monitor electrolytes  Outpt Nephrology F/U  Check F/U labs in AM    DM (diabetes mellitus) (HCC)- (present on admission)  Assessment & Plan  HbA1c 6.4  Now off FSBS and SSI  Continue diet control  Outpt meds include Metformin    Hyperlipidemia- (present on admission)  Assessment & Plan  Continue Lipitor  Outpt meds include Lipitor    Hypertension- (present on admission)  Assessment & Plan  On Norvasc and Hydralazine  Will increase Hydralazine to optimize blood pressure control  Outpt meds include Norvasc    Full Code

## 2023-11-20 NOTE — PROGRESS NOTES
"  Physical Medicine & Rehabilitation Progress Note    Encounter Date: 11/20/2023    Chief Complaint: Doing ok    Interval Events (Subjective):  VSS  Voiding   BM 11/20    Patient seen and examined in his room. Vileka is at bedside. They would like him to stay 6 weeks. He feels well and had an ok weekend.     ROS: 14 point ROS negative unless otherwise specified in the HPI    Objective:  VITAL SIGNS: BP (!) 136/98   Pulse 68   Temp 36.7 °C (98.1 °F) (Oral)   Resp 18   Ht 1.727 m (5' 8\")   Wt 86 kg (189 lb 9.5 oz)   SpO2 98%   BMI 28.83 kg/m²     GEN: No apparent distress  HEENT: Head normocephalic, atraumatic.  Sclera nonicteric bilaterally, no ocular discharge appreciated bilaterally.  CV: Extremities warm and well-perfused, no peripheral edema appreciated bilaterally.  PULMONARY: Breathing nonlabored on room air, no respiratory accessory muscle use.  Not requiring supplemental oxygen.  SKIN: No appreciable skin breakdown on exposed areas of skin.  PSYCH: Flat affect  NEURO: Awake alert.  Conversational.  Logical thought content. Dysarthria.       Laboratory Values:  Recent Results (from the past 72 hour(s))   Basic Metabolic Panel    Collection Time: 11/18/23  6:26 AM   Result Value Ref Range    Sodium 139 135 - 145 mmol/L    Potassium 4.0 3.6 - 5.5 mmol/L    Chloride 104 96 - 112 mmol/L    Co2 26 20 - 33 mmol/L    Glucose 146 (H) 65 - 99 mg/dL    Bun 31 (H) 8 - 22 mg/dL    Creatinine 2.49 (H) 0.50 - 1.40 mg/dL    Calcium 9.4 8.5 - 10.5 mg/dL    Anion Gap 9.0 7.0 - 16.0   MAGNESIUM    Collection Time: 11/18/23  6:26 AM   Result Value Ref Range    Magnesium 2.4 1.5 - 2.5 mg/dL   PHOSPHORUS    Collection Time: 11/18/23  6:26 AM   Result Value Ref Range    Phosphorus 3.3 2.5 - 4.5 mg/dL   ESTIMATED GFR    Collection Time: 11/18/23  6:26 AM   Result Value Ref Range    GFR (CKD-EPI) 28 (A) >60 mL/min/1.73 m 2       Medications:  Scheduled Medications   Medication Dose Frequency    hydrALAZINE  75 mg Q8HRS    " traZODone  75 mg QHS    senna-docusate  2 Tablet BID    omeprazole  20 mg DAILY    amLODIPine  10 mg DAILY    atorvastatin  40 mg Nightly    baclofen  5 mg QHS     PRN medications: melatonin, [DISCONTINUED] insulin regular **AND** [CANCELED] POC blood glucose manual result **AND** NOTIFY MD and PharmD **AND** Administer 20 grams of glucose (approximately 8 ounces of fruit juice) every 15 minutes PRN FSBG less than 70 mg/dL **AND** dextrose bolus, Respiratory Therapy Consult, senna-docusate **AND** polyethylene glycol/lytes **AND** magnesium hydroxide **AND** bisacodyl, mag hydrox-al hydrox-simeth, ondansetron **OR** ondansetron, traZODone, sodium chloride, [] acetaminophen **FOLLOWED BY** acetaminophen, oxyCODONE immediate-release **OR** oxyCODONE immediate-release, hydrALAZINE    Diet:  Current Diet Order   Procedures    Diet Order Diet: Level 6 - Soft and Bite Sized (2 gram sodium restriction; medications whole with thin liquids); Liquid level: Level 0 - Thin; Second Modifier: (optional): Consistent CHO (Diabetic)       Medical Decision Making and Plan:  Right thalamic hemorrhagic stroke  Originally presented with a headache and left-sided weakness to hospital in [facility]  Work-up at the outside hospital in [facility] revealed a right thalamic hemorrhagic stroke  Repeat CT head done after return flight to the ,  CT head shows right thalamic hemorrhage, decrease in density since prior studies from the outside hospital, slight asymmetric dilation of the left ventricular system including the left temporal horn with a possible component of hydrocephalus  Planning for BP less than 140, avoiding any kind of anticoagulation  Initiate comprehensive IRF level therapy with 3 days of therapy 5 days a week with services from PT/OT/SLP     Spasticity  Continue baclofen 5 mg nightly, started on  --> increase to TID 2023 --> continue 2023, stable on higher dose.   PRAFO ordered for right lower  extremity to prevent further plantarflexion contracture  Spasticity controlled, continue Baclofen 11/20/2023      CKD  Has seen nephrology in past  Improving 11/13/2023   F/u OP with nephrology  S/p IVF 11/13-11/14 11/14/2023 BUN and Cr improved compared to prior  BMP 11/16 - improved - currently receiving IVF  Recheck [facility] 11/22     Hypertension  Continue Amlodipine 10mg daily  Continue Hydralazine 25mg TID - increased by hospitalist 11/13/2023 from BID to TID--> increased to 50mg q8hrs 11/15  11/16 Hydralazine increased to 75mg q8hrs and 1x Hydralazine given  11/17 BP still elevated but hydralazine recently increased. Monitor  VSS 11/20/2023 Continue Amlodipine and Hydralazine at current dose.      Prediabetes  Discontinue SSI     HLD  Continue home dose satin      Bowel  Continue with scheduled Colace and senna, as needed stool softeners     Insomnia  Secondary to recent jet lag, melatonin scheduled --> increase to home dose 11/13/2023 9mg  Utilize trazodone as needed insomnia continues  Schedule Trazodone 11/15/2023   11/16/2023 continue Trazodone as is. Thinks he slept better last night  11/17/2023 Still not sleeping well. Increase to 75mg nightly.   11/20/2023 Continue trazodone at current dose     Pain -as needed Tylenol and oxycodone    Post-Stroke Depression  Consulted Pyschiatry   Consider anti-depressant       DVT PROPHYLAXIS: NO - Hemorrhagic stroke    HOSPITALIST FOLLOWING: YES - d/w hospitalist 11/20    CODE STATUS: FULL CODE    DISPO: Home alone. Vielka and patient not , she cannot take off FMLA. D/w CM to give resources for private care giving. Their goal is to stay 6 weeks. RWW denied. Counseled extensively on private care giving/therapy in addition to Home Health or outpatient.     MARCUS: 12/4/23    MEDS SENT TO: TBD    DISCHARGE FOLLOW UP: Neurology, Nephrology, PCP - needs referral to all.   ____________________________________    Dr. Lisa Lee DO, MS  [facility] - Physical Medicine &  Rehabilitation   ____________________________________

## 2023-11-20 NOTE — THERAPY
Speech Language Pathology  Daily Treatment     Patient Name: aJson Lima  Age:  62 y.o., Sex:  male  Medical Record #: [id number]  Today's Date: 11/20/2023     Precautions  Precautions: Fall Risk  Comments: Left hemiparesis, left visual inattention.    Subjective    Patient agreeable to therapy.  Patient was focused on fear of being paralyzed for rest of life.  Patient reports eye fatigue and burning sensation in eyes with eye fatigue with sustained visual attention.     Objective       11/20/23 0801   Treatment Charges   SLP Swallowing Dysfunction Treatment Swallowing Dysfunction Treatment   SLP Cognitive Skill Development First 15 Minutes 1   SLP Cognitive Skill Development Additional 15 Minutes 1   SLP Total Time Spent   SLP Individual Total Time Spent (Mins) 60   Cognition   Simple Attention Moderate (3)   Visual Scanning / Cancellation Skills Minimal (4)         Assessment    Patient seen with current (6) soft and bite size and (0) thin liquids ( after recent down grade).  Patient verbalizes that he knows that he needs to slow down, however, he still demonstrates difficulty self regulating rate of intake and inhibiting automatic behavior.   Discussed with patient if he still wants to try regular textures and added a swallow strategy to place bolus material in the right side of mouth and masticate in the right side of mouth.  Ordered a regular trial to be sent for lunch with ST iesha [place].  However, the trial did not come at lunch. Will reattempt tomorrow.    Patient worked on visual scanning for 2 single letter targets.  He reports burning eyes and eye fatigue after several minutes.  First attempt ( with review of light house ( typewriter) strategy- 73%, second attempt ( did not read the new set of instructions with mod A needed to attend.  After which  he performed with 65% acc.  Again reviewed scanning strategies and on 3rd set he performed with 82%.    Strengths: Effective communication skills,  Alert and oriented, Able to follow instructions, Motivated for self care and independence, Pleasant and cooperative, Supportive family, Willingly participates in therapeutic activities  Barriers: Aspiration risk, Hemiparesis, Impaired carryover of learning, Impaired insight/denial of deficits, Impaired functional cognition, Impulsive, Visual impairment    Plan    Target attention in functional reading, alternating attention, recall, safe swallow with therapeutic trials of regular.    Passport items to be completed:  Express basic needs, understand food/liquid recommendations, consistently follow swallow precautions, manage finances, manage medications, arrive to therapy appointments on time, complete daily memory log entries, solve problems related to safety situations, review education related to hospitalization, complete caregiver training     Speech Therapy Problems (Active)       Problem: Comprehension STGs       Dates: Start:  11/13/23         Goal: STG-Within one week, patient will perform attention for comprehension in functional reading tasks with 75% acc.       Dates: Start:  11/13/23         Goal Note filed on 11/15/23 1301 by Tawny Forbes MS,CCC-SLP       To be addresssed.                 Problem: Expression STGs       Dates: Start:  11/13/23         Goal: STG-Within one week, patient will       Dates: Start:  11/13/23               Problem: Memory STGs       Dates: Start:  11/13/23         Goal: STG-Within one week, patient will recall daily events and safety sequencing with 60% acc with use of external memory aids.       Dates: Start:  11/13/23         Goal Note filed on 11/15/23 1301 by Tawny Forbes MS,CCC-SLP       Educated patient on use of memory log and memory strategies this date.  Min-mod A needed for 60% acc.                 Problem: Problem Solving STGs       Dates: Start:  11/13/23         Goal: STG-Within one week, patient will perform alternating attention tasks with 75% acc.        Dates: Start:  11/13/23         Goal Note filed on 11/15/23 1301 by Tawny Forbes MS,CCC-SLP       Patient has preformed alternating attention tasks with 40% acc.                 Problem: Speech/Swallowing LTGs       Dates: Start:  11/13/23         Goal: LTG-By discharge, patient will safely swallow (7)regular diet textures and (0) thin liquids with no overt s/sx of aspiration for 2/2 days.       Dates: Start:  11/13/23            Goal: LTG-By discharge, patient will solve complex problem       Dates: Start:  11/13/23       Description: For safety and self care with 80% acc mod I  for safe discharge home.         Goal: LTG-By discharge, patient will recall new training with 80% acc with min A and use of external memory aids.       Dates: Start:  11/13/23               Problem: Swallowing STGs       Dates: Start:  11/13/23         Goal: STG-Within one week, patient will safely swallow (7) regular easy chew and cut up and (0) thin liquids with no overt s/sx of aspiration        Dates: Start:  11/13/23         Goal Note filed on 11/15/23 1301 by Tawny Forbes MS,CCC-SLP       Patient displays occasional coughing on thin liquids displays occasional anterior leakage, but is overall tolerating without distress.  MBSS scheduled for today.

## 2023-11-20 NOTE — PROGRESS NOTES
NURSING DAILY NOTE    Name: Jason Lima   Date of Admission: 11/12/2023   Admitting Diagnosis: Thalamic hemorrhage (HCC)  Attending Physician: Lisa Lee D.o.  Allergies: Penicillins    Safety  Patient Assist  max 2  Patient Precautions  Fall Risk  Precaution Comments  Left hemiparesis, left visual inattention.  Bed Transfer Status  Moderate Assist  Toilet Transfer Status   Maximal Assist  Assistive Devices  Rails  Oxygen  Room air w/o2 available  Diet/Therapeutic Dining  Current Diet Order   Procedures    Diet Order Diet: Level 6 - Soft and Bite Sized (2 gram sodium restriction; medications whole with thin liquids); Liquid level: Level 0 - Thin; Second Modifier: (optional): Consistent CHO (Diabetic)     Pill Administration  whole and floated  Agitated Behavioral Scale     ABS Level of Severity       Fall Risk  Has the patient had a fall this admission?   Yes  Shellie Hamilton Fall Risk Scoring  28, HIGH RISK  Fall Risk Safety Measures  bed alarm, chair alarm, seatbelt alarm, poor balance, and low vision/ hearing    Vitals  Temperature: 36.7 °C (98 °F)  Temp src: Oral  Pulse: 85  Respiration: 18  Blood Pressure: 110/65  Blood Pressure MAP (Calculated): 80 MM HG  BP Location: Right, Upper Arm  Patient BP Position: Supine     Oxygen  Pulse Oximetry: 97 %  O2 (LPM): 0  FiO2%: 21 %  O2 Delivery Device: Room air w/o2 available    Bowel and Bladder  Last Bowel Movement  11/19/23  Stool Type  Type 5: Soft blob with clear cut edges (passed easily)  Bowel Device     Continent  Bladder: Continent void   Bowel: Continent movement  Bladder Function  Urine Void (mL): 600 ml (urinals)  Number of Times Voided: 1  Urine Color: Unable To Evaluate  Genitourinary Assessment   Bladder Assessment (WDL):  Within Defined Limits  Echols Catheter: Not Applicable  Urine Color: Unable To Evaluate  Bladder Device: Urinal  Time Void: No  Bladder Scan: Post Void  $ Bladder Scan  Results (mL): 26    Skin  Polo Score   15  Sensory Interventions   Bed Types: Standard/Trauma Mattress  Skin Preventative Measures: Pillows in Use for Support / Positioning  Moisture Interventions  Moisturizers/Barriers: Barrier Wipes      Pain  Pain Rating Scale  0 - No Pain  Pain Location  Back  Pain Location Orientation  Upper  Pain Interventions   Declines    ADLs    Bathing   Shower, Staff  Linen Change   Complete  Personal Hygiene  Moist Deja Wipes  Chlorhexidine Bath      Oral Care  Brushed Teeth (assist)  Teeth/Dentures     Shave     Nutrition Percentage Eaten  Lunch, Between % Consumed (100%)  Environmental Precautions  Bed in Low Position, Treaded Slipper Socks on Patient  Patient Turns/Positioning  Patient Turns Self from Side to Side  Patient Turns Assistance/Tolerance  Tolerates Well  Bed Positions  Bed Controls On  Head of Bed Elevated  Self regulated      Psychosocial/Neurologic Assessment  Psychosocial Assessment  Psychosocial (WDL):  Within Defined Limits  Patient Behaviors: Flat Affect  Neurologic Assessment  Neuro (WDL): Exceptions to WDL  Level of Consciousness: Alert  Orientation Level: Oriented X4  Cognition: Appropriate judgement  Speech: Clear, Slurred  Facial Symmetry: Left facial drooping  Pupil Assesment: Yes  R Pupil Size (mm): 3  R Pupil Shape / Description: Round  R Pupil Reaction: Brisk  L Pupil Size (mm): 3  L Pupil Shape / Description: Round  L Pupil Reaction: Brisk  Reflexes: Cough  Cough Reflex: Present  Motor Function/Sensation Assessment: Dorsiflexion, Motor response  R Foot Dorsiflexion: Strong  L Foot Dorsiflexion: Absent  RUE Motor Response: Responds to commands  RUE Sensation: Full sensation  Muscle Strength Right Arm: Normal Strength Against Gravity and Full Resistance  LUE Motor Response: Movement to painful stimulus  LUE Sensation: Tingling, Numbness  Muscle Strength Left Arm: Weak Movement but Not Against Gravity or Resistance  RLE Motor Response: Responds to  commands  RLE Sensation: Full sensation  Muscle Strength Right Leg: Good Strength Against Gravity and Moderate Resistance  LLE Motor Response: Movement to painful stimulus  LLE Sensation: Tingling, Numbness  Muscle Strength Left Leg: Weak Movement but Not Against Gravity or Resistance  EENT (WDL):  WDL Except    Cardio/Pulmonary Assessment  Edema      Respiratory Breath Sounds  RUL Breath Sounds: Clear  RML Breath Sounds: Clear  RLL Breath Sounds: Diminished  MOHAMUD Breath Sounds: Clear  LLL Breath Sounds: Diminished  Cardiac Assessment   Cardiac (WDL):  Within Defined Limits

## 2023-11-20 NOTE — THERAPY
"Occupational Therapy  Daily Treatment     Patient Name: Jason Lima  Age:  62 y.o., Sex:  male  Medical Record #: [id number]  Today's Date: 11/20/2023     Precautions  Precautions: (P) Fall Risk  Comments: (P) Left hemiparesis, left visual inattention         Subjective    Pt encountered 2x this session for OT treatment. Both sessions pt pleasant and agreeable to participate. Pt indicated feeling \"tired\" and \"cold.\"     Objective       11/20/23 1301 11/20/23 1431   OT Charge Group   OT Self Care / ADL (Units) 1 1   OT Neuromuscular Re-education / Balance (Units) 1 1   OT Total Time Spent   OT Individual Total Time Spent (Mins) 30 30   Precautions   Precautions Fall Risk Fall Risk   Comments Left hemiparesis, left visual inattention Left hemiparesis, left visual inattention   Functional Level of Assist   Lower Body Dressing Moderate Assist Moderate Assist   Lower Body Dressing Description Assist with threading into pant leg;Increased time;Verbal cueing;Other (comment)  (Supine in bed; extra time needed to perform bridge to pull pants up; moderate difficulty d/t L-sided weakness) Dressing stick;Assist with threading into pant leg;Increased time;Initial preparation for task;Verbal cueing;Other (comment)  (Supine in bed LBD using AE (changing from pants to shorts for NFC on ) at modA for sequencing and aasistance to bend LLE to assist with bridging to pull pants up.)   Bed, Chair, Wheelchair Transfer Total Assist X 2 Maximal Assist   Bed Chair Wheelchair Transfer Description Assist with one limb;Increased time;Slideboard transfer from bed to wheelchair;Set-up of equipment;Squat pivot transfer to wheelchair  (Reach pivot to the left; STS to mat from WC mod to maxA) Assist with one limb;Increased time;Initial preparation for task;Slideboard transfer from bed to wheelchair;Supervision for safety;Other (comment)  (SB from WC to bed (R side) at maxA for sequencing. Max VC to attend to the LUE.)   Neuro-Muscular " Treatments   Neuro-Muscular Treatments Co-Contraction;Sequencing;Weight Shift Right;Weight Shift Left;Other (See Comments) Co-Contraction;Sequencing;Weight Shift Right;Weight Shift Left;Other (See Comments)   Comments LUE WB on the mat in standing (maxA for stadning balacne) while engaged in dynamic reaching with the RUE. Max to modA required for WB through the LUE. Pt able to facilitate/maintain elbow ext with VC for up to 5 sec; completed 2x. LUE WB on the mat in standing (mod to Kyara for standing balance) while engaged in dynamic reaching with the RUE. Mod tp Kyara required for WB through the LUE in 2/3 trials, but the 3rd trial pt demo at Kyara. Pt able to facilitate/maintain elbow ext for up to 20 sec; completed 3x.   Balance   Standing Balance (Static) Trace +  --    Standing Balance (Dynamic) Trace +  --    Bed Mobility    Supine to Sit Moderate Assist  --    Sit to Supine  --  Minimal Assist   Scooting Maximal Assist  --    Interdisciplinary Plan of Care Collaboration   IDT Collaboration with  Family / Caregiver;Therapy Tech Therapy Tech;Nursing   Patient Position at End of Therapy Seated;Chair Alarm On;Call Light within Reach;Tray Table within Reach;Phone within Reach;Family / Friend in Room  (SO present throughout session) In Bed;Other (Comments)  (hand off to therapy tech (prep for [facility] ))   Collaboration Comments POC; safe transfers safe transfers; BP meds         Assessment    Overall, gradual improvements in standing balance and LUE strengthening/tone management on the thera mat (see notes above) as pt progressed towards max to Kyara. Pt is progressing towards his LBD goals as he is not demo modA for LBD supine in bed using AE PRN. Would benefit to cont AE training to progress indep.       Strengths: Able to follow instructions, Independent prior level of function, Motivated for self care and independence, Pleasant and cooperative, Supportive family, Alert and oriented  Barriers: Decreased endurance,  Fatigue, Generalized weakness, Hemiparesis, Impaired activity tolerance, Impaired balance, Limited mobility, Spasticity    Plan    Cont ADLs, functional transfers, and thera act/ex to maximize functional recovery for safe DC home.       DME  OT DME Recommendations  Bathroom Equipment: 3 in 1 Commode, Tub Transfer Bench (Medicaid Only)  Additional Equipment:  (TBD)    Passport items to be completed:  Perform bathroom transfers, complete dressing, complete feeding, get ready for the day, prepare a simple meal, participate in household tasks, adapt home for safety needs, demonstrate home exercise program, complete caregiver training     Occupational Therapy Goals (Active)       Problem: Bathing       Dates: Start:  11/15/23         Goal: STG-Within one week, patient will bathe at modA using DME/AE PRN       Dates: Start:  11/15/23               Problem: Dressing       Dates: Start:  11/13/23         Goal: STG-Within one week, patient will dress UB with Mod A.       Dates: Start:  11/13/23            Goal: STG-Within one week, patient will dress LB with Mod A.       Dates: Start:  11/13/23               Problem: Functional Transfers       Dates: Start:  11/13/23         Goal: STG-Within one week, patient will transfer to toilet with Max/Mod A.       Dates: Start:  11/13/23            Goal: STG-Within one week, patient will transfer to step in shower with Max A.       Dates: Start:  11/13/23               Problem: OT Long Term Goals       Dates: Start:  11/13/23         Goal: LTG-By discharge, patient will complete basic self care tasks at Mod Independent level.       Dates: Start:  11/13/23            Goal: LTG-By discharge, patient will perform bathroom transfers at Mod Independent level.       Dates: Start:  11/13/23

## 2023-11-20 NOTE — CARE PLAN
"  Problem: Fall Risk - Rehab  Goal: Patient will remain free from falls  Outcome: Progressing  Note: Shellie Hamilton Fall risk Assessment Score: 28    High fall risk Interventions   - Bed and strip alarm   - Yellow sign by the door   - Yellow wrist band \"Fall risk\"  - Room near to the nurse station  - Do not leave patient unattended in the bathroom  - Fall risk education provided      Problem: Pain - Standard  Goal: Alleviation of pain or a reduction in pain to the patient’s comfort goal  Outcome: Progressing  Note: Assessed for pain and discomfort , pain under control, needs anticipated and attended.   The patient is Stable - Low risk of patient condition declining or worsening    Shift Goals  Clinical Goals: safety  Patient Goals: safety  Family Goals: kirit    Progress made toward(s) clinical / shift goals:  Pt free from fall and injury.    "

## 2023-11-21 ENCOUNTER — APPOINTMENT (OUTPATIENT)
Dept: OCCUPATIONAL THERAPY | Facility: REHABILITATION | Age: 63
DRG: 057 | End: 2023-11-21
Attending: PHYSICAL MEDICINE & REHABILITATION
Payer: COMMERCIAL

## 2023-11-21 ENCOUNTER — APPOINTMENT (OUTPATIENT)
Dept: PHYSICAL THERAPY | Facility: REHABILITATION | Age: 63
DRG: 057 | End: 2023-11-21
Attending: PHYSICAL MEDICINE & REHABILITATION
Payer: COMMERCIAL

## 2023-11-21 ENCOUNTER — APPOINTMENT (OUTPATIENT)
Dept: SPEECH THERAPY | Facility: REHABILITATION | Age: 63
DRG: 057 | End: 2023-11-21
Attending: PHYSICAL MEDICINE & REHABILITATION
Payer: COMMERCIAL

## 2023-11-21 PROBLEM — I82.409 DVT (DEEP VENOUS THROMBOSIS) (HCC): Status: ACTIVE | Noted: 2023-11-21

## 2023-11-21 LAB
ALBUMIN SERPL BCP-MCNC: 3.7 G/DL (ref 3.2–4.9)
ALBUMIN/GLOB SERPL: 1.5 G/DL
ALP SERPL-CCNC: 67 U/L (ref 30–99)
ALT SERPL-CCNC: 18 U/L (ref 2–50)
ANION GAP SERPL CALC-SCNC: 11 MMOL/L (ref 7–16)
AST SERPL-CCNC: 13 U/L (ref 12–45)
BILIRUB SERPL-MCNC: 0.4 MG/DL (ref 0.1–1.5)
BUN SERPL-MCNC: 32 MG/DL (ref 8–22)
CALCIUM ALBUM COR SERPL-MCNC: 9.2 MG/DL (ref 8.5–10.5)
CALCIUM SERPL-MCNC: 9 MG/DL (ref 8.5–10.5)
CHLORIDE SERPL-SCNC: 104 MMOL/L (ref 96–112)
CO2 SERPL-SCNC: 24 MMOL/L (ref 20–33)
CREAT SERPL-MCNC: 2.82 MG/DL (ref 0.5–1.4)
ERYTHROCYTE [DISTWIDTH] IN BLOOD BY AUTOMATED COUNT: 40.2 FL (ref 35.9–50)
GFR SERPLBLD CREATININE-BSD FMLA CKD-EPI: 25 ML/MIN/1.73 M 2
GLOBULIN SER CALC-MCNC: 2.4 G/DL (ref 1.9–3.5)
GLUCOSE SERPL-MCNC: 143 MG/DL (ref 65–99)
HCT VFR BLD AUTO: 34.8 % (ref 42–52)
HGB BLD-MCNC: 11.9 G/DL (ref 14–18)
MCH RBC QN AUTO: 31.1 PG (ref 27–33)
MCHC RBC AUTO-ENTMCNC: 34.2 G/DL (ref 32.3–36.5)
MCV RBC AUTO: 90.9 FL (ref 81.4–97.8)
PLATELET # BLD AUTO: 187 K/UL (ref 164–446)
PMV BLD AUTO: 10.5 FL (ref 9–12.9)
POTASSIUM SERPL-SCNC: 4 MMOL/L (ref 3.6–5.5)
PROT SERPL-MCNC: 6.1 G/DL (ref 6–8.2)
RBC # BLD AUTO: 3.83 M/UL (ref 4.7–6.1)
SODIUM SERPL-SCNC: 139 MMOL/L (ref 135–145)
TSH SERPL DL<=0.005 MIU/L-ACNC: 0.64 UIU/ML (ref 0.38–5.33)
VIT B12 SERPL-MCNC: 1291 PG/ML (ref 211–911)
WBC # BLD AUTO: 5.5 K/UL (ref 4.8–10.8)

## 2023-11-21 PROCEDURE — 97129 THER IVNTJ 1ST 15 MIN: CPT

## 2023-11-21 PROCEDURE — 700102 HCHG RX REV CODE 250 W/ 637 OVERRIDE(OP): Performed by: PHYSICAL MEDICINE & REHABILITATION

## 2023-11-21 PROCEDURE — 84443 ASSAY THYROID STIM HORMONE: CPT

## 2023-11-21 PROCEDURE — 97535 SELF CARE MNGMENT TRAINING: CPT

## 2023-11-21 PROCEDURE — 85027 COMPLETE CBC AUTOMATED: CPT

## 2023-11-21 PROCEDURE — 99232 SBSQ HOSP IP/OBS MODERATE 35: CPT | Performed by: PHYSICAL MEDICINE & REHABILITATION

## 2023-11-21 PROCEDURE — A9270 NON-COVERED ITEM OR SERVICE: HCPCS | Performed by: PHYSICAL MEDICINE & REHABILITATION

## 2023-11-21 PROCEDURE — 97130 THER IVNTJ EA ADDL 15 MIN: CPT

## 2023-11-21 PROCEDURE — 700111 HCHG RX REV CODE 636 W/ 250 OVERRIDE (IP): Performed by: HOSPITALIST

## 2023-11-21 PROCEDURE — 700102 HCHG RX REV CODE 250 W/ 637 OVERRIDE(OP): Performed by: HOSPITALIST

## 2023-11-21 PROCEDURE — 80053 COMPREHEN METABOLIC PANEL: CPT

## 2023-11-21 PROCEDURE — 36415 COLL VENOUS BLD VENIPUNCTURE: CPT

## 2023-11-21 PROCEDURE — 97530 THERAPEUTIC ACTIVITIES: CPT

## 2023-11-21 PROCEDURE — A9270 NON-COVERED ITEM OR SERVICE: HCPCS | Performed by: HOSPITALIST

## 2023-11-21 PROCEDURE — 99232 SBSQ HOSP IP/OBS MODERATE 35: CPT | Performed by: HOSPITALIST

## 2023-11-21 PROCEDURE — 99232 SBSQ HOSP IP/OBS MODERATE 35: CPT | Performed by: STUDENT IN AN ORGANIZED HEALTH CARE EDUCATION/TRAINING PROGRAM

## 2023-11-21 PROCEDURE — 92526 ORAL FUNCTION THERAPY: CPT

## 2023-11-21 PROCEDURE — 97112 NEUROMUSCULAR REEDUCATION: CPT

## 2023-11-21 PROCEDURE — 770010 HCHG ROOM/CARE - REHAB SEMI PRIVAT*

## 2023-11-21 PROCEDURE — 82607 VITAMIN B-12: CPT

## 2023-11-21 PROCEDURE — 97116 GAIT TRAINING THERAPY: CPT

## 2023-11-21 RX ORDER — HEPARIN SODIUM 5000 [USP'U]/ML
5000 INJECTION, SOLUTION INTRAVENOUS; SUBCUTANEOUS EVERY 8 HOURS
Status: DISCONTINUED | OUTPATIENT
Start: 2023-11-21 | End: 2023-11-24

## 2023-11-21 RX ORDER — ENOXAPARIN SODIUM 100 MG/ML
40 INJECTION SUBCUTANEOUS DAILY
Status: DISCONTINUED | OUTPATIENT
Start: 2023-11-21 | End: 2023-11-21

## 2023-11-21 RX ADMIN — TRAZODONE HYDROCHLORIDE 75 MG: 50 TABLET ORAL at 20:26

## 2023-11-21 RX ADMIN — HYDRALAZINE HYDROCHLORIDE 100 MG: 50 TABLET, FILM COATED ORAL at 05:11

## 2023-11-21 RX ADMIN — HEPARIN SODIUM 5000 UNITS: 5000 INJECTION, SOLUTION INTRAVENOUS; SUBCUTANEOUS at 20:26

## 2023-11-21 RX ADMIN — HYDRALAZINE HYDROCHLORIDE 100 MG: 50 TABLET, FILM COATED ORAL at 14:05

## 2023-11-21 RX ADMIN — BACLOFEN 5 MG: 10 TABLET ORAL at 20:25

## 2023-11-21 RX ADMIN — AMLODIPINE BESYLATE 10 MG: 5 TABLET ORAL at 08:14

## 2023-11-21 RX ADMIN — HEPARIN SODIUM 5000 UNITS: 5000 INJECTION, SOLUTION INTRAVENOUS; SUBCUTANEOUS at 14:08

## 2023-11-21 RX ADMIN — ATORVASTATIN CALCIUM 40 MG: 40 TABLET, FILM COATED ORAL at 20:26

## 2023-11-21 RX ADMIN — HYDRALAZINE HYDROCHLORIDE 100 MG: 50 TABLET, FILM COATED ORAL at 20:26

## 2023-11-21 RX ADMIN — SENNOSIDES AND DOCUSATE SODIUM 2 TABLET: 50; 8.6 TABLET ORAL at 08:14

## 2023-11-21 RX ADMIN — OMEPRAZOLE 20 MG: 20 CAPSULE, DELAYED RELEASE ORAL at 08:14

## 2023-11-21 ASSESSMENT — GAIT ASSESSMENTS
ASSISTIVE DEVICE: PARALLEL BARS;OTHER (COMMENTS)
GAIT LEVEL OF ASSIST: MAXIMAL ASSIST

## 2023-11-21 ASSESSMENT — ENCOUNTER SYMPTOMS
VOMITING: 0
FEVER: 0
FOCAL WEAKNESS: 1
PALPITATIONS: 0
NAUSEA: 0
CHILLS: 0
POLYDIPSIA: 0
COUGH: 0
MUSCULOSKELETAL NEGATIVE: 1
EYES NEGATIVE: 1
ABDOMINAL PAIN: 0
SHORTNESS OF BREATH: 0
BRUISES/BLEEDS EASILY: 0

## 2023-11-21 NOTE — CARE PLAN
The patient is Stable - Low risk of patient condition declining or worsening    Shift Goals  Clinical Goals: safety  Patient Goals: safety  Family Goals: increased pt strength, independence    Progress made toward(s) clinical / shift goals:  2      Problem: Pain - Standard  Goal: Alleviation of pain or a reduction in pain to the patient’s comfort goal  Outcome: Progressing: Pt denies pain, no analgesics admnistered.     Problem: Mobility  Goal: Patient's capacity to carry out activities will improve  Outcome: Progressing: pt OOB for meals in T-dine, participating in therapies.

## 2023-11-21 NOTE — PROGRESS NOTES
NURSING DAILY NOTE    Name: Jason Lima   Date of Admission: 11/12/2023   Admitting Diagnosis: Thalamic hemorrhage (HCC)  Attending Physician: Lisa Lee D.o.  Allergies: Penicillins    Safety  Patient Assist  max 2  Patient Precautions  Fall Risk  Precaution Comments  Left hemiparesis, left visual inattention  Bed Transfer Status  Maximal Assist  Toilet Transfer Status   Total Assist X 2 (Mod A towards right wc to toilet, 2 person to left toilet to wc)  Assistive Devices  Rails  Oxygen  None - Room Air (C pap noc)  Diet/Therapeutic Dining  Current Diet Order   Procedures    Diet Order Diet: Level 6 - Soft and Bite Sized (2 gram sodium restriction; medications whole with thin liquids); Liquid level: Level 0 - Thin; Second Modifier: (optional): Consistent CHO (Diabetic)     Pill Administration  whole  Agitated Behavioral Scale     ABS Level of Severity       Fall Risk  Has the patient had a fall this admission?   Yes  Shellie Hamilton Fall Risk Scoring  25, HIGH RISK  Fall Risk Safety Measures  bed alarm, chair alarm, and poor balance    Vitals  Temperature: 36.7 °C (98 °F)  Temp src: Oral  Pulse: 66  Respiration: 16  Blood Pressure: 127/86  Blood Pressure MAP (Calculated): 100 MM HG  BP Location: Left, Upper Arm  Patient BP Position: Sitting     Oxygen  Pulse Oximetry: 97 %  O2 (LPM): 0  FiO2%: 21 %  O2 Delivery Device: None - Room Air (C pap noc)    Bowel and Bladder  Last Bowel Movement  11/20/23  Stool Type  Type 6: Fluffy pieces with ragged edges, a mushy stool  Bowel Device     Continent  Bladder: Continent void   Bowel: Continent movement  Bladder Function  Urine Void (mL): 300 ml  Number of Times Voided: 1  Urine Color: Yellow  Genitourinary Assessment   Bladder Assessment (WDL):  Within Defined Limits  Echols Catheter: Not Applicable  Urine Color: Yellow  Bladder Device: Urinal  Time Void: No  Bladder Scan: Post Void  $ Bladder Scan Results (mL):  26    Skin  Polo Score   16  Sensory Interventions   Bed Types: Standard/Trauma Mattress  Skin Preventative Measures: Pillows in Use for Support / Positioning  Moisture Interventions  Moisturizers/Barriers: Barrier Wipes      Pain  Pain Rating Scale  0 - No Pain  Pain Location  Back  Pain Location Orientation  Upper  Pain Interventions   Declines    ADLs    Bathing   Shower, Staff  Linen Change   Complete  Personal Hygiene  Moist Deja Wipes  Chlorhexidine Bath      Oral Care  Brushed Teeth  Teeth/Dentures     Shave  Self  Nutrition Percentage Eaten  Lunch, Between % Consumed (95%)  Environmental Precautions  Bed in Low Position, Treaded Slipper Socks on Patient  Patient Turns/Positioning  Patient Turns Self from Side to Side  Patient Turns Assistance/Tolerance  Assistance of One, Tolerates Well  Bed Positions  Bed Controls On  Head of Bed Elevated  Self regulated      Psychosocial/Neurologic Assessment  Psychosocial Assessment  Psychosocial (WDL):  Within Defined Limits  Patient Behaviors: Flat Affect  Neurologic Assessment  Neuro (WDL): Exceptions to WDL  Level of Consciousness: Alert  Orientation Level: Oriented X4  Cognition: Appropriate judgement  Speech: Clear, Slurred  Facial Symmetry: Left facial drooping  Pupil Assesment: Yes  R Pupil Size (mm): 3  R Pupil Shape / Description: Round  R Pupil Reaction: Brisk  L Pupil Size (mm): 3  L Pupil Shape / Description: Round  L Pupil Reaction: Brisk  Reflexes: Cough  Cough Reflex: Present  Motor Function/Sensation Assessment: Dorsiflexion, Motor response  R Foot Dorsiflexion: Strong  L Foot Dorsiflexion: Absent  RUE Motor Response: Responds to commands  RUE Sensation: Full sensation  Muscle Strength Right Arm: Normal Strength Against Gravity and Full Resistance  LUE Motor Response: Flaccid  LUE Sensation: Tingling, Numbness  Muscle Strength Left Arm: Weak Movement but Not Against Gravity or Resistance  RLE Motor Response: Responds to commands  RLE Sensation:  Full sensation  Muscle Strength Right Leg: Good Strength Against Gravity and Moderate Resistance  LLE Motor Response: Flaccid  LLE Sensation: Tingling, Numbness  Muscle Strength Left Leg: Weak Movement but Not Against Gravity or Resistance  EENT (WDL):  WDL Except    Cardio/Pulmonary Assessment  Edema      Respiratory Breath Sounds  RUL Breath Sounds: Clear  RML Breath Sounds: Clear  RLL Breath Sounds: Diminished  MOHAMUD Breath Sounds: Clear  LLL Breath Sounds: Diminished  Cardiac Assessment   Cardiac (WDL):  Within Defined Limits

## 2023-11-21 NOTE — CARE PLAN
The patient is Stable - Low risk of patient condition declining or worsening    Shift Goals  Clinical Goals: safety  Patient Goals: safety, sleep  Family Goals: increased pt strength, independence    Progress made toward(s) clinical / shift goals:    Problem: Fall Risk - Rehab  Goal: Patient will remain free from falls  Outcome: Progressing. Patient bed in lowest locked position with bed alarm on and call light within reach to right side. Patient calls appropriately with call light for needs and has not attempted to self transfer this shift.      Problem: Pain - Standard  Goal: Alleviation of pain or a reduction in pain to the patient’s comfort goal  Outcome: Progressing. Patient denies having any pain over shift. Has tylenol available for pain as needed.

## 2023-11-21 NOTE — PROGRESS NOTES
"  Physical Medicine & Rehabilitation Progress Note    Encounter Date: 11/21/2023    Chief Complaint: Doing well today    Interval Events (Subjective):  VS: Improved BP  Voiding  BM 11/21/2023    Seen and examined in his room. States he is doing better. He is waiting for therapy. No systemic complaints at this time.     ROS: 14 point ROS negative unless otherwise specified in the HPI    Objective:  VITAL SIGNS: /79   Pulse 80   Temp 36.7 °C (98.1 °F) (Oral)   Resp 15   Ht 1.727 m (5' 8\")   Wt 86 kg (189 lb 9.5 oz)   SpO2 98%   BMI 28.83 kg/m²     GEN: No apparent distress  HEENT: Head normocephalic, atraumatic.  Sclera nonicteric bilaterally, no ocular discharge appreciated bilaterally.  CV: Extremities warm and well-perfused, no peripheral edema appreciated bilaterally.  PULMONARY: Breathing nonlabored on room air, no respiratory accessory muscle use.  Not requiring supplemental oxygen.  SKIN: No appreciable skin breakdown on exposed areas of skin.  PSYCH: Flat affect  NEURO: Awake alert.  Conversational.  Logical thought content. Dysarthria. Left Hemiparesis.       Laboratory Values:  Recent Results (from the past 72 hour(s))   CBC WITHOUT DIFFERENTIAL    Collection Time: 11/21/23  5:32 AM   Result Value Ref Range    WBC 5.5 4.8 - 10.8 K/uL    RBC 3.83 (L) 4.70 - 6.10 M/uL    Hemoglobin 11.9 (L) 14.0 - 18.0 g/dL    Hematocrit 34.8 (L) 42.0 - 52.0 %    MCV 90.9 81.4 - 97.8 fL    MCH 31.1 27.0 - 33.0 pg    MCHC 34.2 32.3 - 36.5 g/dL    RDW 40.2 35.9 - 50.0 fL    Platelet Count 187 164 - 446 K/uL    MPV 10.5 9.0 - 12.9 fL   Comp Metabolic Panel    Collection Time: 11/21/23  5:32 AM   Result Value Ref Range    Sodium 139 135 - 145 mmol/L    Potassium 4.0 3.6 - 5.5 mmol/L    Chloride 104 96 - 112 mmol/L    Co2 24 20 - 33 mmol/L    Anion Gap 11.0 7.0 - 16.0    Glucose 143 (H) 65 - 99 mg/dL    Bun 32 (H) 8 - 22 mg/dL    Creatinine 2.82 (H) 0.50 - 1.40 mg/dL    Calcium 9.0 8.5 - 10.5 mg/dL    Correct Calcium " 9.2 8.5 - 10.5 mg/dL    AST(SGOT) 13 12 - 45 U/L    ALT(SGPT) 18 2 - 50 U/L    Alkaline Phosphatase 67 30 - 99 U/L    Total Bilirubin 0.4 0.1 - 1.5 mg/dL    Albumin 3.7 3.2 - 4.9 g/dL    Total Protein 6.1 6.0 - 8.2 g/dL    Globulin 2.4 1.9 - 3.5 g/dL    A-G Ratio 1.5 g/dL   TSH WITH REFLEX TO FT4    Collection Time: 23  5:32 AM   Result Value Ref Range    TSH 0.640 0.380 - 5.330 uIU/mL   VITAMIN B12    Collection Time: 23  5:32 AM   Result Value Ref Range    Vitamin B12 -True Cobalamin 1291 (H) 211 - 911 pg/mL   ESTIMATED GFR    Collection Time: 23  5:32 AM   Result Value Ref Range    GFR (CKD-EPI) 25 (A) >60 mL/min/1.73 m 2       Medications:  Scheduled Medications   Medication Dose Frequency    enoxaparin (LOVENOX) injection  40 mg DAILY AT 1800    hydrALAZINE  100 mg Q8HRS    traZODone  75 mg QHS    senna-docusate  2 Tablet BID    omeprazole  20 mg DAILY    amLODIPine  10 mg DAILY    atorvastatin  40 mg Nightly    baclofen  5 mg QHS     PRN medications: melatonin, [DISCONTINUED] insulin regular **AND** [CANCELED] POC blood glucose manual result **AND** NOTIFY MD and PharmD **AND** Administer 20 grams of glucose (approximately 8 ounces of fruit juice) every 15 minutes PRN FSBG less than 70 mg/dL **AND** dextrose bolus, Respiratory Therapy Consult, senna-docusate **AND** polyethylene glycol/lytes **AND** magnesium hydroxide **AND** bisacodyl, mag hydrox-al hydrox-simeth, ondansetron **OR** ondansetron, traZODone, sodium chloride, [] acetaminophen **FOLLOWED BY** acetaminophen, oxyCODONE immediate-release **OR** oxyCODONE immediate-release, hydrALAZINE    Diet:  Current Diet Order   Procedures    Diet Order Diet: Level 6 - Soft and Bite Sized (2 gram sodium restriction; medications whole with thin liquids); Liquid level: Level 0 - Thin; Second Modifier: (optional): Consistent CHO (Diabetic)       Medical Decision Making and Plan:  Right thalamic hemorrhagic stroke  Originally presented with  a headache and left-sided weakness to hospital in [facility]  Work-up at the outside hospital in [facility] revealed a right thalamic hemorrhagic stroke  Repeat CT head done after return flight to the [place], 11/11 CT head shows right thalamic hemorrhage, decrease in density since prior studies from the outside hospital, slight asymmetric dilation of the left ventricular system including the left temporal horn with a possible component of hydrocephalus  Planning for BP less than 140, avoiding any kind of anticoagulation  Initiate comprehensive IRF level therapy with 3 days of therapy 5 days a week with services from PT/OT/SLP     Spasticity  Continue baclofen 5 mg nightly, started on 11/11 --> increase to TID 11/13/2023 --> continue 11/14/2023, stable on higher dose.   PRAFO ordered for right lower extremity to prevent further plantarflexion contracture  Spasticity controlled, continue Baclofen 11/20/2023   Consider weaning 11/21/2023      CKD  Has seen nephrology in past  Improving 11/13/2023   F/u OP with nephrology  S/p IVF 11/13-11/14 11/14/2023 BUN and Cr improved compared to prior  BMP 11/16 - improved - currently receiving IVF  Recheck [facility] 11/21 - Slightly worsened renal function - likely baseline     Hypertension  Continue Amlodipine 10mg daily  Continue Hydralazine 25mg TID - increased by hospitalist 11/13/2023 from BID to TID--> increased to 50mg q8hrs 11/15  11/16 Hydralazine increased to 75mg q8hrs and 1x Hydralazine given  11/17 BP still elevated but hydralazine recently increased. Monitor  VSS 11/20/2023 Continue Amlodipine and Hydralazine at current dose.      Prediabetes  Discontinue SSI     HLD  Continue home dose satin      Bowel  Continue with scheduled Colace and senna, as needed stool softeners     Insomnia  Secondary to recent jet lag, melatonin scheduled --> increase to home dose 11/13/2023 9mg  Utilize trazodone as needed insomnia continues  Schedule Trazodone 11/15/2023   11/16/2023 continue  Trazodone as is. Thinks he slept better last night  11/17/2023 Still not sleeping well. Increase to 75mg nightly.   11/21/2023 continue trazodone at current dose     Pain -as needed Tylenol and oxycodone    Post-Stroke Depression  Consulted Pyschiatry       DVT PROPHYLAXIS: NO - Hemorrhagic stroke. US + UE and LE for brachial and peroneal DVT. 11/21/2023 start Lovenox 40mg SQ for further prophylaxis, if tolerates will increase to full AC. Consider repeat CTH to ensure stability bleed once on full AC.     HOSPITALIST FOLLOWING: YES - d/w hospitalist 11/21    CODE STATUS: FULL CODE    DISPO: Home alone. Vielka and patient not . D/w CM to give resources for private care giving. Their goal is to stay 6 weeks. RWW denied. Counseled extensively on private care giving/therapy in addition to Home Health or outpatient.     MARCUS: 12/4/23    MEDS SENT TO: TBD    DISCHARGE FOLLOW UP: Neurology, Nephrology, PCP - needs referral to all.   ____________________________________    Dr. Lisa Lee DO, MS  [facility] - Physical Medicine & Rehabilitation   ____________________________________

## 2023-11-21 NOTE — ASSESSMENT & PLAN NOTE
U/S LUE acute thrombus in paired brachial veins  U/S LLE acute thrombus in paired peroneal veins  Anticoagulated on Eliquis

## 2023-11-21 NOTE — DISCHARGE PLANNING
CM spoke with patients brother Cristobal and sister Rahel RE: DC plan.  They are very happy with [facility] and staff.  CM updated on IDT and DC date of 12/4/23.  They would like patient to stay longer if possible.  CM explained it is up to the insurance company to determine if they can approve additional days.  CM discussed resources available in the community to provide assistance at DC.  CM answered questions and will continue to monitor for DC needs.      11/22/23: Updated clinical notes faxed to Betsy at [facility].  Awaiting response.      11/27/23: CM faxed add'l clinicals to request add'l days.  Awaiting response.  CM LM on VM for ins CM Betsy to call for update.      11/27/23: CM spoke with patients dylon Vora.  Unable to get FMLA because she and patient are not .  She's still working on trying to go part-time.  Family trying to figure out schedule to care for patient.  CM will continue to monitor for DC needs.      11/28/23: CM LM on VM of ins GEOFFREY Meyer for call back.  Awaiting response regarding coverage for add'l days.      11/28/23-14:50: CM called ins co again and spoke with Lavinia HANSON.  She stated clinicals still awaiting nurse review including clinicals sent on the 17th.  She transferred this CM back to Betsy's .  This CM did not leave an add'l VM.    11/28/23: Family meeting at patient bedside.  Family (brother and sister) unable to take patient home by 12/4.  They need him to be independent.  Vileka cannot get time off until after Cierra. Vielka is trying to get time off sooner than that but hasn't heard back from her HR.  CM will continue to monitor for DC needs and family is aware that CM may not be able to get add'l days from insurance co.  CM will continue to monitor for DC needs.      11/29/23: CM rec'd fax approving days to 11/24/23.  CM faxed more clinicals to request add'l days.  Patient is still making progress.  Ins aware orig DC date of 12/4 moved to 12/12.  CM also LM on VM of GEOFFREY Meyer  at [employer] RE: above.

## 2023-11-21 NOTE — THERAPY
Speech Language Pathology  Daily Treatment     Patient Name: Jason Lima  Age:  62 y.o., Sex:  male  Medical Record #: [id number]  Today's Date: 11/21/2023     Precautions  Precautions: Fall Risk  Comments: Left hemiparesis, left visual inattention    Subjective    Pt was pleasant and cooperative during this ST session, his SO was present and supportive throughout.       Objective       11/21/23 1131   Treatment Charges   SLP Swallowing Dysfunction Treatment Swallowing Dysfunction Treatment   SLP Total Time Spent   SLP Individual Total Time Spent (Mins) 30         Assessment    Pt was seen during lunch with trials of 7- Regular Textures (broccoli, salad).  Pt was met in t-dine where he was eating very quickly and required mod cues to slow his rate (benefited from cues to set his fork down between bites, if he didn't he wasn't able to slow his eating rate without verbal cues).  Pt had requested last week for his diet to be downgraded to 6- Soft & Bite Sized textures due to difficulty chewing, but now would like to consume 7- Regular Textures again.  Pt tolerated all textures without s/sx of aspiration.  He was eventually able to slow his eating rate for the second half of this meal and only required min cues on occasion.      Strengths: Effective communication skills, Alert and oriented, Able to follow instructions, Motivated for self care and independence, Pleasant and cooperative, Supportive family, Willingly participates in therapeutic activities  Barriers: Aspiration risk, Hemiparesis, Impaired carryover of learning, Impaired insight/denial of deficits, Impaired functional cognition, Impulsive, Visual impairment    Plan    Recommend advancing pt's diet to 7- Regular Textures (meat and breads requested to be cut up due to UE weakness).  Continue in t-dine to reinforce slow rate of eating.         Speech Therapy Problems (Active)       Problem: Comprehension STGs       Dates: Start:  11/13/23         Goal:  STG-Within one week, patient will perform attention for comprehension in functional reading tasks with 75% acc.       Dates: Start:  11/13/23         Goal Note filed on 11/15/23 1301 by Tawny Forbes MS,CCC-SLP       To be addresssed.                 Problem: Expression STGs       Dates: Start:  11/13/23         Goal: STG-Within one week, patient will       Dates: Start:  11/13/23               Problem: Memory STGs       Dates: Start:  11/13/23         Goal: STG-Within one week, patient will recall daily events and safety sequencing with 60% acc with use of external memory aids.       Dates: Start:  11/13/23         Goal Note filed on 11/15/23 1301 by Tawny Forbes MS,CCC-SLP       Educated patient on use of memory log and memory strategies this date.  Min-mod A needed for 60% acc.                 Problem: Problem Solving STGs       Dates: Start:  11/13/23         Goal: STG-Within one week, patient will perform alternating attention tasks with 75% acc.       Dates: Start:  11/13/23         Goal Note filed on 11/15/23 1301 by Tawny Forbes MS,CCC-SLP       Patient has preformed alternating attention tasks with 40% acc.                 Problem: Speech/Swallowing LTGs       Dates: Start:  11/13/23         Goal: LTG-By discharge, patient will safely swallow (7)regular diet textures and (0) thin liquids with no overt s/sx of aspiration for 2/2 days.       Dates: Start:  11/13/23            Goal: LTG-By discharge, patient will solve complex problem       Dates: Start:  11/13/23       Description: For safety and self care with 80% acc mod I  for safe discharge home.         Goal: LTG-By discharge, patient will recall new training with 80% acc with min A and use of external memory aids.       Dates: Start:  11/13/23               Problem: Swallowing STGs       Dates: Start:  11/13/23         Goal: STG-Within one week, patient will safely swallow (7) regular easy chew and cut up and (0) thin liquids with no overt s/sx  of aspiration        Dates: Start:  11/13/23         Goal Note filed on 11/15/23 1301 by Tawny Forbes MS,CCC-SLP       Patient displays occasional coughing on thin liquids displays occasional anterior leakage, but is overall tolerating without distress.  MBSS scheduled for today.

## 2023-11-21 NOTE — PROGRESS NOTES
Hospital Medicine Daily Progress Note      Chief Complaint:  Hypertension  Diabetes  Renal Failure    Interval History:  No chest pain, shortness of breath, or palpitations.  Lab and imaging results reviewed and discussed.    Review of Systems  Review of Systems   Constitutional:  Negative for chills and fever.   HENT: Negative.     Eyes: Negative.    Respiratory:  Negative for cough and shortness of breath.    Cardiovascular:  Negative for chest pain and palpitations.   Gastrointestinal:  Negative for abdominal pain, nausea and vomiting.   Genitourinary: Negative.    Musculoskeletal: Negative.    Skin:  Negative for itching and rash.   Neurological:  Positive for focal weakness.   Endo/Heme/Allergies:  Negative for polydipsia. Does not bruise/bleed easily.        Physical Exam  Temp:  [36.6 °C (97.9 °F)-36.7 °C (98.1 °F)] 36.7 °C (98.1 °F)  Pulse:  [66-80] 80  Resp:  [15-18] 15  BP: (115-135)/(79-86) 115/79  SpO2:  [95 %-98 %] 98 %    Physical Exam  Vitals reviewed.   Constitutional:       General: He is not in acute distress.     Appearance: Normal appearance. He is not ill-appearing.   HENT:      Head: Normocephalic and atraumatic.      Right Ear: External ear normal.      Left Ear: External ear normal.      Nose: Nose normal.      Mouth/Throat:      Pharynx: Oropharynx is clear.   Eyes:      General:         Right eye: No discharge.         Left eye: No discharge.      Extraocular Movements: Extraocular movements intact.      Conjunctiva/sclera: Conjunctivae normal.   Cardiovascular:      Rate and Rhythm: Normal rate and regular rhythm.   Pulmonary:      Effort: Pulmonary effort is normal. No respiratory distress.      Breath sounds: Normal breath sounds. No wheezing.   Abdominal:      General: Bowel sounds are normal. There is no distension.      Palpations: Abdomen is soft.      Tenderness: There is no abdominal tenderness.   Musculoskeletal:      Cervical back: Normal range of motion and neck supple.       Right lower leg: No edema.      Left lower leg: Edema present.      Comments: LUE +edema   Skin:     General: Skin is warm and dry.   Neurological:      Mental Status: He is alert and oriented to person, place, and time.      Comments: Left sided weakness         Fluids    Intake/Output Summary (Last 24 hours) at 11/21/2023 1359  Last data filed at 11/21/2023 0800  Gross per 24 hour   Intake 740 ml   Output 900 ml   Net -160 ml       Laboratory  Recent Labs     11/21/23  0532   WBC 5.5   RBC 3.83*   HEMOGLOBIN 11.9*   HEMATOCRIT 34.8*   MCV 90.9   MCH 31.1   MCHC 34.2   RDW 40.2   PLATELETCT 187   MPV 10.5       Recent Labs     11/21/23  0532   SODIUM 139   POTASSIUM 4.0   CHLORIDE 104   CO2 24   GLUCOSE 143*   BUN 32*   CREATININE 2.82*   CALCIUM 9.0                   Assessment/Plan  DVT (deep venous thrombosis) ([facility])  Assessment & Plan  U/S LUE acute thrombus in paired brachial veins  U/S LLE acute thrombus in paired peroneal veins  May start proph Heparin then slowly proceed to full anticoagulation if tolerates and cleared from neuro standpoint    Hemorrhagic stroke ([facility])- (present on admission)  Assessment & Plan  Pt suffered R thalamic hemorrhage on 10/23/23 while visiting [place]  Presented there w/ sudden onset HA, L HP, and /100  Management per Physiatry    ROMÁN (acute kidney injury) ([facility])- (present on admission)  Assessment & Plan  U/S medical renal disease, no hydronephrosis  Appears to have CKD, probably stage IV  Avoid nephrotoxins  Renal dose all meds  Monitor electrolytes  Outpt Nephrology F/U    DM (diabetes mellitus) ([facility])- (present on admission)  Assessment & Plan  HbA1c 6.4  Off FSBS and SSI  Continue diet control  Outpt meds include Metformin    Hyperlipidemia- (present on admission)  Assessment & Plan  Continue Lipitor  Outpt meds include Lipitor    Hypertension- (present on admission)  Assessment & Plan  On Norvasc and Hydralazine  Blood pressures better controlled w/ increased  Hydralazine  Outpt meds include Norvasc    Full Code    Discussed w/ pt and Dr. Lee

## 2023-11-21 NOTE — THERAPY
Physical Therapy   Daily Treatment     Patient Name: Jason Lima  Age:  62 y.o., Sex:  male  Medical Record #: [id number]  Today's Date: 11/21/2023     Precautions  Precautions: Fall Risk  Comments: Left hemiparesis, left visual inattention    Subjective    Patient very appreciative of therapy; encouraged by progress with standing and balance work     Objective       11/21/23 0831   PT Charge Group   PT Gait Training (Units) 2   PT Neuromuscular Re-Education / Balance (Units) 1   PT Therapeutic Activities (Units) 1   PT Total Time Spent   PT Individual Total Time Spent (Mins) 60   Precautions   Precautions Fall Risk   Comments left hemiparesis; left visual inattention   Gait Functional Level of Assist    Gait Level Of Assist Maximal Assist  (10 x 2 parallel bar with max assist with gait slider and w/c follow; 10ft x 4 right hallway railing with left Ace to help left foot advancement mod assist at pelvis with w/c follow; verbal cues and mirror for feedback)   Assistive Device Parallel Bars;Other (Comments)   Distance (Feet)   (right hallway railing)   Transfer Functional Level of Assist   Bed, Chair, Wheelchair Transfer Total Assist X 2   Bed Chair Wheelchair Transfer Description   (max assist towards right 2 trials w/c to/from mat, total assist x 2 towards left 2 trial w/c to/from mat)   Sitting Lower Body Exercises   Sitting Lower Body Exercises Yes  (encouraged as warm-up and cool-down)   Ankle Pumps 2 sets of 10;Bilateral   Long Arc Quad 2 sets of 10;Bilateral   Marching 2 sets of 10;Reciprocal  (requires assist)         Assessment    Patient able to demonstrate 10ft ambulation bouts at hallway rail with mod assist and significant verbal cues for balance, patient able to advance left limb 75% of steps; limited by fatigue and requires direction to sit down with control      Strengths: Able to follow instructions, Independent prior level of function, Motivated for self care and independence, Pleasant and  "cooperative, Supportive family, Willingly participates in therapeutic activities  Barriers: Decreased endurance, Hemiparesis, Difficulty following instructions, Fatigue, Hypertension, Impaired activity tolerance, Impaired balance, Impaired insight/denial of deficits, Impaired functional cognition, Impaired sleep pattern, Impulsive, Limited mobility, Visual impairment, Poor family support (lives alone)    Plan    Head righting/ midline orientation  Bed mobility/ PNF rolling  Transfers with SB, emphasis on motor planning  Seated EOB balance  Wc mob with tanvir technique and emphasis on left environmental scanning/ awareness  Initiate HEP  Forced use principles for tanvir body  Vector (West) pregait training  NMES left LE     DME  PT DME Recommendations  Wheelchair: 18\" Width  Cushion: Specialty (See other comments) (TBD pending ambulation progress)  Assistive Device: Tanvir Walker (TBD pending progress, currently 2 person assist for gait)  Additional Equipment:  (TBD)     Passport items to be completed:  Get in/out of bed safely, in/out of a vehicle, safely use mobility device, walk or wheel around home/community, navigate up and down stairs, show how to get up/down from the ground, ensure home is accessible, demonstrate HEP, complete caregiver training      Physical Therapy Problems (Active)       Problem: Balance       Dates: Start:  11/13/23         Goal: STG-Within one week, patient will maintain static standing c/ RUE support on rail at ModA or better.        Dates: Start:  11/13/23               Problem: Mobility       Dates: Start:  11/13/23         Goal: STG-Within one week, patient will ambulate distances >/= 30' c/ RHR and ModA or better.        Dates: Start:  11/13/23               Problem: Mobility Transfers       Dates: Start:  11/13/23         Goal: STG-Within one week, patient will perform bed mobility c/ ModA or better.        Dates: Start:  11/13/23            Goal: STG-Within one week, patient will " transfer bed to chair c/ MaxA x 1 or better.        Dates: Start:  11/13/23         Goal Note filed on 11/15/23 0923 by Vivian Mcdermott DPT       Inconsistent performance, recommend slide board for safety at this time                 Problem: PT-Long Term Goals       Dates: Start:  11/13/23         Goal: LTG-By discharge, patient will propel wheelchair >/= 300' at Neto or better.        Dates: Start:  11/13/23            Goal: LTG-By discharge, patient will ambulate >/= 50' c/ LRAD at Renata or better.        Dates: Start:  11/13/23            Goal: LTG-By discharge, patient will transfer one surface to another c/ Renata or better.        Dates: Start:  11/13/23            Goal: LTG-By discharge, patient will ambulate up/down 1 step c/ LRAD at Renata or better.        Dates: Start:  11/13/23            Goal: LTG-By discharge, patient will transfer in/out of a car c/ ModA or better.        Dates: Start:  11/13/23

## 2023-11-21 NOTE — THERAPY
"Occupational Therapy  Daily Treatment     Patient Name: Jason Lima  Age:  62 y.o., Sex:  male  Medical Record #: [id number]  Today's Date: 11/21/2023     Precautions  Precautions: Fall Risk  Comments: Left hemiparesis, left visual inattention         Subjective    Pt encountered for OT, pleasant and agreeable to participate in ADLs.      Objective       11/21/23 0931   OT Charge Group   OT Self Care / ADL (Units) 2   OT Total Time Spent   OT Individual Total Time Spent (Mins) 30   Precautions   Precautions Fall Risk   Comments Left hemiparesis, left visual inattention   Vision Screen   Vision Tested   Visual Acuity Able to read employee name badge without difficulty  (Pt able to reach name tag from 12 inches away, but reports \"blurry\")   Functional Level of Assist   Grooming Standing;Total Assist   Grooming Description Increased time;Set-up of equipment;Supervision for safety;Standing at sink  (Trialed standing at EOS for toothbrushing. Pt required totalA for stadning balance d/t significant left lateral lean. Discussed seated grooming tasks at this time for safety and to progress towards [place]. Pt verablized understanding.)   Upper Body Dressing Minimal Assist   Upper Body Dressing Description Set-up of equipment;Verbal cueing;Other (comment)   Interdisciplinary Plan of Care Collaboration   IDT Collaboration with  Therapy Tech   Patient Position at End of Therapy Seated;Chair Alarm On;Call Light within Reach;Tray Table within Reach;Phone within Reach   Collaboration Comments safe transfers     Trialed resting hand splint on L hand (1 hour donned) to prevent stiffness from flexor tone. Upon 1 hour f/u pt reports \"no pain,\" no pressure points noted when doffed.     Assessment    Overall, steady progress towards dressing goals as pt demo at [facility] for UB dressing this day. Poor balance d/t left lateral lean requiring TAx2 during trial of standing ADLs, discussed with pt participating in seated ADLs, at this time, for " safe independence, may benefit to review seated ADLs next session.     Strengths: Able to follow instructions, Independent prior level of function, Motivated for self care and independence, Pleasant and cooperative, Supportive family, Alert and oriented  Barriers: Decreased endurance, Fatigue, Generalized weakness, Hemiparesis, Impaired activity tolerance, Impaired balance, Limited mobility, Spasticity    Plan    Cont ADLs (seated EOS grooming), functional transfers, and thera act/ex to maximize functional recovery for safe DC home. Cont to trial resting hand splint wear time/tolerance.       DME  OT DME Recommendations  Bathroom Equipment: 3 in 1 Commode, Tub Transfer Bench (Medicaid Only)  Additional Equipment:  (TBD)    Passport items to be completed:  Perform bathroom transfers, complete dressing, complete feeding, get ready for the day, prepare a simple meal, participate in household tasks, adapt home for safety needs, demonstrate home exercise program, complete caregiver training     Occupational Therapy Goals (Active)       Problem: Bathing       Dates: Start:  11/15/23         Goal: STG-Within one week, patient will bathe at modA using DME/AE PRN       Dates: Start:  11/15/23               Problem: Dressing       Dates: Start:  11/13/23         Goal: STG-Within one week, patient will dress UB with Mod A.       Dates: Start:  11/13/23            Goal: STG-Within one week, patient will dress LB with Mod A.       Dates: Start:  11/13/23               Problem: Functional Transfers       Dates: Start:  11/13/23         Goal: STG-Within one week, patient will transfer to toilet with Max/Mod A.       Dates: Start:  11/13/23            Goal: STG-Within one week, patient will transfer to step in shower with Max A.       Dates: Start:  11/13/23               Problem: OT Long Term Goals       Dates: Start:  11/13/23         Goal: LTG-By discharge, patient will complete basic self care tasks at Mod Independent level.        Dates: Start:  11/13/23            Goal: LTG-By discharge, patient will perform bathroom transfers at Mod Independent level.       Dates: Start:  11/13/23

## 2023-11-22 ENCOUNTER — APPOINTMENT (OUTPATIENT)
Dept: OCCUPATIONAL THERAPY | Facility: REHABILITATION | Age: 63
DRG: 057 | End: 2023-11-22
Attending: PHYSICAL MEDICINE & REHABILITATION
Payer: COMMERCIAL

## 2023-11-22 ENCOUNTER — APPOINTMENT (OUTPATIENT)
Dept: PHYSICAL THERAPY | Facility: REHABILITATION | Age: 63
DRG: 057 | End: 2023-11-22
Attending: PHYSICAL MEDICINE & REHABILITATION
Payer: COMMERCIAL

## 2023-11-22 ENCOUNTER — APPOINTMENT (OUTPATIENT)
Dept: SPEECH THERAPY | Facility: REHABILITATION | Age: 63
DRG: 057 | End: 2023-11-22
Attending: PHYSICAL MEDICINE & REHABILITATION
Payer: COMMERCIAL

## 2023-11-22 PROCEDURE — A9270 NON-COVERED ITEM OR SERVICE: HCPCS | Performed by: HOSPITALIST

## 2023-11-22 PROCEDURE — 92526 ORAL FUNCTION THERAPY: CPT

## 2023-11-22 PROCEDURE — 97112 NEUROMUSCULAR REEDUCATION: CPT

## 2023-11-22 PROCEDURE — 770010 HCHG ROOM/CARE - REHAB SEMI PRIVAT*

## 2023-11-22 PROCEDURE — 97535 SELF CARE MNGMENT TRAINING: CPT

## 2023-11-22 PROCEDURE — 700102 HCHG RX REV CODE 250 W/ 637 OVERRIDE(OP): Performed by: PHYSICAL MEDICINE & REHABILITATION

## 2023-11-22 PROCEDURE — 700102 HCHG RX REV CODE 250 W/ 637 OVERRIDE(OP): Performed by: HOSPITALIST

## 2023-11-22 PROCEDURE — 97129 THER IVNTJ 1ST 15 MIN: CPT

## 2023-11-22 PROCEDURE — A9270 NON-COVERED ITEM OR SERVICE: HCPCS | Performed by: PHYSICAL MEDICINE & REHABILITATION

## 2023-11-22 PROCEDURE — 99231 SBSQ HOSP IP/OBS SF/LOW 25: CPT | Performed by: HOSPITALIST

## 2023-11-22 PROCEDURE — 97130 THER IVNTJ EA ADDL 15 MIN: CPT

## 2023-11-22 PROCEDURE — 99232 SBSQ HOSP IP/OBS MODERATE 35: CPT | Performed by: PHYSICAL MEDICINE & REHABILITATION

## 2023-11-22 PROCEDURE — 700111 HCHG RX REV CODE 636 W/ 250 OVERRIDE (IP): Performed by: HOSPITALIST

## 2023-11-22 PROCEDURE — 97530 THERAPEUTIC ACTIVITIES: CPT

## 2023-11-22 PROCEDURE — 94760 N-INVAS EAR/PLS OXIMETRY 1: CPT

## 2023-11-22 PROCEDURE — 97760 ORTHOTIC MGMT&TRAING 1ST ENC: CPT

## 2023-11-22 RX ADMIN — OMEPRAZOLE 20 MG: 20 CAPSULE, DELAYED RELEASE ORAL at 08:38

## 2023-11-22 RX ADMIN — HEPARIN SODIUM 5000 UNITS: 5000 INJECTION, SOLUTION INTRAVENOUS; SUBCUTANEOUS at 20:15

## 2023-11-22 RX ADMIN — SENNOSIDES AND DOCUSATE SODIUM 2 TABLET: 50; 8.6 TABLET ORAL at 08:39

## 2023-11-22 RX ADMIN — HEPARIN SODIUM 5000 UNITS: 5000 INJECTION, SOLUTION INTRAVENOUS; SUBCUTANEOUS at 05:36

## 2023-11-22 RX ADMIN — HYDRALAZINE HYDROCHLORIDE 100 MG: 50 TABLET, FILM COATED ORAL at 20:15

## 2023-11-22 RX ADMIN — BACLOFEN 5 MG: 10 TABLET ORAL at 20:15

## 2023-11-22 RX ADMIN — HEPARIN SODIUM 5000 UNITS: 5000 INJECTION, SOLUTION INTRAVENOUS; SUBCUTANEOUS at 14:20

## 2023-11-22 RX ADMIN — HYDRALAZINE HYDROCHLORIDE 100 MG: 50 TABLET, FILM COATED ORAL at 14:25

## 2023-11-22 RX ADMIN — ATORVASTATIN CALCIUM 40 MG: 40 TABLET, FILM COATED ORAL at 20:15

## 2023-11-22 RX ADMIN — HYDRALAZINE HYDROCHLORIDE 100 MG: 50 TABLET, FILM COATED ORAL at 05:36

## 2023-11-22 RX ADMIN — AMLODIPINE BESYLATE 10 MG: 5 TABLET ORAL at 08:39

## 2023-11-22 RX ADMIN — TRAZODONE HYDROCHLORIDE 75 MG: 50 TABLET ORAL at 20:15

## 2023-11-22 RX ADMIN — SENNOSIDES AND DOCUSATE SODIUM 2 TABLET: 50; 8.6 TABLET ORAL at 20:15

## 2023-11-22 ASSESSMENT — ENCOUNTER SYMPTOMS
FEVER: 0
EYES NEGATIVE: 1
SHORTNESS OF BREATH: 0
VOMITING: 0
COUGH: 0
POLYDIPSIA: 0
BRUISES/BLEEDS EASILY: 0
MUSCULOSKELETAL NEGATIVE: 1
PALPITATIONS: 0
CHILLS: 0
FOCAL WEAKNESS: 1
NAUSEA: 0
ABDOMINAL PAIN: 0

## 2023-11-22 NOTE — CARE PLAN
Problem: Swallowing STGs  Goal: STG-Within one week, patient will safely swallow (7) regular easy chew and cut up and (0) thin liquids with no overt s/sx of aspiration   Outcome: Met     Problem: Problem Solving STGs  Goal: STG-Within one week, patient will perform alternating attention tasks with 75% acc.  Outcome: Met  Note: For simple     Problem: Memory STGs  Goal: STG-Within one week, patient will recall daily events and safety sequencing with 60% acc with use of external memory aids.  Outcome: Met  Note: 70%     Problem: Comprehension STGs  Goal: STG-Within one week, patient will perform attention for comprehension in functional reading tasks with 75% acc.  Outcome: Met  Note: 80% in single page functional reading.

## 2023-11-22 NOTE — CARE PLAN
Problem: Mobility  Goal: STG-Within one week, patient will ambulate distances >/= 30' c/ RHR and ModA or better.   11/22/2023 1102 by JUAN DANIEL Watts  Outcome: Not Met  Note: Limited to 10ft at right hallway rail mod assist  11/22/2023 1100 by JUAN DANIEL Watts  Outcome: Not Met  Note: Limited to 10ft ambulation at right hallway rail     Problem: Balance  Goal: STG-Within one week, patient will maintain static standing c/ RUE support on rail at ModA or better.   Outcome: Met     Problem: Mobility Transfers  Goal: STG-Within one week, patient will perform bed mobility c/ ModA or better.   Outcome: Met     Problem: Mobility Transfers  Goal: STG-Within one week, patient will transfer bed to chair c/ MaxA x 1 or better.   Outcome: Discharged - Not Met  Note: Transfers towards right mod assist x 1

## 2023-11-22 NOTE — CARE PLAN
Problem: Functional Transfers  Goal: STG-Within one week, patient will transfer to toilet with Max/Mod A.  Outcome: Progressing  Goal: STG-Within one week, patient will transfer to step in shower with Max A.  Outcome: Progressing     Problem: Dressing  Goal: STG-Within one week, patient will dress UB with Mod A.  Outcome: Met  Goal: STG-Within one week, patient will dress LB with Mod A.  Outcome: Met

## 2023-11-22 NOTE — PROGRESS NOTES
Hospital Medicine Daily Progress Note      Chief Complaint:  Hypertension  Diabetes  Renal Failure    Interval History:  No overnight issues.    Review of Systems  Review of Systems   Constitutional:  Negative for chills and fever.   HENT: Negative.     Eyes: Negative.    Respiratory:  Negative for cough and shortness of breath.    Cardiovascular:  Negative for chest pain and palpitations.   Gastrointestinal:  Negative for abdominal pain, nausea and vomiting.   Genitourinary: Negative.    Musculoskeletal: Negative.    Skin:  Negative for itching and rash.   Neurological:  Positive for focal weakness.   Endo/Heme/Allergies:  Negative for polydipsia. Does not bruise/bleed easily.        Physical Exam  Temp:  [36.7 °C (98.1 °F)-37.1 °C (98.8 °F)] 37.1 °C (98.8 °F)  Pulse:  [70-97] 97  Resp:  [15-18] 16  BP: (112-136)/(76-90) 128/89  SpO2:  [91 %-96 %] 95 %    Physical Exam  Vitals reviewed.   Constitutional:       General: He is not in acute distress.     Appearance: Normal appearance. He is not ill-appearing.   HENT:      Head: Normocephalic and atraumatic.      Right Ear: External ear normal.      Left Ear: External ear normal.      Nose: Nose normal.      Mouth/Throat:      Pharynx: Oropharynx is clear.   Eyes:      General:         Right eye: No discharge.         Left eye: No discharge.      Extraocular Movements: Extraocular movements intact.      Conjunctiva/sclera: Conjunctivae normal.   Cardiovascular:      Rate and Rhythm: Normal rate and regular rhythm.   Pulmonary:      Effort: Pulmonary effort is normal. No respiratory distress.      Breath sounds: Normal breath sounds. No wheezing.   Abdominal:      General: Bowel sounds are normal. There is no distension.      Palpations: Abdomen is soft.      Tenderness: There is no abdominal tenderness.   Musculoskeletal:      Cervical back: Normal range of motion and neck supple.      Right lower leg: No edema.      Left lower leg: Edema present.      Comments: ANN  +edema   Skin:     General: Skin is warm and dry.   Neurological:      Mental Status: He is alert and oriented to person, place, and time.      Comments: Left sided weakness         Fluids    Intake/Output Summary (Last 24 hours) at 11/22/2023 1241  Last data filed at 11/22/2023 1203  Gross per 24 hour   Intake 1000 ml   Output 975 ml   Net 25 ml       Laboratory  Recent Labs     11/21/23  0532   WBC 5.5   RBC 3.83*   HEMOGLOBIN 11.9*   HEMATOCRIT 34.8*   MCV 90.9   MCH 31.1   MCHC 34.2   RDW 40.2   PLATELETCT 187   MPV 10.5       Recent Labs     11/21/23  0532   SODIUM 139   POTASSIUM 4.0   CHLORIDE 104   CO2 24   GLUCOSE 143*   BUN 32*   CREATININE 2.82*   CALCIUM 9.0                   Assessment/Plan  DVT (deep venous thrombosis) ([facility])  Assessment & Plan  U/S LUE acute thrombus in paired brachial veins  U/S LLE acute thrombus in paired peroneal veins  Started proph Heparin  If tolerates, then may proceed to full anticoagulation if cleared from neuro standpoint    Hemorrhagic stroke ([facility])- (present on admission)  Assessment & Plan  Pt suffered R thalamic hemorrhage on 10/23/23 while visiting [place]  Presented there w/ sudden onset HA, L HP, and /100  Management per Physiatry    ROMÁN (acute kidney injury) ([facility])- (present on admission)  Assessment & Plan  U/S medical renal disease, no hydronephrosis  Appears to have CKD, probably stage IV  Avoid nephrotoxins  Renal dose all meds  Monitor electrolytes  Outpt Nephrology F/U    DM (diabetes mellitus) ([facility])- (present on admission)  Assessment & Plan  HbA1c 6.4  Off FSBS and SSI  Continue diet control  Outpt meds include Metformin    Hyperlipidemia- (present on admission)  Assessment & Plan  Continue Lipitor  Outpt meds include Lipitor    Hypertension- (present on admission)  Assessment & Plan  On Norvasc and Hydralazine  Observe blood pressure trends  Outpt meds include Norvasc    Full Code

## 2023-11-22 NOTE — CARE PLAN
Problem: VTE Prevention  Goal: Patient will remain free from venous thromboembolism (VTE)  Note: LUE and LLE with acute thrombus. Heparin injection (TID) was started by Hospitalist.       The patient is Stable - Low risk of patient condition declining or worsening    Shift Goals  Clinical Goals: Safety  Patient Goals: safety, sleep  Family Goals: increased pt strength, independence

## 2023-11-22 NOTE — THERAPY
Speech Language Pathology  Daily Treatment     Patient Name: Jason Lima  Age:  62 y.o., Sex:  male  Medical Record #: [id number]  Today's Date: 11/22/2023     Precautions  Precautions: Fall Risk  Comments: Left hemiparesis, left visual inattention.    Subjective    Patient agreeable to therapy.     Objective       11/22/23 1101   Treatment Charges   SLP Swallowing Dysfunction Treatment Swallowing Dysfunction Treatment   SLP Cognitive Skill Development First 15 Minutes 1   SLP Cognitive Skill Development Additional 15 Minutes 2   SLP Total Time Spent   SLP Individual Total Time Spent (Mins) 60   Cognition   Simple Attention Minimal (4)   Moderate Attention Moderate (3)   Visual Scanning / Cancellation Skills Supervision (5)   Prospective Memory Supervision (5)   Functional Memory Activities Minimal (4)   Functional Level of Assist   Eating Supervision   Eating Description Verbal cueing   Comprehension Supervision   Comprehension Description Glasses;Magnifying glass   Expression Modified Independent   Expression Description Other (comment)  (Extra time)   Social Interaction Supervision   Social Interaction Description Increased time;Verbal cues   Problem Solving Minimal Assist   Problem Solving Description Increased time;Seat belt;Supervision;Bed/chair alarm;Therapy schedule;Verbal cueing   Memory Minimal Assist   Memory Description Increased time;Seat belt;Supervision;Bed/chair alarm;Therapy schedule;Verbal cueing   Speech Language Pathologist Assigned   Assigned SLP / Treatment Time / Comments LM 30 cog. T-dine for self feeding         Assessment    Patient worked on visual attention in functional reading task of medication lable ( with 86%, 6/7 task items answered correctly, with verbal cue needed for left visual attention on one item).  Patient completed following 1-2 step directions with 100% acc.  Completed a simple deductive reasoning task with min to mod cues needed for attention to location however, logical  reasoning was intact for this task.    Patient seen at meal with regular textures cut up. Patient verbalized recall that his strategy is to slow rate of intake, however does need occasional verbal cues to initiate use of strategy.  He tolerated well overall .  Recommend patient remain in t-dine for set up and to ensure that he is up in chair and has his food cut up ( as he is not able to cut it himself).  Recommend discontinue dysphagia intervention at this time.    After interdisciplinary discussion recommended patient increase PT and reduce ST to 30 min to help overcome physical function barriers to discharge home.    Strengths: Effective communication skills, Alert and oriented, Able to follow instructions, Motivated for self care and independence, Pleasant and cooperative, Supportive family, Willingly participates in therapeutic activities  Barriers: Aspiration risk, Hemiparesis, Impaired carryover of learning, Impaired insight/denial of deficits, Impaired functional cognition, Impulsive, Visual impairment    Plan    Target attention, organization, executive functions,  Recall of safety sequencing for transfers ( provided by PT)    Passport items to be completed:  Express basic needs, understand food/liquid recommendations, consistently follow swallow precautions, manage finances, manage medications, arrive to therapy appointments on time, complete daily memory log entries, solve problems related to safety situations, review education related to hospitalization, complete caregiver training     Speech Therapy Problems (Active)       Problem: Expression STGs       Dates: Start:  11/13/23         Goal: STG-Within one week, patient will       Dates: Start:  11/13/23               Problem: Memory STGs       Dates: Start:  11/22/23         Goal: STG-Within one week, patient will recall new training and safety sequencing with 80% acc with set up and external cues.       Dates: Start:  11/22/23               Problem:  Problem Solving STGs       Dates: Start:  11/22/23         Goal: STG-Within one week, patient will perform alternating attention tasks with 85% acc with set up.       Dates: Start:  11/22/23            Goal: STG-Within one week, patient will organization and reasoning tasks with 80% acc with min cues.       Dates: Start:  11/22/23               Problem: Speech/Swallowing LTGs       Dates: Start:  11/13/23         Goal: LTG-By discharge, patient will safely swallow (7)regular diet textures and (0) thin liquids with no overt s/sx of aspiration for 2/2 days.       Dates: Start:  11/13/23            Goal: LTG-By discharge, patient will solve complex problem       Dates: Start:  11/13/23       Description: For safety and self care with 80% acc mod I  for safe discharge home.         Goal: LTG-By discharge, patient will recall new training with 80% acc with min A and use of external memory aids.       Dates: Start:  11/13/23               Problem: Swallowing STGs       Dates: Start:  11/13/23

## 2023-11-22 NOTE — THERAPY
Occupational Therapy  Daily Treatment     Patient Name: Jason Lima  Age:  62 y.o., Sex:  male  Medical Record #: [id number]  Today's Date: 11/22/2023     Precautions  Precautions: Fall Risk  Comments: Left hemiparesis, left visual inattention.         Subjective    Pt encountered for OT sitting in  in room. Pleasant and agreeable to participate in ADLs.      Objective       11/22/23 0901   OT Charge Group   Charges Yes   OT Orthotics Treatment - Initial (Units) 1   OT Self Care / ADL (Units) 3   OT Total Time Spent   OT Individual Total Time Spent (Mins) 60   Precautions   Precautions Fall Risk   Comments Left hemiparesis, left visual inattention.   Strength Upper Body   Left  Impaired  (9#; R 60#)   Left Lateral Pinch Impaired  (3#; R 21#)   Functional Level of Assist   Grooming Seated;Contact Guard Assist   Grooming Description Increased time;Seated in wheelchair at sink;Supervision for safety;Other (comment)  (Toothbrushing; pt completed toothbrushing using R hand only at CGA seated at WC.)   Bathing Maximal Assist   Bathing Description Grab bar;Hand held shower;Hand rails;Tub bench;Assit with back;Assit wtih lower extremities;Increased time;Initial preparation for task;Supervision for safety;Other (comment);Verbal cueing  (Seated at shower bench w/ GB at R side; Assistance needed for sitting balance d/t L lateral lean. Pt able to wash BUE (upper thigh), face and hair at [place] for support with sitting balance.)   Upper Body Dressing Minimal Assist   Upper Body Dressing Description Assist with pulling shirt over head;Increased time;Supervision for safety;Verbal cueing  (verbal reminders to thread LUE up past elbow)   Lower Body Dressing Moderate Assist   Lower Body Dressing Description Dressing stick;Grab bar;Reacher;Shoe horn;Assist with threading into pant leg;Increased time;Initial preparation for task;Set-up of equipment;Supervision for safety;Verbal cueing;Other (comment)  (Pt able to thread RLE via hip  hike; maxA to thread the LLU d/t difficulties with L hip hike. Max to modA for pulling pants up in standing using GB for support)   Bed, Chair, Wheelchair Transfer Moderate Assist   Bed Chair Wheelchair Transfer Description Increased time;Assist with one limb;Initial preparation for task;Requires lift;Squat pivot transfer to wheelchair;Supervision for safety;Verbal cueing;Other (comment)  (ModA for squat pivot to R-side from WC <> therapy mat; x2. VC needed to be aware of L arm)   Tub / Shower Transfers Moderate Assist   Tub Shower Transfer Description Grab bar;Drop arm chair;Shower bench;Assist with one limb;Increased time;Initial preparation for task;Requires lift;Set-up of equipment;Supervision for safety;Verbal cueing;Other (comment)  (VC for awareness to L arm during transfer. ModA to R-side from WC to shower bench using via squat pivot. MaxA from shower bench to WC (L-side).)   Balance   Sitting Balance (Dynamic) Trace +   Skilled Intervention Compensatory Strategies;Postural Facilitation;Other (See Comments)   Comments Lateral shift from R <> L while seated at mat to increase sitting balance. TC to avoid >15 degree lateral lean to the L side (>15 degree resulted is modA across 3 trials)   Interdisciplinary Plan of Care Collaboration   IDT Collaboration with  Physical Therapist   Patient Position at End of Therapy Seated;Chair Alarm On;Call Light within Reach;Tray Table within Reach;Phone within Reach;Other (Comments)   OT DME Recommendations   Bathroom Equipment 3 in 1 Commode   Additional Equipment Other (Comments)         Assessment    Overall, good progress towards bathing/shower transfers goals as pt demo at max to modA this session while seated on a shower transfer bench. Sitting balance remains impaired d/t L lateral lean; however gradual improvements noted with cueing.    Strengths: Able to follow instructions, Independent prior level of function, Motivated for self care and independence, Pleasant and  cooperative, Supportive family, Alert and oriented  Barriers: Decreased endurance, Fatigue, Generalized weakness, Hemiparesis, Impaired activity tolerance, Impaired balance, Limited mobility, Spasticity    Plan    Cont ADLs, functional transfers, and thera act/ex to maximize functional recovery for safe DC home.       DME  OT DME Recommendations  Bathroom Equipment: (P) 3 in 1 Commode  Additional Equipment: (P) Other (Comments): dressing stick/reacher    Passport items to be completed:  Perform bathroom transfers, complete dressing, complete feeding, get ready for the day, prepare a simple meal, participate in household tasks, adapt home for safety needs, demonstrate home exercise program, complete caregiver training     Occupational Therapy Goals (Active)       Problem: Bathing       Dates: Start:  11/15/23         Goal: STG-Within one week, patient will bathe at modA using DME/AE PRN       Dates: Start:  11/15/23               Problem: Dressing       Dates: Start:  11/13/23         Goal: STG-Within one week, patient will dress UB with Mod A.       Dates: Start:  11/13/23            Goal: STG-Within one week, patient will dress LB with Mod A.       Dates: Start:  11/13/23               Problem: Functional Transfers       Dates: Start:  11/13/23         Goal: STG-Within one week, patient will transfer to toilet with Max/Mod A.       Dates: Start:  11/13/23            Goal: STG-Within one week, patient will transfer to step in shower with Max A.       Dates: Start:  11/13/23               Problem: OT Long Term Goals       Dates: Start:  11/13/23         Goal: LTG-By discharge, patient will complete basic self care tasks at Mod Independent level.       Dates: Start:  11/13/23            Goal: LTG-By discharge, patient will perform bathroom transfers at Mod Independent level.       Dates: Start:  11/13/23

## 2023-11-22 NOTE — THERAPY
"Speech Language Pathology  Daily Treatment     Patient Name: Jason Lima  Age:  62 y.o., Sex:  male  Medical Record #: [id number]  Today's Date: 11/21/2023     Precautions  Precautions: Fall Risk  Comments: Left hemiparesis, left visual inattention    Subjective    Pt in room with SO and nursing. Agreeable to session. Frequently looking to SO to answer questions as it pertains to care and activities completed in therapy.     Objective       11/21/23 1404   Treatment Charges   SLP Cognitive Skill Development First 15 Minutes 1   SLP Cognitive Skill Development Additional 15 Minutes 3   SLP Total Time Spent   SLP Individual Total Time Spent (Mins) 60   Cognition   Visual Scanning / Cancellation Skills Minimal (4)   Interdisciplinary Plan of Care Collaboration   IDT Collaboration with  Family / Caregiver;Nursing   Patient Position at End of Therapy Seated;Self Releasing Lap Belt Applied;Call Light within Reach;Tray Table within Reach;Phone within Reach   Collaboration Comments SO did not remain for session. assumed care from nursing         Assessment    Visual scanning tasks from L-R, while also alternating attention from lower half of page to upper half. Pt unable to read small print due to blurred and double vision (I.e. pt mistaking the letter \"I\" for \"11\"), if smaller print was read to pt he was able to scan larger print to complete task with 100% accuracy.   Following 2 component written directions: Pt able to identify object with 100% accuracy, executing correct direction (I.e. [place], underline etc) 80%  4 component directions: object ID: 100%, executing directions: 69%.    Strengths: Effective communication skills, Alert and oriented, Able to follow instructions, Motivated for self care and independence, Pleasant and cooperative, Supportive family, Willingly participates in therapeutic activities  Barriers: Aspiration risk, Hemiparesis, Impaired carryover of learning, Impaired insight/denial of deficits, " Impaired functional cognition, Impulsive, Visual impairment    Plan    Continue to target attention, assessment of regular textures to ensure tolerance, recall of therapy tasks.     Passport items to be completed:  Express basic needs, understand food/liquid recommendations, consistently follow swallow precautions, manage finances, manage medications, arrive to therapy appointments on time, complete daily memory log entries, solve problems related to safety situations, review education related to hospitalization, complete caregiver training     Speech Therapy Problems (Active)       Problem: Comprehension STGs       Dates: Start:  11/13/23         Goal: STG-Within one week, patient will perform attention for comprehension in functional reading tasks with 75% acc.       Dates: Start:  11/13/23         Goal Note filed on 11/15/23 1301 by Tawny Forbes MS,CCC-SLP       To be addresssed.                 Problem: Expression STGs       Dates: Start:  11/13/23         Goal: STG-Within one week, patient will       Dates: Start:  11/13/23               Problem: Memory STGs       Dates: Start:  11/13/23         Goal: STG-Within one week, patient will recall daily events and safety sequencing with 60% acc with use of external memory aids.       Dates: Start:  11/13/23         Goal Note filed on 11/15/23 1301 by Tawny Forbes MS,CCC-SLP       Educated patient on use of memory log and memory strategies this date.  Min-mod A needed for 60% acc.                 Problem: Problem Solving STGs       Dates: Start:  11/13/23         Goal: STG-Within one week, patient will perform alternating attention tasks with 75% acc.       Dates: Start:  11/13/23         Goal Note filed on 11/15/23 1301 by Tawny Forbes MS,CCC-SLP       Patient has preformed alternating attention tasks with 40% acc.                 Problem: Speech/Swallowing LTGs       Dates: Start:  11/13/23         Goal: LTG-By discharge, patient will safely swallow  (7)regular diet textures and (0) thin liquids with no overt s/sx of aspiration for 2/2 days.       Dates: Start:  11/13/23            Goal: LTG-By discharge, patient will solve complex problem       Dates: Start:  11/13/23       Description: For safety and self care with 80% acc mod I  for safe discharge home.         Goal: LTG-By discharge, patient will recall new training with 80% acc with min A and use of external memory aids.       Dates: Start:  11/13/23               Problem: Swallowing STGs       Dates: Start:  11/13/23         Goal: STG-Within one week, patient will safely swallow (7) regular easy chew and cut up and (0) thin liquids with no overt s/sx of aspiration        Dates: Start:  11/13/23         Goal Note filed on 11/15/23 1301 by Tawny Forbes MS,CCC-SLP       Patient displays occasional coughing on thin liquids displays occasional anterior leakage, but is overall tolerating without distress.  MBSS scheduled for today.

## 2023-11-22 NOTE — THERAPY
Physical Therapy   Daily Treatment     Patient Name: Jason Lima  Age:  62 y.o., Sex:  male  Medical Record #: [id number]  Today's Date: 11/22/2023     Precautions  Precautions: Fall Risk  Comments: Left hemiparesis, left visual inattention    Subjective    Pt up in wc, willing to participate but requesting assist with urinal prior to start.     Objective       11/22/23 0831   PT Charge Group   PT Neuromuscular Re-Education / Balance (Units) 2   PT Total Time Spent   PT Individual Total Time Spent (Mins) 30   Bed Mobility    Sit to Stand Moderate Assist   Neuro-Muscular Treatments   Neuro-Muscular Treatments Anterior weight shift;Biofeedback;Facilitation;Joint Approximation;Postural Facilitation;Sequencing;Tactile Cuing;Verbal Cuing;Weight Shift Right;Weight Shift Left   Comments neuro re-ed standing posture/ midline orientation/ wt shifting and forced use WB activity with standing frame and mirror for visual feedback x 16 minutes.     Neuro re-ed standing posture with standing frame as noted. Pt completed reaching activity with RUE for anterior/ R lateral and overhead reaching with verbal and manual cues for wt shifting and return to midline, lateral wt shifting at hips, and mini-squats.     Assessment    Pt tolerated prolonged upright activity with standing frame for posture training/ midline orientation/ wt shifting and forced use WB to LLE well x 16 minutes without seated rest.     Strengths: Able to follow instructions, Independent prior level of function, Motivated for self care and independence, Pleasant and cooperative, Supportive family, Willingly participates in therapeutic activities  Barriers: Decreased endurance, Hemiparesis, Difficulty following instructions, Fatigue, Hypertension, Impaired activity tolerance, Impaired balance, Impaired insight/denial of deficits, Impaired functional cognition, Impaired sleep pattern, Impulsive, Limited mobility, Visual impairment, Poor family support (lives  "alone)    Plan    Head righting/ midline orientation/ sitting and standing activity tolerance  Bed mobility/ PNF rolling  Transfers with SB, emphasis on motor planning  Seated EOB balance  Wc mob with tanvir technique and emphasis on left environmental scanning/ awareness  Initiate HEP  Forced use principles for tanvir body/ standing frame  Vector (West) pregait training  NMES left LE    DME  PT DME Recommendations  Wheelchair: 18\" Width  Cushion: Specialty (See other comments) (TBD pending ambulation progress)  Assistive Device: Tanvir Walker (TBD pending progress, currently 2 person assist for gait)  Additional Equipment:  (TBD)    Passport items to be completed:  Get in/out of bed safely, in/out of a vehicle, safely use mobility device, walk or wheel around home/community, navigate up and down stairs, show how to get up/down from the ground, ensure home is accessible, demonstrate HEP, complete caregiver training    Physical Therapy Problems (Active)       Problem: Balance       Dates: Start:  11/13/23         Goal: STG-Within one week, patient will maintain static standing c/ RUE support on rail at ModA or better.        Dates: Start:  11/13/23               Problem: Mobility       Dates: Start:  11/13/23         Goal: STG-Within one week, patient will ambulate distances >/= 30' c/ RHR and ModA or better.        Dates: Start:  11/13/23               Problem: Mobility Transfers       Dates: Start:  11/13/23         Goal: STG-Within one week, patient will perform bed mobility c/ ModA or better.        Dates: Start:  11/13/23            Goal: STG-Within one week, patient will transfer bed to chair c/ MaxA x 1 or better.        Dates: Start:  11/13/23         Goal Note filed on 11/15/23 0923 by Vivian Mcdermott DPT       Inconsistent performance, recommend slide board for safety at this time                 Problem: PT-Long Term Goals       Dates: Start:  11/13/23         Goal: LTG-By discharge, patient will propel " wheelchair >/= 300' at Neto or better.        Dates: Start:  11/13/23            Goal: LTG-By discharge, patient will ambulate >/= 50' c/ LRAD at Renata or better.        Dates: Start:  11/13/23            Goal: LTG-By discharge, patient will transfer one surface to another c/ Renata or better.        Dates: Start:  11/13/23            Goal: LTG-By discharge, patient will ambulate up/down 1 step c/ LRAD at Renata or better.        Dates: Start:  11/13/23            Goal: LTG-By discharge, patient will transfer in/out of a car c/ ModA or better.        Dates: Start:  11/13/23

## 2023-11-22 NOTE — CONSULTS
"PSYCHIATRIC CONSULTATION:  *Reason for admission:   right thalamic hemorrhage  *Reason for consult:depression   *Requesting Physician:Lisa Lee  Supervising Physician:Rachele Colon         Legal status:  not applicable       *Interval history  The patient reports his mood has overall been ok. 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 He rates mood as a 6/10, 10 being happiest most days. He reports appetite is still \"ok.\" He reports family has been brining him food which he enjoys. Denies decreased appetite. Energy level is still good, reports he would like to do more therapy because he would like to walk better and would like more strength in his left arm. He denies low energy or decreased motivation. He reports the only thing he feels he needs is sleep. He reports he has a hard time falling asleep but does not wake up a lot at night when he does finally go to sleep. Informed him trazodone was increased over the weekend. He states perhaps sleep is getting  better. Reminded him he has a pRN trazodone he can request if he still cannot fall asleep. If he is needing the PRN total night does can be increased. Discussed calming techniques at bedtime. Patient can play music off youtibe on this phone. Discussed playing callming music on his phone while visualizing a place where he feels comfortable and safe. Advised that if worries start to interrupt this though to redirect them to visualizing all aspects of this space. Patient reports he can try this. He reports overall mood is mostly good considering his circumstances and he still has hope for the future. Would like to travel, garden and spend more time with his fiancee and family at discharge. He denies active and passive SI/HI and contracts for personal safety.           *Medical Review of Systems: as reported by pt. All systems reviewed. Only those found to be + are noted below. All others are negative.   Pt did " "not endorse any physical complaints        *Psychiatric (Physical) Examination: observed phenomenon:  Vitals:    11/21/23 1600   BP: 112/76   Pulse: 71   Resp: 16   Temp: 36.8 °C (98.2 °F)   SpO2: 91%       General: Awake, alert in no acute distress  Patient Appearance: Appropriate, fairly groomed, wearing glasses and jewelry  Behavior: Appropriate Behavior, Calm, Cooperative  Speech.: Spontaneous, Normal rate and rhythm, Answers appropriately, normal  volume  Mood Comments: \"alright\"  Affect: appears euthymic  Thought Content: Appropriate, No suicidal ideation, No thoughts of self harm,  No homicidal ideation, Contracts for safety, Feels life is worth living  Thought Process: Logical and goal directed, No loosening of associations  Psychosis: No delusions, No hallucinations  Insight: Good insight  Judgment: good judgment  Cognition: Memory appeared intact in interview., Concentration is intact for age  and education and Fully oriented to person, place, time and circumstance.  Language: Is able to repeat phrases. and Is able to name objects.  Fund of Knowledge: AS expected for age and level of education  Neuro: no tics, tremors, dyskinesias. no dystonias. Gait not observed        Allergies:        Allergies   Allergen Reactions    Penicillins            Medications (currently prescribed at [facility]):     Current Facility-Administered Medications:     heparin injection 5,000 Units, 5,000 Units, Subcutaneous, Q8HRS, Sita Grewal M.D., 5,000 Units at 11/21/23 1408    hydrALAZINE (Apresoline) tablet 100 mg, 100 mg, Oral, Q8HRS, Sita Grewal M.D., 100 mg at 11/21/23 1405    traZODone (Desyrel) tablet 75 mg, 75 mg, Oral, QHS, MAXWELL MooreO., 75 mg at 11/20/23 2121    melatonin tablet 9 mg, 9 mg, Oral, QHS PRN, MAXWELL MooreO., 9 mg at 11/20/23 2122    [DISCONTINUED] insulin regular (HumuLIN R,NovoLIN R) injection, 2-12 Units, Subcutaneous, 4X/DAY ACHS **AND** [CANCELED] POC blood glucose manual result, , , Q AC AND " BEDTIME(S) **AND** NOTIFY MD and PharmD, , , Once **AND** Administer 20 grams of glucose (approximately 8 ounces of fruit juice) every 15 minutes PRN FSBG less than 70 mg/dL, , , PRN **AND** dextrose 50% (D50W) injection 25 g, 25 g, Intravenous, Q15 MIN PRN, Sita Grewal M.D.    Respiratory Therapy Consult, , Nebulization, Continuous RT, Peyton Watkins D.O.    senna-docusate (Pericolace Or Senokot S) 8.6-50 MG per tablet 2 Tablet, 2 Tablet, Oral, BID, 2 Tablet at 23 0814 **AND** polyethylene glycol/lytes (Miralax) PACKET 1 Packet, 1 Packet, Oral, QDAY PRN, 1 Packet at 23 0602 **AND** magnesium hydroxide (Milk Of Magnesia) suspension 30 mL, 30 mL, Oral, QDAY PRN **AND** bisacodyl (Dulcolax) suppository 10 mg, 10 mg, Rectal, QDAY PRN, Peyton Watkins D.O.    omeprazole (PriLOSEC) capsule 20 mg, 20 mg, Oral, DAILY, MAXWELL DenisO., 20 mg at 23 0814    mag hydrox-al hydrox-simeth (Maalox Plus Es Or Mylanta Ds) suspension 20 mL, 20 mL, Oral, Q2HRS PRN, MAXWELL DenisO.    ondansetron (Zofran ODT) dispertab 4 mg, 4 mg, Oral, 4X/DAY PRN **OR** ondansetron (Zofran) syringe/vial injection 4 mg, 4 mg, Intramuscular, 4X/DAY PRN, Peyton Watkins D.O.    traZODone (Desyrel) tablet 50 mg, 50 mg, Oral, QHS PRN, MAXWELL DenisO.    sodium chloride (Ocean) 0.65 % nasal spray 2 Spray, 2 Spray, Nasal, PRN, MAXWELL DenisO.    [] acetaminophen (Tylenol) tablet 1,000 mg, 1,000 mg, Oral, Q6HRS, 1,000 mg at 23 0530 **FOLLOWED BY** acetaminophen (Tylenol) tablet 1,000 mg, 1,000 mg, Oral, Q6HRS PRN, LEANA Denis.O.    oxyCODONE immediate-release (Roxicodone) tablet 2.5 mg, 2.5 mg, Oral, Q3HRS PRN **OR** oxyCODONE immediate-release (Roxicodone) tablet 5 mg, 5 mg, Oral, Q3HRS PRN, Peyton Watkins D.O., 5 mg at 23 0929    hydrALAZINE (Apresoline) tablet 25 mg, 25 mg, Oral, Q8HRS PRN, Peyton Watkins D.O., 25 mg at 23 1712    amLODIPine (Norvasc) tablet 10 mg, 10 mg, Oral, DAILY,  LEANA Denis.SHAE., 10 mg at 11/21/23 0814    atorvastatin (Lipitor) tablet 40 mg, 40 mg, Oral, Nightly, LEANA Denis.O., 40 mg at 11/20/23 2122    baclofen (Lioresal) tablet 5 mg, 5 mg, Oral, QHS, LEANA Denis.O., 5 mg at 11/20/23 2122             *Labs:  Lab Results   Component Value Date/Time    SODIUM 139 11/21/2023 05:32 AM    POTASSIUM 4.0 11/21/2023 05:32 AM    CHLORIDE 104 11/21/2023 05:32 AM    CO2 24 11/21/2023 05:32 AM    GLUCOSE 143 (H) 11/21/2023 05:32 AM    BUN 32 (H) 11/21/2023 05:32 AM    CREATININE 2.82 (H) 11/21/2023 05:32 AM      Lab Results   Component Value Date/Time    WBC 5.5 11/21/2023 05:32 AM    RBC 3.83 (L) 11/21/2023 05:32 AM    HEMOGLOBIN 11.9 (L) 11/21/2023 05:32 AM    HEMATOCRIT 34.8 (L) 11/21/2023 05:32 AM    MCV 90.9 11/21/2023 05:32 AM    MCH 31.1 11/21/2023 05:32 AM    MCHC 34.2 11/21/2023 05:32 AM    MPV 10.5 11/21/2023 05:32 AM    NEUTSPOLYS 66.50 11/13/2023 05:25 AM    LYMPHOCYTES 22.60 11/13/2023 05:25 AM    MONOCYTES 7.20 11/13/2023 05:25 AM    EOSINOPHILS 3.10 11/13/2023 05:25 AM    BASOPHILS 0.40 11/13/2023 05:25 AM      Recent Labs     11/21/23  0532   ASTSGOT 13   ALTSGPT 18   TBILIRUBIN 0.4   GLOBULIN 2.4     11/21/23  TSH-0.640  [id number]    EKG 11/11 NSR QTC-436  Imaging  MRI 11/11/23  1.  Right thalamic hemorrhage, decreased in density since prior study  2.  Stranding vasogenic edema appears stable.  3.  Slight asymmetric dilatation of the left ventricular system including the left temporal horn, consider component of hydrocephalus.  4.  Low-density fluid collection in the left temporal fossa, appearance most compatible with arachnoid cyst.  5.  Nonspecific white matter changes, commonly associated with small vessel ischemic disease.  Associated mild cerebral atrophy is noted.  6.  Atherosclerosis.        *ASSESSMENT:   R/O Adjustment disorder  -Patient endorsed increased anxiety and occasional feeling of hopelessness that began after his stroke. He had  "noted more disrupted sleep that he feels affects his energy levels and anxiety. However he had been participating in OT/PT and had been complaint with recommended medications and treatments. Denied SI. Sleep may also have been affected my discomfort from CPAP    The patient states mood is \"alright\" and does have much more hope for the future as well as goals for when he leaves rehab. 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 He has been participating in PT. Sleep may be \"somewhat better\" with trazodone. NO problems with nighttime awakenings, mainly initial insomnia now. Discussed a visualization exercise patient can do before bed to promote relaxation. Patient agreeable to possibly increasing trazodone this week if no further benefit noted from current does. He currently denies need or desire for pharmacotherapy for depression.         *Plan/Further Workup:   Legal status: not applicable     *Medication Managment:       -continue trazodone 75 mg QHS for insomnia with an additional 50 mg QHS PRN for insomnia.   -continue psychotherapy with patient  *Labs Reviewed/Ordered:  B12 and TSH reviewed, WNL  *Imaging Reviewed                Will follow  Thank you for the consult.  "

## 2023-11-22 NOTE — PROGRESS NOTES
"  Physical Medicine & Rehabilitation Progress Note    Encounter Date: 11/22/2023    Chief Complaint: Doing ok    Interval Events (Subjective):  ROMAIN JIMENEZ 11/22  Voiding     Seen and examined in his room. No systemic complaints. Aware of Heparin usage with plan to fully anticoagulate if he tolerates.     ROS: 14 point ROS negative unless otherwise specified in the HPI    Objective:  VITAL SIGNS: /89   Pulse 97   Temp 37.1 °C (98.8 °F) (Oral)   Resp 16   Ht 1.727 m (5' 8\")   Wt 86 kg (189 lb 9.5 oz)   SpO2 95%   BMI 28.83 kg/m²     GEN: No apparent distress  HEENT: Head normocephalic, atraumatic.  Sclera nonicteric bilaterally, no ocular discharge appreciated bilaterally.  CV: Extremities warm and well-perfused, no peripheral edema appreciated bilaterally.  PULMONARY: Breathing nonlabored on room air, no respiratory accessory muscle use.  Not requiring supplemental oxygen.  SKIN: No appreciable skin breakdown on exposed areas of skin.  PSYCH: Flat affect  NEURO: Awake alert.  Conversational.  Logical thought content. Dysarthria. Left Hemiparesis.       Laboratory Values:  Recent Results (from the past 72 hour(s))   CBC WITHOUT DIFFERENTIAL    Collection Time: 11/21/23  5:32 AM   Result Value Ref Range    WBC 5.5 4.8 - 10.8 K/uL    RBC 3.83 (L) 4.70 - 6.10 M/uL    Hemoglobin 11.9 (L) 14.0 - 18.0 g/dL    Hematocrit 34.8 (L) 42.0 - 52.0 %    MCV 90.9 81.4 - 97.8 fL    MCH 31.1 27.0 - 33.0 pg    MCHC 34.2 32.3 - 36.5 g/dL    RDW 40.2 35.9 - 50.0 fL    Platelet Count 187 164 - 446 K/uL    MPV 10.5 9.0 - 12.9 fL   Comp Metabolic Panel    Collection Time: 11/21/23  5:32 AM   Result Value Ref Range    Sodium 139 135 - 145 mmol/L    Potassium 4.0 3.6 - 5.5 mmol/L    Chloride 104 96 - 112 mmol/L    Co2 24 20 - 33 mmol/L    Anion Gap 11.0 7.0 - 16.0    Glucose 143 (H) 65 - 99 mg/dL    Bun 32 (H) 8 - 22 mg/dL    Creatinine 2.82 (H) 0.50 - 1.40 mg/dL    Calcium 9.0 8.5 - 10.5 mg/dL    Correct Calcium 9.2 8.5 - 10.5 " mg/dL    AST(SGOT) 13 12 - 45 U/L    ALT(SGPT) 18 2 - 50 U/L    Alkaline Phosphatase 67 30 - 99 U/L    Total Bilirubin 0.4 0.1 - 1.5 mg/dL    Albumin 3.7 3.2 - 4.9 g/dL    Total Protein 6.1 6.0 - 8.2 g/dL    Globulin 2.4 1.9 - 3.5 g/dL    A-G Ratio 1.5 g/dL   TSH WITH REFLEX TO FT4    Collection Time: 23  5:32 AM   Result Value Ref Range    TSH 0.640 0.380 - 5.330 uIU/mL   VITAMIN B12    Collection Time: 23  5:32 AM   Result Value Ref Range    Vitamin B12 -True Cobalamin 1291 (H) 211 - 911 pg/mL   ESTIMATED GFR    Collection Time: 23  5:32 AM   Result Value Ref Range    GFR (CKD-EPI) 25 (A) >60 mL/min/1.73 m 2       Medications:  Scheduled Medications   Medication Dose Frequency    heparin  5,000 Units Q8HRS    hydrALAZINE  100 mg Q8HRS    traZODone  75 mg QHS    senna-docusate  2 Tablet BID    omeprazole  20 mg DAILY    amLODIPine  10 mg DAILY    atorvastatin  40 mg Nightly    baclofen  5 mg QHS     PRN medications: melatonin, [DISCONTINUED] insulin regular **AND** [CANCELED] POC blood glucose manual result **AND** NOTIFY MD and PharmD **AND** Administer 20 grams of glucose (approximately 8 ounces of fruit juice) every 15 minutes PRN FSBG less than 70 mg/dL **AND** dextrose bolus, Respiratory Therapy Consult, senna-docusate **AND** polyethylene glycol/lytes **AND** magnesium hydroxide **AND** bisacodyl, mag hydrox-al hydrox-simeth, ondansetron **OR** ondansetron, traZODone, sodium chloride, [] acetaminophen **FOLLOWED BY** acetaminophen, oxyCODONE immediate-release **OR** oxyCODONE immediate-release, hydrALAZINE    Diet:  Current Diet Order   Procedures    Diet Order Diet: Consistent CHO (Diabetic) (2 gram sodium restriction; medications whole with thin liquids); Second Modifier: (optional): 2 Gram Sodium       Medical Decision Making and Plan:  Right thalamic hemorrhagic stroke ~ last week October   Originally presented with a headache and left-sided weakness to hospital in [place]  [facility]  Work-up at the outside hospital in [facility] revealed a right thalamic hemorrhagic stroke  Repeat CT head done after return flight to the [place], 11/11 CT head shows right thalamic hemorrhage, decrease in density since prior studies from the outside hospital, slight asymmetric dilation of the left ventricular system including the left temporal horn with a possible component of hydrocephalus  Planning for BP less than 140, avoiding any kind of anticoagulation  Initiate comprehensive IRF level therapy with 3 days of therapy 5 days a week with services from PT/OT/SLP     Spasticity  Continue baclofen 5 mg nightly, started on 11/11 --> increase to TID 11/13/2023 --> continue 11/14/2023, stable on higher dose.   PRAFO ordered for right lower extremity to prevent further plantarflexion contracture  Spasticity controlled, continue Baclofen 11/20/2023   Consider weaning 11/21/2023     ABLA  Monitor on heparin      CKD  Has seen nephrology in past  Improving 11/13/2023   F/u OP with nephrology  S/p IVF 11/13-11/14 11/14/2023 BUN and Cr improved compared to prior  [place] [facility] 11/16 - improved - currently receiving IVF  Recheck [place] [facility] 11/21 - Slightly worsened renal function - likely baseline     Hypertension  Continue Amlodipine 10mg daily  Continue Hydralazine 25mg TID - increased by hospitalist 11/13/2023 from BID to TID--> increased to 50mg q8hrs 11/15  11/16 Hydralazine increased to 75mg q8hrs and 1x Hydralazine given  11/17 BP still elevated but hydralazine recently increased. Monitor  VSS 11/20/2023 Continue Amlodipine and Hydralazine at current dose.      Prediabetes  Discontinue SSI     HLD  Continue home dose satin      Bowel  Continue with scheduled Colace and senna, as needed stool softeners     Insomnia  Secondary to recent jet lag, melatonin scheduled --> increase to home dose 11/13/2023 9mg  Utilize trazodone as needed insomnia continues  Schedule Trazodone 11/15/2023   11/16/2023 continue Trazodone as is. Thinks he slept  better last night  11/17/2023 Still not sleeping well. Increase to 75mg nightly.   11/21/2023 continue trazodone at current dose     Pain -as needed Tylenol and oxycodone    Post-Stroke Depression  Consulted Pyschiatry       DVT PROPHYLAXIS: NO - Hemorrhagic stroke. US + UE and LE for brachial and peroneal DVT. 11/21/2023 start Heparin 5000 units q8hrs SQ for further prophylaxis, if tolerates will increase to full AC. Consider repeat CTH to ensure stability bleed once on full AC.     HOSPITALIST FOLLOWING: YES - d/w hospitalist 11/22    CODE STATUS: FULL CODE    DISPO: Home alone. Vielka and patient not . D/w CM to give resources for private care giving. Their goal is to stay 6 weeks. RWW denied. Counseled extensively on private care giving/therapy in addition to Home Health or outpatient.     MARCUS: 12/4/23    MEDS SENT TO: TBD    DISCHARGE FOLLOW UP: Neurology, Nephrology, PCP - needs referral to all.   ____________________________________    Dr. Lisa Lee DO, MS  [facility] - Physical Medicine & Rehabilitation   ____________________________________    _____________________________________  Interdisciplinary Team Conference   Most recent IDT on 11/22/2023    I, Dr. Lisa Lee DO, MS, was present and led the interdisciplinary team conference on 11/22/2023.  I led the IDT conference and agree with the IDT conference documentation and plan of care as noted below.     Nursing:  Diet Current Diet Order   Procedures    Diet Order Diet: Consistent CHO (Diabetic) (2 gram sodium restriction; medications whole with thin liquids); Second Modifier: (optional): 2 Gram Sodium       Eating ADL Supervision  Modified diet, Set-up of equipment or meal/tube feeding, Supervision for safety   % of Last Meal  Oral Nutrition: *  * Meal *  *, Breakfast, Between % Consumed (100%)   Sleep    Bowel Last BM: 11/21/23   Bladder        Physical Therapy:  Bed Mobility    Transfers Moderate Assist  Increased time, Assist  with one limb, Initial preparation for task, Requires lift, Squat pivot transfer to wheelchair, Supervision for safety, Verbal cueing, Other (comment) (ModA for squat pivot to R-side from WC <> therapy mat; x2. VC needed to be aware of L arm)   Mobility Maximal Assist (10 x 2 parallel bar with max assist with gait slider and w/c follow; 10ft x 4 right hallway railing with left Ace to help left foot advancement mod assist at pelvis with w/c follow; verbal cues and mirror for feedback)   Stairs    Inconsistent on transfers  Walks in hallway railing, but can only go 10 ft  Making attempts with vector    Occupational Therapy:  Grooming Seated, Contact Guard Assist   Bathing Maximal Assist   UB Dressing Minimal Assist   LB Dressing Moderate Assist   Toileting Total Assist x 2   Shower & Transfer    Met 2/4 goals  Dressing is improved, using jade-techniques  Sitting balance is poor. Leans to left. With cues can correct.     Speech-Language Pathology:  Comprehension:  Supervision  Comprehension Description:     Expression:  Modified Independent  Expression Description:  Other (comment) (Extra time)  Social Interaction:  Modified Independent  Social Interaction Description:  Increased time, Verbal cues  Problem Solving:  Minimal Assist  Problem Solving Description:  Supervision, Seat belt, Medication, Bed/chair alarm, Therapy schedule, Verbal cueing  Memory:  Moderate Assist  Memory Description:  Supervision, Seat belt, Medication, Bed/chair alarm, Therapy schedule, Verbal cueing    Met 4/4 goals   Will stay in T dine to help with cutting meals.   Regular diet.   Focused on psychological stressors  Visual tasks are difficult   Cannot plan ahead well  Poor self awareness    Recreation Management:  Working on coping skills   Working towards an outing next week    Respiratory Therapy:  O2 (LPM): 0  O2 Delivery Device: Room air w/o2 available    Case Management:  Continues to work on disposition and DME needs.     BARRIERS TO  DISCHARGE HOME:  Depression  Left hemiparesis  Poor problem solving  Equipment needs     Discharge Date/Disposition:  12/4/23  _____________________________________

## 2023-11-22 NOTE — PROGRESS NOTES
NURSING DAILY NOTE    Name: Jason Lima   Date of Admission: 11/12/2023   Admitting Diagnosis: Thalamic hemorrhage (HCC)  Attending Physician: Lisa Lee D.o.  Allergies: Penicillins    Safety  Patient Assist  max 2  Patient Precautions  Fall Risk  Precaution Comments  Left hemiparesis, left visual inattention  Bed Transfer Status  Total Assist X 2  Toilet Transfer Status   Total Assist X 2 (Mod A towards right wc to toilet, 2 person to left toilet to wc)  Assistive Devices  Wheelchair  Oxygen  None - Room Air  Diet/Therapeutic Dining  Current Diet Order   Procedures    Diet Order Diet: Consistent CHO (Diabetic) (2 gram sodium restriction; medications whole with thin liquids); Second Modifier: (optional): 2 Gram Sodium     Pill Administration  whole  Agitated Behavioral Scale     ABS Level of Severity       Fall Risk  Has the patient had a fall this admission?   Yes  Shellie Hamilton Fall Risk Scoring  25, HIGH RISK  Fall Risk Safety Measures  bed alarm and chair alarm    Vitals  Temperature: 36.8 °C (98.2 °F)  Temp src: Oral  Pulse: 71  Respiration: 16  Blood Pressure: 112/76  Blood Pressure MAP (Calculated): 88 MM HG  BP Location: Right, Upper Arm  Patient BP Position: Supine     Oxygen  Pulse Oximetry: 91 %  O2 (LPM): 0  FiO2%: 21 %  O2 Delivery Device: None - Room Air    Bowel and Bladder  Last Bowel Movement  11/21/23  Stool Type  Type 6: Fluffy pieces with ragged edges, a mushy stool  Bowel Device     Continent  Bladder: Continent void   Bowel: Continent movement  Bladder Function  Urine Void (mL): 400 ml (urinals)  Number of Times Voided: 1  Urine Color: Yellow  Genitourinary Assessment   Bladder Assessment (WDL):  Within Defined Limits  Echols Catheter: Not Applicable  Urine Color: Yellow  Bladder Device: Urinal, Absorbent Brief (Pull Up)  Time Void: No  Bladder Scan: Post Void  $ Bladder Scan Results (mL): 26    Skin  Polo Score   16  Sensory  Interventions   Bed Types: Standard/Trauma Mattress  Skin Preventative Measures: Elbow Protectors In Use, Pillows in Use for Support / Positioning  Moisture Interventions  Moisturizers/Barriers: Barrier Paste      Pain  Pain Rating Scale  0 - No Pain  Pain Location  Back  Pain Location Orientation  Upper  Pain Interventions   Declines    ADLs    Bathing   Shower, Staff  Linen Change   Complete  Personal Hygiene  Moist Deja Wipes  Chlorhexidine Bath      Oral Care  Brushed Teeth  Teeth/Dentures     Shave  Self  Nutrition Percentage Eaten  Dinner, Between % Consumed  Environmental Precautions  Bed in Low Position, Treaded Slipper Socks on Patient  Patient Turns/Positioning  Patient Turns Self from Side to Side  Patient Turns Assistance/Tolerance  Assistance of Two or More, Tolerates Well  Bed Positions  Bed Controls On  Head of Bed Elevated  Self regulated      Psychosocial/Neurologic Assessment  Psychosocial Assessment  Psychosocial (WDL):  Within Defined Limits  Patient Behaviors: Flat Affect  Neurologic Assessment  Neuro (WDL): Exceptions to WDL  Level of Consciousness: Alert  Orientation Level: Oriented X4  Cognition: Appropriate judgement  Speech: Clear, Slurred  Facial Symmetry: Left facial drooping  Pupil Assesment: Yes  R Pupil Size (mm): 3  R Pupil Shape / Description: Round  R Pupil Reaction: Brisk  L Pupil Size (mm): 3  L Pupil Shape / Description: Round  L Pupil Reaction: Brisk  Reflexes: Cough  Cough Reflex: Present  Motor Function/Sensation Assessment: Dorsiflexion, Motor response  R Foot Dorsiflexion: Strong  L Foot Dorsiflexion: Absent  RUE Motor Response: Responds to commands  RUE Sensation: Full sensation  Muscle Strength Right Arm: Normal Strength Against Gravity and Full Resistance  LUE Motor Response: Flaccid  LUE Sensation: Tingling, Numbness  Muscle Strength Left Arm: Weak Movement but Not Against Gravity or Resistance  RLE Motor Response: Responds to commands  RLE Sensation: Full  sensation  Muscle Strength Right Leg: Good Strength Against Gravity and Moderate Resistance  LLE Motor Response: Flaccid  LLE Sensation: Tingling, Numbness  Muscle Strength Left Leg: Weak Movement but Not Against Gravity or Resistance  EENT (WDL):  WDL Except    Cardio/Pulmonary Assessment  Edema      Respiratory Breath Sounds  RUL Breath Sounds: Clear  RML Breath Sounds: Clear  RLL Breath Sounds: Diminished  MOHAMUD Breath Sounds: Clear  LLL Breath Sounds: Diminished  Cardiac Assessment   Cardiac (WDL):  Within Defined Limits

## 2023-11-22 NOTE — FLOWSHEET NOTE
11/22/23 0904   Events/Summary/Plan   Events/Summary/Plan RA pulse ox check   Vital Signs   Pulse 97   Respiration 16   Pulse Oximetry 95 %   $ Pulse Oximetry (Spot Check) Yes   Respiratory Assessment   Level of Consciousness Alert   Chest Exam   Work Of Breathing / Effort Within Normal Limits   Oxygen   O2 Delivery Device Room air w/o2 available

## 2023-11-22 NOTE — CARE PLAN
"The patient is Stable - Low risk of patient condition declining or worsening    Shift Goals  Clinical Goals: Safety  Patient Goals: safety, sleep  Family Goals: increased pt strength, independence    Patient is not progressing towards the following goals:    Problem: Fall Risk - Rehab  Goal: Patient will remain free from falls  Outcome: Not Met  Note: Shellie Hamilton Fall risk Assessment Score: 19    High fall risk Interventions   - Alarming seatbelt  - Bed and strip alarm   - Yellow sign by the door   - Yellow wrist band \"Fall risk\"  - Do not leave patient unattended in the bathroom  - Fall risk education provided     "

## 2023-11-22 NOTE — PROGRESS NOTES
..                                                         NURSING DAILY NOTE    Name: Jason Lima   Date of Admission: 11/12/2023   Admitting Diagnosis: Thalamic hemorrhage (HCC)  Attending Physician: Lisa Lee D.o.  Allergies: Penicillins    Safety  Patient Assist  max 2  Patient Precautions  Fall Risk  Precaution Comments  Left hemiparesis, left visual inattention  Bed Transfer Status  Total Assist X 2  Toilet Transfer Status   Total Assist X 2 (Mod A towards right wc to toilet, 2 person to left toilet to wc)  Assistive Devices  Wheelchair  Oxygen  CPAP  Diet/Therapeutic Dining  Current Diet Order   Procedures    Diet Order Diet: Consistent CHO (Diabetic) (2 gram sodium restriction; medications whole with thin liquids); Second Modifier: (optional): 2 Gram Sodium     Pill Administration  whole  Agitated Behavioral Scale     ABS Level of Severity       Fall Risk  Has the patient had a fall this admission?   Yes  Shellie Hamilton Fall Risk Scoring  19, HIGH RISK  Fall Risk Safety Measures  bed alarm, chair alarm, seatbelt alarm, fall during this hospitalization, and poor balance    Vitals  Temperature: 36.8 °C (98.2 °F)  Temp src: Oral  Pulse: 80  Respiration: 16  Blood Pressure: 125/77  Blood Pressure MAP (Calculated): 93 MM HG  BP Location: Right, Upper Arm  Patient BP Position: Supine     Oxygen  Pulse Oximetry: 91 %  O2 (LPM): 0  FiO2%: 21 %  O2 Delivery Device: CPAP    Bowel and Bladder  Last Bowel Movement  11/21/23  Stool Type  Type 6: Fluffy pieces with ragged edges, a mushy stool  Bowel Device     Continent  Bladder: Continent void   Bowel: Continent movement  Bladder Function  Urine Void (mL): 200 ml  Number of Times Voided: 1  Urine Color: Yellow  Number of Times Incontinent of Urine: 0  Genitourinary Assessment   Bladder Assessment (WDL):  Within Defined Limits  Echols Catheter: Not Applicable  Urine Color: Yellow  Number of Bladder Accidents: 0  Total Number of Bladder of Accidents in Last 7 Days:  0  Number of Times Incontinent of Urine: 0  Bladder Device: Urinal  Time Void: No  Bladder Scan: Post Void  $ Bladder Scan Results (mL): 26    Skin  Polo Score   17  Sensory Interventions   Bed Types: Standard/Trauma Mattress  Skin Preventative Measures: Pillows in Use for Support / Positioning  Moisture Interventions  Moisturizers/Barriers: Barrier Paste      Pain  Pain Rating Scale  0 - No Pain  Pain Location  Back  Pain Location Orientation  Upper  Pain Interventions   Declines    ADLs    Bathing   Patient Refused Bathing  Linen Change   Complete  Personal Hygiene  Moist Deja Wipes  Chlorhexidine Bath      Oral Care  Brushed Teeth  Teeth/Dentures     Shave  Self  Nutrition Percentage Eaten  Dinner, Between % Consumed  Environmental Precautions  Treaded Slipper Socks on Patient, Bed in Low Position  Patient Turns/Positioning  Patient Turns Self from Side to Side  Patient Turns Assistance/Tolerance  Assistance of Two or More, Tolerates Well  Bed Positions  Bed Controls On  Head of Bed Elevated  Self regulated      Psychosocial/Neurologic Assessment  Psychosocial Assessment  Psychosocial (WDL):  Within Defined Limits  Patient Behaviors: Flat Affect  Neurologic Assessment  Neuro (WDL): Exceptions to WDL  Level of Consciousness: Alert  Orientation Level: Oriented X4  Cognition: Appropriate judgement  Speech: Clear, Slurred  Facial Symmetry: Left facial drooping  Pupil Assesment: Yes  R Pupil Size (mm): 3  R Pupil Shape / Description: Round  R Pupil Reaction: Brisk  L Pupil Size (mm): 3  L Pupil Shape / Description: Round  L Pupil Reaction: Brisk  Reflexes: Cough  Cough Reflex: Present  Motor Function/Sensation Assessment: Dorsiflexion, Motor response  R Foot Dorsiflexion: Strong  L Foot Dorsiflexion: Absent  RUE Motor Response: Responds to commands  RUE Sensation: Full sensation  Muscle Strength Right Arm: Normal Strength Against Gravity and Full Resistance  LUE Motor Response: Flaccid  LUE Sensation:  Tingling, Numbness  Muscle Strength Left Arm: Weak Movement but Not Against Gravity or Resistance  RLE Motor Response: Responds to commands  RLE Sensation: Full sensation  Muscle Strength Right Leg: Good Strength Against Gravity and Moderate Resistance  LLE Motor Response: Flaccid  LLE Sensation: Tingling, Numbness  Muscle Strength Left Leg: Weak Movement but Not Against Gravity or Resistance  EENT (WDL):  WDL Except    Cardio/Pulmonary Assessment  Edema   RLE Edema: Trace  LLE Edema: Trace  Respiratory Breath Sounds  RUL Breath Sounds: Clear  RML Breath Sounds: Clear  RLL Breath Sounds: Diminished  MOHAMUD Breath Sounds: Clear  LLL Breath Sounds: Diminished  Cardiac Assessment   Cardiac (WDL):  Within Defined Limits

## 2023-11-23 ENCOUNTER — APPOINTMENT (OUTPATIENT)
Dept: PHYSICAL THERAPY | Facility: REHABILITATION | Age: 63
DRG: 057 | End: 2023-11-23
Attending: PHYSICAL MEDICINE & REHABILITATION
Payer: COMMERCIAL

## 2023-11-23 ENCOUNTER — APPOINTMENT (OUTPATIENT)
Dept: SPEECH THERAPY | Facility: REHABILITATION | Age: 63
DRG: 057 | End: 2023-11-23
Attending: PHYSICAL MEDICINE & REHABILITATION
Payer: COMMERCIAL

## 2023-11-23 ENCOUNTER — APPOINTMENT (OUTPATIENT)
Dept: OCCUPATIONAL THERAPY | Facility: REHABILITATION | Age: 63
DRG: 057 | End: 2023-11-23
Attending: HOSPITALIST
Payer: COMMERCIAL

## 2023-11-23 PROCEDURE — 700111 HCHG RX REV CODE 636 W/ 250 OVERRIDE (IP): Performed by: HOSPITALIST

## 2023-11-23 PROCEDURE — A9270 NON-COVERED ITEM OR SERVICE: HCPCS | Performed by: PHYSICAL MEDICINE & REHABILITATION

## 2023-11-23 PROCEDURE — 94760 N-INVAS EAR/PLS OXIMETRY 1: CPT

## 2023-11-23 PROCEDURE — 97530 THERAPEUTIC ACTIVITIES: CPT | Mod: CQ

## 2023-11-23 PROCEDURE — A9270 NON-COVERED ITEM OR SERVICE: HCPCS | Performed by: HOSPITALIST

## 2023-11-23 PROCEDURE — 97130 THER IVNTJ EA ADDL 15 MIN: CPT

## 2023-11-23 PROCEDURE — 94660 CPAP INITIATION&MGMT: CPT

## 2023-11-23 PROCEDURE — 99231 SBSQ HOSP IP/OBS SF/LOW 25: CPT | Performed by: HOSPITALIST

## 2023-11-23 PROCEDURE — 97150 GROUP THERAPEUTIC PROCEDURES: CPT

## 2023-11-23 PROCEDURE — 97112 NEUROMUSCULAR REEDUCATION: CPT

## 2023-11-23 PROCEDURE — 97116 GAIT TRAINING THERAPY: CPT | Mod: CQ

## 2023-11-23 PROCEDURE — 700102 HCHG RX REV CODE 250 W/ 637 OVERRIDE(OP): Performed by: PHYSICAL MEDICINE & REHABILITATION

## 2023-11-23 PROCEDURE — 700102 HCHG RX REV CODE 250 W/ 637 OVERRIDE(OP): Performed by: HOSPITALIST

## 2023-11-23 PROCEDURE — 97129 THER IVNTJ 1ST 15 MIN: CPT

## 2023-11-23 PROCEDURE — 770010 HCHG ROOM/CARE - REHAB SEMI PRIVAT*

## 2023-11-23 RX ADMIN — HYDRALAZINE HYDROCHLORIDE 25 MG: 25 TABLET, FILM COATED ORAL at 16:39

## 2023-11-23 RX ADMIN — HYDRALAZINE HYDROCHLORIDE 100 MG: 50 TABLET, FILM COATED ORAL at 22:10

## 2023-11-23 RX ADMIN — HEPARIN SODIUM 5000 UNITS: 5000 INJECTION, SOLUTION INTRAVENOUS; SUBCUTANEOUS at 22:11

## 2023-11-23 RX ADMIN — HEPARIN SODIUM 5000 UNITS: 5000 INJECTION, SOLUTION INTRAVENOUS; SUBCUTANEOUS at 12:58

## 2023-11-23 RX ADMIN — TRAZODONE HYDROCHLORIDE 75 MG: 50 TABLET ORAL at 21:21

## 2023-11-23 RX ADMIN — SENNOSIDES AND DOCUSATE SODIUM 2 TABLET: 50; 8.6 TABLET ORAL at 07:57

## 2023-11-23 RX ADMIN — HYDRALAZINE HYDROCHLORIDE 100 MG: 50 TABLET, FILM COATED ORAL at 05:47

## 2023-11-23 RX ADMIN — SENNOSIDES AND DOCUSATE SODIUM 2 TABLET: 50; 8.6 TABLET ORAL at 21:21

## 2023-11-23 RX ADMIN — OMEPRAZOLE 20 MG: 20 CAPSULE, DELAYED RELEASE ORAL at 07:56

## 2023-11-23 RX ADMIN — HEPARIN SODIUM 5000 UNITS: 5000 INJECTION, SOLUTION INTRAVENOUS; SUBCUTANEOUS at 05:47

## 2023-11-23 RX ADMIN — ATORVASTATIN CALCIUM 40 MG: 40 TABLET, FILM COATED ORAL at 21:21

## 2023-11-23 RX ADMIN — AMLODIPINE BESYLATE 10 MG: 5 TABLET ORAL at 07:56

## 2023-11-23 RX ADMIN — HYDRALAZINE HYDROCHLORIDE 100 MG: 50 TABLET, FILM COATED ORAL at 12:58

## 2023-11-23 RX ADMIN — BACLOFEN 5 MG: 10 TABLET ORAL at 21:22

## 2023-11-23 ASSESSMENT — GAIT ASSESSMENTS
DISTANCE (FEET): 8
ASSISTIVE DEVICE: PARALLEL BARS
GAIT LEVEL OF ASSIST: TOTAL ASSIST
GAIT LEVEL OF ASSIST: TOTAL ASSIST X 2
DISTANCE (FEET): 10
ASSISTIVE DEVICE: PARALLEL BARS

## 2023-11-23 ASSESSMENT — ENCOUNTER SYMPTOMS
PALPITATIONS: 0
COUGH: 0
EYES NEGATIVE: 1
POLYDIPSIA: 0
SHORTNESS OF BREATH: 0
FOCAL WEAKNESS: 1
NAUSEA: 0
FEVER: 0
CHILLS: 0
MUSCULOSKELETAL NEGATIVE: 1
VOMITING: 0
BRUISES/BLEEDS EASILY: 0
ABDOMINAL PAIN: 0

## 2023-11-23 ASSESSMENT — PATIENT HEALTH QUESTIONNAIRE - PHQ9
2. FEELING DOWN, DEPRESSED, IRRITABLE, OR HOPELESS: NOT AT ALL
1. LITTLE INTEREST OR PLEASURE IN DOING THINGS: NOT AT ALL
SUM OF ALL RESPONSES TO PHQ9 QUESTIONS 1 AND 2: 0

## 2023-11-23 NOTE — PROGRESS NOTES
Patient care assumed. Report received from [facility] CLAYTON Rojo. Patient is alert and calm, resting in bed. Call light and bedside table within reach. Will continue to monitor.

## 2023-11-23 NOTE — PROGRESS NOTES
..                                                         NURSING DAILY NOTE    Name: Jason Lima   Date of Admission: 11/12/2023   Admitting Diagnosis: Thalamic hemorrhage (HCC)  Attending Physician: Lisa Lee D.o.  Allergies: Penicillins    Safety  Patient Assist  Max x 2  Patient Precautions  Fall Risk  Precaution Comments  Left hemiparesis, left visual inattention  Bed Transfer Status  Maximal Assist (mod towards right x 3 reps, max towards left x 3 reps)  Toilet Transfer Status   Total Assist X 2 (Mod A towards right wc to toilet, 2 person to left toilet to wc)  Assistive Devices  Gait Belt  Oxygen  None - Room Air  Diet/Therapeutic Dining  Current Diet Order   Procedures    Diet Order Diet: Consistent CHO (Diabetic) (2 gram sodium restriction; medications whole with thin liquids); Second Modifier: (optional): 2 Gram Sodium     Pill Administration  whole  Agitated Behavioral Scale     ABS Level of Severity       Fall Risk  Has the patient had a fall this admission?   Yes  Shellie Hamilton Fall Risk Scoring  19, HIGH RISK  Fall Risk Safety Measures  bed alarm, chair alarm, seatbelt alarm, poor balance, and low vision/ hearing    Vitals  Temperature: 37 °C (98.6 °F)  Temp src: Oral  Pulse: 68  Respiration: 18  Blood Pressure: (!) 145/96  Blood Pressure MAP (Calculated): 112 MM HG  BP Location: Right, Upper Arm  Patient BP Position: Supine     Oxygen  Pulse Oximetry: 97 %  O2 (LPM): 0  FiO2%: 21 %  O2 Delivery Device: None - Room Air    Bowel and Bladder  Last Bowel Movement  11/21/23  Stool Type  Type 4: Like a sausage or snake, smooth and soft  Bowel Device     Continent  Bladder: Continent void   Bowel: Continent movement  Bladder Function  Urine Void (mL): 400 ml  Number of Times Voided: 1  Urine Color: Pale  Number of Times Incontinent of Urine: 0  Genitourinary Assessment   Bladder Assessment (WDL):  Within Defined Limits  Echols Catheter: Not Applicable  Urine Color: Pale  Number of Bladder  Accidents: 0  Total Number of Bladder of Accidents in Last 7 Days: 0  Number of Times Incontinent of Urine: 0  Bladder Device: Urinal  Time Void: No  Bladder Scan: Post Void  $ Bladder Scan Results (mL): 26    Skin  Polo Score   18  Sensory Interventions   Bed Types: Standard/Trauma Mattress  Skin Preventative Measures: Elbow Protectors In Use, Pillows in Use for Support / Positioning  Moisture Interventions  Moisturizers/Barriers: Barrier Wipes      Pain  Pain Rating Scale  0 - No Pain  Pain Location  Back  Pain Location Orientation  Upper  Pain Interventions   Declines    ADLs    Bathing   Shower, Staff  Linen Change   Complete  Personal Hygiene  Moist Deja Wipes  Chlorhexidine Bath      Oral Care  Brushed Teeth  Teeth/Dentures     Shave  Self  Nutrition Percentage Eaten  Dinner, Between 25-50% Consumed  Environmental Precautions  Bed in Low Position, Treaded Slipper Socks on Patient  Patient Turns/Positioning  Patient Turns Self from Side to Side  Patient Turns Assistance/Tolerance  Assistance of Two or More  Bed Positions  Bed Locked, Bed Controls On  Head of Bed Elevated  Self regulated      Psychosocial/Neurologic Assessment  Psychosocial Assessment  Psychosocial (WDL):  Within Defined Limits  Patient Behaviors: Flat Affect  Neurologic Assessment  Neuro (WDL): Exceptions to WDL  Level of Consciousness: Alert  Orientation Level: Oriented X4  Cognition: Appropriate judgement  Speech: Clear, Slurred  Facial Symmetry: Left facial drooping  Pupil Assesment: Yes  R Pupil Size (mm): 3  R Pupil Shape / Description: Round  R Pupil Reaction: Brisk  L Pupil Size (mm): 3  L Pupil Shape / Description: Round  L Pupil Reaction: Brisk  Reflexes: Cough  Cough Reflex: Present  Motor Function/Sensation Assessment: Dorsiflexion, Motor response  R Foot Dorsiflexion: Strong  L Foot Dorsiflexion: Absent  RUE Motor Response: Responds to commands  RUE Sensation: Full sensation  Muscle Strength Right Arm: Normal Strength Against  Gravity and Full Resistance  LUE Motor Response: Flaccid  LUE Sensation: Tingling, Numbness  Muscle Strength Left Arm: Weak Movement but Not Against Gravity or Resistance  RLE Motor Response: Responds to commands  RLE Sensation: Full sensation  Muscle Strength Right Leg: Good Strength Against Gravity and Moderate Resistance  LLE Motor Response: Flaccid  LLE Sensation: Tingling, Numbness  Muscle Strength Left Leg: Weak Movement but Not Against Gravity or Resistance  EENT (WDL):  WDL Except    Cardio/Pulmonary Assessment  Edema   RLE Edema: Trace  LLE Edema: Trace  Respiratory Breath Sounds  RUL Breath Sounds: Clear  RML Breath Sounds: Clear  RLL Breath Sounds: Diminished  MOHAMUD Breath Sounds: Clear  LLL Breath Sounds: Diminished  Cardiac Assessment   Cardiac (WDL):  Within Defined Limits

## 2023-11-23 NOTE — CARE PLAN
Problem: Fall Risk - Rehab  Goal: Patient will remain free from falls  Note: Patient remains free from falls this shift. Patient was educated on using the call light to prevent falls. Patients bed is in the lowest position. The patients belongings are placed in near proximity to the patient.      Problem: Pain - Standard  Goal: Alleviation of pain or a reduction in pain to the patient’s comfort goal  Flowsheets (Taken 11/23/2023 0800)  Non Verbal Scale:   Calm   Unlabored Breathing  Pain Rating Scale (NPRS): 0   The patient is Stable - Low risk of patient condition declining or worsening

## 2023-11-23 NOTE — THERAPY
Recreational Therapy  Daily Treatment     Patient Name: Jason Lima  AGE:  62 y.o., SEX:  male  Medical Record #: [id number]  Today's Date: 11/23/2023       Subjective    Patient ready and agreeable for rec/OT group.      Objective       11/23/23 1305   Procedural Tracking   Procedural Tracking Community Re-Integration;Community Skills Development;Leisure Skills Awareness;Leisure Skills Development;Cognitive Skills Training;Gross Motor Functional Leisure Skills;Group Treatment;Fine Motor Functional Leisure Skills;Social Skills Training   Treatment Time   Total Time Spent (mins) 60   Total Time Missed 0   Functional Ability Status - Cognitive   Attention Span Remains on Task   Comprehension Follows One Step Commands;Follows Two Step Commands   Judgment Able to Make Independent Decisions   Functional Ability Status - Emotional    Affect Appropriate   Mood Appropriate   Behavior Appropriate;Cooperative   Skilled Intervention    Skilled Intervention Social Skills;Relaxation / Coping Skills;Group Participation;Gross Motor Leisure;Fine Motor Leisure   Skilled Intervention Comments giant tic tac toe, pumpkin bowling, turkey hunt   Interdisciplinary Plan of Care Collaboration   IDT Collaboration with  Therapy Tech;Occupational Therapist   Patient Position at End of Therapy Seated;Other (Comments)  (with therapy tech)   Strengths & Barriers   Strengths Able to follow instructions;Alert and oriented;Effective communication skills;Willingly participates in therapeutic activities;Pleasant and cooperative;Motivated for self care and independence;Making steady progress towards goals;Good insight into deficits/needs;Independent prior level of function   Barriers Decreased endurance;Generalized weakness;Emotional lability;Hemiparesis;Impaired activity tolerance;Impaired balance;Limited mobility         Assessment    Patient participated in giant tic tac toe, pumpkin bowling and a turkey scanning hunt, during recreation therapy group  session with OT cotx. Patient was socially acceptable throughout group. Patient required min cues for score keeping and utilizing both UE.     Strengths: (P) Able to follow instructions, Alert and oriented, Effective communication skills, Willingly participates in therapeutic activities, Pleasant and cooperative, Motivated for self care and independence, Making steady progress towards goals, Good insight into deficits/needs, Independent prior level of function  Barriers: (P) Decreased endurance, Generalized weakness, Emotional lability, Hemiparesis, Impaired activity tolerance, Impaired balance, Limited mobility    Plan  Patient will benefit from continued recreation therapy sessions.     Passport items to be completed:  Verbalize two positive leisure activities, discuss returning to work, hobbies, community groups or volunteer activities, explore community resources

## 2023-11-23 NOTE — THERAPY
Occupational Therapy  Daily Treatment     Patient Name: Jason Lima  Age:  62 y.o., Sex:  male  Medical Record #: [id number]  Today's Date: 11/23/2023     Precautions  Precautions: Fall Risk  Comments: Left hemiparesis, left visual inattention         Subjective  Patient agreeable to therapy group.      Objective     11/23/23 1301   OT Charge Group   OT Group Therapy Group Activities   OT Total Time Spent   OT Individual Total Time Spent (Mins) 60   Vitals   O2 Delivery Device None - Room Air   Reason for Group Therapy   Reason for Group Therapy To Increase Active Participation through Peer Pressure;To Enhance Motivation;To Validate Increased [place] with Skills;To Decrease Anxiety through New Skill Performance;To Increase Patient Interaction during Physical Recovery   Neuro-Muscular Treatments   Neuro-Muscular Treatments Weight Shift Left;Compensatory Strategies;Tactile Cuing   Interdisciplinary Plan of Care Collaboration   IDT Collaboration with  Therapy Tech;Recreation Therapist   Patient Position at End of Therapy Seated   Collaboration Comments txfr of care to therapy tech; therapy tech and recreation therapist present throughout session     Patient participated in group therapy session. Patient does not have the motor control to use his LUE for functional activities, but did incorporate it as a functional assist while moving/picking up cups. When not incorporating LUE into activities, he was encouraged to bear weight into LUE for neuro re-ed. He required cues for LUE safety, as he left his LUE unsupported intermittently throughout the session. Additionally, he required cues for L visual scanning.     He demo'd good postural control @ w/c level as evidenced by ability to toss ball with fair velocity while leaning anteriorly.     Assessment  Patient had good tolerance to session. He benefited from a group format a this time to enhance motivation amongst peers and improve emotional stability. He requires  ongoing therapies to address his L side deficits.   Strengths: Able to follow instructions, Independent prior level of function, Motivated for self care and independence, Pleasant and cooperative, Supportive family, Alert and oriented  Barriers: Decreased endurance, Fatigue, Generalized weakness, Hemiparesis, Impaired activity tolerance, Impaired balance, Limited mobility, Spasticity    Plan  Continue OT POC    DME  OT DME Recommendations  Bathroom Equipment: 3 in 1 Commode  Additional Equipment: Other (Comments)    Passport items to be completed:  Perform bathroom transfers, complete dressing, complete feeding, get ready for the day, prepare a simple meal, participate in household tasks, adapt home for safety needs, demonstrate home exercise program, complete caregiver training     Occupational Therapy Goals (Active)       Problem: Bathing       Dates: Start:  11/15/23         Goal: STG-Within one week, patient will bathe at modA using DME/AE PRN       Dates: Start:  11/15/23               Problem: Dressing       Dates: Start:  11/22/23         Goal: STG-Within one week, patient will dress UB at [place] using LRD       Dates: Start:  11/22/23            Goal: STG-Within one week, patient will dress LB at [place] using LRD       Dates: Start:  11/22/23               Problem: Functional Transfers       Dates: Start:  11/13/23         Goal: STG-Within one week, patient will transfer to toilet with Max/Mod A.       Dates: Start:  11/13/23            Goal: STG-Within one week, patient will transfer to step in shower with Max A.       Dates: Start:  11/13/23               Problem: OT Long Term Goals       Dates: Start:  11/13/23         Goal: LTG-By discharge, patient will complete basic self care tasks at Mod Independent level.       Dates: Start:  11/13/23            Goal: LTG-By discharge, patient will perform bathroom transfers at Mod Independent level.       Dates: Start:  11/13/23

## 2023-11-23 NOTE — CARE PLAN
"The patient is Stable - Low risk of patient condition declining or worsening    Shift Goals  Clinical Goals: safety  Patient Goals: safety  Family Goals: increased pt strength, independence    Patient is not progressing towards the following goals:    Problem: Fall Risk - Rehab  Goal: Patient will remain free from falls  Outcome: Not Met  Note: Shellie Hamilton Fall risk Assessment Score: 19    High fall risk Interventions   - Alarming seatbelt  - Bed and strip alarm   - Yellow sign by the door   - Yellow wrist band \"Fall risk\"  - Do not leave patient unattended in the bathroom  - Fall risk education provided     "

## 2023-11-23 NOTE — CARE PLAN
The patient is Stable - Low risk of patient condition declining or worsening    Shift Goals  Clinical Goals: safety  Patient Goals: safety  Family Goals: increased pt strength, independence    Problem: Fall Risk - Rehab  Goal: Patient will remain free from falls  Outcome: Progressing Pt uses call light consistently and appropriately. Waits for assistance does not attempt self transfer this shift. Able to verbalize needs.     Problem: Bladder / Voiding  Goal: Patient will establish and maintain regular urinary output  Outcome: Progressing Patient voiding adequate amounts of clear, yellow urine. Denies dysuria and flank pain: afebrile. Will continue to monitor.

## 2023-11-23 NOTE — PROGRESS NOTES
Hospital Medicine Daily Progress Note      Chief Complaint:  Hypertension  Diabetes  Renal Failure    Interval History:  Doing well.    Review of Systems  Review of Systems   Constitutional:  Negative for chills and fever.   HENT: Negative.     Eyes: Negative.    Respiratory:  Negative for cough and shortness of breath.    Cardiovascular:  Negative for chest pain and palpitations.   Gastrointestinal:  Negative for abdominal pain, nausea and vomiting.   Genitourinary: Negative.    Musculoskeletal: Negative.    Skin:  Negative for itching and rash.   Neurological:  Positive for focal weakness.   Endo/Heme/Allergies:  Negative for polydipsia. Does not bruise/bleed easily.        Physical Exam  Temp:  [36.9 °C (98.4 °F)-37.1 °C (98.8 °F)] 36.9 °C (98.4 °F)  Pulse:  [] 86  Resp:  [16-18] 16  BP: (126-145)/() 140/96  SpO2:  [94 %-98 %] 98 %    Physical Exam  Vitals reviewed.   Constitutional:       General: He is not in acute distress.     Appearance: Normal appearance. He is not ill-appearing.   HENT:      Head: Normocephalic and atraumatic.      Right Ear: External ear normal.      Left Ear: External ear normal.      Nose: Nose normal.      Mouth/Throat:      Pharynx: Oropharynx is clear.   Eyes:      General:         Right eye: No discharge.         Left eye: No discharge.      Extraocular Movements: Extraocular movements intact.      Conjunctiva/sclera: Conjunctivae normal.   Cardiovascular:      Rate and Rhythm: Normal rate and regular rhythm.   Pulmonary:      Effort: Pulmonary effort is normal. No respiratory distress.      Breath sounds: Normal breath sounds. No wheezing.   Abdominal:      General: Bowel sounds are normal. There is no distension.      Palpations: Abdomen is soft.      Tenderness: There is no abdominal tenderness.   Musculoskeletal:      Cervical back: Normal range of motion and neck supple.      Right lower leg: No edema.      Left lower leg: Edema present.      Comments: LUE +edema    Skin:     General: Skin is warm and dry.   Neurological:      Mental Status: He is alert and oriented to person, place, and time.      Comments: Left sided weakness         Fluids    Intake/Output Summary (Last 24 hours) at 11/23/2023 1404  Last data filed at 11/23/2023 1300  Gross per 24 hour   Intake 1680 ml   Output 875 ml   Net 805 ml       Laboratory  Recent Labs     11/21/23  0532   WBC 5.5   RBC 3.83*   HEMOGLOBIN 11.9*   HEMATOCRIT 34.8*   MCV 90.9   MCH 31.1   MCHC 34.2   RDW 40.2   PLATELETCT 187   MPV 10.5       Recent Labs     11/21/23  0532   SODIUM 139   POTASSIUM 4.0   CHLORIDE 104   CO2 24   GLUCOSE 143*   BUN 32*   CREATININE 2.82*   CALCIUM 9.0                   Assessment/Plan  DVT (deep venous thrombosis) ([facility])  Assessment & Plan  U/S LUE acute thrombus in paired brachial veins  U/S LLE acute thrombus in paired peroneal veins  Started proph Heparin  If tolerates, then may proceed to full anticoagulation if cleared from neuro standpoint  Check F/U labs in AM    Hemorrhagic stroke ([facility])- (present on admission)  Assessment & Plan  Pt suffered R thalamic hemorrhage on 10/23/23 while visiting [facility] in [place]  Presented there w/ sudden onset HA, L HP, and /100  Management per Physiatry    ROMÁN (acute kidney injury) ([facility])- (present on admission)  Assessment & Plan  U/S medical renal disease, no hydronephrosis  Appears to have CKD, probably stage IV  Avoid nephrotoxins  Renal dose all meds  Monitor electrolytes  Outpt Nephrology F/U  Check F/U labs in AM    DM (diabetes mellitus) ([facility])- (present on admission)  Assessment & Plan  HbA1c 6.4  Off FSBS and SSI  Continue diet control  Outpt meds include Metformin    Hyperlipidemia- (present on admission)  Assessment & Plan  Continue Lipitor  Outpt meds include Lipitor    Hypertension- (present on admission)  Assessment & Plan  On Norvasc and Hydralazine  Observe blood pressure trends  Outpt meds include Norvasc    Full Code

## 2023-11-23 NOTE — THERAPY
Physical Therapy   Daily Treatment     Patient Name: Jason Lima  Age:  62 y.o., Sex:  male  Medical Record #: [id number]  Today's Date: 11/23/2023     Precautions  Precautions: Fall Risk  Comments: Left hemiparesis, left visual inattention    Subjective    Pt seated in Tdine, agreeable to session.pt expressed feeling useless and hopeless. Encouraged pt by focusing in therapy and keep working hard.     Objective       11/23/23 0831   PT Charge Group   PT Gait Training (Units) 2   PT Therapeutic Activities (Units) 2   Supervising Physical Therapist Abe Pacheco   PT Total Time Spent   PT Individual Total Time Spent (Mins) 60   Cognition    Level of Consciousness Alert   Attention Impaired   Gait Functional Level of Assist    Gait Level Of Assist Total Assist  (in vector, w/ #50 unloading)   Assistive Device Parallel Bars   Distance (Feet) 8   # of Times Distance was Traveled 3   Deviation Decreased Heel Strike;Decreased Toe Off;Bradykinetic;Ataxic   Wheelchair Functional Level of Assist   Wheelchair Assist Supervised  (jade technique)   Distance Wheelchair (Feet or Distance) 180   Wheelchair Description Supervision for safety   Bed Mobility    Sit to Stand Minimal Assist  (in // bars)   Neuro-Muscular Treatments   Neuro-Muscular Treatments Postural Facilitation;Weight Shift Right;Weight Shift Left   Comments static standing in // bars w/ RUE support 2 mins x3 while donning/ doffing harness. lateral WS in // bars w/ RUE support 2x10.   Interdisciplinary Plan of Care Collaboration   IDT Collaboration with  Therapy Tech;Speech Therapist   Patient Position at End of Therapy Seated   Collaboration Comments tech assisted, transition care to SLP     LLE DF ace wrapped during gait training.    Assessment    Completed gait training in west vector w/ XL harness in // bars. Initially set for #35 unloading but pt required mod-maxA for LLE swing so PTA adjusted to #50 unloading and pt required min-modA for swing. Required frequent  "cues for standing posture w/ mirror for biofeedback and pt was able to self correct w/o assistance. Pt does get fatigue and required seated RB during session.      Strengths: Able to follow instructions, Independent prior level of function, Motivated for self care and independence, Pleasant and cooperative, Supportive family, Willingly participates in therapeutic activities  Barriers: Decreased endurance, Hemiparesis, Difficulty following instructions, Fatigue, Hypertension, Impaired activity tolerance, Impaired balance, Impaired insight/denial of deficits, Impaired functional cognition, Impaired sleep pattern, Impulsive, Limited mobility, Visual impairment, Poor family support (lives alone)    Plan    Head righting/ midline orientation/ sitting and standing activity tolerance  Bed mobility/ PNF rolling  Transfers with SB, emphasis on motor planning  Seated EOB balance  Wc mob with tanvir technique and emphasis on left environmental scanning/ awareness  Initiate HEP  Forced use principles for tanvir body/ standing frame  Vector (West) pregait training w/ XL harness  NMES left LE     DME  PT DME Recommendations  Wheelchair: 18\" Width  Cushion: Specialty (See other comments) (TBD pending ambulation progress)  Assistive Device: Tanvir Walker (TBD pending progress, currently 2 person assist for gait)  Additional Equipment:  (TBD)     Passport items to be completed:  Get in/out of bed safely, in/out of a vehicle, safely use mobility device, walk or wheel around home/community, navigate up and down stairs, show how to get up/down from the ground, ensure home is accessible, demonstrate HEP, complete caregiver training    Physical Therapy Problems (Active)       Problem: Mobility       Dates: Start:  11/13/23         Goal: STG-Within one week, patient will ambulate distances >/= 30' c/ RHR and ModA or better.        Dates: Start:  11/13/23         Goal Note filed on 11/22/23 1102 by Awilda Dela Cruz, MSPT       Limited to 10ft at " right hallway rail mod assist                 Problem: PT-Long Term Goals       Dates: Start:  11/13/23         Goal: LTG-By discharge, patient will propel wheelchair >/= 300' at [place] or better.        Dates: Start:  11/13/23            Goal: LTG-By discharge, patient will ambulate >/= 50' c/ LRAD at [place] or better.        Dates: Start:  11/13/23            Goal: LTG-By discharge, patient will transfer one surface to another c/ Ren[place] or better.        Dates: Start:  11/13/23            Goal: LTG-By discharge, patient will ambulate up/down 1 step c/ LRAD at [place] or better.        Dates: Start:  11/13/23            Goal: LTG-By discharge, patient will transfer in/out of a car c/ ModA or better.        Dates: Start:  11/13/23

## 2023-11-23 NOTE — THERAPY
Physical Therapy   Daily Treatment     Patient Name: Jason Lima  Age:  62 y.o., Sex:  male  Medical Record #: [id number]  Today's Date: 11/22/2023     Precautions  Precautions: (P) Fall Risk  Comments: (P) Left hemiparesis, left visual inattention    Subjective    Patient reports he wants to do more standing and walking work; very appreciative after therapy session; reports he does not think he needs transfer written sequence because he can remember them- understands why transfer steps posted in room for consistency     Objective       11/22/23 1531   PT Charge Group   PT Therapeutic Activities (Units) 2   PT Total Time Spent   PT Individual Total Time Spent (Mins) 30   Precautions   Precautions Fall Risk   Comments Left hemiparesis, left visual inattention   Transfer Functional Level of Assist   Bed, Chair, Wheelchair Transfer Maximal Assist  (mod towards right x 3 reps, max towards left x 3 reps)   Interdisciplinary Plan of Care Collaboration   IDT Collaboration with  Physician;Therapy Tech   Collaboration Comments discharge planning; SBA for transfers     Performed 6 w/c to mat transfers requiring mod assist towards right, max assist towards left  Transfer sequene typed out in 12 steps for consitency    Assessment    Patient able to follow one step verbal commands to follow transfer sequence; unable to verbalize sequence or direct staff who is unfamiliar with transfer steps    Strengths: Able to follow instructions, Independent prior level of function, Motivated for self care and independence, Pleasant and cooperative, Supportive family, Willingly participates in therapeutic activities  Barriers: Decreased endurance, Hemiparesis, Difficulty following instructions, Fatigue, Hypertension, Impaired activity tolerance, Impaired balance, Impaired insight/denial of deficits, Impaired functional cognition, Impaired sleep pattern, Impulsive, Limited mobility, Visual impairment, Poor family support (lives  "alone)    Plan    Use transfer sequence sheet for consistency  Trial Vector for ambulation/standing balance  Ambulation on hallway railing, transition towards hemiwalker  Seated balance  W/c skills  NMES for left leg  Schedule family training for transfers     DME  PT DME Recommendations  Wheelchair: 18\" Width  Cushion: Specialty (See other comments) (TBD pending ambulation progress)  Assistive Device: Tanvir Walker (TBD pending progress, currently 2 person assist for gait)  Additional Equipment: Other (Comments)    Passport items to be completed:  Get in/out of bed safely, in/out of a vehicle, safely use mobility device, walk or wheel around home/community, navigate up and down stairs, show how to get up/down from the ground, ensure home is accessible, demonstrate HEP, complete caregiver training    Physical Therapy Problems (Active)       Problem: Mobility       Dates: Start:  11/13/23         Goal: STG-Within one week, patient will ambulate distances >/= 30' c/ RHR and ModA or better.        Dates: Start:  11/13/23         Goal Note filed on 11/22/23 1102 by Awilda Dela Cruz, MSPT       Limited to 10ft at right hallway rail mod assist                 Problem: PT-Long Term Goals       Dates: Start:  11/13/23         Goal: LTG-By discharge, patient will propel wheelchair >/= 300' at [place] or better.        Dates: Start:  11/13/23            Goal: LTG-By discharge, patient will ambulate >/= 50' c/ LRAD at [place] or better.        Dates: Start:  11/13/23            Goal: LTG-By discharge, patient will transfer one surface to another c/ Renata or better.        Dates: Start:  11/13/23            Goal: LTG-By discharge, patient will ambulate up/down 1 step c/ LRAD at [place] or better.        Dates: Start:  11/13/23            Goal: LTG-By discharge, patient will transfer in/out of a car c/ ModA or better.        Dates: Start:  11/13/23              "

## 2023-11-23 NOTE — THERAPY
"Speech Language Pathology  Daily Treatment     Patient Name: Jason Lima  Age:  62 y.o., Sex:  male  Medical Record #: [id number]  Today's Date: 11/23/2023     Precautions  Precautions: Fall Risk  Comments: Left hemiparesis, left visual inattention    Subjective    Pt pleasant and cooperative, expressed that he was feeling down stating \"I'm scared\"     Objective       11/23/23 0933   Treatment Charges   SLP Cognitive Skill Development First 15 Minutes 1   SLP Cognitive Skill Development Additional 15 Minutes 1   SLP Total Time Spent   SLP Individual Total Time Spent (Mins) 30         Assessment    Pt expressing feeling down today \"I dont want to do this anymore\" and \"I'm scared.\" SLP provided attentive listening, reassured pt of progress  thus far and ongoing plan for therapy even after dc from hospital with likel recommendations for home health therapy. Discussed that although pt has impairments from stroke he also has some great strengths and encouraged pt to maintain motivation even during tough times. Pt expressed gratitude for open conversation and verbalized indep awareness of seeing others in hospital who may have \"a bigger mountain to climb than me\" and that he will cont to work towards his goals. Pt with written sequencing steps for transfer in room, reviewed at this time and encouraged pt to cont to review during downtime to reinforce and assist in automatic completion when attempting transfers.     Strengths: Effective communication skills, Alert and oriented, Able to follow instructions, Motivated for self care and independence, Pleasant and cooperative, Supportive family, Willingly participates in therapeutic activities  Barriers: Aspiration risk, Hemiparesis, Impaired carryover of learning, Impaired insight/denial of deficits, Impaired functional cognition, Impulsive, Visual impairment    Plan    Visual scanning, sequencing of transfers and executive functioning     Speech Therapy Problems (Active)  "      Problem: Expression STGs       Dates: Start:  11/13/23         Goal: STG-Within one week, patient will       Dates: Start:  11/13/23               Problem: Memory STGs       Dates: Start:  11/22/23         Goal: STG-Within one week, patient will recall new training and safety sequencing with 80% acc with set up and external cues.       Dates: Start:  11/22/23               Problem: Problem Solving STGs       Dates: Start:  11/22/23         Goal: STG-Within one week, patient will perform alternating attention tasks with 85% acc with set up.       Dates: Start:  11/22/23            Goal: STG-Within one week, patient will organization and reasoning tasks with 80% acc with min cues.       Dates: Start:  11/22/23               Problem: Speech/Swallowing LTGs       Dates: Start:  11/13/23         Goal: LTG-By discharge, patient will safely swallow (7)regular diet textures and (0) thin liquids with no overt s/sx of aspiration for 2/2 days.       Dates: Start:  11/13/23            Goal: LTG-By discharge, patient will solve complex problem       Dates: Start:  11/13/23       Description: For safety and self care with 80% acc mod I  for safe discharge home.         Goal: LTG-By discharge, patient will recall new training with 80% acc with min A and use of external memory aids.       Dates: Start:  11/13/23               Problem: Swallowing STGs       Dates: Start:  11/13/23

## 2023-11-24 ENCOUNTER — APPOINTMENT (OUTPATIENT)
Dept: PHYSICAL THERAPY | Facility: REHABILITATION | Age: 63
DRG: 057 | End: 2023-11-24
Attending: PHYSICAL MEDICINE & REHABILITATION
Payer: COMMERCIAL

## 2023-11-24 ENCOUNTER — APPOINTMENT (OUTPATIENT)
Dept: SPEECH THERAPY | Facility: REHABILITATION | Age: 63
DRG: 057 | End: 2023-11-24
Attending: PHYSICAL MEDICINE & REHABILITATION
Payer: COMMERCIAL

## 2023-11-24 ENCOUNTER — APPOINTMENT (OUTPATIENT)
Dept: OCCUPATIONAL THERAPY | Facility: REHABILITATION | Age: 63
DRG: 057 | End: 2023-11-24
Attending: PHYSICAL MEDICINE & REHABILITATION
Payer: COMMERCIAL

## 2023-11-24 LAB
ANION GAP SERPL CALC-SCNC: 13 MMOL/L (ref 7–16)
BASOPHILS # BLD AUTO: 0.4 % (ref 0–1.8)
BASOPHILS # BLD: 0.02 K/UL (ref 0–0.12)
BUN SERPL-MCNC: 33 MG/DL (ref 8–22)
CALCIUM SERPL-MCNC: 8.8 MG/DL (ref 8.5–10.5)
CHLORIDE SERPL-SCNC: 104 MMOL/L (ref 96–112)
CO2 SERPL-SCNC: 23 MMOL/L (ref 20–33)
CREAT SERPL-MCNC: 2.86 MG/DL (ref 0.5–1.4)
EOSINOPHIL # BLD AUTO: 0.17 K/UL (ref 0–0.51)
EOSINOPHIL NFR BLD: 3.4 % (ref 0–6.9)
ERYTHROCYTE [DISTWIDTH] IN BLOOD BY AUTOMATED COUNT: 39.6 FL (ref 35.9–50)
GFR SERPLBLD CREATININE-BSD FMLA CKD-EPI: 24 ML/MIN/1.73 M 2
GLUCOSE SERPL-MCNC: 137 MG/DL (ref 65–99)
HCT VFR BLD AUTO: 33.2 % (ref 42–52)
HGB BLD-MCNC: 11.4 G/DL (ref 14–18)
IMM GRANULOCYTES # BLD AUTO: 0.02 K/UL (ref 0–0.11)
IMM GRANULOCYTES NFR BLD AUTO: 0.4 % (ref 0–0.9)
LYMPHOCYTES # BLD AUTO: 1.45 K/UL (ref 1–4.8)
LYMPHOCYTES NFR BLD: 29.1 % (ref 22–41)
MCH RBC QN AUTO: 31 PG (ref 27–33)
MCHC RBC AUTO-ENTMCNC: 34.3 G/DL (ref 32.3–36.5)
MCV RBC AUTO: 90.2 FL (ref 81.4–97.8)
MONOCYTES # BLD AUTO: 0.35 K/UL (ref 0–0.85)
MONOCYTES NFR BLD AUTO: 7 % (ref 0–13.4)
NEUTROPHILS # BLD AUTO: 2.98 K/UL (ref 1.82–7.42)
NEUTROPHILS NFR BLD: 59.7 % (ref 44–72)
NRBC # BLD AUTO: 0 K/UL
NRBC BLD-RTO: 0 /100 WBC (ref 0–0.2)
PLATELET # BLD AUTO: 175 K/UL (ref 164–446)
PMV BLD AUTO: 10.4 FL (ref 9–12.9)
POTASSIUM SERPL-SCNC: 4.2 MMOL/L (ref 3.6–5.5)
RBC # BLD AUTO: 3.68 M/UL (ref 4.7–6.1)
SODIUM SERPL-SCNC: 140 MMOL/L (ref 135–145)
WBC # BLD AUTO: 5 K/UL (ref 4.8–10.8)

## 2023-11-24 PROCEDURE — 97530 THERAPEUTIC ACTIVITIES: CPT

## 2023-11-24 PROCEDURE — 97116 GAIT TRAINING THERAPY: CPT

## 2023-11-24 PROCEDURE — A9270 NON-COVERED ITEM OR SERVICE: HCPCS | Performed by: PHYSICAL MEDICINE & REHABILITATION

## 2023-11-24 PROCEDURE — 700102 HCHG RX REV CODE 250 W/ 637 OVERRIDE(OP): Performed by: PHYSICAL MEDICINE & REHABILITATION

## 2023-11-24 PROCEDURE — 36415 COLL VENOUS BLD VENIPUNCTURE: CPT

## 2023-11-24 PROCEDURE — 80048 BASIC METABOLIC PNL TOTAL CA: CPT

## 2023-11-24 PROCEDURE — 700111 HCHG RX REV CODE 636 W/ 250 OVERRIDE (IP): Performed by: HOSPITALIST

## 2023-11-24 PROCEDURE — 770010 HCHG ROOM/CARE - REHAB SEMI PRIVAT*

## 2023-11-24 PROCEDURE — 99232 SBSQ HOSP IP/OBS MODERATE 35: CPT | Performed by: PHYSICAL MEDICINE & REHABILITATION

## 2023-11-24 PROCEDURE — 97535 SELF CARE MNGMENT TRAINING: CPT

## 2023-11-24 PROCEDURE — 700102 HCHG RX REV CODE 250 W/ 637 OVERRIDE(OP): Performed by: HOSPITALIST

## 2023-11-24 PROCEDURE — 97112 NEUROMUSCULAR REEDUCATION: CPT

## 2023-11-24 PROCEDURE — 97129 THER IVNTJ 1ST 15 MIN: CPT

## 2023-11-24 PROCEDURE — 99232 SBSQ HOSP IP/OBS MODERATE 35: CPT | Performed by: HOSPITALIST

## 2023-11-24 PROCEDURE — A9270 NON-COVERED ITEM OR SERVICE: HCPCS | Performed by: HOSPITALIST

## 2023-11-24 PROCEDURE — 94660 CPAP INITIATION&MGMT: CPT

## 2023-11-24 PROCEDURE — 85025 COMPLETE CBC W/AUTO DIFF WBC: CPT

## 2023-11-24 PROCEDURE — 97130 THER IVNTJ EA ADDL 15 MIN: CPT

## 2023-11-24 RX ADMIN — TRAZODONE HYDROCHLORIDE 75 MG: 50 TABLET ORAL at 20:28

## 2023-11-24 RX ADMIN — HYDRALAZINE HYDROCHLORIDE 100 MG: 50 TABLET, FILM COATED ORAL at 13:02

## 2023-11-24 RX ADMIN — AMLODIPINE BESYLATE 10 MG: 5 TABLET ORAL at 08:16

## 2023-11-24 RX ADMIN — SENNOSIDES AND DOCUSATE SODIUM 2 TABLET: 50; 8.6 TABLET ORAL at 20:28

## 2023-11-24 RX ADMIN — SENNOSIDES AND DOCUSATE SODIUM 2 TABLET: 50; 8.6 TABLET ORAL at 08:16

## 2023-11-24 RX ADMIN — HEPARIN SODIUM 5000 UNITS: 5000 INJECTION, SOLUTION INTRAVENOUS; SUBCUTANEOUS at 06:01

## 2023-11-24 RX ADMIN — ATORVASTATIN CALCIUM 40 MG: 40 TABLET, FILM COATED ORAL at 20:28

## 2023-11-24 RX ADMIN — APIXABAN 10 MG: 5 TABLET, FILM COATED ORAL at 20:28

## 2023-11-24 RX ADMIN — OMEPRAZOLE 20 MG: 20 CAPSULE, DELAYED RELEASE ORAL at 08:16

## 2023-11-24 RX ADMIN — HYDRALAZINE HYDROCHLORIDE 100 MG: 50 TABLET, FILM COATED ORAL at 06:01

## 2023-11-24 RX ADMIN — HYDRALAZINE HYDROCHLORIDE 100 MG: 50 TABLET, FILM COATED ORAL at 20:29

## 2023-11-24 ASSESSMENT — ENCOUNTER SYMPTOMS
POLYDIPSIA: 0
PALPITATIONS: 0
ABDOMINAL PAIN: 0
MUSCULOSKELETAL NEGATIVE: 1
BRUISES/BLEEDS EASILY: 0
COUGH: 0
FEVER: 0
FOCAL WEAKNESS: 1
NAUSEA: 0
VOMITING: 0
SHORTNESS OF BREATH: 0
CHILLS: 0
EYES NEGATIVE: 1

## 2023-11-24 ASSESSMENT — GAIT ASSESSMENTS
ASSISTIVE DEVICE: PARALLEL BARS;OTHER (COMMENTS)
DEVIATION: STEP TO;DECREASED BASE OF SUPPORT;BRADYKINETIC;DECREASED HEEL STRIKE;DECREASED TOE OFF
GAIT LEVEL OF ASSIST: TOTAL ASSIST X 2
DISTANCE (FEET): 30

## 2023-11-24 ASSESSMENT — PAIN DESCRIPTION - PAIN TYPE: TYPE: ACUTE PAIN

## 2023-11-24 NOTE — THERAPY
Physical Therapy   Daily Treatment     Patient Name: Jason Lima  Age:  62 y.o., Sex:  male  Medical Record #: [id number]  Today's Date: 11/23/2023     Precautions  Precautions: Fall Risk  Comments: Left hemiparesis, left visual inattention    Subjective    Patient pleasant and very motivated to participate. Requests to work on standing and walking this afternoon.      Objective       11/23/23 1501   PT Charge Group   PT Neuromuscular Re-Education / Balance (Units) 2   PT Total Time Spent   PT Individual Total Time Spent (Mins) 30   Gait Functional Level of Assist    Gait Level Of Assist Total Assist X 2  (Mod A from therapist; second person WC follow)   Assistive Device Parallel Bars  (acewrap DF assist at L LE)   Distance (Feet) 10   # of Times Distance was Traveled 4   Deviation   (mod-max A for L foot advancement; mod A for L knee extension and to correct pelvic retraction in stance; min-mod A to correct L lean; cue to bring R hip to // bar during R stance phase to assist with L swing phase; mirror for visual feedback)     Wheelchair Functional Level of Assist   Wheelchair Assist Supervised   Distance Wheelchair (Feet or Distance) 120   Wheelchair Description Assistance with steering;Extra time;Limited by fatigue;Impaired coordination;Supervision for safety;Verbal cueing  (hemitechnique)   Bed Mobility    Sit to Stand Minimal Assist  (in // bars; cuing for set-up)   Neuro-Muscular Treatments   Comments   Sit <> stand x 10 in // bars with min A  -mirror for visual feedback  -5 x 30 seconds with lateral weight-shifting (B UE support on // bars; max A for L UE support)  -5 x 30 seconds static stand with horizontal head turns (patients hand on therapists shoulder to reduce potential pushing on // bar)     Interdisciplinary Plan of Care Collaboration   IDT Collaboration with  Therapy Tech   Patient Position at End of Therapy Seated;Chair Alarm On;Self Releasing Lap Belt Applied;Call Light within Reach;Tray Table  "within Reach;Phone within Reach  (L hemitray)   Collaboration Comments Therapy tech assist throughout session         Assessment    Patient able to maintain and find midline in static standing in // bars, but unable to self-correct with using mirror for feedback and cuing from therapist. He required mod-max A to advance the L LE during ambulation in // bars but was able to advance the limb several times with only min A for placement (benefited from verbal cue to weight-shift R hip all the way to // bar during R stance phase). Patient remains very motivated to participate.     Strengths: Able to follow instructions, Independent prior level of function, Motivated for self care and independence, Pleasant and cooperative, Supportive family, Willingly participates in therapeutic activities  Barriers: Decreased endurance, Hemiparesis, Difficulty following instructions, Fatigue, Hypertension, Impaired activity tolerance, Impaired balance, Impaired insight/denial of deficits, Impaired functional cognition, Impaired sleep pattern, Impulsive, Limited mobility, Visual impairment, Poor family support (lives alone)    Plan    Head righting/ midline orientation/ sitting and standing activity tolerance  Bed mobility/ PNF rolling  Transfers with SB, emphasis on motor planning  Seated EOB balance  Wc mob with tanvir technique and emphasis on left environmental scanning/ awareness  Initiate HEP  Forced use principles for tanvir body/ standing frame  Vector (West) pregait training w/ XL harness  NMES left LE    DME  PT DME Recommendations  Wheelchair: 18\" Width  Cushion: Specialty (See other comments) (TBD pending ambulation progress)  Assistive Device: Tanvir Walker (TBD pending progress, currently 2 person assist for gait)  Additional Equipment: Other (Comments)    Passport items to be completed:  Get in/out of bed safely, in/out of a vehicle, safely use mobility device, walk or wheel around home/community, navigate up and down stairs, " show how to get up/down from the ground, ensure home is accessible, demonstrate HEP, complete caregiver training    Physical Therapy Problems (Active)       Problem: Mobility       Dates: Start:  11/13/23         Goal: STG-Within one week, patient will ambulate distances >/= 30' c/ RHR and ModA or better.        Dates: Start:  11/13/23         Goal Note filed on 11/22/23 1102 by Awilda Dela Cruz, MSPT       Limited to 10ft at right hallway rail mod assist                 Problem: PT-Long Term Goals       Dates: Start:  11/13/23         Goal: LTG-By discharge, patient will propel wheelchair >/= 300' at [place] or better.        Dates: Start:  11/13/23            Goal: LTG-By discharge, patient will ambulate >/= 50' c/ LRAD at [place] or better.        Dates: Start:  11/13/23            Goal: LTG-By discharge, patient will transfer one surface to another c/ Ren[place] or better.        Dates: Start:  11/13/23            Goal: LTG-By discharge, patient will ambulate up/down 1 step c/ LRAD at [place] or better.        Dates: Start:  11/13/23            Goal: LTG-By discharge, patient will transfer in/out of a car c/ ModA or better.        Dates: Start:  11/13/23

## 2023-11-24 NOTE — THERAPY
Physical Therapy   Daily Treatment     Patient Name: Jason Lima  Age:  62 y.o., Sex:  male  Medical Record #: [id number]  Today's Date: 11/24/2023     Precautions  Precautions: Fall Risk  Comments: Left hemiparesis, left visual inattention    Subjective    Pt was seated in w/c upon arrival and agreeable to treatment.       Objective       11/24/23 1301   PT Charge Group   PT Gait Training (Units) 2   PT Neuromuscular Re-Education / Balance (Units) 2   PT Total Time Spent   PT Individual Total Time Spent (Mins) 60   Precautions   Precautions Fall Risk   Gait Functional Level of Assist    Gait Level Of Assist Total Assist X 2  (Max A with w/c follow for safety)   Assistive Device Parallel Bars;Other (Comments)  (R hand rail)   Distance (Feet) 30  (x10 feet x 2 in // bars)   # of Times Distance was Traveled 3   Deviation Step To;Decreased Base Of Support;Bradykinetic;Decreased Heel Strike;Decreased Toe Off   Interdisciplinary Plan of Care Collaboration   Patient Position at End of Therapy Seated;Other (Comments)  (Hand off to SLP)     Standing posture and pre-gait activities in // bars x 10 min.    AMB in // bars progressing to R hallway rail with focus on upright posture and increased R lateral lean/R LE weight shift using foot slider at assist LLE advancement in swing phase  Continued education on CVA recovery    Assessment    Pt continues to be limited d/t L sided lean/midline orientation deficits/contraversive pushing.  However, pt with slight improvement in R weight shift with repetition and significant tactile cues/facilitation.  Pt progressing to min or no assist on LLE advancement ~50% of the time with use of foot slider to decrease shoe to floor friction. Pt is very motivated to progress, but carry over will need to be assessed.   Strengths: Able to follow instructions, Independent prior level of function, Motivated for self care and independence, Pleasant and cooperative, Supportive family, Willingly  "participates in therapeutic activities  Barriers: Decreased endurance, Hemiparesis, Difficulty following instructions, Fatigue, Hypertension, Impaired activity tolerance, Impaired balance, Impaired insight/denial of deficits, Impaired functional cognition, Impaired sleep pattern, Impulsive, Limited mobility, Visual impairment, Poor family support (lives alone)    Plan    Head righting/ midline orientation/ sitting and standing activity tolerance  Bed mobility/ PNF rolling  Transfers with SB, emphasis on motor planning  Seated EOB balance  Wc mob with tanvir technique and emphasis on left environmental scanning/ awareness  Initiate HEP  Forced use principles for tanvir body/ standing frame  Vector (West) pregait training w/ XL harness  NMES left LE     DME  PT DME Recommendations  Wheelchair: 18\" Width  Cushion: Specialty (See other comments) (TBD pending ambulation progress)  Assistive Device: Tanvir Walker (TBD pending progress, currently 2 person assist for gait)  Additional Equipment: Other (Comments)     Passport items to be completed:  Get in/out of bed safely, in/out of a vehicle, safely use mobility device, walk or wheel around home/community, navigate up and down stairs, show how to get up/down from the ground, ensure home is accessible, demonstrate HEP, complete caregiver training       Physical Therapy Problems (Active)       Problem: Mobility       Dates: Start:  11/13/23         Goal: STG-Within one week, patient will ambulate distances >/= 30' c/ RHR and ModA or better.        Dates: Start:  11/13/23         Goal Note filed on 11/22/23 1102 by Awilda Dela Cruz, MSPT       Limited to 10ft at right hallway rail mod assist                 Problem: PT-Long Term Goals       Dates: Start:  11/13/23         Goal: LTG-By discharge, patient will propel wheelchair >/= 300' at [place] or better.        Dates: Start:  11/13/23            Goal: LTG-By discharge, patient will ambulate >/= 50' c/ LRAD at [place] or better.        " Dates: Start:  11/13/23            Goal: LTG-By discharge, patient will transfer one surface to another c/ Ren[place] or better.        Dates: Start:  11/13/23            Goal: LTG-By discharge, patient will ambulate up/down 1 step c/ LRAD at [place] or better.        Dates: Start:  11/13/23            Goal: LTG-By discharge, patient will transfer in/out of a car c/ ModA or better.        Dates: Start:  11/13/23

## 2023-11-24 NOTE — FLOWSHEET NOTE
11/23/23 5947   Events/Summary/Plan   Events/Summary/Plan Pt has been using [facility] CPAP, pt wanted to use his own CPAP so it was cleaned and set up at bedside for use tonight.   Vital Signs   Pulse 92   Respiration 18   Pulse Oximetry 94 %   $ Pulse Oximetry (Spot Check) Yes   Respiratory Assessment   Respiratory Pattern Within Normal Limits   Level of Consciousness Alert   Chest Exam   Work Of Breathing / Effort Within Normal Limits   Oxygen   O2 Delivery Device None - Room Air   Non-Invasive Ventilation LUZ Group   Nocturnal CPAP or BIPAP CPAP - [facility] Unit   $ System Evaluation Yes   Cleanliness and Damage Inspection Performed Yes   Settings (If Known) Auto CPAP 5-14, EPR-3

## 2023-11-24 NOTE — THERAPY
"Occupational Therapy  Daily Treatment     Patient Name: Jason Lima  Age:  62 y.o., Sex:  male  Medical Record #: [id number]  Today's Date: 11/24/2023     Precautions  Precautions: Fall Risk  Comments: Left hemiparesis, left visual inattention         Subjective    Pt encountered for OT, pleasant and agreeable to participate. \"I really want to work on walking.\"     Objective       11/24/23 0931   OT Charge Group   Charges Yes   OT Self Care / ADL (Units) 1   OT Therapy Activity (Units) 3   OT Total Time Spent   OT Individual Total Time Spent (Mins) 60   Precautions   Precautions Fall Risk   Comments Left hemiparesis, left visual inattention   Functional Level of Assist   Toileting Total Assist x 2   Toileting Description Assist for hygiene;Assist to pull pants up;Assist to pull pants down;Assist for standing balance;Grab bar;Supervision for safety;Other (comment)  (TAx2 to TA for standing balance to manage pants down in standing and complete perineal care following BM. Pt SPV for sitting on toilet using GB for sitting balance.)   Bed, Chair, Wheelchair Transfer Maximal Assist   Bed Chair Wheelchair Transfer Description Assist with one limb;Initial preparation for task;Increased time;Requires lift;Set-up of equipment;Squat pivot transfer to wheelchair;Supervision for safety;Verbal cueing;Other (comment)  (MaxA for squat pivot from therapy mat <> WC, x4. Max VC for WOLF/LE awareness during transfers. Trialed GivMohr sling for UE safety during transfers. Pt completed non-hospital bed transfers with bedside GB, x2 at maxA.)   Toilet Transfers Total Assist X 2   Toilet Transfer Description Grab bar;Assist with one limb;Increased time;Initial preparation for task;Requires lift;Supervision for safety;Verbal cueing;Other (comment)  (TA x2 this day d/t significant L lateral lean.)   Bed Mobility    Supine to Sit Maximal Assist  (non-hospital bed)   Sit to Supine Moderate Assist   Skilled Intervention Compensatory " Strategies;Tactile Cuing;Verbal Cuing;Sequencing;Other (See Comments)  (Non-hospital bed training.)   Interdisciplinary Plan of Care Collaboration   IDT Collaboration with  Therapy Tech   Patient Position at End of Therapy Seated;Chair Alarm On;Call Light within Reach;Tray Table within Reach;Phone within Reach   Collaboration Comments therapy tech assistance throughout session for safe transfers     Discussed with pt mastering safety with functional transfers (e.g. bed <> WC) prior to progressing towards standing/walking types of functional mobility. Pt verbalized agreement and understanding.     Moderate VC needed for sequencing transfers and L-sided awareness.     Assessment    Overall, steady yet fluctuating progress towards maxA for functional transfers, but pt conts to demo at TAx2 at times cont to require tech assist for safety. Left lateral lean and inattention to the left side cont to be primary barrier. Good response to use of Givmohr sling during functional transfers to facilitate safe positioning of the LUE and to provide tactile input, may benefit to cont.     Strengths: Able to follow instructions, Independent prior level of function, Motivated for self care and independence, Pleasant and cooperative, Supportive family, Alert and oriented  Barriers: Decreased endurance, Fatigue, Generalized weakness, Hemiparesis, Impaired activity tolerance, Impaired balance, Limited mobility, Spasticity    Plan    Cont ADLs, functional transfers, and thera act/ex to maximize functional recovery for safe DC home.   Cont use of Givmohr sling during functional transfers to support safe positioning of LUE.     DME  OT DME Recommendations  Bathroom Equipment: 3 in 1 Commode  Additional Equipment: Other (Comments)    Passport items to be completed:  Perform bathroom transfers, complete dressing, complete feeding, get ready for the day, prepare a simple meal, participate in household tasks, adapt home for safety needs,  demonstrate home exercise program, complete caregiver training     Occupational Therapy Goals (Active)       Problem: Bathing       Dates: Start:  11/15/23         Goal: STG-Within one week, patient will bathe at modA using DME/AE PRN       Dates: Start:  11/15/23               Problem: Dressing       Dates: Start:  11/22/23         Goal: STG-Within one week, patient will dress UB at [place] using LRD       Dates: Start:  11/22/23            Goal: STG-Within one week, patient will dress LB at [place] using LRD       Dates: Start:  11/22/23               Problem: Functional Transfers       Dates: Start:  11/13/23         Goal: STG-Within one week, patient will transfer to toilet with Max/Mod A.       Dates: Start:  11/13/23            Goal: STG-Within one week, patient will transfer to step in shower with Max A.       Dates: Start:  11/13/23               Problem: OT Long Term Goals       Dates: Start:  11/13/23         Goal: LTG-By discharge, patient will complete basic self care tasks at Mod Independent level.       Dates: Start:  11/13/23            Goal: LTG-By discharge, patient will perform bathroom transfers at Mod Independent level.       Dates: Start:  11/13/23

## 2023-11-24 NOTE — CARE PLAN
The patient is Watcher - Medium risk of patient condition declining or worsening    Shift Goals  Clinical Goals: safety, comfot, sleep  Patient Goals: sleep  Family Goals: increased pt strength, independence    Progress made toward(s) clinical / shift goals:   Problem: Knowledge Deficit - Standard  Goal: Patient and family/care givers will demonstrate understanding of plan of care, disease process/condition, diagnostic tests and medications  Outcome: Progressing  Note: Pt education reinforced regarding plan of care with emphasis on adequate hydration, pt again shows better understanding with improved follow through, pt again reports understanding how this can effect constipation, will continue to reinforce education and continue to monitor.

## 2023-11-24 NOTE — THERAPY
"Speech Language Pathology  Daily Treatment     Patient Name: Jason Lima  Age:  62 y.o., Sex:  male  Medical Record #: [id number]  Today's Date: 11/24/2023     Precautions  Precautions: Fall Risk  Comments: Left hemiparesis, left visual inattention    Subjective    Pt pleasant and cooperative during ST session.      Objective       11/24/23 1401   Treatment Charges   SLP Cognitive Skill Development First 15 Minutes 1   SLP Cognitive Skill Development Additional 15 Minutes 1   SLP Total Time Spent   SLP Individual Total Time Spent (Mins) 30   Cognition   Cognitive-Linguistic (WDL) X   Planning / Decision Making Moderate (3)   Executive Functioning / Organization Minimal (4)   Interdisciplinary Plan of Care Collaboration   Patient Position at End of Therapy Seated;Chair Alarm On;Self Releasing Lap Belt Applied;Call Light within Reach;Tray Table within Reach;Phone within Reach         Assessment    Kitchen Shelves executive function task completed with MOD-MIN A to achieve 100% acc. Pt asked twice, \"why am I doing this?\" With education re: areas of deficit being planning, organization, attention. 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 Pt req'd MOD cues to determine logical steps for initiating sell, who would need to be hired, etc.     Strengths: Effective communication skills, Alert and oriented, Able to follow instructions, Motivated for self care and independence, Pleasant and cooperative, Supportive family, Willingly participates in therapeutic activities  Barriers: Aspiration risk, Hemiparesis, Impaired carryover of learning, Impaired insight/denial of deficits, Impaired functional cognition, Impulsive, Visual impairment    Plan    Continue to address executive function, complex planning/organization.    Passport items to be completed:  Express basic needs, understand food/liquid recommendations, consistently follow swallow precautions, manage finances, manage medications, " arrive to therapy appointments on time, complete daily memory log entries, solve problems related to safety situations, review education related to hospitalization, complete caregiver training     Speech Therapy Problems (Active)       Problem: Expression STGs       Dates: Start:  11/13/23         Goal: STG-Within one week, patient will       Dates: Start:  11/13/23               Problem: Memory STGs       Dates: Start:  11/22/23         Goal: STG-Within one week, patient will recall new training and safety sequencing with 80% acc with set up and external cues.       Dates: Start:  11/22/23               Problem: Problem Solving STGs       Dates: Start:  11/22/23         Goal: STG-Within one week, patient will perform alternating attention tasks with 85% acc with set up.       Dates: Start:  11/22/23            Goal: STG-Within one week, patient will organization and reasoning tasks with 80% acc with min cues.       Dates: Start:  11/22/23               Problem: Speech/Swallowing LTGs       Dates: Start:  11/13/23         Goal: LTG-By discharge, patient will safely swallow (7)regular diet textures and (0) thin liquids with no overt s/sx of aspiration for 2/2 days.       Dates: Start:  11/13/23            Goal: LTG-By discharge, patient will solve complex problem       Dates: Start:  11/13/23       Description: For safety and self care with 80% acc mod I  for safe discharge home.         Goal: LTG-By discharge, patient will recall new training with 80% acc with min A and use of external memory aids.       Dates: Start:  11/13/23               Problem: Swallowing STGs       Dates: Start:  11/13/23

## 2023-11-24 NOTE — PROGRESS NOTES
"  Physical Medicine & Rehabilitation Progress Note    Encounter Date: 11/24/2023    Chief Complaint: Feels well    Interval Events (Subjective):  VS: BP somewhat labile  BM 11/24  Voiding    Seen and examined in his room and in therapy gym. He is feeling well. Okay with starting DVT therapeutic dose. Discussed risks and benefits. Patient verbalizes agreement to plan.     ROS: 14 point ROS negative unless otherwise specified in the HPI    Objective:  VITAL SIGNS: BP (!) 137/92   Pulse 80   Temp 36.9 °C (98.4 °F) (Oral)   Resp 16   Ht 1.727 m (5' 8\")   Wt 86 kg (189 lb 9.5 oz)   SpO2 95%   BMI 28.83 kg/m²     GEN: No apparent distress  HEENT: Head normocephalic, atraumatic.  Sclera nonicteric bilaterally, no ocular discharge appreciated bilaterally.  CV: Extremities warm and well-perfused, no peripheral edema appreciated bilaterally.  PULMONARY: Breathing nonlabored on room air, no respiratory accessory muscle use.  Not requiring supplemental oxygen.  SKIN: No appreciable skin breakdown on exposed areas of skin.  PSYCH: Flat affect  NEURO: Awake alert.  Conversational.  Logical thought content. Dysarthria. Left Hemiparesis.       Laboratory Values:  Recent Results (from the past 72 hour(s))   CBC WITH DIFFERENTIAL    Collection Time: 11/24/23  5:23 AM   Result Value Ref Range    WBC 5.0 4.8 - 10.8 K/uL    RBC 3.68 (L) 4.70 - 6.10 M/uL    Hemoglobin 11.4 (L) 14.0 - 18.0 g/dL    Hematocrit 33.2 (L) 42.0 - 52.0 %    MCV 90.2 81.4 - 97.8 fL    MCH 31.0 27.0 - 33.0 pg    MCHC 34.3 32.3 - 36.5 g/dL    RDW 39.6 35.9 - 50.0 fL    Platelet Count 175 164 - 446 K/uL    MPV 10.4 9.0 - 12.9 fL    Neutrophils-Polys 59.70 44.00 - 72.00 %    Lymphocytes 29.10 22.00 - 41.00 %    Monocytes 7.00 0.00 - 13.40 %    Eosinophils 3.40 0.00 - 6.90 %    Basophils 0.40 0.00 - 1.80 %    Immature Granulocytes 0.40 0.00 - 0.90 %    Nucleated RBC 0.00 0.00 - 0.20 /100 WBC    Neutrophils (Absolute) 2.98 1.82 - 7.42 K/uL    Lymphs (Absolute) " 1.45 1.00 - 4.80 K/uL    Monos (Absolute) 0.35 0.00 - 0.85 K/uL    Eos (Absolute) 0.17 0.00 - 0.51 K/uL    Baso (Absolute) 0.02 0.00 - 0.12 K/uL    Immature Granulocytes (abs) 0.02 0.00 - 0.11 K/uL    NRBC (Absolute) 0.00 K/uL   Basic Metabolic Panel    Collection Time: 23  5:23 AM   Result Value Ref Range    Sodium 140 135 - 145 mmol/L    Potassium 4.2 3.6 - 5.5 mmol/L    Chloride 104 96 - 112 mmol/L    Co2 23 20 - 33 mmol/L    Glucose 137 (H) 65 - 99 mg/dL    Bun 33 (H) 8 - 22 mg/dL    Creatinine 2.86 (H) 0.50 - 1.40 mg/dL    Calcium 8.8 8.5 - 10.5 mg/dL    Anion Gap 13.0 7.0 - 16.0   ESTIMATED GFR    Collection Time: 23  5:23 AM   Result Value Ref Range    GFR (CKD-EPI) 24 (A) >60 mL/min/1.73 m 2       Medications:  Scheduled Medications   Medication Dose Frequency    apixaban  10 mg BID    [START ON 2023] apixaban  5 mg BID    hydrALAZINE  100 mg Q8HRS    traZODone  75 mg QHS    senna-docusate  2 Tablet BID    omeprazole  20 mg DAILY    amLODIPine  10 mg DAILY    atorvastatin  40 mg Nightly    baclofen  5 mg QHS     PRN medications: melatonin, [DISCONTINUED] insulin regular **AND** [CANCELED] POC blood glucose manual result **AND** NOTIFY MD and PharmD **AND** Administer 20 grams of glucose (approximately 8 ounces of fruit juice) every 15 minutes PRN FSBG less than 70 mg/dL **AND** dextrose bolus, Respiratory Therapy Consult, senna-docusate **AND** polyethylene glycol/lytes **AND** magnesium hydroxide **AND** bisacodyl, mag hydrox-al hydrox-simeth, ondansetron **OR** ondansetron, traZODone, sodium chloride, [] acetaminophen **FOLLOWED BY** acetaminophen, oxyCODONE immediate-release **OR** oxyCODONE immediate-release, hydrALAZINE    Diet:  Current Diet Order   Procedures    Diet Order Diet: Consistent CHO (Diabetic) (2 gram sodium restriction; medications whole with thin liquids); Second Modifier: (optional): 2 Gram Sodium       Medical Decision Making and Plan:  Right thalamic hemorrhagic  stroke ~ last week October   Originally presented with a headache and left-sided weakness to hospital in [facility]  Work-up at the outside hospital in [facility] revealed a right thalamic hemorrhagic stroke  Repeat CT head done after return flight to the [place], 11/11 CT head shows right thalamic hemorrhage, decrease in density since prior studies from the outside hospital, slight asymmetric dilation of the left ventricular system including the left temporal horn with a possible component of hydrocephalus  Planning for BP less than 140, avoiding any kind of anticoagulation  Initiate comprehensive IRF level therapy with 3 days of therapy 5 days a week with services from PT/OT/SLP     Spasticity  Continue baclofen 5 mg nightly, started on 11/11 --> increase to TID 11/13/2023 --> continue 11/14/2023, stable on higher dose.   PRAFO ordered for right lower extremity to prevent further plantarflexion contracture  Spasticity controlled, continue Baclofen 11/20/2023   Consider weaning 11/21/2023   Stop 11/24/2023 - monitor for worsening spasticity    ABLA  Monitor on heparin      CKD  Has seen nephrology in past  Improving 11/13/2023   F/u OP with nephrology  S/p IVF 11/13-11/14 11/14/2023 BUN and Cr improved compared to prior  BMP 11/16 - improved - currently receiving IVF  Recheck BMP 11/21 - Slightly worsened renal function - likely baseline  BUN/Cr stable 11/24/2023      Hypertension  Continue Amlodipine 10mg daily  Continue Hydralazine 25mg TID - increased by hospitalist 11/13/2023 from BID to TID--> increased to 50mg q8hrs 11/15  11/16 Hydralazine increased to 75mg q8hrs and 1x Hydralazine given  11/17 BP still elevated but hydralazine recently increased. Monitor  Labile BP 11/24/2023 continue anti-HTN medications at current doses. Largely within normal range.      Prediabetes  Discontinue SSI     HLD  Continue home dose satin      Bowel  Continue with scheduled Colace and senna, as needed stool softeners      Insomnia  Secondary to recent jet lag, melatonin scheduled --> increase to home dose 11/13/2023 9mg  Utilize trazodone as needed insomnia continues  Schedule Trazodone 11/15/2023   11/16/2023 continue Trazodone as is. Thinks he slept better last night  11/17/2023 Still not sleeping well. Increase to 75mg nightly.   11/24/2023 continue trazodone at current dose     Pain -as needed Tylenol and oxycodone    Post-Stroke Depression  Consulted Pyschiatry       DVT PROPHYLAXIS: NO - Hemorrhagic stroke. US + UE and LE for brachial and peroneal DVT. 11/21/2023 start Heparin 5000 units q8hrs SQ for further prophylaxis, if tolerates will increase to full AC. Consider repeat CTH to ensure stability bleed once on full AC. 11/24/2023 Start loading dose Eliquis 10mg BID x 7 days with transition to 5mg BID.     HOSPITALIST FOLLOWING: YES - d/w hospitalist 11/24    CODE STATUS: FULL CODE    DISPO: Home alone. Vielka and patient not . D/w CM to give resources for private care giving. Their goal is to stay 6 weeks. RWW denied. Counseled extensively on private care giving/therapy in addition to Home Health or outpatient.     MARCUS: 12/4/23    MEDS SENT TO: TBD    DISCHARGE FOLLOW UP: Neurology, Nephrology, PCP - needs referral to all.   ____________________________________    Dr. Lisa Lee DO, MS  [facility] - Physical Medicine & Rehabilitation   ____________________________________

## 2023-11-24 NOTE — THERAPY
Physical Therapy   Daily Treatment     Patient Name: Jason Lima  Age:  62 y.o., Sex:  male  Medical Record #: [id number]  Today's Date: 11/24/2023     Precautions  Precautions: Fall Risk  Comments: Left hemiparesis, left visual inattention    Subjective    Pt was seated in w/c upon arrival and agreeable to treatment.  Pt's sister present at beginning of session.      Objective       11/24/23 1101   PT Charge Group   PT Neuromuscular Re-Education / Balance (Units) 2   PT Total Time Spent   PT Individual Total Time Spent (Mins) 30   Precautions   Precautions Fall Risk   Bed Mobility    Sit to Stand Minimal Assist  (progressing to SBA in // bars)   Interdisciplinary Plan of Care Collaboration   Patient Position at End of Therapy Seated;Other (Comments)  (Hand off to therapy tech for Tdine)     STS training in // bars with focus on midline orientation, sequencing, and safety x 15 reps using mirror for increased visual feedback  Standing posture and balance with focus on midline orientation  Pre-gait weight shifting x 10 reps with TC of tapping R hip on // bar to increase RLE WS  Education on CVA anatomy and physiology and neuroplasticity during recovery    Assessment    Pt continues to be limited d/t poor midline orientation.  Pt with improvement in midline orientation with STS training with repetition, VCing, and increased visual feedback.  Pt verbalized all education.    Strengths: Able to follow instructions, Independent prior level of function, Motivated for self care and independence, Pleasant and cooperative, Supportive family, Willingly participates in therapeutic activities  Barriers: Decreased endurance, Hemiparesis, Difficulty following instructions, Fatigue, Hypertension, Impaired activity tolerance, Impaired balance, Impaired insight/denial of deficits, Impaired functional cognition, Impaired sleep pattern, Impulsive, Limited mobility, Visual impairment, Poor family support (lives alone)    Plan    Head  "righting/ midline orientation/ sitting and standing activity tolerance  Bed mobility/ PNF rolling  Transfers with SB, emphasis on motor planning  Seated EOB balance  Wc mob with tavnir technique and emphasis on left environmental scanning/ awareness  Initiate HEP  Forced use principles for tanvir body/ standing frame  Vector (West) pregait training w/ XL harness  NMES left LE    DME  PT DME Recommendations  Wheelchair: 18\" Width  Cushion: Specialty (See other comments) (TBD pending ambulation progress)  Assistive Device: Tanvir Walker (TBD pending progress, currently 2 person assist for gait)  Additional Equipment: Other (Comments)    Passport items to be completed:  Get in/out of bed safely, in/out of a vehicle, safely use mobility device, walk or wheel around home/community, navigate up and down stairs, show how to get up/down from the ground, ensure home is accessible, demonstrate HEP, complete caregiver training    Physical Therapy Problems (Active)       Problem: Mobility       Dates: Start:  11/13/23         Goal: STG-Within one week, patient will ambulate distances >/= 30' c/ RHR and ModA or better.        Dates: Start:  11/13/23         Goal Note filed on 11/22/23 1102 by Awilda Dela Cruz, MSPT       Limited to 10ft at right hallway rail mod assist                 Problem: PT-Long Term Goals       Dates: Start:  11/13/23         Goal: LTG-By discharge, patient will propel wheelchair >/= 300' at Neto or better.        Dates: Start:  11/13/23            Goal: LTG-By discharge, patient will ambulate >/= 50' c/ LRAD at Renata or better.        Dates: Start:  11/13/23            Goal: LTG-By discharge, patient will transfer one surface to another c/ Renata or better.        Dates: Start:  11/13/23            Goal: LTG-By discharge, patient will ambulate up/down 1 step c/ LRAD at Renata or better.        Dates: Start:  11/13/23            Goal: LTG-By discharge, patient will transfer in/out of a car c/ ModA or better.        " Dates: Start:  11/13/23

## 2023-11-24 NOTE — PROGRESS NOTES
Hospital Medicine Daily Progress Note      Chief Complaint:  Hypertension  Diabetes  Renal Failure    Interval History:  Pt denies new complaints.  Labs reviewed.    Review of Systems  Review of Systems   Constitutional:  Negative for chills and fever.   HENT: Negative.     Eyes: Negative.    Respiratory:  Negative for cough and shortness of breath.    Cardiovascular:  Negative for chest pain and palpitations.   Gastrointestinal:  Negative for abdominal pain, nausea and vomiting.   Genitourinary: Negative.    Musculoskeletal: Negative.    Skin:  Negative for itching and rash.   Neurological:  Positive for focal weakness.   Endo/Heme/Allergies:  Negative for polydipsia. Does not bruise/bleed easily.        Physical Exam  Temp:  [36.9 °C (98.4 °F)-37.1 °C (98.8 °F)] 36.9 °C (98.4 °F)  Pulse:  [74-92] 76  Resp:  [18] 18  BP: (129-167)/(78-90) 129/80  SpO2:  [94 %-95 %] 95 %    Physical Exam  Vitals reviewed.   Constitutional:       General: He is not in acute distress.     Appearance: Normal appearance. He is not ill-appearing.   HENT:      Head: Normocephalic and atraumatic.      Right Ear: External ear normal.      Left Ear: External ear normal.      Nose: Nose normal.      Mouth/Throat:      Pharynx: Oropharynx is clear.   Eyes:      General:         Right eye: No discharge.         Left eye: No discharge.      Extraocular Movements: Extraocular movements intact.      Conjunctiva/sclera: Conjunctivae normal.   Cardiovascular:      Rate and Rhythm: Normal rate and regular rhythm.   Pulmonary:      Effort: Pulmonary effort is normal. No respiratory distress.      Breath sounds: Normal breath sounds. No wheezing.   Abdominal:      General: Bowel sounds are normal. There is no distension.      Palpations: Abdomen is soft.      Tenderness: There is no abdominal tenderness.   Musculoskeletal:      Cervical back: Normal range of motion and neck supple.      Right lower leg: No edema.      Left lower leg: Edema present.       Comments: LUE +edema   Skin:     General: Skin is warm and dry.   Neurological:      Mental Status: He is alert and oriented to person, place, and time.      Comments: Left sided weakness         Fluids    Intake/Output Summary (Last 24 hours) at 11/24/2023 1027  Last data filed at 11/24/2023 0800  Gross per 24 hour   Intake 900 ml   Output 1625 ml   Net -725 ml       Laboratory  Recent Labs     11/24/23  0523   WBC 5.0   RBC 3.68*   HEMOGLOBIN 11.4*   HEMATOCRIT 33.2*   MCV 90.2   MCH 31.0   MCHC 34.3   RDW 39.6   PLATELETCT 175   MPV 10.4       Recent Labs     11/24/23  0523   SODIUM 140   POTASSIUM 4.2   CHLORIDE 104   CO2 23   GLUCOSE 137*   BUN 33*   CREATININE 2.86*   CALCIUM 8.8                   Assessment/Plan  DVT (deep venous thrombosis) ([facility])  Assessment & Plan  U/S LUE acute thrombus in paired brachial veins  U/S LLE acute thrombus in paired peroneal veins  Started proph Heparin w/ stable H/H  Consider full dose anticoagulation if cleared from neuro standpoint    Hemorrhagic stroke ([facility])- (present on admission)  Assessment & Plan  Pt suffered R thalamic hemorrhage on 10/23/23 while visiting [facility]  Presented there w/ sudden onset HA, L HP, and /100  Management per Physiatry    ROMÁN (acute kidney injury) ([facility])- (present on admission)  Assessment & Plan  U/S medical renal disease, no hydronephrosis  Appears to have CKD, probably stage IV  Avoid nephrotoxins  Renal dose all meds  Monitor electrolytes  Outpt Nephrology F/U    DM (diabetes mellitus) ([facility])- (present on admission)  Assessment & Plan  HbA1c 6.4  Off FSBS and SSI  Continue diet control  Outpt meds include Metformin    Hyperlipidemia- (present on admission)  Assessment & Plan  Continue Lipitor  Outpt meds include Lipitor    Hypertension- (present on admission)  Assessment & Plan  On Norvasc and Hydralazine  Observe blood pressure trends  Outpt meds include Norvasc    Full Code    Discussed w/ pt and Dr. Lee

## 2023-11-24 NOTE — PROGRESS NOTES
NURSING DAILY NOTE    Name: Jason Lima   Date of Admission: 11/12/2023   Admitting Diagnosis: Thalamic hemorrhage (HCC)  Attending Physician: Lisa Lee D.o.  Allergies: Penicillins    Safety  Patient Assist  max 2  Patient Precautions  Fall Risk  Precaution Comments  Left hemiparesis, left visual inattention  Bed Transfer Status  Maximal Assist (mod towards right x 3 reps, max towards left x 3 reps)  Toilet Transfer Status   Total Assist X 2 (Mod A towards right wc to toilet, 2 person to left toilet to wc)  Assistive Devices  Gait Belt, Rails, Wheelchair, Wheelchair push  Oxygen  None - Room Air  Diet/Therapeutic Dining  Current Diet Order   Procedures    Diet Order Diet: Consistent CHO (Diabetic) (2 gram sodium restriction; medications whole with thin liquids); Second Modifier: (optional): 2 Gram Sodium     Pill Administration  whole  Agitated Behavioral Scale     ABS Level of Severity       Fall Risk  Has the patient had a fall this admission?   Yes  Shellie Hamilton Fall Risk Scoring  24, HIGH RISK  Fall Risk Safety Measures  bed alarm, chair alarm, fall during this hospitalization, and poor balance    Vitals  Temperature: 36.9 °C (98.4 °F)  Temp src: Oral  Pulse: 76  Respiration: 18  Blood Pressure: 129/80  Blood Pressure MAP (Calculated): 96 MM HG  BP Location: Right, Upper Arm  Patient BP Position: Sitting     Oxygen  Pulse Oximetry: 95 %  O2 (LPM): 0  FiO2%: 21 %  O2 Delivery Device: None - Room Air    Bowel and Bladder  Last Bowel Movement  11/23/23  Stool Type  Type 4: Like a sausage or snake, smooth and soft  Bowel Device  Bathroom  Continent  Bladder: Continent void   Bowel: Continent movement  Bladder Function  Urine Void (mL): 400 ml  Number of Times Voided: 1  Urine Color: Straw  Number of Times Incontinent of Urine: 0  Genitourinary Assessment   Bladder Assessment (WDL):  Within Defined Limits  Echols Catheter: Not Applicable  Urine  Color: Straw  Number of Bladder Accidents: 0  Total Number of Bladder of Accidents in Last 7 Days: 0  Number of Times Incontinent of Urine: 0  Bladder Device: Urinal  Time Void: No  Bladder Scan: Post Void  $ Bladder Scan Results (mL): 26    Skin  Polo Score   17  Sensory Interventions   Bed Types: Standard/Trauma Mattress  Skin Preventative Measures: Pillows in Use for Support / Positioning  Moisture Interventions  Moisturizers/Barriers: Barrier Wipes      Pain  Pain Rating Scale  0 - No Pain  Pain Location  Back  Pain Location Orientation  Upper  Pain Interventions   Declines    ADLs    Bathing   Shower, Staff  Linen Change   Complete  Personal Hygiene  Change Deja Pads, Moist Deja Wipes, Perineal Care  Chlorhexidine Bath      Oral Care  Brushed Teeth  Teeth/Dentures     Shave  Self  Nutrition Percentage Eaten  *  * Meal *  *, Dinner, Between % Consumed  Environmental Precautions  Treaded Slipper Socks on Patient, Personal Belongings, Wastebasket, Call Bell etc. in Easy Reach, Transferred to Stronger Side, Bed in Low Position  Patient Turns/Positioning  Patient Turns Self from Side to Side  Patient Turns Assistance/Tolerance  Assistance of One  Bed Positions  Bed Controls On  Head of Bed Elevated  Self regulated      Psychosocial/Neurologic Assessment  Psychosocial Assessment  Psychosocial (WDL):  Within Defined Limits  Patient Behaviors: Flat Affect  Neurologic Assessment  Neuro (WDL): Exceptions to WDL  Level of Consciousness: Alert  Orientation Level: Oriented X4  Cognition: Follows commands, Appropriate attention/concentration  Speech: Clear, Slurred  Facial Symmetry: Left facial drooping  Pupil Assesment: Yes  R Pupil Size (mm): 3  R Pupil Shape / Description: Round  R Pupil Reaction: Brisk  L Pupil Size (mm): 3  L Pupil Shape / Description: Round  L Pupil Reaction: Brisk  Reflexes: Cough  Cough Reflex: Present  Motor Function/Sensation Assessment: Dorsiflexion, Motor response  R Foot Dorsiflexion:  Strong  L Foot Dorsiflexion: Absent  RUE Motor Response: Responds to commands  RUE Sensation: Full sensation  Muscle Strength Right Arm: Normal Strength Against Gravity and Full Resistance  LUE Motor Response: Flaccid  LUE Sensation: Tingling, Numbness  Muscle Strength Left Arm: Weak Movement but Not Against Gravity or Resistance  RLE Motor Response: Responds to commands  RLE Sensation: Full sensation  Muscle Strength Right Leg: Good Strength Against Gravity and Moderate Resistance  LLE Motor Response: Flaccid  LLE Sensation: Tingling, Numbness  Muscle Strength Left Leg: Weak Movement but Not Against Gravity or Resistance  EENT (WDL):  WDL Except    Cardio/Pulmonary Assessment  Edema   RLE Edema: Trace  LLE Edema: Trace  Respiratory Breath Sounds  RUL Breath Sounds: Clear  RML Breath Sounds: Clear  RLL Breath Sounds: Diminished  MOHAMUD Breath Sounds: Clear  LLL Breath Sounds: Diminished  Cardiac Assessment   Cardiac (WDL):  Within Defined Limits

## 2023-11-24 NOTE — CARE PLAN
Problem: Fall Risk - Rehab  Goal: Patient will remain free from falls  Note: Patient remains free from falls this shift. Patient was educated on using the call light to prevent falls. Patients bed is in the lowest position. The patients belongings are placed in near proximity to the patient.      Problem: Pain - Standard  Goal: Alleviation of pain or a reduction in pain to the patient’s comfort goal  Flowsheets (Taken 11/24/2023 0800)  Non Verbal Scale:   Calm   Unlabored Breathing  Pain Rating Scale (NPRS): 0   The patient is Stable - Low risk of patient condition declining or worsening

## 2023-11-25 ENCOUNTER — APPOINTMENT (OUTPATIENT)
Dept: PHYSICAL THERAPY | Facility: REHABILITATION | Age: 63
DRG: 057 | End: 2023-11-25
Attending: PHYSICAL MEDICINE & REHABILITATION
Payer: COMMERCIAL

## 2023-11-25 ENCOUNTER — APPOINTMENT (OUTPATIENT)
Dept: RADIOLOGY | Facility: REHABILITATION | Age: 63
DRG: 057 | End: 2023-11-25
Attending: GENERAL PRACTICE
Payer: COMMERCIAL

## 2023-11-25 ENCOUNTER — APPOINTMENT (OUTPATIENT)
Dept: SPEECH THERAPY | Facility: REHABILITATION | Age: 63
DRG: 057 | End: 2023-11-25
Attending: PHYSICAL MEDICINE & REHABILITATION
Payer: COMMERCIAL

## 2023-11-25 PROCEDURE — A9270 NON-COVERED ITEM OR SERVICE: HCPCS | Performed by: PHYSICAL MEDICINE & REHABILITATION

## 2023-11-25 PROCEDURE — 99232 SBSQ HOSP IP/OBS MODERATE 35: CPT | Performed by: GENERAL PRACTICE

## 2023-11-25 PROCEDURE — 700102 HCHG RX REV CODE 250 W/ 637 OVERRIDE(OP): Performed by: PHYSICAL MEDICINE & REHABILITATION

## 2023-11-25 PROCEDURE — 73620 X-RAY EXAM OF FOOT: CPT | Mod: RT

## 2023-11-25 PROCEDURE — 94760 N-INVAS EAR/PLS OXIMETRY 1: CPT

## 2023-11-25 PROCEDURE — 92526 ORAL FUNCTION THERAPY: CPT

## 2023-11-25 PROCEDURE — 99232 SBSQ HOSP IP/OBS MODERATE 35: CPT | Performed by: HOSPITALIST

## 2023-11-25 PROCEDURE — 770010 HCHG ROOM/CARE - REHAB SEMI PRIVAT*

## 2023-11-25 PROCEDURE — A9270 NON-COVERED ITEM OR SERVICE: HCPCS | Performed by: GENERAL PRACTICE

## 2023-11-25 PROCEDURE — 700102 HCHG RX REV CODE 250 W/ 637 OVERRIDE(OP): Performed by: GENERAL PRACTICE

## 2023-11-25 PROCEDURE — A9270 NON-COVERED ITEM OR SERVICE: HCPCS | Performed by: HOSPITALIST

## 2023-11-25 PROCEDURE — 700102 HCHG RX REV CODE 250 W/ 637 OVERRIDE(OP): Performed by: HOSPITALIST

## 2023-11-25 RX ORDER — CARBOXYMETHYLCELLULOSE SODIUM 5 MG/ML
1 SOLUTION/ DROPS OPHTHALMIC PRN
Status: DISCONTINUED | OUTPATIENT
Start: 2023-11-25 | End: 2024-01-02 | Stop reason: HOSPADM

## 2023-11-25 RX ADMIN — APIXABAN 10 MG: 5 TABLET, FILM COATED ORAL at 20:45

## 2023-11-25 RX ADMIN — HYDRALAZINE HYDROCHLORIDE 100 MG: 50 TABLET, FILM COATED ORAL at 20:52

## 2023-11-25 RX ADMIN — SENNOSIDES AND DOCUSATE SODIUM 2 TABLET: 50; 8.6 TABLET ORAL at 09:57

## 2023-11-25 RX ADMIN — SENNOSIDES AND DOCUSATE SODIUM 2 TABLET: 50; 8.6 TABLET ORAL at 20:45

## 2023-11-25 RX ADMIN — OMEPRAZOLE 20 MG: 20 CAPSULE, DELAYED RELEASE ORAL at 09:00

## 2023-11-25 RX ADMIN — CARBOXYMETHYLCELLULOSE SODIUM 1 DROP: 5 SOLUTION/ DROPS OPHTHALMIC at 15:27

## 2023-11-25 RX ADMIN — AMLODIPINE BESYLATE 10 MG: 5 TABLET ORAL at 09:57

## 2023-11-25 RX ADMIN — HYDRALAZINE HYDROCHLORIDE 100 MG: 50 TABLET, FILM COATED ORAL at 15:07

## 2023-11-25 RX ADMIN — HYDRALAZINE HYDROCHLORIDE 100 MG: 50 TABLET, FILM COATED ORAL at 06:23

## 2023-11-25 RX ADMIN — OXYCODONE 5 MG: 5 TABLET ORAL at 16:47

## 2023-11-25 RX ADMIN — ATORVASTATIN CALCIUM 40 MG: 40 TABLET, FILM COATED ORAL at 20:45

## 2023-11-25 RX ADMIN — APIXABAN 10 MG: 5 TABLET, FILM COATED ORAL at 09:57

## 2023-11-25 RX ADMIN — OXYCODONE 5 MG: 5 TABLET ORAL at 20:51

## 2023-11-25 RX ADMIN — ACETAMINOPHEN 1000 MG: 500 TABLET ORAL at 09:58

## 2023-11-25 RX ADMIN — TRAZODONE HYDROCHLORIDE 75 MG: 50 TABLET ORAL at 20:45

## 2023-11-25 ASSESSMENT — ENCOUNTER SYMPTOMS
POLYDIPSIA: 0
CHILLS: 0
FOCAL WEAKNESS: 1
ABDOMINAL PAIN: 0
PALPITATIONS: 0
EYES NEGATIVE: 1
FEVER: 0
NAUSEA: 0
MUSCULOSKELETAL NEGATIVE: 1
COUGH: 0
VOMITING: 0
SHORTNESS OF BREATH: 0
BRUISES/BLEEDS EASILY: 0

## 2023-11-25 ASSESSMENT — PAIN DESCRIPTION - PAIN TYPE: TYPE: ACUTE PAIN

## 2023-11-25 NOTE — CARE PLAN
"  Problem: Fall Risk - Rehab  Goal: Patient will remain free from falls  Note: Shellie Hamitlon Fall risk Assessment Score: 24    High fall risk Interventions   - Alarming seatbelt  - Wander guard  - Bed and strip alarm   - Yellow sign by the door   - Yellow wrist band \"Fall risk\"  - Room near to the nurse station  - Do not leave patient unattended in the bathroom  - Fall risk education provided      Problem: Respiratory  Goal: Patient will understand use and administration of respiratory medications to improve respiratory function  Note: CPAP     Problem: Infection  Goal: Patient will remain free from infection  Note: Patient c/o bilateral eyes, redness, discharge and itchy. Eye care given. Patient would like to be seen by MD in AM. Will monitor.       The patient is Watcher - Medium risk of patient condition declining or worsening    Shift Goals  Clinical Goals: safety, comfot, sleep  Patient Goals: sleep  Family Goals: increased pt strength, independence    "

## 2023-11-25 NOTE — FLOWSHEET NOTE
11/25/23 0728   Events/Summary/Plan   Events/Summary/Plan RA pulse ox check   Vital Signs   Pulse 83   Respiration 16   Pulse Oximetry 92 %   $ Pulse Oximetry (Spot Check) Yes   Respiratory Assessment   Level of Consciousness Alert   Chest Exam   Work Of Breathing / Effort Within Normal Limits   Oxygen   O2 Delivery Device Room air w/o2 available

## 2023-11-25 NOTE — FLOWSHEET NOTE
11/24/23 1719   Events/Summary/Plan   Events/Summary/Plan CPAP check   Oxygen   O2 Delivery Device None - Room Air   Non-Invasive Ventilation [facility] [employer]   Nocturnal CPAP or BIPAP CPAP - Home Unit   $ System Evaluation Yes   Cleanliness and Damage Inspection Performed Yes   Settings (If Known) Auto CPAP5-14, EPR-3

## 2023-11-25 NOTE — THERAPY
"Speech Language Pathology  Daily Treatment     Patient Name: Jason Lima  Age:  62 y.o., Sex:  male  Medical Record #: [id number]  Today's Date: 11/25/2023     Precautions  Precautions: Fall Risk  Comments: Left hemiparesis, left visual inattention    Subjective    Pt received seated in cafeteria with spouse with lunch trays of regular textures. Pt and spouse pleasant and agreeable to session.      Objective       11/25/23 1131   Treatment Charges   SLP Swallowing Dysfunction Treatment Swallowing Dysfunction Treatment   Interdisciplinary Plan of Care Collaboration   IDT Collaboration with  Family / Caregiver   Patient Position at End of Therapy Seated;Chair Alarm On;Family / Friend in Room  (in cafeteria)   Collaboration Comments Spouse present during lunch.         Assessment    Pt consuming meal quickly throughout session. Wife with appropriate cues to slow down and swallow before additional intake. She reports this to be a \"life long\" thing he will require as \"he has always shoveled food.\" No overt s/sx of aspiration noted throughout intake observed. Spouse reporting that they are looking into [facility] around the corner. She reports that they would like him to receive more intensive therapy either by prolonging stay here or having to move to another facility following d/c.      Strengths: Effective communication skills, Alert and oriented, Able to follow instructions, Motivated for self care and independence, Pleasant and cooperative, Supportive family, Willingly participates in therapeutic activities  Barriers: Aspiration risk, Hemiparesis, Impaired carryover of learning, Impaired insight/denial of deficits, Impaired functional cognition, Impulsive, Visual impairment    Plan    Continue regular diet textures with ST session targeting use of compensatory strategies to oral residue and pacing.     Passport items to be completed:  Express basic needs, understand food/liquid recommendations, consistently follow " swallow precautions, manage finances, manage medications, arrive to therapy appointments on time, complete daily memory log entries, solve problems related to safety situations, review education related to hospitalization, complete caregiver training     Speech Therapy Problems (Active)       Problem: Expression STGs       Dates: Start:  11/13/23         Goal: STG-Within one week, patient will       Dates: Start:  11/13/23               Problem: Memory STGs       Dates: Start:  11/22/23         Goal: STG-Within one week, patient will recall new training and safety sequencing with 80% acc with set up and external cues.       Dates: Start:  11/22/23               Problem: Problem Solving STGs       Dates: Start:  11/22/23         Goal: STG-Within one week, patient will perform alternating attention tasks with 85% acc with set up.       Dates: Start:  11/22/23            Goal: STG-Within one week, patient will organization and reasoning tasks with 80% acc with min cues.       Dates: Start:  11/22/23               Problem: Speech/Swallowing LTGs       Dates: Start:  11/13/23         Goal: LTG-By discharge, patient will safely swallow (7)regular diet textures and (0) thin liquids with no overt s/sx of aspiration for 2/2 days.       Dates: Start:  11/13/23            Goal: LTG-By discharge, patient will solve complex problem       Dates: Start:  11/13/23       Description: For safety and self care with 80% acc mod I  for safe discharge home.         Goal: LTG-By discharge, patient will recall new training with 80% acc with min A and use of external memory aids.       Dates: Start:  11/13/23               Problem: Swallowing STGs       Dates: Start:  11/13/23

## 2023-11-25 NOTE — PROGRESS NOTES
NURSING DAILY NOTE    Name: Jason Lima   Date of Admission: 11/12/2023   Admitting Diagnosis: Thalamic hemorrhage (HCC)  Attending Physician: Lisa Lee D.o.  Allergies: Penicillins    Safety  Patient Assist  max 2  Patient Precautions  Fall Risk  Precaution Comments  Left hemiparesis, left visual inattention  Bed Transfer Status  Maximal Assist  Toilet Transfer Status   Total Assist X 2  Assistive Devices  Rails  Oxygen  None - Room Air  Diet/Therapeutic Dining  Current Diet Order   Procedures    Diet Order Diet: Consistent CHO (Diabetic) (2 gram sodium restriction; medications whole with thin liquids); Second Modifier: (optional): 2 Gram Sodium     Pill Administration  2 pills at a time  Agitated Behavioral Scale     ABS Level of Severity       Fall Risk  Has the patient had a fall this admission?   Yes  Shellie Hamilton Fall Risk Scoring  24, HIGH RISK  Fall Risk Safety Measures  bed alarm, chair alarm, fall during this hospitalization, and poor balance    Vitals  Temperature: 36.9 °C (98.4 °F)  Temp src: Oral  Pulse: 80  Respiration: 18  Blood Pressure: 130/77  Blood Pressure MAP (Calculated): 95 MM HG  BP Location: Left, Upper Arm  Patient BP Position: Supine     Oxygen  Pulse Oximetry: 93 %  O2 (LPM): 0  FiO2%: 21 %  O2 Delivery Device: None - Room Air    Bowel and Bladder  Last Bowel Movement  11/24/23  Stool Type  Not observed  Bowel Device  Bathroom  Continent  Bladder: Continent void   Bowel: Continent movement  Bladder Function  Urine Void (mL): 350 ml  Number of Times Voided: 1  Urine Color: Yellow  Number of Times Incontinent of Urine: 0  Genitourinary Assessment   Bladder Assessment (WDL):  Within Defined Limits  Echols Catheter: Not Applicable  Urine Color: Yellow  Number of Bladder Accidents: 0  Total Number of Bladder of Accidents in Last 7 Days: 0  Number of Times Incontinent of Urine: 0  Bladder Device: Urinal  Time Void: No  Bladder  Scan: Post Void  $ Bladder Scan Results (mL): 26    Skin  Polo Score   17  Sensory Interventions   Bed Types: Standard/Trauma Mattress  Skin Preventative Measures: Pillows in Use for Support / Positioning  Moisture Interventions  Moisturizers/Barriers: Barrier Wipes      Pain  Pain Rating Scale  0 - No Pain  Pain Location  Back  Pain Location Orientation  Upper  Pain Interventions   Declines    ADLs    Bathing   Shower, Staff  Linen Change   Complete  Personal Hygiene  Change Deja Pads, Moist Deja Wipes, Perineal Care  Chlorhexidine Bath      Oral Care  Brushed Teeth  Teeth/Dentures     Shave  Self  Nutrition Percentage Eaten  Snack, Refused  Environmental Precautions  Treaded Slipper Socks on Patient, Personal Belongings, Wastebasket, Call Bell etc. in Easy Reach, Transferred to Stronger Side, Bed in Low Position  Patient Turns/Positioning  Patient Turns Self from Side to Side  Patient Turns Assistance/Tolerance  Assistance of One  Bed Positions  Bed Controls On  Head of Bed Elevated  Self regulated      Psychosocial/Neurologic Assessment  Psychosocial Assessment  Psychosocial (WDL):  Within Defined Limits  Patient Behaviors: Flat Affect  Neurologic Assessment  Neuro (WDL): Exceptions to WDL  Level of Consciousness: Alert  Orientation Level: Oriented X4  Cognition: Follows commands, Appropriate attention/concentration  Speech: Clear, Slurred  Facial Symmetry: Left facial drooping  Pupil Assesment: Yes  R Pupil Size (mm): 3  R Pupil Shape / Description: Round  R Pupil Reaction: Brisk  L Pupil Size (mm): 3  L Pupil Shape / Description: Round  L Pupil Reaction: Brisk  Reflexes: Cough  Cough Reflex: Present  Motor Function/Sensation Assessment: Dorsiflexion, Motor response  R Foot Dorsiflexion: Strong  L Foot Dorsiflexion: Absent  RUE Motor Response: Responds to commands  RUE Sensation: Full sensation  Muscle Strength Right Arm: Normal Strength Against Gravity and Full Resistance  LUE Motor Response: Flaccid  LUE  Sensation: Tingling, Numbness  Muscle Strength Left Arm: Weak Movement but Not Against Gravity or Resistance  RLE Motor Response: Responds to commands  RLE Sensation: Full sensation  Muscle Strength Right Leg: Good Strength Against Gravity and Moderate Resistance  LLE Motor Response: Flaccid  LLE Sensation: Tingling, Numbness  Muscle Strength Left Leg: Weak Movement but Not Against Gravity or Resistance  EENT (WDL):  WDL Except    Cardio/Pulmonary Assessment  Edema   RLE Edema: Trace  LLE Edema: Trace  Respiratory Breath Sounds  RUL Breath Sounds: Clear  RML Breath Sounds: Clear  RLL Breath Sounds: Diminished  MOHAMUD Breath Sounds: Clear  LLL Breath Sounds: Diminished  Cardiac Assessment   Cardiac (WDL):  Within Defined Limits

## 2023-11-25 NOTE — PROGRESS NOTES
"  Physical Medicine & Rehabilitation Progress Note    Encounter Date: 11/25/2023    Chief Complaint:   Visit Diagnoses     ICD-10-CM   1. Acute cerebrovascular accident (CVA) ([facility])  I63.9   2. Hemorrhagic cerebrovascular accident (CVA) ([facility])  I61.9       Interval Events (Subjective):  Acute problems/issues reported by staff: Right foot pain and dry eyes    Right foot pain with history of gout but currently improving.  Dry eyes but no eye itching.  No bacterial discharge but reported clear drainage.    Participating in therapy. Pain controlled. PO tolerant.      ROS: Denies fever, chill, headache, dizziness, blurry vision, cough, shortness of breath, chest pain, nausea, vomiting, bowel changes, or urinary changes.      Objective:  VITAL SIGNS: BP (!) 144/92   Pulse 91   Temp 36.9 °C (98.4 °F) (Oral)   Resp 16   Ht 1.727 m (5' 8\")   Wt 86 kg (189 lb 9.5 oz)   SpO2 92%   BMI 28.83 kg/m²     General:  No acute distress.     HEENT: normocephalic, atraumatic, sclera and conjunctiva normal, normal neck ROM no active discharge.  Respiratory:  non-labored respirations. full sentences. clear to auscultation bilaterally   Cardiovascular:  Regular rate and rhythm. peripheral perfusion normal   Abdomen: soft, non-tender, non distended, bowel sounds normal   Extremities: no edema; no bilateral calf pain.    Right  to palpation at first digit.  No redness or swelling.  Tender to palpation  Mental status/ Cognition:  Alert. Appropriate responses    Psychiatric: normal affect.  Cooperative.        Laboratory Values:  Recent Results (from the past 72 hour(s))   CBC WITH DIFFERENTIAL    Collection Time: 11/24/23  5:23 AM   Result Value Ref Range    WBC 5.0 4.8 - 10.8 K/uL    RBC 3.68 (L) 4.70 - 6.10 M/uL    Hemoglobin 11.4 (L) 14.0 - 18.0 g/dL    Hematocrit 33.2 (L) 42.0 - 52.0 %    MCV 90.2 81.4 - 97.8 fL    MCH 31.0 27.0 - 33.0 pg    MCHC 34.3 32.3 - 36.5 g/dL    RDW 39.6 35.9 - 50.0 fL    Platelet Count 175 164 - " 446 K/uL    MPV 10.4 9.0 - 12.9 fL    Neutrophils-Polys 59.70 44.00 - 72.00 %    Lymphocytes 29.10 22.00 - 41.00 %    Monocytes 7.00 0.00 - 13.40 %    Eosinophils 3.40 0.00 - 6.90 %    Basophils 0.40 0.00 - 1.80 %    Immature Granulocytes 0.40 0.00 - 0.90 %    Nucleated RBC 0.00 0.00 - 0.20 /100 WBC    Neutrophils (Absolute) 2.98 1.82 - 7.42 K/uL    Lymphs (Absolute) 1.45 1.00 - 4.80 K/uL    Monos (Absolute) 0.35 0.00 - 0.85 K/uL    Eos (Absolute) 0.17 0.00 - 0.51 K/uL    Baso (Absolute) 0.02 0.00 - 0.12 K/uL    Immature Granulocytes (abs) 0.02 0.00 - 0.11 K/uL    NRBC (Absolute) 0.00 K/uL   Basic Metabolic Panel    Collection Time: 23  5:23 AM   Result Value Ref Range    Sodium 140 135 - 145 mmol/L    Potassium 4.2 3.6 - 5.5 mmol/L    Chloride 104 96 - 112 mmol/L    Co2 23 20 - 33 mmol/L    Glucose 137 (H) 65 - 99 mg/dL    Bun 33 (H) 8 - 22 mg/dL    Creatinine 2.86 (H) 0.50 - 1.40 mg/dL    Calcium 8.8 8.5 - 10.5 mg/dL    Anion Gap 13.0 7.0 - 16.0   ESTIMATED GFR    Collection Time: 23  5:23 AM   Result Value Ref Range    GFR (CKD-EPI) 24 (A) >60 mL/min/1.73 m 2       Medications:  Scheduled Medications   Medication Dose Frequency    apixaban  10 mg BID    [START ON 2023] apixaban  5 mg BID    hydrALAZINE  100 mg Q8HRS    traZODone  75 mg QHS    senna-docusate  2 Tablet BID    omeprazole  20 mg DAILY    amLODIPine  10 mg DAILY    atorvastatin  40 mg Nightly     PRN medications: melatonin, [DISCONTINUED] insulin regular **AND** [CANCELED] POC blood glucose manual result **AND** NOTIFY MD and PharmD **AND** Administer 20 grams of glucose (approximately 8 ounces of fruit juice) every 15 minutes PRN FSBG less than 70 mg/dL **AND** dextrose bolus, Respiratory Therapy Consult, senna-docusate **AND** polyethylene glycol/lytes **AND** magnesium hydroxide **AND** bisacodyl, mag hydrox-al hydrox-simeth, ondansetron **OR** ondansetron, traZODone, sodium chloride, [] acetaminophen **FOLLOWED BY**  acetaminophen, oxyCODONE immediate-release **OR** oxyCODONE immediate-release, hydrALAZINE    Diet:  Current Diet Order   Procedures    Diet Order Diet: Consistent CHO (Diabetic) (2 gram sodium restriction; medications whole with thin liquids); Second Modifier: (optional): 2 Gram Sodium       Medical Decision Making and Plan:  Reviewed primary care team's plan.      Rehab - Functional status reviewed briefly and progressing towards goals. Cont therapy plan     Neuro -  monitor for neurological deficits and continue management plan of primary care team   Right thalamic hemorrhagic stroke ~ last week October   Originally presented with a headache and left-sided weakness to hospital in [facility]  Work-up at the outside hospital in [facility] revealed a right thalamic hemorrhagic stroke  Repeat CT head done after return flight to the , 11/11 CT head shows right thalamic hemorrhage, decrease in density since prior studies from the outside hospital, slight asymmetric dilation of the left ventricular system including the left temporal horn with a possible component of hydrocephalus  Planning for BP less than 140, avoiding any kind of anticoagulation  Initiate comprehensive IRF level therapy with 3 days of therapy 5 days a week with services from PT/OT/SLP    Spasticity  Continue baclofen 5 mg nightly, started on 11/11 --> increase to TID 11/13/2023 --> continue 11/14/2023, stable on higher dose.   PRAFO ordered for right lower extremity to prevent further plantarflexion contracture  Spasticity controlled, continue Baclofen 11/20/2023   Consider weaning 11/21/2023   Stop 11/24/2023 - monitor for worsening spasticity    Ortho -  continue management plan of primary care team to include pain management plan   Foot pain:   -Suspect gout, with history.  Currently improving.  Will obtain x-ray.  Will consider Tylenol and if ineffective will provide corticosteroids 40 mg daily due to chronic kidney disease.    Cardiac/Pulm -  monitor clinical presentation and vitals; continue management plan of primary care team     ENT  Dry eyes and will provide refresh drops as needed    Pain - will monitor pain levels and adjust medications accordingly     DVT px - cont prophylaxis     Dispo - per primary team     ____________________________________    Chuck Bagley DO  Physical Medicine and Rehabilitation  [facility]  ____________________________________

## 2023-11-25 NOTE — PROGRESS NOTES
NURSING DAILY NOTE    Name: Jason Lima   Date of Admission: 11/12/2023   Admitting Diagnosis: Thalamic hemorrhage (HCC)  Attending Physician: Lisa Lee D.o.  Allergies: Penicillins    Safety  Patient Assist  max 2  Patient Precautions  Fall Risk  Precaution Comments  Left hemiparesis, left visual inattention  Bed Transfer Status  Maximal Assist  Toilet Transfer Status   Total Assist X 2  Assistive Devices  Gait Belt, Rails, Wheelchair, Wheelchair push  Oxygen  None - Room Air  Diet/Therapeutic Dining  Current Diet Order   Procedures    Diet Order Diet: Consistent CHO (Diabetic) (2 gram sodium restriction; medications whole with thin liquids); Second Modifier: (optional): 2 Gram Sodium     Pill Administration  whole  Agitated Behavioral Scale     ABS Level of Severity       Fall Risk  Has the patient had a fall this admission?   Yes  Shellie Hamilton Fall Risk Scoring  24, HIGH RISK  Fall Risk Safety Measures  bed alarm, chair alarm, fall during this hospitalization, and poor balance    Vitals  Temperature: 36.8 °C (98.2 °F)  Temp src: Oral  Pulse: 86  Respiration: 17  Blood Pressure: (!) 143/93  Blood Pressure MAP (Calculated): 110 MM HG  BP Location: Left  Patient BP Position: Supine     Oxygen  Pulse Oximetry: 95 %  O2 (LPM): 0  FiO2%: 21 %  O2 Delivery Device: None - Room Air    Bowel and Bladder  Last Bowel Movement  11/24/23  Stool Type  Not observed  Bowel Device  Bathroom  Continent  Bladder: Continent void   Bowel: Continent movement  Bladder Function  Urine Void (mL): 150 ml  Number of Times Voided: 1  Urine Color: Yellow  Number of Times Incontinent of Urine: 0  Genitourinary Assessment   Bladder Assessment (WDL):  Within Defined Limits  Echols Catheter: Not Applicable  Urine Color: Yellow  Number of Bladder Accidents: 0  Total Number of Bladder of Accidents in Last 7 Days: 0  Number of Times Incontinent of Urine: 0  Bladder Device:  Bathroom  Time Void: No  Bladder Scan: Post Void  $ Bladder Scan Results (mL): 26    Skin  Polo Score   17  Sensory Interventions   Bed Types: Standard/Trauma Mattress  Skin Preventative Measures: Pillows in Use for Support / Positioning  Moisture Interventions  Moisturizers/Barriers: Barrier Wipes      Pain  Pain Rating Scale  0 - No Pain  Pain Location  Back  Pain Location Orientation  Upper  Pain Interventions   Declines    ADLs    Bathing   Shower, Staff  Linen Change   Complete  Personal Hygiene  Change Deja Pads, Moist Deja Wipes, Perineal Care  Chlorhexidine Bath      Oral Care  Brushed Teeth  Teeth/Dentures     Shave  Self  Nutrition Percentage Eaten  Lunch, Between % Consumed (100%)  Environmental Precautions  Treaded Slipper Socks on Patient, Personal Belongings, Wastebasket, Call Bell etc. in Easy Reach, Transferred to Stronger Side, Bed in Low Position  Patient Turns/Positioning  Patient Turns Self from Side to Side  Patient Turns Assistance/Tolerance  Assistance of One  Bed Positions  Bed Controls On  Head of Bed Elevated  Self regulated      Psychosocial/Neurologic Assessment  Psychosocial Assessment  Psychosocial (WDL):  Within Defined Limits  Patient Behaviors: Flat Affect  Neurologic Assessment  Neuro (WDL): Exceptions to WDL  Level of Consciousness: Alert  Orientation Level: Oriented X4  Cognition: Follows commands, Appropriate attention/concentration  Speech: Clear, Slurred  Facial Symmetry: Left facial drooping  Pupil Assesment: Yes  R Pupil Size (mm): 3  R Pupil Shape / Description: Round  R Pupil Reaction: Brisk  L Pupil Size (mm): 3  L Pupil Shape / Description: Round  L Pupil Reaction: Brisk  Reflexes: Cough  Cough Reflex: Present  Motor Function/Sensation Assessment: Dorsiflexion, Motor response  R Foot Dorsiflexion: Strong  L Foot Dorsiflexion: Absent  RUE Motor Response: Responds to commands  RUE Sensation: Full sensation  Muscle Strength Right Arm: Normal Strength Against Gravity  and Full Resistance  LUE Motor Response: Flaccid  LUE Sensation: Tingling, Numbness  Muscle Strength Left Arm: Weak Movement but Not Against Gravity or Resistance  RLE Motor Response: Responds to commands  RLE Sensation: Full sensation  Muscle Strength Right Leg: Good Strength Against Gravity and Moderate Resistance  LLE Motor Response: Flaccid  LLE Sensation: Tingling, Numbness  Muscle Strength Left Leg: Weak Movement but Not Against Gravity or Resistance  EENT (WDL):  WDL Except    Cardio/Pulmonary Assessment  Edema   RLE Edema: Trace  LLE Edema: Trace  Respiratory Breath Sounds  RUL Breath Sounds: Clear  RML Breath Sounds: Clear  RLL Breath Sounds: Diminished  MOHAMUD Breath Sounds: Clear  LLL Breath Sounds: Diminished  Cardiac Assessment   Cardiac (WDL):  Within Defined Limits

## 2023-11-25 NOTE — CARE PLAN
"  Problem: Fall Risk - Rehab  Goal: Patient will remain free from falls  Outcome: Progressing   Shellie Hamilton Fall risk Assessment Score: 24    High fall risk Interventions   - Alarming seatbelt  - Wander guard  - Bed and strip alarm   - Yellow sign by the door   - Yellow wrist band \"Fall risk\"  - Room near to the nurse station  - Do not leave patient unattended in the bathroom  - Fall risk education provided           Problem: Pain - Standard  Goal: Alleviation of pain or a reduction in pain to the patient’s comfort goal  Outcome: Progressing   Patient is able to rate pain on a scale of 1-10.    "

## 2023-11-26 ENCOUNTER — APPOINTMENT (OUTPATIENT)
Dept: SPEECH THERAPY | Facility: REHABILITATION | Age: 63
DRG: 057 | End: 2023-11-26
Attending: PHYSICAL MEDICINE & REHABILITATION
Payer: COMMERCIAL

## 2023-11-26 ENCOUNTER — APPOINTMENT (OUTPATIENT)
Dept: PHYSICAL THERAPY | Facility: REHABILITATION | Age: 63
DRG: 057 | End: 2023-11-26
Attending: PHYSICAL MEDICINE & REHABILITATION
Payer: COMMERCIAL

## 2023-11-26 PROCEDURE — 94760 N-INVAS EAR/PLS OXIMETRY 1: CPT

## 2023-11-26 PROCEDURE — A9270 NON-COVERED ITEM OR SERVICE: HCPCS | Performed by: PHYSICAL MEDICINE & REHABILITATION

## 2023-11-26 PROCEDURE — 97129 THER IVNTJ 1ST 15 MIN: CPT

## 2023-11-26 PROCEDURE — 700102 HCHG RX REV CODE 250 W/ 637 OVERRIDE(OP): Performed by: HOSPITALIST

## 2023-11-26 PROCEDURE — 99232 SBSQ HOSP IP/OBS MODERATE 35: CPT | Performed by: HOSPITALIST

## 2023-11-26 PROCEDURE — A9270 NON-COVERED ITEM OR SERVICE: HCPCS | Performed by: HOSPITALIST

## 2023-11-26 PROCEDURE — 99232 SBSQ HOSP IP/OBS MODERATE 35: CPT | Performed by: GENERAL PRACTICE

## 2023-11-26 PROCEDURE — 700102 HCHG RX REV CODE 250 W/ 637 OVERRIDE(OP): Performed by: PHYSICAL MEDICINE & REHABILITATION

## 2023-11-26 PROCEDURE — 770010 HCHG ROOM/CARE - REHAB SEMI PRIVAT*

## 2023-11-26 PROCEDURE — 97130 THER IVNTJ EA ADDL 15 MIN: CPT

## 2023-11-26 RX ADMIN — OXYCODONE 5 MG: 5 TABLET ORAL at 08:38

## 2023-11-26 RX ADMIN — ACETAMINOPHEN 1000 MG: 500 TABLET ORAL at 08:39

## 2023-11-26 RX ADMIN — APIXABAN 10 MG: 5 TABLET, FILM COATED ORAL at 20:10

## 2023-11-26 RX ADMIN — APIXABAN 10 MG: 5 TABLET, FILM COATED ORAL at 08:33

## 2023-11-26 RX ADMIN — HYDRALAZINE HYDROCHLORIDE 100 MG: 50 TABLET, FILM COATED ORAL at 20:12

## 2023-11-26 RX ADMIN — SENNOSIDES AND DOCUSATE SODIUM 2 TABLET: 50; 8.6 TABLET ORAL at 20:10

## 2023-11-26 RX ADMIN — HYDRALAZINE HYDROCHLORIDE 100 MG: 50 TABLET, FILM COATED ORAL at 13:59

## 2023-11-26 RX ADMIN — CARBOXYMETHYLCELLULOSE SODIUM 1 DROP: 5 SOLUTION/ DROPS OPHTHALMIC at 20:05

## 2023-11-26 RX ADMIN — AMLODIPINE BESYLATE 10 MG: 5 TABLET ORAL at 08:33

## 2023-11-26 RX ADMIN — TRAZODONE HYDROCHLORIDE 75 MG: 50 TABLET ORAL at 20:10

## 2023-11-26 RX ADMIN — SENNOSIDES AND DOCUSATE SODIUM 2 TABLET: 50; 8.6 TABLET ORAL at 08:39

## 2023-11-26 RX ADMIN — ATORVASTATIN CALCIUM 40 MG: 40 TABLET, FILM COATED ORAL at 20:11

## 2023-11-26 RX ADMIN — HYDRALAZINE HYDROCHLORIDE 100 MG: 50 TABLET, FILM COATED ORAL at 05:57

## 2023-11-26 RX ADMIN — OXYCODONE 5 MG: 5 TABLET ORAL at 20:11

## 2023-11-26 RX ADMIN — OMEPRAZOLE 20 MG: 20 CAPSULE, DELAYED RELEASE ORAL at 08:39

## 2023-11-26 ASSESSMENT — ENCOUNTER SYMPTOMS
FEVER: 0
BRUISES/BLEEDS EASILY: 0
SHORTNESS OF BREATH: 0
ABDOMINAL PAIN: 0
VOMITING: 0
FOCAL WEAKNESS: 1
CHILLS: 0
EYES NEGATIVE: 1
COUGH: 0
POLYDIPSIA: 0
PALPITATIONS: 0
MUSCULOSKELETAL NEGATIVE: 1
NAUSEA: 0

## 2023-11-26 ASSESSMENT — PAIN DESCRIPTION - PAIN TYPE
TYPE: ACUTE PAIN
TYPE: ACUTE PAIN

## 2023-11-26 ASSESSMENT — FIBROSIS 4 INDEX: FIB4 SCORE: 1.07

## 2023-11-26 NOTE — FLOWSHEET NOTE
11/26/23 0736   Events/Summary/Plan   Events/Summary/Plan RA pulse ox check. Wearing cpap nasal mask   Vital Signs   Pulse 73   Respiration 18   Pulse Oximetry 93 %   $ Pulse Oximetry (Spot Check) Yes   Respiratory Assessment   Level of Consciousness Responds to voice  (sleeping)   Chest Exam   Work Of Breathing / Effort Within Normal Limits   Oxygen   O2 Delivery Device CPAP

## 2023-11-26 NOTE — THERAPY
"Missed Therapy    Patient Name: Jason Lima  Age:  62 y.o., Sex:  male  Medical Record #: [id number]  Today's Date: 11/26/2023    Patient refusing recreation therapy group due to being in \"too much pain.\" Will attempt 1:1 session in PM.        11/26/23 0901   Treatment Time   Total Time Spent (mins) 0   Total Time Missed 60   Reasons for Time Missed Non-Medical-Patient  Refused       "

## 2023-11-26 NOTE — PROGRESS NOTES
Received shift report and assumed care of patient.  Patient awake, calm and stable, currently positioned in bed for comfort and safety; call light within reach.  Continues to c/o right foot pain at this time.  Will continue to monitor.

## 2023-11-26 NOTE — CARE PLAN
Problem: Skin Integrity  Goal: Patient's skin integrity will be maintained or improve  Outcome: Progressing  Note: Patient's skin remains intact and free from new or accidental injury this shift.  Will continue to monitor.   The patient is Stable - Low risk of patient condition declining or worsening    Shift Goals  Clinical Goals: Safety  Patient Goals: Rest  Family Goals: increased pt strength, independence    Progress made toward(s) clinical / shift goals:  pt skin intact, no new injury or breakdown    Patient is not progressing towards the following goals:

## 2023-11-26 NOTE — CARE PLAN
"  Problem: Fall Risk - Rehab  Goal: Patient will remain free from falls  Note: Shellie Hamilton Fall risk Assessment Score: 24    High fall risk Interventions   - Alarming seatbelt  - Wander guard  - Bed and strip alarm   - Yellow sign by the door   - Yellow wrist band \"Fall risk\"  - Room near to the nurse station  - Do not leave patient unattended in the bathroom  - Fall risk education provided      Problem: Diabetes Management  Goal: Patient's ability to maintain appropriate glucose levels will be maintained or improve  Note: Refused HS snack.     Problem: Respiratory  Goal: Patient will understand use and administration of respiratory medications to improve respiratory function  Note: CPAP.     Problem: Pain - Standard  Goal: Alleviation of pain or a reduction in pain to the patient’s comfort goal  Note: Educate patient of non-pharmacological comfort measures: repositioning, relaxation/breathing technique, cold compress and activities.       The patient is Watcher - Medium risk of patient condition declining or worsening    Shift Goals  Clinical Goals: Safety  Patient Goals: Rest  Family Goals: increased pt strength, independence        "

## 2023-11-26 NOTE — PROGRESS NOTES
NURSING DAILY NOTE    Name: Jason Lima   Date of Admission: 11/12/2023   Admitting Diagnosis: Thalamic hemorrhage (HCC)  Attending Physician: Lisa Lee D.o.  Allergies: Penicillins    Safety  Patient Assist  Max 2  Patient Precautions  Fall Risk  Precaution Comments  Left hemiparesis, left visual inattention  Bed Transfer Status  Maximal Assist  Toilet Transfer Status   Total Assist X 2  Assistive Devices  Rails  Oxygen  None - Room Air  Diet/Therapeutic Dining  Current Diet Order   Procedures    Diet Order Diet: Consistent CHO (Diabetic) (2 gram sodium restriction; medications whole with thin liquids); Second Modifier: (optional): 2 Gram Sodium     Pill Administration  whole  Agitated Behavioral Scale     ABS Level of Severity       Fall Risk  Has the patient had a fall this admission?   No  Shellie Hamilton Fall Risk Scoring  24, HIGH RISK  Fall Risk Safety Measures  bed alarm and chair alarm    Vitals  Temperature: 37.1 °C (98.8 °F)  Temp src: Oral  Pulse: 80  Respiration: 18  Blood Pressure: (!) 125/90  Blood Pressure MAP (Calculated): 102 MM HG  BP Location: Right, Upper Arm  Patient BP Position: Supine     Oxygen  Pulse Oximetry: 93 %  O2 (LPM): 0  FiO2%: 21 %  O2 Delivery Device: None - Room Air    Bowel and Bladder  Last Bowel Movement  11/24/23  Stool Type  Not observed  Bowel Device  Bathroom  Continent  Bladder: Continent void   Bowel: Continent movement  Bladder Function  Urine Void (mL): 350 ml  Number of Times Voided: 1  Urine Color: Yellow  Number of Times Incontinent of Urine: 0  Genitourinary Assessment   Bladder Assessment (WDL):  Within Defined Limits  Echols Catheter: Not Applicable  Urine Color: Yellow  Number of Bladder Accidents: 0  Total Number of Bladder of Accidents in Last 7 Days: 0  Number of Times Incontinent of Urine: 0  Bladder Device: Urinal  Time Void: No  Bladder Scan: Post Void  $ Bladder Scan Results (mL):  26    Skin  Polo Score   17  Sensory Interventions   Bed Types: Standard/Trauma Mattress  Skin Preventative Measures: Pillows in Use to Float Heels, Pillows in Use for Support / Positioning  Moisture Interventions  Moisturizers/Barriers: Barrier Wipes      Pain  Pain Rating Scale  7 - Focus of attention, prevents doing daily activities  Pain Location  Back  Pain Location Orientation  Upper  Pain Interventions   Declines    ADLs    Bathing   Shower, Staff  Linen Change   Complete  Personal Hygiene  Change Deja Pads, Moist Deja Wipes, Perineal Care  Chlorhexidine Bath      Oral Care  Brushed Teeth  Teeth/Dentures     Shave  Self  Nutrition Percentage Eaten  *  * Meal *  *, Lunch, Between 25-50% Consumed  Environmental Precautions  Treaded Slipper Socks on Patient, Personal Belongings, Wastebasket, Call Bell etc. in Easy Reach, Transferred to Stronger Side, Bed in Low Position  Patient Turns/Positioning  Patient Turns Self from Side to Side  Patient Turns Assistance/Tolerance  Assistance of One  Bed Positions  Bed Controls On  Head of Bed Elevated  Self regulated      Psychosocial/Neurologic Assessment  Psychosocial Assessment  Psychosocial (WDL):  Within Defined Limits  Patient Behaviors: Flat Affect  Neurologic Assessment  Neuro (WDL): Exceptions to WDL  Level of Consciousness: Alert  Orientation Level: Oriented X4  Cognition: Follows commands, Appropriate attention/concentration  Speech: Clear, Slurred  Facial Symmetry: Left facial drooping  Pupil Assesment: Yes  R Pupil Size (mm): 3  R Pupil Shape / Description: Round  R Pupil Reaction: Brisk  L Pupil Size (mm): 3  L Pupil Shape / Description: Round  L Pupil Reaction: Brisk  Reflexes: Cough  Cough Reflex: Present  Motor Function/Sensation Assessment: Dorsiflexion, Motor response  R Foot Dorsiflexion: Strong  L Foot Dorsiflexion: Absent  RUE Motor Response: Responds to commands  RUE Sensation: Full sensation  Muscle Strength Right Arm: Normal Strength Against  Gravity and Full Resistance  LUE Motor Response: Flaccid  LUE Sensation: Tingling, Numbness  Muscle Strength Left Arm: Weak Movement but Not Against Gravity or Resistance  RLE Motor Response: Responds to commands  RLE Sensation: Full sensation  Muscle Strength Right Leg: Good Strength Against Gravity and Moderate Resistance  LLE Motor Response: Flaccid  LLE Sensation: Tingling, Numbness  Muscle Strength Left Leg: Weak Movement but Not Against Gravity or Resistance  EENT (WDL):  WDL Except    Cardio/Pulmonary Assessment  Edema   RLE Edema: Trace  LLE Edema: Trace  Respiratory Breath Sounds  RUL Breath Sounds: Clear  RML Breath Sounds: Clear  RLL Breath Sounds: Diminished  MOHAMUD Breath Sounds: Clear  LLL Breath Sounds: Diminished  Cardiac Assessment   Cardiac (WDL):  Within Defined Limits

## 2023-11-26 NOTE — THERAPY
"Speech Language Pathology  Daily Treatment     Patient Name: Jason Lima  Age:  62 y.o., Sex:  male  Medical Record #: [id number]  Today's Date: 11/26/2023     Precautions  Precautions: Fall Risk  Comments: Left hemiparesis, left visual inattention    Subjective    Pt received from [facility] for cognitive therapy. Pt asked \"do we have to do that\" when informed of ST tx session. Pt stated \"that is not short\" in regards to 30 minute session. He appears down in spirits today perseverative on \"this has to be a nightmare.\"     Objective       11/26/23 0831   Treatment Charges   SLP Cognitive Skill Development First 15 Minutes 1   SLP Cognitive Skill Development Additional 15 Minutes 1   SLP Total Time Spent   SLP Individual Total Time Spent (Mins) 30   Interdisciplinary Plan of Care Collaboration   Patient Position at End of Therapy Seated;Chair Alarm On;Self Releasing Lap Belt Applied;Call Light within Reach;Tray Table within Reach;Phone within Reach         Assessment    Attempted to target executive functions using deductive puzzle. Pt asking \"do I really have to do this.\" Pt expressing \"I don't understand how this is good.\" Pt saw 'Life after stroke' packet in personal folder and reported interest in reading it. Went through packet. Brief overview of risk factors/ lifestyle preventative factors. Pt reporting his risk factors and stating importance of monitoring medication to prevent future strokes. Pt perseverative on \"this is a nightmare\" and reporting depression. Active and empathetic listening provided. Pt looked for the positive \"my sister has worked hard and is doing well\" as she has been recovering from a stroke.     Strengths: Effective communication skills, Alert and oriented, Able to follow instructions, Motivated for self care and independence, Pleasant and cooperative, Supportive family, Willingly participates in therapeutic activities  Barriers: Aspiration risk, Hemiparesis, Impaired carryover of learning, " Impaired insight/denial of deficits, Impaired functional cognition, Impulsive, Visual impairment    Plan    Complex attention, organization/planning. Continue reviewing life after stroke education with risk factors, s/sx of stroke.     Passport items to be completed:  Express basic needs, understand food/liquid recommendations, consistently follow swallow precautions, manage finances, manage medications, arrive to therapy appointments on time, complete daily memory log entries, solve problems related to safety situations, review education related to hospitalization, complete caregiver training     Speech Therapy Problems (Active)       Problem: Expression STGs       Dates: Start:  11/13/23         Goal: STG-Within one week, patient will       Dates: Start:  11/13/23               Problem: Memory STGs       Dates: Start:  11/22/23         Goal: STG-Within one week, patient will recall new training and safety sequencing with 80% acc with set up and external cues.       Dates: Start:  11/22/23               Problem: Problem Solving STGs       Dates: Start:  11/22/23         Goal: STG-Within one week, patient will perform alternating attention tasks with 85% acc with set up.       Dates: Start:  11/22/23            Goal: STG-Within one week, patient will organization and reasoning tasks with 80% acc with min cues.       Dates: Start:  11/22/23               Problem: Speech/Swallowing LTGs       Dates: Start:  11/13/23         Goal: LTG-By discharge, patient will safely swallow (7)regular diet textures and (0) thin liquids with no overt s/sx of aspiration for 2/2 days.       Dates: Start:  11/13/23            Goal: LTG-By discharge, patient will solve complex problem       Dates: Start:  11/13/23       Description: For safety and self care with 80% acc mod I  for safe discharge home.         Goal: LTG-By discharge, patient will recall new training with 80% acc with min A and use of external memory aids.       Dates:  Start:  11/13/23               Problem: Swallowing STGs       Dates: Start:  11/13/23

## 2023-11-26 NOTE — PROGRESS NOTES
"  Physical Medicine & Rehabilitation Progress Note    Encounter Date: 11/26/2023    Chief Complaint:   Visit Diagnoses     ICD-10-CM   1. Acute cerebrovascular accident (CVA) ([facility])  I63.9   2. Hemorrhagic cerebrovascular accident (CVA) (Formerly [facility])  I61.9       Interval Events (Subjective):  Acute problems/issues reported by staff: Right foot pain and dry eyes    Right foot pain with history of gout but currently improving with Tylenol.  Dry eyes stable.  Participating in therapy. Pain controlled. PO tolerant.      ROS: Denies fever, chill, headache, dizziness, blurry vision, cough, shortness of breath, chest pain, nausea, vomiting, bowel changes, or urinary changes.      Objective:  VITAL SIGNS: BP (!) 145/101   Pulse 73   Temp 36.3 °C (97.3 °F) (Oral)   Resp 18   Ht 1.727 m (5' 8\")   Wt 86 kg (189 lb 9.5 oz)   SpO2 93%   BMI 28.83 kg/m²     General:  No acute distress.     HEENT: normocephalic, atraumatic, sclera and conjunctiva normal, normal neck ROM no active discharge.  Respiratory:  non-labored respirations. full sentences. clear to auscultation bilaterally   Cardiovascular:  Regular rate and rhythm. peripheral perfusion normal   Abdomen: soft, non-tender, non distended, bowel sounds normal   Extremities: no edema; no bilateral calf pain.    Right  to palpation at first digit.  No redness or swelling.  Tender to palpation  Mental status/ Cognition:  Alert. Appropriate responses    Psychiatric: normal affect.  Cooperative.        Laboratory Values:  Recent Results (from the past 72 hour(s))   CBC WITH DIFFERENTIAL    Collection Time: 11/24/23  5:23 AM   Result Value Ref Range    WBC 5.0 4.8 - 10.8 K/uL    RBC 3.68 (L) 4.70 - 6.10 M/uL    Hemoglobin 11.4 (L) 14.0 - 18.0 g/dL    Hematocrit 33.2 (L) 42.0 - 52.0 %    MCV 90.2 81.4 - 97.8 fL    MCH 31.0 27.0 - 33.0 pg    MCHC 34.3 32.3 - 36.5 g/dL    RDW 39.6 35.9 - 50.0 fL    Platelet Count 175 164 - 446 K/uL    MPV 10.4 9.0 - 12.9 fL    " Neutrophils-Polys 59.70 44.00 - 72.00 %    Lymphocytes 29.10 22.00 - 41.00 %    Monocytes 7.00 0.00 - 13.40 %    Eosinophils 3.40 0.00 - 6.90 %    Basophils 0.40 0.00 - 1.80 %    Immature Granulocytes 0.40 0.00 - 0.90 %    Nucleated RBC 0.00 0.00 - 0.20 /100 WBC    Neutrophils (Absolute) 2.98 1.82 - 7.42 K/uL    Lymphs (Absolute) 1.45 1.00 - 4.80 K/uL    Monos (Absolute) 0.35 0.00 - 0.85 K/uL    Eos (Absolute) 0.17 0.00 - 0.51 K/uL    Baso (Absolute) 0.02 0.00 - 0.12 K/uL    Immature Granulocytes (abs) 0.02 0.00 - 0.11 K/uL    NRBC (Absolute) 0.00 K/uL   Basic Metabolic Panel    Collection Time: 11/24/23  5:23 AM   Result Value Ref Range    Sodium 140 135 - 145 mmol/L    Potassium 4.2 3.6 - 5.5 mmol/L    Chloride 104 96 - 112 mmol/L    Co2 23 20 - 33 mmol/L    Glucose 137 (H) 65 - 99 mg/dL    Bun 33 (H) 8 - 22 mg/dL    Creatinine 2.86 (H) 0.50 - 1.40 mg/dL    Calcium 8.8 8.5 - 10.5 mg/dL    Anion Gap 13.0 7.0 - 16.0   ESTIMATED GFR    Collection Time: 11/24/23  5:23 AM   Result Value Ref Range    GFR (CKD-EPI) 24 (A) >60 mL/min/1.73 m 2       11/25/2023 3:17 PM     HISTORY/REASON FOR EXAM:  Atraumatic Pain/Swelling/Deformity; suspect gout        TECHNIQUE/EXAM DESCRIPTION AND NUMBER OF VIEWS:  2 views of the RIGHT foot.     COMPARISON:  None.     FINDINGS:     There is soft tissue swelling medial to the 1st metatarsal head. There are no erosion.     CT calcifications expected position the distal achilles tendon.     There are no fracture or malalignment.     There are no erosion.     The mineralization is within normal limits.     No periarticular or soft tissue calcification are present.     No osteoarthritis is identified.     IMPRESSION:     1.  No radiographic evidence for gout     2.  Soft tissue swelling medial to the 1st metatarsal head    Medications:  Scheduled Medications   Medication Dose Frequency    apixaban  10 mg BID    [START ON 12/1/2023] apixaban  5 mg BID    hydrALAZINE  100 mg Q8HRS    traZODone   75 mg QHS    senna-docusate  2 Tablet BID    omeprazole  20 mg DAILY    amLODIPine  10 mg DAILY    atorvastatin  40 mg Nightly     PRN medications: carboxymethylcellulose, melatonin, [DISCONTINUED] insulin regular **AND** [CANCELED] POC blood glucose manual result **AND** NOTIFY MD and PharmD **AND** Administer 20 grams of glucose (approximately 8 ounces of fruit juice) every 15 minutes PRN FSBG less than 70 mg/dL **AND** dextrose bolus, Respiratory Therapy Consult, senna-docusate **AND** polyethylene glycol/lytes **AND** magnesium hydroxide **AND** bisacodyl, mag hydrox-al hydrox-simeth, ondansetron **OR** ondansetron, traZODone, sodium chloride, [] acetaminophen **FOLLOWED BY** acetaminophen, oxyCODONE immediate-release **OR** oxyCODONE immediate-release, hydrALAZINE    Diet:  Current Diet Order   Procedures    Diet Order Diet: Consistent CHO (Diabetic) (2 gram sodium restriction; medications whole with thin liquids); Second Modifier: (optional): 2 Gram Sodium       Medical Decision Making and Plan:  Reviewed primary care team's plan.      Rehab - Functional status reviewed briefly and progressing towards goals. Cont therapy plan     Neuro -  monitor for neurological deficits and continue management plan of primary care team   Right thalamic hemorrhagic stroke ~ last week October   Originally presented with a headache and left-sided weakness to hospital in [facility]  Work-up at the outside hospital in \[facility] [place] [facility]\"" revealed a right thalamic hemorrhagic stroke  Repeat CT head done after return flight to the ,  CT head shows right thalamic hemorrhage, decrease in density since prior studies from the outside hospital, slight asymmetric dilation of the left ventricular system including the left temporal horn with a possible component of hydrocephalus  Planning for BP less than 140, avoiding any kind of anticoagulation  Initiate comprehensive IRF level therapy with 3 days of therapy 5 days a week with services  from PT/OT/SLP    Spasticity  Continue baclofen 5 mg nightly, started on 11/11 --> increase to TID 11/13/2023 --> continue 11/14/2023, stable on higher dose.   PRAFO ordered for right lower extremity to prevent further plantarflexion contracture  Spasticity controlled, continue Baclofen 11/20/2023   Consider weaning 11/21/2023   Stop 11/24/2023 - monitor for worsening spasticity    Ortho -  continue management plan of primary care team to include pain management plan   Foot pain:   11/25 Suspect gout, with history.  Currently improving.  Will obtain x-ray.  Will consider Tylenol and if ineffective will provide corticosteroids 40 mg daily due to chronic kidney disease.  11/26 likely bunion, controlled with Tylenol, recommend patient to obtain wider shoes.    Cardiac/Pulm - monitor clinical presentation and vitals; continue management plan of primary care team     ENT  Dry eyes and will provide refresh drops as needed    Pain - will monitor pain levels and adjust medications accordingly     DVT px - cont prophylaxis     Dispo - per primary team     ____________________________________    Chuck Bagley, DO  Physical Medicine and Rehabilitation  [facility]  ____________________________________

## 2023-11-26 NOTE — PROGRESS NOTES
Hospital Medicine Daily Progress Note      Chief Complaint:  Hypertension  Diabetes  Renal Failure    Interval History:  Pt has multiple questions regarding right foot pain and therapies--defer to Physiatry.    Review of Systems  Review of Systems   Constitutional:  Negative for chills and fever.   HENT: Negative.     Eyes: Negative.    Respiratory:  Negative for cough and shortness of breath.    Cardiovascular:  Negative for chest pain and palpitations.   Gastrointestinal:  Negative for abdominal pain, nausea and vomiting.   Genitourinary: Negative.    Musculoskeletal: Negative.         Right foot pain   Skin:  Negative for itching and rash.   Neurological:  Positive for focal weakness.   Endo/Heme/Allergies:  Negative for polydipsia. Does not bruise/bleed easily.        Physical Exam  Temp:  [36.3 °C (97.3 °F)-37.1 °C (98.8 °F)] 36.3 °C (97.3 °F)  Pulse:  [73-88] 73  Resp:  [18] 18  BP: (124-145)/() 145/101  SpO2:  [93 %-94 %] 93 %    Physical Exam  Vitals reviewed.   Constitutional:       General: He is not in acute distress.     Appearance: Normal appearance. He is not ill-appearing.   HENT:      Head: Normocephalic and atraumatic.      Right Ear: External ear normal.      Left Ear: External ear normal.      Nose: Nose normal.      Mouth/Throat:      Pharynx: Oropharynx is clear.   Eyes:      General:         Right eye: No discharge.         Left eye: No discharge.      Extraocular Movements: Extraocular movements intact.      Conjunctiva/sclera: Conjunctivae normal.   Cardiovascular:      Rate and Rhythm: Normal rate and regular rhythm.   Pulmonary:      Effort: Pulmonary effort is normal. No respiratory distress.      Breath sounds: Normal breath sounds. No wheezing.   Abdominal:      General: Bowel sounds are normal. There is no distension.      Palpations: Abdomen is soft.      Tenderness: There is no abdominal tenderness.   Musculoskeletal:      Cervical back: Normal range of motion and neck supple.       Right lower leg: No edema.      Left lower leg: Edema present.      Comments: LUE +edema   Skin:     General: Skin is warm and dry.   Neurological:      Mental Status: He is alert and oriented to person, place, and time.      Comments: Left sided weakness         Fluids    Intake/Output Summary (Last 24 hours) at 11/26/2023 1017  Last data filed at 11/26/2023 0800  Gross per 24 hour   Intake 920 ml   Output 1050 ml   Net -130 ml       Laboratory  Recent Labs     11/24/23  0523   WBC 5.0   RBC 3.68*   HEMOGLOBIN 11.4*   HEMATOCRIT 33.2*   MCV 90.2   MCH 31.0   MCHC 34.3   RDW 39.6   PLATELETCT 175   MPV 10.4       Recent Labs     11/24/23  0523   SODIUM 140   POTASSIUM 4.2   CHLORIDE 104   CO2 23   GLUCOSE 137*   BUN 33*   CREATININE 2.86*   CALCIUM 8.8                   Assessment/Plan  DVT (deep venous thrombosis) ([facility])  Assessment & Plan  U/S LUE acute thrombus in paired brachial veins  U/S LLE acute thrombus in paired peroneal veins  Tolerated proph Heparin  Now anticoagulated on Eliquis    Hemorrhagic stroke ([facility])- (present on admission)  Assessment & Plan  Pt suffered R thalamic hemorrhage on 10/23/23 while visiting [place]  Presented there w/ sudden onset HA, L HP, and /100  Management per Physiatry    ROMÁN (acute kidney injury) ([facility])- (present on admission)  Assessment & Plan  U/S medical renal disease, no hydronephrosis  Appears to have CKD, probably stage IV  Avoid nephrotoxins  Renal dose all meds  Monitor electrolytes  Outpt Nephrology F/U    DM (diabetes mellitus) ([facility])- (present on admission)  Assessment & Plan  HbA1c 6.4  Off FSBS and SSI  Continue diet control  Outpt meds include Metformin    Hyperlipidemia- (present on admission)  Assessment & Plan  Continue Lipitor  Outpt meds include Lipitor    Hypertension- (present on admission)  Assessment & Plan  On Norvasc and Hydralazine  Observe blood pressure trends  Outpt meds include Norvasc    Full Code    Discussed w/ pt and Dr. Bagley

## 2023-11-26 NOTE — PROGRESS NOTES
Hospital Medicine Daily Progress Note      Chief Complaint:  Hypertension  Diabetes  Renal Failure    Interval History:  Pt c/o right foot pain--defer to Physiatry.    Review of Systems  Review of Systems   Constitutional:  Negative for chills and fever.   HENT: Negative.     Eyes: Negative.    Respiratory:  Negative for cough and shortness of breath.    Cardiovascular:  Negative for chest pain and palpitations.   Gastrointestinal:  Negative for abdominal pain, nausea and vomiting.   Genitourinary: Negative.    Musculoskeletal: Negative.    Skin:  Negative for itching and rash.   Neurological:  Positive for focal weakness.   Endo/Heme/Allergies:  Negative for polydipsia. Does not bruise/bleed easily.        Physical Exam  Temp:  [36.9 °C (98.4 °F)-37.1 °C (98.8 °F)] 37.1 °C (98.8 °F)  Pulse:  [80-91] 80  Resp:  [16-18] 18  BP: (125-149)/(77-92) 125/90  SpO2:  [92 %-94 %] 93 %    Physical Exam  Vitals reviewed.   Constitutional:       General: He is not in acute distress.     Appearance: Normal appearance. He is not ill-appearing.   HENT:      Head: Normocephalic and atraumatic.      Right Ear: External ear normal.      Left Ear: External ear normal.      Nose: Nose normal.      Mouth/Throat:      Pharynx: Oropharynx is clear.   Eyes:      General:         Right eye: No discharge.         Left eye: No discharge.      Extraocular Movements: Extraocular movements intact.      Conjunctiva/sclera: Conjunctivae normal.   Cardiovascular:      Rate and Rhythm: Normal rate and regular rhythm.   Pulmonary:      Effort: Pulmonary effort is normal. No respiratory distress.      Breath sounds: Normal breath sounds. No wheezing.   Abdominal:      General: Bowel sounds are normal. There is no distension.      Palpations: Abdomen is soft.      Tenderness: There is no abdominal tenderness.   Musculoskeletal:      Cervical back: Normal range of motion and neck supple.      Right lower leg: No edema.      Left lower leg: Edema  present.      Comments: LUE +edema   Skin:     General: Skin is warm and dry.   Neurological:      Mental Status: He is alert and oriented to person, place, and time.      Comments: Left sided weakness         Fluids    Intake/Output Summary (Last 24 hours) at 11/25/2023 1704  Last data filed at 11/25/2023 1300  Gross per 24 hour   Intake 690 ml   Output 850 ml   Net -160 ml       Laboratory  Recent Labs     11/24/23  0523   WBC 5.0   RBC 3.68*   HEMOGLOBIN 11.4*   HEMATOCRIT 33.2*   MCV 90.2   MCH 31.0   MCHC 34.3   RDW 39.6   PLATELETCT 175   MPV 10.4       Recent Labs     11/24/23  0523   SODIUM 140   POTASSIUM 4.2   CHLORIDE 104   CO2 23   GLUCOSE 137*   BUN 33*   CREATININE 2.86*   CALCIUM 8.8                   Assessment/Plan  DVT (deep venous thrombosis) ([facility])  Assessment & Plan  U/S LUE acute thrombus in paired brachial veins  U/S LLE acute thrombus in paired peroneal veins  Tolerated proph Heparin, now transitioned to Eliquis    Hemorrhagic stroke ([facility])- (present on admission)  Assessment & Plan  Pt suffered R thalamic hemorrhage on 10/23/23 while visiting [place]  Presented there w/ sudden onset HA, L HP, and /100  Management per Physiatry    ROMÁN (acute kidney injury) ([facility])- (present on admission)  Assessment & Plan  U/S medical renal disease, no hydronephrosis  Appears to have CKD, probably stage IV  Avoid nephrotoxins  Renal dose all meds  Monitor electrolytes  Outpt Nephrology F/U    DM (diabetes mellitus) ([facility])- (present on admission)  Assessment & Plan  HbA1c 6.4  Off FSBS and SSI  Continue diet control  Outpt meds include Metformin    Hyperlipidemia- (present on admission)  Assessment & Plan  Continue Lipitor  Outpt meds include Lipitor    Hypertension- (present on admission)  Assessment & Plan  On Norvasc and Hydralazine  Observe blood pressure trends  Outpt meds include Norvasc    Full Code    Discussed w/ pt, wife, and Dr. Bagley

## 2023-11-27 ENCOUNTER — APPOINTMENT (OUTPATIENT)
Dept: PHYSICAL THERAPY | Facility: REHABILITATION | Age: 63
DRG: 057 | End: 2023-11-27
Attending: PHYSICAL MEDICINE & REHABILITATION
Payer: COMMERCIAL

## 2023-11-27 ENCOUNTER — APPOINTMENT (OUTPATIENT)
Dept: OCCUPATIONAL THERAPY | Facility: REHABILITATION | Age: 63
DRG: 057 | End: 2023-11-27
Attending: HOSPITALIST
Payer: COMMERCIAL

## 2023-11-27 ENCOUNTER — APPOINTMENT (OUTPATIENT)
Dept: SPEECH THERAPY | Facility: REHABILITATION | Age: 63
DRG: 057 | End: 2023-11-27
Attending: PHYSICAL MEDICINE & REHABILITATION
Payer: COMMERCIAL

## 2023-11-27 PROCEDURE — A9270 NON-COVERED ITEM OR SERVICE: HCPCS | Performed by: HOSPITALIST

## 2023-11-27 PROCEDURE — 700102 HCHG RX REV CODE 250 W/ 637 OVERRIDE(OP): Performed by: PHYSICAL MEDICINE & REHABILITATION

## 2023-11-27 PROCEDURE — 97112 NEUROMUSCULAR REEDUCATION: CPT

## 2023-11-27 PROCEDURE — 700102 HCHG RX REV CODE 250 W/ 637 OVERRIDE(OP): Performed by: HOSPITALIST

## 2023-11-27 PROCEDURE — 97530 THERAPEUTIC ACTIVITIES: CPT

## 2023-11-27 PROCEDURE — A9270 NON-COVERED ITEM OR SERVICE: HCPCS | Performed by: PHYSICAL MEDICINE & REHABILITATION

## 2023-11-27 PROCEDURE — 99232 SBSQ HOSP IP/OBS MODERATE 35: CPT | Performed by: HOSPITALIST

## 2023-11-27 PROCEDURE — 97130 THER IVNTJ EA ADDL 15 MIN: CPT

## 2023-11-27 PROCEDURE — 99232 SBSQ HOSP IP/OBS MODERATE 35: CPT | Performed by: PHYSICAL MEDICINE & REHABILITATION

## 2023-11-27 PROCEDURE — 97535 SELF CARE MNGMENT TRAINING: CPT

## 2023-11-27 PROCEDURE — 97129 THER IVNTJ 1ST 15 MIN: CPT

## 2023-11-27 PROCEDURE — 770010 HCHG ROOM/CARE - REHAB SEMI PRIVAT*

## 2023-11-27 PROCEDURE — 97116 GAIT TRAINING THERAPY: CPT

## 2023-11-27 RX ADMIN — OMEPRAZOLE 20 MG: 20 CAPSULE, DELAYED RELEASE ORAL at 08:11

## 2023-11-27 RX ADMIN — AMLODIPINE BESYLATE 10 MG: 5 TABLET ORAL at 08:11

## 2023-11-27 RX ADMIN — POLYETHYLENE GLYCOL 3350 1 PACKET: 17 POWDER, FOR SOLUTION ORAL at 06:14

## 2023-11-27 RX ADMIN — TRAZODONE HYDROCHLORIDE 75 MG: 50 TABLET ORAL at 20:31

## 2023-11-27 RX ADMIN — ATORVASTATIN CALCIUM 40 MG: 40 TABLET, FILM COATED ORAL at 20:32

## 2023-11-27 RX ADMIN — HYDRALAZINE HYDROCHLORIDE 100 MG: 50 TABLET, FILM COATED ORAL at 06:14

## 2023-11-27 RX ADMIN — APIXABAN 10 MG: 5 TABLET, FILM COATED ORAL at 08:11

## 2023-11-27 RX ADMIN — SENNOSIDES AND DOCUSATE SODIUM 2 TABLET: 50; 8.6 TABLET ORAL at 08:12

## 2023-11-27 RX ADMIN — APIXABAN 10 MG: 5 TABLET, FILM COATED ORAL at 20:32

## 2023-11-27 RX ADMIN — SENNOSIDES AND DOCUSATE SODIUM 2 TABLET: 50; 8.6 TABLET ORAL at 20:32

## 2023-11-27 RX ADMIN — HYDRALAZINE HYDROCHLORIDE 100 MG: 50 TABLET, FILM COATED ORAL at 20:31

## 2023-11-27 RX ADMIN — OXYCODONE 5 MG: 5 TABLET ORAL at 20:32

## 2023-11-27 RX ADMIN — OXYCODONE 5 MG: 5 TABLET ORAL at 08:12

## 2023-11-27 RX ADMIN — HYDRALAZINE HYDROCHLORIDE 100 MG: 50 TABLET, FILM COATED ORAL at 15:34

## 2023-11-27 ASSESSMENT — ENCOUNTER SYMPTOMS
VOMITING: 0
EYES NEGATIVE: 1
ABDOMINAL PAIN: 0
FEVER: 0
NAUSEA: 0
MUSCULOSKELETAL NEGATIVE: 1
PALPITATIONS: 0
POLYDIPSIA: 0
BRUISES/BLEEDS EASILY: 0
FOCAL WEAKNESS: 1
SHORTNESS OF BREATH: 0
CHILLS: 0
COUGH: 0

## 2023-11-27 ASSESSMENT — PAIN DESCRIPTION - PAIN TYPE
TYPE: ACUTE PAIN

## 2023-11-27 ASSESSMENT — GAIT ASSESSMENTS: GAIT LEVEL OF ASSIST: TOTAL ASSIST

## 2023-11-27 NOTE — THERAPY
"Occupational Therapy  Daily Treatment     Patient Name: Jason Lima  Age:  62 y.o., Sex:  male  Medical Record #: [id number]  Today's Date: 11/27/2023     Precautions  Precautions: (P) Fall Risk  Comments: (P) Left hemiparesis, left visual inattention         Subjective    Pt encountered for OT sitting in WC in room talking on the phone. Pt pleasant and agreeable to participate. \"The machine () is getting a little uncomfortable.\"     Objective       11/27/23 1031   OT Charge Group   Charges Yes   OT Self Care / ADL (Units) 1   OT Neuromuscular Re-education / Balance (Units) 1   OT Therapy Activity (Units) 2   OT Total Time Spent   OT Individual Total Time Spent (Mins) 60   Precautions   Precautions Fall Risk   Comments Left hemiparesis, left visual inattention   Functional Level of Assist   Bed, Chair, Wheelchair Transfer Total Assist X 2   Bed Chair Wheelchair Transfer Description Adaptive equipment;Assist with one limb;Requires lift;Set-up of equipment;Squat pivot transfer to wheelchair;Supervision for safety;Other (comment)  (TAx2 to maxA towards the R d/t L lateral lean. VC needed for safe sequencing d/t occasional impulsivness.)   Standing Upper Body Exercises   Standing Upper Body Exercises Yes   Other Exercises BUE WB standing at mat; functional mobility at the //   Comments TAx2 BUE WB in standing d/t L lateral lean. TA to WB through an L open hand d/t spasticity. Low activity tolerance d/t c/o LBP. MaxA for functional mobility at the // d/t L lateral lean. VC to attend to and open L hand to maintain grasp on railing (limited by proximal waekness).   Neuro-Muscular Treatments   Neuro-Muscular Treatments Electrical Stimulation   Comments   (Adjusted intensity for pt comfort)   Interdisciplinary Plan of Care Collaboration   IDT Collaboration with  Therapy Tech   Patient Position at End of Therapy Seated;Chair Alarm On;Other (Comments)  (hand off to therapy tech to bring pt to dinning room) "   Collaboration Comments safety with transfers, POC     Pt set up on RT-300 FES for leftUE neuro re-ed, ROM and sensory input.  Electrodes applied to left scap stabilizers, shoulder, biceps, triceps, and wrist flexors/extensors. left UE placed in arm trough with ace wrap applied at left wrist in boxers wrap style for increased stability while allowing for finger flex/ext with stimulation. Adjusted  to improved overall positioning and posture for improved performance and comfort. Muscle stimulation parameters assessed for each muscle group to pt tolerance to e-stim and muscle contraction observed. Pt tolerated well with no complaints of pain.    Minutes of Cycling   *including warmup and cool down N/A minutes   Distance Traveled 0.12 miles   Energy Expended 0.0 kCal   Energy Per Hour 0.8 kCal/hr   Avg Power 0.9 W   Avg Stimulation N/A uC   Avg Asymmetry 8 % (Right)       Assessment    Overall, pt is making steady progress towards his goals, however, remains at TA to maxA for transfers d/t safety during functional mobility tasks d/t difficulties with balance.        Strengths: Able to follow instructions, Independent prior level of function, Motivated for self care and independence, Pleasant and cooperative, Supportive family, Alert and oriented  Barriers: Decreased endurance, Fatigue, Generalized weakness, Hemiparesis, Impaired activity tolerance, Impaired balance, Limited mobility, Spasticity    Plan    Cont ADLs, functional transfers, and thera act/ex to maximize functional recovery for safe DC home.       DME  OT DME Recommendations  Bathroom Equipment: 3 in 1 Commode  Additional Equipment: Other (Comments)    Passport items to be completed:  Perform bathroom transfers, complete dressing, complete feeding, get ready for the day, prepare a simple meal, participate in household tasks, adapt home for safety needs, demonstrate home exercise program, complete caregiver training     Occupational Therapy Goals  (Active)       Problem: Bathing       Dates: Start:  11/15/23         Goal: STG-Within one week, patient will bathe at modA using DME/AE PRN       Dates: Start:  11/15/23               Problem: Dressing       Dates: Start:  11/22/23         Goal: STG-Within one week, patient will dress UB at [place] using LRD       Dates: Start:  11/22/23            Goal: STG-Within one week, patient will dress LB at [place] using LRD       Dates: Start:  11/22/23               Problem: Functional Transfers       Dates: Start:  11/13/23         Goal: STG-Within one week, patient will transfer to toilet with Max/Mod A.       Dates: Start:  11/13/23            Goal: STG-Within one week, patient will transfer to step in shower with Max A.       Dates: Start:  11/13/23               Problem: OT Long Term Goals       Dates: Start:  11/13/23         Goal: LTG-By discharge, patient will complete basic self care tasks at Mod Independent level.       Dates: Start:  11/13/23            Goal: LTG-By discharge, patient will perform bathroom transfers at Mod Independent level.       Dates: Start:  11/13/23

## 2023-11-27 NOTE — PROGRESS NOTES
Received shift report from Noc shift and assumed care of patient. Patient awake, calm resting in bed. Call light and bedside table within reach. Will continue to monitor.

## 2023-11-27 NOTE — PROGRESS NOTES
Hospital Medicine Daily Progress Note      Chief Complaint:  Hypertension  Diabetes  Renal Failure    Interval History:  Pt frustrated over his debility from the stroke; otherwise, denies any new physical complaints.    Review of Systems  Review of Systems   Constitutional:  Negative for chills and fever.   HENT: Negative.     Eyes: Negative.    Respiratory:  Negative for cough and shortness of breath.    Cardiovascular:  Negative for chest pain and palpitations.   Gastrointestinal:  Negative for abdominal pain, nausea and vomiting.   Genitourinary: Negative.    Musculoskeletal: Negative.         Right foot pain   Skin:  Negative for itching and rash.   Neurological:  Positive for focal weakness.   Endo/Heme/Allergies:  Negative for polydipsia. Does not bruise/bleed easily.        Physical Exam  Temp:  [36.4 °C (97.5 °F)-36.7 °C (98 °F)] 36.7 °C (98 °F)  Pulse:  [75-86] 86  Resp:  [18] 18  BP: (114-135)/(75-84) 124/84  SpO2:  [95 %-96 %] 95 %    Physical Exam  Vitals reviewed.   Constitutional:       General: He is not in acute distress.     Appearance: Normal appearance. He is not ill-appearing.   HENT:      Head: Normocephalic and atraumatic.      Right Ear: External ear normal.      Left Ear: External ear normal.      Nose: Nose normal.      Mouth/Throat:      Pharynx: Oropharynx is clear.   Eyes:      General:         Right eye: No discharge.         Left eye: No discharge.      Extraocular Movements: Extraocular movements intact.      Conjunctiva/sclera: Conjunctivae normal.   Cardiovascular:      Rate and Rhythm: Normal rate and regular rhythm.   Pulmonary:      Effort: Pulmonary effort is normal. No respiratory distress.      Breath sounds: Normal breath sounds. No wheezing.   Abdominal:      General: Bowel sounds are normal. There is no distension.      Palpations: Abdomen is soft.      Tenderness: There is no abdominal tenderness.   Musculoskeletal:      Cervical back: Normal range of motion and neck  supple.      Right lower leg: No edema.      Left lower leg: Edema present.      Comments: LUE +edema   Skin:     General: Skin is warm and dry.   Neurological:      Mental Status: He is alert and oriented to person, place, and time.      Comments: Left sided weakness         Fluids    Intake/Output Summary (Last 24 hours) at 11/27/2023 1116  Last data filed at 11/27/2023 0808  Gross per 24 hour   Intake 690 ml   Output 500 ml   Net 190 ml       Laboratory                      Assessment/Plan  DVT (deep venous thrombosis) ([facility])  Assessment & Plan  U/S LUE acute thrombus in paired brachial veins  U/S LLE acute thrombus in paired peroneal veins  Tolerated proph Heparin  Now anticoagulated on Eliquis  Check F/U labs in AM    Hemorrhagic stroke ([facility])- (present on admission)  Assessment & Plan  Pt suffered R thalamic hemorrhage on 10/23/23 while visiting [place]  Presented there w/ sudden onset HA, L HP, and /100  Management per Physiatry    ROMÁN (acute kidney injury) ([facility])- (present on admission)  Assessment & Plan  U/S medical renal disease, no hydronephrosis  Appears to have CKD, probably stage IV  Avoid nephrotoxins  Renal dose all meds  Monitor electrolytes  Outpt Nephrology F/U  Check F/U labs in AM    DM (diabetes mellitus) ([facility])- (present on admission)  Assessment & Plan  HbA1c 6.4  Off FSBS and SSI  Continue diet control  Outpt meds include Metformin    Hyperlipidemia- (present on admission)  Assessment & Plan  Continue Lipitor  Outpt meds include Lipitor    Hypertension- (present on admission)  Assessment & Plan  On Norvasc and Hydralazine  Observe blood pressure trends  Outpt meds include Norvasc    Full Code    Discussed w/ pt and Dr. Lee

## 2023-11-27 NOTE — CARE PLAN
"  Problem: Fall Risk - Rehab  Goal: Patient will remain free from falls  Note: Shellie Hamilton Fall risk Assessment Score: 22    High fall risk Interventions   - Alarming seatbelt  - Wander guard  - Bed and strip alarm   - Yellow sign by the door   - Yellow wrist band \"Fall risk\"  - Room near to the nurse station  - Do not leave patient unattended in the bathroom  - Fall risk education provided      Problem: Diabetes Management  Goal: Patient's ability to maintain appropriate glucose levels will be maintained or improve  Note: HS snack given.     Problem: Respiratory  Goal: Patient will understand use and administration of respiratory medications to improve respiratory function  Note: CPAP     Problem: Pain - Standard  Goal: Alleviation of pain or a reduction in pain to the patient’s comfort goal  Note: Educate patient of non-pharmacological comfort measures: repositioning, relaxation/breathing technique, cold compress and activities.         The patient is Watcher - Medium risk of patient condition declining or worsening    Shift Goals  Clinical Goals: Safety  Patient Goals: Rest  Family Goals: increased pt strength, independence    "

## 2023-11-27 NOTE — CARE PLAN
Problem: Bowel Elimination  Goal: Patient will participate in bowel management program  Outcome: Progressing  Note: Patient uses bathroom during bowel elimination. Will continue to monitor   The patient is Stable - Low risk of patient condition declining or worsening    Shift Goals  Clinical Goals: (P) Safety  Patient Goals: (P) Comfort  Family Goals: increased pt strength, independence    Progress made toward(s) clinical / shift goals:  pt has regular bowel movements and is continent    Patient is not progressing towards the following goals:

## 2023-11-27 NOTE — THERAPY
Speech Language Pathology  Daily Treatment     Patient Name: Jason Lima  Age:  62 y.o., Sex:  male  Medical Record #: [id number]  Today's Date: 11/27/2023     Precautions  Precautions: Fall Risk  Comments: Left hemiparesis, left visual inattention    Subjective    Patient agreeable to therapy.       Objective       11/27/23 0901   Treatment Charges   SLP Cognitive Skill Development First 15 Minutes 1   SLP Cognitive Skill Development Additional 15 Minutes 1   SLP Total Time Spent   SLP Individual Total Time Spent (Mins) 30   Cognition   Functional Memory Activities Minimal (4)   Planning / Decision Making Moderate (3)         Assessment    Patient continues to need reinforcement for stroke ed.  Patient was asked if he could recall prior stroke ed and or transfer sequencing that had been written out by PT.  Patient stated that he could not recall.  Reviewed patient's transfer sequencing.  Patient is able to recognize the rational for given steps and recognize the steps when reviewed, but does not generate the information independently or with a simple prompt.   Patient benefits from reminders to find his midline when sitting in w/c.      Strengths: Effective communication skills, Alert and oriented, Able to follow instructions, Motivated for self care and independence, Pleasant and cooperative, Supportive family, Willingly participates in therapeutic activities  Barriers: Aspiration risk, Hemiparesis, Impaired carryover of learning, Impaired insight/denial of deficits, Impaired functional cognition, Impulsive, Visual impairment    Plan    Target recall of safety sequencing, selective and alternating attention, safety planning and problem solving.    Passport items to be completed:  Express basic needs, understand food/liquid recommendations, consistently follow swallow precautions, manage finances, manage medications, arrive to therapy appointments on time, complete daily memory log entries, solve problems related to  safety situations, review education related to hospitalization, complete caregiver training     Speech Therapy Problems (Active)       Problem: Expression STGs       Dates: Start:  11/13/23         Goal: STG-Within one week, patient will       Dates: Start:  11/13/23               Problem: Memory STGs       Dates: Start:  11/22/23         Goal: STG-Within one week, patient will recall new training and safety sequencing with 80% acc with set up and external cues.       Dates: Start:  11/22/23               Problem: Problem Solving STGs       Dates: Start:  11/22/23         Goal: STG-Within one week, patient will perform alternating attention tasks with 85% acc with set up.       Dates: Start:  11/22/23            Goal: STG-Within one week, patient will organization and reasoning tasks with 80% acc with min cues.       Dates: Start:  11/22/23               Problem: Speech/Swallowing LTGs       Dates: Start:  11/13/23         Goal: LTG-By discharge, patient will safely swallow (7)regular diet textures and (0) thin liquids with no overt s/sx of aspiration for 2/2 days.       Dates: Start:  11/13/23            Goal: LTG-By discharge, patient will solve complex problem       Dates: Start:  11/13/23       Description: For safety and self care with 80% acc mod I  for safe discharge home.         Goal: LTG-By discharge, patient will recall new training with 80% acc with min A and use of external memory aids.       Dates: Start:  11/13/23               Problem: Swallowing STGs       Dates: Start:  11/13/23

## 2023-11-27 NOTE — THERAPY
Physical Therapy   Daily Treatment     Patient Name: Jason Lima  Age:  62 y.o., Sex:  male  Medical Record #: [id number]  Today's Date: 11/27/2023     Precautions  Precautions: Fall Risk  Comments: Left hemiparesis, left visual inattention    Subjective    Pt is pleasant and cooperative.      Objective       11/27/23 0700   PT Charge Group   PT Gait Training (Units) 2   PT Neuromuscular Re-Education / Balance (Units) 2   PT Total Time Spent   PT Individual Total Time Spent (Mins) 60   Pain 0 - 10 Group   Location Foot   Location Orientation Right   Pain Rating Scale (NPRS) 3   Gait Functional Level of Assist    Gait Level Of Assist Total Assist   Bed Mobility    Sit to Stand Minimal Assist   Interdisciplinary Plan of Care Collaboration   Patient Position at End of Therapy Seated;Chair Alarm On  (in dining lopez)     Pt brought to therapy gym.     Squat pivot via R sided approach with MOD A.     Pt performed several minutes of seated neuro re-ed to facilitate decrease lateropulsion including   -R reaching outside base of support  -R lateral forearm prop   -R shoulder taps to target.   Pt with improved pushing as activities progressed    Treadmill training using Lite Gait. Pt performed 3 bouts of 2-2.5 mins of ambulation on treadmill progressing from 0.1-0.3 mph. L DFA wrap used. Pt required MOD-MAX A for swing limb advancement of the LLE, at times able to swing limb advance with only MIN A. Pt with moderate pushing in standing however at times able to self correct with VC. Pt with consistent step to gait today despite VC for step through. As speed improved, pt with improving gait mechanics including ability to swing/advance L leg and stance stability. Pt ambulated a total of 74'    Pt remained seated in w/c with chair alarm on in dining lopez.     Assessment    Pt tolerated treadmill ambulation well, and continues to be limited by lateropulsion.   Strengths: Able to follow instructions, Independent prior level of  "function, Motivated for self care and independence, Pleasant and cooperative, Supportive family, Willingly participates in therapeutic activities  Barriers: Decreased endurance, Hemiparesis, Difficulty following instructions, Fatigue, Hypertension, Impaired activity tolerance, Impaired balance, Impaired insight/denial of deficits, Impaired functional cognition, Impaired sleep pattern, Impulsive, Limited mobility, Visual impairment, Poor family support (lives alone)    Plan    Continue to progress pt's functional independence.     DME  PT DME Recommendations  Wheelchair: 18\" Width  Cushion: Specialty (See other comments) (TBD pending ambulation progress)  Assistive Device: Tanvir Walker (TBD pending progress, currently 2 person assist for gait)  Additional Equipment: Other (Comments)    Passport items to be completed:  Get in/out of bed safely, in/out of a vehicle, safely use mobility device, walk or wheel around home/community, navigate up and down stairs, show how to get up/down from the ground, ensure home is accessible, demonstrate HEP, complete caregiver training    Physical Therapy Problems (Active)       Problem: Mobility       Dates: Start:  11/13/23         Goal: STG-Within one week, patient will ambulate distances >/= 30' c/ RHR and ModA or better.        Dates: Start:  11/13/23         Goal Note filed on 11/22/23 1102 by Awilda Dela Cruz, MSPT       Limited to 10ft at right hallway rail mod assist                 Problem: PT-Long Term Goals       Dates: Start:  11/13/23         Goal: LTG-By discharge, patient will propel wheelchair >/= 300' at Neto or better.        Dates: Start:  11/13/23            Goal: LTG-By discharge, patient will ambulate >/= 50' c/ LRAD at Renata or better.        Dates: Start:  11/13/23            Goal: LTG-By discharge, patient will transfer one surface to another c/ Renata or better.        Dates: Start:  11/13/23            Goal: LTG-By discharge, patient will ambulate up/down 1 step " c/ LRAD at Renata or better.        Dates: Start:  11/13/23            Goal: LTG-By discharge, patient will transfer in/out of a car c/ ModA or better.        Dates: Start:  11/13/23

## 2023-11-27 NOTE — THERAPY
Recreational Therapy  Daily Treatment     Patient Name: Jason Lima  AGE:  62 y.o., SEX:  male  Medical Record #: [id number]  Today's Date: 11/27/2023       Subjective    Pt became tearful sharing that he was upset that he was unable to perform the task to his expectations.      Objective       11/27/23 1331   Procedural Tracking   Procedural Tracking Gross Motor Functional Leisure Skills   Treatment Time   Total Time Spent (mins) 45   Functional Ability Status - Cognitive   Attention Span Remains on Task   Comprehension Follows Three Step Commands   Judgment Able to Make Independent Decisions   Functional Ability Status - Emotional    Affect Sad;Appropriate   Mood Appropriate;Depressed   Behavior Appropriate   Emotional Comments Tearful at times sharing that he was upset that he could not perform the task to his expectation.   Skilled Intervention    Skilled Intervention Fine Motor Leisure;Gross Motor Leisure   Skilled Intervention Comments Kinetic sand, large puzzle   Interdisciplinary Plan of Care Collaboration   IDT Collaboration with  Family / Caregiver   Patient Position at End of Therapy Seated;Family / Friend in Room;Tray Table within Reach;Call Light within Reach   Collaboration Comments Sister attended the second half of session   Strengths & Barriers   Strengths Able to follow instructions;Alert and oriented;Pleasant and cooperative;Motivated for self care and independence   Barriers Hemiparesis;Decreased endurance;Fatigue;Generalized weakness         Assessment    Required RUE to support LUE to perform task.     Strengths: (P) Able to follow instructions, Alert and oriented, Pleasant and cooperative, Motivated for self care and independence  Barriers: (P) Hemiparesis, Decreased endurance, Fatigue, Generalized weakness    Plan    Community engagement and stroke support group.    Passport items to be completed:  Verbalize two positive leisure activities, discuss returning to work, hobbies, community groups  or volunteer activities, explore community resources

## 2023-11-27 NOTE — PROGRESS NOTES
"  Physical Medicine & Rehabilitation Progress Note    Encounter Date: 11/27/2023    Chief Complaint: Foot Pain    Interval Events (Subjective):  ROMAIN JIMENEZ 11/26  [place]    Seen and examined in the cafe. He feels as though he is doing well. Making improvements everyday. He has some questions regarding discharge. Has been waking up with crusty red eyes. Also has some foot pain from bunion.     ROS: 14 point ROS negative unless otherwise specified in the HPI    Objective:  VITAL SIGNS: /84   Pulse 86   Temp 36.7 °C (98 °F) (Oral)   Resp 18   Ht 1.727 m (5' 8\")   Wt 87 kg (191 lb 12.8 oz)   SpO2 95%   BMI 29.16 kg/m²     GEN: No apparent distress  HEENT: Head normocephalic, atraumatic.  Sclera nonicteric bilaterally, no ocular discharge appreciated bilaterally.  CV: Extremities warm and well-perfused, no peripheral edema appreciated bilaterally.  PULMONARY: Breathing nonlabored on room air, no respiratory accessory muscle use.  Not requiring supplemental oxygen.  SKIN: No appreciable skin breakdown on exposed areas of skin.  PSYCH: Normal affect  NEURO: Awake alert.  Conversational.  Logical thought content. Dysarthria. Left Hemiparesis.       Laboratory Values:  No results found for this or any previous visit (from the past 72 hour(s)).      Medications:  Scheduled Medications   Medication Dose Frequency    apixaban  10 mg BID    [START ON 12/1/2023] apixaban  5 mg BID    hydrALAZINE  100 mg Q8HRS    traZODone  75 mg QHS    senna-docusate  2 Tablet BID    omeprazole  20 mg DAILY    amLODIPine  10 mg DAILY    atorvastatin  40 mg Nightly     PRN medications: carboxymethylcellulose, melatonin, [DISCONTINUED] insulin regular **AND** [CANCELED] POC blood glucose manual result **AND** NOTIFY MD and PharmD **AND** Administer 20 grams of glucose (approximately 8 ounces of fruit juice) every 15 minutes PRN FSBG less than 70 mg/dL **AND** dextrose bolus, Respiratory Therapy Consult, senna-docusate **AND** " polyethylene glycol/lytes **AND** magnesium hydroxide **AND** bisacodyl, mag hydrox-al hydrox-simeth, ondansetron **OR** ondansetron, traZODone, sodium chloride, [] acetaminophen **FOLLOWED BY** acetaminophen, oxyCODONE immediate-release **OR** oxyCODONE immediate-release, hydrALAZINE    Diet:  Current Diet Order   Procedures    Diet Order Diet: Consistent CHO (Diabetic) (2 gram sodium restriction; medications whole with thin liquids); Second Modifier: (optional): 2 Gram Sodium       Medical Decision Making and Plan:  Right thalamic hemorrhagic stroke ~ last week October   Originally presented with a headache and left-sided weakness to hospital in [facility]  Work-up at the outside hospital in [facility] revealed a right thalamic hemorrhagic stroke  Repeat CT head done after return flight to the ,  CT head shows right thalamic hemorrhage, decrease in density since prior studies from the outside hospital, slight asymmetric dilation of the left ventricular system including the left temporal horn with a possible component of hydrocephalus  Planning for BP less than 140, avoiding any kind of anticoagulation  Initiate comprehensive IRF level therapy with 3 days of therapy 5 days a week with services from PT/OT/SLP     Spasticity  Continue baclofen 5 mg nightly, started on  --> increase to TID 2023 --> continue 2023, stable on higher dose.   PRAFO ordered for right lower extremity to prevent further plantarflexion contracture  Spasticity controlled, continue Baclofen 2023   Consider weaning 2023   Stop 2023 - monitor for worsening spasticity  Remain off of Baclofen 2023     ABLA  Now on Eliquis  Recheck      CKD  Has seen nephrology in past  Improving 2023   F/u OP with nephrology  S/p IVF -2023 BUN and Cr improved compared to prior  BMP  - improved - currently receiving IVF  Recheck BMP  - Slightly worsened renal function - likely  baseline  BUN/Cr stable 11/24/2023   Recheck 11/28     Hypertension  Continue Amlodipine 10mg daily  Continue Hydralazine 25mg TID - increased by hospitalist 11/13/2023 from BID to TID--> increased to 50mg q8hrs 11/15  11/16 Hydralazine increased to 75mg q8hrs and 1x Hydralazine given  11/17 BP still elevated but hydralazine recently increased. Monitor  BP stable continue BP meds at current doses 11/27/2023      Prediabetes  Discontinue SSI     HLD  Continue home dose satin      Bowel  Continue with scheduled Colace and senna, as needed stool softeners     Insomnia  Secondary to recent jet lag, melatonin scheduled --> increase to home dose 11/13/2023 9mg  Utilize trazodone as needed insomnia continues  Schedule Trazodone 11/15/2023   11/16/2023 continue Trazodone as is. Thinks he slept better last night  11/17/2023 Still not sleeping well. Increase to 75mg nightly.   11/24/2023 continue trazodone at current dose     Pain -as needed Tylenol and oxycodone    Post-Stroke Depression  Consulted Pyschiatry     Right Foot Pain  Xray with swelling 1st metatarsal head no sign of gout  Due to presence of bunion  No elective surgery for at least 6 months post stroke    LABS 11/28: CBC + BMP      DVT PROPHYLAXIS: NO - Hemorrhagic stroke. US + UE and LE for brachial and peroneal DVT. 11/21/2023 start Heparin 5000 units q8hrs SQ for further prophylaxis, if tolerates will increase to full AC. Consider repeat CTH to ensure stability bleed once on full AC. 11/24/2023 Start loading dose Eliquis 10mg BID x 7 days with transition to 5mg BID.     HOSPITALIST FOLLOWING: YES - d/w hospitalist 11/26    CODE STATUS: FULL CODE    DISPO: Home alone. Vielka and patient not . D/w CM to give resources for private care giving. Their goal is to stay 6 weeks. RWW denied. Counseled extensively on private care giving/therapy in addition to Home Health or outpatient. 11/27 FMLA unavailable to Vielka. If cannot get insurance extension family is  wanting SNF.     MARCUS: 12/4/23    MEDS SENT TO: TBD    DISCHARGE FOLLOW UP: Neurology, Nephrology, PCP - needs referral to all.   ____________________________________    Dr. Lisa Lee DO, MS  [facility] - Physical Medicine & Rehabilitation   ____________________________________

## 2023-11-27 NOTE — THERAPY
Physical Therapy   Daily Treatment     Patient Name: Jason Lima  Age:  62 y.o., Sex:  male  Medical Record #: [id number]  Today's Date: 11/27/2023     Precautions  Precautions: (P) Fall Risk  Comments: (P) Left hemiparesis, left visual inattention    Subjective    Pt admits that he cannot tell he is leaning left.      Objective       11/27/23 0831   PT Charge Group   PT Neuromuscular Re-Education / Balance (Units) 1   PT Therapeutic Activities (Units) 1   PT Total Time Spent   PT Individual Total Time Spent (Mins) 30   Precautions   Precautions Fall Risk   Comments Left hemiparesis, left visual inattention   Stairs Functional Level of Assist   Level of Assist with Stairs Unable to Participate   Transfer Functional Level of Assist   Bed, Chair, Wheelchair Transfer Total Assist X 2   Bed Chair Wheelchair Transfer Description Adaptive equipment;Slideboard transfer from bed to wheelchair;Squat pivot transfer to wheelchair;Set-up of equipment;Non-hospital bed;Increased time;Initial preparation for task;Verbal cueing;Other (comment)  (Safety facilitation for LUE. SQPT towards left with bed rail mod/max A, 2nd person CGA/SBA- strong L lateral lean, unsafe. Slide board transfer standard bed to wc, mod/max A x 1, second person for safety (inconsistent, L Lateral lean, LUE neglect).)   Sitting Lower Body Exercises   Comments Seated hamstring curls LLE AAROM on skateboard 15x. Tilt board with BLE for ankle mobility x 2 min, AAROM LLE.   Bed Mobility    Supine to Sit Moderate Assist   Sit to Supine Minimal Assist  (with bed rail)   Scooting Maximal Assist  (for repositioning in chair)   Rolling Minimum Assist to Lt.;Moderate Assist to Rt.   Neuro-Muscular Treatments   Neuro-Muscular Treatments Weight Shift Left;Weight Shift Right;Verbal Cuing;Tactile Cuing;Sequencing;Postural Changes;Postural Facilitation;Joint Approximation;Compensatory Strategies;Anterior weight shift   Comments Midline orientation in front of mirror with  "facilitation for head righting.   Interdisciplinary Plan of Care Collaboration   IDT Collaboration with  Physical Therapist;Therapy Tech   Patient Position at End of Therapy Seated;Self Releasing Lap Belt Applied;Tray Table within Reach;Call Light within Reach;Phone within Reach;Chair Alarm On   Collaboration Comments POC, CLOF     LLE LAQ 10 x 2 sets    Assessment    Pt continues to lean/ push onto his tanvir side/ left side, that cannot support him. He is motivated, and moves quickly, but safety is compromised. For this reason, 2 person assist continues. Pt continues to demonstrate impaired midline orientation, and remains limited by L neglect.     Strengths: Able to follow instructions, Independent prior level of function, Motivated for self care and independence, Pleasant and cooperative, Supportive family, Willingly participates in therapeutic activities  Barriers: Decreased endurance, Hemiparesis, Difficulty following instructions, Fatigue, Hypertension, Impaired activity tolerance, Impaired balance, Impaired insight/denial of deficits, Impaired functional cognition, Impaired sleep pattern, Impulsive, Limited mobility, Visual impairment, Poor family support (lives alone)    Plan    Head righting/ midline orientation/ sitting and standing activity tolerance  Bed mobility/ PNF rolling  Transfers with SB, emphasis on motor planning  Seated EOB balance  Wc mob with tanvir technique and emphasis on left environmental scanning/ awareness  Initiate HEP  Forced use principles for tanvir body/ standing frame  Vector (West) pregait training w/ XL harness  NMES/ PNF left LE    DME  PT DME Recommendations  Wheelchair: 18\" Width  Cushion: Specialty (See other comments) (TBD pending ambulation progress)  Assistive Device: Tanvir Walker (TBD pending progress, currently 2 person assist for gait)  Additional Equipment: Other (Comments)    Passport items to be completed:  Get in/out of bed safely, in/out of a vehicle, safely use " mobility device, walk or wheel around home/community, navigate up and down stairs, show how to get up/down from the ground, ensure home is accessible, demonstrate HEP, complete caregiver training    Physical Therapy Problems (Active)       Problem: Mobility       Dates: Start:  11/13/23         Goal: STG-Within one week, patient will ambulate distances >/= 30' c/ RHR and ModA or better.        Dates: Start:  11/13/23         Goal Note filed on 11/22/23 1102 by Awilda Dela Cruz, MSPT       Limited to 10ft at right hallway rail mod assist                 Problem: PT-Long Term Goals       Dates: Start:  11/13/23         Goal: LTG-By discharge, patient will propel wheelchair >/= 300' at Neto or better.        Dates: Start:  11/13/23            Goal: LTG-By discharge, patient will ambulate >/= 50' c/ LRAD at Renata or better.        Dates: Start:  11/13/23            Goal: LTG-By discharge, patient will transfer one surface to another c/ Renata or better.        Dates: Start:  11/13/23            Goal: LTG-By discharge, patient will ambulate up/down 1 step c/ LRAD at Renata or better.        Dates: Start:  11/13/23            Goal: LTG-By discharge, patient will transfer in/out of a car c/ ModA or better.        Dates: Start:  11/13/23

## 2023-11-28 ENCOUNTER — APPOINTMENT (OUTPATIENT)
Dept: PHYSICAL THERAPY | Facility: REHABILITATION | Age: 63
DRG: 057 | End: 2023-11-28
Attending: PHYSICAL MEDICINE & REHABILITATION
Payer: COMMERCIAL

## 2023-11-28 ENCOUNTER — APPOINTMENT (OUTPATIENT)
Dept: OCCUPATIONAL THERAPY | Facility: REHABILITATION | Age: 63
DRG: 057 | End: 2023-11-28
Attending: HOSPITALIST
Payer: COMMERCIAL

## 2023-11-28 ENCOUNTER — TELEPHONE (OUTPATIENT)
Dept: NEUROLOGY | Facility: MEDICAL CENTER | Age: 63
End: 2023-11-28
Payer: COMMERCIAL

## 2023-11-28 ENCOUNTER — APPOINTMENT (OUTPATIENT)
Dept: SPEECH THERAPY | Facility: REHABILITATION | Age: 63
DRG: 057 | End: 2023-11-28
Attending: PHYSICAL MEDICINE & REHABILITATION
Payer: COMMERCIAL

## 2023-11-28 LAB
ANION GAP SERPL CALC-SCNC: 12 MMOL/L (ref 7–16)
BUN SERPL-MCNC: 39 MG/DL (ref 8–22)
CALCIUM SERPL-MCNC: 8.9 MG/DL (ref 8.5–10.5)
CHLORIDE SERPL-SCNC: 103 MMOL/L (ref 96–112)
CO2 SERPL-SCNC: 25 MMOL/L (ref 20–33)
CREAT SERPL-MCNC: 3 MG/DL (ref 0.5–1.4)
ERYTHROCYTE [DISTWIDTH] IN BLOOD BY AUTOMATED COUNT: 39.6 FL (ref 35.9–50)
GFR SERPLBLD CREATININE-BSD FMLA CKD-EPI: 23 ML/MIN/1.73 M 2
GLUCOSE SERPL-MCNC: 118 MG/DL (ref 65–99)
HCT VFR BLD AUTO: 34 % (ref 42–52)
HGB BLD-MCNC: 12 G/DL (ref 14–18)
MCH RBC QN AUTO: 31.4 PG (ref 27–33)
MCHC RBC AUTO-ENTMCNC: 35.3 G/DL (ref 32.3–36.5)
MCV RBC AUTO: 89 FL (ref 81.4–97.8)
PLATELET # BLD AUTO: 177 K/UL (ref 164–446)
PMV BLD AUTO: 10.3 FL (ref 9–12.9)
POTASSIUM SERPL-SCNC: 3.8 MMOL/L (ref 3.6–5.5)
RBC # BLD AUTO: 3.82 M/UL (ref 4.7–6.1)
SODIUM SERPL-SCNC: 140 MMOL/L (ref 135–145)
URATE SERPL-MCNC: 9 MG/DL (ref 2.5–8.3)
WBC # BLD AUTO: 5.9 K/UL (ref 4.8–10.8)

## 2023-11-28 PROCEDURE — 97530 THERAPEUTIC ACTIVITIES: CPT

## 2023-11-28 PROCEDURE — A9270 NON-COVERED ITEM OR SERVICE: HCPCS | Performed by: HOSPITALIST

## 2023-11-28 PROCEDURE — A9270 NON-COVERED ITEM OR SERVICE: HCPCS | Performed by: PHYSICAL MEDICINE & REHABILITATION

## 2023-11-28 PROCEDURE — 99232 SBSQ HOSP IP/OBS MODERATE 35: CPT | Performed by: HOSPITALIST

## 2023-11-28 PROCEDURE — 97535 SELF CARE MNGMENT TRAINING: CPT

## 2023-11-28 PROCEDURE — 99231 SBSQ HOSP IP/OBS SF/LOW 25: CPT | Performed by: STUDENT IN AN ORGANIZED HEALTH CARE EDUCATION/TRAINING PROGRAM

## 2023-11-28 PROCEDURE — 97112 NEUROMUSCULAR REEDUCATION: CPT

## 2023-11-28 PROCEDURE — 99232 SBSQ HOSP IP/OBS MODERATE 35: CPT | Performed by: PHYSICAL MEDICINE & REHABILITATION

## 2023-11-28 PROCEDURE — 97130 THER IVNTJ EA ADDL 15 MIN: CPT

## 2023-11-28 PROCEDURE — 97110 THERAPEUTIC EXERCISES: CPT

## 2023-11-28 PROCEDURE — 700102 HCHG RX REV CODE 250 W/ 637 OVERRIDE(OP): Performed by: HOSPITALIST

## 2023-11-28 PROCEDURE — 85027 COMPLETE CBC AUTOMATED: CPT

## 2023-11-28 PROCEDURE — 97129 THER IVNTJ 1ST 15 MIN: CPT

## 2023-11-28 PROCEDURE — 36415 COLL VENOUS BLD VENIPUNCTURE: CPT

## 2023-11-28 PROCEDURE — 84550 ASSAY OF BLOOD/URIC ACID: CPT

## 2023-11-28 PROCEDURE — 700102 HCHG RX REV CODE 250 W/ 637 OVERRIDE(OP): Performed by: PHYSICAL MEDICINE & REHABILITATION

## 2023-11-28 PROCEDURE — 80048 BASIC METABOLIC PNL TOTAL CA: CPT

## 2023-11-28 PROCEDURE — 700101 HCHG RX REV CODE 250: Performed by: PHYSICAL MEDICINE & REHABILITATION

## 2023-11-28 PROCEDURE — 770010 HCHG ROOM/CARE - REHAB SEMI PRIVAT*

## 2023-11-28 RX ORDER — COLCHICINE 0.6 MG/1
1.2 TABLET ORAL ONCE
Status: COMPLETED | OUTPATIENT
Start: 2023-11-28 | End: 2023-11-28

## 2023-11-28 RX ORDER — POLYMYXIN B SULFATE AND TRIMETHOPRIM 1; 10000 MG/ML; [USP'U]/ML
1 SOLUTION OPHTHALMIC
Status: DISCONTINUED | OUTPATIENT
Start: 2023-11-28 | End: 2023-11-28

## 2023-11-28 RX ORDER — COLCHICINE 0.6 MG/1
0.6 TABLET ORAL 2 TIMES DAILY
Status: COMPLETED | OUTPATIENT
Start: 2023-11-29 | End: 2023-12-03

## 2023-11-28 RX ORDER — FLUOXETINE 10 MG/1
10 CAPSULE ORAL DAILY
Status: DISCONTINUED | OUTPATIENT
Start: 2023-11-29 | End: 2023-11-28

## 2023-11-28 RX ORDER — CIPROFLOXACIN HYDROCHLORIDE 3.5 MG/ML
1 SOLUTION/ DROPS TOPICAL
Status: DISPENSED | OUTPATIENT
Start: 2023-11-28 | End: 2023-12-05

## 2023-11-28 RX ORDER — COLCHICINE 0.6 MG/1
0.6 TABLET ORAL ONCE
Status: COMPLETED | OUTPATIENT
Start: 2023-11-28 | End: 2023-11-28

## 2023-11-28 RX ADMIN — APIXABAN 10 MG: 5 TABLET, FILM COATED ORAL at 20:33

## 2023-11-28 RX ADMIN — HYDRALAZINE HYDROCHLORIDE 100 MG: 50 TABLET, FILM COATED ORAL at 13:54

## 2023-11-28 RX ADMIN — COLCHICINE 0.6 MG: 0.6 TABLET, FILM COATED ORAL at 10:53

## 2023-11-28 RX ADMIN — OXYCODONE 5 MG: 5 TABLET ORAL at 05:38

## 2023-11-28 RX ADMIN — OXYCODONE 5 MG: 5 TABLET ORAL at 12:07

## 2023-11-28 RX ADMIN — MENTHOL 2 G: 10 GEL TOPICAL at 17:00

## 2023-11-28 RX ADMIN — ATORVASTATIN CALCIUM 40 MG: 40 TABLET, FILM COATED ORAL at 20:33

## 2023-11-28 RX ADMIN — APIXABAN 10 MG: 5 TABLET, FILM COATED ORAL at 08:23

## 2023-11-28 RX ADMIN — HYDRALAZINE HYDROCHLORIDE 100 MG: 50 TABLET, FILM COATED ORAL at 05:32

## 2023-11-28 RX ADMIN — TRAZODONE HYDROCHLORIDE 75 MG: 50 TABLET ORAL at 20:33

## 2023-11-28 RX ADMIN — MENTHOL 2 G: 10 GEL TOPICAL at 12:00

## 2023-11-28 RX ADMIN — MENTHOL 2 G: 10 GEL TOPICAL at 21:00

## 2023-11-28 RX ADMIN — CIPROFLOXACIN 1 DROP: 3 SOLUTION OPHTHALMIC at 16:59

## 2023-11-28 RX ADMIN — SENNOSIDES AND DOCUSATE SODIUM 2 TABLET: 50; 8.6 TABLET ORAL at 08:23

## 2023-11-28 RX ADMIN — OXYCODONE 5 MG: 5 TABLET ORAL at 08:28

## 2023-11-28 RX ADMIN — CIPROFLOXACIN 1 DROP: 3 SOLUTION OPHTHALMIC at 22:00

## 2023-11-28 RX ADMIN — CIPROFLOXACIN 1 DROP: 3 SOLUTION OPHTHALMIC at 12:00

## 2023-11-28 RX ADMIN — COLCHICINE 1.2 MG: 0.6 TABLET, FILM COATED ORAL at 09:55

## 2023-11-28 RX ADMIN — AMLODIPINE BESYLATE 10 MG: 5 TABLET ORAL at 08:23

## 2023-11-28 RX ADMIN — HYDRALAZINE HYDROCHLORIDE 100 MG: 50 TABLET, FILM COATED ORAL at 20:32

## 2023-11-28 RX ADMIN — OMEPRAZOLE 20 MG: 20 CAPSULE, DELAYED RELEASE ORAL at 08:23

## 2023-11-28 ASSESSMENT — ENCOUNTER SYMPTOMS
FEVER: 0
VOMITING: 0
CHILLS: 0
NAUSEA: 0
SHORTNESS OF BREATH: 0
ABDOMINAL PAIN: 0
DIARRHEA: 0
NERVOUS/ANXIOUS: 0

## 2023-11-28 ASSESSMENT — PAIN DESCRIPTION - PAIN TYPE
TYPE: ACUTE PAIN

## 2023-11-28 NOTE — PROGRESS NOTES
Hospital Medicine Daily Progress Note      Chief Complaint:  Hypertension  Diabetes  CKD/ROMÁN    Interval History:  No complaints.  Doing ok.    Review of Systems  Review of Systems   Constitutional:  Negative for chills and fever.   Respiratory:  Negative for shortness of breath.    Cardiovascular:  Negative for chest pain.   Gastrointestinal:  Negative for abdominal pain, diarrhea, nausea and vomiting.   Psychiatric/Behavioral:  The patient is not nervous/anxious.         Physical Exam  Temp:  [36.7 °C (98 °F)] 36.7 °C (98 °F)  Pulse:  [78-96] 78  Resp:  [16-18] 18  BP: (126-145)/() 126/89  SpO2:  [94 %-95 %] 95 %    Physical Exam  Vitals and nursing note reviewed.   Constitutional:       Appearance: Normal appearance.   HENT:      Head: Atraumatic.   Eyes:      Conjunctiva/sclera: Conjunctivae normal.      Pupils: Pupils are equal, round, and reactive to light.   Cardiovascular:      Rate and Rhythm: Normal rate and regular rhythm.      Heart sounds: No murmur heard.  Pulmonary:      Effort: Pulmonary effort is normal.      Breath sounds: No stridor. No wheezing or rales.   Abdominal:      General: There is no distension.      Palpations: Abdomen is soft.      Tenderness: There is no abdominal tenderness.   Musculoskeletal:      Cervical back: Normal range of motion and neck supple.      Right lower leg: No edema.      Left lower leg: Edema present.      Comments: Has LUE swelling   Skin:     General: Skin is warm and dry.      Findings: No rash.   Neurological:      Mental Status: He is alert and oriented to person, place, and time.   Psychiatric:         Mood and Affect: Mood normal.         Behavior: Behavior normal.         Fluids    Intake/Output Summary (Last 24 hours) at 11/28/2023 0850  Last data filed at 11/28/2023 0800  Gross per 24 hour   Intake 530 ml   Output 525 ml   Net 5 ml         Laboratory  Recent Labs     11/28/23  0525   WBC 5.9   RBC 3.82*   HEMOGLOBIN 12.0*   HEMATOCRIT 34.0*   MCV  89.0   MCH 31.4   MCHC 35.3   RDW 39.6   PLATELETCT 177   MPV 10.3     Recent Labs     11/28/23  0525   SODIUM 140   POTASSIUM 3.8   CHLORIDE 103   CO2 25   GLUCOSE 118*   BUN 39*   CREATININE 3.00*   CALCIUM 8.9                 Assessment/Plan  DVT (deep venous thrombosis) (HCC)  Assessment & Plan  US LUE: acute thrombus in paired brachial veins  US LLE: acute thrombus in paired peroneal veins  Cont Eliquis    Hemorrhagic stroke (HCC)- (present on admission)  Assessment & Plan  Had right thalamic hemorrhage on 10/23/23 while visiting [facility]    ROMÁN (acute kidney injury) ([facility])- (present on admission)  Assessment & Plan  Bun/Cr trending up recently  US: medical renal disease, no hydronephrosis  Appears to have CKD (bun/cr elevated in 2017)  Encouraging fluid intake  Note: had prior IVF's x 3 runs  Needs outpatient follow up with Nephro  Cont to monitor    DM (diabetes mellitus) ([facility])- (present on admission)  Assessment & Plan  Hba1c: 6.4  BS were good  Off accuchecks  Note: home meds include Metformin (but now has CKD)    Hyperlipidemia- (present on admission)  Assessment & Plan  Continue Lipitor  Outpt meds include Lipitor    Hypertension- (present on admission)  Assessment & Plan  On Norvasc and Hydralazine  Observe blood pressure trends  Outpt meds include Norvasc

## 2023-11-28 NOTE — CARE PLAN
Problem: Self Care  Goal: Patient will have the ability to perform ADLs independently or with assistance  Outcome: Progressing  Note: Patient able to perform regular activities this shift.  Pain controlled this shift.  Pain management includes PRN pain meds as well as non-pharmacological measures such as emotional support, rest, and repositioning.  Will continue to monitor.   The patient is Stable - Low risk of patient condition declining or worsening    Shift Goals  Clinical Goals: Safety  Patient Goals: Sleep well  Family Goals: increased pt strength, independence    Progress made toward(s) clinical / shift goals:  pt participates with therapy and is cooperative with nursing    Patient is not progressing towards the following goals:

## 2023-11-28 NOTE — PROGRESS NOTES
Received shift report and assumed care of patient.  Patient awake, calm and stable, currently positioned in bed for comfort and safety; call light within reach.  Pt continues to c/o severe pain to right ankle/foot at this time.  Will continue to monitor.

## 2023-11-28 NOTE — PROGRESS NOTES
NURSING DAILY NOTE    Name: Jason Lima   Date of Admission: 11/12/2023   Admitting Diagnosis: Thalamic hemorrhage (HCC)  Attending Physician: Lisa Lee D.o.  Allergies: Penicillins    Safety  Patient Assist  Max Assist  Patient Precautions  Fall Risk  Precaution Comments  Left hemiparesis, left visual inattention  Bed Transfer Status  Total Assist X 2  Toilet Transfer Status   Total Assist X 2  Assistive Devices  Gait Belt, Wheelchair  Oxygen  CPAP  Diet/Therapeutic Dining  Current Diet Order   Procedures    Diet Order Diet: Consistent CHO (Diabetic) (2 gram sodium restriction; medications whole with thin liquids); Second Modifier: (optional): 2 Gram Sodium     Pill Administration  whole  Agitated Behavioral Scale     ABS Level of Severity       Fall Risk  Has the patient had a fall this admission?   Yes  Shellie Hamilton Fall Risk Scoring  22, HIGH RISK  Fall Risk Safety Measures  Bed strip alarm    Vitals  Temperature: 36.7 °C (98 °F)  Temp src: Oral  Pulse: 78  Respiration: 18  Blood Pressure: 126/89  Blood Pressure MAP (Calculated): 101 MM HG  BP Location: Right, Upper Arm  Patient BP Position: Supine     Oxygen  Pulse Oximetry: 95 %  O2 (LPM): 0  FiO2%: 21 %  O2 Delivery Device: CPAP    Bowel and Bladder  Last Bowel Movement  11/24/23  Stool Type  Not observed  Bowel Device  Bathroom, Other (Comment) (Bowel Meds)  Continent  Bladder: Continent void   Bowel: Continent movement  Bladder Function  Urine Void (mL): 250 ml (urinal and bathroom vd)  Number of Times Voided: 2  Urinary Options: Yes  Urine Color: Yellow  Number of Times Incontinent of Urine: 0  Genitourinary Assessment   Bladder Assessment (WDL):  WDL Except  Echols Catheter: Not Applicable  Urine Color: Yellow  Number of Bladder Accidents: 0  Total Number of Bladder of Accidents in Last 7 Days: 0  Number of Times Incontinent of Urine: 0  Bladder Device: Urinal, Bathroom  Time Void:  No  Bladder Scan: Post Void  $ Bladder Scan Results (mL): 26    Skin  Polo Score   17  Sensory Interventions   Bed Types: Standard/Trauma Mattress  Skin Preventative Measures: Pillows in Use for Support / Positioning  Moisture Interventions  Moisturizers/Barriers: Barrier Wipes      Pain  Pain Rating Scale  7 - Focus of attention, prevents doing daily activities  Pain Location  Foot  Pain Location Orientation  Right  Pain Interventions   Rest    ADLs    Bathing   Shower, * * With Assistance from, Staff  Linen Change   Partial  Personal Hygiene  Change Deja Pads, Moist Deja Wipes, Perineal Care  Chlorhexidine Bath      Oral Care  Brushed Teeth  Teeth/Dentures     Shave  Self  Nutrition Percentage Eaten  Dinner, Between % Consumed  Environmental Precautions  Treaded Slipper Socks on Patient, Bed in Low Position  Patient Turns/Positioning  Patient Turns Self from Side to Side  Patient Turns Assistance/Tolerance  Assistance of Two or More, General Weakness  Bed Positions  Bed Controls On, Bed Locked  Head of Bed Elevated  Self regulated      Psychosocial/Neurologic Assessment  Psychosocial Assessment  Psychosocial (WDL):  Within Defined Limits  Patient Behaviors: Flat Affect  Neurologic Assessment  Neuro (WDL): Exceptions to WDL  Level of Consciousness: Alert  Orientation Level: Oriented X4  Cognition: Follows commands, Appropriate attention/concentration  Speech: Clear, Slurred  Facial Symmetry: Left facial drooping  Pupil Assesment: No  R Pupil Size (mm): 3  R Pupil Shape / Description: Round  R Pupil Reaction: Brisk  L Pupil Size (mm): 3  L Pupil Shape / Description: Round  L Pupil Reaction: Brisk  Reflexes: Cough  Cough Reflex: Present  Motor Function/Sensation Assessment: Dorsiflexion, Motor response  R Foot Dorsiflexion: Strong  L Foot Dorsiflexion: Absent  RUE Motor Response: Responds to commands  RUE Sensation: Full sensation  Muscle Strength Right Arm: Normal Strength Against Gravity and Full  Resistance  LUE Motor Response: Flaccid  LUE Sensation: Tingling, Numbness  Muscle Strength Left Arm: Weak Movement but Not Against Gravity or Resistance  RLE Motor Response: Responds to commands  RLE Sensation: Full sensation  Muscle Strength Right Leg: Good Strength Against Gravity and Moderate Resistance  LLE Motor Response: Flaccid  LLE Sensation: Tingling, Numbness  Muscle Strength Left Leg: Weak Movement but Not Against Gravity or Resistance  EENT (WDL):  WDL Except    Cardio/Pulmonary Assessment  Edema   RLE Edema: Trace  LLE Edema: Trace  Respiratory Breath Sounds  RUL Breath Sounds: Clear  RML Breath Sounds: Clear  RLL Breath Sounds: Clear, Diminished  MOHAMUD Breath Sounds: Clear  LLL Breath Sounds: Clear, Diminished  Cardiac Assessment   Cardiac (WDL):  WDL Except (H/O HTN)

## 2023-11-28 NOTE — TELEPHONE ENCOUNTER
NEUROLOGY PATIENT PRE-VISIT PLANNING     Patient was NOT contacted to complete PVP.  Note: Patient will not be contacted if there is no indication to call.     Patient Appointment is scheduled as: New Patient     Is visit type and length scheduled correctly? Yes    [facility] Patient Demographics? Yes    3.   Is referral attached to visit? Yes    4. Were records received from referring provider? Yes    4. Patient was NOT contacted to have someone accompany them to visit.     5. Is this appointment scheduled as a Hospital Follow-Up?  Yes    6. Does the patient require any pre procedure or post procedure follow up? No    7. If any orders were placed at last visit or intended to be done for this visit do we have Results/Consult Notes? No  Labs - Labs ordered, completed on 11.13.2023 and results are in chart.  Imaging - Imaging ordered, completed and results are in chart.  Referrals - No referrals were ordered at last office visit.  Note: If patient appointment is for lab or imaging review and patient did not complete the studies, check with provider if OK to reschedule patient until completed.    8. If patient appointment is for Botox - is order pended for provider? N/A    9. Was Plan Assessment from last Neurology Office Visit Reviewed?  Yes

## 2023-11-28 NOTE — THERAPY
Recreational Therapy  Daily Treatment     Patient Name: Jason Lima  AGE:  62 y.o., SEX:  male  Medical Record #: [id number]  Today's Date: 11/28/2023       Subjective    Patient refusing recreation therapy session.      Objective       11/28/23 1431   Procedural Tracking   Procedural Tracking Community Re-Integration;Community Skills Development;Leisure Skills Awareness;Social Skills Training;Leisure Skills Development;Group Treatment;Gross Motor Functional Leisure Skills;Cognitive Skills Training;Fine Motor Functional Leisure Skills   Treatment Time   Total Time Spent (mins) 15   Total Time Missed 15   Reasons for Time Missed Non-Medical-Patient  Refused   Functional Ability Status - Cognitive   Attention Span Remains on Task   Comprehension Follows One Step Commands;Follows Two Step Commands   Judgment Able to Make Independent Decisions   Functional Ability Status - Emotional    Affect Sad   Mood Depressed   Behavior Appropriate   Skilled Intervention    Skilled Intervention Gross Motor Leisure;Fine Motor Leisure;Cognitive Leisure   Skilled Intervention Comments pool prep   Interdisciplinary Plan of Care Collaboration   IDT Collaboration with  Family / Caregiver         Assessment    Patient refused session. Patient and CTRS discussed pool therapy session tomorrow. Patient excited to try exercising in water.     Strengths: Able to follow instructions, Alert and oriented, Pleasant and cooperative, Motivated for self care and independence  Barriers: Hemiparesis, Decreased endurance, Fatigue, Generalized weakness    Plan    Patient will benefit from continued recreation therapy sessions.     Passport items to be completed:  Verbalize two positive leisure activities, discuss returning to work, hobbies, community groups or volunteer activities, explore community resources

## 2023-11-28 NOTE — FLOWSHEET NOTE
11/27/23 1808   Events/Summary/Plan   Events/Summary/Plan CPAP check   Oxygen   O2 Delivery Device None - Room Air   Non-Invasive Ventilation [employer]   Nocturnal CPAP or BIPAP CPAP - Home Unit  (8 hrs use last night.)

## 2023-11-28 NOTE — PROGRESS NOTES
"  Physical Medicine & Rehabilitation Progress Note    Encounter Date: 11/28/2023    Chief Complaint: Foot Pain    Interval Events (Subjective):  VSS - 2x SBP into 140's  BM 11/28  Voiding     Seen and examined in his room with fiance and brother. Doing well overall. Ok with anti-depressant but would like to think about it.     ROS: 14 point ROS negative unless otherwise specified in the HPI    Objective:  VITAL SIGNS: /72   Pulse 78   Temp 36.7 °C (98.1 °F) (Oral)   Resp 18   Ht 1.727 m (5' 8\")   Wt 87 kg (191 lb 12.8 oz)   SpO2 96%   BMI 29.16 kg/m²     GEN: No apparent distress  HEENT: Head normocephalic, atraumatic.  Sclera nonicteric bilaterally, no ocular discharge appreciated bilaterally.  CV: Extremities warm and well-perfused, no peripheral edema appreciated bilaterally.  PULMONARY: Breathing nonlabored on room air, no respiratory accessory muscle use.  Not requiring supplemental oxygen.  SKIN: No appreciable skin breakdown on exposed areas of skin.  PSYCH: Normal affect  NEURO: Awake alert.  Conversational.  Logical thought content. Dysarthria. Left Hemiparesis.       Laboratory Values:  Recent Results (from the past 72 hour(s))   CBC WITHOUT DIFFERENTIAL    Collection Time: 11/28/23  5:25 AM   Result Value Ref Range    WBC 5.9 4.8 - 10.8 K/uL    RBC 3.82 (L) 4.70 - 6.10 M/uL    Hemoglobin 12.0 (L) 14.0 - 18.0 g/dL    Hematocrit 34.0 (L) 42.0 - 52.0 %    MCV 89.0 81.4 - 97.8 fL    MCH 31.4 27.0 - 33.0 pg    MCHC 35.3 32.3 - 36.5 g/dL    RDW 39.6 35.9 - 50.0 fL    Platelet Count 177 164 - 446 K/uL    MPV 10.3 9.0 - 12.9 fL   Basic Metabolic Panel    Collection Time: 11/28/23  5:25 AM   Result Value Ref Range    Sodium 140 135 - 145 mmol/L    Potassium 3.8 3.6 - 5.5 mmol/L    Chloride 103 96 - 112 mmol/L    Co2 25 20 - 33 mmol/L    Glucose 118 (H) 65 - 99 mg/dL    Bun 39 (H) 8 - 22 mg/dL    Creatinine 3.00 (H) 0.50 - 1.40 mg/dL    Calcium 8.9 8.5 - 10.5 mg/dL    Anion Gap 12.0 7.0 - 16.0 "   ESTIMATED GFR    Collection Time: 23  5:25 AM   Result Value Ref Range    GFR (CKD-EPI) 23 (A) >60 mL/min/1.73 m 2   URIC ACID    Collection Time: 23  5:25 AM   Result Value Ref Range    Uric Acid 9.0 (H) 2.5 - 8.3 mg/dL         Medications:  Scheduled Medications   Medication Dose Frequency    ciprofloxacin  1 Drop Q4HRS WHILE AWAKE    diclofenac sodium  2 g 4X/DAY    [START ON 2023] colchicine  0.6 mg BID    apixaban  10 mg BID    [START ON 2023] apixaban  5 mg BID    hydrALAZINE  100 mg Q8HRS    traZODone  75 mg QHS    senna-docusate  2 Tablet BID    omeprazole  20 mg DAILY    amLODIPine  10 mg DAILY    atorvastatin  40 mg Nightly     PRN medications: carboxymethylcellulose, melatonin, [DISCONTINUED] insulin regular **AND** [CANCELED] POC blood glucose manual result **AND** NOTIFY MD and PharmD **AND** Administer 20 grams of glucose (approximately 8 ounces of fruit juice) every 15 minutes PRN FSBG less than 70 mg/dL **AND** dextrose bolus, Respiratory Therapy Consult, senna-docusate **AND** polyethylene glycol/lytes **AND** magnesium hydroxide **AND** bisacodyl, mag hydrox-al hydrox-simeth, ondansetron **OR** ondansetron, traZODone, sodium chloride, [] acetaminophen **FOLLOWED BY** acetaminophen, oxyCODONE immediate-release **OR** oxyCODONE immediate-release, hydrALAZINE    Diet:  Current Diet Order   Procedures    Diet Order Diet: Consistent CHO (Diabetic) (2 gram sodium restriction; medications whole with thin liquids); Second Modifier: (optional): 2 Gram Sodium       Medical Decision Making and Plan:  Right thalamic hemorrhagic stroke ~ last week October   Originally presented with a headache and left-sided weakness to hospital in [facility]  Work-up at the outside hospital in hospitals revealed a right thalamic hemorrhagic stroke  Repeat CT head done after return flight to the ,  CT head shows right thalamic hemorrhage, decrease in density since prior studies from the  outside hospital, slight asymmetric dilation of the left ventricular system including the left temporal horn with a possible component of hydrocephalus  Planning for BP less than 140, avoiding any kind of anticoagulation  Initiate comprehensive IRF level therapy with 3 days of therapy 5 days a week with services from PT/OT/SLP     Spasticity  Continue baclofen 5 mg nightly, started on 11/11 --> increase to TID 11/13/2023 --> continue 11/14/2023, stable on higher dose.   PRAFO ordered for right lower extremity to prevent further plantarflexion contracture  Spasticity controlled, continue Baclofen 11/20/2023   Consider weaning 11/21/2023   Stop 11/24/2023 - monitor for worsening spasticity  Remain off of Baclofen 11/27/2023     ABLA  Now on Eliquis  Recheck 11/28 - improved 11.4--> 12.0     CKD  Has seen nephrology in past  Improving 11/13/2023   F/u OP with nephrology  S/p IVF 11/13-11/14 11/14/2023 BUN and Cr improved compared to prior  [facility] 11/16 - improved - currently receiving IVF  Recheck [facility] 11/21 - Slightly worsened renal function - likely baseline  BUN/Cr stable 11/24/2023   Recheck 11/28 - Slightly higher at 39/3     Hypertension  Continue Amlodipine 10mg daily  Continue Hydralazine 25mg TID - increased by hospitalist 11/13/2023 from BID to TID--> increased to 50mg q8hrs 11/15  11/16 Hydralazine increased to 75mg q8hrs and 1x Hydralazine given  11/17 BP still elevated but hydralazine recently increased. Monitor  11/28/2023 VSS Continue Hydralazine 100mg q8hrs + Amlodipine 10mg daily     Prediabetes  Discontinue SSI     HLD  Continue home dose satin      Bowel  Continue with scheduled Colace and senna, as needed stool softeners     Insomnia  Secondary to recent jet lag, melatonin scheduled --> increase to home dose 11/13/2023 9mg  Utilize trazodone as needed insomnia continues  Schedule Trazodone 11/15/2023   11/16/2023 continue Trazodone as is. Thinks he slept better last night  11/17/2023 Still not sleeping  well. Increase to 75mg nightly.   11/24/2023 continue trazodone at current dose     Pain -as needed Tylenol and oxycodone    Post-Stroke Depression  Consulted Pyschiatry   Start Prozac 11/28/2023     Right Foot Pain  H/o Gout  Xray with swelling 1st metatarsal head   Trial Colchicine for acute gout flare 11/28/2023   Topical Voltaren gel scheduled       DVT PROPHYLAXIS: NO - Hemorrhagic stroke. US + UE and LE for brachial and peroneal DVT. 11/21/2023 start Heparin 5000 units q8hrs SQ for further prophylaxis, if tolerates will increase to full AC. Consider repeat CTH to ensure stability bleed once on full AC. 11/24/2023 Start loading dose Eliquis 10mg BID x 7 days with transition to 5mg BID. CBC showing improvement in H/H 11/28/2023 no neurologic decline.     HOSPITALIST FOLLOWING: YES - d/w hospitalist 11/28    CODE STATUS: FULL CODE    DISPO: Home alone. Vielka and patient not . D/w CM to give resources for private care giving. Their goal is to stay 6 weeks - discussed multiple times this will not be an option. RWW denied. Counseled extensively on private care giving/therapy in addition to Home Health or outpatient. 11/27 FMLA unavailable to Vielka. If cannot get insurance extension family is wanting SNF. 11/28/2023 D/w family (malina, patient, brother) and CM regarding discharge plan. Family cannot assist and cannot hire CG for 24/7 care, only will have 4-6 hours of care. Brother pushing for SNF - he is currently looking at different facilities. Malina can potentially work part time after [place].     MARCUS: 12/4/23    MEDS SENT TO: TBD    DISCHARGE FOLLOW UP: Neurology, Nephrology, PCP - needs referral to all.   ____________________________________    Dr. Lisa Lee DO, MS  [facility] Physical Medicine [facility]   ____________________________________

## 2023-11-28 NOTE — THERAPY
Physical Therapy   Daily Treatment     Patient Name: Jason Lima  Age:  62 y.o., Sex:  male  Medical Record #: [id number]  Today's Date: 11/28/2023     Precautions  Precautions: Fall Risk  Comments: Left hemiparesis, left visual inattention    Subjective    Pt is pleasant and cooperative. He reports 10/10 R foot pain at head of first metatarsal. Area inspected C/D/I, slightly increased temp noted. MD made aware.      Objective       11/28/23 0830   PT Charge Group   PT Neuromuscular Re-Education / Balance (Units) 2   PT Total Time Spent   PT Individual Total Time Spent (Mins) 30   Pain 0 - 10 Group   Location Foot   Location Orientation Right   Pain Rating Scale (NPRS) 10   Transfer Functional Level of Assist   Bed, Chair, Wheelchair Transfer Moderate Assist   Bed Mobility    Sit to Stand Moderate Assist   Interdisciplinary Plan of Care Collaboration   Patient Position at End of Therapy Seated;Chair Alarm On;Family / Friend in Room;Tray Table within Reach;Phone within Reach;Call Light within Reach     Pt performed squat pivot transfer to mat table with MOD A.     Pt performed 20 minutes of seated balance aimed at decreasing lateropulsion symptoms including  -R reaching outside base of support multi directional  -R reaching posteriorly for trunk rotation  -R lateral forearm prop    Pt performed sit to stand with quad cane with MOD A. Pt reports 10/10 R foot pain and was unable to stand for greater than 10s.         Assessment    Pt tolerated session well and acticity was modified to accommodate for R foot pain.   Strengths: Able to follow instructions, Independent prior level of function, Motivated for self care and independence, Pleasant and cooperative, Supportive family, Willingly participates in therapeutic activities  Barriers: Decreased endurance, Hemiparesis, Difficulty following instructions, Fatigue, Hypertension, Impaired activity tolerance, Impaired balance, Impaired insight/denial of deficits, Impaired  "functional cognition, Impaired sleep pattern, Impulsive, Limited mobility, Visual impairment, Poor family support (lives alone)    Plan    Continue to progress pt's independence and decrease pushing symptoms.     DME  PT DME Recommendations  Wheelchair: 18\" Width  Cushion: Specialty (See other comments) (TBD pending ambulation progress)  Assistive Device: Tanvir Walker (TBD pending progress, currently 2 person assist for gait)  Additional Equipment: Other (Comments)    Passport items to be completed:  Get in/out of bed safely, in/out of a vehicle, safely use mobility device, walk or wheel around home/community, navigate up and down stairs, show how to get up/down from the ground, ensure home is accessible, demonstrate HEP, complete caregiver training    Physical Therapy Problems (Active)       Problem: Mobility       Dates: Start:  11/13/23         Goal: STG-Within one week, patient will ambulate distances >/= 30' c/ RHR and ModA or better.        Dates: Start:  11/13/23         Goal Note filed on 11/22/23 1102 by Awilda Dela Cruz, MSPT       Limited to 10ft at right hallway rail mod assist                 Problem: PT-Long Term Goals       Dates: Start:  11/13/23         Goal: LTG-By discharge, patient will propel wheelchair >/= 300' at [place] or better.        Dates: Start:  11/13/23            Goal: LTG-By discharge, patient will ambulate >/= 50' c/ LRAD at [place] or better.        Dates: Start:  11/13/23            Goal: LTG-By discharge, patient will transfer one surface to another c/ [place] or better.        Dates: Start:  11/13/23            Goal: LTG-By discharge, patient will ambulate up/down 1 step c/ LRAD at [place] or better.        Dates: Start:  11/13/23            Goal: LTG-By discharge, patient will transfer in/out of a car c/ ModA or better.        Dates: Start:  11/13/23              "

## 2023-11-28 NOTE — THERAPY
"Occupational Therapy  Daily Treatment     Patient Name: Jason Lima  Age:  62 y.o., Sex:  male  Medical Record #: [id number]  Today's Date: 11/28/2023     Precautions  Precautions: Fall Risk  Comments: Left hemiparesis, left visual inattention         Subjective    Pt encountered for OT in main gym following speech. SO present throughout session, agreeable for transfer training.       Objective       11/28/23 1031   OT Charge Group   OT Self Care / ADL (Units) 2   OT Therapeutic Exercise (Units) 2   OT Total Time Spent   OT Individual Total Time Spent (Mins) 60   Precautions   Precautions Fall Risk   Comments Left hemiparesis, left visual inattention   Functional Level of Assist   Bed, Chair, Wheelchair Transfer Moderate Assist   Bed Chair Wheelchair Transfer Description Adaptive equipment;Verbal cueing;Supervision for safety;Set-up of equipment;Requires lift;Increased time;Other (comment)   Tub / Shower Transfers Maximal Assist   Tub Shower Transfer Description Grab bar;Shower bench;Initial preparation for task;Requires lift;Set-up of equipment;Supervision for safety  (MaxA for step-in shower transfer using GB (left side) to stand pivot towards the right side. MaxA for transfer from shower to WC.)   Interdisciplinary Plan of Care Collaboration   IDT Collaboration with  Family / Caregiver;Therapy Tech   Patient Position at End of Therapy Seated;Chair Alarm On;Other (Comments)   Collaboration Comments Fiance present throughout session for transfer training. Tech present for safety during transfers.   OT DME Recommendations   Bathroom Equipment 3 in 1 Commode     Thera ex: functional mobility on the //. Focused on upright posture, standing balance, and LUE grasp. MaxA for standing balance d/t L lateral lean and moderate VC to attend and grasp with the LUE.     Grasp and release activity using cones: VC needed to open and close L hand. Pt appeared frustrated by task and indicated feeling \"dumb.\" Transferred and stacked " up to 5 cones with extra time and motivation.     Assessment    Overall, gradual progress with functional mobility, but left lateral lean during transfers remains a barrier. Good carryover of skills following transfer training via return demo. Family may benefit from ongoing training.     Strengths: Able to follow instructions, Independent prior level of function, Motivated for self care and independence, Pleasant and cooperative, Supportive family, Alert and oriented  Barriers: Decreased endurance, Fatigue, Generalized weakness, Hemiparesis, Impaired activity tolerance, Impaired balance, Limited mobility, Spasticity    Plan    Cont ADLs, functional transfers, and thera act/ex to maximize functional recovery for safe DC home.       DME  OT DME Recommendations  Bathroom Equipment: (P) 3 in 1 Commode  Additional Equipment: Other (Comments)    Passport items to be completed:  Perform bathroom transfers, complete dressing, complete feeding, get ready for the day, prepare a simple meal, participate in household tasks, adapt home for safety needs, demonstrate home exercise program, complete caregiver training     Occupational Therapy Goals (Active)       Problem: Bathing       Dates: Start:  11/15/23         Goal: STG-Within one week, patient will bathe at modA using DME/AE PRN       Dates: Start:  11/15/23               Problem: Dressing       Dates: Start:  11/22/23         Goal: STG-Within one week, patient will dress UB at [place] using LRD       Dates: Start:  11/22/23            Goal: STG-Within one week, patient will dress LB at [place] using LRD       Dates: Start:  11/22/23               Problem: Functional Transfers       Dates: Start:  11/13/23         Goal: STG-Within one week, patient will transfer to toilet with Max/Mod A.       Dates: Start:  11/13/23            Goal: STG-Within one week, patient will transfer to step in shower with Max A.       Dates: Start:  11/13/23               Problem: OT Long Term Goals        Dates: Start:  11/13/23         Goal: LTG-By discharge, patient will complete basic self care tasks at Mod Independent level.       Dates: Start:  11/13/23            Goal: LTG-By discharge, patient will perform bathroom transfers at Mod Independent level.       Dates: Start:  11/13/23

## 2023-11-28 NOTE — PROGRESS NOTES
NURSING DAILY NOTE    Name: Jason Lima   Date of Admission: 11/12/2023   Admitting Diagnosis: Thalamic hemorrhage (HCC)  Attending Physician: Lisa Lee D.o.  Allergies: Penicillins    Safety  Patient Assist  (P) MAX  Patient Precautions  Fall Risk  Precaution Comments  Left hemiparesis, left visual inattention  Bed Transfer Status  Total Assist X 2  Toilet Transfer Status   Total Assist X 2  Assistive Devices  (P) Gait Belt, Wheelchair  Oxygen  None - Room Air  Diet/Therapeutic Dining  Current Diet Order   Procedures    Diet Order Diet: Consistent CHO (Diabetic) (2 gram sodium restriction; medications whole with thin liquids); Second Modifier: (optional): 2 Gram Sodium     Pill Administration  whole  Agitated Behavioral Scale     ABS Level of Severity       Fall Risk  Has the patient had a fall this admission?   (P) Yes  Shellie Hamilton Fall Risk Scoring  (P) 22, (P) HIGH RISK  Fall Risk Safety Measures  bed alarm, chair alarm, and poor balance    Vitals  Temperature: 36.7 °C (98 °F)  Temp src: Oral  Pulse: 95  Respiration: 16  Blood Pressure: (!) 141/102  Blood Pressure MAP (Calculated): 115 MM HG  BP Location: Right, Upper Arm  Patient BP Position: Supine     Oxygen  Pulse Oximetry: 94 %  O2 (LPM): 0  FiO2%: 21 %  O2 Delivery Device: None - Room Air    Bowel and Bladder  Last Bowel Movement  11/24/23  Stool Type  Not observed  Bowel Device  (P) Bathroom  Continent  Bladder: Continent void   Bowel: Continent movement  Bladder Function  Urine Void (mL): 275 ml  Number of Times Voided: 1  Urine Color: Yellow  Number of Times Incontinent of Urine: (P) 0  Genitourinary Assessment   Bladder Assessment (WDL): (P)  WDL Except  Echols Catheter: (P) Not Applicable  Urine Color: Yellow  Number of Bladder Accidents: (P) 0  Total Number of Bladder of Accidents in Last 7 Days: (P) 0  Number of Times Incontinent of Urine: (P) 0  Bladder Device: (P) Urinal  Time  Void: No  Bladder Scan: Post Void  $ Bladder Scan Results (mL): 26    Skin  Polo Score   (P) 17  Sensory Interventions   Bed Types: (P) Standard/Trauma Mattress  Skin Preventative Measures: (P) Pillows in Use for Support / Positioning  Moisture Interventions  Moisturizers/Barriers: (P) Barrier Wipes      Pain  Pain Rating Scale  (P) 2 - Notice Pain, does not interfere with activities  Pain Location  (P) Foot  Pain Location Orientation  (P) Right  Pain Interventions   (P) Medication (see MAR)    ADLs    Bathing   Shower, Staff  Linen Change   Partial  Personal Hygiene  Change Deja Pads, Moist Deja Wipes, Perineal Care  Chlorhexidine Bath      Oral Care  Brushed Teeth  Teeth/Dentures     Shave  Self  Nutrition Percentage Eaten  Dinner, Between % Consumed  Environmental Precautions  (P) Treaded Slipper Socks on Patient, Personal Belongings, Wastebasket, Call Bell etc. in Easy Reach, Bed in Low Position  Patient Turns/Positioning  (P) Patient Turns Self from Side to Side  Patient Turns Assistance/Tolerance  (P) Assistance of Two or More  Bed Positions  Bed Controls On  Head of Bed Elevated  (P) Self regulated      Psychosocial/Neurologic Assessment  Psychosocial Assessment  Psychosocial (WDL): (P)  Within Defined Limits  Patient Behaviors: (P) Flat Affect  Neurologic Assessment  Neuro (WDL): (P) Exceptions to WDL  Level of Consciousness: (P) Alert  Orientation Level: (P) Oriented X4  Cognition: (P) Follows commands, Appropriate attention/concentration  Speech: (P) Clear, Slurred  Facial Symmetry: (P) Left facial drooping  Pupil Assesment: (P) No  R Pupil Size (mm): 3  R Pupil Shape / Description: Round  R Pupil Reaction: Brisk  L Pupil Size (mm): 3  L Pupil Shape / Description: Round  L Pupil Reaction: Brisk  Reflexes: Cough  Cough Reflex: Present  Motor Function/Sensation Assessment: (P) Dorsiflexion, Motor response  R Foot Dorsiflexion: (P) Strong  L Foot Dorsiflexion: (P) Absent  RUE Motor Response: (P)  Responds to commands  RUE Sensation: (P) Full sensation  Muscle Strength Right Arm: (P) Normal Strength Against Gravity and Full Resistance  LUE Motor Response: (P) Flaccid  LUE Sensation: (P) Tingling, Numbness  Muscle Strength Left Arm: (P) Weak Movement but Not Against Gravity or Resistance  RLE Motor Response: (P) Responds to commands  RLE Sensation: (P) Full sensation  Muscle Strength Right Leg: (P) Good Strength Against Gravity and Moderate Resistance  LLE Motor Response: (P) Flaccid  LLE Sensation: (P) Tingling, Numbness  Muscle Strength Left Leg: (P) Weak Movement but Not Against Gravity or Resistance  EENT (WDL): (P)  WDL Except    Cardio/Pulmonary Assessment  Edema   RLE Edema: (P) Trace  LLE Edema: (P) Trace  Respiratory Breath Sounds  RUL Breath Sounds: (P) Clear  RML Breath Sounds: (P) Clear  RLL Breath Sounds: (P) Diminished  MOHAMUD Breath Sounds: (P) Clear  LLL Breath Sounds: (P) Diminished  Cardiac Assessment   Cardiac (WDL): (P)  WDL Except (Hx Htn, Hld)

## 2023-11-28 NOTE — PROGRESS NOTES
Received bedside shift report from Heather PRIETO RN regarding patient and assumed care. Patient awake, calm and stable, currently positioned in bed for comfort and safety; call light within reach. Denies pain or discomfort at this time. Will continue to monitor.

## 2023-11-28 NOTE — THERAPY
Physical Therapy   Daily Treatment     Patient Name: Jason Lima  Age:  62 y.o., Sex:  male  Medical Record #: [id number]  Today's Date: 11/28/2023     Precautions  Precautions: (P) Fall Risk  Comments: (P) Left hemiparesis, left visual inattention    Subjective    Pt declined transfer training and standing tx, due to probable R foot gout flare up.       Objective       11/28/23 1301   PT Charge Group   PT Neuromuscular Re-Education / Balance (Units) 1   PT Therapeutic Activities (Units) 3   PT Total Time Spent   PT Individual Total Time Spent (Mins) 60   Precautions   Precautions Fall Risk   Comments Left hemiparesis, left visual inattention   Wheelchair Functional Level of Assist   Wheelchair Assist Minimal Assist   Distance Wheelchair (Feet or Distance) 200   Wheelchair Description Adaptive equipment;Assistance with steering;Limited by fatigue;Requires incidental assist;Safety concerns;Supervision for safety;Verbal cueing  (Cueing to scan left, throughout, and veer away from left side)   Sitting Lower Body Exercises   Comments MotoMed LE cycle 20 min (12 fwd/ 8 bwd), 65%L, 35%R, 2.15 miles, SPV.   Neuro-Muscular Treatments   Neuro-Muscular Treatments Verbal Cuing;Sequencing   Interdisciplinary Plan of Care Collaboration   IDT Collaboration with  Family / Caregiver;Physician  (Physiatrist, and hospitalist)   Patient Position at End of Therapy Seated;Family / Friend in Room;Self Releasing Lap Belt Applied   Collaboration Comments Probable gout flare up, increased L side tone, D/C recommendations. Extensive D/C planning discussion with physician, girfriend, and brother.     Gentle stretching to L sided cervical spine, for midline and head righting. Mirror anterior to patient for visual cueing. Pt tends to rotate right, and lateral bend left. Positioning for midline during MotoMed tx. Review with sig other on left side environmental scanning/ cervical rotation/ compensation.     Assessment    Pt was limited by  "probable gout flare up in right foot, with decreased tolerance to jade wc mobility. Pt was able to tolerate Motomed LE cycle fwd/ bwd x 20 min for ROM, without resistance. Pt did require min A during jade wc mobility d/t tendency to veer left. Pt continues to demonstrate right cervical rotation, and left cervical side bending, which presents as impaired midline/ head righting. Pt can use mirror to mostly self correct, but is unable to maintain.       Strengths: Able to follow instructions, Independent prior level of function, Motivated for self care and independence, Pleasant and cooperative, Supportive family, Willingly participates in therapeutic activities  Barriers: Decreased endurance, Hemiparesis, Difficulty following instructions, Fatigue, Hypertension, Impaired activity tolerance, Impaired balance, Impaired insight/denial of deficits, Impaired functional cognition, Impaired sleep pattern, Impulsive, Limited mobility, Visual impairment, Poor family support (lives alone)    Plan    Head righting/ midline orientation/ sitting and standing activity tolerance  Bed mobility/ PNF rolling  Transfers with SB, emphasis on motor planning  Seated EOB balance  Wc mob with jade technique and emphasis on left environmental scanning/ awareness  Initiate HEP  Forced use principles for jade body/ standing frame  Vector (West) pregait training w/ XL harness  NMES/ PNF left LE    DME  PT DME Recommendations  Wheelchair: 18\" Width  Cushion: Specialty     Passport items to be completed:  Get in/out of bed safely, in/out of a vehicle, safely use mobility device, walk or wheel around home/community, navigate up and down stairs, show how to get up/down from the ground, ensure home is accessible, demonstrate HEP, complete caregiver training    Physical Therapy Problems (Active)       Problem: Mobility       Dates: Start:  11/13/23         Goal: STG-Within one week, patient will ambulate distances >/= 30' c/ RHR and ModA or better.   "      Dates: Start:  11/13/23         Goal Note filed on 11/22/23 1102 by Awilda Dela Cruz, MSPT       Limited to 10ft at right hallway rail mod assist                 Problem: PT-Long Term Goals       Dates: Start:  11/13/23         Goal: LTG-By discharge, patient will propel wheelchair >/= 300' at [place] or better.        Dates: Start:  11/13/23            Goal: LTG-By discharge, patient will ambulate >/= 50' c/ LRAD at Ren[place] or better.        Dates: Start:  11/13/23            Goal: LTG-By discharge, patient will transfer one surface to another c/ Renata or better.        Dates: Start:  11/13/23            Goal: LTG-By discharge, patient will ambulate up/down 1 step c/ LRAD at Renata or better.        Dates: Start:  11/13/23            Goal: LTG-By discharge, patient will transfer in/out of a car c/ ModA or better.        Dates: Start:  11/13/23

## 2023-11-29 ENCOUNTER — APPOINTMENT (OUTPATIENT)
Dept: PHYSICAL THERAPY | Facility: REHABILITATION | Age: 63
DRG: 057 | End: 2023-11-29
Attending: PHYSICAL MEDICINE & REHABILITATION
Payer: COMMERCIAL

## 2023-11-29 ENCOUNTER — APPOINTMENT (OUTPATIENT)
Dept: OCCUPATIONAL THERAPY | Facility: REHABILITATION | Age: 63
DRG: 057 | End: 2023-11-29
Attending: HOSPITALIST
Payer: COMMERCIAL

## 2023-11-29 ENCOUNTER — APPOINTMENT (OUTPATIENT)
Dept: SPEECH THERAPY | Facility: REHABILITATION | Age: 63
DRG: 057 | End: 2023-11-29
Attending: PHYSICAL MEDICINE & REHABILITATION
Payer: COMMERCIAL

## 2023-11-29 LAB — GLUCOSE BLD STRIP.AUTO-MCNC: 146 MG/DL (ref 65–99)

## 2023-11-29 PROCEDURE — 97116 GAIT TRAINING THERAPY: CPT

## 2023-11-29 PROCEDURE — 97535 SELF CARE MNGMENT TRAINING: CPT

## 2023-11-29 PROCEDURE — 82962 GLUCOSE BLOOD TEST: CPT

## 2023-11-29 PROCEDURE — A9270 NON-COVERED ITEM OR SERVICE: HCPCS | Performed by: PHYSICAL MEDICINE & REHABILITATION

## 2023-11-29 PROCEDURE — 97113 AQUATIC THERAPY/EXERCISES: CPT

## 2023-11-29 PROCEDURE — A9270 NON-COVERED ITEM OR SERVICE: HCPCS | Performed by: HOSPITALIST

## 2023-11-29 PROCEDURE — 700102 HCHG RX REV CODE 250 W/ 637 OVERRIDE(OP): Performed by: PHYSICAL MEDICINE & REHABILITATION

## 2023-11-29 PROCEDURE — 700102 HCHG RX REV CODE 250 W/ 637 OVERRIDE(OP): Performed by: STUDENT IN AN ORGANIZED HEALTH CARE EDUCATION/TRAINING PROGRAM

## 2023-11-29 PROCEDURE — 700102 HCHG RX REV CODE 250 W/ 637 OVERRIDE(OP): Performed by: HOSPITALIST

## 2023-11-29 PROCEDURE — A9270 NON-COVERED ITEM OR SERVICE: HCPCS | Performed by: STUDENT IN AN ORGANIZED HEALTH CARE EDUCATION/TRAINING PROGRAM

## 2023-11-29 PROCEDURE — 97130 THER IVNTJ EA ADDL 15 MIN: CPT

## 2023-11-29 PROCEDURE — 770010 HCHG ROOM/CARE - REHAB SEMI PRIVAT*

## 2023-11-29 PROCEDURE — 99232 SBSQ HOSP IP/OBS MODERATE 35: CPT | Performed by: HOSPITALIST

## 2023-11-29 PROCEDURE — 97129 THER IVNTJ 1ST 15 MIN: CPT

## 2023-11-29 PROCEDURE — 99232 SBSQ HOSP IP/OBS MODERATE 35: CPT | Performed by: PHYSICAL MEDICINE & REHABILITATION

## 2023-11-29 RX ORDER — AMLODIPINE BESYLATE 5 MG/1
10 TABLET ORAL DAILY
Status: DISCONTINUED | OUTPATIENT
Start: 2023-11-30 | End: 2024-01-02 | Stop reason: HOSPADM

## 2023-11-29 RX ORDER — POLYETHYLENE GLYCOL 3350 17 G/17G
1 POWDER, FOR SOLUTION ORAL
Status: DISPENSED | OUTPATIENT
Start: 2023-11-29 | End: 2023-11-29

## 2023-11-29 RX ADMIN — HYDRALAZINE HYDROCHLORIDE 100 MG: 50 TABLET, FILM COATED ORAL at 05:20

## 2023-11-29 RX ADMIN — OMEPRAZOLE 20 MG: 20 CAPSULE, DELAYED RELEASE ORAL at 08:35

## 2023-11-29 RX ADMIN — SENNOSIDES AND DOCUSATE SODIUM 2 TABLET: 50; 8.6 TABLET ORAL at 19:58

## 2023-11-29 RX ADMIN — CIPROFLOXACIN 1 DROP: 3 SOLUTION OPHTHALMIC at 05:23

## 2023-11-29 RX ADMIN — POLYETHYLENE GLYCOL 3350 1 PACKET: 17 POWDER, FOR SOLUTION ORAL at 12:14

## 2023-11-29 RX ADMIN — ATORVASTATIN CALCIUM 40 MG: 40 TABLET, FILM COATED ORAL at 19:59

## 2023-11-29 RX ADMIN — TRAZODONE HYDROCHLORIDE 75 MG: 50 TABLET ORAL at 19:58

## 2023-11-29 RX ADMIN — CIPROFLOXACIN 1 DROP: 3 SOLUTION OPHTHALMIC at 12:13

## 2023-11-29 RX ADMIN — HYDRALAZINE HYDROCHLORIDE 100 MG: 50 TABLET, FILM COATED ORAL at 14:10

## 2023-11-29 RX ADMIN — CIPROFLOXACIN 1 DROP: 3 SOLUTION OPHTHALMIC at 16:56

## 2023-11-29 RX ADMIN — MENTHOL 2 G: 10 GEL TOPICAL at 12:13

## 2023-11-29 RX ADMIN — SENNOSIDES AND DOCUSATE SODIUM 2 TABLET: 50; 8.6 TABLET ORAL at 08:35

## 2023-11-29 RX ADMIN — HYDRALAZINE HYDROCHLORIDE 100 MG: 50 TABLET, FILM COATED ORAL at 20:06

## 2023-11-29 RX ADMIN — MENTHOL 2 G: 10 GEL TOPICAL at 20:06

## 2023-11-29 RX ADMIN — AMLODIPINE BESYLATE 10 MG: 5 TABLET ORAL at 08:35

## 2023-11-29 RX ADMIN — CIPROFLOXACIN 1 DROP: 3 SOLUTION OPHTHALMIC at 14:10

## 2023-11-29 RX ADMIN — MENTHOL 2 G: 10 GEL TOPICAL at 16:56

## 2023-11-29 RX ADMIN — MAGNESIUM HYDROXIDE 30 ML: 400 SUSPENSION ORAL at 05:20

## 2023-11-29 RX ADMIN — SERTRALINE 25 MG: 50 TABLET, FILM COATED ORAL at 08:43

## 2023-11-29 RX ADMIN — COLCHICINE 0.6 MG: 0.6 TABLET, FILM COATED ORAL at 08:43

## 2023-11-29 RX ADMIN — COLCHICINE 0.6 MG: 0.6 TABLET, FILM COATED ORAL at 20:00

## 2023-11-29 RX ADMIN — CIPROFLOXACIN 1 DROP: 3 SOLUTION OPHTHALMIC at 20:06

## 2023-11-29 RX ADMIN — APIXABAN 10 MG: 5 TABLET, FILM COATED ORAL at 08:35

## 2023-11-29 RX ADMIN — APIXABAN 10 MG: 5 TABLET, FILM COATED ORAL at 19:59

## 2023-11-29 ASSESSMENT — ENCOUNTER SYMPTOMS
BLURRED VISION: 0
VOMITING: 0
SHORTNESS OF BREATH: 0
HEADACHES: 0
FEVER: 0
HALLUCINATIONS: 0
NAUSEA: 0
PALPITATIONS: 0
DIZZINESS: 0

## 2023-11-29 ASSESSMENT — GAIT ASSESSMENTS
GAIT LEVEL OF ASSIST: MODERATE ASSIST
DISTANCE (FEET): 15

## 2023-11-29 NOTE — PROGRESS NOTES
Pt states pain to right foot has significantly improved with voltaren and colchicine. Will continue to monitor.

## 2023-11-29 NOTE — CARE PLAN
The patient is Stable - Low risk of patient condition declining or worsening    Shift Goals  Clinical Goals: Safety  Patient Goals: sleep well  Family Goals: increased pt strength, independence    Progress made toward(s) clinical / shift goals:    Problem: Fall Risk - Rehab  Goal: Patient will remain free from falls  Outcome: Progressing. Patient bed in lowest locked position with bed alarm on and call light within reach. Patient calls appropriately with call light for needs. Patient has not attempted to self transfer over shift.      Problem: Pain - Standard  Goal: Alleviation of pain or a reduction in pain to the patient’s comfort goal  Outcome: Progressing. Patient denies having pain over shift. Refused prn oxycodone at bedtime, understands he needs to notify staff if he has pain and would like medication. Patient has prn oxycodone q3 and tylenol available.

## 2023-11-29 NOTE — THERAPY
Occupational Therapy  Daily Treatment     Patient Name: Jason Lima  Age:  62 y.o., Sex:  male  Medical Record #: [id number]  Today's Date: 11/29/2023     Precautions  Precautions: (P) Fall Risk  Comments: (P) Left hemiparesis, left visual inattention         Subjective    Pt encountered for OT sitting in WC. Pleasant and agreeable to participate in shower in prep for aquatic therapy later this day.      Objective       11/29/23 0901   OT Charge Group   OT Self Care / ADL (Units) 4   OT Total Time Spent   OT Individual Total Time Spent (Mins) 60   Precautions   Precautions Fall Risk   Comments Left hemiparesis, left visual inattention   Functional Level of Assist   Grooming Minimal Assist;Seated   Grooming Description Increased time;Seated in wheelchair at sink;Supervision for safety;Verbal cueing;Other (comment)  (Renata this day for moderate VC needed to maintain an upright sitting posture during dynamic sitting ADLs to compensate for left lateral lean.)   Bathing Moderate Assist   Bathing Description Grab bar;Hand held shower;Tub bench;Assit with back;Increased time;Supervision for safety;Verbal cueing;Other (comment)  (ModA for sitting balance d/t left lateral lean. Pt required up to 2 physical assist to regain seated balance throughout 30min shower session. Moderate VC to maintain an upright posture.)   Upper Body Dressing Minimal Assist   Upper Body Dressing Description Assit with threading arms through sleeves;Increased time;Verbal cueing;Other (comment)  (VC for sequencing jade dressing techniques for donning a tshirt.)   Lower Body Dressing Moderate Assist   Lower Body Dressing Description Dressing stick;Grab bar;Assist with threading into pant leg;Initial preparation for task;Set-up of equipment;Supervision for safety;Verbal cueing;Other (comment)  (VC needed for sequencing LB dressing and WC safety prior to STS from WC using GB. Requires assistance to initally thread LLE. Pt able to complete the remaining  step with assistance for standing balance. Kyara to don/doff socks using one-handed tech.)   Toileting Maximal Assist   Toileting Description Assist to pull pants down;Assist for standing balance;Grab bar;Supervision for safety;Assist to pull pants up;Other (comment)  (Assistance required for sitting balance while leaning to the L to perform perineal care. Pt able to sit on toilet using R GB for sitting balance at SPV level.)   Toilet Transfers Maximal Assist   Toilet Transfer Description Grab bar;Increased time;Initial preparation for task;Supervision for safety;Verbal cueing;Other (comment)  (Moderate VC for WC safety. Pt able to apply both brakes in 1/2 transfer from WC <> toilet. MaxA stand step from WC <> toilet using R side GB.)   Tub / Shower Transfers Maximal Assist   Tub Shower Transfer Description Grab bar;Shower bench;Increased time;Supervision for safety;Verbal cueing;Other (comment)  (Moderate VC for WC safety in 1/2 WC to shower bench transfers. MaxA for WC <> shower bench using stand step and R sided GB.)   Interdisciplinary Plan of Care Collaboration   IDT Collaboration with  Therapy Tech   Patient Position at End of Therapy Seated;Chair Alarm On;Call Light within Reach;Tray Table within Reach;Phone within Reach   Collaboration Comments safety with transfers d/t pushing to the L side.   OT DME Recommendations   Bathroom Equipment 3 in 1 Commode   Additional Equipment Other (Comments)       Assessment    Overall, steady progress with functional transfers (progressing towards maxA) and sitting balance while performing ADLs; however, L sided neglect and frequent pushing to the L side during dynamic movements prevent pt from performing seated ADLs safely. Pt now able to don socks at Kyara using one-hand tech w/o AE.      Strengths: Able to follow instructions, Independent prior level of function, Motivated for self care and independence, Pleasant and cooperative, Supportive family, Alert and  oriented  Barriers: Decreased endurance, Fatigue, Generalized weakness, Hemiparesis, Impaired activity tolerance, Impaired balance, Limited mobility, Spasticity    Plan    Cont ADLs, functional transfers, and thera act/ex to maximize functional recovery for safe DC home.       DME  OT DME Recommendations  Bathroom Equipment: (P) 3 in 1 Commode  Additional Equipment: (P) Other (Comments)    Passport items to be completed:  Perform bathroom transfers, complete dressing, complete feeding, get ready for the day, prepare a simple meal, participate in household tasks, adapt home for safety needs, demonstrate home exercise program, complete caregiver training     Occupational Therapy Goals (Active)       Problem: Bathing       Dates: Start:  11/15/23         Goal: STG-Within one week, patient will bathe at modA using DME/AE PRN       Dates: Start:  11/15/23               Problem: Dressing       Dates: Start:  11/22/23         Goal: STG-Within one week, patient will dress UB at Kyara using LRD       Dates: Start:  11/22/23            Goal: STG-Within one week, patient will dress LB at [place] using LRD       Dates: Start:  11/22/23               Problem: Functional Transfers       Dates: Start:  11/13/23         Goal: STG-Within one week, patient will transfer to toilet with Max/Mod A.       Dates: Start:  11/13/23            Goal: STG-Within one week, patient will transfer to step in shower with Max A.       Dates: Start:  11/13/23               Problem: OT Long Term Goals       Dates: Start:  11/13/23         Goal: LTG-By discharge, patient will complete basic self care tasks at Mod Independent level.       Dates: Start:  11/13/23            Goal: LTG-By discharge, patient will perform bathroom transfers at Mod Independent level.       Dates: Start:  11/13/23

## 2023-11-29 NOTE — CARE PLAN
Problem: Functional Transfers  Goal: STG-Within one week, patient will transfer to step in shower with Max A.  Outcome: Progressing     Problem: Dressing  Goal: STG-Within one week, patient will dress LB at Kyara using LRD  Outcome: Progressing     Problem: Functional Transfers  Goal: STG-Within one week, patient will transfer to toilet with Max/Mod A.  Outcome: Met     Problem: Bathing  Goal: STG-Within one week, patient will bathe at modA using DME/AE PRN  Outcome: Met     Problem: Dressing  Goal: STG-Within one week, patient will dress UB at Kyara using LRD  Outcome: Met

## 2023-11-29 NOTE — PROGRESS NOTES
Hospital Medicine Daily Progress Note      Chief Complaint:  Hypertension  Diabetes  CKD/ROMÁN    Interval History:  No significant events overnight.    Review of Systems  Review of Systems   Constitutional:  Negative for fever.   Eyes:  Negative for blurred vision.   Respiratory:  Negative for shortness of breath.    Cardiovascular:  Negative for palpitations.   Gastrointestinal:  Negative for nausea and vomiting.   Neurological:  Negative for dizziness and headaches.   Psychiatric/Behavioral:  Negative for hallucinations.         Physical Exam  Temp:  [36.6 °C (97.9 °F)-36.8 °C (98.3 °F)] 36.6 °C (97.9 °F)  Pulse:  [90-98] 90  Resp:  [18] 18  BP: (131-148)/(72-91) 144/86  SpO2:  [94 %-96 %] 95 %    Physical Exam  Vitals and nursing note reviewed.   Constitutional:       General: He is not in acute distress.  HENT:      Mouth/Throat:      Mouth: Mucous membranes are moist.      Pharynx: Oropharynx is clear.   Eyes:      General: No scleral icterus.  Cardiovascular:      Rate and Rhythm: Normal rate and regular rhythm.      Heart sounds: No murmur heard.  Pulmonary:      Effort: Pulmonary effort is normal.      Breath sounds: Normal breath sounds. No stridor.   Abdominal:      General: There is no distension.      Palpations: Abdomen is soft.      Tenderness: There is no abdominal tenderness.   Musculoskeletal:      Cervical back: Normal range of motion. No rigidity.      Right lower leg: No edema.      Left lower leg: Edema present.      Comments: Has LUE swelling   Skin:     General: Skin is warm and dry.      Findings: No rash.   Neurological:      Mental Status: He is alert and oriented to person, place, and time.   Psychiatric:         Mood and Affect: Mood normal.         Behavior: Behavior normal.         Fluids    Intake/Output Summary (Last 24 hours) at 11/29/2023 0939  Last data filed at 11/29/2023 0856  Gross per 24 hour   Intake 1180 ml   Output 800 ml   Net 380 ml         Laboratory  Recent Labs      11/28/23  0525   WBC 5.9   RBC 3.82*   HEMOGLOBIN 12.0*   HEMATOCRIT 34.0*   MCV 89.0   MCH 31.4   MCHC 35.3   RDW 39.6   PLATELETCT 177   MPV 10.3       Recent Labs     11/28/23  0525   SODIUM 140   POTASSIUM 3.8   CHLORIDE 103   CO2 25   GLUCOSE 118*   BUN 39*   CREATININE 3.00*   CALCIUM 8.9                   Assessment/Plan  DVT (deep venous thrombosis) ([facility])  Assessment & Plan  US LUE: acute thrombus in paired brachial veins  US LLE: acute thrombus in paired peroneal veins  Cont Eliquis    Hemorrhagic stroke (HCC)- (present on admission)  Assessment & Plan  Had right thalamic hemorrhage on 10/23/23 while visiting [place]    ROMÁN (acute kidney injury) ([facility])- (present on admission)  Assessment & Plan  Bun/Cr trending up recently  US: medical renal disease, no hydronephrosis  Appears to have CKD (bun/cr elevated in 2017)  Encouraging fluid intake  Note: had prior IVF's x 3 runs  Needs outpatient follow up with Nephro  Cont to monitor    DM (diabetes mellitus) ([facility])- (present on admission)  Assessment & Plan  Hba1c: 6.4  BS were good  Off accuchecks  Note: home meds include Metformin (but now has CKD)    Hyperlipidemia- (present on admission)  Assessment & Plan  Cont Lipitor    Hypertension- (present on admission)  Assessment & Plan  BP a little labile  Cont Norvasc  Cont Hydralazine  Note: home meds include Norvasc  Cont to monitor

## 2023-11-29 NOTE — PROGRESS NOTES
"  Physical Medicine & Rehabilitation Progress Note    Encounter Date: 11/29/2023    Chief Complaint: Foot pain improved    Interval Events (Subjective):  VSS - 2x 's  Voiding  No BM since 11/24    Patient seen and examined in his room. States that he is doing ok and his foot is improved. Ok with anti-depressant. Otherwise doing well.     ROS: 14 point ROS negative unless otherwise specified in the HPI    Objective:  VITAL SIGNS: BP (!) 144/86   Pulse 90   Temp 36.6 °C (97.9 °F) (Oral)   Resp 18   Ht 1.727 m (5' 8\")   Wt 87 kg (191 lb 12.8 oz)   SpO2 95%   BMI 29.16 kg/m²     GEN: No apparent distress  HEENT: Head normocephalic, atraumatic.  Sclera nonicteric bilaterally, no ocular discharge appreciated bilaterally.  CV: Extremities warm and well-perfused, no peripheral edema appreciated bilaterally.  PULMONARY: Breathing nonlabored on room air, no respiratory accessory muscle use.  Not requiring supplemental oxygen.  SKIN: No appreciable skin breakdown on exposed areas of skin.  PSYCH: Normal affect  NEURO: Awake alert.  Conversational.  Logical thought content. Dysarthria. Left Hemiparesis.       Laboratory Values:  Recent Results (from the past 72 hour(s))   CBC WITHOUT DIFFERENTIAL    Collection Time: 11/28/23  5:25 AM   Result Value Ref Range    WBC 5.9 4.8 - 10.8 K/uL    RBC 3.82 (L) 4.70 - 6.10 M/uL    Hemoglobin 12.0 (L) 14.0 - 18.0 g/dL    Hematocrit 34.0 (L) 42.0 - 52.0 %    MCV 89.0 81.4 - 97.8 fL    MCH 31.4 27.0 - 33.0 pg    MCHC 35.3 32.3 - 36.5 g/dL    RDW 39.6 35.9 - 50.0 fL    Platelet Count 177 164 - 446 K/uL    MPV 10.3 9.0 - 12.9 fL   Basic Metabolic Panel    Collection Time: 11/28/23  5:25 AM   Result Value Ref Range    Sodium 140 135 - 145 mmol/L    Potassium 3.8 3.6 - 5.5 mmol/L    Chloride 103 96 - 112 mmol/L    Co2 25 20 - 33 mmol/L    Glucose 118 (H) 65 - 99 mg/dL    Bun 39 (H) 8 - 22 mg/dL    Creatinine 3.00 (H) 0.50 - 1.40 mg/dL    Calcium 8.9 8.5 - 10.5 mg/dL    Anion Gap " 12.0 7.0 - 16.0   ESTIMATED GFR    Collection Time: 23  5:25 AM   Result Value Ref Range    GFR (CKD-EPI) 23 (A) >60 mL/min/1.73 m 2   URIC ACID    Collection Time: 23  5:25 AM   Result Value Ref Range    Uric Acid 9.0 (H) 2.5 - 8.3 mg/dL         Medications:  Scheduled Medications   Medication Dose Frequency    ciprofloxacin  1 Drop Q4HRS WHILE AWAKE    diclofenac sodium  2 g 4X/DAY    colchicine  0.6 mg BID    sertraline  25 mg DAILY    apixaban  10 mg BID    [START ON 2023] apixaban  5 mg BID    hydrALAZINE  100 mg Q8HRS    traZODone  75 mg QHS    senna-docusate  2 Tablet BID    omeprazole  20 mg DAILY    amLODIPine  10 mg DAILY    atorvastatin  40 mg Nightly     PRN medications: carboxymethylcellulose, melatonin, [DISCONTINUED] insulin regular **AND** [CANCELED] POC blood glucose manual result **AND** NOTIFY MD and PharmD **AND** Administer 20 grams of glucose (approximately 8 ounces of fruit juice) every 15 minutes PRN FSBG less than 70 mg/dL **AND** dextrose bolus, Respiratory Therapy Consult, senna-docusate **AND** polyethylene glycol/lytes **AND** magnesium hydroxide **AND** bisacodyl, mag hydrox-al hydrox-simeth, ondansetron **OR** ondansetron, sodium chloride, [] acetaminophen **FOLLOWED BY** acetaminophen, oxyCODONE immediate-release **OR** oxyCODONE immediate-release, hydrALAZINE    Diet:  Current Diet Order   Procedures    Diet Order Diet: Consistent CHO (Diabetic) (2 gram sodium restriction; medications whole with thin liquids); Second Modifier: (optional): 2 Gram Sodium       Medical Decision Making and Plan:  Right thalamic hemorrhagic stroke ~ last week October   Originally presented with a headache and left-sided weakness to hospital in [place]  Work-up at the outside hospital in [place] revealed a right thalamic hemorrhagic stroke  Repeat CT head done after return flight to the ,  CT head shows right thalamic hemorrhage, decrease in density since prior studies  from the outside hospital, slight asymmetric dilation of the left ventricular system including the left temporal horn with a possible component of hydrocephalus  Planning for BP less than 140, avoiding any kind of anticoagulation  Initiate comprehensive IRF level therapy with 3 days of therapy 5 days a week with services from PT/OT/SLP     Spasticity  Continue baclofen 5 mg nightly, started on 11/11 --> increase to TID 11/13/2023 --> continue 11/14/2023, stable on higher dose.   PRAFO ordered for right lower extremity to prevent further plantarflexion contracture  Spasticity controlled, continue Baclofen 11/20/2023   Consider weaning 11/21/2023   Stop 11/24/2023 - monitor for worsening spasticity  Remain off of Baclofen 11/29/2023     ABLA  Now on Eliquis  Recheck 11/28 - improved 11.4--> 12.0     CKD  Has seen nephrology in past  Improving 11/13/2023   F/u OP with nephrology  S/p IVF 11/13-11/14 11/14/2023 BUN and Cr improved compared to prior  [facility] 11/16 - improved - currently receiving IVF  Recheck BMP 11/21 - Slightly worsened renal function - likely baseline  BUN/Cr stable 11/24/2023   Recheck 11/28 - Slightly higher at 39/3     Hypertension  Continue Amlodipine 10mg daily  Continue Hydralazine 25mg TID - increased by hospitalist 11/13/2023 from BID to TID--> increased to 50mg q8hrs 11/15  11/16 Hydralazine increased to 75mg q8hrs and 1x Hydralazine given  11/17 BP still elevated but hydralazine recently increased. Monitor  11/29/2023 VSS Continue Hydralazine 100mg q8hrs + Amlodipine 10mg daily     Prediabetes  Discontinue SSI     HLD  Continue home dose satin      Bowel  Constipation 11/29/2023   Continue with scheduled Colace and senna, as needed stool softeners  Miralax x3 doses 11/29/2023      Insomnia  Secondary to recent jet lag, melatonin scheduled --> increase to home dose 11/13/2023 9mg  Utilize trazodone as needed insomnia continues  Schedule Trazodone 11/15/2023   11/16/2023 continue Trazodone as is.  Thinks he slept better last night  11/17/2023 Still not sleeping well. Increase to 75mg nightly.   11/24/2023 continue trazodone at current dose     Pain -as needed Tylenol and oxycodone    Post-Stroke Depression  Consulted Pyschiatry   Start Prozac 11/28/2023     Right Foot Pain  H/o Gout  Xray with swelling 1st metatarsal head   Trial Colchicine for acute gout flare 11/28/2023   Topical Voltaren gel scheduled   Improved with less swelling, erythema 11/29/2023     LABS: CMP + CBC      DVT PROPHYLAXIS: NO - Hemorrhagic stroke. US + UE and LE for brachial and peroneal DVT. 11/21/2023 start Heparin 5000 units q8hrs SQ for further prophylaxis, if tolerates will increase to full AC. Consider repeat CTH to ensure stability bleed once on full AC. 11/24/2023 Start loading dose Eliquis 10mg BID x 7 days with transition to 5mg BID. CBC showing improvement in H/H 11/28/2023 no neurologic decline.     HOSPITALIST FOLLOWING: YES - d/w hospitalist 11/29    CODE STATUS: FULL CODE    DISPO: Home alone. Vielka and patient not . D/w CM to give resources for private care giving. 11/29/2023 Patient making significant progress with therapy and would benefit from more time in acute to maximize neuro recovery. Family actively looking for Caregivers for 24/7 care. Discharge extended due to measurable progress.     MARCUS: 12/12/23    MEDS SENT TO: TBD    DISCHARGE FOLLOW UP: Neurology, Nephrology, PCP - needs referral to all.   ____________________________________    Dr. Lisa Lee DO, MS  [facility] - Physical Medicine & Rehabilitation   ____________________________________    _____________________________________  Interdisciplinary Team Conference   Most recent IDT on 11/29/2023    IDr. Lisa DO, MS, was present and led the interdisciplinary team conference on 11/29/2023.  I led the IDT conference and agree with the IDT conference documentation and plan of care as noted below.     Nursing:  Diet Current Diet Order    Procedures    Diet Order Diet: Consistent CHO (Diabetic) (2 gram sodium restriction; medications whole with thin liquids); Second Modifier: (optional): 2 Gram Sodium       Eating ADL Supervision  Verbal cueing   % of Last Meal  Oral Nutrition: Breakfast, Between % Consumed (85%)   Sleep    Bowel Last BM: 11/29/23   Bladder      Physical Therapy:  Bed Mobility    Transfers Moderate Assist  Adaptive equipment, Verbal cueing, Supervision for safety, Set-up of equipment, Requires lift, Increased time, Other (comment)   Mobility Total Assist   Stairs    Limited by lateral propulsion   Making progress    Occupational Therapy:  Grooming Minimal Assist, Seated   Bathing Moderate Assist   UB Dressing Minimal Assist   LB Dressing Moderate Assist   Toileting Maximal Assist   Shower & Transfer    Sequencing for safety with wheelchair.   Making progress    Speech-Language Pathology:  Comprehension:  Supervision  Comprehension Description:  Glasses, Magnifying glass  Expression:  Modified Independent  Expression Description:  Other (comment) (Extra time)  Social Interaction:  Supervision  Social Interaction Description:  Increased time, Verbal cues  Problem Solving:  Minimal Assist  Problem Solving Description:  Increased time, Seat belt, Supervision, Bed/chair alarm, Therapy schedule, Verbal cueing  Memory:  Minimal Assist  Memory Description:  Increased time, Seat belt, Supervision, Bed/chair alarm, Therapy schedule, Verbal cueing    Regular thins - chopped.   Cues to ask for assistance  Rapid rate of intake, baseline    Respiratory Therapy:  O2 (LPM): 0  O2 Delivery Device: CPAP    Case Management:  Continues to work on disposition and DME needs.     BARRIERS TO DISCHARGE HOME:  Lateral propulsion  Care at home   Carry over to functional tasks  Discharge Date/Disposition:  12/12/23  _____________________________________

## 2023-11-29 NOTE — CARE PLAN
Problem: PT-Long Term Goals  Goal: LTG-By discharge, patient will propel wheelchair >/= 300' at [place] or better.   Outcome: Progressing  Goal: LTG-By discharge, patient will ambulate >/= 50' c/ LRAD at [place] or better.   Outcome: Progressing  Goal: LTG-By discharge, patient will transfer one surface to another c/ [place] or better.   Outcome: Progressing  Goal: LTG-By discharge, patient will ambulate up/down 1 step c/ LRAD at [place] or better.   Outcome: Progressing  Goal: LTG-By discharge, patient will transfer in/out of a car c/ [place] or better.   Outcome: Progressing

## 2023-11-29 NOTE — CONSULTS
"PSYCHIATRIC CONSULTATION:  *Reason for admission:   right thalamic hemorrhage  *Reason for consult:depression   *Requesting Physician:Lisa Lee  Supervising Physician:Rachele Colon         Legal status:  not applicable        *Interval history  The patient reports his mood has been  up and down. HE feels mood has been getting worse lately. He states that he although he has been progressing more physically he has often been feeling depressed and anxious. He states he still has hopes that he will still be able to enjoy life and perhaps travel again when he leaves rehab but is also overwhelmed with thoughts that he will be a burden to his family due to his physical disability. He also feels overwhelmed with thoughts that he is currently helpless (needs help sitting up, walking, going to the bathroom) and is frustrated recovery is not going faster. He has a lot of anxiety about leaving rehab as he is not sure \"how I am going to live my life.\" He admits sometimes he has wishes that her were \"not here anymore\" but states he has not plans on actually taking his life. States his family and the hope he can still enjoy life somehow are reasons for living. He reports energy level and motivation are good, feels he needs more physical rehab. Sleep has been better, usually stays asleep all night and more than half the week this last week he has not had problems falling asleep. Appetite is fair, repots sometimes he does not feel like eating due to the quality of the food. He denies HI. NO A/V hallucinations and no delusions.        *Medical Review of Systems: as reported by pt. All systems reviewed. Only those found to be + are noted below. All others are negative.   (+) foot pain, recently improved with treatment but still present        *Psychiatric (Physical) Examination: observed phenomenon:  Vitals:    11/28/23 1600   BP: (!) 148/91   Pulse: 98   Resp: 18   Temp: 36.8 °C (98.3 °F)   SpO2: 94%          General: " "Awake, alert in no acute distress  Patient Appearance: Appropriate, fairly groomed, wearing glasses   Behavior: Appropriate Behavior, Calm, Cooperative  Speech.: Spontaneous, Normal rate and rhythm, Answers appropriately, normal  volume  Mood Comments: \"jocelyne depressed\"  Affect: appears flat  Thought Content: Appropriate, No suicidal ideation, No thoughts of self harm,  No homicidal ideation, Contracts for safety, Feels life is worth living  Thought Process: Logical and goal directed, No loosening of associations  Psychosis: No delusions, No hallucinations  Insight: Good insight  Judgment: good judgment  Cognition: Memory appeared intact in interview., Concentration is intact for age  and education and Fully oriented to person, place, time and circumstance.  Language: Is able to repeat phrases. and Is able to name objects.  Fund of Knowledge: AS expected for age and level of education  Neuro: no tics, tremors, dyskinesias. no dystonias. Gait not observed         Allergies:           Allergies   Allergen Reactions    Penicillins            Medications (currently prescribed at [facility]):       Current Facility-Administered Medications:     ciprofloxacin (Ciloxin) 0.3 % ophthalmic solution 1 Drop, 1 Drop, Both Eyes, Q4HRS WHILE AWAKE, Lisa Lee D.O., 1 Drop at 11/28/23 2200    diclofenac sodium (Voltaren) 1 % gel 2 g, 2 g, Topical, 4X/DAY, Lisa Lee D.O., 2 g at 11/28/23 2100    [START ON 11/29/2023] colchicine (Colcrys) tablet 0.6 mg, 0.6 mg, Oral, BID, Lisa Lee D.O.    [START ON 11/29/2023] sertraline (Zoloft) tablet 25 mg, 25 mg, Oral, DAILY, Rachele Del Rio D.O.    carboxymethylcellulose (Refresh Tears) 0.5 % ophthalmic drops 1 Drop, 1 Drop, Both Eyes, PRN, Chuck Bagley D.O., 1 Drop at 11/26/23 2005    apixaban (Eliquis) tablet 10 mg, 10 mg, Oral, BID, Lisa Lee D.O., 10 mg at 11/28/23 2033    [START ON 12/1/2023] apixaban (Eliquis) tablet 5 mg, 5 mg, Oral, BID, Lisa" NAHEED Lee D.O.    hydrALAZINE (Apresoline) tablet 100 mg, 100 mg, Oral, Q8HRS, Sita Grewal M.D., 100 mg at 23    traZODone (Desyrel) tablet 75 mg, 75 mg, Oral, QHS, Lisa Lee D.O., 75 mg at 23    melatonin tablet 9 mg, 9 mg, Oral, QHS PRN, Lisa Lee D.O., 9 mg at 23    [DISCONTINUED] insulin regular (HumuLIN R,NovoLIN R) injection, 2-12 Units, Subcutaneous, 4X/DAY ACHS **AND** [CANCELED] POC blood glucose manual result, , , Q AC AND BEDTIME(S) **AND** NOTIFY MD and PharmD, , , Once **AND** Administer 20 grams of glucose (approximately 8 ounces of fruit juice) every 15 minutes PRN FSBG less than 70 mg/dL, , , PRN **AND** dextrose 50% (D50W) injection 25 g, 25 g, Intravenous, Q15 MIN PRN, Sita Grewal M.D.    Respiratory Therapy Consult, , Nebulization, Continuous RT, Peyton Watkins D.O.    senna-docusate (Pericolace Or Senokot S) 8.6-50 MG per tablet 2 Tablet, 2 Tablet, Oral, BID, 2 Tablet at 23 **AND** polyethylene glycol/lytes (Miralax) PACKET 1 Packet, 1 Packet, Oral, QDAY PRN, 1 Packet at 23 0614 **AND** magnesium hydroxide (Milk Of Magnesia) suspension 30 mL, 30 mL, Oral, QDAY PRN **AND** bisacodyl (Dulcolax) suppository 10 mg, 10 mg, Rectal, QDAY PRN, Peyton Watkins D.O.    omeprazole (PriLOSEC) capsule 20 mg, 20 mg, Oral, DAILY, Peyton Watkins D.O., 20 mg at 23    mag hydrox-al hydrox-simeth (Maalox Plus Es Or Mylanta Ds) suspension 20 mL, 20 mL, Oral, Q2HRS PRN, MAXWELL DenisO.    ondansetron (Zofran ODT) dispertab 4 mg, 4 mg, Oral, 4X/DAY PRN **OR** ondansetron (Zofran) syringe/vial injection 4 mg, 4 mg, Intramuscular, 4X/DAY PRN, MAXWELL DenisOWang    traZODone (Desyrel) tablet 50 mg, 50 mg, Oral, QHS PRN, Peyton Watkins D.O.    sodium chloride (Ocean) 0.65 % nasal spray 2 Spray, 2 Spray, Nasal, PRN, MAXWELL DenisOWang    [] acetaminophen (Tylenol) tablet 1,000 mg, 1,000 mg, Oral, Q6HRS, 1,000 mg at 23 0530  **FOLLOWED BY** acetaminophen (Tylenol) tablet 1,000 mg, 1,000 mg, Oral, Q6HRS PRN, Peyton Watkins D.O., 1,000 mg at 11/26/23 0839    oxyCODONE immediate-release (Roxicodone) tablet 2.5 mg, 2.5 mg, Oral, Q3HRS PRN **OR** oxyCODONE immediate-release (Roxicodone) tablet 5 mg, 5 mg, Oral, Q3HRS PRN, Peyton Watkins D.O., 5 mg at 11/28/23 1207    hydrALAZINE (Apresoline) tablet 25 mg, 25 mg, Oral, Q8HRS PRN, LEANA Denis.O., 25 mg at 11/23/23 1639    amLODIPine (Norvasc) tablet 10 mg, 10 mg, Oral, DAILY, LEANA Denis.O., 10 mg at 11/28/23 0823    atorvastatin (Lipitor) tablet 40 mg, 40 mg, Oral, Nightly, Peyton Watkins D.O., 40 mg at 11/28/23 2033       *Labs:  Lab Results   Component Value Date/Time    SODIUM 140 11/28/2023 05:25 AM    POTASSIUM 3.8 11/28/2023 05:25 AM    CHLORIDE 103 11/28/2023 05:25 AM    CO2 25 11/28/2023 05:25 AM    GLUCOSE 118 (H) 11/28/2023 05:25 AM    BUN 39 (H) 11/28/2023 05:25 AM    CREATININE 3.00 (H) 11/28/2023 05:25 AM       Lab Results   Component Value Date/Time    WBC 5.9 11/28/2023 05:25 AM    RBC 3.82 (L) 11/28/2023 05:25 AM    HEMOGLOBIN 12.0 (L) 11/28/2023 05:25 AM    HEMATOCRIT 34.0 (L) 11/28/2023 05:25 AM    MCV 89.0 11/28/2023 05:25 AM    MCH 31.4 11/28/2023 05:25 AM    MCHC 35.3 11/28/2023 05:25 AM    MPV 10.3 11/28/2023 05:25 AM    NEUTSPOLYS 59.70 11/24/2023 05:23 AM    LYMPHOCYTES 29.10 11/24/2023 05:23 AM    MONOCYTES 7.00 11/24/2023 05:23 AM    EOSINOPHILS 3.40 11/24/2023 05:23 AM    BASOPHILS 0.40 11/24/2023 05:23 AM        11/21/23  TSH-0.640  [id number]     EKG 11/11 NSR QTC-436  Imaging  MRI 11/11/23  1.  Right thalamic hemorrhage, decreased in density since prior study  2.  Stranding vasogenic edema appears stable.  3.  Slight asymmetric dilatation of the left ventricular system including the left temporal horn, consider component of hydrocephalus.  4.  Low-density fluid collection in the left temporal fossa, appearance most compatible with arachnoid cyst.  5.   Nonspecific white matter changes, commonly associated with small vessel ischemic disease.  Associated mild cerebral atrophy is noted.  6.  Atherosclerosis.        *ASSESSMENT:   R/O Adjustment disorder  -Patient endorsed increased anxiety and occasional feeling of hopelessness that began after his stroke. He had noted more disrupted sleep that he feels affects his energy levels and anxiety. However he had been participating in OT/PT and had been complaint with recommended medications and treatments. Denied SI. Sleep may also have been affected my discomfort from CPAP     The patient states mood has been more depressed lately. Processed reasons for change in mood. Discussed patient's goals after rehab and worked with patient to reframe some negative thoughts. He is sleeping better. Appetite is fair. Energy level and motivation are good. Denies SI    *Plan/Further Workup:   Legal status: not applicable     *Medication Managment:         -continue trazodone 75 mg QHS for insomnia can dc PRN as patient has not been using it  Start Zoloft 25 mg daily for depression. Does not have significant interactions with medications patient is currently taking and does no require renal dosing. Patient is in agreement with starting this medication after R/B and side effects were discussed  -continue psychotherapy with patient  *Labs Reviewed/Ordered: no new lab      Will follow with patient                 Will follow  Thank you for the consult.

## 2023-11-29 NOTE — FLOWSHEET NOTE
11/28/23 1915   Events/Summary/Plan   Events/Summary/Plan CPAP check   Oxygen   O2 Delivery Device CPAP   Non-Invasive Ventilation [facility] [employer]   Nocturnal CPAP or BIPAP CPAP - Home Unit

## 2023-11-29 NOTE — THERAPY
"Speech Language Pathology  Daily Treatment     Patient Name: Jason Lima  Age:  62 y.o., Sex:  male  Medical Record #: [id number]  Today's Date: 11/29/2023     Precautions  Precautions: Fall Risk  Comments: Left hemiparesis, left visual inattention    Subjective    Patient was willing to participate, spouse present for session.      Objective       11/29/23 140   Treatment Charges   SLP Cognitive Skill Development First 15 Minutes 1   SLP Cognitive Skill Development Additional 15 Minutes 1   SLP Total Time Spent   SLP Individual Total Time Spent (Mins) 30   Cognition   Moderate Attention Moderate (3)   Insight into Deficits Moderate (3)   Executive Functioning / Organization Moderate (3)   Interdisciplinary Plan of Care Collaboration   Patient Position at End of Therapy Seated;Chair Alarm On;Call Light within Reach;Family / Friend in Room;Tray Table within Reach         Assessment    Patient participated in mental manipulation and organization tasks using \"flex your brain cards.\"  Mod cues needed throughout task. Improved with external aids, however patient required frequent cues to utilize. Patient benefited from education regarding organization tasks and rationale for speech therapy.      Strengths: Alert and oriented, Effective communication skills, Motivated for self care and independence, Pleasant and cooperative, Independent prior level of function, Making steady progress towards goals, Willingly participates in therapeutic activities  Barriers: Impaired functional cognition (depression, left neglect, difficulty self monitoring/regulating)    Plan    Executive function, attention.       Speech Therapy Problems (Active)       Problem: Problem Solving STGs       Dates: Start:  11/22/23         Goal: STG-Within one week, patient will perform alternating attention tasks with 85% acc with set up.       Dates: Start:  11/22/23         Goal Note filed on 11/29/23 1121 by Neva Abbott MS,CCC-SLP       MOD A, " continues to require cues               Goal: STG-Within one week, patient will organization and reasoning tasks with 80% acc with min cues.       Dates: Start:  11/22/23         Goal Note filed on 11/29/23 1121 by Neva Abbott MS,CCC-SLP       Will continue to target, continues to require MOD A                 Problem: Speech/Swallowing LTGs       Dates: Start:  11/13/23         Goal: LTG-By discharge, patient will safely swallow (7)regular diet textures and (0) thin liquids with no overt s/sx of aspiration for 2/2 days.       Dates: Start:  11/13/23            Goal: LTG-By discharge, patient will solve complex problem       Dates: Start:  11/13/23       Description: For safety and self care with 80% acc mod I  for safe discharge home.         Goal: LTG-By discharge, patient will recall new training with 80% acc with min A and use of external memory aids.       Dates: Start:  11/13/23               Problem: Swallowing STGs       Dates: Start:  11/13/23

## 2023-11-29 NOTE — THERAPY
"Physical Therapy   Daily Treatment     Patient Name: Jason Lima  Age:  62 y.o., Sex:  male  Medical Record #: [id number]  Today's Date: 11/29/2023     Precautions  Precautions: Fall Risk  Comments: Left hemiparesis, left visual inattention    Subjective    Pt is pleasant and cooperative.      Objective       11/29/23 1030   PT Charge Group   PT Gait Training (Units) 2   PT Total Time Spent   PT Individual Total Time Spent (Mins) 30   Gait Functional Level of Assist    Gait Level Of Assist Moderate Assist   Distance (Feet) 15   # of Times Distance was Traveled 4     Pt brought to R handrail.     Session started with several standing R lateral weight shifting and RUE reaching towards wall to decrease lateropulsion prior to gait training.     Pt ambulated 4 bouts of 15' with R handrail, MOD A, DFA wrap of L foot, and w/c follow of . As ambulation progressed, pt with improving step through gait, able to swing limb advance LLE approximately 60% of steps however demonstrates significant scissoring of LLE requiring physical assistance to prevent. Pt with continued impaired stability of LLE during stance however no significant buckling or losses of balance noted.     Ambulation attempted with FWW using L handgrip however pt with significantly worse lateropulsion due to inability to \"pull\" with his RUE. Activity discontinued.     Assessment    Pt tolerated session well with best ambulation performance today during his rehab stay. He continues to be limited by L jade paresis and lateropulsion.   Strengths: Able to follow instructions, Independent prior level of function, Motivated for self care and independence, Pleasant and cooperative, Supportive family, Willingly participates in therapeutic activities  Barriers: Decreased endurance, Hemiparesis, Difficulty following instructions, Fatigue, Hypertension, Impaired activity tolerance, Impaired balance, Impaired insight/denial of deficits, Impaired functional " "cognition, Impaired sleep pattern, Impulsive, Limited mobility, Visual impairment, Poor family support (lives alone)    Plan    Continue to progress pt's independence with mobility and decrease his lateropulsion symptoms.     DME  PT DME Recommendations  Wheelchair: 18\" Width  Cushion: Specialty (See other comments) (TBD pending ambulation progress)  Assistive Device: Tanvir Walker (TBD pending progress, currently 2 person assist for gait)  Additional Equipment: Other (Comments)    Passport items to be completed:  Get in/out of bed safely, in/out of a vehicle, safely use mobility device, walk or wheel around home/community, navigate up and down stairs, show how to get up/down from the ground, ensure home is accessible, demonstrate HEP, complete caregiver training    Physical Therapy Problems (Active)       Problem: Mobility       Dates: Start:  11/13/23         Goal: STG-Within one week, patient will ambulate distances >/= 30' c/ RHR and ModA or better.        Dates: Start:  11/13/23         Goal Note filed on 11/22/23 1102 by Awilda Dela Cruz, MSPT       Limited to 10ft at right hallway rail mod assist                 Problem: PT-Long Term Goals       Dates: Start:  11/13/23         Goal: LTG-By discharge, patient will propel wheelchair >/= 300' at Neto or better.        Dates: Start:  11/13/23            Goal: LTG-By discharge, patient will ambulate >/= 50' c/ LRAD at Renata or better.        Dates: Start:  11/13/23            Goal: LTG-By discharge, patient will transfer one surface to another c/ Renata or better.        Dates: Start:  11/13/23            Goal: LTG-By discharge, patient will ambulate up/down 1 step c/ LRAD at Renata or better.        Dates: Start:  11/13/23            Goal: LTG-By discharge, patient will transfer in/out of a car c/ ModA or better.        Dates: Start:  11/13/23              "

## 2023-11-29 NOTE — PROGRESS NOTES
NURSING DAILY NOTE    Name: Jason Lima   Date of Admission: 11/12/2023   Admitting Diagnosis: Thalamic hemorrhage (HCC)  Attending Physician: Lisa Lee D.o.  Allergies: Penicillins    Safety  Patient Assist  max 1-2  Patient Precautions  Fall Risk  Precaution Comments  Left hemiparesis, left visual inattention  Bed Transfer Status  Moderate Assist  Toilet Transfer Status   Total Assist X 2  Assistive Devices  Gait Belt, Wheelchair  Oxygen  CPAP  Diet/Therapeutic Dining  Current Diet Order   Procedures    Diet Order Diet: Consistent CHO (Diabetic) (2 gram sodium restriction; medications whole with thin liquids); Second Modifier: (optional): 2 Gram Sodium     Pill Administration  whole  Agitated Behavioral Scale     ABS Level of Severity       Fall Risk  Has the patient had a fall this admission?   Yes  Shellie Hamilton Fall Risk Scoring  22, HIGH RISK  Fall Risk Safety Measures  bed alarm, chair alarm, fall during this hospitalization, and poor balance    Vitals  Temperature: 36.8 °C (98.3 °F)  Temp src: Oral  Pulse: 98  Respiration: 18  Blood Pressure: (!) 148/91  Blood Pressure MAP (Calculated): 110 MM HG  BP Location: Right, Upper Arm  Patient BP Position: Supine     Oxygen  Pulse Oximetry: 94 %  O2 (LPM): 0  FiO2%: 21 %  O2 Delivery Device: CPAP    Bowel and Bladder  Last Bowel Movement  11/24/23  Stool Type  Not observed  Bowel Device  Bathroom  Continent  Bladder: Continent void   Bowel: Continent movement  Bladder Function  Urine Void (mL): 300 ml  Number of Times Voided: 1  Urinary Options: Yes  Urine Color: Yellow  Number of Times Incontinent of Urine: 0  Genitourinary Assessment   Bladder Assessment (WDL):  WDL Except  Echols Catheter: Not Applicable  Urine Color: Yellow  Number of Bladder Accidents: 0  Total Number of Bladder of Accidents in Last 7 Days: 0  Number of Times Incontinent of Urine: 0  Bladder Device: Urinal  Time Void: No  Bladder  Scan: Post Void  $ Bladder Scan Results (mL): 26    Skin  Polo Score   17  Sensory Interventions   Bed Types: Standard/Trauma Mattress  Skin Preventative Measures: Pillows in Use for Support / Positioning  Moisture Interventions  Moisturizers/Barriers: Barrier Wipes      Pain  Pain Rating Scale  7 - Focus of attention, prevents doing daily activities  Pain Location  Foot  Pain Location Orientation  Right  Pain Interventions   Medication (see MAR)    ADLs    Bathing   Shower, * * With Assistance from, Staff  Linen Change   Partial  Personal Hygiene  Change Deja Pads, Moist Deja Wipes, Perineal Care  Chlorhexidine Bath      Oral Care  Brushed Teeth  Teeth/Dentures     Shave  Self  Nutrition Percentage Eaten  Lunch, Between % Consumed (75%)  Environmental Precautions  Treaded Slipper Socks on Patient, Personal Belongings, Wastebasket, Call Bell etc. in Easy Reach, Transferred to Stronger Side, Report Given to Other Health Care Providers Regarding Fall Risk, Bed in Low Position  Patient Turns/Positioning  Patient Turns Self from Side to Side  Patient Turns Assistance/Tolerance  Assistance of Two or More, General Weakness  Bed Positions  Bed Controls On  Head of Bed Elevated  Self regulated      Psychosocial/Neurologic Assessment  Psychosocial Assessment  Psychosocial (WDL):  Within Defined Limits  Patient Behaviors: Flat Affect  Neurologic Assessment  Neuro (WDL): Exceptions to WDL  Level of Consciousness: Alert  Orientation Level: Oriented X4  Cognition: Follows commands, Appropriate attention/concentration  Speech: Clear, Slurred  Facial Symmetry: Left facial drooping  Pupil Assesment: No  R Pupil Size (mm): 3  R Pupil Shape / Description: Round  R Pupil Reaction: Brisk  L Pupil Size (mm): 3  L Pupil Shape / Description: Round  L Pupil Reaction: Brisk  Reflexes: Cough  Cough Reflex: Present  Motor Function/Sensation Assessment: Dorsiflexion, Motor response  R Foot Dorsiflexion: Strong  L Foot Dorsiflexion:  Absent  RUE Motor Response: Responds to commands  RUE Sensation: Full sensation  Muscle Strength Right Arm: Normal Strength Against Gravity and Full Resistance  LUE Motor Response: Flaccid  LUE Sensation: Tingling, Numbness  Muscle Strength Left Arm: Weak Movement but Not Against Gravity or Resistance  RLE Motor Response: Responds to commands  RLE Sensation: Full sensation  Muscle Strength Right Leg: Good Strength Against Gravity and Moderate Resistance  LLE Motor Response: Flaccid  LLE Sensation: Tingling, Numbness  Muscle Strength Left Leg: Weak Movement but Not Against Gravity or Resistance  EENT (WDL):  WDL Except    Cardio/Pulmonary Assessment  Edema   RLE Edema: Trace  LLE Edema: Trace  Respiratory Breath Sounds  RUL Breath Sounds: Clear  RML Breath Sounds: Clear  RLL Breath Sounds: Diminished  MOHAMUD Breath Sounds: Clear  LLL Breath Sounds: Diminished  Cardiac Assessment   Cardiac (WDL):  WDL Except (Hx Htn, Hld)

## 2023-11-29 NOTE — CARE PLAN
Problem: Problem Solving STGs  Goal: STG-Within one week, patient will perform alternating attention tasks with 85% acc with set up.  Outcome: Not Met  Note: MOD A, continues to require cues   Goal: STG-Within one week, patient will organization and reasoning tasks with 80% acc with min cues.  Outcome: Not Met  Note: Will continue to target, continues to require MOD A     Problem: Memory STGs  Goal: STG-Within one week, patient will recall new training and safety sequencing with 80% acc with set up and external cues.  Outcome: Met  Note: Pt is able to recall training, requires cues to implement functionally.      Problem: Expression STGs  Goal: STG-Within one week, patient will  Outcome: Discharged - Not Met  Note: Goal made in error.

## 2023-11-29 NOTE — THERAPY
Speech Language Pathology  Daily Treatment     Patient Name: Jason Lima  Age:  62 y.o., Sex:  male  Medical Record #: [id number]  Today's Date: 11/28/2023     Precautions  Precautions: Fall Risk  Comments: Left hemiparesis, left visual inattention    Subjective    Patient agreeable to therapy.  Fiance accompanied patient to session.     Objective       11/28/23 0931   Treatment Charges   SLP Cognitive Skill Development First 15 Minutes 1   SLP Cognitive Skill Development Additional 15 Minutes 3   SLP Total Time Spent   SLP Individual Total Time Spent (Mins) 60   Cognition   Moderate Attention Moderate (3)   Planning / Decision Making Moderate (3)   Functional Math / Financial Management Moderate (3)   Interdisciplinary Plan of Care Collaboration   IDT Collaboration with  Family / Caregiver   Patient Position at End of Therapy Seated;Chair Alarm On;Call Light within Reach;Tray Table within Reach;Phone within Reach;Family / Friend in Room   Collaboration Comments Fiance present during session.  Education provided on CLOF and POC.  Educated on use of memory log and strategies to prompt safety planning and problem solving prior to begining a task.       Assessment    Fiance present during session.  Education provided on CLOF and POC.  Educated on use of memory log and strategies to prompt safety planning and problem solving prior to begining a task.    Patient was able to verbalize transfer sequences with min verbal cuing.  Patient worked on functional attention in the context of check book organization and caluation activity.  Patient needed moderate cuing for attention, and use of strategies to keep his place in the task, and to record information neatly so as to perform accurate calculations.    Strengths: Effective communication skills, Alert and oriented, Able to follow instructions, Motivated for self care and independence, Pleasant and cooperative, Supportive family, Willingly participates in therapeutic  activities  Barriers: Aspiration risk, Hemiparesis, Impaired carryover of learning, Impaired insight/denial of deficits, Impaired functional cognition, Impulsive, Visual impairment    Plan    Target recall, attention, safety planning problem solving and sequencing.    Passport items to be completed:  Express basic needs, understand food/liquid recommendations, consistently follow swallow precautions, manage finances, manage medications, arrive to therapy appointments on time, complete daily memory log entries, solve problems related to safety situations, review education related to hospitalization, complete caregiver training     Speech Therapy Problems (Active)       Problem: Expression STGs       Dates: Start:  11/13/23         Goal: STG-Within one week, patient will       Dates: Start:  11/13/23               Problem: Memory STGs       Dates: Start:  11/22/23         Goal: STG-Within one week, patient will recall new training and safety sequencing with 80% acc with set up and external cues.       Dates: Start:  11/22/23               Problem: Problem Solving STGs       Dates: Start:  11/22/23         Goal: STG-Within one week, patient will perform alternating attention tasks with 85% acc with set up.       Dates: Start:  11/22/23            Goal: STG-Within one week, patient will organization and reasoning tasks with 80% acc with min cues.       Dates: Start:  11/22/23               Problem: Speech/Swallowing LTGs       Dates: Start:  11/13/23         Goal: LTG-By discharge, patient will safely swallow (7)regular diet textures and (0) thin liquids with no overt s/sx of aspiration for 2/2 days.       Dates: Start:  11/13/23            Goal: LTG-By discharge, patient will solve complex problem       Dates: Start:  11/13/23       Description: For safety and self care with 80% acc mod I  for safe discharge home.         Goal: LTG-By discharge, patient will recall new training with 80% acc with min A and use of  external memory aids.       Dates: Start:  11/13/23               Problem: Swallowing STGs       Dates: Start:  11/13/23

## 2023-11-29 NOTE — CARE PLAN
Problem: Bladder / Voiding  Goal: Patient will establish and maintain bladder regimen  Outcome: Progressing  Note: Patient is continent of bladder this shift.  Will continue to monitor.   The patient is Stable - Low risk of patient condition declining or worsening    Shift Goals  Clinical Goals: Safety  Patient Goals: sleep well  Family Goals: increased pt strength, independence    Progress made toward(s) clinical / shift goals:  no episodes of incontinence    Patient is not progressing towards the following goals:

## 2023-11-29 NOTE — PROGRESS NOTES
NURSING DAILY NOTE    Name: Jason Lima  Date of Admission: 11/12/2023  Admitting Diagnosis: Thalamic hemorrhage (HCC)  Attending Physician: Lisa Lee D.o.  Allergies: Penicillins    Safety  Patient Assist  omax 1-2  Patient Precautions  oFall Risk  Precaution Comments  oLeft hemiparesis, left visual inattention  Bed Transfer Status  oModerate Assist  Toilet Transfer Status  oTotal Assist X 2  Assistive Devices  oGait Belt, Wheelchair  Oxygen  oCPAP  Diet/Therapeutic Dining  o  Current Diet Order   Procedures    Diet Order Diet: Consistent CHO (Diabetic) (2 gram sodium restriction; medications whole with thin liquids); Second Modifier: (optional): 2 Gram Sodium     Pill Administration  owhole  Agitated Behavioral Scale  o   ABS Level of Severity  o     Fall Risk  Has the patient had a fall this admission?  Jonatan Hamilton Fall Risk Scoring  o22, HIGH RISK  Fall Risk Safety Measures  maddie alarm and chair alarm    Vitals  Temperature: 36.6 °C (97.9 °F)  Temp src: Oral  Pulse: 90  Respiration: 18  Blood Pressure: (!) 144/86  Blood Pressure MAP (Calculated): 105 MM HG  BP Location: Right, Upper Arm  Patient BP Position: Supine    Oxygen  Pulse Oximetry: 95 %  O2 (LPM): 0  FiO2%: 21 %  O2 Delivery Device: CPAP    Bowel and Bladder  Last Bowel Movement  o11/24/23  Stool Type  oNot observed  Bowel Device  oBathroom  Continent  oBladder: Continent void  oBowel: Continent movement  Bladder Function  oUrine Void (mL): 500 ml  Number of Times Voided: 1  Urinary Options: Yes  Urine Color: Yellow  Number of Times Incontinent of Urine: 0  Genitourinary Assessment  oBladder Assessment (WDL):  WDL Except  Echols Catheter: Not Applicable  Urine Color: Yellow  Number of Bladder Accidents: 0  Total Number of Bladder of Accidents in Last 7 Days: 0  Number of Times Incontinent of Urine: 0  Bladder Device: Urinal  Time Void: No  Bladder Scan: Post Void  $ Bladder Scan Results (mL): 26    Skin  Polo Score  o 17  Sensory  Interventions  o Bed Types: Standard/Trauma Mattress  Skin Preventative Measures: Pillows in Use for Support / Positioning  Moisture Interventions  oMoisturizers/Barriers: Barrier Wipes      Pain  Pain Rating Scale  o7 - Focus of attention, prevents doing daily activities  Pain Location  oFoot  Pain Location Orientation  oRight  Pain Interventions  oMedication (see MAR)    ADLs    Bathing  oShower, * * With Assistance from, Staff  Linen Change  oPartial  Personal Hygiene  oChange Deja Pads, Moist Deja Wipes, Perineal Care  Chlorhexidine Bath  o   Oral Care  oBrushed Teeth  Teeth/Dentures  o   Shave  oSelf  Nutrition Percentage Eaten  oLunch, Between % Consumed (75%)  Environmental Precautions  oTransferred to Stronger Side, Personal Belongings, Wastebasket, Call Bell etc. in Easy Reach, Bed in Low Position  Patient Turns/Positioning  oPatient Turns Self from Side to Side  Patient Turns Assistance/Tolerance  oAssistance of Two or More  Bed Positions  Jessa Controls On  Head of Bed Elevated  oSelf regulated      Psychosocial/Neurologic Assessment  Psychosocial Assessment  oPsychosocial (WDL):  Within Defined Limits  Patient Behaviors: Flat Affect, Fatigue  Neurologic Assessment  oNeuro (WDL): Exceptions to WDL  Level of Consciousness: Alert  Orientation Level: Oriented X4  Cognition: Follows commands, Appropriate attention/concentration  Speech: Clear, Slurred  Facial Symmetry: Left facial drooping  Pupil Assesment: No  R Pupil Size (mm): 3  R Pupil Shape / Description: Round  R Pupil Reaction: Brisk  L Pupil Size (mm): 3  L Pupil Shape / Description: Round  L Pupil Reaction: Brisk  Reflexes: Cough  Cough Reflex: Present  Motor Function/Sensation Assessment: Dorsiflexion, Motor response  R Foot Dorsiflexion: Strong  L Foot Dorsiflexion: Absent  RUE Motor Response: Responds to commands  RUE Sensation: Full sensation  Muscle Strength Right Arm: Normal Strength Against Gravity and Full Resistance  LUE Motor  Response: Flaccid  LUE Sensation: Tingling, Numbness  Muscle Strength Left Arm: Weak Movement but Not Against Gravity or Resistance  RLE Motor Response: Responds to commands  RLE Sensation: Full sensation  Muscle Strength Right Leg: Good Strength Against Gravity and Moderate Resistance  LLE Motor Response: Flaccid  LLE Sensation: Tingling, Numbness  Muscle Strength Left Leg: Weak Movement but Not Against Gravity or Resistance  oEENT (WDL):  WDL Except    Cardio/Pulmonary Assessment  Edema  oRLE Edema: Trace  LLE Edema: Trace  Respiratory Breath Sounds  oRUL Breath Sounds: Clear  RML Breath Sounds: Clear  RLL Breath Sounds: Diminished  MOHAMUD Breath Sounds: Clear  LLL Breath Sounds: Diminished  Cardiac Assessment  oCardiac (WDL):  WDL Except

## 2023-11-30 ENCOUNTER — APPOINTMENT (OUTPATIENT)
Dept: SPEECH THERAPY | Facility: REHABILITATION | Age: 63
DRG: 057 | End: 2023-11-30
Attending: PHYSICAL MEDICINE & REHABILITATION
Payer: COMMERCIAL

## 2023-11-30 ENCOUNTER — APPOINTMENT (OUTPATIENT)
Dept: PHYSICAL THERAPY | Facility: REHABILITATION | Age: 63
DRG: 057 | End: 2023-11-30
Attending: PHYSICAL MEDICINE & REHABILITATION
Payer: COMMERCIAL

## 2023-11-30 ENCOUNTER — APPOINTMENT (OUTPATIENT)
Dept: NEUROLOGY | Facility: MEDICAL CENTER | Age: 63
End: 2023-11-30
Attending: PSYCHIATRY & NEUROLOGY
Payer: COMMERCIAL

## 2023-11-30 ENCOUNTER — APPOINTMENT (OUTPATIENT)
Dept: OCCUPATIONAL THERAPY | Facility: REHABILITATION | Age: 63
DRG: 057 | End: 2023-11-30
Attending: HOSPITALIST
Payer: COMMERCIAL

## 2023-11-30 PROBLEM — N18.9 CKD (CHRONIC KIDNEY DISEASE): Status: ACTIVE | Noted: 2023-11-11

## 2023-11-30 LAB
ALBUMIN SERPL BCP-MCNC: 3.6 G/DL (ref 3.2–4.9)
ALBUMIN/GLOB SERPL: 1.5 G/DL
ALP SERPL-CCNC: 78 U/L (ref 30–99)
ALT SERPL-CCNC: 19 U/L (ref 2–50)
ANION GAP SERPL CALC-SCNC: 9 MMOL/L (ref 7–16)
AST SERPL-CCNC: 15 U/L (ref 12–45)
BASOPHILS # BLD AUTO: 0.4 % (ref 0–1.8)
BASOPHILS # BLD: 0.02 K/UL (ref 0–0.12)
BILIRUB SERPL-MCNC: 0.4 MG/DL (ref 0.1–1.5)
BUN SERPL-MCNC: 37 MG/DL (ref 8–22)
CALCIUM ALBUM COR SERPL-MCNC: 9 MG/DL (ref 8.5–10.5)
CALCIUM SERPL-MCNC: 8.7 MG/DL (ref 8.5–10.5)
CHLORIDE SERPL-SCNC: 104 MMOL/L (ref 96–112)
CO2 SERPL-SCNC: 27 MMOL/L (ref 20–33)
CREAT SERPL-MCNC: 3.06 MG/DL (ref 0.5–1.4)
EOSINOPHIL # BLD AUTO: 0.15 K/UL (ref 0–0.51)
EOSINOPHIL NFR BLD: 3.1 % (ref 0–6.9)
ERYTHROCYTE [DISTWIDTH] IN BLOOD BY AUTOMATED COUNT: 39.9 FL (ref 35.9–50)
GFR SERPLBLD CREATININE-BSD FMLA CKD-EPI: 22 ML/MIN/1.73 M 2
GLOBULIN SER CALC-MCNC: 2.4 G/DL (ref 1.9–3.5)
GLUCOSE SERPL-MCNC: 140 MG/DL (ref 65–99)
HCT VFR BLD AUTO: 33.4 % (ref 42–52)
HGB BLD-MCNC: 11.6 G/DL (ref 14–18)
IMM GRANULOCYTES # BLD AUTO: 0.01 K/UL (ref 0–0.11)
IMM GRANULOCYTES NFR BLD AUTO: 0.2 % (ref 0–0.9)
LYMPHOCYTES # BLD AUTO: 1.49 K/UL (ref 1–4.8)
LYMPHOCYTES NFR BLD: 30.7 % (ref 22–41)
MCH RBC QN AUTO: 31 PG (ref 27–33)
MCHC RBC AUTO-ENTMCNC: 34.7 G/DL (ref 32.3–36.5)
MCV RBC AUTO: 89.3 FL (ref 81.4–97.8)
MONOCYTES # BLD AUTO: 0.39 K/UL (ref 0–0.85)
MONOCYTES NFR BLD AUTO: 8 % (ref 0–13.4)
NEUTROPHILS # BLD AUTO: 2.79 K/UL (ref 1.82–7.42)
NEUTROPHILS NFR BLD: 57.6 % (ref 44–72)
NRBC # BLD AUTO: 0 K/UL
NRBC BLD-RTO: 0 /100 WBC (ref 0–0.2)
PLATELET # BLD AUTO: 193 K/UL (ref 164–446)
PMV BLD AUTO: 10.1 FL (ref 9–12.9)
POTASSIUM SERPL-SCNC: 3.9 MMOL/L (ref 3.6–5.5)
PROT SERPL-MCNC: 6 G/DL (ref 6–8.2)
RBC # BLD AUTO: 3.74 M/UL (ref 4.7–6.1)
SODIUM SERPL-SCNC: 140 MMOL/L (ref 135–145)
WBC # BLD AUTO: 4.9 K/UL (ref 4.8–10.8)

## 2023-11-30 PROCEDURE — A9270 NON-COVERED ITEM OR SERVICE: HCPCS | Performed by: HOSPITALIST

## 2023-11-30 PROCEDURE — 80053 COMPREHEN METABOLIC PANEL: CPT

## 2023-11-30 PROCEDURE — 700102 HCHG RX REV CODE 250 W/ 637 OVERRIDE(OP): Performed by: PHYSICAL MEDICINE & REHABILITATION

## 2023-11-30 PROCEDURE — 97112 NEUROMUSCULAR REEDUCATION: CPT

## 2023-11-30 PROCEDURE — A9270 NON-COVERED ITEM OR SERVICE: HCPCS | Performed by: PHYSICAL MEDICINE & REHABILITATION

## 2023-11-30 PROCEDURE — 770010 HCHG ROOM/CARE - REHAB SEMI PRIVAT*

## 2023-11-30 PROCEDURE — 36415 COLL VENOUS BLD VENIPUNCTURE: CPT

## 2023-11-30 PROCEDURE — 85025 COMPLETE CBC W/AUTO DIFF WBC: CPT

## 2023-11-30 PROCEDURE — 99231 SBSQ HOSP IP/OBS SF/LOW 25: CPT | Performed by: STUDENT IN AN ORGANIZED HEALTH CARE EDUCATION/TRAINING PROGRAM

## 2023-11-30 PROCEDURE — 90837 PSYTX W PT 60 MINUTES: CPT | Performed by: SOCIAL WORKER

## 2023-11-30 PROCEDURE — 700102 HCHG RX REV CODE 250 W/ 637 OVERRIDE(OP): Performed by: HOSPITALIST

## 2023-11-30 PROCEDURE — 99232 SBSQ HOSP IP/OBS MODERATE 35: CPT | Performed by: HOSPITALIST

## 2023-11-30 PROCEDURE — 700105 HCHG RX REV CODE 258: Performed by: HOSPITALIST

## 2023-11-30 PROCEDURE — 97530 THERAPEUTIC ACTIVITIES: CPT

## 2023-11-30 PROCEDURE — 97129 THER IVNTJ 1ST 15 MIN: CPT

## 2023-11-30 PROCEDURE — 99232 SBSQ HOSP IP/OBS MODERATE 35: CPT | Performed by: PHYSICAL MEDICINE & REHABILITATION

## 2023-11-30 PROCEDURE — 700102 HCHG RX REV CODE 250 W/ 637 OVERRIDE(OP): Performed by: STUDENT IN AN ORGANIZED HEALTH CARE EDUCATION/TRAINING PROGRAM

## 2023-11-30 PROCEDURE — A9270 NON-COVERED ITEM OR SERVICE: HCPCS | Performed by: STUDENT IN AN ORGANIZED HEALTH CARE EDUCATION/TRAINING PROGRAM

## 2023-11-30 PROCEDURE — 94760 N-INVAS EAR/PLS OXIMETRY 1: CPT

## 2023-11-30 PROCEDURE — 97130 THER IVNTJ EA ADDL 15 MIN: CPT

## 2023-11-30 PROCEDURE — 97110 THERAPEUTIC EXERCISES: CPT | Mod: CQ

## 2023-11-30 RX ORDER — SODIUM CHLORIDE 9 MG/ML
INJECTION, SOLUTION INTRAVENOUS ONCE
Status: COMPLETED | OUTPATIENT
Start: 2023-11-30 | End: 2023-11-30

## 2023-11-30 RX ORDER — BACLOFEN 10 MG/1
5 TABLET ORAL 3 TIMES DAILY
Status: DISCONTINUED | OUTPATIENT
Start: 2023-11-30 | End: 2023-12-06

## 2023-11-30 RX ADMIN — HYDRALAZINE HYDROCHLORIDE 100 MG: 50 TABLET, FILM COATED ORAL at 16:42

## 2023-11-30 RX ADMIN — CIPROFLOXACIN 1 DROP: 3 SOLUTION OPHTHALMIC at 05:43

## 2023-11-30 RX ADMIN — OMEPRAZOLE 20 MG: 20 CAPSULE, DELAYED RELEASE ORAL at 09:00

## 2023-11-30 RX ADMIN — CIPROFLOXACIN 1 DROP: 3 SOLUTION OPHTHALMIC at 10:00

## 2023-11-30 RX ADMIN — OXYCODONE 5 MG: 5 TABLET ORAL at 02:45

## 2023-11-30 RX ADMIN — APIXABAN 10 MG: 5 TABLET, FILM COATED ORAL at 08:27

## 2023-11-30 RX ADMIN — MENTHOL 2 G: 10 GEL TOPICAL at 13:00

## 2023-11-30 RX ADMIN — ATORVASTATIN CALCIUM 40 MG: 40 TABLET, FILM COATED ORAL at 20:23

## 2023-11-30 RX ADMIN — AMLODIPINE BESYLATE 10 MG: 5 TABLET ORAL at 05:42

## 2023-11-30 RX ADMIN — CIPROFLOXACIN 1 DROP: 3 SOLUTION OPHTHALMIC at 18:00

## 2023-11-30 RX ADMIN — OXYCODONE 5 MG: 5 TABLET ORAL at 12:51

## 2023-11-30 RX ADMIN — COLCHICINE 0.6 MG: 0.6 TABLET, FILM COATED ORAL at 08:27

## 2023-11-30 RX ADMIN — HYDRALAZINE HYDROCHLORIDE 100 MG: 50 TABLET, FILM COATED ORAL at 05:42

## 2023-11-30 RX ADMIN — MENTHOL 2 G: 10 GEL TOPICAL at 17:00

## 2023-11-30 RX ADMIN — SENNOSIDES AND DOCUSATE SODIUM 2 TABLET: 50; 8.6 TABLET ORAL at 08:27

## 2023-11-30 RX ADMIN — SODIUM CHLORIDE: 9 INJECTION, SOLUTION INTRAVENOUS at 19:24

## 2023-11-30 RX ADMIN — SENNOSIDES AND DOCUSATE SODIUM 2 TABLET: 50; 8.6 TABLET ORAL at 20:23

## 2023-11-30 RX ADMIN — HYDRALAZINE HYDROCHLORIDE 100 MG: 50 TABLET, FILM COATED ORAL at 20:30

## 2023-11-30 RX ADMIN — OXYCODONE 5 MG: 5 TABLET ORAL at 20:26

## 2023-11-30 RX ADMIN — BACLOFEN 5 MG: 10 TABLET ORAL at 20:21

## 2023-11-30 RX ADMIN — SERTRALINE 25 MG: 50 TABLET, FILM COATED ORAL at 08:27

## 2023-11-30 RX ADMIN — BACLOFEN 5 MG: 10 TABLET ORAL at 16:41

## 2023-11-30 RX ADMIN — MENTHOL 2 G: 10 GEL TOPICAL at 20:32

## 2023-11-30 RX ADMIN — TRAZODONE HYDROCHLORIDE 75 MG: 50 TABLET ORAL at 20:23

## 2023-11-30 RX ADMIN — CIPROFLOXACIN 1 DROP: 3 SOLUTION OPHTHALMIC at 20:32

## 2023-11-30 RX ADMIN — CIPROFLOXACIN 1 DROP: 3 SOLUTION OPHTHALMIC at 14:00

## 2023-11-30 RX ADMIN — APIXABAN 10 MG: 5 TABLET, FILM COATED ORAL at 20:22

## 2023-11-30 RX ADMIN — OXYCODONE 5 MG: 5 TABLET ORAL at 08:51

## 2023-11-30 RX ADMIN — COLCHICINE 0.6 MG: 0.6 TABLET, FILM COATED ORAL at 20:23

## 2023-11-30 RX ADMIN — MENTHOL 2 G: 10 GEL TOPICAL at 08:50

## 2023-11-30 ASSESSMENT — ACTIVITIES OF DAILY LIVING (ADL)
BED_CHAIR_WHEELCHAIR_TRANSFER_DESCRIPTION: SET-UP OF EQUIPMENT;VERBAL CUEING;SUPERVISION FOR SAFETY
BED_CHAIR_WHEELCHAIR_TRANSFER_DESCRIPTION: SUPERVISION FOR SAFETY;SET-UP OF EQUIPMENT;VERBAL CUEING

## 2023-11-30 ASSESSMENT — GAIT ASSESSMENTS
GAIT LEVEL OF ASSIST: MODERATE ASSIST
DISTANCE (FEET): 30
DEVIATION: STEP TO;DECREASED BASE OF SUPPORT;BRADYKINETIC;DECREASED HEEL STRIKE;DECREASED TOE OFF
ASSISTIVE DEVICE: OTHER (COMMENTS)

## 2023-11-30 ASSESSMENT — ENCOUNTER SYMPTOMS
FEVER: 0
DIZZINESS: 0
NERVOUS/ANXIOUS: 0
COUGH: 0
BLURRED VISION: 0
DIARRHEA: 0

## 2023-11-30 ASSESSMENT — PATIENT HEALTH QUESTIONNAIRE - PHQ9
1. LITTLE INTEREST OR PLEASURE IN DOING THINGS: NOT AT ALL
SUM OF ALL RESPONSES TO PHQ9 QUESTIONS 1 AND 2: 0
2. FEELING DOWN, DEPRESSED, IRRITABLE, OR HOPELESS: NOT AT ALL

## 2023-11-30 NOTE — CARE PLAN
The patient is Stable - Low risk of patient condition declining or worsening    Shift Goals  Clinical Goals: safety, pain  Patient Goals: safety, pain  Family Goals: increased pt strength, independence    Progress made toward(s) clinical / shift goals:      Problem: Diabetes Management  Goal: Patient's ability to maintain appropriate glucose levels will be maintained or improve  Outcome: Progressing     Problem: Pain - Standard  Goal: Alleviation of pain or a reduction in pain to the patient’s comfort goal  11/30/2023 1555 by Gail Lowry, R.N.  Outcome: Progressing  11/30/2023 1552 by Gail Lowry R.N.  Outcome: Progressing     Problem: Mobility  Goal: Patient's capacity to carry out activities will improve  Outcome: Progressing       Patient is not progressing towards the following goals:none

## 2023-11-30 NOTE — THERAPY
"Speech Language Pathology  Daily Treatment     Patient Name: Jason Lima  Age:  62 y.o., Sex:  male  Medical Record #: [id number]  Today's Date: 11/30/2023     Precautions  Precautions: Fall Risk  Comments: Left hemiparesis, left visual inattention    Subjective    Pt was willing to participate in this ST session.  Encouragement required for participation pt benefited from explanation of the purpose of therapy.       Objective       11/30/23 1101   Treatment Charges   SLP Cognitive Skill Development First 15 Minutes 1   SLP Cognitive Skill Development Additional 15 Minutes 1   SLP Total Time Spent   SLP Individual Total Time Spent (Mins) 30         Assessment    Pt completed an alternating attention task (Keeping in Mind) requiring him to recall 3 rules and complete multiple calculations (addition and subtraction only) with a goal of calculating the correct total in 3 columns of calculations.  Pt initially reported frustration that this task was so \"complicated\".  Educated pt on the purpose of completing challenging tasks.  Pt benefited from min cues to recall the rules (cues were required to remind pt to reference the rules list if he could not recall them) and min-mod cues to take his time and double check his word.  Several errors were made due to impaired attention (adding instead of subtracting, calculating numbers too quickly).      Strengths: Alert and oriented, Effective communication skills, Motivated for self care and independence, Pleasant and cooperative, Independent prior level of function, Making steady progress towards goals, Willingly participates in therapeutic activities  Barriers: Impaired functional cognition (depression, left neglect, difficulty self monitoring/regulating)    Plan    Continue targeting memory, alternating attention tasks       Speech Therapy Problems (Active)       Problem: Problem Solving STGs       Dates: Start:  11/22/23         Goal: STG-Within one week, patient will perform " alternating attention tasks with 85% acc with set up.       Dates: Start:  11/22/23         Goal Note filed on 11/29/23 1121 by Neva Abbott MS,CCC-SLP       MOD A, continues to require cues               Goal: STG-Within one week, patient will organization and reasoning tasks with 80% acc with min cues.       Dates: Start:  11/22/23         Goal Note filed on 11/29/23 1121 by Neva Abbott MS,CCC-SLP       Will continue to target, continues to require MOD A                 Problem: Speech/Swallowing LTGs       Dates: Start:  11/13/23         Goal: LTG-By discharge, patient will safely swallow (7)regular diet textures and (0) thin liquids with no overt s/sx of aspiration for 2/2 days.       Dates: Start:  11/13/23            Goal: LTG-By discharge, patient will solve complex problem       Dates: Start:  11/13/23       Description: For safety and self care with 80% acc mod I  for safe discharge home.         Goal: LTG-By discharge, patient will recall new training with 80% acc with min A and use of external memory aids.       Dates: Start:  11/13/23               Problem: Swallowing STGs       Dates: Start:  11/13/23

## 2023-11-30 NOTE — THERAPY
Physical Therapy   Daily Treatment     Patient Name: Jason Lima  Age:  62 y.o., Sex:  male  Medical Record #: [id number]  Today's Date: 11/30/2023     Precautions  Precautions: Fall Risk  Comments: Left hemiparesis, left visual inattention    Subjective    Pt seated in w/c upon arrival, c/o pain d/t gout but agreeable to session.     Objective       11/30/23 1031   PT Charge Group   PT Therapeutic Exercise (Units) 1   PT Neuromuscular Re-Education / Balance (Units) 1   Supervising Physical Therapist Nicki Vazquez   PT Total Time Spent   PT Individual Total Time Spent (Mins) 30   Pain 0 - 10 Group   Location Foot   Therapist Pain Assessment Post Activity Pain Same as Prior to Activity   Cognition    Level of Consciousness Alert   Wheelchair Functional Level of Assist   Wheelchair Assist Stand by Assist   Distance Wheelchair (Feet or Distance) 150   Wheelchair Description Extra time;Supervision for safety;Verbal cueing  (cues for L side awareness)   Sitting Lower Body Exercises   Sit to Stand 1 set of 10  (w/ R hallway rail, 1x5 in // bars)   Bed Mobility    Sit to Stand Moderate Assist  (to [facility]ara and set up)   Neuro-Muscular Treatments   Neuro-Muscular Treatments Postural Changes;Postural Facilitation;Weight Shift Right;Weight Shift Left   Comments 90 degrees pivoting in // bars, reuqired 2nd person to assist with LUE  and transitioning to other bar; maxA w/o AFO and modA w/ AFO for LLE pivoting. completed turning both side x5   Interdisciplinary Plan of Care Collaboration   IDT Collaboration with  Therapy Tech;Physical Therapist;Family / Caregiver   Patient Position at End of Therapy Seated;Call Light within Reach;Tray Table within Reach;Family / Friend in Room;Phone within Reach   Collaboration Comments tech assisted, POC w/ PT, sister observed during session     Blossom murcia and jennifer DEL TORO, skin checked after removing AFO and no ABN signs.    Assessment    Pt tolerated session well while practicing pivoting  "in // bars. He had a major LOB while initial turning and require maxA to recover and that was without AFO on. Continues to need cues for motor planning and 2nd person to assist w/ LUE for safety.      Strengths: Able to follow instructions, Independent prior level of function, Motivated for self care and independence, Pleasant and cooperative, Supportive family, Willingly participates in therapeutic activities  Barriers: Decreased endurance, Hemiparesis, Difficulty following instructions, Fatigue, Hypertension, Impaired activity tolerance, Impaired balance, Impaired insight/denial of deficits, Impaired functional cognition, Impaired sleep pattern, Impulsive, Limited mobility, Visual impairment, Poor family support (lives alone)    Plan    Head righting/ midline orientation/ sitting and standing activity tolerance  Try standing frame c/ estim to L glut and quad as pregait activity  Bed mobility/ PNF rolling  Transfers, emphasis on motor planning  Seated EOB balance  Wc mob with tanvir technique and emphasis on left environmental scanning/ awareness  Initiate HEP  Forced use principles for tanvir body/ standing frame  Vector (West) pregait training w/ XL harness  NMES/ PNF left LE     DME  PT DME Recommendations  Wheelchair: 18\" Width  Cushion: Specialty (See other comments) (TBD pending ambulation progress)  Assistive Device: Tanvir Walker (TBD pending progress, currently 2 person assist for gait)  Additional Equipment: Other (Comments)     Passport items to be completed:  Get in/out of bed safely, in/out of a vehicle, safely use mobility device, walk or wheel around home/community, navigate up and down stairs, show how to get up/down from the ground, ensure home is accessible, demonstrate HEP, complete caregiver training    Physical Therapy Problems (Active)       Problem: Mobility       Dates: Start:  11/13/23         Goal: STG-Within one week, patient will ambulate distances >/= 30' c/ RHR and ModA or better.        " Dates: Start:  11/13/23         Goal Note filed on 11/22/23 1102 by Awilda Dela Cruz, MSPT       Limited to 10ft at right hallway rail mod assist                 Problem: PT-Long Term Goals       Dates: Start:  11/13/23         Goal: LTG-By discharge, patient will propel wheelchair >/= 300' at [place] or better.        Dates: Start:  11/13/23            Goal: LTG-By discharge, patient will ambulate >/= 50' c/ LRAD at [place] or better.        Dates: Start:  11/13/23            Goal: LTG-By discharge, patient will transfer one surface to another c/ Renata or better.        Dates: Start:  11/13/23            Goal: LTG-By discharge, patient will ambulate up/down 1 step c/ LRAD at [place] or better.        Dates: Start:  11/13/23            Goal: LTG-By discharge, patient will transfer in/out of a car c/ ModA or better.        Dates: Start:  11/13/23

## 2023-11-30 NOTE — CARE PLAN
The patient is Stable - Low risk of patient condition declining or worsening    Shift Goals  Clinical Goals: safety, pain  Patient Goals: safety, pain  Family Goals: increased pt strength, independence    Progress made toward(s) clinical / shift goals:      Problem: Pain - Standard  Goal: Alleviation of pain or a reduction in pain to the patient’s comfort goal  Outcome: Progressing     Problem: Mobility  Goal: Patient's capacity to carry out activities will improve  Outcome: Progressing       Patient is not progressing towards the following goals:none

## 2023-11-30 NOTE — THERAPY
Occupational Therapy  Daily Treatment     Patient Name: Jason Lima  Age:  62 y.o., Sex:  male  Medical Record #: [id number]  Today's Date: 11/30/2023     Precautions  Precautions: (P) Fall Risk  Comments: (P) Left hemiparesis, left visual inattention         Subjective    Pt encountered for OT sitting in WC with SO. Pleasant and agreeable to participate.      Objective       11/30/23 1401   OT Charge Group   Charges Yes   OT Neuromuscular Re-education / Balance (Units) 2   OT Therapy Activity (Units) 2   OT Total Time Spent   OT Individual Total Time Spent (Mins) 60   Precautions   Precautions Fall Risk   Comments Left hemiparesis, left visual inattention   Functional Level of Assist   Bed, Chair, Wheelchair Transfer Minimal Assist   Bed Chair Wheelchair Transfer Description Set-up of equipment;Verbal cueing;Supervision for safety  (STS from WC to therapy mat; x3)   Fine Motor / Dexterity    Fine Motor / Dexterity Yes   Fine Motor / Dexterity Interventions Removing pegs from a peg board using the LUE and placing pegs into a bin with Saebo donned.   Comments  Extra time and VC for sequencing slow movements to faciloitate motor planning. Pt transfered 4 pegs into a bin within 10 min.   Interdisciplinary Plan of Care Collaboration   IDT Collaboration with  Therapy Tech;Physical Therapist;Family / Caregiver   Patient Position at End of Therapy Seated;Bed Alarm On;Call Light within Reach;Family / Friend in Room  (SO present throughout session)   Collaboration Comments PT co-TX for assistance with LE tone. Therapy tech present for safety with transfers.     Functional mobility: walking along the hallway with wall/GB on the R side. ModA for neuro-ed to facilitate a normal gait pattern while maintaining midline orientation and postural control. Pt completed 2x.    RUE WB on the mat to decrease tone. Following passive wrist ext for up to 3 min on the mat, pt was able to sustain for up to 2 min prior to flexor spasticity  kicking in following neuro-re-ed to the LLE.     Reviewed and educated on sooth pursuits and saccades HEP to to improve visual motor skills. Notable deficits in R eye vergence. Exercises performed up to 5x each with each eye covered with patch. Pt reported eye strain and pain following these exercise, but is agreeable and understandable of the purpose of the exercises.       Assessment    Overall, continued improvements in midline balance and LUE/LE function during functional mobility and fine motor tasks as a result of skilled therapy, but spasticity, balance, and visual motor deficits cont to be barriers.       Strengths: Able to follow instructions, Independent prior level of function, Motivated for self care and independence, Pleasant and cooperative, Supportive family, Alert and oriented  Barriers: Decreased endurance, Fatigue, Generalized weakness, Hemiparesis, Impaired activity tolerance, Impaired balance, Limited mobility, Spasticity    Plan    Cont ADLs, functional transfers, and thera act/ex to maximize functional recovery for safe DC home.       DME  OT DME Recommendations  Bathroom Equipment: 3 in 1 Commode  Additional Equipment: Other (Comments)    Passport items to be completed:  Perform bathroom transfers, complete dressing, complete feeding, get ready for the day, prepare a simple meal, participate in household tasks, adapt home for safety needs, demonstrate home exercise program, complete caregiver training     Occupational Therapy Goals (Active)       Problem: Bathing       Dates: Start:  11/29/23         Goal: STG-Within one week, patient will bathe at [facility] using LRD       Dates: Start:  11/29/23               Problem: Dressing       Dates: Start:  11/22/23         Goal: STG-Within one week, patient will dress LB at [facility] using LRD       Dates: Start:  11/22/23            Goal: STG-Within one week, patient will dress UB at CGA       Dates: Start:  11/29/23               Problem: Functional  Transfers       Dates: Start:  11/13/23         Goal: STG-Within one week, patient will transfer to step in shower with Max A.       Dates: Start:  11/13/23            Goal: STG-Within one week, patient will transfer to toilet at modA       Dates: Start:  11/29/23               Problem: OT Long Term Goals       Dates: Start:  11/13/23         Goal: LTG-By discharge, patient will complete basic self care tasks at Mod Independent level.       Dates: Start:  11/13/23            Goal: LTG-By discharge, patient will perform bathroom transfers at Mod Independent level.       Dates: Start:  11/13/23

## 2023-11-30 NOTE — PROGRESS NOTES
Hospital Medicine Daily Progress Note      Chief Complaint:  Hypertension  Diabetes  CKD/ROMÁN    Interval History:  Discussed about his dehydration not improving and will give another IVF's.    Review of Systems  Review of Systems   Constitutional:  Negative for fever.   Eyes:  Negative for blurred vision.   Respiratory:  Negative for cough.    Cardiovascular:  Negative for chest pain.   Gastrointestinal:  Negative for diarrhea.   Musculoskeletal:  Negative for joint pain.   Neurological:  Negative for dizziness.   Psychiatric/Behavioral:  The patient is not nervous/anxious.         Physical Exam  Temp:  [36.6 °C (97.8 °F)-36.9 °C (98.4 °F)] 36.6 °C (97.8 °F)  Pulse:  [70-98] 85  Resp:  [16-18] 16  BP: (130-148)/(79-89) 130/84  SpO2:  [91 %-96 %] 91 %    Physical Exam  Vitals and nursing note reviewed.   Constitutional:       Appearance: He is not diaphoretic.   HENT:      Mouth/Throat:      Pharynx: No oropharyngeal exudate or posterior oropharyngeal erythema.   Eyes:      Extraocular Movements: Extraocular movements intact.   Neck:      Vascular: No carotid bruit.   Cardiovascular:      Rate and Rhythm: Normal rate and regular rhythm.   Pulmonary:      Effort: Pulmonary effort is normal.      Breath sounds: Normal breath sounds.   Abdominal:      General: There is no distension.      Palpations: Abdomen is soft.      Tenderness: There is no abdominal tenderness.   Musculoskeletal:      Cervical back: Normal range of motion.      Right lower leg: No edema.      Left lower leg: Edema present.      Comments: Has LUE swelling   Skin:     General: Skin is warm and dry.   Neurological:      Mental Status: He is alert and oriented to person, place, and time.   Psychiatric:         Mood and Affect: Mood normal.         Behavior: Behavior normal.         Fluids    Intake/Output Summary (Last 24 hours) at 11/30/2023 0975  Last data filed at 11/30/2023 0900  Gross per 24 hour   Intake 1700 ml   Output 1575 ml   Net 125 ml          Laboratory  Recent Labs     11/28/23  0525 11/30/23  0518   WBC 5.9 4.9   RBC 3.82* 3.74*   HEMOGLOBIN 12.0* 11.6*   HEMATOCRIT 34.0* 33.4*   MCV 89.0 89.3   MCH 31.4 31.0   MCHC 35.3 34.7   RDW 39.6 39.9   PLATELETCT 177 193   MPV 10.3 10.1       Recent Labs     11/28/23  0525 11/30/23  0518   SODIUM 140 140   POTASSIUM 3.8 3.9   CHLORIDE 103 104   CO2 25 27   GLUCOSE 118* 140*   BUN 39* 37*   CREATININE 3.00* 3.06*   CALCIUM 8.9 8.7                   Assessment/Plan  DVT (deep venous thrombosis) ([facility])  Assessment & Plan  US LUE: acute thrombus in paired brachial veins  US LLE: acute thrombus in paired peroneal veins  Cont Eliquis    Hemorrhagic stroke ([facility])- (present on admission)  Assessment & Plan  Had right thalamic hemorrhage on 10/23/23 while visiting [facility]    CKD (chronic kidney disease)- (present on admission)  Assessment & Plan  Appears to have CKD (bun/cr elevated in 2017)  Bun/Cr a little more elevated than baseline  US: medical renal disease, no hydronephrosis  Encouraging fluid intake  Will give IVF's NS x 1 liter (11/30)  Note: had prior IVF's x 3 runs which has helped  Needs outpatient follow up with Nephro  Cont to monitor    DM (diabetes mellitus) ([facility])- (present on admission)  Assessment & Plan  Hba1c: 6.4  BS were good  Off accuchecks  Note: home meds include Metformin (but now has CKD)    Hyperlipidemia- (present on admission)  Assessment & Plan  Cont Lipitor    Hypertension- (present on admission)  Assessment & Plan  BP a little labile  Cont Norvasc  Cont Hydralazine  Note: home meds include Norvasc  Cont to monitor

## 2023-11-30 NOTE — CARE PLAN
"The patient is Watcher - Medium risk of patient condition declining or worsening    Shift Goals  Clinical Goals: Safety  Patient Goals: sleep well  Family Goals: increased pt strength, independence    Progress made toward(s) clinical / shift goals:    Problem: Skin Integrity  Goal: Skin integrity is maintained or improved  Outcome: Progressing  Note: Patient's skin remains intact and free from new or accidental injury this shift.  Will continue to monitor.     Problem: Fall Risk - Rehab  Goal: Patient will remain free from falls  Outcome: Progressing  Note: Shellie Hamilton Fall risk Assessment Score: 22    High fall risk Interventions   - Alarming seatbelt  - Bed and strip alarm   - Yellow sign by the door   - Yellow wrist band \"Fall risk\"  - Room near to the nurse station  - Do not leave patient unattended in the bathroom  - Fall risk education provided       Problem: Diabetes Management  Goal: Patient's ability to maintain appropriate glucose levels will be maintained or improve  Outcome: Progressing  Note: No S/S hypo/hyperglycemia noted. Snacks given.      Problem: Psychosocial  Goal: Patient's level of anxiety will decrease  Outcome: Progressing             "

## 2023-11-30 NOTE — THERAPY
Physical Therapy   Daily Treatment     Patient Name: Jason Lima  Age:  62 y.o., Sex:  male  Medical Record #: [id number]  Today's Date: 11/30/2023     Precautions  Precautions: Fall Risk  Comments: Left hemiparesis, left visual inattention    Subjective    Pt was seated in w/c upon arrival and agreeable to treatment.  Pt requested changing pants.      Objective       11/30/23 0901   PT Charge Group   PT Neuromuscular Re-Education / Balance (Units) 2   PT Therapeutic Activities (Units) 2   PT Total Time Spent   PT Individual Total Time Spent (Mins) 60   Precautions   Precautions Fall Risk   Gait Functional Level of Assist    Gait Level Of Assist Moderate Assist  (to min A for weight shift and LLE advancement in swing)   Assistive Device Other (Comments)  (R railing, L off the shelf AFO)   Distance (Feet) 30   # of Times Distance was Traveled 2   Deviation Step To;Decreased Base Of Support;Bradykinetic;Decreased Heel Strike;Decreased Toe Off   Transfer Functional Level of Assist   Bed, Chair, Wheelchair Transfer Minimal Assist   Bed Chair Wheelchair Transfer Description Supervision for safety;Set-up of equipment;Verbal cueing  (SQPT)   Interdisciplinary Plan of Care Collaboration   IDT Collaboration with  Physical Therapist;Occupational Therapist   Patient Position at End of Therapy Seated;Other (Comments)  (Hand off to therapy tech)   Collaboration Comments POC     Assisted pt in donning pants standing using grab bar with mod A  Assisted pt in donning B shoes, dependent  Sitting balance at BOM with focus on midline orientation  STS training with use of mirror for increased visual feedback with focus on midline orientation x 5 reps  Assisted pt in donning off the shelf AFO  Visual screening:      VOMS  Findings Notes   Gaze stability (midline) normal No nystagmus   Eccentric Gaze abnormal Torsional nystamus with upward gaze   Vergence abnormal Poor R eye convergence, no diplopia   Saccades Horizontal abnormal Poor  "return to midline, evoked nystagmus, lateral target   Saccades Vertical abnormal Poor return to midline, evoked nystagmus, lateral target   Smooth Pursuits abnormal Ratcheting, loss of pure vertical eye movement, compensation with upward and R diagonal movement   VOR not examined NT   VOR Cancel not examined NT   HIT not examined NT   VBI   not examined NT       Supplemental Vision Testing Findings Notes   Acuity NT NT   Visual Fields  normal WFL    Resting eye position/Hirschberg test abnormal R exotropia   Skew NT NT   Pupillary reflex NT  NT   Visuoperceptual neglect: normal Hand wave test         Assessment    Pt demonstrated improving midline awareness and weight shift with standing and ambulation using significant cueing and increased visual feedback.  Pt also with improvement in LLE advancement in swing with use of AFO. Pt also demonstrated poor R eye convergence and decreased upward gaze.  Pt would benefit from eye exercises to assist with eye movement and ROM.   Strengths: Able to follow instructions, Independent prior level of function, Motivated for self care and independence, Pleasant and cooperative, Supportive family, Willingly participates in therapeutic activities  Barriers: Decreased endurance, Hemiparesis, Difficulty following instructions, Fatigue, Hypertension, Impaired activity tolerance, Impaired balance, Impaired insight/denial of deficits, Impaired functional cognition, Impaired sleep pattern, Impulsive, Limited mobility, Visual impairment, Poor family support (lives alone)    Plan    Continue midline orientation in sitting and standing, SQPT vs SPT, w/c mobility with hemitechnique and environmental scanning, continue ambulation with AFO on R hallway rail progressing to AD as appropriate, bed mobility     DME  PT DME Recommendations  Wheelchair: 18\" Width  Cushion: Specialty (See other comments) (TBD pending ambulation progress)  Assistive Device: Tanvir Walker (TBD pending progress, currently " 2 person assist for gait)  Additional Equipment: Other (Comments)    Passport items to be completed:  Get in/out of bed safely, in/out of a vehicle, safely use mobility device, walk or wheel around home/community, navigate up and down stairs, show how to get up/down from the ground, ensure home is accessible, demonstrate HEP, complete caregiver training    Physical Therapy Problems (Active)       Problem: Mobility       Dates: Start:  11/13/23         Goal: STG-Within one week, patient will ambulate distances >/= 30' c/ RHR and ModA or better.        Dates: Start:  11/13/23         Goal Note filed on 11/22/23 1102 by Awilda Dela Cruz, MSPT       Limited to 10ft at right hallway rail mod assist                 Problem: PT-Long Term Goals       Dates: Start:  11/13/23         Goal: LTG-By discharge, patient will propel wheelchair >/= 300' at [place] or better.        Dates: Start:  11/13/23            Goal: LTG-By discharge, patient will ambulate >/= 50' c/ LRAD at [place] or better.        Dates: Start:  11/13/23            Goal: LTG-By discharge, patient will transfer one surface to another c/ Ren[place] or better.        Dates: Start:  11/13/23            Goal: LTG-By discharge, patient will ambulate up/down 1 step c/ LRAD at [place] or better.        Dates: Start:  11/13/23            Goal: LTG-By discharge, patient will transfer in/out of a car c/ ModA or better.        Dates: Start:  11/13/23

## 2023-11-30 NOTE — PROGRESS NOTES
"  Physical Medicine & Rehabilitation Progress Note    Encounter Date: 11/30/2023    Chief Complaint: Feels better    Interval Events (Subjective):  VSS  Voiding  BM 11/30/2023     Seen and examined in his room. Working with therapy. Therapists notice more tone on the left side. Doing well on the Zoloft. He feels well today.     ROS: 14 point ROS negative unless otherwise specified in the HPI    Objective:  VITAL SIGNS: /80   Pulse 86   Temp 36.6 °C (97.8 °F) (Oral)   Resp 18   Ht 1.727 m (5' 8\")   Wt 87 kg (191 lb 12.8 oz)   SpO2 96%   BMI 29.16 kg/m²     GEN: No apparent distress  HEENT: Head normocephalic, atraumatic.  Sclera nonicteric bilaterally, no ocular discharge appreciated bilaterally.  CV: Extremities warm and well-perfused, no peripheral edema appreciated bilaterally.  PULMONARY: Breathing nonlabored on room air, no respiratory accessory muscle use.  Not requiring supplemental oxygen.  SKIN: No appreciable skin breakdown on exposed areas of skin.  PSYCH: Normal affect  NEURO: Awake alert.  Conversational.  Logical thought content. Dysarthria. Left Hemiparesis. Tone in left wrist, fingers 2/4 and in bicep 1+/4      Laboratory Values:  Recent Results (from the past 72 hour(s))   CBC WITHOUT DIFFERENTIAL    Collection Time: 11/28/23  5:25 AM   Result Value Ref Range    WBC 5.9 4.8 - 10.8 K/uL    RBC 3.82 (L) 4.70 - 6.10 M/uL    Hemoglobin 12.0 (L) 14.0 - 18.0 g/dL    Hematocrit 34.0 (L) 42.0 - 52.0 %    MCV 89.0 81.4 - 97.8 fL    MCH 31.4 27.0 - 33.0 pg    MCHC 35.3 32.3 - 36.5 g/dL    RDW 39.6 35.9 - 50.0 fL    Platelet Count 177 164 - 446 K/uL    MPV 10.3 9.0 - 12.9 fL   Basic Metabolic Panel    Collection Time: 11/28/23  5:25 AM   Result Value Ref Range    Sodium 140 135 - 145 mmol/L    Potassium 3.8 3.6 - 5.5 mmol/L    Chloride 103 96 - 112 mmol/L    Co2 25 20 - 33 mmol/L    Glucose 118 (H) 65 - 99 mg/dL    Bun 39 (H) 8 - 22 mg/dL    Creatinine 3.00 (H) 0.50 - 1.40 mg/dL    Calcium 8.9 8.5 " - 10.5 mg/dL    Anion Gap 12.0 7.0 - 16.0   ESTIMATED GFR    Collection Time: 11/28/23  5:25 AM   Result Value Ref Range    GFR (CKD-EPI) 23 (A) >60 mL/min/1.73 m 2   URIC ACID    Collection Time: 11/28/23  5:25 AM   Result Value Ref Range    Uric Acid 9.0 (H) 2.5 - 8.3 mg/dL   POCT glucose device results    Collection Time: 11/29/23 11:44 AM   Result Value Ref Range    POC Glucose, Blood 146 (H) 65 - 99 mg/dL   CBC WITH DIFFERENTIAL    Collection Time: 11/30/23  5:18 AM   Result Value Ref Range    WBC 4.9 4.8 - 10.8 K/uL    RBC 3.74 (L) 4.70 - 6.10 M/uL    Hemoglobin 11.6 (L) 14.0 - 18.0 g/dL    Hematocrit 33.4 (L) 42.0 - 52.0 %    MCV 89.3 81.4 - 97.8 fL    MCH 31.0 27.0 - 33.0 pg    MCHC 34.7 32.3 - 36.5 g/dL    RDW 39.9 35.9 - 50.0 fL    Platelet Count 193 164 - 446 K/uL    MPV 10.1 9.0 - 12.9 fL    Neutrophils-Polys 57.60 44.00 - 72.00 %    Lymphocytes 30.70 22.00 - 41.00 %    Monocytes 8.00 0.00 - 13.40 %    Eosinophils 3.10 0.00 - 6.90 %    Basophils 0.40 0.00 - 1.80 %    Immature Granulocytes 0.20 0.00 - 0.90 %    Nucleated RBC 0.00 0.00 - 0.20 /100 WBC    Neutrophils (Absolute) 2.79 1.82 - 7.42 K/uL    Lymphs (Absolute) 1.49 1.00 - 4.80 K/uL    Monos (Absolute) 0.39 0.00 - 0.85 K/uL    Eos (Absolute) 0.15 0.00 - 0.51 K/uL    Baso (Absolute) 0.02 0.00 - 0.12 K/uL    Immature Granulocytes (abs) 0.01 0.00 - 0.11 K/uL    NRBC (Absolute) 0.00 K/uL   Comp Metabolic Panel    Collection Time: 11/30/23  5:18 AM   Result Value Ref Range    Sodium 140 135 - 145 mmol/L    Potassium 3.9 3.6 - 5.5 mmol/L    Chloride 104 96 - 112 mmol/L    Co2 27 20 - 33 mmol/L    Anion Gap 9.0 7.0 - 16.0    Glucose 140 (H) 65 - 99 mg/dL    Bun 37 (H) 8 - 22 mg/dL    Creatinine 3.06 (H) 0.50 - 1.40 mg/dL    Calcium 8.7 8.5 - 10.5 mg/dL    Correct Calcium 9.0 8.5 - 10.5 mg/dL    AST(SGOT) 15 12 - 45 U/L    ALT(SGPT) 19 2 - 50 U/L    Alkaline Phosphatase 78 30 - 99 U/L    Total Bilirubin 0.4 0.1 - 1.5 mg/dL    Albumin 3.6 3.2 - 4.9 g/dL     Total Protein 6.0 6.0 - 8.2 g/dL    Globulin 2.4 1.9 - 3.5 g/dL    A-G Ratio 1.5 g/dL   ESTIMATED GFR    Collection Time: 23  5:18 AM   Result Value Ref Range    GFR (CKD-EPI) 22 (A) >60 mL/min/1.73 m 2         Medications:  Scheduled Medications   Medication Dose Frequency    baclofen  5 mg TID    amLODIPine  10 mg DAILY    ciprofloxacin  1 Drop Q4HRS WHILE AWAKE    diclofenac sodium  2 g 4X/DAY    colchicine  0.6 mg BID    sertraline  25 mg DAILY    apixaban  10 mg BID    [START ON 2023] apixaban  5 mg BID    hydrALAZINE  100 mg Q8HRS    traZODone  75 mg QHS    senna-docusate  2 Tablet BID    omeprazole  20 mg DAILY    atorvastatin  40 mg Nightly     PRN medications: carboxymethylcellulose, melatonin, [DISCONTINUED] insulin regular **AND** [CANCELED] POC blood glucose manual result **AND** NOTIFY MD and PharmD **AND** Administer 20 grams of glucose (approximately 8 ounces of fruit juice) every 15 minutes PRN FSBG less than 70 mg/dL **AND** dextrose bolus, Respiratory Therapy Consult, senna-docusate **AND** polyethylene glycol/lytes **AND** magnesium hydroxide **AND** bisacodyl, mag hydrox-al hydrox-simeth, ondansetron **OR** ondansetron, sodium chloride, [] acetaminophen **FOLLOWED BY** acetaminophen, oxyCODONE immediate-release **OR** oxyCODONE immediate-release, hydrALAZINE    Diet:  Current Diet Order   Procedures    Diet Order Diet: Consistent CHO (Diabetic) (2 gram sodium restriction; medications whole with thin liquids); Second Modifier: (optional): 2 Gram Sodium       Medical Decision Making and Plan:  Right thalamic hemorrhagic stroke ~ last week October   Originally presented with a headache and left-sided weakness to hospital in [place]  Work-up at the outside hospital in [place] revealed a right thalamic hemorrhagic stroke  Repeat CT head done after return flight to the ,  CT head shows right thalamic hemorrhage, decrease in density since prior studies from the outside  hospital, slight asymmetric dilation of the left ventricular system including the left temporal horn with a possible component of hydrocephalus  Planning for BP less than 140, avoiding any kind of anticoagulation  Initiate comprehensive IRF level therapy with 3 days of therapy 5 days a week with services from PT/OT/SLP     Spasticity  Continue baclofen 5 mg nightly, started on 11/11 --> increase to TID 11/13/2023 --> continue 11/14/2023, stable on higher dose.   PRAFO ordered for right lower extremity to prevent further plantarflexion contracture  Spasticity controlled, continue Baclofen 11/20/2023   Consider weaning 11/21/2023   Stop 11/24/2023 - monitor for worsening spasticity  Has had resurgence of spasticity restart baclofen 5mg TID 11/30/2023     ABLA  Now on Eliquis  Recheck 11/28 - improved 11.4--> 12.0--> 11.6 11/30/2023      CKD  Has seen nephrology in past  Improving 11/13/2023   F/u OP with nephrology  S/p IVF 11/13-11/14 11/14/2023 BUN and Cr improved compared to prior  BMP 11/16 - improved - currently receiving IVF  Recheck BMP 11/21 - Slightly worsened renal function - likely baseline  BUN/Cr stable 11/24/2023   BUN/Cr stable in 30's/3's     Hypertension  Continue Amlodipine 10mg daily  Continue Hydralazine 25mg TID - increased by hospitalist 11/13/2023 from BID to TID--> increased to 50mg q8hrs 11/15  11/16 Hydralazine increased to 75mg q8hrs and 1x Hydralazine given  11/17 BP still elevated but hydralazine recently increased. Monitor  11/30/2023 VSS Continue Hydralazine 100mg q8hrs + Amlodipine 10mg daily     Prediabetes  Discontinue SSI     HLD  Continue home dose satin      Bowel  Constipation 11/29/2023, resolved 11/30/2023   Continue with scheduled Colace and senna, as needed stool softeners  Miralax x3 doses 11/29/2023 --> Constipation Resolved 11/30/2023      Insomnia  Secondary to recent jet lag, melatonin scheduled --> increase to home dose 11/13/2023 9mg  Utilize trazodone as needed  insomnia continues  Schedule Trazodone 11/15/2023   11/16/2023 continue Trazodone as is. Thinks he slept better last night  11/17/2023 Still not sleeping well. Increase to 75mg nightly.   11/24/2023 continue trazodone at current dose     Pain -as needed Tylenol and oxycodone    Post-Stroke Depression  Consulted Pyschiatry   Start Prozac 11/28/2023     Right Foot Pain  H/o Gout  Xray with swelling 1st metatarsal head   Trial Colchicine for acute gout flare 11/28/2023   Topical Voltaren gel scheduled   Improved with less swelling, erythema 11/29/2023       DVT PROPHYLAXIS: NO - Hemorrhagic stroke. US + UE and LE for brachial and peroneal DVT. 11/21/2023 start Heparin 5000 units q8hrs SQ for further prophylaxis, if tolerates will increase to full AC. Consider repeat CTH to ensure stability bleed once on full AC. 11/24/2023 Start loading dose Eliquis 10mg BID x 7 days with transition to 5mg BID. CBC showing improvement in H/H 11/28/2023 no neurologic decline.     HOSPITALIST FOLLOWING: YES - d/w hospitalist 11/29    CODE STATUS: FULL CODE    DISPO: Home alone. Vielka and patient not . D/w CM to give resources for private care giving. 11/29/2023 Patient making significant progress with therapy and would benefit from more time in acute to maximize neuro recovery. Family actively looking for Caregivers for 24/7 care. Discharge extended due to measurable progress.     MARCUS: 12/12/23    MEDS SENT TO: TBD    DISCHARGE FOLLOW UP: Neurology, Nephrology, PCP - needs referral to all.   ____________________________________    Dr. Lisa Lee DO, MS  [facility] - Physical Medicine & Rehabilitation   ____________________________________

## 2023-11-30 NOTE — FLOWSHEET NOTE
11/30/23 0722   Events/Summary/Plan   Events/Summary/Plan RA pulse ox check   Vital Signs   Pulse 85   Respiration 16   Pulse Oximetry 91 %   $ Pulse Oximetry (Spot Check) Yes   Respiratory Assessment   Level of Consciousness Alert   Chest Exam   Work Of Breathing / Effort Within Normal Limits   Oxygen   O2 Delivery Device Room air w/o2 available

## 2023-11-30 NOTE — PROGRESS NOTES
NURSING DAILY NOTE    Name: Jason Lima   Date of Admission: 11/12/2023   Admitting Diagnosis: Thalamic hemorrhage (HCC)  Attending Physician: Lisa Lee D.o.  Allergies: Penicillins    Safety  Patient Assist  max 1-2  Patient Precautions  Fall Risk  Precaution Comments  Left hemiparesis, left visual inattention  Bed Transfer Status  Moderate Assist  Toilet Transfer Status   Maximal Assist  Assistive Devices  Gait Belt, Wheelchair  Oxygen  CPAP  Diet/Therapeutic Dining  Current Diet Order   Procedures    Diet Order Diet: Consistent CHO (Diabetic) (2 gram sodium restriction; medications whole with thin liquids); Second Modifier: (optional): 2 Gram Sodium     Pill Administration  whole and one at a time   Agitated Behavioral Scale     ABS Level of Severity       Fall Risk  Has the patient had a fall this admission?   Yes  Shellie Hamilton Fall Risk Scoring  22, HIGH RISK  Fall Risk Safety Measures  bed alarm, chair alarm, fall during this hospitalization, and poor balance    Vitals  Temperature: 36.9 °C (98.4 °F)  Temp src: Oral  Pulse: 95  Respiration: 18  Blood Pressure: 131/79  Blood Pressure MAP (Calculated): 96 MM HG  BP Location: Right, Upper Arm  Patient BP Position: Sitting     Oxygen  Pulse Oximetry: 96 %  O2 (LPM): 0  FiO2%: 21 %  O2 Delivery Device: CPAP    Bowel and Bladder  Last Bowel Movement  11/29/23  Stool Type  Not observed  Bowel Device  Bathroom  Continent  Bladder: Continent void   Bowel: Continent movement  Bladder Function  Urine Void (mL): 250 ml  Number of Times Voided: 1  Urinary Options: Yes  Urine Color: Yellow  Number of Times Incontinent of Urine: 0  Genitourinary Assessment   Bladder Assessment (WDL):  WDL Except  Echols Catheter: Not Applicable  Urine Color: Yellow  Number of Bladder Accidents: 0  Total Number of Bladder of Accidents in Last 7 Days: 0  Number of Times Incontinent of Urine: 0  Bladder Device: Urinal  Time  Void: No  Bladder Scan: Post Void  $ Bladder Scan Results (mL): 26    Skin  Polo Score   17  Sensory Interventions   Bed Types: Standard/Trauma Mattress  Skin Preventative Measures: Pillows in Use for Support / Positioning  Moisture Interventions  Moisturizers/Barriers: Barrier Wipes      Pain  Pain Rating Scale  7 - Focus of attention, prevents doing daily activities  Pain Location  Foot  Pain Location Orientation  Right  Pain Interventions   Medication (see MAR)    ADLs    Bathing   Patient Refused Bathing (Patient said took shower today, notified RN Clarissa.)  Linen Change   Partial  Personal Hygiene  Change Deja Pads, Moist Deja Wipes, Perineal Care  Chlorhexidine Bath      Oral Care  Brushed Teeth  Teeth/Dentures     Shave  Self  Nutrition Percentage Eaten  Snack  Environmental Precautions  Treaded Slipper Socks on Patient, Personal Belongings, Wastebasket, Call Bell etc. in Easy Reach, Transferred to Stronger Side, Report Given to Other Health Care Providers Regarding Fall Risk, Bed in Low Position  Patient Turns/Positioning  Patient Turns Self from Side to Side  Patient Turns Assistance/Tolerance  Assistance of One  Bed Positions  Bed Controls On, Bed Locked  Head of Bed Elevated  Self regulated      Psychosocial/Neurologic Assessment  Psychosocial Assessment  Psychosocial (WDL):  Within Defined Limits  Patient Behaviors: Flat Affect  Neurologic Assessment  Neuro (WDL): Exceptions to WDL  Level of Consciousness: Alert  Orientation Level: Oriented X4  Cognition: Follows commands, Appropriate attention/concentration  Speech: Clear, Slurred  Facial Symmetry: Left facial drooping  Pupil Assesment: No  R Pupil Size (mm): 3  R Pupil Shape / Description: Round  R Pupil Reaction: Brisk  L Pupil Size (mm): 3  L Pupil Shape / Description: Round  L Pupil Reaction: Brisk  Reflexes: Cough  Cough Reflex: Present  Motor Function/Sensation Assessment: Dorsiflexion, Motor response  R Foot Dorsiflexion: Strong  L Foot  Dorsiflexion: Absent  RUE Motor Response: Responds to commands  RUE Sensation: Full sensation  Muscle Strength Right Arm: Normal Strength Against Gravity and Full Resistance  LUE Motor Response: Flaccid  LUE Sensation: Tingling, Numbness  Muscle Strength Left Arm: Weak Movement but Not Against Gravity or Resistance  RLE Motor Response: Responds to commands  RLE Sensation: Full sensation  Muscle Strength Right Leg: Good Strength Against Gravity and Moderate Resistance  LLE Motor Response: Flaccid  LLE Sensation: Tingling, Numbness  Muscle Strength Left Leg: Weak Movement but Not Against Gravity or Resistance  EENT (WDL):  WDL Except    Cardio/Pulmonary Assessment  Edema   RLE Edema: Trace  LLE Edema: Trace  Respiratory Breath Sounds  RUL Breath Sounds: Clear  RML Breath Sounds: Clear  RLL Breath Sounds: Diminished  MOHAMUD Breath Sounds: Clear  LLL Breath Sounds: Diminished  Cardiac Assessment   Cardiac (WDL):  WDL Except (Hx Htn, Hld)

## 2023-11-30 NOTE — THERAPY
"Physical Therapy   Daily Treatment     Patient Name: Jason Albrightoa  Age:  62 y.o., Sex:  male  Medical Record #: [id number]  Today's Date: 11/29/2023     Precautions  Precautions: Fall Risk  Comments: Left hemiparesis, left visual inattention    Subjective    \"I hope I don't drown.\" Jason in w/c at arrival, agreeable aquatic therapy session.      Objective       11/29/23 1501   PT Charge Group   PT Aquatic Therapy  4   PT Total Time Spent   PT Individual Total Time Spent (Mins) 60   Neuro-Muscular Treatments   Neuro-Muscular Treatments Anterior weight shift;Weight Shift Right;Weight Shift Left;Co-Contraction;Postural Changes;Postural Facilitation;Sequencing;Tactile Cuing;Verbal Cuing;Facilitation;Compensatory Strategies   Comments midline postural facilitation, WS, and gait training in pool, see note   Interdisciplinary Plan of Care Collaboration   IDT Collaboration with  Therapy Tech;Recreation Therapist;Family / Caregiver   Patient Position at End of Therapy Seated;Other (Comments)  (c/ therapy tech propelled back to room)   Collaboration Comments cotx c/ RecT; therapy tech for transport, pt's family member present for session, observing   Physical Therapist Assigned   Assigned PT / Treatment Time / Comments Vivian/Nicki. (Isidra if primary PT unavailable). Therapy tech for all sessions.     75% WB support for majority of session.     NMRE/Gait: 5# cuff weight added to LLE to decrease foot inv, improve positive contact c/ floor   Midline control:  Facilitated L<>R WS c/ tactile/verbal/visual cues to increase amplitude of WS, progressed to  BUE shoulder depressions c/ dumbell 1 x 12  BUE rows c/ dumbell x 15  LUE shoulder flexion 1 x 5  AAROM/float assist reaching to targets 1 x 12 LUE  Float assist boxing drills, LUE progressed to alt UE punches x 30  Static holds without UE support 5 x 3-5\" holds   LE WB, pregait c/ facilitation at L knee for extension control, glut activation, trunk extension  Forward/backward step " "c/ RLE, LLE fixed   Unilateral kicks c/ RLE 1 x 20  Jumps 1 x 10  STS 1 x 8  Facilitated gait c/ float walker, assist c/ LLE for swing, hip abd to prevent scissoring, and WS in stance 6 x 25' pool lengths  Brannon/pool sling lift used for entry exit; ModA for mini STS for donning sling.     Assessment    Jason c/ gains in midline control, step amplitude/reciprocal pattern, and functional WS during pool session. Positive affect and response to success with functional activities in pool, will benefit from continued aquatic therapy.     Strengths: Able to follow instructions, Independent prior level of function, Motivated for self care and independence, Pleasant and cooperative, Supportive family, Willingly participates in therapeutic activities  Barriers: Decreased endurance, Hemiparesis, Difficulty following instructions, Fatigue, Hypertension, Impaired activity tolerance, Impaired balance, Impaired insight/denial of deficits, Impaired functional cognition, Impaired sleep pattern, Impulsive, Limited mobility, Visual impairment, Poor family support (lives alone)    Plan    Head righting/ midline orientation/ sitting and standing activity tolerance  Try standing frame c/ estim to L glut and quad as pregait activity  Bed mobility/ PNF rolling  Transfers with SB, emphasis on motor planning  Seated EOB balance  Wc mob with tanvir technique and emphasis on left environmental scanning/ awareness  Initiate HEP  Forced use principles for tanvir body/ standing frame  Vector (West) pregait training w/ XL harness  NMES/ PNF left LE    DME  PT DME Recommendations  Wheelchair: 18\" Width  Cushion: Specialty (See other comments) (TBD pending ambulation progress)  Assistive Device: Tanvir Walker (TBD pending progress, currently 2 person assist for gait)  Additional Equipment: Other (Comments)    Passport items to be completed:  Get in/out of bed safely, in/out of a vehicle, safely use mobility device, walk or wheel around home/community, " navigate up and down stairs, show how to get up/down from the ground, ensure home is accessible, demonstrate HEP, complete caregiver training    Physical Therapy Problems (Active)       Problem: Mobility       Dates: Start:  11/13/23         Goal: STG-Within one week, patient will ambulate distances >/= 30' c/ RHR and ModA or better.        Dates: Start:  11/13/23         Goal Note filed on 11/22/23 1102 by Awilda Dela Cruz, MSPT       Limited to 10ft at right hallway rail mod assist                 Problem: PT-Long Term Goals       Dates: Start:  11/13/23         Goal: LTG-By discharge, patient will propel wheelchair >/= 300' at [place] or better.        Dates: Start:  11/13/23            Goal: LTG-By discharge, patient will ambulate >/= 50' c/ LRAD at [place] or better.        Dates: Start:  11/13/23            Goal: LTG-By discharge, patient will transfer one surface to another c/ Renata or better.        Dates: Start:  11/13/23            Goal: LTG-By discharge, patient will ambulate up/down 1 step c/ LRAD at [place] or better.        Dates: Start:  11/13/23            Goal: LTG-By discharge, patient will transfer in/out of a car c/ ModA or better.        Dates: Start:  11/13/23

## 2023-12-01 ENCOUNTER — APPOINTMENT (OUTPATIENT)
Dept: PHYSICAL THERAPY | Facility: REHABILITATION | Age: 63
DRG: 057 | End: 2023-12-01
Attending: PHYSICAL MEDICINE & REHABILITATION
Payer: COMMERCIAL

## 2023-12-01 ENCOUNTER — APPOINTMENT (OUTPATIENT)
Dept: OCCUPATIONAL THERAPY | Facility: REHABILITATION | Age: 63
DRG: 057 | End: 2023-12-01
Attending: HOSPITALIST
Payer: COMMERCIAL

## 2023-12-01 ENCOUNTER — APPOINTMENT (OUTPATIENT)
Dept: SPEECH THERAPY | Facility: REHABILITATION | Age: 63
DRG: 057 | End: 2023-12-01
Attending: PHYSICAL MEDICINE & REHABILITATION
Payer: COMMERCIAL

## 2023-12-01 ENCOUNTER — APPOINTMENT (OUTPATIENT)
Dept: OCCUPATIONAL THERAPY | Facility: REHABILITATION | Age: 63
DRG: 057 | End: 2023-12-01
Attending: PHYSICAL MEDICINE & REHABILITATION
Payer: COMMERCIAL

## 2023-12-01 PROCEDURE — 97112 NEUROMUSCULAR REEDUCATION: CPT

## 2023-12-01 PROCEDURE — 99232 SBSQ HOSP IP/OBS MODERATE 35: CPT | Performed by: HOSPITALIST

## 2023-12-01 PROCEDURE — A9270 NON-COVERED ITEM OR SERVICE: HCPCS | Performed by: PHYSICAL MEDICINE & REHABILITATION

## 2023-12-01 PROCEDURE — 97530 THERAPEUTIC ACTIVITIES: CPT

## 2023-12-01 PROCEDURE — 97129 THER IVNTJ 1ST 15 MIN: CPT

## 2023-12-01 PROCEDURE — 97535 SELF CARE MNGMENT TRAINING: CPT

## 2023-12-01 PROCEDURE — 700102 HCHG RX REV CODE 250 W/ 637 OVERRIDE(OP): Performed by: HOSPITALIST

## 2023-12-01 PROCEDURE — 99232 SBSQ HOSP IP/OBS MODERATE 35: CPT | Performed by: PHYSICAL MEDICINE & REHABILITATION

## 2023-12-01 PROCEDURE — 700102 HCHG RX REV CODE 250 W/ 637 OVERRIDE(OP): Performed by: PHYSICAL MEDICINE & REHABILITATION

## 2023-12-01 PROCEDURE — 770010 HCHG ROOM/CARE - REHAB SEMI PRIVAT*

## 2023-12-01 PROCEDURE — 700102 HCHG RX REV CODE 250 W/ 637 OVERRIDE(OP): Performed by: STUDENT IN AN ORGANIZED HEALTH CARE EDUCATION/TRAINING PROGRAM

## 2023-12-01 PROCEDURE — A9270 NON-COVERED ITEM OR SERVICE: HCPCS | Performed by: STUDENT IN AN ORGANIZED HEALTH CARE EDUCATION/TRAINING PROGRAM

## 2023-12-01 PROCEDURE — A9270 NON-COVERED ITEM OR SERVICE: HCPCS | Performed by: HOSPITALIST

## 2023-12-01 PROCEDURE — 97116 GAIT TRAINING THERAPY: CPT

## 2023-12-01 PROCEDURE — 97130 THER IVNTJ EA ADDL 15 MIN: CPT

## 2023-12-01 RX ADMIN — CIPROFLOXACIN 1 DROP: 3 SOLUTION OPHTHALMIC at 17:33

## 2023-12-01 RX ADMIN — BACLOFEN 5 MG: 10 TABLET ORAL at 09:00

## 2023-12-01 RX ADMIN — CIPROFLOXACIN 1 DROP: 3 SOLUTION OPHTHALMIC at 14:16

## 2023-12-01 RX ADMIN — APIXABAN 10 MG: 5 TABLET, FILM COATED ORAL at 08:26

## 2023-12-01 RX ADMIN — ATORVASTATIN CALCIUM 40 MG: 40 TABLET, FILM COATED ORAL at 20:05

## 2023-12-01 RX ADMIN — MENTHOL 2 G: 10 GEL TOPICAL at 08:31

## 2023-12-01 RX ADMIN — SERTRALINE 25 MG: 50 TABLET, FILM COATED ORAL at 08:27

## 2023-12-01 RX ADMIN — HYDRALAZINE HYDROCHLORIDE 100 MG: 50 TABLET, FILM COATED ORAL at 20:11

## 2023-12-01 RX ADMIN — CIPROFLOXACIN 1 DROP: 3 SOLUTION OPHTHALMIC at 20:14

## 2023-12-01 RX ADMIN — BACLOFEN 5 MG: 10 TABLET ORAL at 20:04

## 2023-12-01 RX ADMIN — COLCHICINE 0.6 MG: 0.6 TABLET, FILM COATED ORAL at 20:04

## 2023-12-01 RX ADMIN — CIPROFLOXACIN 1 DROP: 3 SOLUTION OPHTHALMIC at 10:00

## 2023-12-01 RX ADMIN — MENTHOL 2 G: 10 GEL TOPICAL at 14:10

## 2023-12-01 RX ADMIN — SENNOSIDES AND DOCUSATE SODIUM 2 TABLET: 50; 8.6 TABLET ORAL at 20:03

## 2023-12-01 RX ADMIN — HYDRALAZINE HYDROCHLORIDE 100 MG: 50 TABLET, FILM COATED ORAL at 14:09

## 2023-12-01 RX ADMIN — HYDRALAZINE HYDROCHLORIDE 100 MG: 50 TABLET, FILM COATED ORAL at 05:37

## 2023-12-01 RX ADMIN — BACLOFEN 5 MG: 10 TABLET ORAL at 14:13

## 2023-12-01 RX ADMIN — TRAZODONE HYDROCHLORIDE 75 MG: 50 TABLET ORAL at 20:04

## 2023-12-01 RX ADMIN — AMLODIPINE BESYLATE 10 MG: 5 TABLET ORAL at 05:37

## 2023-12-01 RX ADMIN — SENNOSIDES AND DOCUSATE SODIUM 2 TABLET: 50; 8.6 TABLET ORAL at 08:26

## 2023-12-01 RX ADMIN — MENTHOL 2 G: 10 GEL TOPICAL at 17:33

## 2023-12-01 RX ADMIN — COLCHICINE 0.6 MG: 0.6 TABLET, FILM COATED ORAL at 08:28

## 2023-12-01 RX ADMIN — MENTHOL 2 G: 10 GEL TOPICAL at 20:14

## 2023-12-01 RX ADMIN — APIXABAN 5 MG: 5 TABLET, FILM COATED ORAL at 20:05

## 2023-12-01 RX ADMIN — OXYCODONE 5 MG: 5 TABLET ORAL at 20:08

## 2023-12-01 RX ADMIN — OMEPRAZOLE 20 MG: 20 CAPSULE, DELAYED RELEASE ORAL at 08:26

## 2023-12-01 ASSESSMENT — ENCOUNTER SYMPTOMS
FEVER: 0
NAUSEA: 0
VOMITING: 0
ABDOMINAL PAIN: 0
CHILLS: 0
DIARRHEA: 0
NERVOUS/ANXIOUS: 0
SHORTNESS OF BREATH: 0

## 2023-12-01 ASSESSMENT — PATIENT HEALTH QUESTIONNAIRE - PHQ9
4. FEELING TIRED OR HAVING LITTLE ENERGY: NOT AT ALL
9. THOUGHTS THAT YOU WOULD BE BETTER OFF DEAD, OR OF HURTING YOURSELF: NOT AT ALL
2. FEELING DOWN, DEPRESSED, IRRITABLE, OR HOPELESS: NOT AT ALL
3. TROUBLE FALLING OR STAYING ASLEEP OR SLEEPING TOO MUCH: NOT AT ALL
SUM OF ALL RESPONSES TO PHQ QUESTIONS 1-9: 0
1. LITTLE INTEREST OR PLEASURE IN DOING THINGS: NOT AT ALL
SUM OF ALL RESPONSES TO PHQ9 QUESTIONS 1 AND 2: 0
8. MOVING OR SPEAKING SO SLOWLY THAT OTHER PEOPLE COULD HAVE NOTICED. OR THE OPPOSITE, BEING SO FIGETY OR RESTLESS THAT YOU HAVE BEEN MOVING AROUND A LOT MORE THAN USUAL: NOT AT ALL
5. POOR APPETITE OR OVEREATING: NOT AT ALL
7. TROUBLE CONCENTRATING ON THINGS, SUCH AS READING THE NEWSPAPER OR WATCHING TELEVISION: NOT AT ALL
6. FEELING BAD ABOUT YOURSELF - OR THAT YOU ARE A FAILURE OR HAVE LET YOURSELF OR YOUR FAMILY DOWN: NOT AL ALL

## 2023-12-01 ASSESSMENT — GAIT ASSESSMENTS
GAIT LEVEL OF ASSIST: MODERATE ASSIST
DISTANCE (FEET): 30
DEVIATION: STEP TO;DECREASED BASE OF SUPPORT;DECREASED HEEL STRIKE;DECREASED TOE OFF
ASSISTIVE DEVICE: OTHER (COMMENTS)

## 2023-12-01 ASSESSMENT — ACTIVITIES OF DAILY LIVING (ADL)
BED_CHAIR_WHEELCHAIR_TRANSFER_DESCRIPTION: ADAPTIVE EQUIPMENT;SET-UP OF EQUIPMENT;SUPERVISION FOR SAFETY;VERBAL CUEING
BED_CHAIR_WHEELCHAIR_TRANSFER_DESCRIPTION: ADAPTIVE EQUIPMENT
TOILETING_LEVEL_OF_ASSIST_DESCRIPTION: ADAPTIVE EQUIPMENT

## 2023-12-01 NOTE — THERAPY
"Occupational Therapy  Daily Treatment     Patient Name: Jason Lima  Age:  62 y.o., Sex:  male  Medical Record #: [id number]  Today's Date: 12/1/2023     Precautions  Precautions: (P) Fall Risk  Comments: (P) Left hemiparesis, left visual inattention         Subjective    Pt encountered 0701 and 1431 for tx. Pt pleasant and agreeable to participate. Pt c/o \"blurry eyes.\"     Objective       12/01/23 0701 12/01/23 1431   OT Charge Group   OT Self Care / ADL (Units) 2  --    OT Neuromuscular Re-education / Balance (Units)  --  2   OT Therapy Activity (Units) 2  --    OT Total Time Spent   OT Individual Total Time Spent (Mins) 60 30   Precautions   Precautions Fall Risk Fall Risk   Comments Left hemiparesis, left visual inattention Left hemiparesis, left visual inattention   Functional Level of Assist   Lower Body Dressing Minimal Assist  --    Lower Body Dressing Description Shoe horn;Other (comment)  (Assistance with donning L shoe using shoe horn (foot swelling may have impacted progress); SBA for donning and doffing socks using one-handed tech. Pt able to use and maintain a L hip hike during LBD.)  --    Toileting Moderate Assist  --    Toileting Description Adaptive equipment  (assistance needed to keep urinal down during toileting supine in bed. Pt able to manage his pants up/down.)  --    Bed, Chair, Wheelchair Transfer Total Assist X 2  --    Bed Chair Wheelchair Transfer Description Increased time;Assist with one limb;Adaptive equipment;Set-up of equipment;Supervision for safety  (TAx2 needed today during stand step transfer from bed to  d/t L lateral lean and LE weakness this day.)  --    Sitting Upper Body Exercises   Sitting Upper Body Exercises Yes  --    Other Exercise Saebo arm trough; LUE while participating in BBT activity.  --    Comments Saebo at lvl 7 tension; BBT 4 blocks in 1 min; total of 20 blocks in 20 min. L shoulder pain reported following tx.  --    Fine Motor / Dexterity    Fine Motor / " Dexterity Yes  --    Fine Motor / Dexterity Interventions BBT  --    Comments  BBT 4 blocks in 1 min; total of 20 blocks in 20 min. Pt utilzed a key pinch > gross grasp  --    Interdisciplinary Plan of Care Collaboration   IDT Collaboration with  Therapy Tech;Nursing Family / Caregiver;Therapy Tech   Patient Position at End of Therapy Seated;Chair Alarm On;Phone within Reach;Other (Comments)  (Hand-off to T-Dine) Seated;Chair Alarm On;Call Light within Reach;Tray Table within Reach;Phone within Reach   Collaboration Comments Removing of IV; safety with transfers Family briefly at the beginning of the session RE CLOF. Tech = safety with transfers     1431 vision neuro-muscular re-ed: Saccades (vertical/horizontal/oblique) x10 with one eye occluded. RB needed every 5 reps d/t fatigue. Completed BBT with each eye occluded (transferred up to 12 blocks (6 block/per eye) w/ L hand in Saebo) Reviewed PROM BUE exercises on the table.     Assessment    Overall, pt c/o blurry vision and strain during isolated eye exercises indicated ocular motor impairment. Good tolerance to today's tx with RB provided. Would benefit to cont. Pt able to maintain midline during seated tasks, but did at time require VC to maintain an upright posture which is an improvement from baseline.      Strengths: Able to follow instructions, Independent prior level of function, Motivated for self care and independence, Pleasant and cooperative, Supportive family, Alert and oriented  Barriers: Decreased endurance, Fatigue, Generalized weakness, Hemiparesis, Impaired activity tolerance, Impaired balance, Limited mobility, Spasticity    Plan    Cont ADLs, functional transfers, and thera act/ex to maximize functional recovery for safe DC home.       DME  OT DME Recommendations  Bathroom Equipment: 3 in 1 Commode  Additional Equipment: Other (Comments)    Passport items to be completed:  Perform bathroom transfers, complete dressing, complete feeding, get  ready for the day, prepare a simple meal, participate in household tasks, adapt home for safety needs, demonstrate home exercise program, complete caregiver training     Occupational Therapy Goals (Active)       Problem: Bathing       Dates: Start:  11/29/23         Goal: STG-Within one week, patient will bathe at [facility] using LRD       Dates: Start:  11/29/23               Problem: Dressing       Dates: Start:  11/22/23         Goal: STG-Within one week, patient will dress LB at [facility] using LRD       Dates: Start:  11/22/23            Goal: STG-Within one week, patient will dress UB at [facility]       Dates: Start:  11/29/23               Problem: Functional Transfers       Dates: Start:  11/13/23         Goal: STG-Within one week, patient will transfer to step in shower with Max A.       Dates: Start:  11/13/23            Goal: STG-Within one week, patient will transfer to toilet at modA       Dates: Start:  11/29/23               Problem: OT Long Term Goals       Dates: Start:  11/13/23         Goal: LTG-By discharge, patient will complete basic self care tasks at Mod Independent level.       Dates: Start:  11/13/23            Goal: LTG-By discharge, patient will perform bathroom transfers at Mod Independent level.       Dates: Start:  11/13/23               100

## 2023-12-01 NOTE — PROGRESS NOTES
NURSING DAILY NOTE    Name: Jason Lima   Date of Admission: 11/12/2023   Admitting Diagnosis: Thalamic hemorrhage (HCC)  Attending Physician: Lisa Lee D.o.  Allergies: Penicillins    Safety  Patient Assist  max 1-2  Patient Precautions  Fall Risk  Precaution Comments  Left hemiparesis, left visual inattention  Bed Transfer Status  Moderate Assist  Toilet Transfer Status   Maximal Assist  Assistive Devices  Rails, Wheelchair  Oxygen  CPAP  Diet/Therapeutic Dining  Current Diet Order   Procedures    Diet Order Diet: Consistent CHO (Diabetic) (2 gram sodium restriction; medications whole with thin liquids); Second Modifier: (optional): 2 Gram Sodium     Pill Administration  whole and floated  Agitated Behavioral Scale     ABS Level of Severity       Fall Risk  Has the patient had a fall this admission?   Yes  Shellie Hamilton Fall Risk Scoring  23, HIGH RISK  Fall Risk Safety Measures  bed alarm, chair alarm, and seatbelt alarm    Vitals  Temperature: 36.8 °C (98.2 °F)  Temp src: Oral  Pulse: 94  Respiration: 18  Blood Pressure: (!) 141/90  Blood Pressure MAP (Calculated): 107 MM HG  BP Location: Right, Upper Arm  Patient BP Position: Sitting     Oxygen  Pulse Oximetry: 96 %  O2 (LPM): 0  FiO2%: 21 %  O2 Delivery Device: CPAP    Bowel and Bladder  Last Bowel Movement  11/30/23 (per pt)  Stool Type  Not observed  Bowel Device  Bathroom  Continent  Bladder: Continent void   Bowel: Continent movement  Bladder Function  Urine Void (mL): 300 ml  Number of Times Voided: 1  Urinary Options: Yes  Urine Color: Justina  Number of Times Incontinent of Urine: 0  Genitourinary Assessment   Bladder Assessment (WDL):  WDL Except  Echols Catheter: Not Applicable  Urine Color: Justina  Number of Bladder Accidents: 0  Total Number of Bladder of Accidents in Last 7 Days: 0  Number of Times Incontinent of Urine: 0  Bladder Device: Urinal  Time Void: No  Bladder Scan: Post  Void  $ Bladder Scan Results (mL): 26    Skin  Polo Score   17  Sensory Interventions   Bed Types: Standard/Trauma Mattress  Skin Preventative Measures: Pillows in Use for Support / Positioning  Moisture Interventions  Moisturizers/Barriers: Barrier Wipes      Pain  Pain Rating Scale  1 - Hardly Notice Pain  Pain Location  Foot  Pain Location Orientation  Right, Left  Pain Interventions   Declines    ADLs    Bathing   Patient Refused Bathing (Patient said took shower today, notified RN Clarissa.)  Linen Change   Partial  Personal Hygiene  Change Deja Pads, Moist Deja Wipes, Perineal Care  Chlorhexidine Bath      Oral Care  Brushed Teeth  Teeth/Dentures     Shave  Self  Nutrition Percentage Eaten  Breakfast, Between % Consumed (100%)  Environmental Precautions  Treaded Slipper Socks on Patient  Patient Turns/Positioning  Patient Turns Self from Side to Side  Patient Turns Assistance/Tolerance  Assistance of One  Bed Positions  Bed Controls On, Bed Locked  Head of Bed Elevated  Self regulated      Psychosocial/Neurologic Assessment  Psychosocial Assessment  Psychosocial (WDL):  Within Defined Limits  Patient Behaviors: Fatigue  Neurologic Assessment  Neuro (WDL): Exceptions to WDL  Level of Consciousness: Alert  Orientation Level: Oriented X4  Cognition: Follows commands  Speech: Clear, Slurred  Facial Symmetry: Left facial drooping  Pupil Assesment: No  R Pupil Size (mm): 3  R Pupil Shape / Description: Round  R Pupil Reaction: Brisk  L Pupil Size (mm): 3  L Pupil Shape / Description: Round  L Pupil Reaction: Brisk  Reflexes: Cough  Cough Reflex: Present  Motor Function/Sensation Assessment: Dorsiflexion, Motor response  R Foot Dorsiflexion: Strong  L Foot Dorsiflexion: Absent  RUE Motor Response: Responds to commands  RUE Sensation: Full sensation  Muscle Strength Right Arm: Normal Strength Against Gravity and Full Resistance  LUE Motor Response: Flaccid  LUE Sensation: Tingling, Numbness  Muscle Strength Left  Arm: Weak Movement but Not Against Gravity or Resistance  RLE Motor Response: Responds to commands  RLE Sensation: Full sensation  Muscle Strength Right Leg: Good Strength Against Gravity and Moderate Resistance  LLE Motor Response: Flaccid  LLE Sensation: Tingling, Numbness  Muscle Strength Left Leg: Weak Movement but Not Against Gravity or Resistance  EENT (WDL):  WDL Except    Cardio/Pulmonary Assessment  Edema   RLE Edema: Trace  LLE Edema: Trace  Respiratory Breath Sounds  RUL Breath Sounds: Clear  RML Breath Sounds: Clear  RLL Breath Sounds: Diminished  MOHAMUD Breath Sounds: Clear  LLL Breath Sounds: Diminished  Cardiac Assessment   Cardiac (WDL):  WDL Except

## 2023-12-01 NOTE — PROGRESS NOTES
"  Physical Medicine & Rehabilitation Progress Note    Encounter Date: 12/1/2023    Chief Complaint: Doing well    Interval Events (Subjective):  VS: BP sometimes in 140's  BM 11/30  Voiding    Seen and examined in his room with Vielka. He is doing well. Foot pain continues to improve. No significant complaints. Stool is hard but not eating much.     ROS: 14 point ROS negative unless otherwise specified in the HPI    Objective:  VITAL SIGNS: BP (!) 141/90   Pulse 94   Temp 36.8 °C (98.2 °F) (Oral)   Resp 18   Ht 1.727 m (5' 8\")   Wt 87 kg (191 lb 12.8 oz)   SpO2 96%   BMI 29.16 kg/m²     GEN: No apparent distress  HEENT: Head normocephalic, atraumatic.  Sclera nonicteric bilaterally, no ocular discharge appreciated bilaterally.  CV: Extremities warm and well-perfused, no peripheral edema appreciated bilaterally.  PULMONARY: Breathing nonlabored on room air, no respiratory accessory muscle use.  Not requiring supplemental oxygen.  SKIN: No appreciable skin breakdown on exposed areas of skin.  PSYCH: Normal affect  NEURO: Awake alert.  Conversational.  Logical thought content. Dysarthria. Left Hemiparesis. Tone in left wrist, fingers 2/4 and in bicep 1+/4      Laboratory Values:  Recent Results (from the past 72 hour(s))   POCT glucose device results    Collection Time: 11/29/23 11:44 AM   Result Value Ref Range    POC Glucose, Blood 146 (H) 65 - 99 mg/dL   CBC WITH DIFFERENTIAL    Collection Time: 11/30/23  5:18 AM   Result Value Ref Range    WBC 4.9 4.8 - 10.8 K/uL    RBC 3.74 (L) 4.70 - 6.10 M/uL    Hemoglobin 11.6 (L) 14.0 - 18.0 g/dL    Hematocrit 33.4 (L) 42.0 - 52.0 %    MCV 89.3 81.4 - 97.8 fL    MCH 31.0 27.0 - 33.0 pg    MCHC 34.7 32.3 - 36.5 g/dL    RDW 39.9 35.9 - 50.0 fL    Platelet Count 193 164 - 446 K/uL    MPV 10.1 9.0 - 12.9 fL    Neutrophils-Polys 57.60 44.00 - 72.00 %    Lymphocytes 30.70 22.00 - 41.00 %    Monocytes 8.00 0.00 - 13.40 %    Eosinophils 3.10 0.00 - 6.90 %    Basophils 0.40 0.00 " - 1.80 %    Immature Granulocytes 0.20 0.00 - 0.90 %    Nucleated RBC 0.00 0.00 - 0.20 /100 WBC    Neutrophils (Absolute) 2.79 1.82 - 7.42 K/uL    Lymphs (Absolute) 1.49 1.00 - 4.80 K/uL    Monos (Absolute) 0.39 0.00 - 0.85 K/uL    Eos (Absolute) 0.15 0.00 - 0.51 K/uL    Baso (Absolute) 0.02 0.00 - 0.12 K/uL    Immature Granulocytes (abs) 0.01 0.00 - 0.11 K/uL    NRBC (Absolute) 0.00 K/uL   Comp Metabolic Panel    Collection Time: 11/30/23  5:18 AM   Result Value Ref Range    Sodium 140 135 - 145 mmol/L    Potassium 3.9 3.6 - 5.5 mmol/L    Chloride 104 96 - 112 mmol/L    Co2 27 20 - 33 mmol/L    Anion Gap 9.0 7.0 - 16.0    Glucose 140 (H) 65 - 99 mg/dL    Bun 37 (H) 8 - 22 mg/dL    Creatinine 3.06 (H) 0.50 - 1.40 mg/dL    Calcium 8.7 8.5 - 10.5 mg/dL    Correct Calcium 9.0 8.5 - 10.5 mg/dL    AST(SGOT) 15 12 - 45 U/L    ALT(SGPT) 19 2 - 50 U/L    Alkaline Phosphatase 78 30 - 99 U/L    Total Bilirubin 0.4 0.1 - 1.5 mg/dL    Albumin 3.6 3.2 - 4.9 g/dL    Total Protein 6.0 6.0 - 8.2 g/dL    Globulin 2.4 1.9 - 3.5 g/dL    A-G Ratio 1.5 g/dL   ESTIMATED GFR    Collection Time: 11/30/23  5:18 AM   Result Value Ref Range    GFR (CKD-EPI) 22 (A) >60 mL/min/1.73 m 2         Medications:  Scheduled Medications   Medication Dose Frequency    baclofen  5 mg TID    amLODIPine  10 mg DAILY    ciprofloxacin  1 Drop Q4HRS WHILE AWAKE    diclofenac sodium  2 g 4X/DAY    colchicine  0.6 mg BID    sertraline  25 mg DAILY    apixaban  5 mg BID    hydrALAZINE  100 mg Q8HRS    traZODone  75 mg QHS    senna-docusate  2 Tablet BID    omeprazole  20 mg DAILY    atorvastatin  40 mg Nightly     PRN medications: carboxymethylcellulose, melatonin, [DISCONTINUED] insulin regular **AND** [CANCELED] POC blood glucose manual result **AND** NOTIFY MD and PharmD **AND** Administer 20 grams of glucose (approximately 8 ounces of fruit juice) every 15 minutes PRN FSBG less than 70 mg/dL **AND** dextrose bolus, Respiratory Therapy Consult, senna-docusate  **AND** polyethylene glycol/lytes **AND** magnesium hydroxide **AND** bisacodyl, mag hydrox-al hydrox-simeth, ondansetron **OR** ondansetron, sodium chloride, [] acetaminophen **FOLLOWED BY** acetaminophen, oxyCODONE immediate-release **OR** oxyCODONE immediate-release, hydrALAZINE    Diet:  Current Diet Order   Procedures    Diet Order Diet: Consistent CHO (Diabetic) (2 gram sodium restriction; medications whole with thin liquids); Second Modifier: (optional): 2 Gram Sodium       Medical Decision Making and Plan:  Right thalamic hemorrhagic stroke ~ last week October   Originally presented with a headache and left-sided weakness to hospital in [facility]  Work-up at the outside hospital in [facility] revealed a right thalamic hemorrhagic stroke  Repeat CT head done after return flight to the ,  CT head shows right thalamic hemorrhage, decrease in density since prior studies from the outside hospital, slight asymmetric dilation of the left ventricular system including the left temporal horn with a possible component of hydrocephalus  Planning for BP less than 140, avoiding any kind of anticoagulation  Initiate comprehensive IRF level therapy with 3 days of therapy 5 days a week with services from PT/OT/SLP     Spasticity  Continue baclofen 5 mg nightly, started on  --> increase to TID 2023 --> continue 2023, stable on higher dose.   PRAFO ordered for right lower extremity to prevent further plantarflexion contracture  Spasticity controlled, continue Baclofen 2023   Consider weaning 2023   Stop 2023 - monitor for worsening spasticity  Has had resurgence of spasticity restart baclofen 5mg TID 2023     ABLA  Now on Eliquis  Recheck  - improved 11.4--> 12.0--> 11.6 2023      CKD  Has seen nephrology in past  Improving 2023   F/u OP with nephrology  S/p IVF -2023 BUN and Cr improved compared to prior  BMP  - improved - currently  receiving IVF  Recheck BMP 11/21 - Slightly worsened renal function - likely baseline  BUN/Cr stable 11/24/2023   BUN/Cr stable in 30's/3's - s/p 1L IVF     Hypertension  Continue Amlodipine 10mg daily  Continue Hydralazine 25mg TID - increased by hospitalist 11/13/2023 from BID to TID--> increased to 50mg q8hrs 11/15  11/16 Hydralazine increased to 75mg q8hrs and 1x Hydralazine given  11/17 BP still elevated but hydralazine recently increased. Monitor  11/30/2023 VSS Continue Hydralazine 100mg q8hrs + Amlodipine 10mg daily     Prediabetes  Discontinue SSI     HLD  Continue home dose satin      Bowel  Constipation 11/29/2023, resolved 11/30/2023   Continue with scheduled Colace and senna, as needed stool softeners  Miralax x3 doses 11/29/2023 --> Constipation Resolved 11/30/2023      Insomnia  Secondary to recent jet lag, melatonin scheduled --> increase to home dose 11/13/2023 9mg  Utilize trazodone as needed insomnia continues  Schedule Trazodone 11/15/2023   11/16/2023 continue Trazodone as is. Thinks he slept better last night  11/17/2023 Still not sleeping well. Increase to 75mg nightly.   11/24/2023 continue trazodone at current dose     Pain -as needed Tylenol and oxycodone    Post-Stroke Depression  Consulted Pyschiatry   Start Prozac 11/28/2023 --> Switched to zoloft per psychiatry  Appreciate assistance with management    Right Foot Pain  H/o Gout  Xray with swelling 1st metatarsal head   Trial Colchicine for acute gout flare 11/28/2023   Topical Voltaren gel scheduled   Improved with less swelling, erythema 11/29/2023       LABS for 12/2 per hospitalist      DVT PROPHYLAXIS: NO - Hemorrhagic stroke. US + UE and LE for brachial and peroneal DVT. 11/21/2023 start Heparin 5000 units q8hrs SQ for further prophylaxis, if tolerates will increase to full AC. Consider repeat CTH to ensure stability bleed once on full AC. 11/24/2023 Start loading dose Eliquis 10mg BID x 7 days with transition to 5mg BID. CBC  showing improvement in H/H 11/28/2023 no neurologic decline.     HOSPITALIST FOLLOWING: YES - d/w hospitalist 12/1    CODE STATUS: FULL CODE    DISPO: Home alone. Vielka and patient not . D/w CM to give resources for private care giving. 11/29/2023 Patient making significant progress with therapy and would benefit from more time in acute to maximize neuro recovery. Family actively looking for Caregivers for 24/7 care. Discharge extended due to measurable progress.     MARCUS: 12/12/23    MEDS SENT TO: TBD    DISCHARGE FOLLOW UP: Neurology, Nephrology, PCP - needs referral to all.   ____________________________________    Dr. Lisa Lee DO, MS  [facility] - Physical Medicine & Rehabilitation   ____________________________________

## 2023-12-01 NOTE — PROGRESS NOTES
20 g saline lock placed in right hand by charge RN.  Pt refusing to have IV fluids started until in bed for the night.  Have attempted to start fluids several times.  Patient sitting up in w/c so dinner can digest and then he will lie down and fluids can be started.  Will pass on in report to oncoming nurse.  .09 NS 1 liter primed and ready to infuse.

## 2023-12-01 NOTE — CARE PLAN
"The patient is Stable - Low risk of patient condition declining or worsening    Shift Goals  Clinical Goals: safety, pain  Patient Goals: safety, pain  Family Goals: increased pt strength, independence    Progress made toward(s) clinical / shift goals:      Problem: Fall Risk - Rehab  Goal: Patient will remain free from falls  Outcome: Progressing  Note: Shellie Hamilton Fall risk Assessment Score: 23    High fall risk Interventions   - Alarming seatbelt  - Bed and strip alarm   - Yellow sign by the door   - Yellow wrist band \"Fall risk\"  - Room near to the nurse station  - Do not leave patient unattended in the bathroom  - Fall risk education provided    Pt calls appropriately when in need of assistance.     Problem: Pain - Standard  Goal: Alleviation of pain or a reduction in pain to the patient’s comfort goal  Outcome: Progressing  Note: Roxicodone 5mg given PRN per MAR for c/o bilateral foot pain/tingling with adequate relief. Pt sleeps good. Will continue to monitor.       Patient is not progressing towards the following goals:      "

## 2023-12-01 NOTE — CARE PLAN
Problem: Skin Integrity  Goal: Patient's skin integrity will be maintained or improve  Outcome: Progressing  Patient encouraged to turn to sides to prevent pressure areas.     Problem: Fall Risk - Rehab  Goal: Patient will remain free from falls  Outcome: Progressing  Patient reminded to use call light for assist with needs/transfers to prevent falls.   The patient is Stable - Low risk of patient condition declining or worsening    Shift Goals  Clinical Goals: safety, pain  Patient Goals: safety, pain  Family Goals: increased pt strength, independence

## 2023-12-01 NOTE — FLOWSHEET NOTE
12/01/23 1530   Events/Summary/Plan   Events/Summary/Plan CPAP check, Pt wanted CPAP check for proper function, checked circuit and connedtions all function well. Pt may have had interface twisted on face so machine may not have been able to function properly.  Educated Pt  and he understands how to fix problem.   Non-Invasive Ventilation [facility]   Nocturnal CPAP or BIPAP CPAP - Home Unit  (10:27 hrs cpap use last night.)

## 2023-12-01 NOTE — CONSULTS
[facility]    INPATIENT ASSESSMENT    Name: Jason Lima  MRN: [id number]  : 1960  Age: 62 y.o.  Date of assessment: 2023  PCP: Humble Garcia D.O.  Persons in attendance: Patient and Girlfriend Vielka    CHIEF COMPLAINT/PRESENTING ISSUE (as stated by Patient and Girlfriend):  Patient was seen sitting in wheel chair with girlfriend at bedside. Patient reports improved mood with a higher level of motivation. Patient's girlfriend reports her effort in keeping patient encouraged.    Psychotherapy Session Summary  Clinician challenged patient to identify goals and work towards them. Additionally, we discussed Cognitive Distortions and how negative thought patterns impair our ability to remain focused on improving outcomes. Patient reports struggling with  negative thoughts regarding his future. However, patient is willing to practice cognitive restructuring. Clinician and patient practiced this technique during the session. Patient reports he feels encouraged because his girlfriend has demonstrated a high level of commitment. Clinician validated patients appreciation for girlfriend, however clinician encouraged patient to develop reasons to work hard on his recovery for himself. Clinician challenged patient to consider developing self motivation and not depend upon external factors to motivate him. Its great to have a supportive girlfriend, but if girlfriend decides his medical condition is too much for him, what is he going to do? Encouraged patient to find within himself the strength and motivation to move forward in his life regardless of the presence of external motivators. Patient continues to struggle with a positive outlook but is putting forth effort in developing these skills.    CURRENT LIVING SITUATION/SOCIAL SUPPORT: Patient is  and resides alone. He has a girlfriend and support from extended family. 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     BEHAVIORAL HEALTH TREATMENT HISTORY  Does patient/parent report a history of prior behavioral health treatment for patient?    No     SAFETY ASSESSMENT - SELF  Does patient acknowledge current or past symptoms of dangerousness to self? no  Does parent/significant other report patient has current or past symptoms of dangerousness to self? N\A  Does presenting problem suggest symptoms of dangerousness to self? No     SAFETY ASSESSMENT - OTHERS  Does patient acknowledge current or past symptoms of aggressive behavior or risk to others? no  Does parent/significant other report patient has current or past symptoms of aggressive behavior or risk to others?  N\A  Does presenting problem suggest symptoms of dangerousness to others? No     Crisis Safety Plan completed and copy given to patient? no     ABUSE/NEGLECT SCREENING  Does patient report feeling “unsafe” in his/her home, or afraid of anyone?  no  Does patient report any history of physical, sexual, or emotional abuse?  no  Does parent or significant other report any of the above? no  Is there evidence of neglect by self?  no  Is there evidence of neglect by a caregiver? no  Does the patient/parent report any history of CPS/APS/police involvement related to suspected abuse/neglect or domestic violence? no  Based on the information provided during the current assessment, is a mandated report of suspected abuse/neglect being made?  No     SUBSTANCE USE SCREENING  No history        MENTAL STATUS              Participation: Active verbal participation  Grooming: Casual  Orientation: Alert  Behavior: Calm  Eye contact: Limited  Mood: Depressed  Affect: Flat  Thought process: Circumstantial  Thought content: Within normal limits  Speech: Latency of response  Perception: Within normal limits  Memory:  Recent:  Adequate  Insight: Adequate  Judgment:  Adequate  Other:     Collateral information:   Source:   Significant  other present in person:    Significant other by telephone   [facility]   [facility] Medical Record-reviewed   Other:       Unable to complete full assessment due to:   Acute intoxication   Patient declined to participate/engage   Patient verbally unresponsive   Significant cognitive deficits   Significant perceptual distortions or behavioral disorganization   Other:              CLINICAL IMPRESSIONS:  Primary:  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  Secondary:                                          IDENTIFIED NEEDS/PLAN:  [Trigger DISPOSITION list for any items marked]       Imminent safety risk - self   Imminent safety risk - others     Acute substance withdrawal   Psychosis/Impaired reality testing    x Mood/anxiety   Substance use/Addictive behavior     Maladaptive behavior   Parent/child conflict     Family/Couples conflict   Biomedical     Housing   Financial      Legal   Occupational/Educational     Domestic violence   Other:      Recommendations and Observation Level:  Will continue to follow        Legal Hold: N/A     Thank you,       IDENTIFIED NEEDS/PLAN:  [Trigger DISPOSITION list for any items marked]      Imminent safety risk - self  Imminent safety risk - others     Acute substance withdrawal   Psychosis/Impaired reality testing    x Mood/anxiety   Substance use/Addictive behavior     Maladaptive behavior   Parent/child conflict     Family/Couples conflict   Biomedical     Housing   Financial      Legal  Occupational/Educational     Domestic violence   Other:     Recommendations and Observation Level:  Will continue to follow    Legal Hold: N/A          Kriss Sanabria, Ph.D., [facility]  12/1/2023   Length of intervention: 60 minutes

## 2023-12-01 NOTE — PROGRESS NOTES
NURSING DAILY NOTE    Name: Jason Lima   Date of Admission: 11/12/2023   Admitting Diagnosis: Thalamic hemorrhage (HCC)  Attending Physician: Lisa Lee D.o.  Allergies: Penicillins    Safety  Patient Assist  max 1-2  Patient Precautions  Fall Risk  Precaution Comments  Left hemiparesis, left visual inattention  Bed Transfer Status  Minimal Assist  Toilet Transfer Status   Maximal Assist  Assistive Devices  Rails, Wheelchair  Oxygen  Room air w/o2 available  Diet/Therapeutic Dining  Current Diet Order   Procedures    Diet Order Diet: Consistent CHO (Diabetic) (2 gram sodium restriction; medications whole with thin liquids); Second Modifier: (optional): 2 Gram Sodium     Pill Administration  whole  Agitated Behavioral Scale     ABS Level of Severity       Fall Risk  Has the patient had a fall this admission?   Yes  Shellie Hamilton Fall Risk Scoring  23, HIGH RISK  Fall Risk Safety Measures  bed alarm, chair alarm, fall during this hospitalization, poor balance, and low vision/ hearing    Vitals  Temperature: 36.8 °C (98.2 °F)  Temp src: Oral  Pulse: 85  Respiration: 18  Blood Pressure: (Abnormal) 142/92  Blood Pressure MAP (Calculated): 109 MM HG  BP Location: Left, Upper Arm  Patient BP Position: Sitting     Oxygen  Pulse Oximetry: 92 %  O2 (LPM): 0  FiO2%: 21 %  O2 Delivery Device: Room air w/o2 available    Bowel and Bladder  Last Bowel Movement  11/30/23 (per pt)  Stool Type  Not observed  Bowel Device  Bathroom  Continent  Bladder: Continent void   Bowel: Continent movement  Bladder Function  Urine Void (mL): 200 ml  Number of Times Voided: 1  Urinary Options: Yes  Urine Color: Yellow  Number of Times Incontinent of Urine: 0  Genitourinary Assessment   Bladder Assessment (WDL):  WDL Except  Echols Catheter: Not Applicable  Urine Color: Yellow  Number of Bladder Accidents: 0  Total Number of Bladder of Accidents in Last 7 Days: 0  Number of Times  Incontinent of Urine: 0  Bladder Device: Urinal  Time Void: No  Bladder Scan: Post Void  $ Bladder Scan Results (mL): 26    Skin  Polo Score   17  Sensory Interventions   Bed Types: Standard/Trauma Mattress  Skin Preventative Measures: Pillows in Use for Support / Positioning  Moisture Interventions  Moisturizers/Barriers: Barrier Wipes      Pain  Pain Rating Scale  1 - Hardly Notice Pain  Pain Location  Foot  Pain Location Orientation  Right, Left  Pain Interventions   Rest    ADLs    Bathing   Patient Refused Bathing (Patient said took shower today, notified RN Clarissa.)  Linen Change   Partial  Personal Hygiene  Change Deja Pads, Moist Deja Wipes, Perineal Care  Chlorhexidine Bath      Oral Care  Brushed Teeth  Teeth/Dentures     Shave  Self  Nutrition Percentage Eaten  *  * Meal *  *, Dinner, Between % Consumed (75%)  Environmental Precautions  Treaded Slipper Socks on Patient, Personal Belongings, Wastebasket, Call Bell etc. in Easy Reach, Transferred to Stronger Side, Report Given to Other Health Care Providers Regarding Fall Risk, Bed in Low Position  Patient Turns/Positioning  Patient Turns Self from Side to Side  Patient Turns Assistance/Tolerance  Assistance of Two or More  Bed Positions  Bed Controls On, Bed Locked  Head of Bed Elevated  Self regulated      Psychosocial/Neurologic Assessment  Psychosocial Assessment  Psychosocial (WDL):  Within Defined Limits  Patient Behaviors: Fatigue  Neurologic Assessment  Neuro (WDL): Exceptions to WDL  Level of Consciousness: Alert  Orientation Level: Oriented X4  Cognition: Follows commands, Appropriate attention/concentration  Speech: Clear, Slurred  Facial Symmetry: Left facial drooping  Pupil Assesment: No  R Pupil Size (mm): 3  R Pupil Shape / Description: Round  R Pupil Reaction: Brisk  L Pupil Size (mm): 3  L Pupil Shape / Description: Round  L Pupil Reaction: Brisk  Reflexes: Cough  Cough Reflex: Present  Motor Function/Sensation Assessment:  Dorsiflexion, Motor response  R Foot Dorsiflexion: Strong  L Foot Dorsiflexion: Absent  RUE Motor Response: Responds to commands  RUE Sensation: Full sensation  Muscle Strength Right Arm: Normal Strength Against Gravity and Full Resistance  LUE Motor Response: Flaccid  LUE Sensation: Tingling, Numbness  Muscle Strength Left Arm: Weak Movement but Not Against Gravity or Resistance  RLE Motor Response: Responds to commands  RLE Sensation: Full sensation  Muscle Strength Right Leg: Good Strength Against Gravity and Moderate Resistance  LLE Motor Response: Flaccid  LLE Sensation: Tingling, Numbness  Muscle Strength Left Leg: Weak Movement but Not Against Gravity or Resistance  EENT (WDL):  WDL Except    Cardio/Pulmonary Assessment  Edema   RLE Edema: Trace  LLE Edema: Trace  Respiratory Breath Sounds  RUL Breath Sounds: Clear  RML Breath Sounds: Clear  RLL Breath Sounds: Diminished  MOHAMUD Breath Sounds: Clear  LLL Breath Sounds: Diminished  Cardiac Assessment   Cardiac (WDL):  WDL Except

## 2023-12-01 NOTE — PROGRESS NOTES
Hospital Medicine Daily Progress Note      Chief Complaint:  Hypertension  Diabetes  CKD/ROMNÁ    Interval History:  No complaints.  Doing ok.    Review of Systems  Review of Systems   Constitutional:  Negative for chills and fever.   Respiratory:  Negative for shortness of breath.    Cardiovascular:  Negative for chest pain.   Gastrointestinal:  Negative for abdominal pain, diarrhea, nausea and vomiting.   Psychiatric/Behavioral:  The patient is not nervous/anxious.         Physical Exam  Temp:  [36.4 °C (97.5 °F)-36.8 °C (98.2 °F)] 36.4 °C (97.5 °F)  Pulse:  [79-86] 79  Resp:  [18] 18  BP: (121-142)/(80-92) 139/87  SpO2:  [92 %-97 %] 97 %    Physical Exam  Vitals and nursing note reviewed.   Constitutional:       Appearance: Normal appearance.   HENT:      Head: Atraumatic.   Eyes:      Conjunctiva/sclera: Conjunctivae normal.      Pupils: Pupils are equal, round, and reactive to light.   Cardiovascular:      Rate and Rhythm: Normal rate and regular rhythm.   Pulmonary:      Effort: Pulmonary effort is normal.      Breath sounds: Normal breath sounds.   Abdominal:      General: Bowel sounds are normal.      Palpations: Abdomen is soft.   Musculoskeletal:      Cervical back: Normal range of motion and neck supple.      Right lower leg: No edema.      Left lower leg: Edema present.      Comments: Has LUE swelling   Skin:     General: Skin is warm and dry.   Neurological:      Mental Status: He is alert and oriented to person, place, and time.   Psychiatric:         Mood and Affect: Mood normal.         Behavior: Behavior normal.         Fluids    Intake/Output Summary (Last 24 hours) at 12/1/2023 0919  Last data filed at 12/1/2023 0838  Gross per 24 hour   Intake 1510 ml   Output 1412 ml   Net 98 ml         Laboratory  Recent Labs     11/30/23 0518   WBC 4.9   RBC 3.74*   HEMOGLOBIN 11.6*   HEMATOCRIT 33.4*   MCV 89.3   MCH 31.0   MCHC 34.7   RDW 39.9   PLATELETCT 193   MPV 10.1       Recent Labs     11/30/23 0518    SODIUM 140   POTASSIUM 3.9   CHLORIDE 104   CO2 27   GLUCOSE 140*   BUN 37*   CREATININE 3.06*   CALCIUM 8.7                   Assessment/Plan  DVT (deep venous thrombosis) ([facility])  Assessment & Plan  US LUE: acute thrombus in paired brachial veins  US LLE: acute thrombus in paired peroneal veins  Cont Eliquis    Hemorrhagic stroke (HCC)- (present on admission)  Assessment & Plan  Had right thalamic hemorrhage on 10/23/23 while visiting [facility]    CKD (chronic kidney disease)- (present on admission)  Assessment & Plan  Appears to have CKD (bun/cr elevated in 2017)  Bun/Cr a little more elevated than baseline  US: medical renal disease, no hydronephrosis  Encouraging fluid intake  S/P IVF's NS x 3 prior runs (which has helped)  S/P IVF's NS x 1 liter (11/30)  Needs outpatient follow up with Nephro  Cont to monitor    DM (diabetes mellitus) ([facility])- (present on admission)  Assessment & Plan  Hba1c: 6.4  BS were good  Off accuchecks  Note: home meds include Metformin (but now has CKD)    Hyperlipidemia- (present on admission)  Assessment & Plan  Cont Lipitor    Hypertension- (present on admission)  Assessment & Plan  BP ok  Cont Norvasc  Cont Hydralazine  Note: home meds include Norvasc  Cont to monitor

## 2023-12-01 NOTE — CONSULTS
"PSYCHIATRIC CONSULTATION:  *Reason for admission:   right thalamic hemorrhage  *Reason for consult:depression   *Requesting Physician:Lisa Lee  Supervising Physician:Rachele Colon         Legal status:  not applicable       *Interval history  The patient reports mood has been good since last visit as he feels he has been making good physical progress lately. He is also enjoying a visit with his fiancee as he has not seen her in 6 days. He reports anxiety I \"ok.\" He denies problems falling asleep. Sleeps a total of 7 hours but states he has been having intermittent nighttime awakenings due to foot pain. He does state treatments for pain are helpful. He states appetite is good and denies N/V, diarrhea or abdominal pain. Energy level is ok. Denies any side effects so far from zoloft and wants to continue current dose. Denies SI/HI. Reports he feels he has things to look forward to after rehab. Denies hallucinations. No delusions.        *Medical Review of Systems: as reported by pt. All systems reviewed. Only those found to be + are noted below. All others are negative.   (+) foot pain, recently improved with treatment but still present        *Psychiatric (Physical) Examination: observed phenomenon:  Vitals:    11/30/23 1649   BP: 134/81   Pulse: 84   Resp: 18   Temp: 36.8 °C (98.2 °F)   SpO2: 93%           General: Awake, alert in no acute distress  Patient Appearance: Appropriate, fairly groomed, wearing glasses   Behavior: Appropriate Behavior, Calm, Cooperative  Speech.: Spontaneous, Normal rate and rhythm, Answers appropriately, normal  volume  Mood Comments: \"good the last few days\"  Affect: appears euthymic, smiles occasionally  Thought Content: Appropriate, No suicidal ideation, No thoughts of self harm,  No homicidal ideation, Contracts for safety, Feels life is worth living  Thought Process: Logical and goal directed, No loosening of associations  Psychosis: No delusions, No " hallucinations  Insight: Good insight  Judgment: good judgment  Cognition: Memory appeared intact in interview., Concentration is intact for age  and education and Fully oriented to person, place, time and circumstance.  Language: Is able to repeat phrases. and Is able to name objects.  Fund of Knowledge: AS expected for age and level of education  Neuro: no tics, tremors, dyskinesias. no dystonias. Gait not observed         Allergies:           Allergies   Allergen Reactions    Penicillins            Medications (currently prescribed at [facility]):    Current Facility-Administered Medications:     baclofen (Lioresal) tablet 5 mg, 5 mg, Oral, TID, MAXWELL MooreOWang, 5 mg at 11/30/23 1641    NS infusion, , Intravenous, Once, Urbano Xiong M.D.    amLODIPine (Norvasc) tablet 10 mg, 10 mg, Oral, DAILY, MAXWELL GomezOWang, 10 mg at 11/30/23 0542    ciprofloxacin (Ciloxin) 0.3 % ophthalmic solution 1 Drop, 1 Drop, Both Eyes, Q4HRS WHILE AWAKE, Lisa Lee D.O., 1 Drop at 11/30/23 1800    diclofenac sodium (Voltaren) 1 % gel 2 g, 2 g, Topical, 4X/DAY, Lisa Lee D.O., 2 g at 11/30/23 1700    colchicine (Colcrys) tablet 0.6 mg, 0.6 mg, Oral, BID, Lisa Lee D.O., 0.6 mg at 11/30/23 0827    sertraline (Zoloft) tablet 25 mg, 25 mg, Oral, DAILY, Rahcele Del Rio D.O., 25 mg at 11/30/23 0827    carboxymethylcellulose (Refresh Tears) 0.5 % ophthalmic drops 1 Drop, 1 Drop, Both Eyes, PRN, Chuck Bagley D.O., 1 Drop at 11/26/23 2005    apixaban (Eliquis) tablet 10 mg, 10 mg, Oral, BID, Lisa Lee D.O., 10 mg at 11/30/23 0827    [START ON 12/1/2023] apixaban (Eliquis) tablet 5 mg, 5 mg, Oral, BID, Lisa Lee D.O.    hydrALAZINE (Apresoline) tablet 100 mg, 100 mg, Oral, Q8HRS, Sita Grewal M.D., 100 mg at 11/30/23 1642    traZODone (Desyrel) tablet 75 mg, 75 mg, Oral, QHS, Lisa Lee D.O., 75 mg at 11/29/23 1958    melatonin tablet 9 mg, 9 mg, Oral, QHS PRN, Lisa FARFAN  RUBY Lee, 9 mg at 23    [DISCONTINUED] insulin regular (HumuLIN R,NovoLIN R) injection, 2-12 Units, Subcutaneous, 4X/DAY ACHS **AND** [CANCELED] POC blood glucose manual result, , , Q AC AND BEDTIME(S) **AND** NOTIFY MD and PharmD, , , Once **AND** Administer 20 grams of glucose (approximately 8 ounces of fruit juice) every 15 minutes PRN FSBG less than 70 mg/dL, , , PRN **AND** dextrose 50% (D50W) injection 25 g, 25 g, Intravenous, Q15 MIN PRN, Sita Grewal M.D.    Respiratory Therapy Consult, , Nebulization, Continuous RT, Peyton Watkins D.O.    senna-docusate (Pericolace Or Senokot S) 8.6-50 MG per tablet 2 Tablet, 2 Tablet, Oral, BID, 2 Tablet at 23 0827 **AND** polyethylene glycol/lytes (Miralax) PACKET 1 Packet, 1 Packet, Oral, QDAY PRN, 1 Packet at 23 0614 **AND** magnesium hydroxide (Milk Of Magnesia) suspension 30 mL, 30 mL, Oral, QDAY PRN, 30 mL at 23 0520 **AND** bisacodyl (Dulcolax) suppository 10 mg, 10 mg, Rectal, QDAY PRN, Petyon Watkins D.O.    omeprazole (PriLOSEC) capsule 20 mg, 20 mg, Oral, DAILY, MAXWELL DenisO., 20 mg at 23 0900    mag hydrox-al hydrox-simeth (Maalox Plus Es Or Mylanta Ds) suspension 20 mL, 20 mL, Oral, Q2HRS PRN, Peyton Watkins D.O.    ondansetron (Zofran ODT) dispertab 4 mg, 4 mg, Oral, 4X/DAY PRN **OR** ondansetron (Zofran) syringe/vial injection 4 mg, 4 mg, Intramuscular, 4X/DAY PRN, Peyton Watkins D.O.    sodium chloride (Ocean) 0.65 % nasal spray 2 Spray, 2 Spray, Nasal, PRN, Peyton Watkins D.O.    [] acetaminophen (Tylenol) tablet 1,000 mg, 1,000 mg, Oral, Q6HRS, 1,000 mg at 23 0530 **FOLLOWED BY** acetaminophen (Tylenol) tablet 1,000 mg, 1,000 mg, Oral, Q6HRS PRN, AMXWELL DenisO., 1,000 mg at 23 0839    oxyCODONE immediate-release (Roxicodone) tablet 2.5 mg, 2.5 mg, Oral, Q3HRS PRN **OR** oxyCODONE immediate-release (Roxicodone) tablet 5 mg, 5 mg, Oral, Q3HRS PRN, MAXWELL DenisO., 5 mg at 23  1251    hydrALAZINE (Apresoline) tablet 25 mg, 25 mg, Oral, Q8HRS PRN, Peyton Watkins D.O., 25 mg at 11/23/23 1639    atorvastatin (Lipitor) tablet 40 mg, 40 mg, Oral, Nightly, Peyton Watkins, D.O., 40 mg at 11/29/23 1959          *Labs:  Lab Results   Component Value Date/Time    WBC 4.9 11/30/2023 05:18 AM    RBC 3.74 (L) 11/30/2023 05:18 AM    HEMOGLOBIN 11.6 (L) 11/30/2023 05:18 AM    HEMATOCRIT 33.4 (L) 11/30/2023 05:18 AM    MCV 89.3 11/30/2023 05:18 AM    MCH 31.0 11/30/2023 05:18 AM    MCHC 34.7 11/30/2023 05:18 AM    MPV 10.1 11/30/2023 05:18 AM    NEUTSPOLYS 57.60 11/30/2023 05:18 AM    LYMPHOCYTES 30.70 11/30/2023 05:18 AM    MONOCYTES 8.00 11/30/2023 05:18 AM    EOSINOPHILS 3.10 11/30/2023 05:18 AM    BASOPHILS 0.40 11/30/2023 05:18 AM   Plt-193,000       Lab Results   Component Value Date/Time    SODIUM 140 11/30/2023 05:18 AM    POTASSIUM 3.9 11/30/2023 05:18 AM    CHLORIDE 104 11/30/2023 05:18 AM    CO2 27 11/30/2023 05:18 AM    GLUCOSE 140 (H) 11/30/2023 05:18 AM    BUN 37 (H) 11/30/2023 05:18 AM    CREATININE 3.06 (H) 11/30/2023 05:18 AM     Recent Labs     11/30/23  0518   ASTSGOT 15   ALTSGPT 19   TBILIRUBIN 0.4   GLOBULIN 2.4        11/21/23  TSH-0.640  [id number]     EKG 11/11 NSR QTC-436  Imaging  MRI 11/11/23  1.  Right thalamic hemorrhage, decreased in density since prior study  2.  Stranding vasogenic edema appears stable.  3.  Slight asymmetric dilatation of the left ventricular system including the left temporal horn, consider component of hydrocephalus.  4.  Low-density fluid collection in the left temporal fossa, appearance most compatible with arachnoid cyst.  5.  Nonspecific white matter changes, commonly associated with small vessel ischemic disease.  Associated mild cerebral atrophy is noted.  6.  Atherosclerosis.        *ASSESSMENT:   R/O Adjustment disorder  -Patient endorsed increased anxiety and occasional feeling of hopelessness that began after his stroke. He had noted more  "disrupted sleep that he feels affects his energy levels and anxiety. However he had been participating in OT/PT and had been complaint with recommended medications and treatments. Denied SI. Sleep may also have been affected my discomfort from CPAP     The patient states mood has been better the last two days. Anxiety is \"ok.\" He feels physically stronger which is reassuring. NO significant changes in sleep, energy or appetite. Denies SI. Tolerating zoloft without side effects. He would like to continue current dose unchanged for now.     *Plan/Further Workup:   Legal status: not applicable     *Medication Managment:          -continue trazodone 75 mg QHS for insomnia   -continue Zoloft 25 mg daily for depression.   -continue psychotherapy with patient    *Labs Reviewed        Will follow with patient    Thank you for the consult.  "

## 2023-12-01 NOTE — THERAPY
Speech Language Pathology  Daily Treatment     Patient Name: Jason Lima  Age:  62 y.o., Sex:  male  Medical Record #: [id number]  Today's Date: 12/1/2023     Precautions  Precautions: Fall Risk  Comments: Left hemiparesis, left visual inattention    Subjective    Pt pleasant and agreeable to therapy. Pt expressed he has been pleased with therapy staff and the care he has received while at [facility].     Objective       12/01/23 0904   Treatment Charges   SLP Cognitive Skill Development First 15 Minutes 1   SLP Cognitive Skill Development Additional 15 Minutes 1   SLP Total Time Spent   SLP Individual Total Time Spent (Mins) 30   Cognition   Moderate Attention Minimal (4)   Interdisciplinary Plan of Care Collaboration   IDT Collaboration with  Certified Nursing Assistant   Patient Position at End of Therapy Seated  (on toilet with CNA present)   Collaboration Comments CNA assist in bathroom         Assessment    Completed same task attempted in ST session day prior to address carryover of strategies. Pt recalled task and 3 rules and was able to recognize each rule within task without having to reference key. Pt did require MIN cues for scanning to far left margin as well as problem solving corrective lenses - pt expressed difficulty with his bifocals, offered reading glasses (+1.25 initially, needing to go up to +2.75), cues to identify if the glasses improved performance. SLP recommends family bring in a pair of reading glasses for patient to use during ST for paper/pencil/computer tasks.   MIN cues needed to slow rate when summing multiple units of information.    Strengths: Alert and oriented, Effective communication skills, Motivated for self care and independence, Pleasant and cooperative, Independent prior level of function, Making steady progress towards goals, Willingly participates in therapeutic activities  Barriers: Impaired functional cognition (depression, left neglect, difficulty self  monitoring/regulating)    Plan    Continue to target attention, self monitoring of performance, organization    Passport items to be completed:  Express basic needs, understand food/liquid recommendations, consistently follow swallow precautions, manage finances, manage medications, arrive to therapy appointments on time, complete daily memory log entries, solve problems related to safety situations, review education related to hospitalization, complete caregiver training     Speech Therapy Problems (Active)       Problem: Problem Solving STGs       Dates: Start:  11/22/23         Goal: STG-Within one week, patient will perform alternating attention tasks with 85% acc with set up.       Dates: Start:  11/22/23         Goal Note filed on 11/29/23 1121 by Neva Abbott MS,CCC-SLP       MOD A, continues to require cues               Goal: STG-Within one week, patient will organization and reasoning tasks with 80% acc with min cues.       Dates: Start:  11/22/23         Goal Note filed on 11/29/23 1121 by Neva Abbott MS,CCC-SLP       Will continue to target, continues to require MOD A                 Problem: Speech/Swallowing LTGs       Dates: Start:  11/13/23         Goal: LTG-By discharge, patient will safely swallow (7)regular diet textures and (0) thin liquids with no overt s/sx of aspiration for 2/2 days.       Dates: Start:  11/13/23            Goal: LTG-By discharge, patient will solve complex problem       Dates: Start:  11/13/23       Description: For safety and self care with 80% acc mod I  for safe discharge home.         Goal: LTG-By discharge, patient will recall new training with 80% acc with min A and use of external memory aids.       Dates: Start:  11/13/23               Problem: Swallowing STGs       Dates: Start:  11/13/23

## 2023-12-01 NOTE — THERAPY
Physical Therapy   Daily Treatment     Patient Name: Jason Lima  Age:  62 y.o., Sex:  male  Medical Record #: [id number]  Today's Date: 12/1/2023     Precautions  Precautions: Fall Risk  Comments: Left hemiparesis, left visual inattention    Subjective    Pt was seated in w/c upon arrival and agreeable to treatment.       Objective       12/01/23 0931   PT Charge Group   PT Neuromuscular Re-Education / Balance (Units) 2   PT Total Time Spent   PT Individual Total Time Spent (Mins) 30   Precautions   Precautions Fall Risk   Transfer Functional Level of Assist   Bed, Chair, Wheelchair Transfer Moderate Assist   Bed Chair Wheelchair Transfer Description Adaptive equipment   Interdisciplinary Plan of Care Collaboration   Patient Position at End of Therapy Seated;Chair Alarm On;Call Light within Reach;Tray Table within Reach;Phone within Reach     STS and transfer training with focus on midline awareness, sequencing, and anterior lean x 35 reps    Assessment    Pt with inconsistent performance with transfers and STS (max A to CGA) and pt continues to require significant cueing for midline awareness.  However, overall pt continues to make daily gains in mobility and independence.   Strengths: Able to follow instructions, Independent prior level of function, Motivated for self care and independence, Pleasant and cooperative, Supportive family, Willingly participates in therapeutic activities  Barriers: Decreased endurance, Hemiparesis, Difficulty following instructions, Fatigue, Hypertension, Impaired activity tolerance, Impaired balance, Impaired insight/denial of deficits, Impaired functional cognition, Impaired sleep pattern, Impulsive, Limited mobility, Visual impairment, Poor family support (lives alone)    Plan    Continue midline orientation in sitting and standing, SQPT vs SPT, w/c mobility with hemitechnique and environmental scanning, continue ambulation with AFO on R hallway rail progressing to AD as  "appropriate, bed mobility      DME  PT DME Recommendations  Wheelchair: 18\" Width  Cushion: Specialty (See other comments) (TBD pending ambulation progress)  Assistive Device: Tanvir Walker (TBD pending progress, currently 2 person assist for gait)  Additional Equipment: Other (Comments)     Passport items to be completed:  Get in/out of bed safely, in/out of a vehicle, safely use mobility device, walk or wheel around home/community, navigate up and down stairs, show how to get up/down from the ground, ensure home is accessible, demonstrate HEP, complete caregiver training       Physical Therapy Problems (Active)       Problem: Mobility       Dates: Start:  11/13/23         Goal: STG-Within one week, patient will ambulate distances >/= 30' c/ RHR and ModA or better.        Dates: Start:  11/13/23         Goal Note filed on 11/22/23 1102 by Awilda Dela Cruz, MSPT       Limited to 10ft at right hallway rail mod assist                 Problem: PT-Long Term Goals       Dates: Start:  11/13/23         Goal: LTG-By discharge, patient will propel wheelchair >/= 300' at Neto or better.        Dates: Start:  11/13/23            Goal: LTG-By discharge, patient will ambulate >/= 50' c/ LRAD at Renata or better.        Dates: Start:  11/13/23            Goal: LTG-By discharge, patient will transfer one surface to another c/ Renata or better.        Dates: Start:  11/13/23            Goal: LTG-By discharge, patient will ambulate up/down 1 step c/ LRAD at Renata or better.        Dates: Start:  11/13/23            Goal: LTG-By discharge, patient will transfer in/out of a car c/ ModA or better.        Dates: Start:  11/13/23              "

## 2023-12-01 NOTE — PROGRESS NOTES
NURSING DAILY NOTE    Name: Jason Lima   Date of Admission: 11/12/2023   Admitting Diagnosis: Thalamic hemorrhage (HCC)  Attending Physician: Lisa Lee D.o.  Allergies: Penicillins    Safety  Patient Assist  max 1-2  Patient Precautions  Fall Risk  Precaution Comments  Left hemiparesis, left visual inattention  Bed Transfer Status  Minimal Assist  Toilet Transfer Status   Maximal Assist  Assistive Devices  Gait Belt, Wheelchair  Oxygen  Room air w/o2 available  Diet/Therapeutic Dining  Current Diet Order   Procedures    Diet Order Diet: Consistent CHO (Diabetic) (2 gram sodium restriction; medications whole with thin liquids); Second Modifier: (optional): 2 Gram Sodium     Pill Administration  whole and one at a time   Agitated Behavioral Scale     ABS Level of Severity       Fall Risk  Has the patient had a fall this admission?   Yes  Shellie Hamilton Fall Risk Scoring  23, HIGH RISK  Fall Risk Safety Measures  bed alarm, chair alarm, seatbelt alarm, and poor balance    Vitals  Temperature: 36.8 °C (98.2 °F)  Temp src: Oral  Pulse: 84  Respiration: 18  Blood Pressure: 134/81  Blood Pressure MAP (Calculated): 99 MM HG  BP Location: Right, Upper Arm  Patient BP Position: Sitting     Oxygen  Pulse Oximetry: 93 %  O2 (LPM): 0  FiO2%: 21 %  O2 Delivery Device: Room air w/o2 available    Bowel and Bladder  Last Bowel Movement  11/29/23  Stool Type  Not observed  Bowel Device  Bathroom  Continent  Bladder: Continent void   Bowel: Continent movement  Bladder Function  Urine Void (mL): 300 ml  Number of Times Voided: 1  Urinary Options: Yes  Urine Color: Yellow  Number of Times Incontinent of Urine: 0  Genitourinary Assessment   Bladder Assessment (WDL):  WDL Except  Echols Catheter: Not Applicable  Urine Color: Yellow  Number of Bladder Accidents: 0  Total Number of Bladder of Accidents in Last 7 Days: 0  Number of Times Incontinent of Urine: 0  Bladder  Device: Urinal  Time Void: No  Bladder Scan: Post Void  $ Bladder Scan Results (mL): 26    Skin  Polo Score   17  Sensory Interventions   Bed Types: Standard/Trauma Mattress  Skin Preventative Measures: Pillows in Use for Support / Positioning, Pillows in Use to Float Heels  Moisture Interventions  Moisturizers/Barriers: Barrier Wipes      Pain  Pain Rating Scale  5 - Interrupts some activities  Pain Location  Foot  Pain Location Orientation  Right  Pain Interventions   Medication (see MAR)    ADLs    Bathing   Patient Refused Bathing (Patient said took shower today, notified RN Clarissa.)  Linen Change   Partial  Personal Hygiene  Change Deja Pads, Moist Deja Wipes, Perineal Care  Chlorhexidine Bath      Oral Care  Brushed Teeth  Teeth/Dentures     Shave  Self  Nutrition Percentage Eaten  *  * Meal *  *, Dinner, Between % Consumed (75%)  Environmental Precautions  Treaded Slipper Socks on Patient, Personal Belongings, Wastebasket, Call Bell etc. in Easy Reach, Transferred to Stronger Side, Report Given to Other Health Care Providers Regarding Fall Risk, Bed in Low Position  Patient Turns/Positioning  Patient Turns Self from Side to Side  Patient Turns Assistance/Tolerance  Assistance of Two or More  Bed Positions  Bed Controls On, Bed Locked  Head of Bed Elevated  Self regulated      Psychosocial/Neurologic Assessment  Psychosocial Assessment  Psychosocial (WDL):  Within Defined Limits  Patient Behaviors: Flat Affect  Neurologic Assessment  Neuro (WDL): Exceptions to WDL  Level of Consciousness: Alert  Orientation Level: Oriented X4  Cognition: Follows commands, Appropriate attention/concentration  Speech: Clear, Slurred  Facial Symmetry: Left facial drooping  Pupil Assesment: No  R Pupil Size (mm): 3  R Pupil Shape / Description: Round  R Pupil Reaction: Brisk  L Pupil Size (mm): 3  L Pupil Shape / Description: Round  L Pupil Reaction: Brisk  Reflexes: Cough  Cough Reflex: Present  Motor Function/Sensation  Assessment: Dorsiflexion, Motor response  R Foot Dorsiflexion: Strong  L Foot Dorsiflexion: Absent  RUE Motor Response: Responds to commands  RUE Sensation: Full sensation  Muscle Strength Right Arm: Normal Strength Against Gravity and Full Resistance  LUE Motor Response: Flaccid  LUE Sensation: Tingling, Numbness  Muscle Strength Left Arm: Weak Movement but Not Against Gravity or Resistance  RLE Motor Response: Responds to commands  RLE Sensation: Full sensation  Muscle Strength Right Leg: Good Strength Against Gravity and Moderate Resistance  LLE Motor Response: Flaccid  LLE Sensation: Tingling, Numbness  Muscle Strength Left Leg: Weak Movement but Not Against Gravity or Resistance  EENT (WDL):  WDL Except    Cardio/Pulmonary Assessment  Edema   RLE Edema: Trace  LLE Edema: Trace  Respiratory Breath Sounds  RUL Breath Sounds: Clear  RML Breath Sounds: Clear  RLL Breath Sounds: Diminished  MOHAMUD Breath Sounds: Clear  LLL Breath Sounds: Diminished  Cardiac Assessment   Cardiac (WDL):  WDL Except (Hx Htn, Hld)

## 2023-12-02 ENCOUNTER — APPOINTMENT (OUTPATIENT)
Dept: PHYSICAL THERAPY | Facility: REHABILITATION | Age: 63
DRG: 057 | End: 2023-12-02
Attending: PHYSICAL MEDICINE & REHABILITATION
Payer: COMMERCIAL

## 2023-12-02 LAB
ANION GAP SERPL CALC-SCNC: 9 MMOL/L (ref 7–16)
BUN SERPL-MCNC: 35 MG/DL (ref 8–22)
CALCIUM SERPL-MCNC: 9 MG/DL (ref 8.5–10.5)
CHLORIDE SERPL-SCNC: 104 MMOL/L (ref 96–112)
CO2 SERPL-SCNC: 25 MMOL/L (ref 20–33)
CREAT SERPL-MCNC: 2.58 MG/DL (ref 0.5–1.4)
GFR SERPLBLD CREATININE-BSD FMLA CKD-EPI: 27 ML/MIN/1.73 M 2
GLUCOSE SERPL-MCNC: 141 MG/DL (ref 65–99)
MAGNESIUM SERPL-MCNC: 2.1 MG/DL (ref 1.5–2.5)
PHOSPHATE SERPL-MCNC: 2.9 MG/DL (ref 2.5–4.5)
POTASSIUM SERPL-SCNC: 3.7 MMOL/L (ref 3.6–5.5)
SODIUM SERPL-SCNC: 138 MMOL/L (ref 135–145)

## 2023-12-02 PROCEDURE — A9270 NON-COVERED ITEM OR SERVICE: HCPCS | Performed by: PHYSICAL MEDICINE & REHABILITATION

## 2023-12-02 PROCEDURE — 80048 BASIC METABOLIC PNL TOTAL CA: CPT

## 2023-12-02 PROCEDURE — A9270 NON-COVERED ITEM OR SERVICE: HCPCS | Performed by: HOSPITALIST

## 2023-12-02 PROCEDURE — 700102 HCHG RX REV CODE 250 W/ 637 OVERRIDE(OP): Performed by: PHYSICAL MEDICINE & REHABILITATION

## 2023-12-02 PROCEDURE — 700102 HCHG RX REV CODE 250 W/ 637 OVERRIDE(OP): Performed by: STUDENT IN AN ORGANIZED HEALTH CARE EDUCATION/TRAINING PROGRAM

## 2023-12-02 PROCEDURE — A9270 NON-COVERED ITEM OR SERVICE: HCPCS | Performed by: STUDENT IN AN ORGANIZED HEALTH CARE EDUCATION/TRAINING PROGRAM

## 2023-12-02 PROCEDURE — 36415 COLL VENOUS BLD VENIPUNCTURE: CPT

## 2023-12-02 PROCEDURE — 84100 ASSAY OF PHOSPHORUS: CPT

## 2023-12-02 PROCEDURE — 700101 HCHG RX REV CODE 250: Performed by: PHYSICAL MEDICINE & REHABILITATION

## 2023-12-02 PROCEDURE — 770010 HCHG ROOM/CARE - REHAB SEMI PRIVAT*

## 2023-12-02 PROCEDURE — 700102 HCHG RX REV CODE 250 W/ 637 OVERRIDE(OP): Performed by: HOSPITALIST

## 2023-12-02 PROCEDURE — 94760 N-INVAS EAR/PLS OXIMETRY 1: CPT

## 2023-12-02 PROCEDURE — 83735 ASSAY OF MAGNESIUM: CPT

## 2023-12-02 PROCEDURE — 99232 SBSQ HOSP IP/OBS MODERATE 35: CPT | Performed by: HOSPITALIST

## 2023-12-02 RX ADMIN — BACLOFEN 5 MG: 10 TABLET ORAL at 08:35

## 2023-12-02 RX ADMIN — SERTRALINE 25 MG: 50 TABLET, FILM COATED ORAL at 08:35

## 2023-12-02 RX ADMIN — OXYCODONE 5 MG: 5 TABLET ORAL at 21:09

## 2023-12-02 RX ADMIN — HYDRALAZINE HYDROCHLORIDE 100 MG: 50 TABLET, FILM COATED ORAL at 14:36

## 2023-12-02 RX ADMIN — HYDRALAZINE HYDROCHLORIDE 100 MG: 50 TABLET, FILM COATED ORAL at 05:43

## 2023-12-02 RX ADMIN — CIPROFLOXACIN 1 DROP: 3 SOLUTION OPHTHALMIC at 14:36

## 2023-12-02 RX ADMIN — APIXABAN 5 MG: 5 TABLET, FILM COATED ORAL at 08:35

## 2023-12-02 RX ADMIN — CIPROFLOXACIN 1 DROP: 3 SOLUTION OPHTHALMIC at 21:14

## 2023-12-02 RX ADMIN — MENTHOL 2 G: 10 GEL TOPICAL at 08:35

## 2023-12-02 RX ADMIN — BACLOFEN 5 MG: 10 TABLET ORAL at 21:10

## 2023-12-02 RX ADMIN — AMLODIPINE BESYLATE 10 MG: 5 TABLET ORAL at 05:43

## 2023-12-02 RX ADMIN — MENTHOL 2 G: 10 GEL TOPICAL at 13:16

## 2023-12-02 RX ADMIN — OMEPRAZOLE 20 MG: 20 CAPSULE, DELAYED RELEASE ORAL at 08:35

## 2023-12-02 RX ADMIN — COLCHICINE 0.6 MG: 0.6 TABLET, FILM COATED ORAL at 08:35

## 2023-12-02 RX ADMIN — BACLOFEN 5 MG: 10 TABLET ORAL at 14:36

## 2023-12-02 RX ADMIN — CIPROFLOXACIN 1 DROP: 3 SOLUTION OPHTHALMIC at 17:55

## 2023-12-02 RX ADMIN — MENTHOL 2 G: 10 GEL TOPICAL at 21:15

## 2023-12-02 RX ADMIN — APIXABAN 5 MG: 5 TABLET, FILM COATED ORAL at 21:09

## 2023-12-02 RX ADMIN — COLCHICINE 0.6 MG: 0.6 TABLET, FILM COATED ORAL at 21:07

## 2023-12-02 RX ADMIN — MENTHOL 2 G: 10 GEL TOPICAL at 17:54

## 2023-12-02 RX ADMIN — TRAZODONE HYDROCHLORIDE 75 MG: 50 TABLET ORAL at 21:07

## 2023-12-02 RX ADMIN — HYDRALAZINE HYDROCHLORIDE 100 MG: 50 TABLET, FILM COATED ORAL at 21:13

## 2023-12-02 RX ADMIN — ATORVASTATIN CALCIUM 40 MG: 40 TABLET, FILM COATED ORAL at 21:08

## 2023-12-02 RX ADMIN — CIPROFLOXACIN 1 DROP: 3 SOLUTION OPHTHALMIC at 10:00

## 2023-12-02 ASSESSMENT — ENCOUNTER SYMPTOMS
PALPITATIONS: 0
HALLUCINATIONS: 0
VOMITING: 0
HEADACHES: 0
DIZZINESS: 0
BLURRED VISION: 0
NAUSEA: 0
SHORTNESS OF BREATH: 0
FEVER: 0

## 2023-12-02 NOTE — CARE PLAN
The patient is Stable - Low risk of patient condition declining or worsening    Shift Goals  Clinical Goals: safety, pain  Patient Goals: safety, pain  Family Goals: increased pt strength, independence    Progress made toward(s) clinical / shift goals:      Problem: Bowel Elimination  Goal: Patient will participate in bowel management program  Outcome: Progressing  Note: Pt continent of bowel. On bowel meds. LBM 12/1/23.     Problem: Pain - Standard  Goal: Alleviation of pain or a reduction in pain to the patient’s comfort goal  Outcome: Progressing  Note: Pt with c/o right foot pain. Medicated him with roxicodone 5mg PRN per MAR with adequate relief. Pt sleeps good. Will continue to monitor       Patient is not progressing towards the following goals:

## 2023-12-02 NOTE — PROGRESS NOTES
NURSING DAILY NOTE    Name: Jason Lima   Date of Admission: 11/12/2023   Admitting Diagnosis: Thalamic hemorrhage (HCC)  Attending Physician: Lisa Lee D.o.  Allergies: Penicillins    Safety  Patient Assist  max 1-2  Patient Precautions  Fall Risk  Precaution Comments  Left hemiparesis, left visual inattention  Bed Transfer Status  Moderate Assist  Toilet Transfer Status   Maximal Assist  Assistive Devices  Rails, Wheelchair  Oxygen  CPAP  Diet/Therapeutic Dining  Current Diet Order   Procedures    Diet Order Diet: Consistent CHO (Diabetic) (2 gram sodium restriction; medications whole with thin liquids); Second Modifier: (optional): 2 Gram Sodium     Pill Administration  whole  Agitated Behavioral Scale  14  ABS Level of Severity  No Agitation    Fall Risk  Has the patient had a fall this admission?   Yes  Shellie Hamilton Fall Risk Scoring  23, HIGH RISK  Fall Risk Safety Measures  bed alarm, chair alarm, poor balance, and low vision/ hearing    Vitals  Temperature: 36.8 °C (98.2 °F)  Temp src: Oral  Pulse: 86  Respiration: 18  Blood Pressure: (Abnormal) 144/92  Blood Pressure MAP (Calculated): 109 MM HG  BP Location: Left, Upper Arm  Patient BP Position: Sitting     Oxygen  Pulse Oximetry: 93 %  O2 (LPM): 0  FiO2%: 21 %  O2 Delivery Device: CPAP    Bowel and Bladder  Last Bowel Movement  12/01/23  Stool Type  Not observed  Bowel Device  Bathroom  Continent  Bladder: Continent void   Bowel: Continent movement  Bladder Function  Urine Void (mL): 400 ml  Number of Times Voided: 1  Urinary Options: Yes  Urine Color: Straw  Number of Times Incontinent of Urine: 0  Genitourinary Assessment   Bladder Assessment (WDL):  WDL Except  Echols Catheter: Not Applicable  Urine Color: Straw  Number of Bladder Accidents: 0  Total Number of Bladder of Accidents in Last 7 Days: 0  Number of Times Incontinent of Urine: 0  Bladder Device: Urinal  Time Void: No  Bladder  Scan: Post Void  $ Bladder Scan Results (mL): 26    Skin  Polo Score   17  Sensory Interventions   Bed Types: Standard/Trauma Mattress  Skin Preventative Measures: Pillows in Use for Support / Positioning  Moisture Interventions  Moisturizers/Barriers: Barrier Wipes      Pain  Pain Rating Scale  1 - Hardly Notice Pain  Pain Location  Foot  Pain Location Orientation  Right, Left  Pain Interventions   Rest    ADLs    Bathing   Patient Refused Bathing (Patient said took shower today, notified RN Clarissa.)  Linen Change   Partial  Personal Hygiene  Change Deja Pads, Moist Deja Wipes, Perineal Care  Chlorhexidine Bath      Oral Care  Brushed Teeth  Teeth/Dentures     Shave  Self  Nutrition Percentage Eaten  Dinner, Between % Consumed  Environmental Precautions  Treaded Slipper Socks on Patient  Patient Turns/Positioning  Patient Turns Self from Side to Side  Patient Turns Assistance/Tolerance  Assistance of One  Bed Positions  Bed Controls On, Bed Locked  Head of Bed Elevated  Self regulated      Psychosocial/Neurologic Assessment  Psychosocial Assessment  Psychosocial (WDL):  Within Defined Limits  Patient Behaviors: Fatigue  Neurologic Assessment  Neuro (WDL): Exceptions to WDL  Level of Consciousness: Alert  Orientation Level: Oriented X4  Cognition: Follows commands  Speech: Clear, Slurred  Facial Symmetry: Left facial drooping  Pupil Assesment: No  R Pupil Size (mm): 3  R Pupil Shape / Description: Round  R Pupil Reaction: Brisk  L Pupil Size (mm): 3  L Pupil Shape / Description: Round  L Pupil Reaction: Brisk  Reflexes: Cough  Cough Reflex: Present  Motor Function/Sensation Assessment: Dorsiflexion, Motor response  R Foot Dorsiflexion: Strong  L Foot Dorsiflexion: Absent  RUE Motor Response: Responds to commands  RUE Sensation: Full sensation  Muscle Strength Right Arm: Normal Strength Against Gravity and Full Resistance  LUE Motor Response: Flaccid  LUE Sensation: Tingling, Numbness  Muscle Strength Left  Arm: Weak Movement but Not Against Gravity or Resistance  RLE Motor Response: Responds to commands  RLE Sensation: Full sensation  Muscle Strength Right Leg: Good Strength Against Gravity and Moderate Resistance  LLE Motor Response: Flaccid  LLE Sensation: Tingling, Numbness  Muscle Strength Left Leg: Weak Movement but Not Against Gravity or Resistance  EENT (WDL):  WDL Except    Cardio/Pulmonary Assessment  Edema   RLE Edema: Trace  LLE Edema: Trace  Respiratory Breath Sounds  RUL Breath Sounds: Clear  RML Breath Sounds: Clear  RLL Breath Sounds: Diminished  MOHAMUD Breath Sounds: Clear  LLL Breath Sounds: Diminished  Cardiac Assessment   Cardiac (WDL):  WDL Except

## 2023-12-02 NOTE — PROGRESS NOTES
Hospital Medicine Daily Progress Note      Chief Complaint:  Hypertension  Diabetes  CKD/ROMÁN    Interval History:  Discussed about his kidney function back at baseline.  Will d/c IV.    Review of Systems  Review of Systems   Constitutional:  Negative for fever.   Eyes:  Negative for blurred vision.   Respiratory:  Negative for shortness of breath.    Cardiovascular:  Negative for palpitations.   Gastrointestinal:  Negative for nausea and vomiting.   Neurological:  Negative for dizziness and headaches.   Psychiatric/Behavioral:  Negative for hallucinations.         Physical Exam  Temp:  [36.6 °C (97.8 °F)-36.8 °C (98.2 °F)] 36.6 °C (97.8 °F)  Pulse:  [71-94] 80  Resp:  [16-18] 16  BP: (141-147)/(74-92) 147/74  SpO2:  [93 %-98 %] 97 %    Physical Exam  Vitals and nursing note reviewed.   Constitutional:       General: He is not in acute distress.  HENT:      Mouth/Throat:      Mouth: Mucous membranes are moist.      Pharynx: Oropharynx is clear.   Eyes:      General: No scleral icterus.  Cardiovascular:      Rate and Rhythm: Normal rate and regular rhythm.   Pulmonary:      Effort: Pulmonary effort is normal.      Breath sounds: No wheezing or rales.   Abdominal:      General: Bowel sounds are normal.      Palpations: Abdomen is soft.   Musculoskeletal:      Cervical back: No rigidity.      Right lower leg: No edema.      Left lower leg: Edema present.      Comments: Has LUE swelling   Skin:     General: Skin is warm and dry.   Neurological:      Mental Status: He is alert and oriented to person, place, and time.   Psychiatric:         Mood and Affect: Mood normal.         Behavior: Behavior normal.         Fluids    Intake/Output Summary (Last 24 hours) at 12/2/2023 0914  Last data filed at 12/2/2023 0800  Gross per 24 hour   Intake 1080 ml   Output 1800 ml   Net -720 ml         Laboratory  Recent Labs     11/30/23  0518   WBC 4.9   RBC 3.74*   HEMOGLOBIN 11.6*   HEMATOCRIT 33.4*   MCV 89.3   MCH 31.0   MCHC 34.7    RDW 39.9   PLATELETCT 193   MPV 10.1       Recent Labs     11/30/23  0518 12/02/23  0621   SODIUM 140 138   POTASSIUM 3.9 3.7   CHLORIDE 104 104   CO2 27 25   GLUCOSE 140* 141*   BUN 37* 35*   CREATININE 3.06* 2.58*   CALCIUM 8.7 9.0                   Assessment/Plan  DVT (deep venous thrombosis) ([facility])  Assessment & Plan  US LUE: acute thrombus in paired brachial veins  US LLE: acute thrombus in paired peroneal veins  Cont Eliquis    Hemorrhagic stroke (HCC)- (present on admission)  Assessment & Plan  Had right thalamic hemorrhage on 10/23/23 while visiting \[facility] [place] [facility]\""    CKD (chronic kidney disease)- (present on admission)  Assessment & Plan  Appears to have CKD (bun/cr elevated in 2017)  Bun/Cr back at baseline after IVF's  US: medical renal disease, no hydronephrosis  Encouraging fluid intake  S/P IVF's NS x 3 prior runs (which has helped)  S/P IVF's NS x 1 liter (11/30)  Needs outpatient follow up with Nephro  Cont to monitor    DM (diabetes mellitus) ([facility])- (present on admission)  Assessment & Plan  Hba1c: 6.4  BS were good  Off accuchecks  Note: home meds include Metformin (but now has CKD)    Hyperlipidemia- (present on admission)  Assessment & Plan  Cont Lipitor    Hypertension- (present on admission)  Assessment & Plan  BP trending up recently with SBP in the 140s -- had recent IVF's  Cont Norvasc  Cont Hydralazine  Note: home meds include Norvasc  Will monitor another day before adjusting meds

## 2023-12-02 NOTE — THERAPY
Physical Therapy   Daily Treatment     Patient Name: Jason Lima  Age:  62 y.o., Sex:  male  Medical Record #: [id number]  Today's Date: 12/1/2023     Precautions  Precautions: Fall Risk  Comments: Left hemiparesis, left visual inattention    Subjective    Pt was seated on toilet upon arrival and agreeable to treatment.  Pt's family present during session.      Objective       12/01/23 1301   PT Charge Group   PT Gait Training (Units) 1   PT Neuromuscular Re-Education / Balance (Units) 2   PT Therapeutic Activities (Units) 1   PT Total Time Spent   PT Individual Total Time Spent (Mins) 60   Precautions   Precautions Fall Risk   Gait Functional Level of Assist    Gait Level Of Assist Moderate Assist   Assistive Device Other (Comments)  (R railing, L off the shelf AFO)   Distance (Feet) 30   # of Times Distance was Traveled 3   Deviation Step To;Decreased Base Of Support;Decreased Heel Strike;Decreased Toe Off   Transfer Functional Level of Assist   Bed, Chair, Wheelchair Transfer Moderate Assist   Bed Chair Wheelchair Transfer Description Adaptive equipment;Set-up of equipment;Supervision for safety;Verbal cueing  (SPT no AD and with HW)   Interdisciplinary Plan of Care Collaboration   Patient Position at End of Therapy Seated;Family / Friend in Room  (Pt's family to go spend time in cafeteria between therapy sessions)     STS/transfer training with no AD and HW x 20 reps.  Education provided on AFO options and rationale.  Gait training completed with focus on weight shift to R and increased LLE hip/knee flexion.     Assessment    Pt with improving WS to R side with use of HW with transfers.  Pt continues to require max cueing for WS but improving sequencing with repetition during transfer training.   Strengths: Able to follow instructions, Independent prior level of function, Motivated for self care and independence, Pleasant and cooperative, Supportive family, Willingly participates in therapeutic  "activities  Barriers: Decreased endurance, Hemiparesis, Difficulty following instructions, Fatigue, Hypertension, Impaired activity tolerance, Impaired balance, Impaired insight/denial of deficits, Impaired functional cognition, Impaired sleep pattern, Impulsive, Limited mobility, Visual impairment, Poor family support (lives alone)    Plan    Continue midline orientation in sitting and standing, SQPT vs SPT with HW, w/c mobility with hemitechnique and environmental scanning, continue ambulation with AFO on R hallway rail progressing to AD as appropriate, bed mobility      DME  PT DME Recommendations  Wheelchair: 18\" Width  Cushion: Specialty (See other comments) (TBD pending ambulation progress)  Assistive Device: Tanvir Walker (TBD pending progress, currently 2 person assist for gait)  Additional Equipment: Other (Comments)     Passport items to be completed:  Get in/out of bed safely, in/out of a vehicle, safely use mobility device, walk or wheel around home/community, navigate up and down stairs, show how to get up/down from the ground, ensure home is accessible, demonstrate HEP, complete caregiver training    Physical Therapy Problems (Active)       Problem: Mobility       Dates: Start:  11/13/23         Goal: STG-Within one week, patient will ambulate distances >/= 30' c/ RHR and ModA or better.        Dates: Start:  11/13/23         Goal Note filed on 11/22/23 1102 by Awilda Dela Cruz, MSPT       Limited to 10ft at right hallway rail mod assist                 Problem: PT-Long Term Goals       Dates: Start:  11/13/23         Goal: LTG-By discharge, patient will propel wheelchair >/= 300' at [place] or better.        Dates: Start:  11/13/23            Goal: LTG-By discharge, patient will ambulate >/= 50' c/ LRAD at [place] or better.        Dates: Start:  11/13/23            Goal: LTG-By discharge, patient will transfer one surface to another c/ [place] or better.        Dates: Start:  11/13/23            Goal: LTG-By " discharge, patient will ambulate up/down 1 step c/ LRAD at [place] or better.        Dates: Start:  11/13/23            Goal: LTG-By discharge, patient will transfer in/out of a car c/ ModA or better.        Dates: Start:  11/13/23

## 2023-12-02 NOTE — CARE PLAN
Problem: Fall Risk - Rehab  Goal: Patient will remain free from falls  Outcome: Progressing     Problem: Mobility  Goal: Patient's capacity to carry out activities will improve  Outcome: Progressing       The patient is Stable - Low risk of patient condition declining or worsening    Shift Goals  Clinical Goals: safety, pain  Patient Goals: safety, pain  Family Goals: increased pt strength, independence

## 2023-12-02 NOTE — FLOWSHEET NOTE
12/02/23 0711   Events/Summary/Plan   Events/Summary/Plan RA pulse ox check. Wearing cpap mask   Vital Signs   Pulse 80   Respiration 16   Pulse Oximetry 97 %   $ Pulse Oximetry (Spot Check) Yes   Respiratory Assessment   Level of Consciousness Alert   Chest Exam   Work Of Breathing / Effort Within Normal Limits   Oxygen   O2 Delivery Device CPAP

## 2023-12-03 ENCOUNTER — APPOINTMENT (OUTPATIENT)
Dept: OCCUPATIONAL THERAPY | Facility: REHABILITATION | Age: 63
DRG: 057 | End: 2023-12-03
Attending: HOSPITALIST
Payer: COMMERCIAL

## 2023-12-03 ENCOUNTER — APPOINTMENT (OUTPATIENT)
Dept: PHYSICAL THERAPY | Facility: REHABILITATION | Age: 63
DRG: 057 | End: 2023-12-03
Attending: HOSPITALIST
Payer: COMMERCIAL

## 2023-12-03 ENCOUNTER — APPOINTMENT (OUTPATIENT)
Dept: SPEECH THERAPY | Facility: REHABILITATION | Age: 63
DRG: 057 | End: 2023-12-03
Attending: PHYSICAL MEDICINE & REHABILITATION
Payer: COMMERCIAL

## 2023-12-03 PROCEDURE — 700102 HCHG RX REV CODE 250 W/ 637 OVERRIDE(OP): Performed by: PHYSICAL MEDICINE & REHABILITATION

## 2023-12-03 PROCEDURE — A9270 NON-COVERED ITEM OR SERVICE: HCPCS | Performed by: PHYSICAL MEDICINE & REHABILITATION

## 2023-12-03 PROCEDURE — A9270 NON-COVERED ITEM OR SERVICE: HCPCS | Performed by: HOSPITALIST

## 2023-12-03 PROCEDURE — 97129 THER IVNTJ 1ST 15 MIN: CPT

## 2023-12-03 PROCEDURE — A9270 NON-COVERED ITEM OR SERVICE: HCPCS | Performed by: STUDENT IN AN ORGANIZED HEALTH CARE EDUCATION/TRAINING PROGRAM

## 2023-12-03 PROCEDURE — 97113 AQUATIC THERAPY/EXERCISES: CPT | Mod: CQ

## 2023-12-03 PROCEDURE — 770010 HCHG ROOM/CARE - REHAB SEMI PRIVAT*

## 2023-12-03 PROCEDURE — 700101 HCHG RX REV CODE 250: Performed by: PHYSICAL MEDICINE & REHABILITATION

## 2023-12-03 PROCEDURE — 97535 SELF CARE MNGMENT TRAINING: CPT

## 2023-12-03 PROCEDURE — 99232 SBSQ HOSP IP/OBS MODERATE 35: CPT | Performed by: HOSPITALIST

## 2023-12-03 PROCEDURE — 700102 HCHG RX REV CODE 250 W/ 637 OVERRIDE(OP): Performed by: STUDENT IN AN ORGANIZED HEALTH CARE EDUCATION/TRAINING PROGRAM

## 2023-12-03 PROCEDURE — 94760 N-INVAS EAR/PLS OXIMETRY 1: CPT

## 2023-12-03 PROCEDURE — 97130 THER IVNTJ EA ADDL 15 MIN: CPT

## 2023-12-03 PROCEDURE — 700102 HCHG RX REV CODE 250 W/ 637 OVERRIDE(OP): Performed by: HOSPITALIST

## 2023-12-03 RX ADMIN — CIPROFLOXACIN 1 DROP: 3 SOLUTION OPHTHALMIC at 09:38

## 2023-12-03 RX ADMIN — TRAZODONE HYDROCHLORIDE 75 MG: 50 TABLET ORAL at 19:29

## 2023-12-03 RX ADMIN — SERTRALINE 25 MG: 50 TABLET, FILM COATED ORAL at 08:15

## 2023-12-03 RX ADMIN — BACLOFEN 5 MG: 10 TABLET ORAL at 13:24

## 2023-12-03 RX ADMIN — COLCHICINE 0.6 MG: 0.6 TABLET, FILM COATED ORAL at 19:30

## 2023-12-03 RX ADMIN — APIXABAN 5 MG: 5 TABLET, FILM COATED ORAL at 19:30

## 2023-12-03 RX ADMIN — HYDRALAZINE HYDROCHLORIDE 100 MG: 50 TABLET, FILM COATED ORAL at 06:02

## 2023-12-03 RX ADMIN — OMEPRAZOLE 20 MG: 20 CAPSULE, DELAYED RELEASE ORAL at 08:15

## 2023-12-03 RX ADMIN — CIPROFLOXACIN 1 DROP: 3 SOLUTION OPHTHALMIC at 19:42

## 2023-12-03 RX ADMIN — BACLOFEN 5 MG: 10 TABLET ORAL at 08:15

## 2023-12-03 RX ADMIN — SENNOSIDES AND DOCUSATE SODIUM 2 TABLET: 50; 8.6 TABLET ORAL at 19:30

## 2023-12-03 RX ADMIN — HYDRALAZINE HYDROCHLORIDE 100 MG: 50 TABLET, FILM COATED ORAL at 13:24

## 2023-12-03 RX ADMIN — APIXABAN 5 MG: 5 TABLET, FILM COATED ORAL at 08:15

## 2023-12-03 RX ADMIN — MENTHOL 2 G: 10 GEL TOPICAL at 09:00

## 2023-12-03 RX ADMIN — AMLODIPINE BESYLATE 10 MG: 5 TABLET ORAL at 06:02

## 2023-12-03 RX ADMIN — HYDRALAZINE HYDROCHLORIDE 100 MG: 50 TABLET, FILM COATED ORAL at 19:45

## 2023-12-03 RX ADMIN — SENNOSIDES AND DOCUSATE SODIUM 2 TABLET: 50; 8.6 TABLET ORAL at 08:15

## 2023-12-03 RX ADMIN — ATORVASTATIN CALCIUM 40 MG: 40 TABLET, FILM COATED ORAL at 19:30

## 2023-12-03 RX ADMIN — COLCHICINE 0.6 MG: 0.6 TABLET, FILM COATED ORAL at 08:15

## 2023-12-03 RX ADMIN — MENTHOL 2 G: 10 GEL TOPICAL at 19:33

## 2023-12-03 RX ADMIN — BACLOFEN 5 MG: 10 TABLET ORAL at 19:30

## 2023-12-03 ASSESSMENT — ACTIVITIES OF DAILY LIVING (ADL)
TOILETING_LEVEL_OF_ASSIST_DESCRIPTION: ASSIST FOR HYGIENE;ASSIST TO PULL PANTS UP;ASSIST TO PULL PANTS DOWN
TUB_SHOWER_TRANSFER_DESCRIPTION: GRAB BAR;SHOWER BENCH
BED_CHAIR_WHEELCHAIR_TRANSFER_DESCRIPTION: SQUAT PIVOT TRANSFER TO WHEELCHAIR

## 2023-12-03 ASSESSMENT — PATIENT HEALTH QUESTIONNAIRE - PHQ9
1. LITTLE INTEREST OR PLEASURE IN DOING THINGS: NOT AT ALL
SUM OF ALL RESPONSES TO PHQ9 QUESTIONS 1 AND 2: 0

## 2023-12-03 ASSESSMENT — ENCOUNTER SYMPTOMS
BLURRED VISION: 0
DIZZINESS: 0
FEVER: 0
NERVOUS/ANXIOUS: 0
COUGH: 0
DIARRHEA: 0

## 2023-12-03 NOTE — PROGRESS NOTES
NURSING DAILY NOTE    Name: Jason Lima   Date of Admission: 11/12/2023   Admitting Diagnosis: Thalamic hemorrhage (HCC)  Attending Physician: Lisa Lee D.o.  Allergies: Penicillins    Safety  Patient Assist  mod to max  Patient Precautions  Fall Risk  Precaution Comments  Left hemiparesis, left visual inattention  Bed Transfer Status  Moderate Assist  Toilet Transfer Status   Maximal Assist  Assistive Devices  Rails, Wheelchair  Oxygen  None - Room Air  Diet/Therapeutic Dining  Current Diet Order   Procedures    Diet Order Diet: Consistent CHO (Diabetic) (2 gram sodium restriction; medications whole with thin liquids); Second Modifier: (optional): 2 Gram Sodium     Pill Administration  whole  Agitated Behavioral Scale  14  ABS Level of Severity  No Agitation    Fall Risk  Has the patient had a fall this admission?   Yes  Shellie Hamilton Fall Risk Scoring  23, HIGH RISK  Fall Risk Safety Measures  bed alarm, chair alarm, fall during this hospitalization, poor balance, and low vision/ hearing    Vitals  Temperature: 36.7 °C (98 °F)  Temp src: Oral  Pulse: 75  Respiration: 17  Blood Pressure: 137/88  Blood Pressure MAP (Calculated): 104 MM HG  BP Location: Left, Upper Arm  Patient BP Position: Supine     Oxygen  Pulse Oximetry: 98 %  O2 (LPM): 0  FiO2%: 21 %  O2 Delivery Device: None - Room Air    Bowel and Bladder  Last Bowel Movement  12/02/23  Stool Type  Type 5: Soft blob with clear cut edges (passed easily)  Bowel Device  Bathroom  Continent  Bladder: Continent void   Bowel: Continent movement  Bladder Function  Urine Void (mL): 300 ml  Number of Times Voided: 1  Urinary Options: Yes  Urine Color: Yellow  Number of Times Incontinent of Urine: 0  Genitourinary Assessment   Bladder Assessment (WDL):  WDL Except  Echols Catheter: Not Applicable  Urine Color: Yellow  Number of Bladder Accidents: 0  Total Number of Bladder of Accidents in Last 7 Days:  0  Number of Times Incontinent of Urine: 0  Bladder Device: Urinal  Time Void: No  Bladder Scan: Post Void  $ Bladder Scan Results (mL): 26    Skin  Polo Score   16  Sensory Interventions   Bed Types: Standard/Trauma Mattress  Skin Preventative Measures: Pillows in Use for Support / Positioning  Moisture Interventions  Moisturizers/Barriers: Barrier Paste, Barrier Wipes      Pain  Pain Rating Scale  1 - Hardly Notice Pain  Pain Location  Foot  Pain Location Orientation  Right, Left  Pain Interventions   Rest    ADLs    Bathing   Patient Refused Bathing (Patient said took shower today, notified RN Clarissa.)  Linen Change   Partial  Personal Hygiene  Change Deja Pads, Moist Deja Wipes, Perineal Care  Chlorhexidine Bath      Oral Care  Brushed Teeth  Teeth/Dentures     Shave  Self  Nutrition Percentage Eaten  Dinner, Between 50-75% Consumed  Environmental Precautions  Treaded Slipper Socks on Patient  Patient Turns/Positioning  Patient Turns Self from Side to Side  Patient Turns Assistance/Tolerance  Assistance of One  Bed Positions  Bed Controls On, Bed Locked  Head of Bed Elevated  Self regulated      Psychosocial/Neurologic Assessment  Psychosocial Assessment  Psychosocial (WDL):  Within Defined Limits  Patient Behaviors: Fatigue  Neurologic Assessment  Neuro (WDL): Exceptions to WDL  Level of Consciousness: Alert  Orientation Level: Oriented X4  Cognition: Follows commands  Speech: Clear, Slurred  Facial Symmetry: Left facial drooping  Pupil Assesment: No  R Pupil Size (mm): 3  R Pupil Shape / Description: Round  R Pupil Reaction: Brisk  L Pupil Size (mm): 3  L Pupil Shape / Description: Round  L Pupil Reaction: Brisk  Reflexes: Cough  Cough Reflex: Present  Motor Function/Sensation Assessment: Dorsiflexion, Motor response  R Foot Dorsiflexion: Strong  L Foot Dorsiflexion: Absent  RUE Motor Response: Responds to commands  RUE Sensation: Full sensation  Muscle Strength Right Arm: Normal Strength Against Gravity and  Full Resistance  LUE Motor Response: Flaccid  LUE Sensation: Tingling, Numbness  Muscle Strength Left Arm: Weak Movement but Not Against Gravity or Resistance  RLE Motor Response: Responds to commands  RLE Sensation: Full sensation  Muscle Strength Right Leg: Good Strength Against Gravity and Moderate Resistance  LLE Motor Response: Flaccid  LLE Sensation: Tingling, Numbness  Muscle Strength Left Leg: Weak Movement but Not Against Gravity or Resistance  EENT (WDL):  WDL Except    Cardio/Pulmonary Assessment  Edema   RLE Edema: Trace  LLE Edema: Trace  Respiratory Breath Sounds  RUL Breath Sounds: Clear  RML Breath Sounds: Clear  RLL Breath Sounds: Diminished  MOHAMUD Breath Sounds: Clear  LLL Breath Sounds: Diminished  Cardiac Assessment   Cardiac (WDL):  WDL Except

## 2023-12-03 NOTE — FLOWSHEET NOTE
12/03/23 0706   Events/Summary/Plan   Events/Summary/Plan RA pulse ox check   Vital Signs   Pulse 70   Respiration 16   Pulse Oximetry 93 %   $ Pulse Oximetry (Spot Check) Yes   Respiratory Assessment   Level of Consciousness Alert   Chest Exam   Work Of Breathing / Effort Within Normal Limits   Oxygen   O2 Delivery Device Room air w/o2 available

## 2023-12-03 NOTE — PROGRESS NOTES
Hospital Medicine Daily Progress Note      Chief Complaint:  Hypertension  Diabetes  CKD/ROMÁN    Interval History:  No significant events overnight.    Review of Systems  Review of Systems   Constitutional:  Negative for fever.   Eyes:  Negative for blurred vision.   Respiratory:  Negative for cough.    Cardiovascular:  Negative for chest pain.   Gastrointestinal:  Negative for diarrhea.   Musculoskeletal:  Negative for joint pain.   Neurological:  Negative for dizziness.   Psychiatric/Behavioral:  The patient is not nervous/anxious.         Physical Exam  Temp:  [36.6 °C (97.8 °F)-36.8 °C (98.2 °F)] 36.7 °C (98 °F)  Pulse:  [70-89] 70  Resp:  [16-17] 16  BP: (130-148)/(84-95) 137/88  SpO2:  [93 %-98 %] 93 %    Physical Exam  Vitals and nursing note reviewed.   Constitutional:       Appearance: He is not diaphoretic.   HENT:      Mouth/Throat:      Pharynx: No oropharyngeal exudate or posterior oropharyngeal erythema.   Eyes:      Extraocular Movements: Extraocular movements intact.   Neck:      Vascular: No carotid bruit.   Cardiovascular:      Rate and Rhythm: Normal rate and regular rhythm.   Pulmonary:      Effort: Pulmonary effort is normal.      Breath sounds: No wheezing or rales.   Abdominal:      General: There is no distension.      Palpations: Abdomen is soft.      Tenderness: There is no abdominal tenderness.   Musculoskeletal:      Right lower leg: No edema.      Left lower leg: Edema present.      Comments: Has LUE swelling   Skin:     General: Skin is warm and dry.   Neurological:      Mental Status: He is alert and oriented to person, place, and time.   Psychiatric:         Mood and Affect: Mood normal.         Behavior: Behavior normal.         Fluids    Intake/Output Summary (Last 24 hours) at 12/3/2023 0909  Last data filed at 12/3/2023 0800  Gross per 24 hour   Intake 1000 ml   Output 1000 ml   Net 0 ml         Laboratory        Recent Labs     12/02/23  0621   SODIUM 138   POTASSIUM 3.7    CHLORIDE 104   CO2 25   GLUCOSE 141*   BUN 35*   CREATININE 2.58*   CALCIUM 9.0                   Assessment/Plan  DVT (deep venous thrombosis) ([facility])  Assessment & Plan  US LUE: acute thrombus in paired brachial veins  US LLE: acute thrombus in paired peroneal veins  Cont Eliquis    Hemorrhagic stroke (HCC)- (present on admission)  Assessment & Plan  Had right thalamic hemorrhage on 10/23/23 while visiting [facility]    CKD (chronic kidney disease)- (present on admission)  Assessment & Plan  Appears to have CKD (bun/cr elevated in 2017)  Bun/Cr back at baseline after IVF's  US: medical renal disease, no hydronephrosis  Encouraging fluid intake  S/P IVF's NS x 3 prior runs (which has helped)  S/P IVF's NS x 1 liter (11/30)  Cont to monitor    DM (diabetes mellitus) ([facility])- (present on admission)  Assessment & Plan  Hba1c: 6.4  BS were good  Off accuchecks  Note: home meds include Metformin (but now has CKD)    Hyperlipidemia- (present on admission)  Assessment & Plan  Cont Lipitor    Hypertension- (present on admission)  Assessment & Plan  BP generally ok but occ rises up  Cont Norvasc  Cont Hydralazine  Note: home meds include Norvasc  Will monitor another day before adjusting meds

## 2023-12-03 NOTE — PROGRESS NOTES
NURSING DAILY NOTE    Name: Jason Lima   Date of Admission: 11/12/2023   Admitting Diagnosis: Thalamic hemorrhage (HCC)  Attending Physician: Lisa Lee D.o.  Allergies: Penicillins    Safety  Patient Assist  max 1-2  Patient Precautions  Fall Risk  Precaution Comments  Left hemiparesis, left visual inattention  Bed Transfer Status  Moderate Assist  Toilet Transfer Status   Maximal Assist  Assistive Devices  Rails, Wheelchair  Oxygen  None - Room Air  Diet/Therapeutic Dining  Current Diet Order   Procedures    Diet Order Diet: Consistent CHO (Diabetic) (2 gram sodium restriction; medications whole with thin liquids); Second Modifier: (optional): 2 Gram Sodium     Pill Administration  whole  Agitated Behavioral Scale  14  ABS Level of Severity  No Agitation    Fall Risk  Has the patient had a fall this admission?   Yes  Shellie Hamilton Fall Risk Scoring  23, HIGH RISK  Fall Risk Safety Measures  bed alarm and chair alarm    Vitals  Temperature: 36.8 °C (98.2 °F)  Temp src: Oral  Pulse: 81  Respiration: 17  Blood Pressure: (!) 148/95 (Rn notified)  Blood Pressure MAP (Calculated): 113 MM HG  BP Location: Right, Upper Arm  Patient BP Position: Sitting     Oxygen  Pulse Oximetry: 95 %  O2 (LPM): 0  FiO2%: 21 %  O2 Delivery Device: None - Room Air    Bowel and Bladder  Last Bowel Movement  12/02/23  Stool Type  Type 5: Soft blob with clear cut edges (passed easily)  Bowel Device  Bathroom  Continent  Bladder: Continent void   Bowel: Continent movement  Bladder Function  Urine Void (mL): 300 ml  Number of Times Voided: 1  Urinary Options: Yes  Urine Color: Yellow  Number of Times Incontinent of Urine: 0  Genitourinary Assessment   Bladder Assessment (WDL):  WDL Except  Echols Catheter: Not Applicable  Urine Color: Yellow  Number of Bladder Accidents: 0  Total Number of Bladder of Accidents in Last 7 Days: 0  Number of Times Incontinent of Urine: 0  Bladder  Device: Urinal  Time Void: No  Bladder Scan: Post Void  $ Bladder Scan Results (mL): 26    Skin  Polo Score   17  Sensory Interventions   Bed Types: Standard/Trauma Mattress  Skin Preventative Measures: Pillows in Use for Support / Positioning  Moisture Interventions  Moisturizers/Barriers: Barrier Wipes      Pain  Pain Rating Scale  1 - Hardly Notice Pain  Pain Location  Foot  Pain Location Orientation  Right, Left  Pain Interventions   Rest    ADLs    Bathing   Patient Refused Bathing (Patient said took shower today, notified RN Clarissa.)  Linen Change   Partial  Personal Hygiene  Change Deja Pads, Moist Deja Wipes, Perineal Care  Chlorhexidine Bath      Oral Care  Brushed Teeth  Teeth/Dentures     Shave  Self  Nutrition Percentage Eaten  Dinner, Between 50-75% Consumed  Environmental Precautions  Treaded Slipper Socks on Patient  Patient Turns/Positioning  Patient Turns Self from Side to Side  Patient Turns Assistance/Tolerance  Assistance of One  Bed Positions  Bed Controls On, Bed Locked  Head of Bed Elevated  Self regulated      Psychosocial/Neurologic Assessment  Psychosocial Assessment  Psychosocial (WDL):  Within Defined Limits  Patient Behaviors: Fatigue  Neurologic Assessment  Neuro (WDL): Exceptions to WDL  Level of Consciousness: Alert  Orientation Level: Oriented X4  Cognition: Follows commands  Speech: Clear, Slurred  Facial Symmetry: Left facial drooping  Pupil Assesment: No  R Pupil Size (mm): 3  R Pupil Shape / Description: Round  R Pupil Reaction: Brisk  L Pupil Size (mm): 3  L Pupil Shape / Description: Round  L Pupil Reaction: Brisk  Reflexes: Cough  Cough Reflex: Present  Motor Function/Sensation Assessment: Dorsiflexion, Motor response  R Foot Dorsiflexion: Strong  L Foot Dorsiflexion: Absent  RUE Motor Response: Responds to commands  RUE Sensation: Full sensation  Muscle Strength Right Arm: Normal Strength Against Gravity and Full Resistance  LUE Motor Response: Flaccid  LUE Sensation:  Tingling, Numbness  Muscle Strength Left Arm: Weak Movement but Not Against Gravity or Resistance  RLE Motor Response: Responds to commands  RLE Sensation: Full sensation  Muscle Strength Right Leg: Good Strength Against Gravity and Moderate Resistance  LLE Motor Response: Flaccid  LLE Sensation: Tingling, Numbness  Muscle Strength Left Leg: Weak Movement but Not Against Gravity or Resistance  EENT (WDL):  WDL Except    Cardio/Pulmonary Assessment  Edema   RLE Edema: Trace  LLE Edema: Trace  Respiratory Breath Sounds  RUL Breath Sounds: Clear  RML Breath Sounds: Clear  RLL Breath Sounds: Diminished  MOHAMUD Breath Sounds: Clear  LLL Breath Sounds: Diminished  Cardiac Assessment   Cardiac (WDL):  WDL Except

## 2023-12-03 NOTE — THERAPY
Occupational Therapy  Daily Treatment     Patient Name: Jason Lima  Age:  62 y.o., Sex:  male  Medical Record #: [id number]  Today's Date: 12/3/2023     Precautions  Precautions: Fall Risk  Comments: Left Hemiparesis, left visual inattention         Subjective    Do I have to take a shower before and after the pool?     Objective       12/03/23 1031   OT Charge Group   Charges Yes   OT Self Care / ADL (Units) 4   OT Total Time Spent   OT Individual Total Time Spent (Mins) 60   Precautions   Precautions Fall Risk   Comments Left Hemiparesis, left visual inattention   Pain   Intervention Declines   Non Verbal Descriptors   Non Verbal Scale  Calm   Functional Level of Assist   Grooming Supervision   Grooming Description Set-up of equipment;Seated in wheelchair at sink   Bathing Minimal Assist   Bathing Description Grab bar;Assit with back;Other (comment)  (Assist with R UE)   Upper Body Dressing Stand by Assist   Upper Body Dressing Description Set-up of equipment   Lower Body Dressing Maximal Assist   Lower Body Dressing Description Assist with threading into pant leg;Increased time   Toileting Maximal Assist   Toileting Description Assist for hygiene;Assist to pull pants up;Assist to pull pants down   Bed, Chair, Wheelchair Transfer Moderate Assist   Bed Chair Wheelchair Transfer Description Squat pivot transfer to wheelchair   Toilet Transfers Moderate Assist   Toilet Transfer Description Initial preparation for task;Other (comment);Grab bar   Tub / Shower Transfers Moderate Assist   Tub Shower Transfer Description Grab bar;Shower bench   Balance   Sitting Balance (Static) Poor   Sitting Balance (Dynamic) Fair +   Standing Balance (Static) Trace +   Standing Balance (Dynamic) Trace +   Interdisciplinary Plan of Care Collaboration   IDT Collaboration with  Physical Therapist   Patient Position at End of Therapy Chair Alarm On;Self Releasing Lap Belt Applied;Tray Table within Reach;Call Light within Reach;Phone within  Reach   Strengths & Barriers   Strengths Able to follow instructions;Independent prior level of function;Motivated for self care and independence;Pleasant and cooperative;Supportive family   Barriers Decreased endurance;Fatigue;Generalized weakness;Hemiparesis;Impaired activity tolerance;Impaired balance;Limited mobility;Spasticity         Assessment    Patient is impulsive and has poor safety awareness. He completed self care tasks he was able to, and did ask for assistance as appropriate. Patient demonstrates a L lateral lean that he is able to self correct with verbal cues while seated on shower bench. Max A for LB bathing due to nearly falling forward  out of wc while reaching for item on the floor.   Strengths: Able to follow instructions, Independent prior level of function, Motivated for self care and independence, Pleasant and cooperative, Supportive family  Barriers: Decreased endurance, Fatigue, Generalized weakness, Hemiparesis, Impaired activity tolerance, Impaired balance, Limited mobility, Spasticity    Plan    Cont ADLs, functional transfers, and thera act/ex to maximize functional recovery for safe DC home.       DME  OT DME Recommendations  Bathroom Equipment: 3 in 1 Commode  Additional Equipment: Other (Comments)    Passport items to be completed:  Perform bathroom transfers, complete dressing, complete feeding, get ready for the day, prepare a simple meal, participate in household tasks, adapt home for safety needs, demonstrate home exercise program, complete caregiver training     Occupational Therapy Goals (Active)       Problem: Bathing       Dates: Start:  11/29/23         Goal: STG-Within one week, patient will bathe at [facility] using LRD       Dates: Start:  11/29/23               Problem: Dressing       Dates: Start:  11/22/23         Goal: STG-Within one week, patient will dress LB at [facility] using LRD       Dates: Start:  11/22/23            Goal: STG-Within one week, patient will dress UB at  [facility]       Dates: Start:  11/29/23               Problem: Functional Transfers       Dates: Start:  11/13/23         Goal: STG-Within one week, patient will transfer to step in shower with Max A.       Dates: Start:  11/13/23            Goal: STG-Within one week, patient will transfer to toilet at modA       Dates: Start:  11/29/23               Problem: OT Long Term Goals       Dates: Start:  11/13/23         Goal: LTG-By discharge, patient will complete basic self care tasks at Mod Independent level.       Dates: Start:  11/13/23            Goal: LTG-By discharge, patient will perform bathroom transfers at Mod Independent level.       Dates: Start:  11/13/23

## 2023-12-03 NOTE — THERAPY
"Speech Language Pathology  Daily Treatment     Patient Name: Jason Lima  Age:  62 y.o., Sex:  male  Medical Record #: [id number]  Today's Date: 12/3/2023     Precautions  Precautions: Fall Risk  Comments: Left hemiparesis, left visual inattention    Subjective    Pt seen sitting up in his w/c in is room. Pt reporting his eyes are \"extra blurry\" this date and was not able to read any printed materials.      Objective       12/03/23 1001   Treatment Charges   SLP Cognitive Skill Development First 15 Minutes 1   SLP Cognitive Skill Development Additional 15 Minutes 1   SLP Total Time Spent   SLP Individual Total Time Spent (Mins) 30   Interdisciplinary Plan of Care Collaboration   Patient Position at End of Therapy Seated;Chair Alarm On;Self Releasing Lap Belt Applied;Call Light within Reach;Tray Table within Reach;Phone within Reach         Assessment    Pt participated in mental manipulation task (id the largest number in the list read aloud). Pt achieved 95% accuracy indep. Pt participated in functional recall and verbal problem solving. Pt reports motivation to return home indep as soon as possible.     Strengths: Alert and oriented, Effective communication skills, Motivated for self care and independence, Pleasant and cooperative, Independent prior level of function, Making steady progress towards goals, Willingly participates in therapeutic activities  Barriers: Impaired functional cognition (depression, left neglect, difficulty self monitoring/regulating)    Plan    Continue to target attention and problem solving     Passport items to be completed:  Express basic needs, understand food/liquid recommendations, consistently follow swallow precautions, manage finances, manage medications, arrive to therapy appointments on time, complete daily memory log entries, solve problems related to safety situations, review education related to hospitalization, complete caregiver training     Speech Therapy Problems (Active) "       Problem: Problem Solving STGs       Dates: Start:  11/22/23         Goal: STG-Within one week, patient will perform alternating attention tasks with 85% acc with set up.       Dates: Start:  11/22/23         Goal Note filed on 11/29/23 1121 by Neva Abbott MS,CCC-SLP       MOD A, continues to require cues               Goal: STG-Within one week, patient will organization and reasoning tasks with 80% acc with min cues.       Dates: Start:  11/22/23         Goal Note filed on 11/29/23 1121 by Neva Abbott MS,CCC-SLP       Will continue to target, continues to require MOD A                 Problem: Speech/Swallowing LTGs       Dates: Start:  11/13/23         Goal: LTG-By discharge, patient will safely swallow (7)regular diet textures and (0) thin liquids with no overt s/sx of aspiration for 2/2 days.       Dates: Start:  11/13/23            Goal: LTG-By discharge, patient will solve complex problem       Dates: Start:  11/13/23       Description: For safety and self care with 80% acc mod I  for safe discharge home.         Goal: LTG-By discharge, patient will recall new training with 80% acc with min A and use of external memory aids.       Dates: Start:  11/13/23               Problem: Swallowing STGs       Dates: Start:  11/13/23

## 2023-12-03 NOTE — CARE PLAN
The patient is Stable - Low risk of patient condition declining or worsening    Shift Goals  Clinical Goals: safety, pain  Patient Goals: safety, pain  Family Goals: increased pt strength, independence    Progress made toward(s) clinical / shift goals:      Problem: Bowel Elimination  Goal: Patient will participate in bowel management program  Outcome: Progressing  Note: Pt continent of bowel. He refused his scheduled senna tabs tonight. LBM 12/2/23.     Problem: Pain - Standard  Goal: Alleviation of pain or a reduction in pain to the patient’s comfort goal  Outcome: Progressing  Note: Roxicodone 5mg given PRN per MAR for c/o bilateral foot pain with adequate relief. Voltaren gel also applied to bilateral ankle and feet/toes as scheduled. Will continue to monitor.       Patient is not progressing towards the following goals:

## 2023-12-03 NOTE — THERAPY
Physical Therapy   Daily Treatment     Patient Name: Jason Lima  Age:  62 y.o., Sex:  male  Medical Record #: [id number]  Today's Date: 12/3/2023     Precautions  Precautions: Fall Risk  Comments: Left Hemiparesis, left visual inattention    Subjective    Pt reports he is looking forward to getting into the pool again     Objective       12/03/23 1331   PT Charge Group   PT Aquatic Therapy  4   Supervising Physical Therapist Nicki Vazquez   PT Total Time Spent   PT Individual Total Time Spent (Mins) 60   Interdisciplinary Plan of Care Collaboration   IDT Collaboration with  Occupational Therapist;Recreation Therapist;Therapy Tech   Patient Position at End of Therapy Seated;Self Releasing Lap Belt Applied;Other (Comments)   Collaboration Comments OT: pre pool shower, SO present throughout, RT: co-tx for aquatic therapy, tech: assist with pool session and transporting pt to/from tx area       The patient participated in 60' of aquatic therapy with emphasis on buoyancy assisted mobility  Pt placed in supine with flotation at cervical spine and behind knees, additional flotation belt for trunk  Pt completed modified L UE abd/adduction using principles of bad ragaz ring method  1 x 10 bridges in supine  1 x 10 B knee extension, 1 x 10 isolating L LE knee extension only  Unsupported sitting on pool bench up to maxA for postural correction  Seated trunk flexion/extension with Kyara progressing to CGA  maxA amb with and without water walker, therapist providing assistance to prevent L LE adduction to avoid crossing midline  Static standing with pt using R UE to stabilize on the pool rail practicing correction of midline orientation with assistance of buoyancy  Pt completed session with 2 #5lb ankle weights  Pt entered and exited pool via lift with total A     Assessment    Pt is limited by L LE extensor tone, impaired balance and L jade paresis, the patient continues to make progress with correcting of midline orientation in  "seated and standing, benefits from aquatic therapy for safety with mobility    Strengths: Able to follow instructions, Independent prior level of function, Motivated for self care and independence, Pleasant and cooperative, Supportive family, Willingly participates in therapeutic activities  Barriers: Decreased endurance, Hemiparesis, Difficulty following instructions, Fatigue, Hypertension, Impaired activity tolerance, Impaired balance, Impaired insight/denial of deficits, Impaired functional cognition, Impaired sleep pattern, Impulsive, Limited mobility, Visual impairment, Poor family support (lives alone)    Plan    Continue midline orientation in sitting and standing, SQPT vs SPT with HW, w/c mobility with hemitechnique and environmental scanning, continue ambulation with AFO on R hallway rail progressing to AD as appropriate, bed mobility      DME  PT DME Recommendations  Wheelchair: 18\" Width  Cushion: Specialty (See other comments) (TBD pending ambulation progress)  Assistive Device: Tanvir Walker (TBD pending progress, currently 2 person assist for gait)  Additional Equipment: Other (Comments)    Passport items to be completed:  Get in/out of bed safely, in/out of a vehicle, safely use mobility device, walk or wheel around home/community, navigate up and down stairs, show how to get up/down from the ground, ensure home is accessible, demonstrate HEP, complete caregiver training    Physical Therapy Problems (Active)       Problem: Mobility       Dates: Start:  11/13/23         Goal: STG-Within one week, patient will ambulate distances >/= 30' c/ RHR and ModA or better.        Dates: Start:  11/13/23         Goal Note filed on 11/22/23 1102 by Awilda Dela Cruz, MSPT       Limited to 10ft at right hallway rail mod assist                 Problem: PT-Long Term Goals       Dates: Start:  11/13/23         Goal: LTG-By discharge, patient will propel wheelchair >/= 300' at [place] or better.        Dates: Start:  11/13/23 "            Goal: LTG-By discharge, patient will ambulate >/= 50' c/ LRAD at [place] or better.        Dates: Start:  11/13/23            Goal: LTG-By discharge, patient will transfer one surface to another c/ Renata or better.        Dates: Start:  11/13/23            Goal: LTG-By discharge, patient will ambulate up/down 1 step c/ LRAD at [place] or better.        Dates: Start:  11/13/23            Goal: LTG-By discharge, patient will transfer in/out of a car c/ ModA or better.        Dates: Start:  11/13/23

## 2023-12-04 ENCOUNTER — APPOINTMENT (OUTPATIENT)
Dept: OCCUPATIONAL THERAPY | Facility: REHABILITATION | Age: 63
DRG: 057 | End: 2023-12-04
Attending: PHYSICAL MEDICINE & REHABILITATION
Payer: COMMERCIAL

## 2023-12-04 ENCOUNTER — APPOINTMENT (OUTPATIENT)
Dept: PHYSICAL THERAPY | Facility: REHABILITATION | Age: 63
DRG: 057 | End: 2023-12-04
Attending: PHYSICAL MEDICINE & REHABILITATION
Payer: COMMERCIAL

## 2023-12-04 ENCOUNTER — APPOINTMENT (OUTPATIENT)
Dept: SPEECH THERAPY | Facility: REHABILITATION | Age: 63
DRG: 057 | End: 2023-12-04
Attending: PHYSICAL MEDICINE & REHABILITATION
Payer: COMMERCIAL

## 2023-12-04 PROCEDURE — 97535 SELF CARE MNGMENT TRAINING: CPT

## 2023-12-04 PROCEDURE — A9270 NON-COVERED ITEM OR SERVICE: HCPCS | Performed by: STUDENT IN AN ORGANIZED HEALTH CARE EDUCATION/TRAINING PROGRAM

## 2023-12-04 PROCEDURE — 97129 THER IVNTJ 1ST 15 MIN: CPT

## 2023-12-04 PROCEDURE — 99232 SBSQ HOSP IP/OBS MODERATE 35: CPT | Performed by: PHYSICAL MEDICINE & REHABILITATION

## 2023-12-04 PROCEDURE — 99232 SBSQ HOSP IP/OBS MODERATE 35: CPT | Performed by: HOSPITALIST

## 2023-12-04 PROCEDURE — 700102 HCHG RX REV CODE 250 W/ 637 OVERRIDE(OP): Performed by: PHYSICAL MEDICINE & REHABILITATION

## 2023-12-04 PROCEDURE — 97530 THERAPEUTIC ACTIVITIES: CPT

## 2023-12-04 PROCEDURE — 97112 NEUROMUSCULAR REEDUCATION: CPT

## 2023-12-04 PROCEDURE — A9270 NON-COVERED ITEM OR SERVICE: HCPCS | Performed by: PHYSICAL MEDICINE & REHABILITATION

## 2023-12-04 PROCEDURE — 97130 THER IVNTJ EA ADDL 15 MIN: CPT

## 2023-12-04 PROCEDURE — 700102 HCHG RX REV CODE 250 W/ 637 OVERRIDE(OP): Performed by: HOSPITALIST

## 2023-12-04 PROCEDURE — A9270 NON-COVERED ITEM OR SERVICE: HCPCS | Performed by: HOSPITALIST

## 2023-12-04 PROCEDURE — 97530 THERAPEUTIC ACTIVITIES: CPT | Mod: CQ

## 2023-12-04 PROCEDURE — 700102 HCHG RX REV CODE 250 W/ 637 OVERRIDE(OP): Performed by: STUDENT IN AN ORGANIZED HEALTH CARE EDUCATION/TRAINING PROGRAM

## 2023-12-04 PROCEDURE — 770010 HCHG ROOM/CARE - REHAB SEMI PRIVAT*

## 2023-12-04 PROCEDURE — 97116 GAIT TRAINING THERAPY: CPT | Mod: CQ

## 2023-12-04 RX ADMIN — SENNOSIDES AND DOCUSATE SODIUM 2 TABLET: 50; 8.6 TABLET ORAL at 19:48

## 2023-12-04 RX ADMIN — APIXABAN 5 MG: 5 TABLET, FILM COATED ORAL at 19:48

## 2023-12-04 RX ADMIN — CIPROFLOXACIN 1 DROP: 3 SOLUTION OPHTHALMIC at 05:21

## 2023-12-04 RX ADMIN — BACLOFEN 5 MG: 10 TABLET ORAL at 13:32

## 2023-12-04 RX ADMIN — CIPROFLOXACIN 1 DROP: 3 SOLUTION OPHTHALMIC at 13:32

## 2023-12-04 RX ADMIN — ATORVASTATIN CALCIUM 40 MG: 40 TABLET, FILM COATED ORAL at 19:48

## 2023-12-04 RX ADMIN — MENTHOL 2 G: 10 GEL TOPICAL at 09:00

## 2023-12-04 RX ADMIN — AMLODIPINE BESYLATE 10 MG: 5 TABLET ORAL at 05:19

## 2023-12-04 RX ADMIN — SENNOSIDES AND DOCUSATE SODIUM 2 TABLET: 50; 8.6 TABLET ORAL at 08:15

## 2023-12-04 RX ADMIN — APIXABAN 5 MG: 5 TABLET, FILM COATED ORAL at 08:15

## 2023-12-04 RX ADMIN — SERTRALINE 25 MG: 50 TABLET, FILM COATED ORAL at 08:15

## 2023-12-04 RX ADMIN — BACLOFEN 5 MG: 10 TABLET ORAL at 19:48

## 2023-12-04 RX ADMIN — BACLOFEN 5 MG: 10 TABLET ORAL at 08:15

## 2023-12-04 RX ADMIN — HYDRALAZINE HYDROCHLORIDE 100 MG: 50 TABLET, FILM COATED ORAL at 05:19

## 2023-12-04 RX ADMIN — CIPROFLOXACIN 1 DROP: 3 SOLUTION OPHTHALMIC at 11:33

## 2023-12-04 RX ADMIN — MENTHOL 2 G: 10 GEL TOPICAL at 13:34

## 2023-12-04 RX ADMIN — OMEPRAZOLE 20 MG: 20 CAPSULE, DELAYED RELEASE ORAL at 08:15

## 2023-12-04 RX ADMIN — HYDRALAZINE HYDROCHLORIDE 100 MG: 50 TABLET, FILM COATED ORAL at 19:50

## 2023-12-04 RX ADMIN — TRAZODONE HYDROCHLORIDE 75 MG: 50 TABLET ORAL at 19:48

## 2023-12-04 RX ADMIN — MENTHOL 2 G: 10 GEL TOPICAL at 19:55

## 2023-12-04 RX ADMIN — CIPROFLOXACIN 1 DROP: 3 SOLUTION OPHTHALMIC at 19:54

## 2023-12-04 RX ADMIN — HYDRALAZINE HYDROCHLORIDE 100 MG: 50 TABLET, FILM COATED ORAL at 13:32

## 2023-12-04 ASSESSMENT — ACTIVITIES OF DAILY LIVING (ADL)
BED_CHAIR_WHEELCHAIR_TRANSFER_DESCRIPTION: INCREASED TIME;INITIAL PREPARATION FOR TASK;SET-UP OF EQUIPMENT;SQUAT PIVOT TRANSFER TO WHEELCHAIR;SUPERVISION FOR SAFETY;VERBAL CUEING
TOILET_TRANSFER_DESCRIPTION: OTHER (COMMENT)

## 2023-12-04 ASSESSMENT — GAIT ASSESSMENTS
GAIT LEVEL OF ASSIST: TOTAL ASSIST X 2
DEVIATION: STEP TO;DECREASED BASE OF SUPPORT;DECREASED HEEL STRIKE;DECREASED TOE OFF
ASSISTIVE DEVICE: OTHER (COMMENTS);HEMI-WALKER

## 2023-12-04 ASSESSMENT — ENCOUNTER SYMPTOMS
NERVOUS/ANXIOUS: 0
FEVER: 0
CHILLS: 0
ABDOMINAL PAIN: 0
SHORTNESS OF BREATH: 0
VOMITING: 0
NAUSEA: 0
DIARRHEA: 0

## 2023-12-04 NOTE — DISCHARGE PLANNING
GEOFFREY left Betsy from [employer] a message and faxed her to ask for someone to call for extended auth. No return call yet.

## 2023-12-04 NOTE — CARE PLAN
The patient is Stable - Low risk of patient condition declining or worsening    Shift Goals  Clinical Goals: safety  Patient Goals: safety, pain  Family Goals: increased pt strength, independence    Progress made toward(s) clinical / shift goals:    Problem: Diabetes Management  Goal: Patient's ability to maintain appropriate glucose levels will be maintained or improve  Outcome: Progressing     Problem: Psychosocial  Goal: Patient's level of anxiety will decrease  Outcome: Progressing     Problem: Hemodynamics  Goal: Patient's hemodynamics, fluid balance and neurologic status will be stable or improve  Outcome: Progressing

## 2023-12-04 NOTE — THERAPY
"Occupational Therapy  Daily Treatment     Patient Name: Jason Lima  Age:  62 y.o., Sex:  male  Medical Record #: [id number]  Today's Date: 12/4/2023     Precautions  Precautions: (P) Fall Risk  Comments: (P) Left Hemiparesis, left visual inattention         Subjective    Pt hand-off from speech for OT. \"I want to work more on this (Left) hand.\"     Objective       12/04/23 0931   OT Charge Group   OT Self Care / ADL (Units) 1   OT Therapy Activity (Units) 3   OT Total Time Spent   OT Individual Total Time Spent (Mins) 60   Precautions   Precautions Fall Risk   Comments Left Hemiparesis, left visual inattention   Functional Level of Assist   Toileting Moderate Assist   Toileting Description Assist to pull pants up;Assist to pull pants down;Assist for standing balance;Grab bar;Supervision for safety  (Pt completed hygiene seated (from front) at SBA.)   Toilet Transfers Moderate Assist   Toilet Transfer Description Other (comment)  (STT from WC to toilet using GB)   Fine Motor / Dexterity    Fine Motor / Dexterity Yes   Fine Motor / Dexterity Interventions transfering up to 10 yellow/red clips from board using L hand (R hand supporting at L FA for proximal stability). 20 min.   Comments  Extra time needed to complete d/t difficulties aligning L hand to clip and volutional opening/closing of L hand. Less time needed to complete tasks with mass practice.   Interdisciplinary Plan of Care Collaboration   IDT Collaboration with  Therapy Tech;Family / Caregiver   Patient Position at End of Therapy Seated;Chair Alarm On;Other (Comments);Family / Friend in Room  (hand off to PT)   Collaboration Comments CLOF     Applied k-tape to the posterior L hand along to facilitate wrist and digital extension during thera act. Good response as pt demonstrated with slight (passive) wrist extension during thera act, but still required VC to \"open/close\" hand during FM activity. (See comments above)    During thera act, pt engaged in STS " from WC to therapy mat, x2, with moderate VC, initially, to maintain an upright posture. Pt demo increased postural alignment in standing as he was able to maintain an upright posture for up to 10 min in standing, prior to requesting a seated RB d/t LBP.    Assessment    Overall, good progress towards goals as pt is demonstrating increase midline awareness during standing thera act; however, therapy tech is still recommended at this time. Pt is demonstrating gradual improvements in L hand function but is limited by proximal weakness and difficulties in motor planning.       Strengths: Able to follow instructions, Independent prior level of function, Motivated for self care and independence, Pleasant and cooperative, Supportive family  Barriers: Decreased endurance, Fatigue, Generalized weakness, Hemiparesis, Impaired activity tolerance, Impaired balance, Limited mobility, Spasticity    Plan    Cont ADLs, functional transfers, and thera act/ex to maximize functional recovery for safe DC home.       DME  OT DME Recommendations  Bathroom Equipment: 3 in 1 Commode  Additional Equipment: Other (Comments)    Passport items to be completed:  Perform bathroom transfers, complete dressing, complete feeding, get ready for the day, prepare a simple meal, participate in household tasks, adapt home for safety needs, demonstrate home exercise program, complete caregiver training     Occupational Therapy Goals (Active)       Problem: Bathing       Dates: Start:  11/29/23         Goal: STG-Within one week, patient will bathe at [place] using LRD       Dates: Start:  11/29/23               Problem: Dressing       Dates: Start:  11/22/23         Goal: STG-Within one week, patient will dress LB at Kyara using LRD       Dates: Start:  11/22/23            Goal: STG-Within one week, patient will dress UB at CGA       Dates: Start:  11/29/23               Problem: Functional Transfers       Dates: Start:  11/13/23         Goal: STG-Within one  week, patient will transfer to step in shower with Max A.       Dates: Start:  11/13/23            Goal: STG-Within one week, patient will transfer to toilet at modA       Dates: Start:  11/29/23               Problem: OT Long Term Goals       Dates: Start:  11/13/23         Goal: LTG-By discharge, patient will complete basic self care tasks at Mod Independent level.       Dates: Start:  11/13/23            Goal: LTG-By discharge, patient will perform bathroom transfers at Mod Independent level.       Dates: Start:  11/13/23

## 2023-12-04 NOTE — PROGRESS NOTES
NURSING DAILY NOTE    Name: Jason Lima   Date of Admission: 11/12/2023   Admitting Diagnosis: Thalamic hemorrhage (HCC)  Attending Physician: Lisa Lee D.o.  Allergies: Penicillins    Safety  Patient Assist  mod to max  Patient Precautions  Fall Risk  Precaution Comments  Left Hemiparesis, left visual inattention  Bed Transfer Status  Moderate Assist  Toilet Transfer Status   Moderate Assist  Assistive Devices  Rails, Wheelchair  Oxygen  None - Room Air  Diet/Therapeutic Dining  Current Diet Order   Procedures    Diet Order Diet: Consistent CHO (Diabetic) (2 gram sodium restriction; medications whole with thin liquids); Second Modifier: (optional): 2 Gram Sodium     Pill Administration  whole  Agitated Behavioral Scale  14  ABS Level of Severity  No Agitation    Fall Risk  Has the patient had a fall this admission?   Yes  Shellie Hamilton Fall Risk Scoring  23, HIGH RISK  Fall Risk Safety Measures  bed alarm and chair alarm    Vitals  Temperature: 36.7 °C (98 °F)  Temp src: Oral  Pulse: 74  Respiration: 16  Blood Pressure: 139/79  Blood Pressure MAP (Calculated): 99 MM HG  BP Location: Left, Upper Arm  Patient BP Position: Supine     Oxygen  Pulse Oximetry: 93 %  O2 (LPM): 0  FiO2%: 21 %  O2 Delivery Device: None - Room Air    Bowel and Bladder  Last Bowel Movement  12/02/23  Stool Type  Type 5: Soft blob with clear cut edges (passed easily)  Bowel Device  Bathroom  Continent  Bladder: Continent void   Bowel: Continent movement  Bladder Function  Urine Void (mL): 250 ml  Number of Times Voided: 1  Urinary Options: Yes  Urine Color: Yellow  Number of Times Incontinent of Urine: 0  Genitourinary Assessment   Bladder Assessment (WDL):  WDL Except  Echols Catheter: Not Applicable  Urine Color: Yellow  Number of Bladder Accidents: 0  Total Number of Bladder of Accidents in Last 7 Days: 0  Number of Times Incontinent of Urine: 0  Bladder Device: Urinal  Time  Void: No  Bladder Scan: Post Void  $ Bladder Scan Results (mL): 26    Skin  Polo Score   16  Sensory Interventions   Bed Types: Standard/Trauma Mattress  Skin Preventative Measures: Pillows in Use for Support / Positioning  Moisture Interventions  Moisturizers/Barriers: Barrier Paste, Barrier Cream      Pain  Pain Rating Scale  0 - No Pain  Pain Location  Foot  Pain Location Orientation  Right, Left  Pain Interventions   Declines    ADLs    Bathing   Patient Refused Bathing (Patient said took shower today, notified RN Clarissa.)  Linen Change   Partial  Personal Hygiene  Change Deja Pads, Moist Deja Wipes, Perineal Care  Chlorhexidine Bath      Oral Care  Brushed Teeth  Teeth/Dentures     Shave  Self  Nutrition Percentage Eaten  Lunch, Between % Consumed (100%)  Environmental Precautions  Treaded Slipper Socks on Patient  Patient Turns/Positioning  Patient Turns Self from Side to Side  Patient Turns Assistance/Tolerance  Assistance of One  Bed Positions  Bed Controls On, Bed Locked  Head of Bed Elevated  Self regulated      Psychosocial/Neurologic Assessment  Psychosocial Assessment  Psychosocial (WDL):  Within Defined Limits  Patient Behaviors: Fatigue  Neurologic Assessment  Neuro (WDL): Exceptions to WDL  Level of Consciousness: Alert  Orientation Level: Oriented X4  Cognition: Follows commands  Speech: Clear, Slurred  Facial Symmetry: Left facial drooping  Pupil Assesment: No  R Pupil Size (mm): 3  R Pupil Shape / Description: Round  R Pupil Reaction: Brisk  L Pupil Size (mm): 3  L Pupil Shape / Description: Round  L Pupil Reaction: Brisk  Reflexes: Cough  Cough Reflex: Present  Motor Function/Sensation Assessment: Dorsiflexion, Motor response  R Foot Dorsiflexion: Strong  L Foot Dorsiflexion: Absent  RUE Motor Response: Responds to commands  RUE Sensation: Full sensation  Muscle Strength Right Arm: Normal Strength Against Gravity and Full Resistance  LUE Motor Response: Flaccid  LUE Sensation: Tingling,  Numbness  Muscle Strength Left Arm: Weak Movement but Not Against Gravity or Resistance  RLE Motor Response: Responds to commands  RLE Sensation: Full sensation  Muscle Strength Right Leg: Good Strength Against Gravity and Moderate Resistance  LLE Motor Response: Flaccid  LLE Sensation: Tingling, Numbness  Muscle Strength Left Leg: Weak Movement but Not Against Gravity or Resistance  EENT (WDL):  WDL Except    Cardio/Pulmonary Assessment  Edema   RLE Edema: Trace  LLE Edema: Trace  Respiratory Breath Sounds  RUL Breath Sounds: Clear  RML Breath Sounds: Clear  RLL Breath Sounds: Diminished  MOHAMUD Breath Sounds: Clear  LLL Breath Sounds: Diminished  Cardiac Assessment   Cardiac (WDL):  WDL Except

## 2023-12-04 NOTE — THERAPY
Physical Therapy   Daily Treatment     Patient Name: Jason Lima  Age:  62 y.o., Sex:  male  Medical Record #: [id number]  Today's Date: 12/4/2023     Precautions  Precautions: Fall Risk  Comments: Left Hemiparesis, left visual inattention    Subjective    Pt seated in w/c upon arrival w/ SO presented, agreeable to session.     Objective       12/04/23 1031   PT Charge Group   PT Gait Training (Units) 2   PT Therapeutic Activities (Units) 2   Supervising Physical Therapist Vivian Mcdermott   PT Total Time Spent   PT Individual Total Time Spent (Mins) 60   Cognition    Level of Consciousness Alert   Gait Functional Level of Assist    Gait Level Of Assist Total Assist X 2  (modA w/ w/c follow for safety)   Assistive Device Other (Comments);Tanvir-Walker  (R hallway rail)   Distance (Feet)   (hallway rail 30x1 + tanvir walker 20 x1)   Deviation Step To;Decreased Base Of Support;Decreased Heel Strike;Decreased Toe Off   Transfer Functional Level of Assist   Bed, Chair, Wheelchair Transfer Moderate Assist   Bed Chair Wheelchair Transfer Description Increased time;Initial preparation for task;Set-up of equipment;Squat pivot transfer to wheelchair;Supervision for safety;Verbal cueing  (w/c <> EOM x3, SO completed xfer x1)   Bed Mobility    Sit to Stand Minimal Assist  (hallway rail, tanvir walker)   Scooting Moderate Assist   Interdisciplinary Plan of Care Collaboration   IDT Collaboration with  Family / Caregiver;Therapy Tech   Patient Position at End of Therapy Seated;Family / Friend in Room   Collaboration Comments SO presented throughout, tech assisted     totalA donning and dofreidaing LAFO.  Mirror in front during gait training for visual feedback.      Initiated hands on FT, education provided to SO re: gait belt, positioning and steps regarding SQPT. SO completed w/c <> EOM transfer w/ PTA provided cues.    SO provided home set up-    Main entrance 1STE        Bed set up            Master bathtub        Master shower        Master  "toilet          Assessment    Pt tolerated session well but is limited by fatigue after 2 bouts of ambulation. L lateral lean was able to improve w/ tactile cues and visual feedback, he was able to ambulate w/ hemiwalker for the first time but still required cues for WS to R during LLE swing(modA for LLE swing).      Strengths: Able to follow instructions, Independent prior level of function, Motivated for self care and independence, Pleasant and cooperative, Supportive family, Willingly participates in therapeutic activities  Barriers: Decreased endurance, Hemiparesis, Difficulty following instructions, Fatigue, Hypertension, Impaired activity tolerance, Impaired balance, Impaired insight/denial of deficits, Impaired functional cognition, Impaired sleep pattern, Impulsive, Limited mobility, Visual impairment, Poor family support (lives alone)    Plan    Continue midline orientation in sitting and standing, SQPT vs SPT with HW, w/c mobility with hemitechnique and environmental scanning, continue ambulation with AFO on R hallway rail progressing to AD as appropriate, bed mobility       DME  PT DME Recommendations  Wheelchair: 18\" Width  Cushion: Specialty (See other comments) (TBD pending ambulation progress)  Assistive Device: Tanvir Walker (TBD pending progress, currently 2 person assist for gait)  Additional Equipment: Other (Comments)     Passport items to be completed:  Get in/out of bed safely, in/out of a vehicle, safely use mobility device, walk or wheel around home/community, navigate up and down stairs, show how to get up/down from the ground, ensure home is accessible, demonstrate HEP, complete caregiver training    Physical Therapy Problems (Active)       Problem: Mobility       Dates: Start:  11/13/23         Goal: STG-Within one week, patient will ambulate distances >/= 30' c/ RHR and ModA or better.        Dates: Start:  11/13/23         Goal Note filed on 11/22/23 1102 by JUAN DANIEL Watts       " Limited to 10ft at right hallway rail mod assist                 Problem: PT-Long Term Goals       Dates: Start:  11/13/23         Goal: LTG-By discharge, patient will propel wheelchair >/= 300' at [place] or better.        Dates: Start:  11/13/23            Goal: LTG-By discharge, patient will ambulate >/= 50' c/ LRAD at [place] or better.        Dates: Start:  11/13/23            Goal: LTG-By discharge, patient will transfer one surface to another c/ Ren[place] or better.        Dates: Start:  11/13/23            Goal: LTG-By discharge, patient will ambulate up/down 1 step c/ LRAD at [place] or better.        Dates: Start:  11/13/23            Goal: LTG-By discharge, patient will transfer in/out of a car c/ ModA or better.        Dates: Start:  11/13/23

## 2023-12-04 NOTE — THERAPY
Recreational Therapy  Daily Treatment     Patient Name: Jason Lima  AGE:  62 y.o., SEX:  male  Medical Record #: [id number]  Today's Date: 12/4/2023       Subjective    Patient agreeable to recreation therapy session.      Objective       12/04/23 1415   Procedural Tracking   Procedural Tracking Community Re-Integration;Leisure Skills Awareness;Cognitive Skills Training;Gross Motor Functional Leisure Skills;Group Treatment;Fine Motor Functional Leisure Skills;Social Skills Training;Leisure Skills Development;Community Skills Development   Treatment Time   Total Time Spent (mins) 30   Total Time Missed 0   Functional Ability Status - Cognitive   Attention Span Remains on Task   Comprehension Follows One Step Commands;Follows Two Step Commands   Judgment Able to Make Independent Decisions   Functional Ability Status - Emotional    Affect Appropriate   Mood Appropriate   Behavior Appropriate   Skilled Intervention    Skilled Intervention Gross Motor Leisure;Cognitive Leisure;Fine Motor Leisure;Social Skills;Relaxation / Coping Skills   Skilled Intervention Comments kinetic sand   Interdisciplinary Plan of Care Collaboration   IDT Collaboration with  Family / Caregiver   Patient Position at End of Therapy Seated;Other (Comments)  (with fiance)   Strengths & Barriers   Strengths Able to follow instructions;Alert and oriented;Effective communication skills;Willingly participates in therapeutic activities;Supportive family;Pleasant and cooperative;Motivated for self care and independence;Making steady progress towards goals;Independent prior level of function   Barriers Decreased endurance;Generalized weakness;Fatigue;Emotional lability;Impaired activity tolerance;Impaired balance;Limited mobility         Assessment    Patient utilized Kinetic sand for sensory feedback with LUE during recreation therapy session. Patient started by digging out items of varying sizes and textures. Patient progressed to picking up and moving  the sand around. Patient provided a pop-it for increased fine motor practice.     Strengths: (P) Able to follow instructions, Alert and oriented, Effective communication skills, Willingly participates in therapeutic activities, Supportive family, Pleasant and cooperative, Motivated for self care and independence, Making steady progress towards goals, Independent prior level of function  Barriers: (P) Decreased endurance, Generalized weakness, Fatigue, Emotional lability, Impaired activity tolerance, Impaired balance, Limited mobility    Plan    Patient will benefit from continued recreation therapy sessions.     Passport items to be completed:  Verbalize two positive leisure activities, discuss returning to work, hobbies, community groups or volunteer activities, explore community resources

## 2023-12-04 NOTE — PROGRESS NOTES
NURSING DAILY NOTE    Name: Jason Lima   Date of Admission: 11/12/2023   Admitting Diagnosis: Thalamic hemorrhage (HCC)  Attending Physician: Lisa Lee D.o.  Allergies: Penicillins    Safety  Patient Assist  mod to max  Patient Precautions  Fall Risk  Precaution Comments  Left Hemiparesis, left visual inattention  Bed Transfer Status  Moderate Assist  Toilet Transfer Status   Moderate Assist  Assistive Devices  Rails, Wheelchair  Oxygen  None - Room Air  Diet/Therapeutic Dining  Current Diet Order   Procedures    Diet Order Diet: Consistent CHO (Diabetic) (2 gram sodium restriction; medications whole with thin liquids); Second Modifier: (optional): 2 Gram Sodium     Pill Administration  whole  Agitated Behavioral Scale  14  ABS Level of Severity  No Agitation    Fall Risk  Has the patient had a fall this admission?   Yes  Shellie Hamilton Fall Risk Scoring  23, HIGH RISK  Fall Risk Safety Measures  bed alarm and chair alarm    Vitals  Temperature: 36.7 °C (98 °F)  Temp src: Oral  Pulse: 74  Respiration: 16  Blood Pressure: 139/79  Blood Pressure MAP (Calculated): 99 MM HG  BP Location: Left, Upper Arm  Patient BP Position: Supine     Oxygen  Pulse Oximetry: 93 %  O2 (LPM): 0  FiO2%: 21 %  O2 Delivery Device: None - Room Air    Bowel and Bladder  Last Bowel Movement  12/02/23  Stool Type  Type 5: Soft blob with clear cut edges (passed easily)  Bowel Device  Bathroom  Continent  Bladder: Continent void   Bowel: Continent movement  Bladder Function  Urine Void (mL): 250 ml  Number of Times Voided: 1  Urinary Options: Yes  Urine Color: Yellow  Number of Times Incontinent of Urine: 0  Genitourinary Assessment   Bladder Assessment (WDL):  WDL Except  Echols Catheter: Not Applicable  Urine Color: Yellow  Number of Bladder Accidents: 0  Total Number of Bladder of Accidents in Last 7 Days: 0  Number of Times Incontinent of Urine: 0  Bladder Device: Urinal  Time  Void: No  Bladder Scan: Post Void  $ Bladder Scan Results (mL): 26    Skin  Polo Score   16  Sensory Interventions   Bed Types: Standard/Trauma Mattress  Skin Preventative Measures: Pillows in Use for Support / Positioning  Moisture Interventions  Moisturizers/Barriers: Barrier Paste, Barrier Wipes      Pain  Pain Rating Scale  0 - No Pain  Pain Location  Foot  Pain Location Orientation  Right, Left  Pain Interventions   Declines    ADLs    Bathing   Patient Refused Bathing (too tired)  Linen Change   Partial  Personal Hygiene  Change Deja Pads, Moist Deja Wipes, Perineal Care  Chlorhexidine Bath      Oral Care  Brushed Teeth  Teeth/Dentures     Shave  Self  Nutrition Percentage Eaten  Lunch, Between % Consumed (100%)  Environmental Precautions  Treaded Slipper Socks on Patient  Patient Turns/Positioning  Patient Turns Self from Side to Side  Patient Turns Assistance/Tolerance  Assistance of One  Bed Positions  Bed Controls On, Bed Locked  Head of Bed Elevated  Self regulated      Psychosocial/Neurologic Assessment  Psychosocial Assessment  Psychosocial (WDL):  Within Defined Limits  Patient Behaviors: Fatigue  Neurologic Assessment  Neuro (WDL): Exceptions to WDL  Level of Consciousness: Alert  Orientation Level: Oriented X4  Cognition: Follows commands  Speech: Clear, Slurred  Facial Symmetry: Left facial drooping  Pupil Assesment: No  R Pupil Size (mm): 3  R Pupil Shape / Description: Round  R Pupil Reaction: Brisk  L Pupil Size (mm): 3  L Pupil Shape / Description: Round  L Pupil Reaction: Brisk  Reflexes: Cough  Cough Reflex: Present  Motor Function/Sensation Assessment: Dorsiflexion, Motor response  R Foot Dorsiflexion: Strong  L Foot Dorsiflexion: Absent  RUE Motor Response: Responds to commands  RUE Sensation: Full sensation  Muscle Strength Right Arm: Normal Strength Against Gravity and Full Resistance  LUE Motor Response: Flaccid  LUE Sensation: Tingling, Numbness  Muscle Strength Left Arm: Weak  Movement but Not Against Gravity or Resistance  RLE Motor Response: Responds to commands  RLE Sensation: Full sensation  Muscle Strength Right Leg: Good Strength Against Gravity and Moderate Resistance  LLE Motor Response: Flaccid  LLE Sensation: Tingling, Numbness  Muscle Strength Left Leg: Weak Movement but Not Against Gravity or Resistance  EENT (WDL):  WDL Except    Cardio/Pulmonary Assessment  Edema   RLE Edema: Trace  LLE Edema: Trace  Respiratory Breath Sounds  RUL Breath Sounds: Clear  RML Breath Sounds: Clear  RLL Breath Sounds: Diminished  MOHAMUD Breath Sounds: Clear  LLL Breath Sounds: Diminished  Cardiac Assessment   Cardiac (WDL):  WDL Except

## 2023-12-04 NOTE — CARE PLAN
The patient is Stable - Low risk of patient condition declining or worsening    Shift Goals  Clinical Goals: safety  Patient Goals: safety, pain  Family Goals: increased pt strength, independence    Progress made toward(s) clinical / shift goals:      Problem: Knowledge Deficit - Standard  Goal: Patient and family/care givers will demonstrate understanding of plan of care, disease process/condition, diagnostic tests and medications  Outcome: Progressing     Problem: Fall Risk - Rehab  Goal: Patient will remain free from falls  Outcome: Progressing       Patient is not progressing towards the following goals:       intact

## 2023-12-04 NOTE — THERAPY
"Speech Language Pathology  Daily Treatment     Patient Name: Jason Lima  Age:  62 y.o., Sex:  male  Medical Record #: [id number]  Today's Date: 12/4/2023     Precautions  Precautions: Fall Risk  Comments: Left Hemiparesis, left visual inattention    Subjective    Patient agreeable to therapy.  SO came in towards end of session and has brought +2.75 magnifiying glasses.     Objective       12/04/23 0901   Treatment Charges   SLP Cognitive Skill Development First 15 Minutes 1   SLP Cognitive Skill Development Additional 15 Minutes 1   SLP Total Time Spent   SLP Individual Total Time Spent (Mins) 30   Cognition   Moderate Attention Minimal (4)   Planning / Decision Making Moderate (3)   Executive Functioning / Organization Moderate (3)   Interdisciplinary Plan of Care Collaboration   Collaboration Comments SO came in towards end of session and has brought +2.75 magnifiying glasses.         Assessment    Patient worked on attention and executive elements within counting the's task.  Patient reported that he didn't know, when asked to estimate how he expected to perform.  He identified one strategy to help improve outcome ( \"go slower\").  Educated on typical task challenges during task.   He located 10/15 targets 66%  and was able to locate 3 additional with min cues, mod cuing needed for remainder.  Strengths: Alert and oriented, Effective communication skills, Motivated for self care and independence, Pleasant and cooperative, Independent prior level of function, Making steady progress towards goals, Willingly participates in therapeutic activities  Barriers: Impaired functional cognition (depression, left neglect, difficulty self monitoring/regulating)    Plan    Target alternating attention, organization and reasoning, recall.    Passport items to be completed:  Express basic needs, understand food/liquid recommendations, consistently follow swallow precautions, manage finances, manage medications, arrive to therapy " appointments on time, complete daily memory log entries, solve problems related to safety situations, review education related to hospitalization, complete caregiver training     Speech Therapy Problems (Active)       Problem: Problem Solving STGs       Dates: Start:  11/22/23         Goal: STG-Within one week, patient will perform alternating attention tasks with 85% acc with set up.       Dates: Start:  11/22/23         Goal Note filed on 11/29/23 1121 by Neva Abbott MS,CCC-SLP       MOD A, continues to require cues               Goal: STG-Within one week, patient will organization and reasoning tasks with 80% acc with min cues.       Dates: Start:  11/22/23         Goal Note filed on 11/29/23 1121 by Neva Abbott MS,CCC-SLP       Will continue to target, continues to require MOD A                 Problem: Speech/Swallowing LTGs       Dates: Start:  11/13/23         Goal: LTG-By discharge, patient will safely swallow (7)regular diet textures and (0) thin liquids with no overt s/sx of aspiration for 2/2 days.       Dates: Start:  11/13/23            Goal: LTG-By discharge, patient will solve complex problem       Dates: Start:  11/13/23       Description: For safety and self care with 80% acc mod I  for safe discharge home.         Goal: LTG-By discharge, patient will recall new training with 80% acc with min A and use of external memory aids.       Dates: Start:  11/13/23               Problem: Swallowing STGs       Dates: Start:  11/13/23

## 2023-12-04 NOTE — PROGRESS NOTES
Hospital Medicine Daily Progress Note      Chief Complaint:  Hypertension  Diabetes  CKD/ROMÁN    Interval History:  No complaints.  Pt and family had questions on extending his Rehab stay -- defer to Physiatry and Case Manger.    Review of Systems  Review of Systems   Constitutional:  Negative for chills and fever.   Respiratory:  Negative for shortness of breath.    Cardiovascular:  Negative for chest pain.   Gastrointestinal:  Negative for abdominal pain, diarrhea, nausea and vomiting.   Psychiatric/Behavioral:  The patient is not nervous/anxious.         Physical Exam  Temp:  [36.7 °C (98 °F)-36.8 °C (98.3 °F)] 36.8 °C (98.3 °F)  Pulse:  [74-81] 76  Resp:  [16-18] 18  BP: (125-139)/(79-80) 125/79  SpO2:  [93 %-96 %] 96 %    Physical Exam  Vitals and nursing note reviewed.   Constitutional:       Appearance: Normal appearance.   HENT:      Head: Atraumatic.   Eyes:      Extraocular Movements: Extraocular movements intact.      Conjunctiva/sclera: Conjunctivae normal.      Pupils: Pupils are equal, round, and reactive to light.   Cardiovascular:      Rate and Rhythm: Normal rate and regular rhythm.      Heart sounds: No murmur heard.  Pulmonary:      Effort: Pulmonary effort is normal.      Breath sounds: No stridor. No wheezing or rales.   Abdominal:      General: There is no distension.      Palpations: Abdomen is soft.      Tenderness: There is no abdominal tenderness.   Musculoskeletal:      Cervical back: Normal range of motion and neck supple.      Right lower leg: No edema.      Left lower leg: Edema present.      Comments: Has LUE swelling   Skin:     General: Skin is warm and dry.      Findings: No rash.   Neurological:      Mental Status: He is alert and oriented to person, place, and time.   Psychiatric:         Mood and Affect: Mood normal.         Behavior: Behavior normal.         Fluids    Intake/Output Summary (Last 24 hours) at 12/4/2023 0927  Last data filed at 12/4/2023 0730  Gross per 24 hour    Intake 920 ml   Output 775 ml   Net 145 ml         Laboratory        Recent Labs     12/02/23  0621   SODIUM 138   POTASSIUM 3.7   CHLORIDE 104   CO2 25   GLUCOSE 141*   BUN 35*   CREATININE 2.58*   CALCIUM 9.0                   Assessment/Plan  DVT (deep venous thrombosis) (HCC)  Assessment & Plan  US LUE: acute thrombus in paired brachial veins  US LLE: acute thrombus in paired peroneal veins  Cont Eliquis    Hemorrhagic stroke (HCC)- (present on admission)  Assessment & Plan  Had right thalamic hemorrhage on 10/23/23 while visiting [facility] in [place]    CKD (chronic kidney disease)- (present on admission)  Assessment & Plan  Appears to have CKD (bun/cr elevated in 2017)  Bun/Cr back at baseline after IVF's  US: medical renal disease, no hydronephrosis  Encouraging fluid intake  S/P IVF's NS x 3 prior runs (which has helped)  S/P IVF's NS x 1 liter (11/30)  Cont to monitor    DM (diabetes mellitus) ([facility])- (present on admission)  Assessment & Plan  Hba1c: 6.4  BS were good  Off accuchecks  Note: home meds include Metformin (but now has CKD)    Hyperlipidemia- (present on admission)  Assessment & Plan  Cont Lipitor    Hypertension- (present on admission)  Assessment & Plan  BP better recently  Cont Norvasc  Cont Hydralazine  Note: home meds include Norvasc  Con to monitor

## 2023-12-05 ENCOUNTER — APPOINTMENT (OUTPATIENT)
Dept: OCCUPATIONAL THERAPY | Facility: REHABILITATION | Age: 63
DRG: 057 | End: 2023-12-05
Attending: PHYSICAL MEDICINE & REHABILITATION
Payer: COMMERCIAL

## 2023-12-05 ENCOUNTER — APPOINTMENT (OUTPATIENT)
Dept: PHYSICAL THERAPY | Facility: REHABILITATION | Age: 63
DRG: 057 | End: 2023-12-05
Attending: PHYSICAL MEDICINE & REHABILITATION
Payer: COMMERCIAL

## 2023-12-05 ENCOUNTER — APPOINTMENT (OUTPATIENT)
Dept: SPEECH THERAPY | Facility: REHABILITATION | Age: 63
DRG: 057 | End: 2023-12-05
Attending: PHYSICAL MEDICINE & REHABILITATION
Payer: COMMERCIAL

## 2023-12-05 PROCEDURE — 97535 SELF CARE MNGMENT TRAINING: CPT

## 2023-12-05 PROCEDURE — 700102 HCHG RX REV CODE 250 W/ 637 OVERRIDE(OP): Performed by: STUDENT IN AN ORGANIZED HEALTH CARE EDUCATION/TRAINING PROGRAM

## 2023-12-05 PROCEDURE — 97130 THER IVNTJ EA ADDL 15 MIN: CPT

## 2023-12-05 PROCEDURE — 99232 SBSQ HOSP IP/OBS MODERATE 35: CPT | Performed by: HOSPITALIST

## 2023-12-05 PROCEDURE — 700102 HCHG RX REV CODE 250 W/ 637 OVERRIDE(OP): Performed by: HOSPITALIST

## 2023-12-05 PROCEDURE — 97129 THER IVNTJ 1ST 15 MIN: CPT

## 2023-12-05 PROCEDURE — 700102 HCHG RX REV CODE 250 W/ 637 OVERRIDE(OP): Performed by: PHYSICAL MEDICINE & REHABILITATION

## 2023-12-05 PROCEDURE — A9270 NON-COVERED ITEM OR SERVICE: HCPCS | Performed by: PHYSICAL MEDICINE & REHABILITATION

## 2023-12-05 PROCEDURE — 770010 HCHG ROOM/CARE - REHAB SEMI PRIVAT*

## 2023-12-05 PROCEDURE — 97112 NEUROMUSCULAR REEDUCATION: CPT

## 2023-12-05 PROCEDURE — 97530 THERAPEUTIC ACTIVITIES: CPT

## 2023-12-05 PROCEDURE — A9270 NON-COVERED ITEM OR SERVICE: HCPCS | Performed by: HOSPITALIST

## 2023-12-05 PROCEDURE — A9270 NON-COVERED ITEM OR SERVICE: HCPCS | Performed by: STUDENT IN AN ORGANIZED HEALTH CARE EDUCATION/TRAINING PROGRAM

## 2023-12-05 PROCEDURE — 99232 SBSQ HOSP IP/OBS MODERATE 35: CPT | Performed by: PHYSICAL MEDICINE & REHABILITATION

## 2023-12-05 PROCEDURE — 97116 GAIT TRAINING THERAPY: CPT

## 2023-12-05 RX ADMIN — ATORVASTATIN CALCIUM 40 MG: 40 TABLET, FILM COATED ORAL at 22:30

## 2023-12-05 RX ADMIN — AMLODIPINE BESYLATE 10 MG: 5 TABLET ORAL at 05:26

## 2023-12-05 RX ADMIN — SERTRALINE 25 MG: 50 TABLET, FILM COATED ORAL at 08:04

## 2023-12-05 RX ADMIN — OMEPRAZOLE 20 MG: 20 CAPSULE, DELAYED RELEASE ORAL at 08:03

## 2023-12-05 RX ADMIN — BACLOFEN 5 MG: 10 TABLET ORAL at 08:03

## 2023-12-05 RX ADMIN — SENNOSIDES AND DOCUSATE SODIUM 2 TABLET: 50; 8.6 TABLET ORAL at 22:30

## 2023-12-05 RX ADMIN — APIXABAN 5 MG: 5 TABLET, FILM COATED ORAL at 22:30

## 2023-12-05 RX ADMIN — BACLOFEN 5 MG: 10 TABLET ORAL at 15:14

## 2023-12-05 RX ADMIN — SENNOSIDES AND DOCUSATE SODIUM 2 TABLET: 50; 8.6 TABLET ORAL at 08:03

## 2023-12-05 RX ADMIN — TRAZODONE HYDROCHLORIDE 75 MG: 50 TABLET ORAL at 22:30

## 2023-12-05 RX ADMIN — HYDRALAZINE HYDROCHLORIDE 100 MG: 50 TABLET, FILM COATED ORAL at 05:25

## 2023-12-05 RX ADMIN — HYDRALAZINE HYDROCHLORIDE 100 MG: 50 TABLET, FILM COATED ORAL at 22:29

## 2023-12-05 RX ADMIN — BACLOFEN 5 MG: 10 TABLET ORAL at 22:30

## 2023-12-05 RX ADMIN — APIXABAN 5 MG: 5 TABLET, FILM COATED ORAL at 08:03

## 2023-12-05 RX ADMIN — HYDRALAZINE HYDROCHLORIDE 100 MG: 50 TABLET, FILM COATED ORAL at 15:14

## 2023-12-05 ASSESSMENT — ENCOUNTER SYMPTOMS
FOCAL WEAKNESS: 1
MUSCULOSKELETAL NEGATIVE: 1
VOMITING: 0
CHILLS: 0
EYES NEGATIVE: 1
ABDOMINAL PAIN: 0
NAUSEA: 0
SHORTNESS OF BREATH: 0
BRUISES/BLEEDS EASILY: 0
FEVER: 0
POLYDIPSIA: 0
COUGH: 0
PALPITATIONS: 0

## 2023-12-05 ASSESSMENT — GAIT ASSESSMENTS
DEVIATION: STEP TO;DECREASED BASE OF SUPPORT;DECREASED HEEL STRIKE;DECREASED TOE OFF
ASSISTIVE DEVICE: OTHER (COMMENTS)
DISTANCE (FEET): 50
DISTANCE (FEET): 30
GAIT LEVEL OF ASSIST: TOTAL ASSIST X 2
GAIT LEVEL OF ASSIST: TOTAL ASSIST X 2

## 2023-12-05 ASSESSMENT — ACTIVITIES OF DAILY LIVING (ADL): BED_CHAIR_WHEELCHAIR_TRANSFER_DESCRIPTION: SET-UP OF EQUIPMENT;INCREASED TIME

## 2023-12-05 NOTE — THERAPY
"Occupational Therapy  Daily Treatment     Patient Name: Jason Lima  Age:  62 y.o., Sex:  male  Medical Record #: [id number]  Today's Date: 12/5/2023     Precautions  Precautions: (P) Fall Risk  Comments: (P) Left Hemiparesis, left visual inattention         Subjective    Pt encountered for OT sitting in WC. Pleasant and agreeable to participate in thera act. \"I have not been doing my eye exercises, but I have been working on my (L) hand (e.g. volitional opening and closing).\"     Objective       12/05/23 1031   OT Charge Group   OT Self Care / ADL (Units) 1   OT Neuromuscular Re-education / Balance (Units) 1   OT Therapy Activity (Units) 2   OT Total Time Spent   OT Individual Total Time Spent (Mins) 60   Precautions   Precautions Fall Risk   Comments Left Hemiparesis, left visual inattention   Functional Level of Assist   Toileting Moderate Assist   Toileting Description Assist to pull pants up;Assist to pull pants down;Assist for standing balance;Grab bar;Supervision for safety   Toilet Transfers Moderate Assist   Toilet Transfer Description Grab bar;Adaptive equipment;Supervision for safety;Verbal cueing;Other (comment)  (Stand step from WC <> toilet with minimal VC to maintain an upright posture)   Interdisciplinary Plan of Care Collaboration   IDT Collaboration with  Family / Caregiver   Patient Position at End of Therapy Seated;Chair Alarm On;Tray Table within Reach;Phone within Reach;Family / Friend in Room  (SO present at the end of the session)   Collaboration Comments POC. Discussed modifying home bed height and bed rail to help with transfers     Neuromuscular re-ed: K-tape applied along the wrist extensors of the L arm to facilitate wrist extension and inhibit flexor tone.     Standing balance at the therapy mat + BUE WB. Initial assistance to extend the wrist and open the hand. After about 2 min, pt able to maintain without assistance for up to 2 min prior to requesting a seated RB d/t LBP.    Reviewed " vergence eye exercises with vision occluded. W/o vision occluded, R eye unable to converge with L eye. With L eye occluded, R eye able to converge. Reviewed and recommended vergence exercises 2x/day for 10 reps on each eye using provided eye patch. Pt verbalized understanding and good return demo.         Discussed with SO recommendations for home bed modifications to help with safe transfers. Recommended lowering the bed to about 22-23 inches heigh and a bed rail. SO in active process of modifying bed room.    Assessment    Overall, steady progress with dynamic/static balance during thera act as pt is now able to participate in functional transfers with max to modA and maintain, upright, standing balance while assisting self on mat for up to 2 min.      Strengths: Able to follow instructions, Independent prior level of function, Motivated for self care and independence, Pleasant and cooperative, Supportive family  Barriers: Decreased endurance, Fatigue, Generalized weakness, Hemiparesis, Impaired activity tolerance, Impaired balance, Limited mobility, Spasticity    Plan    Cont ADLs, functional transfers, and thera act/ex to maximize functional recovery for safe DC home.       DME  OT DME Recommendations  Bathroom Equipment: 3 in 1 Commode  Additional Equipment: Other (Comments)    Passport items to be completed:  Perform bathroom transfers, complete dressing, complete feeding, get ready for the day, prepare a simple meal, participate in household tasks, adapt home for safety needs, demonstrate home exercise program, complete caregiver training     Occupational Therapy Goals (Active)       Problem: Bathing       Dates: Start:  11/29/23         Goal: STG-Within one week, patient will bathe at [facility] using LRD       Dates: Start:  11/29/23               Problem: Dressing       Dates: Start:  11/22/23         Goal: STG-Within one week, patient will dress LB at [facility] using LRD       Dates: Start:  11/22/23             Goal: STG-Within one week, patient will dress UB at [facility]       Dates: Start:  11/29/23               Problem: Functional Transfers       Dates: Start:  11/13/23         Goal: STG-Within one week, patient will transfer to step in shower with Max A.       Dates: Start:  11/13/23            Goal: STG-Within one week, patient will transfer to toilet at modA       Dates: Start:  11/29/23               Problem: OT Long Term Goals       Dates: Start:  11/13/23         Goal: LTG-By discharge, patient will complete basic self care tasks at Mod Independent level.       Dates: Start:  11/13/23            Goal: LTG-By discharge, patient will perform bathroom transfers at Mod Independent level.       Dates: Start:  11/13/23

## 2023-12-05 NOTE — CARE PLAN
Problem: Fall Risk - Rehab  Goal: Patient will remain free from falls  Outcome: Progressing     Problem: Self Care  Goal: Patient will have the ability to perform ADLs independently or with assistance  Outcome: Progressing       The patient is Stable - Low risk of patient condition declining or worsening    Shift Goals  Clinical Goals: safety  Patient Goals: safety, pain  Family Goals: increased pt strength, independence

## 2023-12-05 NOTE — CARE PLAN
The patient is Watcher - Medium risk of patient condition declining or worsening    Shift Goals  Clinical Goals: safety  Patient Goals: safety, pain  Family Goals: increased pt strength, independence    Progress made toward(s) clinical / shift goals:    Problem: Skin Integrity  Goal: Skin integrity is maintained or improved  12/5/2023 0101 by Clarissa Perez R.N.  Note: Patient's skin remains intact and free from new or accidental injury this shift.  Will continue to monitor.  12/5/2023 0059 by Clarissa Perez R.N.  Outcome: Progressing     Problem: Fall Risk - Rehab  Goal: Patient will remain free from falls  Outcome: Progressing     Problem: Psychosocial  Goal: Patient's level of anxiety will decrease  Outcome: Progressing       Sleeping comfortably. Wearing CPAP.

## 2023-12-05 NOTE — PROGRESS NOTES
NURSING DAILY NOTE    Name: Jason Lima   Date of Admission: 11/12/2023   Admitting Diagnosis: Thalamic hemorrhage (HCC)  Attending Physician: Lisa Lee D.o.  Allergies: Penicillins    Safety  Patient Assist  mod to max  Patient Precautions  Fall Risk  Precaution Comments  Left Hemiparesis, left visual inattention  Bed Transfer Status  Moderate Assist  Toilet Transfer Status   Moderate Assist  Assistive Devices  Rails, Wheelchair  Oxygen  CPAP  Diet/Therapeutic Dining  Current Diet Order   Procedures    Diet Order Diet: Consistent CHO (Diabetic) (2 gram sodium restriction; medications whole with thin liquids); Second Modifier: (optional): 2 Gram Sodium     Pill Administration  whole  Agitated Behavioral Scale  14  ABS Level of Severity  No Agitation    Fall Risk  Has the patient had a fall this admission?   Yes  Shellie Hamilton Fall Risk Scoring  23, HIGH RISK  Fall Risk Safety Measures  bed alarm and chair alarm    Vitals  Temperature: 36.7 °C (98 °F)  Temp src: Oral  Pulse: 70  Respiration: 18  Blood Pressure: (!) 143/83  Blood Pressure MAP (Calculated): 103 MM HG  BP Location: Right, Upper Arm  Patient BP Position: Sitting     Oxygen  Pulse Oximetry: 94 %  O2 (LPM): 0  FiO2%: 21 %  O2 Delivery Device: CPAP    Bowel and Bladder  Last Bowel Movement  12/04/23  Stool Type  Type 5: Soft blob with clear cut edges (passed easily)  Bowel Device  Bathroom  Continent  Bladder: Continent void   Bowel: Continent movement  Bladder Function  Urine Void (mL): 300 ml  Number of Times Voided: 1  Urinary Options: Yes  Urine Color: Yellow  Number of Times Incontinent of Urine: 0  Genitourinary Assessment   Bladder Assessment (WDL):  WDL Except  Echols Catheter: Not Applicable  Urine Color: Yellow  Number of Bladder Accidents: 0  Total Number of Bladder of Accidents in Last 7 Days: 0  Number of Times Incontinent of Urine: 0  Bladder Device: Urinal  Time Void:  No  Bladder Scan: Post Void  $ Bladder Scan Results (mL): 26    Skin  Polo Score   16  Sensory Interventions   Bed Types: Standard/Trauma Mattress  Skin Preventative Measures: Pillows in Use for Support / Positioning  Moisture Interventions  Moisturizers/Barriers: Barrier Cream      Pain  Pain Rating Scale  0 - No Pain  Pain Location  Foot  Pain Location Orientation  Right, Left  Pain Interventions   Declines    ADLs    Bathing   Patient Refused Bathing (too tired)  Linen Change   Partial  Personal Hygiene  Change Deja Pads, Moist Deja Wipes, Perineal Care  Chlorhexidine Bath      Oral Care  Brushed Teeth  Teeth/Dentures     Shave  Self  Nutrition Percentage Eaten  *  * Meal *  *, Between % Consumed  Environmental Precautions  Treaded Slipper Socks on Patient  Patient Turns/Positioning  Patient Turns Self from Side to Side  Patient Turns Assistance/Tolerance  Assistance of One  Bed Positions  Bed Controls On, Bed Locked  Head of Bed Elevated  Self regulated      Psychosocial/Neurologic Assessment  Psychosocial Assessment  Psychosocial (WDL):  Within Defined Limits  Patient Behaviors: Fatigue  Neurologic Assessment  Neuro (WDL): Exceptions to WDL  Level of Consciousness: Alert  Orientation Level: Oriented X4  Cognition: Follows commands  Speech: Clear, Slurred  Facial Symmetry: Left facial drooping  Pupil Assesment: No  R Pupil Size (mm): 3  R Pupil Shape / Description: Round  R Pupil Reaction: Brisk  L Pupil Size (mm): 3  L Pupil Shape / Description: Round  L Pupil Reaction: Brisk  Reflexes: Cough  Cough Reflex: Present  Motor Function/Sensation Assessment: Dorsiflexion, Motor response  R Foot Dorsiflexion: Strong  L Foot Dorsiflexion: Absent  RUE Motor Response: Responds to commands  RUE Sensation: Full sensation  Muscle Strength Right Arm: Normal Strength Against Gravity and Full Resistance  LUE Motor Response: Flaccid  LUE Sensation: Tingling, Numbness  Muscle Strength Left Arm: Weak Movement but Not  Against Gravity or Resistance  RLE Motor Response: Responds to commands  RLE Sensation: Full sensation  Muscle Strength Right Leg: Good Strength Against Gravity and Moderate Resistance  LLE Motor Response: Flaccid  LLE Sensation: Tingling, Numbness  Muscle Strength Left Leg: Weak Movement but Not Against Gravity or Resistance  EENT (WDL):  WDL Except    Cardio/Pulmonary Assessment  Edema   RLE Edema: Trace  LLE Edema: Trace  Respiratory Breath Sounds  RUL Breath Sounds: Clear  RML Breath Sounds: Clear  RLL Breath Sounds: Diminished  MOHAMUD Breath Sounds: Clear  LLL Breath Sounds: Diminished  Cardiac Assessment   Cardiac (WDL):  WDL Except

## 2023-12-05 NOTE — PROGRESS NOTES
NURSING DAILY NOTE    Name: Jason Lima   Date of Admission: 11/12/2023   Admitting Diagnosis: Thalamic hemorrhage (HCC)  Attending Physician: Lisa Lee D.o.  Allergies: Penicillins    Safety  Patient Assist  mod to max  Patient Precautions  Fall Risk  Precaution Comments  Left Hemiparesis, left visual inattention  Bed Transfer Status  Moderate Assist  Toilet Transfer Status   Moderate Assist  Assistive Devices  Rails, Wheelchair  Oxygen  None - Room Air  Diet/Therapeutic Dining  Current Diet Order   Procedures    Diet Order Diet: Consistent CHO (Diabetic) (2 gram sodium restriction; medications whole with thin liquids); Second Modifier: (optional): 2 Gram Sodium     Pill Administration  whole  Agitated Behavioral Scale  14  ABS Level of Severity  No Agitation    Fall Risk  Has the patient had a fall this admission?   Yes  Shellie Hamilton Fall Risk Scoring  23, HIGH RISK  Fall Risk Safety Measures  bed alarm and chair alarm    Vitals  Temperature: 36.7 °C (98 °F)  Temp src: Oral  Pulse: 75  Respiration: 18  Blood Pressure: (!) 140/98  Blood Pressure MAP (Calculated): 112 MM HG  BP Location: Right, Upper Arm  Patient BP Position: Sitting     Oxygen  Pulse Oximetry: 94 %  O2 (LPM): 0  FiO2%: 21 %  O2 Delivery Device: None - Room Air    Bowel and Bladder  Last Bowel Movement  12/04/23  Stool Type  Type 5: Soft blob with clear cut edges (passed easily)  Bowel Device  Bathroom  Continent  Bladder: Continent void   Bowel: Continent movement  Bladder Function  Urine Void (mL): 600 ml  Number of Times Voided: 1  Urinary Options: Yes  Urine Color: Yellow  Number of Times Incontinent of Urine: 0  Genitourinary Assessment   Bladder Assessment (WDL):  WDL Except  Echols Catheter: Not Applicable  Urine Color: Yellow  Number of Bladder Accidents: 0  Total Number of Bladder of Accidents in Last 7 Days: 0  Number of Times Incontinent of Urine: 0  Bladder Device:  Urinal  Time Void: No  Bladder Scan: Post Void  $ Bladder Scan Results (mL): 26    Skin  Polo Score   16  Sensory Interventions   Bed Types: Standard/Trauma Mattress  Skin Preventative Measures: Pillows in Use for Support / Positioning  Moisture Interventions  Moisturizers/Barriers: Barrier Cream, Barrier Paste      Pain  Pain Rating Scale  0 - No Pain  Pain Location  Foot  Pain Location Orientation  Right, Left  Pain Interventions   Declines    ADLs    Bathing   Patient Refused Bathing (too tired)  Linen Change   Partial  Personal Hygiene  Change Deja Pads, Moist Deja Wipes, Perineal Care  Chlorhexidine Bath      Oral Care  Brushed Teeth  Teeth/Dentures     Shave  Self  Nutrition Percentage Eaten  *  * Meal *  *, Between % Consumed  Environmental Precautions  Treaded Slipper Socks on Patient  Patient Turns/Positioning  Patient Turns Self from Side to Side  Patient Turns Assistance/Tolerance  Assistance of One  Bed Positions  Bed Controls On, Bed Locked  Head of Bed Elevated  Self regulated      Psychosocial/Neurologic Assessment  Psychosocial Assessment  Psychosocial (WDL):  Within Defined Limits  Patient Behaviors: Fatigue  Neurologic Assessment  Neuro (WDL): Exceptions to WDL  Level of Consciousness: Alert  Orientation Level: Oriented X4  Cognition: Follows commands  Speech: Clear, Slurred  Facial Symmetry: Left facial drooping  Pupil Assesment: No  R Pupil Size (mm): 3  R Pupil Shape / Description: Round  R Pupil Reaction: Brisk  L Pupil Size (mm): 3  L Pupil Shape / Description: Round  L Pupil Reaction: Brisk  Reflexes: Cough  Cough Reflex: Present  Motor Function/Sensation Assessment: Dorsiflexion, Motor response  R Foot Dorsiflexion: Strong  L Foot Dorsiflexion: Absent  RUE Motor Response: Responds to commands  RUE Sensation: Full sensation  Muscle Strength Right Arm: Normal Strength Against Gravity and Full Resistance  LUE Motor Response: Flaccid  LUE Sensation: Tingling, Numbness  Muscle Strength  Left Arm: Weak Movement but Not Against Gravity or Resistance  RLE Motor Response: Responds to commands  RLE Sensation: Full sensation  Muscle Strength Right Leg: Good Strength Against Gravity and Moderate Resistance  LLE Motor Response: Flaccid  LLE Sensation: Tingling, Numbness  Muscle Strength Left Leg: Weak Movement but Not Against Gravity or Resistance  EENT (WDL):  WDL Except    Cardio/Pulmonary Assessment  Edema   RLE Edema: Trace  LLE Edema: Trace  Respiratory Breath Sounds  RUL Breath Sounds: Clear  RML Breath Sounds: Clear  RLL Breath Sounds: Diminished  MOHAMUD Breath Sounds: Clear  LLL Breath Sounds: Diminished  Cardiac Assessment   Cardiac (WDL):  WDL Except

## 2023-12-05 NOTE — PROGRESS NOTES
Hospital Medicine Daily Progress Note      Chief Complaint:  Hypertension  Diabetes  Renal Failure    Interval History:  No overnight issues.    Review of Systems  Review of Systems   Constitutional:  Negative for chills and fever.   HENT: Negative.     Eyes: Negative.    Respiratory:  Negative for cough and shortness of breath.    Cardiovascular:  Negative for chest pain and palpitations.   Gastrointestinal:  Negative for abdominal pain, nausea and vomiting.   Genitourinary: Negative.    Musculoskeletal: Negative.    Skin:  Negative for itching and rash.   Neurological:  Positive for focal weakness.   Endo/Heme/Allergies:  Negative for polydipsia. Does not bruise/bleed easily.        Physical Exam  Temp:  [36.6 °C (97.8 °F)-36.7 °C (98.1 °F)] 36.6 °C (97.8 °F)  Pulse:  [68-76] 76  Resp:  [18] 18  BP: (127-144)/(83-98) 144/92  SpO2:  [94 %-97 %] 97 %    Physical Exam  Vitals reviewed.   Constitutional:       General: He is not in acute distress.     Appearance: Normal appearance. He is not ill-appearing.   HENT:      Head: Normocephalic and atraumatic.      Right Ear: External ear normal.      Left Ear: External ear normal.      Nose: Nose normal.      Mouth/Throat:      Pharynx: Oropharynx is clear.   Eyes:      General:         Right eye: No discharge.         Left eye: No discharge.      Extraocular Movements: Extraocular movements intact.      Conjunctiva/sclera: Conjunctivae normal.   Cardiovascular:      Rate and Rhythm: Normal rate and regular rhythm.   Pulmonary:      Effort: Pulmonary effort is normal. No respiratory distress.      Breath sounds: Normal breath sounds. No wheezing.   Abdominal:      General: Bowel sounds are normal. There is no distension.      Palpations: Abdomen is soft.      Tenderness: There is no abdominal tenderness.   Musculoskeletal:      Cervical back: Normal range of motion and neck supple.      Right lower leg: No edema.      Left lower leg: Edema present.      Comments: ANN  +edema   Skin:     General: Skin is warm and dry.   Neurological:      Mental Status: He is alert and oriented to person, place, and time.      Comments: Left sided weakness         Fluids    Intake/Output Summary (Last 24 hours) at 12/5/2023 1525  Last data filed at 12/5/2023 1205  Gross per 24 hour   Intake 1440 ml   Output 850 ml   Net 590 ml       Laboratory                      Assessment/Plan  DVT (deep venous thrombosis) ([facility])  Assessment & Plan  U/S LUE acute thrombus in paired brachial veins  U/S LLE acute thrombus in paired peroneal veins  Anticoagulated on Eliquis  Check F/U labs in AM    Hemorrhagic stroke ([facility])- (present on admission)  Assessment & Plan  Pt suffered R thalamic hemorrhage on 10/23/23 while visiting [place]  Presented there w/ sudden onset HA, L HP, and /100  Now on Zoloft and Trazodone  Management per Physiatry    CKD (chronic kidney disease)- (present on admission)  Assessment & Plan  U/S medical renal disease, no hydronephrosis  Avoid nephrotoxins  Renal dose all meds  Monitor electrolytes  Outpt Nephrology F/U  Check F/U labs in AM    DM (diabetes mellitus) ([facility])- (present on admission)  Assessment & Plan  HbA1c 6.4  Off FSBS and SSI  Continue diet control  Outpt meds include Metformin    Hyperlipidemia- (present on admission)  Assessment & Plan  On Lipitor  Outpt meds include Lipitor    Hypertension- (present on admission)  Assessment & Plan  On max doses of Norvasc and Hydralazine  Has occasional BP readings in the 140's  Outpt meds include Norvasc    Full Code

## 2023-12-05 NOTE — PROGRESS NOTES
"  Physical Medicine & Rehabilitation Progress Note    Encounter Date: 12/5/2023    Chief Complaint: Doing well    Interval Events (Subjective):  ROMAIN  BM 12/4  Voiding    Seen and examined in the therapy gym. He is doing well. Wondering how long he has to wear compression stockings.     ROS: 14 point ROS negative unless otherwise specified in the HPI    Objective:  VITAL SIGNS: /84   Pulse 68   Temp 36.7 °C (98.1 °F) (Oral)   Resp 18   Ht 1.727 m (5' 8\")   Wt 87 kg (191 lb 12.8 oz)   SpO2 95%   BMI 29.16 kg/m²     GEN: No apparent distress  HEENT: Head normocephalic, atraumatic.  Sclera nonicteric bilaterally, no ocular discharge appreciated bilaterally.  CV: Extremities warm and well-perfused, no peripheral edema appreciated bilaterally.  PULMONARY: Breathing nonlabored on room air, no respiratory accessory muscle use.  Not requiring supplemental oxygen.  SKIN: No appreciable skin breakdown on exposed areas of skin.  PSYCH: Normal affect  NEURO: Awake alert.  Conversational.  Logical thought content. Dysarthria. Left Hemiparesis. Tone in left wrist, fingers 2/4 and in bicep 1+/4      Laboratory Values:  No results found for this or any previous visit (from the past 72 hour(s)).        Medications:  Scheduled Medications   Medication Dose Frequency    baclofen  5 mg TID    amLODIPine  10 mg DAILY    ciprofloxacin  1 Drop Q4HRS WHILE AWAKE    diclofenac sodium  2 g 4X/DAY    sertraline  25 mg DAILY    apixaban  5 mg BID    hydrALAZINE  100 mg Q8HRS    traZODone  75 mg QHS    senna-docusate  2 Tablet BID    omeprazole  20 mg DAILY    atorvastatin  40 mg Nightly     PRN medications: carboxymethylcellulose, melatonin, [DISCONTINUED] insulin regular **AND** [CANCELED] POC blood glucose manual result **AND** NOTIFY MD and PharmD **AND** Administer 20 grams of glucose (approximately 8 ounces of fruit juice) every 15 minutes PRN FSBG less than 70 mg/dL **AND** dextrose bolus, Respiratory Therapy Consult, " senna-docusate **AND** polyethylene glycol/lytes **AND** magnesium hydroxide **AND** bisacodyl, mag hydrox-al hydrox-simeth, ondansetron **OR** ondansetron, sodium chloride, [] acetaminophen **FOLLOWED BY** acetaminophen, oxyCODONE immediate-release **OR** oxyCODONE immediate-release, hydrALAZINE    Diet:  Current Diet Order   Procedures    Diet Order Diet: Consistent CHO (Diabetic) (2 gram sodium restriction; medications whole with thin liquids); Second Modifier: (optional): 2 Gram Sodium       Medical Decision Making and Plan:  Right thalamic hemorrhagic stroke ~ last week 2023  Originally presented with a headache and left-sided weakness to hospital in [facility]  Work-up at the outside hospital in [facility] revealed a right thalamic hemorrhagic stroke  Repeat CT head done after return flight to the ,  CT head shows right thalamic hemorrhage, decrease in density since prior studies from the outside hospital, slight asymmetric dilation of the left ventricular system including the left temporal horn with a possible component of hydrocephalus  Planning for BP less than 140, avoiding any kind of anticoagulation  Initiate comprehensive IRF level therapy with 3 days of therapy 5 days a week with services from PT/OT/SLP     Spasticity  Continue baclofen 5 mg nightly, started on  --> increase to TID 2023 --> continue 2023, stable on higher dose.   PRAFO ordered for right lower extremity to prevent further plantarflexion contracture  Spasticity controlled, continue Baclofen 2023   Consider weaning 2023   Stop 2023 - monitor for worsening spasticity  Has had resurgence of spasticity restart baclofen 5mg TID 2023   OP follow up with Physiatry for consideration Botox     ABLA  Now on Eliquis  Recheck  - improved 11.4--> 12.0--> 11.6 2023      CKD  Has seen nephrology in past  Improving 2023   F/u OP with nephrology  S/p IVF -2023  BUN and Cr improved compared to prior  BMP 11/16 - improved - currently receiving IVF  Recheck BMP 11/21 - Slightly worsened renal function - likely baseline  BUN/Cr stable 11/24/2023   BUN/Cr stable in 30's/3's - s/p 1L IVF     Hypertension  Continue Amlodipine 10mg daily  Continue Hydralazine 25mg TID - increased by hospitalist 11/13/2023 from BID to TID--> increased to 50mg q8hrs 11/15  11/16 Hydralazine increased to 75mg q8hrs and 1x Hydralazine given  11/17 BP still elevated but hydralazine recently increased. Monitor  12/5/2023 VSS Continue Hydralazine 100mg q8hrs + Amlodipine 10mg daily     Prediabetes  Discontinue SSI     HLD  Continue home dose satin      Bowel  Constipation 11/29/2023, resolved 11/30/2023   Continue with scheduled Colace and senna, as needed stool softeners  Miralax x3 doses 11/29/2023 --> Constipation Resolved 11/30/2023   Last BM 12/4     Insomnia  Secondary to recent jet lag, melatonin scheduled --> increase to home dose 11/13/2023 9mg  Utilize trazodone as needed insomnia continues  Schedule Trazodone 11/15/2023   11/16/2023 continue Trazodone as is. Thinks he slept better last night  11/17/2023 Still not sleeping well. Increase to 75mg nightly.   12/5/2023 continue trazodone at current dose     Pain -as needed Tylenol and oxycodone    Post-Stroke Depression  Consulted Pyschiatry   Start Prozac 11/28/2023 --> Switched to zoloft per psychiatry  Appreciate assistance with management    Right Foot Pain  H/o Gout  Xray with swelling 1st metatarsal head   Trial Colchicine for acute gout flare 11/28/2023   Topical Voltaren gel scheduled   Improved with less swelling, erythema 11/29/2023   Resolved    LABS 12/6: BMP + CBC      DVT PROPHYLAXIS: NO - Hemorrhagic stroke. US + UE and LE for brachial and peroneal DVT. 11/21/2023 start Heparin 5000 units q8hrs SQ for further prophylaxis, if tolerates will increase to full AC. Consider repeat CTH to ensure stability bleed once on full AC. 11/24/2023  Start loading dose Eliquis 10mg BID x 7 days with transition to 5mg BID. CBC showing improvement in H/H 11/28/2023 no neurologic decline.     HOSPITALIST FOLLOWING: YES - d/w hospitalist 12/5    CODE STATUS: FULL CODE    DISPO: Home alone. Vielka and patient not . D/w CM to give resources for private care giving. 11/29/2023 Patient making significant progress with therapy and would benefit from more time in acute to maximize neuro recovery. Family actively looking for Caregivers for 24/7 care. Discharge extended due to measurable progress.     MARCUS: 12/12/23    MEDS SENT TO: TBD    DISCHARGE FOLLOW UP: Neurology, Nephrology, PCP, Physiatry (Botox) - needs referral to all.   ____________________________________    Dr. Lisa Lee DO, MS  [facility] - Physical Medicine & Rehabilitation   ____________________________________

## 2023-12-05 NOTE — THERAPY
"Physical Therapy   Daily Treatment     Patient Name: Jason Lima  Age:  62 y.o., Sex:  male  Medical Record #: [id number]  Today's Date: 12/4/2023     Precautions  Precautions: (P) Fall Risk  Comments: Left Hemiparesis, left visual inattention    Subjective    Pt reported that he was too fatigued for gait training, but wanted to work on \"sitting and standing\".      Objective       12/04/23 1531   PT Charge Group   PT Neuromuscular Re-Education / Balance (Units) 2   PT Total Time Spent   PT Individual Total Time Spent (Mins) 30   Precautions   Precautions Fall Risk   Sitting Lower Body Exercises   Sit to Stand 1 set of 10   Standing Lower Body Exercises   Mini Squat 1 set of 10   Comments Standing tolerance with lateral wt shifting, and mirror for anterior visual cueing/ midline orientation, and fwd/ bwd stepping with RLE, blocking L knee, 5 min x2. Facilitation to lengthen left trunk, and rotate toward right, d/t tendency to shorten L trunk, and rotate L. Facilitation for glute activation.   Bed Mobility    Sit to Stand Contact Guard Assist  (// bars, with approximation at L knee)   Neuro-Muscular Treatments   Neuro-Muscular Treatments Weight Shift Right;Weight Shift Left;Verbal Cuing;Tactile Cuing;Sequencing;Postural Changes;Postural Facilitation;Joint Approximation   Interdisciplinary Plan of Care Collaboration   IDT Collaboration with  Therapy Tech;Family / Caregiver   Patient Position at End of Therapy Seated;Family / Friend in Room   Collaboration Comments POC, review of forced use principles. Review of Vector option for gait training, and trial for use of FWW with improved L grasp and midline.         Assessment    Pt participated in standing tolerance, wt shifting, midline orientation, and mini squats, all with LUE WB/ grasp on // bars for forced use. Pt demonstrated an inc ability to locate midline, and improved static and mildly dynamic standing balance. Sig other remains highly encouraging, and patient " "demonstrated inc smiling during session, acknowledging progress.      Strengths: Able to follow instructions, Independent prior level of function, Motivated for self care and independence, Pleasant and cooperative, Supportive family, Willingly participates in therapeutic activities  Barriers: Decreased endurance, Hemiparesis, Difficulty following instructions, Fatigue, Hypertension, Impaired activity tolerance, Impaired balance, Impaired insight/denial of deficits, Impaired functional cognition, Impaired sleep pattern, Impulsive, Limited mobility, Visual impairment, Poor family support (lives alone)    Plan    Continue midline orientation in sitting and standing, SQPT vs SPT with HW, w/c mobility with hemitechnique and environmental scanning, continue ambulation with AFO on R hallway rail progressing to AD as appropriate, bed mobility       Recommend trial of walker d/t inc ability to grasp with LUE, in Vector harness (profile previously created), for symmetry training for forced use.   Sig other wants initiation of floor recovery training.      DME  PT DME Recommendations  Wheelchair: 18\" Width  Cushion: Specialty (See other comments) (TBD pending ambulation progress)  Assistive Device: Tanvir Walker (TBD pending progress, currently 2 person assist for gait)  Additional Equipment: Other (Comments)    Passport items to be completed:  Get in/out of bed safely, in/out of a vehicle, safely use mobility device, walk or wheel around home/community, navigate up and down stairs, show how to get up/down from the ground, ensure home is accessible, demonstrate HEP, complete caregiver training    Physical Therapy Problems (Active)       Problem: Mobility       Dates: Start:  11/13/23         Goal: STG-Within one week, patient will ambulate distances >/= 30' c/ RHR and ModA or better.        Dates: Start:  11/13/23         Goal Note filed on 11/22/23 1102 by Awilda Dela Cruz, MSPT       Limited to 10ft at right hallway rail mod " assist                 Problem: PT-Long Term Goals       Dates: Start:  11/13/23         Goal: LTG-By discharge, patient will propel wheelchair >/= 300' at [place] or better.        Dates: Start:  11/13/23            Goal: LTG-By discharge, patient will ambulate >/= 50' c/ LRAD at [place] or better.        Dates: Start:  11/13/23            Goal: LTG-By discharge, patient will transfer one surface to another c/ [place] or better.        Dates: Start:  11/13/23            Goal: LTG-By discharge, patient will ambulate up/down 1 step c/ LRAD at [place] or better.        Dates: Start:  11/13/23            Goal: LTG-By discharge, patient will transfer in/out of a car c/ ModA or better.        Dates: Start:  11/13/23

## 2023-12-05 NOTE — FLOWSHEET NOTE
12/04/23 1752   Events/Summary/Plan   Events/Summary/Plan CPAP check   Non-Invasive [facility]   Nocturnal CPAP or BIPAP CPAP - Home Unit  (12:10 hrs use last night.)

## 2023-12-06 ENCOUNTER — APPOINTMENT (OUTPATIENT)
Dept: SPEECH THERAPY | Facility: REHABILITATION | Age: 63
DRG: 057 | End: 2023-12-06
Attending: PHYSICAL MEDICINE & REHABILITATION
Payer: COMMERCIAL

## 2023-12-06 ENCOUNTER — APPOINTMENT (OUTPATIENT)
Dept: OCCUPATIONAL THERAPY | Facility: REHABILITATION | Age: 63
DRG: 057 | End: 2023-12-06
Attending: HOSPITALIST
Payer: COMMERCIAL

## 2023-12-06 ENCOUNTER — APPOINTMENT (OUTPATIENT)
Dept: INPATIENT REHAB | Facility: REHABILITATION | Age: 63
DRG: 057 | End: 2023-12-06
Attending: PHYSICAL MEDICINE & REHABILITATION
Payer: COMMERCIAL

## 2023-12-06 ENCOUNTER — APPOINTMENT (OUTPATIENT)
Dept: PHYSICAL THERAPY | Facility: REHABILITATION | Age: 63
DRG: 057 | End: 2023-12-06
Attending: PHYSICAL MEDICINE & REHABILITATION
Payer: COMMERCIAL

## 2023-12-06 LAB
ANION GAP SERPL CALC-SCNC: 9 MMOL/L (ref 7–16)
BASOPHILS # BLD AUTO: 0.7 % (ref 0–1.8)
BASOPHILS # BLD: 0.03 K/UL (ref 0–0.12)
BUN SERPL-MCNC: 29 MG/DL (ref 8–22)
CALCIUM SERPL-MCNC: 8.6 MG/DL (ref 8.5–10.5)
CHLORIDE SERPL-SCNC: 106 MMOL/L (ref 96–112)
CO2 SERPL-SCNC: 26 MMOL/L (ref 20–33)
CREAT SERPL-MCNC: 2.76 MG/DL (ref 0.5–1.4)
EOSINOPHIL # BLD AUTO: 0.14 K/UL (ref 0–0.51)
EOSINOPHIL NFR BLD: 3.1 % (ref 0–6.9)
ERYTHROCYTE [DISTWIDTH] IN BLOOD BY AUTOMATED COUNT: 40.8 FL (ref 35.9–50)
GFR SERPLBLD CREATININE-BSD FMLA CKD-EPI: 25 ML/MIN/1.73 M 2
GLUCOSE SERPL-MCNC: 157 MG/DL (ref 65–99)
HCT VFR BLD AUTO: 31.1 % (ref 42–52)
HGB BLD-MCNC: 10.7 G/DL (ref 14–18)
IMM GRANULOCYTES # BLD AUTO: 0.01 K/UL (ref 0–0.11)
IMM GRANULOCYTES NFR BLD AUTO: 0.2 % (ref 0–0.9)
LYMPHOCYTES # BLD AUTO: 1.46 K/UL (ref 1–4.8)
LYMPHOCYTES NFR BLD: 32.4 % (ref 22–41)
MCH RBC QN AUTO: 30.9 PG (ref 27–33)
MCHC RBC AUTO-ENTMCNC: 34.4 G/DL (ref 32.3–36.5)
MCV RBC AUTO: 89.9 FL (ref 81.4–97.8)
MONOCYTES # BLD AUTO: 0.29 K/UL (ref 0–0.85)
MONOCYTES NFR BLD AUTO: 6.4 % (ref 0–13.4)
NEUTROPHILS # BLD AUTO: 2.57 K/UL (ref 1.82–7.42)
NEUTROPHILS NFR BLD: 57.2 % (ref 44–72)
NRBC # BLD AUTO: 0 K/UL
NRBC BLD-RTO: 0 /100 WBC (ref 0–0.2)
PLATELET # BLD AUTO: 199 K/UL (ref 164–446)
PMV BLD AUTO: 10 FL (ref 9–12.9)
POTASSIUM SERPL-SCNC: 3.9 MMOL/L (ref 3.6–5.5)
RBC # BLD AUTO: 3.46 M/UL (ref 4.7–6.1)
SODIUM SERPL-SCNC: 141 MMOL/L (ref 135–145)
WBC # BLD AUTO: 4.5 K/UL (ref 4.8–10.8)

## 2023-12-06 PROCEDURE — A9270 NON-COVERED ITEM OR SERVICE: HCPCS | Performed by: PHYSICAL MEDICINE & REHABILITATION

## 2023-12-06 PROCEDURE — 97130 THER IVNTJ EA ADDL 15 MIN: CPT

## 2023-12-06 PROCEDURE — 97530 THERAPEUTIC ACTIVITIES: CPT

## 2023-12-06 PROCEDURE — 97116 GAIT TRAINING THERAPY: CPT

## 2023-12-06 PROCEDURE — 99232 SBSQ HOSP IP/OBS MODERATE 35: CPT | Performed by: HOSPITALIST

## 2023-12-06 PROCEDURE — 97535 SELF CARE MNGMENT TRAINING: CPT

## 2023-12-06 PROCEDURE — 97112 NEUROMUSCULAR REEDUCATION: CPT

## 2023-12-06 PROCEDURE — 700102 HCHG RX REV CODE 250 W/ 637 OVERRIDE(OP): Performed by: PHYSICAL MEDICINE & REHABILITATION

## 2023-12-06 PROCEDURE — 36415 COLL VENOUS BLD VENIPUNCTURE: CPT

## 2023-12-06 PROCEDURE — 97129 THER IVNTJ 1ST 15 MIN: CPT

## 2023-12-06 PROCEDURE — 770010 HCHG ROOM/CARE - REHAB SEMI PRIVAT*

## 2023-12-06 PROCEDURE — 700102 HCHG RX REV CODE 250 W/ 637 OVERRIDE(OP): Performed by: STUDENT IN AN ORGANIZED HEALTH CARE EDUCATION/TRAINING PROGRAM

## 2023-12-06 PROCEDURE — 94760 N-INVAS EAR/PLS OXIMETRY 1: CPT

## 2023-12-06 PROCEDURE — 85025 COMPLETE CBC W/AUTO DIFF WBC: CPT

## 2023-12-06 PROCEDURE — 80048 BASIC METABOLIC PNL TOTAL CA: CPT

## 2023-12-06 PROCEDURE — 99232 SBSQ HOSP IP/OBS MODERATE 35: CPT | Performed by: PHYSICAL MEDICINE & REHABILITATION

## 2023-12-06 PROCEDURE — A9270 NON-COVERED ITEM OR SERVICE: HCPCS | Performed by: HOSPITALIST

## 2023-12-06 PROCEDURE — 700102 HCHG RX REV CODE 250 W/ 637 OVERRIDE(OP): Performed by: HOSPITALIST

## 2023-12-06 PROCEDURE — A9270 NON-COVERED ITEM OR SERVICE: HCPCS | Performed by: STUDENT IN AN ORGANIZED HEALTH CARE EDUCATION/TRAINING PROGRAM

## 2023-12-06 RX ORDER — BACLOFEN 10 MG/1
10 TABLET ORAL 3 TIMES DAILY
Status: DISCONTINUED | OUTPATIENT
Start: 2023-12-06 | End: 2023-12-28

## 2023-12-06 RX ADMIN — BACLOFEN 5 MG: 10 TABLET ORAL at 08:13

## 2023-12-06 RX ADMIN — SENNOSIDES AND DOCUSATE SODIUM 2 TABLET: 50; 8.6 TABLET ORAL at 21:20

## 2023-12-06 RX ADMIN — HYDRALAZINE HYDROCHLORIDE 100 MG: 50 TABLET, FILM COATED ORAL at 21:21

## 2023-12-06 RX ADMIN — SENNOSIDES AND DOCUSATE SODIUM 2 TABLET: 50; 8.6 TABLET ORAL at 08:13

## 2023-12-06 RX ADMIN — TRAZODONE HYDROCHLORIDE 75 MG: 50 TABLET ORAL at 21:20

## 2023-12-06 RX ADMIN — ATORVASTATIN CALCIUM 40 MG: 40 TABLET, FILM COATED ORAL at 21:20

## 2023-12-06 RX ADMIN — APIXABAN 5 MG: 5 TABLET, FILM COATED ORAL at 08:13

## 2023-12-06 RX ADMIN — HYDRALAZINE HYDROCHLORIDE 100 MG: 50 TABLET, FILM COATED ORAL at 05:30

## 2023-12-06 RX ADMIN — BACLOFEN 10 MG: 10 TABLET ORAL at 21:20

## 2023-12-06 RX ADMIN — APIXABAN 5 MG: 5 TABLET, FILM COATED ORAL at 21:20

## 2023-12-06 RX ADMIN — HYDRALAZINE HYDROCHLORIDE 100 MG: 50 TABLET, FILM COATED ORAL at 14:28

## 2023-12-06 RX ADMIN — OMEPRAZOLE 20 MG: 20 CAPSULE, DELAYED RELEASE ORAL at 08:12

## 2023-12-06 RX ADMIN — AMLODIPINE BESYLATE 10 MG: 5 TABLET ORAL at 05:31

## 2023-12-06 RX ADMIN — SERTRALINE 25 MG: 50 TABLET, FILM COATED ORAL at 08:13

## 2023-12-06 RX ADMIN — BACLOFEN 10 MG: 10 TABLET ORAL at 14:28

## 2023-12-06 ASSESSMENT — GAIT ASSESSMENTS
ASSISTIVE DEVICE: FRONT WHEEL WALKER
DISTANCE (FEET): 50
GAIT LEVEL OF ASSIST: TOTAL ASSIST

## 2023-12-06 ASSESSMENT — ENCOUNTER SYMPTOMS
NAUSEA: 0
BRUISES/BLEEDS EASILY: 0
SHORTNESS OF BREATH: 0
ABDOMINAL PAIN: 0
CHILLS: 0
FOCAL WEAKNESS: 1
VOMITING: 0
FEVER: 0
EYES NEGATIVE: 1
POLYDIPSIA: 0
COUGH: 0
MUSCULOSKELETAL NEGATIVE: 1
PALPITATIONS: 0

## 2023-12-06 ASSESSMENT — ACTIVITIES OF DAILY LIVING (ADL): TOILET_TRANSFER_DESCRIPTION: GRAB BAR;INCREASED TIME;SET-UP OF EQUIPMENT;SUPERVISION FOR SAFETY;VERBAL CUEING

## 2023-12-06 NOTE — CARE PLAN
Problem: Mobility  Goal: STG-Within one week, patient will ambulate distances >/= 30' c/ RHR and ModA or better.   Outcome: Progressing   Variable--as good as Renata, typically ModA but occasional MaxA

## 2023-12-06 NOTE — THERAPY
Physical Therapy   Daily Treatment     Patient Name: Jason Lima  Age:  62 y.o., Sex:  male  Medical Record #: [id number]  Today's Date: 12/5/2023     Precautions  Precautions: (P) Fall Risk  Comments: (P) Left Hemiparesis, left visual inattention    Subjective    Pt reported feeling dismal about performance and likely outcomes at D/C.    Sig other remained encouraging, and realistic.      Objective       12/05/23 1401   PT Charge Group   PT Gait Training (Units) 1   PT Neuromuscular Re-Education / Balance (Units) 2   PT Therapeutic Activities (Units) 1   PT Total Time Spent   PT Individual Total Time Spent (Mins) 60   Precautions   Precautions Fall Risk   Comments Left Hemiparesis, left visual inattention   Cognition    Comments Pt and sig other reported emotional challenges related to D/C planning, and reasonable outcomes at that time, at start of session.   Gait Functional Level of Assist    Gait Level Of Assist Total Assist X 2   Assistive Device Front Wheel Walker;Parallel Bars;Other (Comments)  (Arjo walker)   Distance (Feet) 50  (50 ft in // bars in vector harness, with full turns, 15 ft with FWW (trialed standard , and supportive ), and 15 ft with Arjo walker. All with off the shelf AFO (tap added to front of shoe to assist with sliding fwd). 25-48 lbs BWS.)   # of Times Distance was Traveled 1   Deviation Decreased Heel Strike;Decreased Toe Off;Bradykinetic;Decreased Base Of Support;Step To;Ataxic   Bed Mobility    Sit to Stand Minimal Assist   Neuro-Muscular Treatments   Neuro-Muscular Treatments Weight Shift Left;Weight Shift Right;Tactile Cuing;Sequencing;Postural Facilitation;Joint Approximation;Postural Changes;Compensatory Strategies;Verbal Cuing   Interdisciplinary Plan of Care Collaboration   IDT Collaboration with  Family / Caregiver;Therapy Tech   Patient Position at End of Therapy Seated;Family / Friend in Room;Self Releasing Lap Belt Applied   Collaboration Comments Equipment assist, POC,  "CLOF         Assessment    Pt trialed pregait in Vector harness with 25-48lb BWS, L AFO, and tape as foot , for forced use to tanvir body, and midline orientation/ body awareness. Pt continues to require 2 person assist for safety with pregait training, for L lateral lean, LLE instability, dec midline orientation, and LLE advancement.      Strengths: Able to follow instructions, Independent prior level of function, Motivated for self care and independence, Pleasant and cooperative, Supportive family, Willingly participates in therapeutic activities  Barriers: Decreased endurance, Hemiparesis, Difficulty following instructions, Fatigue, Hypertension, Impaired activity tolerance, Impaired balance, Impaired insight/denial of deficits, Impaired functional cognition, Impaired sleep pattern, Impulsive, Limited mobility, Visual impairment, Poor family support (lives alone)    Plan    Continue midline orientation in sitting and standing, SQPT vs SPT with HW, w/c mobility with hemitechnique and environmental scanning, continue ambulation with AFO on R hallway rail progressing to AD as appropriate, bed mobility, ongoing family training         Sig other wants initiation of floor recovery training.      DME  PT DME Recommendations  Wheelchair: 18\" Width  Cushion: Specialty (See other comments) (TBD pending ambulation progress)  Assistive Device: Tanvir Walker (TBD pending progress, currently 2 person assist for gait)  Additional Equipment: Other (Comments)    Passport items to be completed:  Get in/out of bed safely, in/out of a vehicle, safely use mobility device, walk or wheel around home/community, navigate up and down stairs, show how to get up/down from the ground, ensure home is accessible, demonstrate HEP, complete caregiver training    Physical Therapy Problems (Active)       Problem: Mobility       Dates: Start:  11/13/23         Goal: STG-Within one week, patient will ambulate distances >/= 30' c/ RHR and ModA or " better.        Dates: Start:  11/13/23         Goal Note filed on 11/22/23 1102 by Awilda Dela Cruz, MSPT       Limited to 10ft at right hallway rail mod assist                 Problem: PT-Long Term Goals       Dates: Start:  11/13/23         Goal: LTG-By discharge, patient will propel wheelchair >/= 300' at Neto or better.        Dates: Start:  11/13/23            Goal: LTG-By discharge, patient will ambulate >/= 50' c/ LRAD at Renata or better.        Dates: Start:  11/13/23            Goal: LTG-By discharge, patient will transfer one surface to another c/ Renata or better.        Dates: Start:  11/13/23            Goal: LTG-By discharge, patient will ambulate up/down 1 step c/ LRAD at Renata or better.        Dates: Start:  11/13/23            Goal: LTG-By discharge, patient will transfer in/out of a car c/ ModA or better.        Dates: Start:  11/13/23

## 2023-12-06 NOTE — PROGRESS NOTES
Hospital Medicine Daily Progress Note      Chief Complaint:  Hypertension  Diabetes  Renal Failure    Interval History:  No 24 hour clinical changes.  Labs reviewed.  VS and routine labs have been stable the duration of this hospitalization thus far.    Review of Systems  Review of Systems   Constitutional:  Negative for chills and fever.   HENT: Negative.     Eyes: Negative.    Respiratory:  Negative for cough and shortness of breath.    Cardiovascular:  Negative for chest pain and palpitations.   Gastrointestinal:  Negative for abdominal pain, nausea and vomiting.   Genitourinary: Negative.    Musculoskeletal: Negative.    Skin:  Negative for itching and rash.   Neurological:  Positive for focal weakness.   Endo/Heme/Allergies:  Negative for polydipsia. Does not bruise/bleed easily.        Physical Exam  Temp:  [36.4 °C (97.5 °F)-36.6 °C (97.9 °F)] 36.6 °C (97.9 °F)  Pulse:  [48-76] 53  Resp:  [16-20] 16  BP: (118-145)/(72-94) 121/72  SpO2:  [94 %-97 %] 94 %    Physical Exam  Vitals reviewed.   Constitutional:       General: He is not in acute distress.     Appearance: Normal appearance. He is not ill-appearing.   HENT:      Head: Normocephalic and atraumatic.      Right Ear: External ear normal.      Left Ear: External ear normal.      Nose: Nose normal.      Mouth/Throat:      Pharynx: Oropharynx is clear.   Eyes:      General:         Right eye: No discharge.         Left eye: No discharge.      Extraocular Movements: Extraocular movements intact.      Conjunctiva/sclera: Conjunctivae normal.   Cardiovascular:      Rate and Rhythm: Normal rate and regular rhythm.   Pulmonary:      Effort: Pulmonary effort is normal. No respiratory distress.      Breath sounds: Normal breath sounds. No wheezing.   Abdominal:      General: Bowel sounds are normal. There is no distension.      Palpations: Abdomen is soft.      Tenderness: There is no abdominal tenderness.   Musculoskeletal:      Cervical back: Normal range of  motion and neck supple.      Right lower leg: No edema.      Left lower leg: Edema present.      Comments: LUE +edema   Skin:     General: Skin is warm and dry.   Neurological:      Mental Status: He is alert and oriented to person, place, and time.      Comments: Left sided weakness         Fluids    Intake/Output Summary (Last 24 hours) at 12/6/2023 1129  Last data filed at 12/6/2023 0900  Gross per 24 hour   Intake 1440 ml   Output 900 ml   Net 540 ml       Laboratory  Recent Labs     12/06/23  0526   WBC 4.5*   RBC 3.46*   HEMOGLOBIN 10.7*   HEMATOCRIT 31.1*   MCV 89.9   MCH 30.9   MCHC 34.4   RDW 40.8   PLATELETCT 199   MPV 10.0     Recent Labs     12/06/23  0526   SODIUM 141   POTASSIUM 3.9   CHLORIDE 106   CO2 26   GLUCOSE 157*   BUN 29*   CREATININE 2.76*   CALCIUM 8.6                 Assessment/Plan  DVT (deep venous thrombosis) ([facility])  Assessment & Plan  U/S LUE acute thrombus in paired brachial veins  U/S LLE acute thrombus in paired peroneal veins  Anticoagulated on Eliquis    Hemorrhagic stroke ([facility])- (present on admission)  Assessment & Plan  Pt suffered R thalamic hemorrhage on 10/23/23 while visiting [place]  Presented there w/ sudden onset HA, L HP, and /100  Now on Zoloft and Trazodone  Management per Physiatry    CKD (chronic kidney disease)- (present on admission)  Assessment & Plan  U/S medical renal disease, no hydronephrosis  Avoid nephrotoxins  Renal dose all meds  Monitor electrolytes  Outpt Nephrology F/U    DM (diabetes mellitus) ([facility])- (present on admission)  Assessment & Plan  HbA1c 6.4  Off FSBS and SSI  Continue diet control  Outpt meds include Metformin    Hyperlipidemia- (present on admission)  Assessment & Plan  On Lipitor  Outpt meds include Lipitor    Hypertension- (present on admission)  Assessment & Plan  On max doses of Norvasc and Hydralazine  Avoiding B-Bl due to bradycardic tendencies  Avoiding ACE-I/ARB and diuretics due to CKD  Has occasional BP readings in the 140's  that may be related to activity  Outpt meds include Norvasc    Full Code    Pt w/o acute medical issues requiring Hospitalist services at this time.  Will sign off.  Please re-consult as needed.  Discussed w/ Dr. Lee.

## 2023-12-06 NOTE — CARE PLAN
Problem: Speech/Swallowing LTGs  Goal: LTG-By discharge, patient will safely swallow (7)regular diet textures and (0) thin liquids with no overt s/sx of aspiration for 2/2 days.  Outcome: Met     Problem: Speech/Swallowing LTGs  Goal: LTG-By discharge, patient will solve complex problem  Description: For safety and self care with 80% acc mod I  for safe discharge home.  Outcome: Discharged - Not Met  Goal: LTG-By discharge, patient will recall new training with 80% acc with min A and use of external memory aids.  Outcome: Discharged - Not Met     Problem: Problem Solving STGs  Goal: STG-Within one week, patient will perform alternating attention tasks with 85% acc with set up.  Outcome: Discharged - Not Met  Goal: STG-Within one week, patient will organization and reasoning tasks with 80% acc with min cues.  Outcome: Discharged - Not Met     Problem: Memory STGs  Goal: STG-Within one week, patient will recall new training and safety sequencing with 80% acc with set up and external cues.  Outcome: Discharged - Not Met

## 2023-12-06 NOTE — PROGRESS NOTES
NURSING DAILY NOTE    Name: Jason Lima   Date of Admission: 11/12/2023   Admitting Diagnosis: Thalamic hemorrhage (HCC)  Attending Physician: Lisa Lee D.o.  Allergies: Penicillins    Safety  Patient Assist  Max x 2.  Patient Precautions  Fall Risk  Precaution Comments  Left Hemiparesis, left visual inattention  Bed Transfer Status  Minimal Assist  Toilet Transfer Status   Minimal Assist  Assistive Devices  Rails, Wheelchair  Oxygen  CPAP  Diet/Therapeutic Dining  Current Diet Order   Procedures    Diet Order Diet: Consistent CHO (Diabetic) (2 gram sodium restriction; medications whole with thin liquids); Second Modifier: (optional): 2 Gram Sodium     Pill Administration  whole  Agitated Behavioral Scale  14  ABS Level of Severity  No Agitation    Fall Risk  Has the patient had a fall this admission?   No  Shellie Hamilton Fall Risk Scoring  23, HIGH RISK  Fall Risk Safety Measures  Bed strip alarm    Vitals  Temperature: 36.6 °C (97.9 °F)  Temp src: Oral  Pulse: (!) 57  Respiration: 18  Blood Pressure: 121/72  Blood Pressure MAP (Calculated): 88 MM HG  BP Location: Right, Upper Arm  Patient BP Position: Supine     Oxygen  Pulse Oximetry: 94 %  O2 (LPM): 0  FiO2%: 21 %  O2 Delivery Device: CPAP    Bowel and Bladder  Last Bowel Movement  12/04/23  Stool Type  Type 5: Soft blob with clear cut edges (passed easily)  Bowel Device  Bathroom, Other (Comment)  Continent  Bladder: Continent void   Bowel: Continent movement  Bladder Function  Urine Void (mL): 300 ml  Number of Times Voided: 1  Urinary Options: Yes  Urine Color: Yellow  Number of Times Incontinent of Urine: 0  Genitourinary Assessment   Bladder Assessment (WDL):  WDL Except  Echols Catheter: Not Applicable  Urine Color: Yellow  Number of Bladder Accidents: 0  Total Number of Bladder of Accidents in Last 7 Days: 0  Number of Times Incontinent of Urine: 0  Bladder Device: Urinal  Time Void:  No  Bladder Scan: Post Void  $ Bladder Scan Results (mL): 26    Skin  Polo Score   16  Sensory Interventions   Bed Types: Standard/Trauma Mattress  Skin Preventative Measures: Pillows in Use for Support / Positioning  Moisture Interventions  Moisturizers/Barriers: Barrier Paste, Barrier Wipes      Pain  Pain Rating Scale  0 - No Pain  Pain Location  Foot  Pain Location Orientation  Right, Left  Pain Interventions   Declines    ADLs    Bathing   Patient Refused Bathing (too tired)  Linen Change   Partial  Personal Hygiene  Change Deja Pads, Moist Deja Wipes, Perineal Care  Chlorhexidine Bath      Oral Care  Brushed Teeth  Teeth/Dentures     Shave  Self  Nutrition Percentage Eaten  *  * Meal *  *, Dinner, Between % Consumed  Environmental Precautions  Treaded Slipper Socks on Patient, Bed in Low Position  Patient Turns/Positioning  Patient Turns Self from Side to Side  Patient Turns Assistance/Tolerance  Assistance of Two or More, General Weakness  Bed Positions  Bed Controls On, Bed Locked  Head of Bed Elevated  Self regulated      Psychosocial/Neurologic Assessment  Psychosocial Assessment  Psychosocial (WDL):  Within Defined Limits  Patient Behaviors: Fatigue  Neurologic Assessment  Neuro (WDL): Exceptions to WDL  Level of Consciousness: Alert  Orientation Level: Oriented X4  Cognition: Follows commands  Speech: Clear, Slurred  Facial Symmetry: Left facial drooping  Pupil Assesment: No  R Pupil Size (mm): 3  R Pupil Shape / Description: Round  R Pupil Reaction: Brisk  L Pupil Size (mm): 3  L Pupil Shape / Description: Round  L Pupil Reaction: Brisk  Reflexes: Cough  Cough Reflex: Present  Motor Function/Sensation Assessment: Dorsiflexion, Motor response  R Foot Dorsiflexion: Strong  L Foot Dorsiflexion: Absent  RUE Motor Response: Responds to commands  RUE Sensation: Full sensation  Muscle Strength Right Arm: Normal Strength Against Gravity and Full Resistance  LUE Motor Response: Flaccid  LUE Sensation:  Tingling, Numbness  Muscle Strength Left Arm: Weak Movement but Not Against Gravity or Resistance  RLE Motor Response: Responds to commands  RLE Sensation: Full sensation  Muscle Strength Right Leg: Good Strength Against Gravity and Moderate Resistance  LLE Motor Response: Flaccid  LLE Sensation: Tingling, Numbness  Muscle Strength Left Leg: Weak Movement but Not Against Gravity or Resistance  EENT (WDL):  WDL Except    Cardio/Pulmonary Assessment  Edema   RLE Edema: Trace  LLE Edema: Trace  Respiratory Breath Sounds  RUL Breath Sounds: Clear  RML Breath Sounds: Clear  RLL Breath Sounds: Clear, Diminished  MOHAMUD Breath Sounds: Clear  LLL Breath Sounds: Clear, Diminished  Cardiac Assessment   Cardiac (WDL):  WDL Except (H/O HTN.)

## 2023-12-06 NOTE — FLOWSHEET NOTE
12/06/23 0711   Events/Summary/Plan   Events/Summary/Plan RA pulse ox check. Found wearing cpap nasal mask   Vital Signs   Pulse (!) 48   Respiration 16   Pulse Oximetry 94 %   $ Pulse Oximetry (Spot Check) Yes   Respiratory Assessment   Level of Consciousness Responds to voice  (sleeping)   Chest Exam   Work Of Breathing / Effort Within Normal Limits   Oxygen   O2 Delivery Device CPAP

## 2023-12-06 NOTE — DISCHARGE PLANNING
Case Management/IDT follow up.   IDT continues to recommend IRF level of care as patient continue to make progress with all therapies.   Projected dc date set for 12/12/23.      DC needs:  Recommendations made for home health for PT/OT/SLP or Rehab without [facility] (private pay).  Follow up with: PCP neuro    Met with pt / family providing update from IDT and discussed plan of care.    Plan:  Continue to follow

## 2023-12-06 NOTE — THERAPY
Speech Language Pathology  Daily Treatment     Patient Name: Jason Lima  Age:  62 y.o., Sex:  male  Medical Record #: [id number]  Today's Date: 12/5/2023     Precautions  Precautions: Fall Risk  Comments: Left Hemiparesis, left visual inattention    Subjective    Patient agreeable to therapy.     Objective       12/05/23 0931   Treatment Charges   SLP Cognitive Skill Development First 15 Minutes 1   SLP Cognitive Skill Development Additional 15 Minutes 1   SLP Total Time Spent   SLP Individual Total Time Spent (Mins) 30   Cognition   Prospective Memory Moderate (3)   Insight into Deficits Moderate (3)   Planning / Decision Making Moderate (3)   Functional Level of Assist   Eating Modified Independent   Eating Description Set-up of equipment or meal/tube feeding   Comprehension Modified Independent   Expression Modified Independent   Expression Description   (extra time)   Social Interaction Modified Independent   Social Interaction Description Increased time;Verbal cues   Problem Solving Minimal Assist   Problem Solving Description Increased time;Supervision;Bed/chair alarm;Therapy schedule;Verbal cueing   Memory Moderate Assist   Memory Description Increased time;Supervision;Bed/chair alarm;Therapy schedule;Verbal cueing         Assessment    Patient needed to urinate at the beginning of session.  Assisted patient with use of urinal.  He required mod to max  A to problem solve for placement and moderate assistance overall to manage urinal and clothing one handed.   Patient demonstrated some increased difficulty with  insight into deficits when discussing safety planning and sequencing and displayed more difficulty recalling written sequence for w/c transfers with mod A needed.    Strengths: Alert and oriented, Effective communication skills, Motivated for self care and independence, Pleasant and cooperative, Independent prior level of function, Making steady progress towards goals, Willingly participates in  therapeutic activities  Barriers: Impaired functional cognition (depression, left neglect, difficulty self monitoring/regulating)    Plan    Target attention, organization and reasoning    Passport items to be completed:  Express basic needs, understand food/liquid recommendations, consistently follow swallow precautions, manage finances, manage medications, arrive to therapy appointments on time, complete daily memory log entries, solve problems related to safety situations, review education related to hospitalization, complete caregiver training     Speech Therapy Problems (Active)       Problem: Problem Solving STGs       Dates: Start:  11/22/23         Goal: STG-Within one week, patient will perform alternating attention tasks with 85% acc with set up.       Dates: Start:  11/22/23         Goal Note filed on 11/29/23 1121 by Neva Abbott MS,CCC-SLP       MOD A, continues to require cues               Goal: STG-Within one week, patient will organization and reasoning tasks with 80% acc with min cues.       Dates: Start:  11/22/23         Goal Note filed on 11/29/23 1121 by Neva Abbott MS,CCC-SLP       Will continue to target, continues to require MOD A                 Problem: Speech/Swallowing LTGs       Dates: Start:  11/13/23         Goal: LTG-By discharge, patient will safely swallow (7)regular diet textures and (0) thin liquids with no overt s/sx of aspiration for 2/2 days.       Dates: Start:  11/13/23            Goal: LTG-By discharge, patient will solve complex problem       Dates: Start:  11/13/23       Description: For safety and self care with 80% acc mod I  for safe discharge home.         Goal: LTG-By discharge, patient will recall new training with 80% acc with min A and use of external memory aids.       Dates: Start:  11/13/23               Problem: Swallowing STGs       Dates: Start:  11/13/23

## 2023-12-06 NOTE — CARE PLAN
Problem: Speech/Swallowing LTGs  Goal: LTG-By discharge, patient will solve complex problem  Description: For safety and self care with 80% acc mod I  for safe discharge home.  Outcome: Not Met  Note: Mod A needed.  Goal: LTG-By discharge, patient will recall new training with 80% acc with min A and use of external memory aids.  Outcome: Not Met  Note: Mod A needed.     Problem: Memory STGs  Goal: STG-Within one week, patient will recall new training and safety sequencing with 80% acc with set up and external cues.  12/5/2023 1626 by Tawny Forbes MS,CCC-SLP  Outcome: Not Met  Note: Mod A needed.  12/5/2023 1626 by Tawny Forbes MS,CCC-SLP  Reactivated

## 2023-12-06 NOTE — PROGRESS NOTES
Left eye watery and runny left nostril noted this morning. No drainage noted to the right side. Dr Grewal notified.

## 2023-12-06 NOTE — CARE PLAN
"  Problem: Knowledge Deficit - Standard  Goal: Patient and family/care givers will demonstrate understanding of plan of care, disease process/condition, diagnostic tests and medications  Outcome: Progressing  Note: Pt agrees with plan of care tonight regarding medications and safety.  Will continue to monitor patient.     Problem: Fall Risk - Rehab  Goal: Patient will remain free from falls  Outcome: Progressing  Note: Shellie Hamilton Fall risk Assessment Score:  23    High fall risk Interventions   - Alarming seatbelt  - Wander guard  - Bed and strip alarm   - Yellow sign by the door   - Yellow wrist band \"Fall risk\"  - Room near to the nurse station  - Do not leave patient unattended in the bathroom  - Fall risk education provided       The patient is Stable - Low risk of patient condition declining or worsening    Shift Goals  Clinical Goals: Safety  Patient Goals: Sleep well  Family Goals: increased pt strength, independence    Progress made toward(s) clinical / shift goals:  progressing        "

## 2023-12-06 NOTE — THERAPY
Physical Therapy   Daily Treatment     Patient Name: Jason Lima  Age:  62 y.o., Sex:  male  Medical Record #: [id number]  Today's Date: 12/5/2023     Precautions  Precautions: Fall Risk  Comments: Left Hemiparesis, left visual inattention    Subjective    Patient asking to practice walking     Objective       12/05/23 1301   PT Charge Group   PT Gait Training (Units) 1   PT Therapeutic Activities (Units) 1   PT Total Time Spent   PT Individual Total Time Spent (Mins) 30   Precautions   Precautions Fall Risk   Comments Left Hemiparesis, left visual inattention   Gait Functional Level of Assist    Gait Level Of Assist Total Assist X 2   Assistive Device Other (Comments)  (right rail, left generic AFO)   Distance (Feet) 30   # of Times Distance was Traveled 2   Deviation Step To;Decreased Base Of Support;Decreased Heel Strike;Decreased Toe Off   Transfer Functional Level of Assist   Bed, Chair, Wheelchair Transfer Minimal Assist   Bed Chair Wheelchair Transfer Description Set-up of equipment;Increased time   Toilet Transfers Minimal Assist   Toilet Transfer Description Increased time;Grab bar;Verbal cueing;Set-up of equipment  (wife able to provide assist)       Assessment    Trial of hemiwalker, patient unable to un-weight onto right enough to advance left leg consistently    Strengths: Able to follow instructions, Independent prior level of function, Motivated for self care and independence, Pleasant and cooperative, Supportive family, Willingly participates in therapeutic activities  Barriers: Decreased endurance, Hemiparesis, Difficulty following instructions, Fatigue, Hypertension, Impaired activity tolerance, Impaired balance, Impaired insight/denial of deficits, Impaired functional cognition, Impaired sleep pattern, Impulsive, Limited mobility, Visual impairment, Poor family support (lives alone)    Plan    Continue midline orientation in sitting and standing, SQPT vs SPT with HW, w/c mobility with hemitechnique  "and environmental scanning, continue ambulation with AFO on R hallway rail progressing to AD as appropriate, bed mobility        Recommend trial of walker d/t inc ability to grasp with LUE, in Vector harness (profile previously created), for symmetry training for forced use.   Sig other wants initiation of floor recovery training.       DME  PT DME Recommendations  Wheelchair: 18\" Width  Cushion: Specialty (See other comments) (TBD pending ambulation progress)  Assistive Device: Tanvir Walker (TBD pending progress, currently 2 person assist for gait)  Additional Equipment: Other (Comments)     Passport items to be completed:  Get in/out of bed safely, in/out of a vehicle, safely use mobility device, walk or wheel around home/community, navigate up and down stairs, show how to get up/down from the ground, ensure home is accessible, demonstrate HEP, complete caregiver training    Physical Therapy Problems (Active)       Problem: Mobility       Dates: Start:  11/13/23         Goal: STG-Within one week, patient will ambulate distances >/= 30' c/ RHR and ModA or better.        Dates: Start:  11/13/23         Goal Note filed on 11/22/23 1102 by Awilda Dela Cruz, MSPT       Limited to 10ft at right hallway rail mod assist                 Problem: PT-Long Term Goals       Dates: Start:  11/13/23         Goal: LTG-By discharge, patient will propel wheelchair >/= 300' at [place] or better.        Dates: Start:  11/13/23            Goal: LTG-By discharge, patient will ambulate >/= 50' c/ LRAD at [place] or better.        Dates: Start:  11/13/23            Goal: LTG-By discharge, patient will transfer one surface to another c/ [place] or better.        Dates: Start:  11/13/23            Goal: LTG-By discharge, patient will ambulate up/down 1 step c/ LRAD at [place] or better.        Dates: Start:  11/13/23            Goal: LTG-By discharge, patient will transfer in/out of a car c/ ModA or better.        Dates: Start:  11/13/23              "

## 2023-12-06 NOTE — FLOWSHEET NOTE
12/05/23 1911   Events/Summary/Plan   Events/Summary/Plan CPAP check   Non-Invasive Ventilation [facility]   Nocturnal CPAP or BIPAP CPAP - Home Unit  (11:13 hrs)

## 2023-12-06 NOTE — THERAPY
Occupational Therapy  Daily Treatment     Patient Name: Jason Lima  Age:  62 y.o., Sex:  male  Medical Record #: [id number]  Today's Date: 12/6/2023     Precautions  Precautions: (P) Fall Risk  Comments: (P) Left Hemiparesis, left visual inattention         Subjective    Pt declined outing this day d/t abdominal issues. Pt requested assistance to use the bathroom for a BM.    Pt also expressed preference for working on PT/OT activities in the gym vs outings.       Objective       12/06/23 0901   OT Charge Group   OT Self Care / ADL (Units) 2   OT Neuromuscular Re-education / Balance (Units) 2   OT Therapy Activity (Units) 2   OT Total Time Spent   OT Individual Total Time Spent (Mins) 90   Precautions   Precautions Fall Risk   Comments Left Hemiparesis, left visual inattention   Functional Level of Assist   Toileting Moderate Assist   Toileting Description Assist for standing balance;Assist to pull pants up;Grab bar;Increased time;Supervision for safety;Verbal cueing  (moderate VC needed to cue an upright posture to prevent lateral lean to the left during standing for perineal care following BM. Pt able to correct towards midline with VC about 50% of the time.)   Bed, Chair, Wheelchair Transfer Moderate Assist   Bed Chair Wheelchair Transfer Description Adaptive equipment;Assist with one limb;Set-up of equipment;Supervision for safety;Verbal cueing  (VC needed to sequence steps and maintain an upright posture prior to SPT from mat <> WC.)   Toilet Transfers Moderate Assist   Toilet Transfer Description Grab bar;Increased time;Set-up of equipment;Supervision for safety;Verbal cueing  (Stannd step from WC to toilet using GB on pt right side.)   Supine Upper Body Exercises   Supine Upper Body Exercises Yes   Other Exercise AAROM supine on therapy mat using PVC pipe and L hand wrapped for . Pt performed 5x shoulder flex/ex, external/internal rotation and chest press. All at [place] to CGA   Standing Upper Body  Exercises   Standing Upper Body Exercises Yes   Other Exercises Standing balance in front of mirror using hemiwalker requiring modA 50% of the time for 2 sets x3 min.   Neuro-Muscular Treatments   Neuro-Muscular Treatments Joint Approximation;Postural Facilitation;Sequencing;Tactile Cuing;Weight Shift Right;Weight Shift Left   Comments 45 degree lean and push from the L seated at the mat; 5x at [place] to CGA   Interdisciplinary Plan of Care Collaboration   IDT Collaboration with  Other (See Comments)  (rec therapy)   Patient Position at End of Therapy Seated;Chair Alarm On;Call Light within Reach;Tray Table within Reach;Phone within Reach   Collaboration Comments Pt decline for today's outing.     Discussed the therapeutic benefits and occupational therapy type tasks involved in community outings. Pt verbalized understanding and willingness to reschedule.     Assessment    Overall, cont steady improvements with maintaining midline orientation during functional mobility tasks as pt is progressing towards modA for functional transfers, but VC/TC still needed to reinforce. Improved LUE strength as pt was able to push from the L while seated on the mat at [place] to CGA.        Strengths: Able to follow instructions, Independent prior level of function, Motivated for self care and independence, Pleasant and cooperative, Supportive family  Barriers: Decreased endurance, Fatigue, Generalized weakness, Hemiparesis, Impaired activity tolerance, Impaired balance, Limited mobility, Spasticity    Plan    Cont ADLs, functional transfers, and thera act/ex to maximize functional recovery for safe DC home.       DME  OT DME Recommendations  Bathroom Equipment: 3 in 1 Commode  Additional Equipment: Other (Comments)    Passport items to be completed:  Perform bathroom transfers, complete dressing, complete feeding, get ready for the day, prepare a simple meal, participate in household tasks, adapt home for safety needs, demonstrate home  exercise program, complete caregiver training     Occupational Therapy Goals (Active)       Problem: Bathing       Dates: Start:  11/29/23         Goal: STG-Within one week, patient will bathe at [place] using LRD       Dates: Start:  11/29/23               Problem: Dressing       Dates: Start:  11/22/23         Goal: STG-Within one week, patient will dress LB at [place] using LRD       Dates: Start:  11/22/23            Goal: STG-Within one week, patient will dress UB at CGA       Dates: Start:  11/29/23               Problem: Functional Transfers       Dates: Start:  11/13/23         Goal: STG-Within one week, patient will transfer to step in shower with Max A.       Dates: Start:  11/13/23            Goal: STG-Within one week, patient will transfer to toilet at modA       Dates: Start:  11/29/23               Problem: OT Long Term Goals       Dates: Start:  11/13/23         Goal: LTG-By discharge, patient will complete basic self care tasks at Mod Independent level.       Dates: Start:  11/13/23            Goal: LTG-By discharge, patient will perform bathroom transfers at Mod Independent level.       Dates: Start:  11/13/23

## 2023-12-06 NOTE — PROGRESS NOTES
NURSING DAILY NOTE    Name: Jason Lima   Date of Admission: 11/12/2023   Admitting Diagnosis: Thalamic hemorrhage (HCC)  Attending Physician: Lisa Lee D.o.  Allergies: Penicillins    Safety  Patient Assist  mod to max  Patient Precautions  Fall Risk  Precaution Comments  Left Hemiparesis, left visual inattention  Bed Transfer Status  Minimal Assist  Toilet Transfer Status   Minimal Assist  Assistive Devices  Wheelchair  Oxygen  None - Room Air  Diet/Therapeutic Dining  Current Diet Order   Procedures    Diet Order Diet: Consistent CHO (Diabetic) (2 gram sodium restriction; medications whole with thin liquids); Second Modifier: (optional): 2 Gram Sodium     Pill Administration  whole  Agitated Behavioral Scale  14  ABS Level of Severity  No Agitation    Fall Risk  Has the patient had a fall this admission?   Yes  Shellie Hamilton Fall Risk Scoring  23, HIGH RISK  Fall Risk Safety Measures  bed alarm and chair alarm    Vitals  Temperature: 36.4 °C (97.5 °F)  Temp src: Oral  Pulse: 72  Respiration: 20  Blood Pressure: (!) 145/94  Blood Pressure MAP (Calculated): 111 MM HG  BP Location: Right, Upper Arm  Patient BP Position: Sitting     Oxygen  Pulse Oximetry: 95 %  O2 (LPM): 0  FiO2%: 21 %  O2 Delivery Device: None - Room Air    Bowel and Bladder  Last Bowel Movement  12/04/23  Stool Type  Type 5: Soft blob with clear cut edges (passed easily)  Bowel Device  Bathroom  Continent  Bladder: Continent void   Bowel: Continent movement  Bladder Function  Urine Void (mL): 300 ml  Number of Times Voided: 1  Urinary Options: Yes  Urine Color: Yellow  Number of Times Incontinent of Urine: 0  Genitourinary Assessment   Bladder Assessment (WDL):  WDL Except  Echols Catheter: Not Applicable  Urine Color: Yellow  Number of Bladder Accidents: 0  Total Number of Bladder of Accidents in Last 7 Days: 0  Number of Times Incontinent of Urine: 0  Bladder Device: Urinal  Time  Void: No  Bladder Scan: Post Void  $ Bladder Scan Results (mL): 26    Skin  Polo Score   16  Sensory Interventions   Bed Types: Standard/Trauma Mattress  Skin Preventative Measures: Pillows in Use for Support / Positioning  Moisture Interventions  Moisturizers/Barriers: Barrier Cream      Pain  Pain Rating Scale  0 - No Pain  Pain Location  Foot  Pain Location Orientation  Right, Left  Pain Interventions   Declines    ADLs    Bathing   Patient Refused Bathing (too tired)  Linen Change   Partial  Personal Hygiene  Change Deja Pads, Moist Deja Wipes, Perineal Care  Chlorhexidine Bath      Oral Care  Brushed Teeth  Teeth/Dentures     Shave  Self  Nutrition Percentage Eaten  Lunch, Between % Consumed  Environmental Precautions  Treaded Slipper Socks on Patient  Patient Turns/Positioning  Patient Turns Self from Side to Side  Patient Turns Assistance/Tolerance  Assistance of One  Bed Positions  Bed Controls On, Bed Locked  Head of Bed Elevated  Self regulated      Psychosocial/Neurologic Assessment  Psychosocial Assessment  Psychosocial (WDL):  Within Defined Limits  Patient Behaviors: Fatigue  Neurologic Assessment  Neuro (WDL): Exceptions to WDL  Level of Consciousness: Alert  Orientation Level: Oriented X4  Cognition: Follows commands  Speech: Clear, Slurred  Facial Symmetry: Left facial drooping  Pupil Assesment: No  R Pupil Size (mm): 3  R Pupil Shape / Description: Round  R Pupil Reaction: Brisk  L Pupil Size (mm): 3  L Pupil Shape / Description: Round  L Pupil Reaction: Brisk  Reflexes: Cough  Cough Reflex: Present  Motor Function/Sensation Assessment: Dorsiflexion, Motor response  R Foot Dorsiflexion: Strong  L Foot Dorsiflexion: Absent  RUE Motor Response: Responds to commands  RUE Sensation: Full sensation  Muscle Strength Right Arm: Normal Strength Against Gravity and Full Resistance  LUE Motor Response: Flaccid  LUE Sensation: Tingling, Numbness  Muscle Strength Left Arm: Weak Movement but Not Against  Gravity or Resistance  RLE Motor Response: Responds to commands  RLE Sensation: Full sensation  Muscle Strength Right Leg: Good Strength Against Gravity and Moderate Resistance  LLE Motor Response: Flaccid  LLE Sensation: Tingling, Numbness  Muscle Strength Left Leg: Weak Movement but Not Against Gravity or Resistance  EENT (WDL):  WDL Except    Cardio/Pulmonary Assessment  Edema   RLE Edema: Trace  LLE Edema: Trace  Respiratory Breath Sounds  RUL Breath Sounds: Clear  RML Breath Sounds: Clear  RLL Breath Sounds: Diminished  MOHAMUD Breath Sounds: Clear  LLL Breath Sounds: Diminished  Cardiac Assessment   Cardiac (WDL):  WDL Except

## 2023-12-06 NOTE — DISCHARGE INSTRUCTIONS
[facility] NURSING DISCHARGE INSTRUCTIONS    Blood Pressure: 131/76  Weight: 80.3 kg (177 lb)  Nursing recommendations for Jason Lima at time of discharge are as follows:  Client verbalized understanding of all discharge instructions and prescriptions.     Review all your home medications and newly ordered medications with your doctor and/or pharmacist. Follow medication instructions as directed by your doctor and/or pharmacist.    Pain Management:   Discharge Pain Medication Instructions:  Comfort Goal: Sleep Comfortably, Comfort with Movement  Notify your primary care provider if pain is unrelieved with these measures, if the pain is new, or increased in intensity.    Discharge Skin Characteristics:    Discharge Skin Exam:       Skin / Wound Care Instructions: Please contact your primary care physician for any change in skin integrity.       If You Have Surgical Incisions / Wounds:  Monitor surgical site(s) for signs of increased swelling, redness or symptoms of drainage from the site or fever as this could indicate signs and symptoms of infection. If these symptoms are noted, notifiy your primary care provider.      Discharge Safety Instructions:       Discharge Safety Concerns:    The interdisciplinary team has made recommendation that you should not be left alone  in the house due to balance problem, history of falls, and weakness  Anti-embolic stockings are not required to increase circulation to the lower extremities.    Discharge Diet:       Discharge Liquids:    Discharge Bowel Function:    Please contact your primary care physician for any changes in bowel habits.  Discharge Bowel Program:    Discharge Bladder Function:    Discharge Urinary Devices:        Nursing Discharge Plan:        Case Management Discharge Instructions:   Discharge Location:    Agency Name/Address/Phone:    Home Health:    Outpatient Services:    DME Provider/Phone:    Medical Equipment Ordered:     Prescription Faxed to:        Discharge Medication Instructions:  Below are the medications your physician expects you to take upon discharge:              Suicidal Feelings: How to Help Yourself  Suicide is when you end your own life. Suicidal ideation includes expressing thoughts about, or a preoccupation with, ending your own life. There are many things you can do to help yourself feel better when struggling with these feelings. Many services and people are available to support you and others who struggle with similar feelings.  If you ever feel like you may hurt yourself or others, or have thoughts about taking your own life, get help right away. To get help:  Go to your nearest emergency department.  Call your local emergency services (911 in the [place]  Call the [facility] and human services helpline (211 in the [place]  Call or text a suicide hotline to speak with a trained counselor. The following suicide hotlines are available in the [place]:  4-955-149-TALK (1-685.641.4948 or 248 in the [place]  1-909-TMLKKWU (1-927.617.9391).  Text 164971. This is the Crisis Text Line in the [place]  1-844.432.1669. This is a hotline for [place] speakers.  1-118.211.5117. This is a hotline for TTY users.  6-539-2-U-BRAYDEN (1-453.603.1233). This is a hotline for lesbian, rodriguez, bisexual, transgender, or questioning youth.  For a list of hotlines in [place], visit suicide.org/hotlines/international/jwaeug-lucieub-udbnvvih.html  Contact a crisis center or a local suicide prevention center. To find a crisis center or suicide prevention center:  Call your local hospital, clinic, community service organization, mental health center, social service provider, or health department. Ask for help with connecting to a crisis center.  For a list of crisis centers in the [place], visit: suicidepreventionlifeline.org  For a list of crisis centers in [place], visit: suicideprevention.ca  How to help yourself feel  better    Promise yourself that you will not do anything bad or extreme when you have suicidal feelings. Remember the times you have felt hopeful.  Many people have gotten through suicidal thoughts and feelings, and you can too.  If you have had these feelings before, remind yourself that you can get through them again.  Let family, friends, teachers, or counselors know how you are feeling. Do not separate yourself from those who care about you and want to help you.  Talk with someone every day, even if you do not feel like talking to anyone or being with other people.  Face-to-face conversation is best to help them understand your feelings.  Contact a mental health care provider and work with this person regularly.  Make a safety plan that you can follow during a crisis.  Include phone numbers of suicide prevention hotlines, mental health professionals, and trusted friends and family members you can call during an emergency.  Save these numbers on your phone.  If you are thinking of taking a lot of medicine, give your medicine to someone who can give it to you as prescribed.  If you are on antidepressants and are concerned you will overdose, tell your health care provider so that he or she can give you safer medicines.  Try to stick to your routines and follow a schedule every day. Make self-care a priority.  Make a list of realistic goals, and cross them off when you achieve them. Accomplishments can give you a sense of worth.  Wait until you are feeling better before doing things that you find difficult or unpleasant.  Do things that you have always enjoyed to take your mind off your feelings.  Try reading a book, or listening to or playing music.  Spending time outside, in nature, may help you feel better.  Follow these instructions at home:    Visit your primary health care provider every year for a physical and a mental health checkup.  Take over-the-counter and prescription medicines only as told by your  health care provider.  Ask your health care provider about the possible side effects of any medicines you are taking.  Ask your health care provider about whether suicidal ideation is a possible side effect of any of your medicines.  Learn about suicidal ideation and what increases the risk for the development of suicidal thoughts.  Eat a well-balanced diet, and eat regular meals.  Get plenty of rest.  Exercise if you are able. Just 30 minutes of exercise each day can help you feel better.  Keep your living space well lit.  Do not use alcohol or drugs. Remove these substances from your home.  General recommendations  Remove weapons, poisons, knives, and other deadly items from your home.  Work with a mental health care provider as needed.  When you are feeling well, write yourself a letter with tips and support that you can read when you are not feeling well.  Remember that life's difficulties can be sorted out with help. Conditions can be treated, and you can learn behaviors and ways of thinking that will help you.  Work with your health care provider or counselor to learn ways of coping with your thoughts and feelings.  Where to find more information  National Suicide Prevention Lifeline: www.suicidepreventionlifeline.org  Hopeline: www.hopeline.com  American Foundation for Suicide Prevention: www.afsp.org  The Alvaro Project (for lesbian, rodriguez, bisexual, transgender, or questioning youth): www.thetrevorproject.org  National [place] of Mental Health: www.nimh.nih.gov/health/topics/suicide-prevention  Suicide Prevention Resources: afsp.org/suicide-prevention-resources  Contact a health care provider if:  You feel as though you are a burden to others.  You feel agitated, angry, vengeful, or have extreme mood swings.  You have withdrawn from family and friends.  You are frequently using drugs or alcohol.  Get help right away if:  You are talking about suicide or wishing to die.  You start making plans for how to  commit suicide.  You feel that you have no reason to live.  You start making plans for putting your affairs in order, saying goodbye, or giving your possessions away.  You feel guilt, shame, or unbearable pain, and it seems like there is no way out.  You are engaging in risky behaviors that could lead to death.  If you have any of these thoughts or symptoms, get help right away:  Go to your nearest emergency department or crisis center.  Call emergency services (911 in the [place]  Call or text a suicide crisis helpline.  Summary  Suicide is when you take your own life. Suicidal feelings are thoughts about ending your own life.  Promise yourself that you will not do anything bad or extreme when you have suicidal feelings.  Let family, friends, teachers, or counselors know how you are feeling.  Get help right away if you start making plans for how to commit suicide.  This information is not intended to replace advice given to you by your health care provider. Make sure you discuss any questions you have with your health care provider.  Document Revised: 07/14/2022 Document Reviewed: 04/28/2022  Elsevier Patient Education © 2023 [employer]        Hemorrhagic Stroke    A hemorrhagic stroke happens when a blood vessel in the brain leaks or bursts (ruptures). This causes bleeding in or around the brain (hemorrhage) and leads to the sudden death of brain tissue. A hemorrhagic stroke is a medical emergency. It can cause brain damage and death.  There are two major types of hemorrhagic stroke:  Intracerebral hemorrhage. This happens when bleeding occurs within the brain tissue.  Subarachnoid hemorrhage. This happens when bleeding occurs in the area between the brain and the membrane that covers the brain (subarachnoid space).  What are the causes?  This condition may be caused by:  Head injury (trauma).  Part of a weakened blood vessel wall bulging or ballooning out (cerebral aneurysm).  Thin and hardened blood vessels  due to plaque buildup.  Tangled blood vessels in the brain (arteriovenous malformation).  Protein buildup on the artery walls of the brain (amyloid angiopathy).  Inflamed blood vessels (vasculitis).  A brain tumor.  Sometimes, the cause of this condition is not known.  What increases the risk?  The following factors may make you more likely to develop this condition:  Hypertension.  Having abnormal blood vessels present since birth (congenital abnormality).  Bleeding disorders, such as hemophilia, sickle cell disease, or liver disease.  Blood thinners (anticoagulants) that make the blood too thin.  Being an older adult.  Moderate or heavy alcohol use.  Using drugs, such as cocaine or methamphetamines.  What are the signs or symptoms?  Symptoms of this condition include:  The sudden onset of:  Weakness or numbness of the face, arm, or leg, especially on one side of the body.  Confusion.  Trouble speaking or understanding speech.  Difficulty seeing in one or both eyes.  Difficulty walking or moving the arms or legs.  Dizziness, or loss of balance or coordination.  Nausea and vomiting.  A severe headache with no known cause. This headache may feel like the worst one a person has ever had.  Seizures.  How is this diagnosed?  This condition may be diagnosed based on:  Your symptoms, medical history, and a physical exam.  Tests, including:  Blood tests.  CT scan.  MRI.  CT angiography (CTA) or magnetic resonance angiography (MRA).  Catheter angiogram. In this procedure, dye is injected through a long, thin tube (catheter) into one of your arteries. X-rays are taken and will show whether a blockage or a problem in a blood vessel exists.  How is this treated?  The goals of treatment are to stop the bleeding, reduce pressure on the brain, relieve symptoms, and prevent complications. Treatment may include:  Medicines that do the following:  Lower blood pressure (antihypertensives).  Relieve pain (analgesics), fever, nausea,  or vomiting.  Stop or prevent seizures (anticonvulsants).  Prevent blood vessels in the brain from spasming in response to bleeding.  Control bleeding in the brain.  Use of a machine to help you breathe (ventilator).  A blood transfusion to help your blood clot.  Placement of a tube (shunt) in the brain to relieve pressure.  Physical, speech, or occupational therapy.  Surgery to stop the bleeding, remove a blood clot or tumor, or reduce pressure.  Treatment depends on the cause, severity, and duration of symptoms. Talk with your health care provider about what to expect during your recovery.  Follow these instructions at home:  Activity  Use a walker or a cane as told by your health care provider.  Return to your normal activities as told by your health care provider. Ask your health care provider what activities are safe for you.  Rest to help your brain heal. Make sure you:  Get plenty of sleep. Avoid staying up late at night.  Keep to a sleep schedule. Go to sleep and wake up at about the same time every day.  Avoid activities that cause physical or mental stress.  Lifestyle  Do not drink alcohol if:  Your health care provider tells you not to drink.  You are pregnant, may be pregnant, or are planning to become pregnant.  If you drink alcohol:  Limit how much you use to:  0-1 drink a day for women.  0-2 drinks a day for men.  Know how much alcohol is in your drink. In the [place] one drink equals one 12 oz bottle of beer (355 mL), one 5 oz glass of wine (148 mL), or one 1½ oz glass of hard liquor (44 mL).  General instructions  Do not use any products that contain nicotine or tobacco. These include cigarettes, chewing tobacco, and vaping devices, such as e-cigarettes. If you need help quitting, ask your health care provider.  Do not drive or use heavy machinery until your health care provider approves.  Take over-the-counter and prescription medicines only as told by your health care provider.  Keep all follow-up  "visits, including those with therapists. This is important.  How is this prevented?  You can reduce your risk of stroke by managing conditions, such as:  High blood pressure.  High cholesterol.  Diabetes.  Heart disease.  Obesity.  Other factors and lifestyle changes that can lower your risk include:  Quitting smoking, limiting alcohol, and staying physically active.  Having your bloodwork monitored frequently if you take anticoagulants (blood thinners).  Contact a health care provider if:  You develop any of the following symptoms:  Headaches that keep coming back (that are chronic).  Nausea.  Vision problems.  Increased sensitivity to noise or light.  Depression, mood swings, anxiety, or irritability.  Memory problems.  Difficulty concentrating or paying attention.  Sleep problems.  Feeling tired all of the time.  Get help right away if:  You have a partial or total loss of consciousness.  You are taking blood thinners and you fall or have a minor injury to the head.  You have a bleeding disorder and you fall, or you have a minor trauma to the head.  You have any symptoms of a stroke. \"BE FAST\" is an easy way to remember the main warning signs of a stroke:  B - Balance. Signs are dizziness, sudden trouble walking, or loss of balance.  E - Eyes. Signs are trouble seeing or a sudden change in vision.  F - Face. Signs are sudden weakness or numbness of the face, or the face or eyelid drooping on one side.  A - Arms. Signs are weakness or numbness in an arm. This happens suddenly and usually on one side of the body.  S - Speech. Signs are sudden trouble speaking, slurred speech, or trouble understanding what people say.  T - Time. Time to call emergency services. Write down what time symptoms started.  You have other signs of a stroke, such as:  A sudden, severe headache with no known cause.  Nausea or vomiting.  Seizure.  These symptoms may represent a serious problem that is an emergency. Do not wait to see if the " symptoms will go away. Get medical help right away. Call your local emergency services (911 in the [place] Do not drive yourself to the hospital.  Summary  Hemorrhagic stroke is caused by bleeding in or around the brain.  Hemorrhagic stroke is a medical emergency.  Know the signs and symptoms of stroke.  You can reduce your risk of stroke by managing conditions, quitting smoking, and making other lifestyle changes.  This information is not intended to replace advice given to you by your health care provider. Make sure you discuss any questions you have with your health care provider.  Document Revised: 09/21/2021 Document Reviewed: 09/21/2021  [employer] Patient Education © 2023 [employer]        Understanding Your Risk for Falls  Each year, millions of people have serious injuries from falls. It is important to understand your risk for falling. Talk with your health care provider about your risk and what you can do to lower it. There are actions you can take at home to lower your risk and prevent falls.  If you do have a serious fall, make sure to tell your health care provider. Falling once raises your risk of falling again.  How can falls affect me?  Serious injuries from falls are common. These include:  Broken bones, such as hip fractures.  Head injuries, such as traumatic brain injuries (TBI) or concussion.  A fear of falling can cause you to avoid activities and stay at home. This can make your muscles weaker and actually raise your risk for a fall.  What can increase my risk?  There are a number of risk factors that increase your risk for falling. The more risk factors you have, the higher your risk of falling. Serious injuries from a fall happen most often to people older than age 65. Children and young adults ages 15-29 are also at higher risk.  Common risk factors include:  Weakness in the lower body.  Lack (deficiency) of vitamin D.  Being generally weak or confused due to long-term (chronic)  illness.  Dizziness or balance problems.  Poor vision.  Medicines that cause dizziness or drowsiness. These can include medicines for your blood pressure, heart, anxiety, insomnia, or edema, as well as pain medicines and muscle relaxants.  Other risk factors include:  Drinking alcohol.  Having had a fall in the past.  Having depression.  Having foot pain or wearing improper footwear.  Working at a dangerous job.  Having any of the following in your home:  Tripping hazards, such as floor clutter or loose rugs.  Poor lighting.  Pets.  Having dementia or memory loss.  What actions can I take to lower my risk of falling?         Physical activity  Maintain physical fitness. Do strength and balance exercises. Consider taking a regular class to build strength and balance. Yoga and carmen chi are good options.  Vision  Have your eyes checked every year and your vision prescription updated as needed.  Walking aids and footwear  Wear nonskid shoes. Do not wear high heels.  Do not walk around the house in socks or slippers.  Use a cane or walker as told by your health care provider.  Home safety  Attach secure railings on both sides of your stairs.  Install grab bars for your tub, shower, and toilet. Use a bath mat in your tub or shower.  Use good lighting in all rooms. Keep a flashlight near your bed.  Make sure there is a clear path from your bed to the bathroom. Use night-lights.  Do not use throw rugs. Make sure all carpeting is taped or tacked down securely.  Remove all clutter from walkways and stairways, including extension cords.  Repair uneven or broken steps.  Avoid walking on icy or slippery surfaces. Walk on the grass instead of on icy or slick sidewalks. Use ice melt to get rid of ice on walkways.  Use a cordless phone.  Questions to ask your health care provider  Can you help me check my risk for a fall?  Do any of my medicines make me more likely to fall?  Should I take a vitamin D supplement?  What exercises can  I do to improve my strength and balance?  Should I make an appointment to have my vision checked?  Do I need a bone density test to check for weak bones or osteoporosis?  Would it help to use a cane or a walker?  Where to find more information  Centers for Disease Control and PreventionMAYUR: www.cdc.gov  Community-Based Fall Prevention Programs: www.cdc.gov  National [place] on Aging: www.juana.nih.gov  Contact a health care provider if:  You fall at home.  You are afraid of falling at home.  You feel weak, drowsy, or dizzy.  Summary  Serious injuries from a fall happen most often to people older than age 65. Children and young adults ages 15-29 are also at higher risk.  Talk with your health care provider about your risks for falling and how to lower those risks.  Taking certain precautions at home can lower your risk for falling.  If you fall, always tell your health care provider.  This information is not intended to replace advice given to you by your health care provider. Make sure you discuss any questions you have with your health care provider.  Document Revised: 07/21/2021 Document Reviewed: 07/21/2021  [employer] Patient Education © 2023 [employer]                Speech Therapy Discharge Instructions for Jason Lima    12/6/2023    Diet: Regular (7), Thin (0)  Swallow Strategies: Small bites/sips, tight lips when drinking liquids, use of a mirror is helpful to monitor spillage from lips at mealtimes.  Other Diet Instructions: Please have family assist with cutting food items for ease of self feeding at this time.  Supervision: 24-7 supervision is recommended for safety at this time. Ongoing ST services recommended to target attention, memory, executive function.  Cognition / Communication:   To increase your ability to pay attention and concentrate it is recommended you follow these strategies:     Monitor your fatigue: Monitoring our fatigue is probably one of the most important strategies we can  use. Many people suffer from higher levels of fatigue than normal and must be aware that fatigue will have a major impact on attention capabilities. This means scheduling activities that require your attention at times when you feel at your best.  Make a schedule: Choose a time that’s quiet and unhurried -- maybe at night before you go to bed -- and plan out the next day, down to the task. Use a reminder placido, timer, or alarm to help you stick to that schedule. Alternate things you want to do with ones you don’t to help your mind stay engaged.  Allow plenty of time to complete tasks.  Be realistic about time: Your brain is wired differently than other people’s, and it may take you longer to get things done. That’s OK. Figure out a realistic time frame for your daily tasks -- and don’t forget to build in time for breaks if you think you’ll need them.  Quiet your mind by quieting your space: When it’s time to buckle down and get something done, take away the distractions. Use noise-canceling headphones to drown out sounds. Put your phone on silent. Work in a room with a door you can close. If you can do your job from home, set up the space in a way that helps you focus.  Control clutter: Another way to quiet your brain is to clear your space of things you don’t need. It can prevent distractions, and it can help you stay organized because you’ll have fewer things to tidy up.  Get some organizational helpers like under-the-bed containers or over-the-door holders. Ask a friend to help if it seems like you’re swimming in a sea of debris and you don’t know where to start.    What is memory?   Memory is the ability to learn, store, and retrieve information. New or increasing problems with any or all of these 3 stages of memory often occur after a traumatic brain injury, stroke, brain tumor, multiple sclerosis, or other kind of injury or illness that affects your nervous system.   Some kinds of memory problems may also occur  as part of normal aging, when many people have more trouble retrieving new information.     Types of Memory:    Long-term (remote): memory for old, well-learned information that has been “rehearsed” (used) over time, such as the name of a childhood pet, memories of past vacations, or where you went to high school. Long-term memory tends to be retained after injury or illness.   Short-term (recent): memory for new experiences that took place a few minutes, hours, or days ago, such as what you had for breakfast or what you did yesterday. Short-term memory tends to be the most severely affected after injury. People who have had brain injuries may have problems with their attention span, how much memory they can store, how quickly they can think, and how efficiently they learn. These memory problems will make it hard to understand and save short-term memories so that they can be correctly rehearsed and stored in long-term memory.   Immediate (working): memory for information that is current, that you usually keep track of mentally, such as a phone number you look up, directions someone just gave you, or keeping track of numbers in your head when you add or subtract.   Prospective: the ability to remember to do something in the future, such as remembering to take a medicine, go to an appointment, or follow through on an assignment or project.       Strategies to Help Improve Your Memory   Your speech therapist can work with you to develop strategies to help you remember new information. There are 2 main types of strategies to help your memory: internal reminders and external reminders.     Internal Reminders     Rehearsal: retelling yourself information you just learned, or restating it out loud in your own words.   Repetition: saying the same information over and over, either silently or out loud.   Clarification: asking someone else to repeat or rephrase information.   Chunking: grouping items together to reduce the  number of items to remember, such as grouping 7-digit phone numbers into 2 chunks, one with 3 numbers and the other with 4 numbers.   Rhyming: making a rhyme out of important information.   Acronyms or alphabet cueing: creating a letter for each word you want to remember, or vice versa. One example is using the sentence “Every Good Boy Does Fine” to remember that the lines of a treble staff in music are the notes E, G, B, D, and F.   Imagery (also called visualization): creating pictures of the information in your mind.   Association: linking old information or habits with the new, such as remembering to take your medicine every night at the same time that you brush your teeth.   Personal meaning: making the new information meaningful or emotionally important to you in some way.     External Reminders     Using a paper or electronic calendar or day planner.   Setting timers or alarms to remind you to do something.   Writing down reminders such as to-do lists, shopping lists, and project outlines.   Recording new information with a voice recorder.   Using a medicine organizing tool.   Creating specific, permanent places for important items. One example is always putting your keys, wallet, and cell phone in the same place every time you get home.    Executive function is a set of mental skills that help you get things done. These skills are controlled by an area of the brain called the frontal lobe.    Executive function helps you:  Manage time  Pay attention  Switch focus  Plan and organize  Remember details  Avoid saying or doing the wrong thing  Do things based on your experience  Multitask    When executive function isn’t working as it should, your behavior is less controlled. This can affect your ability to:  Work or go to school  Do things independently  Maintain relationships    How to Manage Executive Function Problems  Take a step-by-step approach to work.  Rely on visual organizational aids.  Use tools like  time organizers, computers, or watches with alarms.  Make schedules and look at them several times a day.  Ask for written and oral instructions whenever possible.  Plan for transition times and shifts in activities.  Allow and budget extra time to get tasks done.    When you are about to try a task that you haven’t done in a while (or struggled with the last time), think about the steps involved, try to anticipate possible challenges and identify what might give you trouble, come up with a plan to compensate of those challenges, and evaluate after the fact - to determine if your plan worked or if it needs adjustment.   If you experience an outcome that you didn’t expect, think back to what may have contributed to the problem.   Break complex problems down into smaller parts.   Sometimes a complicated task can be overwhelming, but if you break it down into components, you will be able to find a place where you can cope with the information.     Be patient and persistent.    Develop tools and strategies that will help organize, filter and narrow down information so that you can deal with it effectively.    Cristian would benefit from assistance managing their medications.  It is recommended that you purchase a pill organizer to sort medications in on a weekly basis.  Implementing a system to help remind you to take your medications will also be helpful (Ex: setting alarms, having a friend/family member call as a reminder).       Thank you for all your hard work at Rehab, Jason! Keep up the great work at home and best of luck in your continued recovery!    Physical Therapy Discharge Instructions for Jason EDDIE Lima    1/1/2024    Level of Assist Required for Ambulation: Physical Assist on Flat Surfaces, Should Not Attempt Curbs at This Time, Should Not Attempt Stairs at This Time (Recommend training with therapist at next level of care to progress walking in the home wiht caregiver assistance)  Distance Patient May  Ambulate: with therapy only at this time  Device Recommended for Ambulation: Tanvir-Walker  Level of Assist Required to Propel Wheelchair: Supervision  Level of Assist Required for Transfers: Physical Assist (recommend contact guard for squat transfers, Min to moderate assist if standing and stepping c/ hemiwalker)  Device Recommended for Transfers: Tanvir-Walker (or wheelchair + bedrail for squat pivot)  Home Exercise Program: Refer to Home Exercise Program Handout for Details  Prosthesis / Orthosis Recommendation / Location:  (Left AFO for walking)    Home program: see handout for details.   Walking: please do some training with your next therapist to work toward walking at home with your caregivers to build good habits and confidence. No walking by yourself at this time!  Transfers: Jason, you are safest when you do a squat pivot transfer, especially when you reach across for the wheelchair or bed rail. Be very careful with stepping transfers as you can easily lose your balance to the left side. Make sure you have the gait belt on and someone with you!  Wheelchair: your specialty chair should be delivered in 2-3 months. Follow up with Catrina at [facility] if you have any problems: [phone].   Vision: please get an eye exam. I recommend [facility] [address], [place] [address], [phone]. They have experience with brain injury and can also refer you to vision therapy if that is appropriate.     It was such a pleasure to work with you! Reach out to me at [email or web address] if I can help with anything! Good luck and happy new year! ~Isidra Hamilton, PT

## 2023-12-06 NOTE — PROGRESS NOTES
Received bedside shift report from Coleen ALEJANDRA RN regarding patient and assumed care. Patient awake, calm and stable, currently positioned in bed for comfort and safety; call light within reach. Denies pain or discomfort at this time. Will continue to monitor.

## 2023-12-06 NOTE — CARE PLAN
Problem: Dressing  Goal: STG-Within one week, patient will dress LB at [facility] using LRD  Outcome: Not Met     Problem: Functional Transfers  Goal: STG-Within one week, patient will transfer to step in shower with Max A.  Outcome: Met  Goal: STG-Within one week, patient will transfer to toilet at modA  Outcome: Met     Problem: Dressing  Goal: STG-Within one week, patient will dress UB at [facility]  Outcome: Met     Problem: Bathing  Goal: STG-Within one week, patient will bathe at [facility] using LRD  Outcome: Met

## 2023-12-06 NOTE — THERAPY
Speech Language Pathology  Daily Treatment     Patient Name: Jason Lima  Age:  62 y.o., Sex:  male  Medical Record #: [id number]  Today's Date: 12/6/2023     Precautions  Precautions: Fall Risk  Comments: Left Hemiparesis, left visual inattention    Subjective    Pt in room with SO, Vielka present. Both pt and SO requesting to d/c ST services at this time to allow more time with PT/OT to work on affected left side. This SLP reinforced pt's need for ongoing cognitive intervention to address attention, memory, executive functioning in addition to physical rehab, however, pt expressed primary goal at this time is to rehab left side and feels more time with PT/OT will address this goal. This SLP recommended ongoing ST services when pt discharges from this facility no matter the location (I.e. SNF vs home d/c), pt and SO in agreement at this time.      Objective       12/06/23 1304   Treatment Charges   SLP Cognitive Skill Development First 15 Minutes 1   SLP Cognitive Skill Development Additional 15 Minutes 1   SLP Total Time Spent   SLP Individual Total Time Spent (Mins) 30   Cognition   Executive Functioning / Organization Moderate (3)   Interdisciplinary Plan of Care Collaboration   IDT Collaboration with  Family / Caregiver   Patient Position at End of Therapy Seated;Self Releasing Lap Belt Applied;Chair Alarm On;Call Light within Reach   Collaboration Comments SO Present at beginning of session, did not remain for therapy         Assessment    Deductive reasoning task organizing tools. Pt requires cues to organize the information present to complete the specific task vs basing answers on how pt would conduct the task in his personal life. Rigid thinking noted with consistent reference to the information within the task from this SLP in order to complete correctly. Education provided on addressing planning/organization skills within this task while also addressing mental flexibility. Pt continues to demonstrate  reduced insight to cognitive deficits and it is recommended ongoing ST services be provided once pt discharges from this facility.     Strengths: Alert and oriented, Effective communication skills, Motivated for self care and independence, Pleasant and cooperative, Independent prior level of function, Making steady progress towards goals, Willingly participates in therapeutic activities  Barriers: Impaired functional cognition (depression, left neglect, difficulty self monitoring/regulating)    Plan    D/c ST for remainder of stay at [facility] to allow more time with PT/OT per patient and SO request to target physical rehabilitation. Recommendation for ongoing ST services upon d/c via home health? Outpatient? Or SNF? Pending d/c disposition.     Passport items to be completed:  manage finances, manage medications, arrive to therapy appointments on time, complete daily memory log entries, solve problems related to safety situations, review education related to hospitalization, complete caregiver training     Speech Therapy Problems (Active)       Problem: Memory STGs       Dates: Start:  11/22/23         Goal: STG-Within one week, patient will recall new training and safety sequencing with 80% acc with set up and external cues.       Dates: Start:  11/22/23         Goal Note filed on 12/05/23 1626 by Tawny Forbes MS,CCC-SLP       Mod A needed.                 Problem: Problem Solving STGs       Dates: Start:  11/22/23         Goal: STG-Within one week, patient will perform alternating attention tasks with 85% acc with set up.       Dates: Start:  11/22/23         Goal Note filed on 11/29/23 1121 by Neva Abbott MS,CCC-SLP       MOD A, continues to require cues               Goal: STG-Within one week, patient will organization and reasoning tasks with 80% acc with min cues.       Dates: Start:  11/22/23         Goal Note filed on 11/29/23 1121 by Neva Abbott MS,CCC-SLP       Will continue to target, continues to  require MOD A                 Problem: Speech/Swallowing LTGs       Dates: Start:  11/13/23         Goal: LTG-By discharge, patient will safely swallow (7)regular diet textures and (0) thin liquids with no overt s/sx of aspiration for 2/2 days.       Dates: Start:  11/13/23            Goal: LTG-By discharge, patient will solve complex problem       Dates: Start:  11/13/23       Description: For safety and self care with 80% acc mod I  for safe discharge home.      Goal Note filed on 12/05/23 1625 by Tawny Forbes MS,CCC-SLP       Mod A needed.              Goal: LTG-By discharge, patient will recall new training with 80% acc with min A and use of external memory aids.       Dates: Start:  11/13/23         Goal Note filed on 12/05/23 1625 by Tawny Forbes MS,CCC-SLP       Mod A needed.                 Problem: Swallowing STGs       Dates: Start:  11/13/23

## 2023-12-07 ENCOUNTER — APPOINTMENT (OUTPATIENT)
Dept: PHYSICAL THERAPY | Facility: REHABILITATION | Age: 63
DRG: 057 | End: 2023-12-07
Attending: PHYSICAL MEDICINE & REHABILITATION
Payer: COMMERCIAL

## 2023-12-07 ENCOUNTER — APPOINTMENT (OUTPATIENT)
Dept: OCCUPATIONAL THERAPY | Facility: REHABILITATION | Age: 63
DRG: 057 | End: 2023-12-07
Attending: PHYSICAL MEDICINE & REHABILITATION
Payer: COMMERCIAL

## 2023-12-07 PROCEDURE — 700102 HCHG RX REV CODE 250 W/ 637 OVERRIDE(OP): Performed by: STUDENT IN AN ORGANIZED HEALTH CARE EDUCATION/TRAINING PROGRAM

## 2023-12-07 PROCEDURE — 700102 HCHG RX REV CODE 250 W/ 637 OVERRIDE(OP): Performed by: PHYSICAL MEDICINE & REHABILITATION

## 2023-12-07 PROCEDURE — A9270 NON-COVERED ITEM OR SERVICE: HCPCS | Performed by: HOSPITALIST

## 2023-12-07 PROCEDURE — A9270 NON-COVERED ITEM OR SERVICE: HCPCS | Performed by: STUDENT IN AN ORGANIZED HEALTH CARE EDUCATION/TRAINING PROGRAM

## 2023-12-07 PROCEDURE — 97116 GAIT TRAINING THERAPY: CPT

## 2023-12-07 PROCEDURE — 90832 PSYTX W PT 30 MINUTES: CPT | Performed by: SOCIAL WORKER

## 2023-12-07 PROCEDURE — 770010 HCHG ROOM/CARE - REHAB SEMI PRIVAT*

## 2023-12-07 PROCEDURE — 97112 NEUROMUSCULAR REEDUCATION: CPT

## 2023-12-07 PROCEDURE — A9270 NON-COVERED ITEM OR SERVICE: HCPCS | Performed by: PHYSICAL MEDICINE & REHABILITATION

## 2023-12-07 PROCEDURE — 94760 N-INVAS EAR/PLS OXIMETRY 1: CPT

## 2023-12-07 PROCEDURE — 97112 NEUROMUSCULAR REEDUCATION: CPT | Mod: CO

## 2023-12-07 PROCEDURE — 700102 HCHG RX REV CODE 250 W/ 637 OVERRIDE(OP): Performed by: HOSPITALIST

## 2023-12-07 PROCEDURE — 306637 HCHG MISC ORTHO ITEM RC 0274

## 2023-12-07 PROCEDURE — 97110 THERAPEUTIC EXERCISES: CPT | Mod: CO

## 2023-12-07 PROCEDURE — 99232 SBSQ HOSP IP/OBS MODERATE 35: CPT | Performed by: PHYSICAL MEDICINE & REHABILITATION

## 2023-12-07 RX ADMIN — HYDRALAZINE HYDROCHLORIDE 100 MG: 50 TABLET, FILM COATED ORAL at 15:16

## 2023-12-07 RX ADMIN — BACLOFEN 10 MG: 10 TABLET ORAL at 20:36

## 2023-12-07 RX ADMIN — BACLOFEN 10 MG: 10 TABLET ORAL at 08:18

## 2023-12-07 RX ADMIN — SENNOSIDES AND DOCUSATE SODIUM 2 TABLET: 50; 8.6 TABLET ORAL at 08:18

## 2023-12-07 RX ADMIN — POLYETHYLENE GLYCOL 3350 1 PACKET: 17 POWDER, FOR SOLUTION ORAL at 20:36

## 2023-12-07 RX ADMIN — BACLOFEN 10 MG: 10 TABLET ORAL at 15:16

## 2023-12-07 RX ADMIN — SENNOSIDES AND DOCUSATE SODIUM 2 TABLET: 50; 8.6 TABLET ORAL at 20:37

## 2023-12-07 RX ADMIN — SERTRALINE 25 MG: 50 TABLET, FILM COATED ORAL at 08:17

## 2023-12-07 RX ADMIN — APIXABAN 5 MG: 5 TABLET, FILM COATED ORAL at 20:36

## 2023-12-07 RX ADMIN — HYDRALAZINE HYDROCHLORIDE 100 MG: 50 TABLET, FILM COATED ORAL at 05:43

## 2023-12-07 RX ADMIN — HYDRALAZINE HYDROCHLORIDE 100 MG: 50 TABLET, FILM COATED ORAL at 21:59

## 2023-12-07 RX ADMIN — AMLODIPINE BESYLATE 10 MG: 5 TABLET ORAL at 05:43

## 2023-12-07 RX ADMIN — ATORVASTATIN CALCIUM 40 MG: 40 TABLET, FILM COATED ORAL at 20:37

## 2023-12-07 RX ADMIN — APIXABAN 5 MG: 5 TABLET, FILM COATED ORAL at 08:17

## 2023-12-07 RX ADMIN — OMEPRAZOLE 20 MG: 20 CAPSULE, DELAYED RELEASE ORAL at 08:17

## 2023-12-07 RX ADMIN — TRAZODONE HYDROCHLORIDE 75 MG: 50 TABLET ORAL at 20:37

## 2023-12-07 ASSESSMENT — ACTIVITIES OF DAILY LIVING (ADL): BED_CHAIR_WHEELCHAIR_TRANSFER_DESCRIPTION: INCREASED TIME;SQUAT PIVOT TRANSFER TO WHEELCHAIR

## 2023-12-07 NOTE — PROGRESS NOTES
"  Physical Medicine & Rehabilitation Progress Note    Encounter Date: 12/7/2023    Chief Complaint: Doing ok    Interval Events (Subjective):  ROMAIN JIMENEZ 12/6  Voiding    Seen and examined in his room. He is doing ok. He is wondering about caregivers at home. He feels safe here at rehab.     ROS: 14 point ROS negative unless otherwise specified in the HPI    Objective:  VITAL SIGNS: /86   Pulse 85   Temp 36.7 °C (98 °F) (Oral)   Resp 16   Ht 1.727 m (5' 8\")   Wt 87 kg (191 lb 12.8 oz)   SpO2 95%   BMI 29.16 kg/m²     GEN: No apparent distress  HEENT: Head normocephalic, atraumatic.  Sclera nonicteric bilaterally, no ocular discharge appreciated bilaterally.  CV: Extremities warm and well-perfused, no peripheral edema appreciated bilaterally.  PULMONARY: Breathing nonlabored on room air, no respiratory accessory muscle use.  Not requiring supplemental oxygen.  SKIN: No appreciable skin breakdown on exposed areas of skin.  PSYCH: Normal affect  NEURO: Awake alert.  Conversational.  Logical thought content. Dysarthria. Left Hemiparesis. Tone in left wrist, fingers 2/4 and in bicep 1+/4      Laboratory Values:  Recent Results (from the past 72 hour(s))   CBC WITH DIFFERENTIAL    Collection Time: 12/06/23  5:26 AM   Result Value Ref Range    WBC 4.5 (L) 4.8 - 10.8 K/uL    RBC 3.46 (L) 4.70 - 6.10 M/uL    Hemoglobin 10.7 (L) 14.0 - 18.0 g/dL    Hematocrit 31.1 (L) 42.0 - 52.0 %    MCV 89.9 81.4 - 97.8 fL    MCH 30.9 27.0 - 33.0 pg    MCHC 34.4 32.3 - 36.5 g/dL    RDW 40.8 35.9 - 50.0 fL    Platelet Count 199 164 - 446 K/uL    MPV 10.0 9.0 - 12.9 fL    Neutrophils-Polys 57.20 44.00 - 72.00 %    Lymphocytes 32.40 22.00 - 41.00 %    Monocytes 6.40 0.00 - 13.40 %    Eosinophils 3.10 0.00 - 6.90 %    Basophils 0.70 0.00 - 1.80 %    Immature Granulocytes 0.20 0.00 - 0.90 %    Nucleated RBC 0.00 0.00 - 0.20 /100 WBC    Neutrophils (Absolute) 2.57 1.82 - 7.42 K/uL    Lymphs (Absolute) 1.46 1.00 - 4.80 K/uL    Monos " (Absolute) 0.29 0.00 - 0.85 K/uL    Eos (Absolute) 0.14 0.00 - 0.51 K/uL    Baso (Absolute) 0.03 0.00 - 0.12 K/uL    Immature Granulocytes (abs) 0.01 0.00 - 0.11 K/uL    NRBC (Absolute) 0.00 K/uL   Basic Metabolic Panel    Collection Time: 23  5:26 AM   Result Value Ref Range    Sodium 141 135 - 145 mmol/L    Potassium 3.9 3.6 - 5.5 mmol/L    Chloride 106 96 - 112 mmol/L    Co2 26 20 - 33 mmol/L    Glucose 157 (H) 65 - 99 mg/dL    Bun 29 (H) 8 - 22 mg/dL    Creatinine 2.76 (H) 0.50 - 1.40 mg/dL    Calcium 8.6 8.5 - 10.5 mg/dL    Anion Gap 9.0 7.0 - 16.0   ESTIMATED GFR    Collection Time: 23  5:26 AM   Result Value Ref Range    GFR (CKD-EPI) 25 (A) >60 mL/min/1.73 m 2           Medications:  Scheduled Medications   Medication Dose Frequency    baclofen  10 mg TID    amLODIPine  10 mg DAILY    sertraline  25 mg DAILY    apixaban  5 mg BID    hydrALAZINE  100 mg Q8HRS    traZODone  75 mg QHS    senna-docusate  2 Tablet BID    omeprazole  20 mg DAILY    atorvastatin  40 mg Nightly     PRN medications: carboxymethylcellulose, melatonin, [DISCONTINUED] insulin regular **AND** [CANCELED] POC blood glucose manual result **AND** NOTIFY MD and PharmD **AND** Administer 20 grams of glucose (approximately 8 ounces of fruit juice) every 15 minutes PRN FSBG less than 70 mg/dL **AND** dextrose bolus, Respiratory Therapy Consult, senna-docusate **AND** polyethylene glycol/lytes **AND** magnesium hydroxide **AND** bisacodyl, mag hydrox-al hydrox-simeth, ondansetron **OR** ondansetron, sodium chloride, [] acetaminophen **FOLLOWED BY** acetaminophen, oxyCODONE immediate-release **OR** oxyCODONE immediate-release, hydrALAZINE    Diet:  Current Diet Order   Procedures    Diet Order Diet: Consistent CHO (Diabetic) (2 gram sodium restriction; medications whole with thin liquids); Second Modifier: (optional): 2 Gram Sodium       Medical Decision Making and Plan:  Right thalamic hemorrhagic stroke ~ last week October  2023  Originally presented with a headache and left-sided weakness to hospital in [facility]  Work-up at the outside hospital in [facility] revealed a right thalamic hemorrhagic stroke  Repeat CT head done after return flight to the [place], 11/11 CT head shows right thalamic hemorrhage, decrease in density since prior studies from the outside hospital, slight asymmetric dilation of the left ventricular system including the left temporal horn with a possible component of hydrocephalus  Planning for BP less than 140, avoiding any kind of anticoagulation  Initiate comprehensive IRF level therapy with 3 days of therapy 5 days a week with services from PT/OT/SLP     Spasticity  OP follow up with Physiatry for consideration Botox   Has had resurgence of spasticity restart baclofen 5mg TID 11/30/2023 --> increase to 10mg TID  12/7/2023 continue Baclofen at 10mg TID, no significant side effects    ABLA  Now on Eliquis  Recheck 11/28 - improved 11.4--> 12.0--> 11.6 11/30/2023 12/6/2023 11.6-->10.7  Monitor     CKD  Follow up with OP nephrology  Renal function has been stable     Hypertension  Continue Amlodipine 10mg daily  Continue Hydralazine 25mg TID - increased by hospitalist 11/13/2023 from BID to TID--> increased to 50mg q8hrs 11/15  11/16 Hydralazine increased to 75mg q8hrs and 1x Hydralazine given  11/17 BP still elevated but hydralazine recently increased. Monitor  12/7/2023 VSS Continue Hydralazine 100mg q8hrs + Amlodipine 10mg daily    Leukopenia  New, mild 12/6/2023   Monitor     Prediabetes  Discontinue SSI     HLD  Continue home dose satin      Bowel  Constipation 11/29/2023, resolved 11/30/2023   Continue with scheduled Colace and senna, as needed stool softeners  Miralax x3 doses 11/29/2023 --> Constipation Resolved 11/30/2023   Last BM 12/6     Insomnia  Secondary to recent jet lag, melatonin scheduled --> increase to home dose 11/13/2023 9mg  Utilize trazodone as needed insomnia continues  Schedule Trazodone  11/15/2023   11/16/2023 continue Trazodone as is. Thinks he slept better last night  11/17/2023 Still not sleeping well. Increase to 75mg nightly.   12/5/2023 continue trazodone at current dose     Pain -as needed Tylenol and oxycodone    Post-Stroke Depression  Consulted Pyschiatry   Start Prozac 11/28/2023 --> Switched to zoloft per psychiatry  Appreciate assistance with management    Right Foot Pain  H/o Gout  Xray with swelling 1st metatarsal head   Trial Colchicine for acute gout flare 11/28/2023   Topical Voltaren gel scheduled   Improved with less swelling, erythema 11/29/2023   Resolved      LABS CBC + BMP 12/11      DVT PROPHYLAXIS: NO - Hemorrhagic stroke. US + UE and LE for brachial and peroneal DVT. 11/21/2023 start Heparin 5000 units q8hrs SQ for further prophylaxis, if tolerates will increase to full AC. Consider repeat CTH to ensure stability bleed once on full AC. 11/24/2023 Start loading dose Eliquis 10mg BID x 7 days with transition to 5mg BID. CBC showing improvement in H/H 11/28/2023 no neurologic decline.     HOSPITALIST FOLLOWING: YES - d/w hospitalist 12/6 --> Signed off 12/6    CODE STATUS: FULL CODE    DISPO: Home alone. Vielka and patient not . D/w CM to give resources for private care giving. 11/29/2023 Patient making significant progress with therapy and would benefit from more time in acute to maximize neuro recovery. Family actively looking for Caregivers for 24/7 care. Discharge extended due to measurable progress.     MARCUS: 12/12/23    MEDS SENT TO: TBD    DISCHARGE FOLLOW UP: Neurology, Nephrology, PCP, Physiatry (Botox) - needs referral to all.   ____________________________________    Dr. Lisa Lee DO, MS  [facility] - Physical Medicine & Rehabilitation   ____________________________________

## 2023-12-07 NOTE — PROGRESS NOTES
NURSING DAILY NOTE    Name: Jason Lima   Date of Admission: 11/12/2023   Admitting Diagnosis: Thalamic hemorrhage (HCC)  Attending Physician: Lisa Lee D.o.  Allergies: Penicillins    Safety  Patient Assist  Max x 2  Patient Precautions  Fall Risk  Precaution Comments  Left Hemiparesis, left visual inattention  Bed Transfer Status  Moderate Assist  Toilet Transfer Status   Maximal Assist  Assistive Devices  Rails, Wheelchair  Oxygen  CPAP  Diet/Therapeutic Dining  Current Diet Order   Procedures    Diet Order Diet: Consistent CHO (Diabetic) (2 gram sodium restriction; medications whole with thin liquids); Second Modifier: (optional): 2 Gram Sodium     Pill Administration  whole  Agitated Behavioral Scale  14  ABS Level of Severity  No Agitation    Fall Risk  Has the patient had a fall this admission?   Yes  Shellie Hamilton Fall Risk Scoring  23, HIGH RISK  Fall Risk Safety Measures  bed alarm and chair alarm    Vitals  Temperature: 36.6 °C (97.8 °F)  Temp src: Oral  Pulse: (!) 54  Respiration: 18  Blood Pressure: 129/76  Blood Pressure MAP (Calculated): 94 MM HG  BP Location: Right, Upper Arm  Patient BP Position: Supine     Oxygen  Pulse Oximetry: 96 %  O2 (LPM): 0  FiO2%: 21 %  O2 Delivery Device: CPAP    Bowel and Bladder  Last Bowel Movement  12/04/23  Stool Type  Type 5: Soft blob with clear cut edges (passed easily)  Bowel Device  Bathroom, Other (Comment)  Continent  Bladder: Continent void   Bowel: Continent movement  Bladder Function  Urine Void (mL): 350 ml  Number of Times Voided: 1  Urinary Options: Yes  Urine Color: Yellow  Number of Times Incontinent of Urine: 0  Genitourinary Assessment   Bladder Assessment (WDL):  WDL Except  Echols Catheter: Not Applicable  Urine Color: Yellow  Number of Bladder Accidents: 0  Total Number of Bladder of Accidents in Last 7 Days: 0  Number of Times Incontinent of Urine: 0  Bladder Device: Urinal  Time  Void: No  Bladder Scan: Post Void  $ Bladder Scan Results (mL): 26    Skin  Polo Score   16  Sensory Interventions   Bed Types: Standard/Trauma Mattress  Skin Preventative Measures: Pillows in Use for Support / Positioning  Moisture Interventions  Moisturizers/Barriers: Barrier Wipes      Pain  Pain Rating Scale  0 - No Pain  Pain Location  Foot  Pain Location Orientation  Right, Left  Pain Interventions   Declines    ADLs    Bathing   Patient Refused Bathing (too tired)  Linen Change   Partial  Personal Hygiene  Change Deja Pads, Moist Deja Wipes, Perineal Care  Chlorhexidine Bath      Oral Care  Brushed Teeth  Teeth/Dentures     Shave  Self  Nutrition Percentage Eaten  Refused  Environmental Precautions  Treaded Slipper Socks on Patient, Personal Belongings, Wastebasket, Call Bell etc. in Easy Reach, Transferred to Stronger Side, Bed in Low Position  Patient Turns/Positioning  Patient Turns Self from Side to Side  Patient Turns Assistance/Tolerance  Assistance of Two or More  Bed Positions  Bed Controls On, Bed Locked  Head of Bed Elevated  Self regulated      Psychosocial/Neurologic Assessment  Psychosocial Assessment  Psychosocial (WDL):  WDL Except  Patient Behaviors: Fatigue  Neurologic Assessment  Neuro (WDL): Exceptions to WDL  Level of Consciousness: Alert  Orientation Level: Oriented X4  Cognition: Follows commands  Speech: Clear, Slurred  Facial Symmetry: Left facial drooping  Pupil Assesment: Yes  R Pupil Size (mm): 3  R Pupil Shape / Description: Round  R Pupil Reaction: Brisk  L Pupil Size (mm): 3  L Pupil Shape / Description: Round  L Pupil Reaction: Brisk  Reflexes: Cough  Cough Reflex: Present  Motor Function/Sensation Assessment: Dorsiflexion  R Foot Dorsiflexion: Strong  L Foot Dorsiflexion: Absent  RUE Motor Response: Responds to commands  RUE Sensation: Full sensation  Muscle Strength Right Arm: Normal Strength Against Gravity and Full Resistance  LUE Motor Response: Flaccid  LUE Sensation:  Tingling, Numbness  Muscle Strength Left Arm: Weak Movement but Not Against Gravity or Resistance  RLE Motor Response: Responds to commands  RLE Sensation: Full sensation  Muscle Strength Right Leg: Good Strength Against Gravity and Moderate Resistance  LLE Motor Response: Flaccid  LLE Sensation: Tingling, Numbness  Muscle Strength Left Leg: Weak Movement but Not Against Gravity or Resistance  EENT (WDL):  WDL Except    Cardio/Pulmonary Assessment  Edema   RLE Edema: Trace  LLE Edema: Trace  Respiratory Breath Sounds  RUL Breath Sounds: Clear  RML Breath Sounds: Clear  RLL Breath Sounds: Diminished  MOHAMUD Breath Sounds: Clear  LLL Breath Sounds: Diminished  Cardiac Assessment   Cardiac (WDL):  WDL Except (hx-htn, hld)

## 2023-12-07 NOTE — FLOWSHEET NOTE
12/07/23 0714   Events/Summary/Plan   Events/Summary/Plan RA pulse ox check. Found wearing CPAP nasal mask   Vital Signs   Pulse 76   Respiration 16   Pulse Oximetry 95 %   $ Pulse Oximetry (Spot Check) Yes   Respiratory Assessment   Level of Consciousness Responds to voice  (sleeping)   Chest Exam   Work Of Breathing / Effort Within Normal Limits   Oxygen   O2 Delivery Device CPAP

## 2023-12-07 NOTE — CARE PLAN
"  Problem: Fall Risk - Rehab  Goal: Patient will remain free from falls  Note: Shellie Hamilton Fall risk Assessment Score: 23    High fall risk Interventions   - Bed and strip alarm   - Yellow sign by the door   - Yellow wrist band \"Fall risk\"  - Room near to the nurse station  - Do not leave patient unattended in the bathroom  - Fall risk education provided       The patient is Stable - Low risk of patient condition declining or worsening    Shift Goals  Clinical Goals: Safety  Patient Goals: Sleep well  Family Goals: increased pt strength, independence  "

## 2023-12-07 NOTE — THERAPY
"Occupational Therapy  Daily Treatment     Patient Name: Jason Lima  Age:  62 y.o., Sex:  male  Medical Record #: [id number]  Today's Date: 12/7/2023     Precautions  Precautions: Fall Risk  Comments: Left Hemiparesis, left visual inattention         Subjective    Pt was agreeable to 2nd OT session while seated in w/c. \" I feel more electrical current in my arm after working it out.\"     Objective       12/07/23 1301   OT Charge Group   OT Neuromuscular Re-education / Balance (Units) 1   OT Therapeutic Exercise (Units) 1   Functional Level of Assist   Bed, Chair, Wheelchair Transfer Minimal Assist   Bed Chair Wheelchair Transfer Description Increased time;Squat pivot transfer to wheelchair   Sitting Upper Body Exercises   Bicep Curls 1 set of 10;Left  (AAROM/AROM; no weight used.)   Pronation / Supination 1 set of 10;Left  (AAROM/AROM; no weight used.)   Wrist Flexion / Extension 1 set of 10;Left  (AAROM/AROM; no weight used.)   Other Exercise digit 1-5 flex/ext on L hand 1 set of 10  (AAROM/AROM; no weight used.)   Neuro-Muscular Treatments   Neuro-Muscular Treatments Anterior weight shift;Postural Changes;Tactile Cuing;Tapping   Comments Instructed pt in anterior lean while WB-ing on LUE to  5 cones accross midline with R hand and other end 3x. VC/TC for elbow ext when coming back up to midline orientation.     Continued with UE exercises seated in w/c to increase strengthening and ROM in LUE for functional tasks.    Min A for a squat pivot from w/c<>mat to for sitting balance activity to challenge midline orientation while WB-ing on LUE. Pt needed rest breaks in between set on dynamic sitting balance activity with VC to maintain midline.     Assessment    Pt tolerated OT session well with no c/o of pain.    Strengths: Able to follow instructions, Independent prior level of function, Motivated for self care and independence, Pleasant and cooperative, Supportive family  Barriers: Decreased endurance, Fatigue, " Generalized weakness, Hemiparesis, Impaired activity tolerance, Impaired balance, Limited mobility, Spasticity    Plan    Strengthening and endurance  Functional transfers  ADLs.    DME  OT DME Recommendations  Bathroom Equipment: 3 in 1 Commode  Additional Equipment: Other (Comments)    Passport items to be completed:  Perform bathroom transfers, complete dressing, complete feeding, get ready for the day, prepare a simple meal, participate in household tasks, adapt home for safety needs, demonstrate home exercise program, complete caregiver training     Occupational Therapy Goals (Active)       Problem: Bathing       Dates: Start:  12/06/23         Goal: STG-Within one week, patient will bathe at [facility] using LRD       Dates: Start:  12/06/23               Problem: Dressing       Dates: Start:  11/22/23         Goal: STG-Within one week, patient will dress LB at [facility] using LRD       Dates: Start:  11/22/23            Goal: STG-Within one week, patient will dress [facility] SPV using LRD       Dates: Start:  12/06/23               Problem: Functional Transfers       Dates: Start:  12/06/23         Goal: STG-Within one week, patient will transfer to toilet at [facility] to [facility] using LRD       Dates: Start:  12/06/23            Goal: STG-Within one week, patient will transfer to step in shower at [facility] to [facility] using LRD       Dates: Start:  12/06/23               Problem: OT Long Term Goals       Dates: Start:  11/13/23         Goal: LTG-By discharge, patient will complete basic self care tasks at Mod Independent level.       Dates: Start:  11/13/23            Goal: LTG-By discharge, patient will perform bathroom transfers at Mod Independent level.       Dates: Start:  11/13/23

## 2023-12-07 NOTE — THERAPY
"Physical Therapy   Daily Treatment     Patient Name: Jason Lima  Age:  62 y.o., Sex:  male  Medical Record #: [id number]  Today's Date: 12/6/2023     Precautions  Precautions: Fall Risk  Comments: Left Hemiparesis, left visual inattention    Subjective    \"I just think about my left leg and how it doesn't do what I want it to do.\" Pt in w/c at arrival, agreeable to PT tx.      Objective       12/06/23 1431   PT Charge Group   PT Gait Training (Units) 2   PT Neuromuscular Re-Education / Balance (Units) 1   PT Therapeutic Activities (Units) 1   PT Total Time Spent   PT Individual Total Time Spent (Mins) 60   Gait Functional Level of Assist    Gait Level Of Assist Total Assist  (Min<>ModA x 2 c/ LPRW)   Assistive Device Front Wheel Walker  (L platform, L AFO, tape to L toe, R shoe lift)   Distance (Feet) 50   # of Times Distance was Traveled 1   Deviation Decreased Heel Strike;Decreased Toe Off;Shuffled Gait;Step To   Transfer Functional Level of Assist   Toilet Transfers Maximal Assist   Toilet Transfer Description Grab bar;Assist with one limb;Verbal cueing;Adaptive equipment;Increased time;Set-up of equipment   Neuro-Muscular Treatments   Neuro-Muscular Treatments Anterior weight shift;Compensatory Strategies;Co-Contraction;Postural Changes;Joint Approximation;Postural Facilitation;Sequencing;Tactile Cuing;Verbal Cuing;Weight Shift Right;Weight Shift Left   Comments gait training, see note   Interdisciplinary Plan of Care Collaboration   IDT Collaboration with  Therapy Tech   Patient Position at End of Therapy Seated;Call Light within Reach;Tray Table within Reach;Phone within Reach   Collaboration Comments Tech assist c/ session   Physical Therapist Assigned   Assigned PT / Treatment Time / Comments Isidra turner. Tech assist.     Gait training: c/ L AFO (Xtern), R shoe lift (1/2\"), tape to L toe for dec friction during swing  On RHR in lopez: 2 x 25' forward, 2 x 25' retro, Renata<>ModA for trunk control, WS, LLE " "swing and alignment  LPRW: 1 x 30', 1 x 50' c/ 2 turns c/ 2 person assist, Min-ModA for FWW steadying, help at trunk for functional WS, LLE swing, and FWW management, multimodal cues for sequencing, attention to task   TA: time spent for toilet transfer: stand step c/ LHR, inc time and multimodal cues to sequence and correct midline when turning, attend to LUE and decrease pulling c/ RUE for righting response; SetupA for toileting, w/c placed on R side for inc safety due to lack of grab bar on R    Education: rationale for therapy activities, progress to date, pending consult for custom AFO for LLE       Assessment    Jason with improving ability to advance LLE c/ AFO and R shoe lift donned, continues to need intermittent assist due to adductor over-recruitment during swing resulting in increased ER during swing/initial contact. Pt easily distracted and perseverative on low functional mobility during gait training but ultimately redirectable c/ structure and encouragement. Recommend NMES in standing to L glut and quad to increase timing and coordination during swing.     Strengths: Able to follow instructions, Independent prior level of function, Motivated for self care and independence, Pleasant and cooperative, Supportive family, Willingly participates in therapeutic activities  Barriers: Decreased endurance, Hemiparesis, Difficulty following instructions, Fatigue, Hypertension, Impaired activity tolerance, Impaired balance, Impaired insight/denial of deficits, Impaired functional cognition, Impaired sleep pattern, Impulsive, Limited mobility, Visual impairment, Poor family support (lives alone)    Plan    NMES to LLE quad/glut  TM training  Continue LPRW training, wean from rail for righting responses   F/U on AFO needs     DME  PT DME Recommendations  Wheelchair: 18\" Width  Cushion: Specialty (See other comments) (TBD pending ambulation progress)  Assistive Device: Tanvir Walker (TBD pending progress, currently 2 " person assist for gait)  Additional Equipment: Other (Comments)    Passport items to be completed:  Get in/out of bed safely, in/out of a vehicle, safely use mobility device, walk or wheel around home/community, navigate up and down stairs, show how to get up/down from the ground, ensure home is accessible, demonstrate HEP, complete caregiver training    Physical Therapy Problems (Active)       Problem: Mobility       Dates: Start:  11/13/23         Goal: STG-Within one week, patient will ambulate distances >/= 30' c/ RHR and ModA or better.        Dates: Start:  11/13/23         Goal Note filed on 11/22/23 1102 by Awilda Dela Cruz, MSPT       Limited to 10ft at right hallway rail mod assist                 Problem: PT-Long Term Goals       Dates: Start:  11/13/23         Goal: LTG-By discharge, patient will propel wheelchair >/= 300' at [place] or better.        Dates: Start:  11/13/23            Goal: LTG-By discharge, patient will ambulate >/= 50' c/ LRAD at [place] or better.        Dates: Start:  11/13/23            Goal: LTG-By discharge, patient will transfer one surface to another c/ Renata or better.        Dates: Start:  11/13/23            Goal: LTG-By discharge, patient will ambulate up/down 1 step c/ LRAD at [place] or better.        Dates: Start:  11/13/23            Goal: LTG-By discharge, patient will transfer in/out of a car c/ ModA or better.        Dates: Start:  11/13/23

## 2023-12-07 NOTE — THERAPY
"Missed Therapy    Patient Name: Jason Lima  Age:  62 y.o., Sex:  male  Medical Record #: [id number]  Today's Date: 12/6/2023    Patient refusing recreation therapy session. Patient refused outing this AM. Patient stated \"this is more important\" referencing PT. Patient states he wants to focus on PT and OT. Education provided, not well received or understood.         12/06/23 1531   Procedural Tracking   Procedural Tracking Community Re-Integration;Community Skills Development;Social Skills Training;Cognitive Skills Training;Group Treatment;Fine Motor Functional Leisure Skills;Gross Motor Functional Leisure Skills;Leisure Skills Development;Leisure Skills Awareness   Treatment Time   Total Time Spent (mins) 0   Total Time Missed 30   Functional Ability Status - Cognitive   Attention Span Remains on Task   Comprehension Follows One Step Commands   Judgment Needs Assistance   Functional Ability Status - Emotional    Affect Appropriate   Mood Appropriate   Behavior Appropriate       "

## 2023-12-07 NOTE — THERAPY
"Occupational Therapy  Daily Treatment     Patient Name: Jason Lima  Age:  62 y.o., Sex:  male  Medical Record #: [id number]  Today's Date: 12/7/2023     Precautions  Precautions: Fall Risk  Comments: Left Hemiparesis, left visual inattention         Subjective    \"I am ready for therapy\" Pt was seated in w/c.     Objective       12/07/23 0831   OT Charge Group   OT Neuromuscular Re-education / Balance (Units) 2   OT Therapeutic Exercise (Units) 2   OT Total Time Spent   OT Individual Total Time Spent (Mins) 60   Sitting Upper Body Exercises   Internal Shoulder Rotation 1 set of 10;Left  (AAROM/AROM; no weight used.)   External Shoulder Rotation 1 set of 10;Left  (AAROM/AROM; no weight used.)   Bicep Curls 1 set of 10;Bilateral;Left  (AAROM/AROM; no weight used.)   Pronation / Supination 1 set of 10;Left  (AAROM/AROM; no weight used.)   Wrist Flexion / Extension 1 set of 10;Left  (AAROM/AROM; no weight used.)   Other Exercise Protraction/retraction 1set of 10; Left  (AAROM/AROM; no weight used.)   Neuro-Muscular Treatments   Neuro-Muscular Treatments Anterior weight shift;Facilitation;Postural Facilitation;Tactile Cuing;Tapping;Verbal Cuing;Weight Shift Right;Weight Shift Left;Co-Contraction   Comments standing frame 3x with seated rest breaks for 2-3min each in front of mirror while preforming AAROM towel slidesusing LUE  forward/backwards and side<>side 10x each with frequent VC for upright midline posture.     UE exercises seated in w/c to increase strengthening and ROM in LUE for functional tasks. Pt needed AAROM after approximately 4th-5th reps, however if pt takes breaks in between he is able to go back to AROM. Still needs assistance with any shoulder movements d/t weakness.     Needed VC and tactile cues while standing in standing frame to engaged in co-contraction in LLE to complete upright posture. Pt was able to perform tasks for brief moments at a time.     Pt needed reminders to look into mirror and " correct upright posture.     Assessment    Pt excited and motivated when observing self movement on LUE during thera ex. Strongly encouraged pt to continue to perform all UE movements throughout the day to increase functional mobility. Pt still needs VC/TC still is needed in seated and standing to maintain midline.     Strengths: Able to follow instructions, Independent prior level of function, Motivated for self care and independence, Pleasant and cooperative, Supportive family  Barriers: Decreased endurance, Fatigue, Generalized weakness, Hemiparesis, Impaired activity tolerance, Impaired balance, Limited mobility, Spasticity    Plan    Strengthening and endurance  Functional transfers  ADLs.    DME  OT DME Recommendations  Bathroom Equipment: 3 in 1 Commode  Additional Equipment: Other (Comments)    Passport items to be completed:  Perform bathroom transfers, complete dressing, complete feeding, get ready for the day, prepare a simple meal, participate in household tasks, adapt home for safety needs, demonstrate home exercise program, complete caregiver training     Occupational Therapy Goals (Active)       Problem: Bathing       Dates: Start:  12/06/23         Goal: STG-Within one week, patient will bathe at [facility] using LRD       Dates: Start:  12/06/23               Problem: Dressing       Dates: Start:  11/22/23         Goal: STG-Within one week, patient will dress LB at [facility] using LRD       Dates: Start:  11/22/23            Goal: STG-Within one week, patient will dress [facility] SPV using LRD       Dates: Start:  12/06/23               Problem: Functional Transfers       Dates: Start:  12/06/23         Goal: STG-Within one week, patient will transfer to toilet at [facility] to [facility] using LRD       Dates: Start:  12/06/23            Goal: STG-Within one week, patient will transfer to step in shower at [facility] to [facility] using LRD       Dates: Start:  12/06/23               Problem: OT Long Term Goals       Dates: Start:  11/13/23          Goal: LTG-By discharge, patient will complete basic self care tasks at Mod Independent level.       Dates: Start:  11/13/23            Goal: LTG-By discharge, patient will perform bathroom transfers at Mod Independent level.       Dates: Start:  11/13/23

## 2023-12-07 NOTE — CARE PLAN
The patient is Stable - Low risk of patient condition declining or worsening    Shift Goals  Clinical Goals: safety, sleep  Patient Goals: sleep  Family Goals: increased pt strength, independence    Progress made toward(s) clinical / shift goals:    Problem: Respiratory  Goal: Patient will understand use and administration of respiratory medications to improve respiratory function  Outcome: Progressing  Note: Pt has QHS use of C Pap and is compliant with use.

## 2023-12-07 NOTE — PROGRESS NOTES
NURSING DAILY NOTE    Name: Jason Lima   Date of Admission: 11/12/2023   Admitting Diagnosis: Thalamic hemorrhage (HCC)  Attending Physician: Lisa Lee D.o.  Allergies: Penicillins    Safety  Patient Assist  Max x 2.  Patient Precautions  Fall Risk  Precaution Comments  Left Hemiparesis, left visual inattention  Bed Transfer Status  Moderate Assist  Toilet Transfer Status   Maximal Assist  Assistive Devices  Wheelchair  Oxygen  None - Room Air  Diet/Therapeutic Dining  Current Diet Order   Procedures    Diet Order Diet: Consistent CHO (Diabetic) (2 gram sodium restriction; medications whole with thin liquids); Second Modifier: (optional): 2 Gram Sodium     Pill Administration  whole  Agitated Behavioral Scale  14  ABS Level of Severity  No Agitation    Fall Risk  Has the patient had a fall this admission?   Yes  Shellie Hamilton Fall Risk Scoring  23, HIGH RISK  Fall Risk Safety Measures  bed alarm and chair alarm    Vitals  Temperature: 36.4 °C (97.6 °F)  Temp src: Oral  Pulse: 62  Respiration: 20  Blood Pressure: (!) 140/90  Blood Pressure MAP (Calculated): 107 MM HG  BP Location: Right, Upper Arm  Patient BP Position: Sitting     Oxygen  Pulse Oximetry: 94 %  O2 (LPM): 0  FiO2%: 21 %  O2 Delivery Device: None - Room Air    Bowel and Bladder  Last Bowel Movement  12/04/23  Stool Type  Type 5: Soft blob with clear cut edges (passed easily)  Bowel Device  Bathroom, Other (Comment)  Continent  Bladder: Continent void   Bowel: Continent movement  Bladder Function  Urine Void (mL): 300 ml  Number of Times Voided: 1  Urinary Options: Yes  Urine Color: Yellow  Number of Times Incontinent of Urine: 0  Genitourinary Assessment   Bladder Assessment (WDL):  WDL Except  Echols Catheter: Not Applicable  Urine Color: Yellow  Number of Bladder Accidents: 0  Total Number of Bladder of Accidents in Last 7 Days: 0  Number of Times Incontinent of Urine: 0  Bladder  Device: Bathroom  Time Void: No  Bladder Scan: Post Void  $ Bladder Scan Results (mL): 26    Skin  Polo Score   16  Sensory Interventions   Bed Types: Standard/Trauma Mattress  Skin Preventative Measures: Pillows in Use for Support / Positioning  Moisture Interventions  Moisturizers/Barriers: Barrier Wipes      Pain  Pain Rating Scale  0 - No Pain  Pain Location  Foot  Pain Location Orientation  Right, Left  Pain Interventions   Declines    ADLs    Bathing   Patient Refused Bathing (too tired)  Linen Change   Partial  Personal Hygiene  Change Deja Pads, Moist Deja Wipes, Perineal Care  Chlorhexidine Bath      Oral Care  Brushed Teeth  Teeth/Dentures     Shave  Self  Nutrition Percentage Eaten  Lunch, Between % Consumed  Environmental Precautions  Treaded Slipper Socks on Patient, Bed in Low Position  Patient Turns/Positioning  Patient Turns Self from Side to Side  Patient Turns Assistance/Tolerance  Assistance of Two or More, General Weakness  Bed Positions  Bed Controls On, Bed Locked  Head of Bed Elevated  Self regulated      Psychosocial/Neurologic Assessment  Psychosocial Assessment  Psychosocial (WDL):  WDL Except  Patient Behaviors: Fatigue  Neurologic Assessment  Neuro (WDL): Exceptions to WDL  Level of Consciousness: Alert  Orientation Level: Oriented X4  Cognition: Follows commands  Speech: Clear, Slurred  Facial Symmetry: Left facial drooping  Pupil Assesment: No  R Pupil Size (mm): 3  R Pupil Shape / Description: Round  R Pupil Reaction: Brisk  L Pupil Size (mm): 3  L Pupil Shape / Description: Round  L Pupil Reaction: Brisk  Reflexes: Cough  Cough Reflex: Present  Motor Function/Sensation Assessment: Dorsiflexion, Motor response  R Foot Dorsiflexion: Strong  L Foot Dorsiflexion: Absent  RUE Motor Response: Responds to commands  RUE Sensation: Full sensation  Muscle Strength Right Arm: Normal Strength Against Gravity and Full Resistance  LUE Motor Response: Flaccid  LUE Sensation: Tingling,  Numbness  Muscle Strength Left Arm: Weak Movement but Not Against Gravity or Resistance  RLE Motor Response: Responds to commands  RLE Sensation: Full sensation  Muscle Strength Right Leg: Good Strength Against Gravity and Moderate Resistance  LLE Motor Response: Flaccid  LLE Sensation: Tingling, Numbness  Muscle Strength Left Leg: Weak Movement but Not Against Gravity or Resistance  EENT (WDL):  WDL Except    Cardio/Pulmonary Assessment  Edema   RLE Edema: Trace  LLE Edema: Trace  Respiratory Breath Sounds  RUL Breath Sounds: Clear  RML Breath Sounds: Clear  RLL Breath Sounds: Diminished  MOHAMUD Breath Sounds: Clear  LLL Breath Sounds: Diminished  Cardiac Assessment   Cardiac (WDL):  WDL Except

## 2023-12-07 NOTE — DISCHARGE PLANNING
Case Management / Initial authorization:  Auth # [id number]  Days authorized: 11/12-12/3  Clinical concurrent review faxed : 12/4/23 and 12/5/23.  Name: Betsy  Phone: [phone]   Fax:[phone]  Outcome: still pending.    GEOFFREY has left multiple messages concerning authed days. Will continue to assist with the discharge planning.    12/8/23 Left another message for Betsy.  12/11/23 Left another message for Betsy and sent clinicals requesting more days.

## 2023-12-08 ENCOUNTER — APPOINTMENT (OUTPATIENT)
Dept: PHYSICAL THERAPY | Facility: REHABILITATION | Age: 63
DRG: 057 | End: 2023-12-08
Attending: PHYSICAL MEDICINE & REHABILITATION
Payer: COMMERCIAL

## 2023-12-08 ENCOUNTER — APPOINTMENT (OUTPATIENT)
Dept: OCCUPATIONAL THERAPY | Facility: REHABILITATION | Age: 63
DRG: 057 | End: 2023-12-08
Attending: PHYSICAL MEDICINE & REHABILITATION
Payer: COMMERCIAL

## 2023-12-08 PROCEDURE — 97110 THERAPEUTIC EXERCISES: CPT

## 2023-12-08 PROCEDURE — 97032 APPL MODALITY 1+ESTIM EA 15: CPT

## 2023-12-08 PROCEDURE — 700102 HCHG RX REV CODE 250 W/ 637 OVERRIDE(OP): Performed by: PHYSICAL MEDICINE & REHABILITATION

## 2023-12-08 PROCEDURE — 770010 HCHG ROOM/CARE - REHAB SEMI PRIVAT*

## 2023-12-08 PROCEDURE — 97112 NEUROMUSCULAR REEDUCATION: CPT

## 2023-12-08 PROCEDURE — A9270 NON-COVERED ITEM OR SERVICE: HCPCS | Performed by: HOSPITALIST

## 2023-12-08 PROCEDURE — 97535 SELF CARE MNGMENT TRAINING: CPT

## 2023-12-08 PROCEDURE — 99231 SBSQ HOSP IP/OBS SF/LOW 25: CPT | Performed by: STUDENT IN AN ORGANIZED HEALTH CARE EDUCATION/TRAINING PROGRAM

## 2023-12-08 PROCEDURE — A9270 NON-COVERED ITEM OR SERVICE: HCPCS | Performed by: PHYSICAL MEDICINE & REHABILITATION

## 2023-12-08 PROCEDURE — 97116 GAIT TRAINING THERAPY: CPT

## 2023-12-08 PROCEDURE — 99232 SBSQ HOSP IP/OBS MODERATE 35: CPT | Performed by: PHYSICAL MEDICINE & REHABILITATION

## 2023-12-08 PROCEDURE — 700102 HCHG RX REV CODE 250 W/ 637 OVERRIDE(OP): Performed by: STUDENT IN AN ORGANIZED HEALTH CARE EDUCATION/TRAINING PROGRAM

## 2023-12-08 PROCEDURE — A9270 NON-COVERED ITEM OR SERVICE: HCPCS | Performed by: STUDENT IN AN ORGANIZED HEALTH CARE EDUCATION/TRAINING PROGRAM

## 2023-12-08 PROCEDURE — 97530 THERAPEUTIC ACTIVITIES: CPT

## 2023-12-08 PROCEDURE — 700102 HCHG RX REV CODE 250 W/ 637 OVERRIDE(OP): Performed by: HOSPITALIST

## 2023-12-08 RX ORDER — TRAZODONE HYDROCHLORIDE 50 MG/1
25 TABLET ORAL
Status: DISCONTINUED | OUTPATIENT
Start: 2023-12-08 | End: 2024-01-02 | Stop reason: HOSPADM

## 2023-12-08 RX ADMIN — TRAZODONE HYDROCHLORIDE 75 MG: 50 TABLET ORAL at 20:02

## 2023-12-08 RX ADMIN — BACLOFEN 10 MG: 10 TABLET ORAL at 07:55

## 2023-12-08 RX ADMIN — HYDRALAZINE HYDROCHLORIDE 100 MG: 50 TABLET, FILM COATED ORAL at 20:01

## 2023-12-08 RX ADMIN — SERTRALINE 25 MG: 50 TABLET, FILM COATED ORAL at 07:54

## 2023-12-08 RX ADMIN — APIXABAN 5 MG: 5 TABLET, FILM COATED ORAL at 20:02

## 2023-12-08 RX ADMIN — BACLOFEN 10 MG: 10 TABLET ORAL at 20:02

## 2023-12-08 RX ADMIN — AMLODIPINE BESYLATE 10 MG: 5 TABLET ORAL at 06:26

## 2023-12-08 RX ADMIN — BACLOFEN 10 MG: 10 TABLET ORAL at 14:32

## 2023-12-08 RX ADMIN — APIXABAN 5 MG: 5 TABLET, FILM COATED ORAL at 07:55

## 2023-12-08 RX ADMIN — SENNOSIDES AND DOCUSATE SODIUM 2 TABLET: 50; 8.6 TABLET ORAL at 07:55

## 2023-12-08 RX ADMIN — OMEPRAZOLE 20 MG: 20 CAPSULE, DELAYED RELEASE ORAL at 07:54

## 2023-12-08 RX ADMIN — HYDRALAZINE HYDROCHLORIDE 100 MG: 50 TABLET, FILM COATED ORAL at 06:26

## 2023-12-08 RX ADMIN — HYDRALAZINE HYDROCHLORIDE 100 MG: 50 TABLET, FILM COATED ORAL at 14:32

## 2023-12-08 RX ADMIN — ATORVASTATIN CALCIUM 40 MG: 40 TABLET, FILM COATED ORAL at 20:02

## 2023-12-08 RX ADMIN — SENNOSIDES AND DOCUSATE SODIUM 2 TABLET: 50; 8.6 TABLET ORAL at 20:02

## 2023-12-08 ASSESSMENT — ACTIVITIES OF DAILY LIVING (ADL)
TOILET_TRANSFER_DESCRIPTION: GRAB BAR;INCREASED TIME;SET-UP OF EQUIPMENT;SUPERVISION FOR SAFETY;VERBAL CUEING
BED_CHAIR_WHEELCHAIR_TRANSFER_DESCRIPTION: INCREASED TIME;INITIAL PREPARATION FOR TASK;VERBAL CUEING;SET-UP OF EQUIPMENT

## 2023-12-08 NOTE — PROGRESS NOTES
NURSING DAILY NOTE    Name: Jason Lima   Date of Admission: 11/12/2023   Admitting Diagnosis: Thalamic hemorrhage (HCC)  Attending Physician: Lisa Lee D.o.  Allergies: Penicillins    Safety  Patient Assist  Max x 2  Patient Precautions  Fall Risk  Precaution Comments  Left Hemiparesis, left visual inattention  Bed Transfer Status  Minimal Assist  Toilet Transfer Status   Maximal Assist  Assistive Devices  Rails, Wheelchair  Oxygen  CPAP  Diet/Therapeutic Dining  Current Diet Order   Procedures    Diet Order Diet: Consistent CHO (Diabetic) (2 gram sodium restriction; medications whole with thin liquids); Second Modifier: (optional): 2 Gram Sodium     Pill Administration  whole  Agitated Behavioral Scale  14  ABS Level of Severity  No Agitation    Fall Risk  Has the patient had a fall this admission?   Yes  Shellie Hamilton Fall Risk Scoring  23, HIGH RISK  Fall Risk Safety Measures  bed alarm, chair alarm, and seatbelt alarm    Vitals  Temperature: 36.9 °C (98.4 °F)  Temp src: Oral  Pulse: 80  Respiration: 18  Blood Pressure: 130/79  Blood Pressure MAP (Calculated): 96 MM HG  BP Location: Right, Upper Arm  Patient BP Position: Supine     Oxygen  Pulse Oximetry: 95 %  O2 (LPM): 0  FiO2%: 21 %  O2 Delivery Device: CPAP    Bowel and Bladder  Last Bowel Movement   (12/6 pt. stated)  Stool Type  Type 5: Soft blob with clear cut edges (passed easily)  Bowel Device  Bathroom, Other (Comment)  Continent  Bladder: Continent void   Bowel: Continent movement  Bladder Function  Urine Void (mL): 450 ml (urinal)  Number of Times Voided: 1  Urinary Options: Yes  Urine Color: Yellow  Number of Times Incontinent of Urine: 0  Genitourinary Assessment   Bladder Assessment (WDL):  WDL Except  Echols Catheter: Not Applicable  Urine Color: Yellow  Number of Bladder Accidents: 0  Total Number of Bladder of Accidents in Last 7 Days: 0  Number of Times Incontinent of Urine:  0  Bladder Device: Urinal  Time Void: No  Bladder Scan: Post Void  $ Bladder Scan Results (mL): 26    Skin  Polo Score   15  Sensory Interventions   Bed Types: Standard/Trauma Mattress  Skin Preventative Measures: Pillows in Use for Support / Positioning  Moisture Interventions  Moisturizers/Barriers: Barrier Cream      Pain  Pain Rating Scale  0 - No Pain  Pain Location  Foot  Pain Location Orientation  Right, Left  Pain Interventions   Declines    ADLs    Bathing   Patient Refused Bathing (too tired)  Linen Change   Partial  Personal Hygiene  Change Deja Pads, Moist Deja Wipes, Perineal Care  Chlorhexidine Bath      Oral Care  Brushed Teeth  Teeth/Dentures     Shave  Self  Nutrition Percentage Eaten  Breakfast, Between % Consumed  Environmental Precautions  Treaded Slipper Socks on Patient  Patient Turns/Positioning  Patient Turns Self from Side to Side  Patient Turns Assistance/Tolerance  Assistance of One  Bed Positions  Bed Controls On, Bed Locked  Head of Bed Elevated  Self regulated      Psychosocial/Neurologic Assessment  Psychosocial Assessment  Psychosocial (WDL):  WDL Except  Patient Behaviors: Fatigue  Neurologic Assessment  Neuro (WDL): Exceptions to WDL  Level of Consciousness: Alert  Orientation Level: Oriented X4  Cognition: Follows commands  Speech: Clear, Slurred  Facial Symmetry: Left facial drooping  Pupil Assesment: Yes  R Pupil Size (mm): 3  R Pupil Shape / Description: Round  R Pupil Reaction: Brisk  L Pupil Size (mm): 3  L Pupil Shape / Description: Round  L Pupil Reaction: Brisk  Reflexes: Cough  Cough Reflex: Present  Motor Function/Sensation Assessment: Dorsiflexion  R Foot Dorsiflexion: Strong  L Foot Dorsiflexion: Absent  RUE Motor Response: Responds to commands  RUE Sensation: Full sensation  Muscle Strength Right Arm: Normal Strength Against Gravity and Full Resistance  LUE Motor Response: Flaccid  LUE Sensation: Tingling, Numbness  Muscle Strength Left Arm: Weak Movement but  Not Against Gravity or Resistance  RLE Motor Response: Responds to commands  RLE Sensation: Full sensation  Muscle Strength Right Leg: Good Strength Against Gravity and Moderate Resistance  LLE Motor Response: Flaccid  LLE Sensation: Tingling, Numbness  Muscle Strength Left Leg: Weak Movement but Not Against Gravity or Resistance  EENT (WDL):  WDL Except    Cardio/Pulmonary Assessment  Edema   RLE Edema: Trace  LLE Edema: Trace  Respiratory Breath Sounds  RUL Breath Sounds: Clear  RML Breath Sounds: Clear  RLL Breath Sounds: Diminished  MOHAMUD Breath Sounds: Clear  LLL Breath Sounds: Diminished  Cardiac Assessment   Cardiac (WDL):  WDL Except (hx-htn, hld)

## 2023-12-08 NOTE — PROGRESS NOTES
NURSING DAILY NOTE    Name: Jason Lima   Date of Admission: 11/12/2023   Admitting Diagnosis: Thalamic hemorrhage (HCC)  Attending Physician: Lisa Lee D.o.  Allergies: Penicillins    Safety  Patient Assist  Max x 2  Patient Precautions  Fall Risk  Precaution Comments  Left Hemiparesis, left visual inattention  Bed Transfer Status  Minimal Assist  Toilet Transfer Status   Maximal Assist  Assistive Devices  Rails, Wheelchair  Oxygen  None - Room Air  Diet/Therapeutic Dining  Current Diet Order   Procedures    Diet Order Diet: Consistent CHO (Diabetic) (2 gram sodium restriction; medications whole with thin liquids); Second Modifier: (optional): 2 Gram Sodium     Pill Administration  whole  Agitated Behavioral Scale  14  ABS Level of Severity  No Agitation    Fall Risk  Has the patient had a fall this admission?   Yes  Shellie Hamilton Fall Risk Scoring  23, HIGH RISK  Fall Risk Safety Measures  bed alarm and chair alarm    Vitals  Temperature: 36.7 °C (98 °F)  Temp src: Oral  Pulse: 85  Respiration: 16  Blood Pressure: 137/86  Blood Pressure MAP (Calculated): 103 MM HG  BP Location: Upper Arm  Patient BP Position: Sitting     Oxygen  Pulse Oximetry: 95 %  O2 (LPM): 0  FiO2%: 21 %  O2 Delivery Device: None - Room Air    Bowel and Bladder  Last Bowel Movement  12/04/23  Stool Type  Type 5: Soft blob with clear cut edges (passed easily)  Bowel Device  Bathroom, Other (Comment)  Continent  Bladder: Continent void   Bowel: Continent movement  Bladder Function  Urine Void (mL): 400 ml  Number of Times Voided: 1  Urinary Options: Yes  Urine Color: Yellow  Number of Times Incontinent of Urine: 0  Genitourinary Assessment   Bladder Assessment (WDL):  WDL Except  Echols Catheter: Not Applicable  Urine Color: Yellow  Number of Bladder Accidents: 0  Total Number of Bladder of Accidents in Last 7 Days: 0  Number of Times Incontinent of Urine: 0  Bladder Device:  Urinal  Time Void: No  Bladder Scan: Post Void  $ Bladder Scan Results (mL): 26    Skin  Polo Score   16  Sensory Interventions   Bed Types: Standard/Trauma Mattress  Skin Preventative Measures: Pillows in Use for Support / Positioning  Moisture Interventions  Moisturizers/Barriers: Barrier Cream      Pain  Pain Rating Scale  0 - No Pain  Pain Location  Foot  Pain Location Orientation  Right, Left  Pain Interventions   Declines    ADLs    Bathing   Patient Refused Bathing (too tired)  Linen Change   Partial  Personal Hygiene  Change Deja Pads, Moist Deja Wipes, Perineal Care  Chlorhexidine Bath      Oral Care  Brushed Teeth  Teeth/Dentures     Shave  Self  Nutrition Percentage Eaten  *  * Meal *  *, Breakfast, Between % Consumed  Environmental Precautions  Treaded Slipper Socks on Patient, Personal Belongings, Wastebasket, [employer] [place] etc. in Easy Reach, Transferred to Stronger Side, Bed in Low Position  Patient Turns/Positioning  Patient Turns Self from Side to Side  Patient Turns Assistance/Tolerance  Assistance of Two or More  Bed Positions  Bed Controls On, Bed Locked  Head of Bed Elevated  Self regulated      Psychosocial/Neurologic Assessment  Psychosocial Assessment  Psychosocial (WDL):  WDL Except  Patient Behaviors: Fatigue  Neurologic Assessment  Neuro (WDL): Exceptions to WDL  Level of Consciousness: Alert  Orientation Level: Oriented X4  Cognition: Follows commands  Speech: Clear, Slurred  Facial Symmetry: Left facial drooping  Pupil Assesment: Yes  R Pupil Size (mm): 3  R Pupil Shape / Description: Round  R Pupil Reaction: Brisk  L Pupil Size (mm): 3  L Pupil Shape / Description: Round  L Pupil Reaction: Brisk  Reflexes: Cough  Cough Reflex: Present  Motor Function/Sensation Assessment: Dorsiflexion  R Foot Dorsiflexion: Strong  L Foot Dorsiflexion: Absent  RUE Motor Response: Responds to commands  RUE Sensation: Full sensation  Muscle Strength Right Arm: Normal Strength Against Gravity and Full  Resistance  LUE Motor Response: Flaccid  LUE Sensation: Tingling, Numbness  Muscle Strength Left Arm: Weak Movement but Not Against Gravity or Resistance  RLE Motor Response: Responds to commands  RLE Sensation: Full sensation  Muscle Strength Right Leg: Good Strength Against Gravity and Moderate Resistance  LLE Motor Response: Flaccid  LLE Sensation: Tingling, Numbness  Muscle Strength Left Leg: Weak Movement but Not Against Gravity or Resistance  EENT (WDL):  WDL Except    Cardio/Pulmonary Assessment  Edema   RLE Edema: Trace  LLE Edema: Trace  Respiratory Breath Sounds  RUL Breath Sounds: Clear  RML Breath Sounds: Clear  RLL Breath Sounds: Diminished  MOHAMUD Breath Sounds: Clear  LLL Breath Sounds: Diminished  Cardiac Assessment   Cardiac (WDL):  WDL Except (hx-htn, hld)

## 2023-12-08 NOTE — THERAPY
"Physical Therapy   Daily Treatment     Patient Name: Jason Lima  Age:  62 y.o., Sex:  male  Medical Record #: [id number]  Today's Date: 12/8/2023     Precautions  Precautions: Fall Risk  Comments: Left Hemiparesis, left visual inattention    Subjective    \"I like this.\" Referring to NuStep. Pt transported to therapy gym by [employer] for session.      Objective       12/08/23 1001   PT Charge Group   PT Gait Training (Units) 1   PT Therapeutic Exercise (Units) 2   PT Neuromuscular Re-Education / Balance (Units) 3   PT Therapeutic Activities (Units) 1   PT Total Time Spent   PT Individual Total Time Spent (Mins) 105   Transfer Functional Level of Assist   Bed, Chair, Wheelchair Transfer Maximal Assist  (lifting, assist to WS, VC/MC to advance LLE, correct L lean/push for SPT/step transfer)   Bed Chair Wheelchair Transfer Description Increased time;Initial preparation for task;Verbal cueing;Set-up of equipment  (stand step vs SPT)   Toilet Transfers Maximal Assist   Toilet Transfer Description Grab bar;Increased time;Other (comment)  (as above for SPT to toilet c/ incidental use of grab bar)   Sitting Lower Body Exercises   Nustep Resistance Level 1;Resistance Level 2;Resistance Level 3;Resistance Level 4  (up to Lvl 4 c/ BLE only, concurrent FES to L quad, goal eliot 40 rpm, total time 10'00\", last minute c/ BUE/LE c/ LUE support)   Neuro-Muscular Treatments   Neuro-Muscular Treatments Anterior weight shift;Postural Changes;Tactile Cuing;Tapping;Weight Shift Right;Weight Shift Left;Co-Contraction;Postural Facilitation;Sequencing;Verbal Cuing   Comments TM gait training, see note   Interdisciplinary Plan of Care Collaboration   IDT Collaboration with  Certified Nursing Assistant;Therapy Tech   Patient Position at End of Therapy Seated;Other (Comments)  (in bathroom c/ CNA present)   Collaboration Comments tech assist c/ session for handling, equipment set up     Setup Bioness for FES/gait training. Able to elicit tetany " "in L quad, over-recruitment of evertors on lower cuff c/ steering pad. Unable to trial quick fit or progress to gait facilitation due to system malfunction.     NuStep for pregait c/ FES as above. Settings: hand switch, R quad, active on downstroke; 324 us, Hz: 30, Intensity: 64-69 for tetany, adjusted for accommodation during effort.      Gait training c/ BWS harness- no BWS, only for safety, L AFO, R shoe lift, tape to L toe box to assist c/ advancement; TotalA for LLE swing and placement; at trunk to facilitate reciprocal WS and step symmetry, stance time on L, swing length on R  Total time: 9'48\"; 1 x 2'00\", 2 x 3'00\", 1 x 1'48\" (limited by AFO becoming loose), speed 0.4 inc to 0.6 mph by last bout   STS <> w/c for donning/doffing harness, seated rest breaks btw efforts     Pt assisted to toilet at end of session, handed off to CNA due to increased toileting time needed.     Assessment    Jason c/ decreased midline control and self reported feeling of falling to R when standing, requiring increased assist for WS and midline control during transfers and gait. Good tolerance to FES + NuStep.     Strengths: Able to follow instructions, Independent prior level of function, Motivated for self care and independence, Pleasant and cooperative, Supportive family, Willingly participates in therapeutic activities  Barriers: Decreased endurance, Hemiparesis, Difficulty following instructions, Fatigue, Hypertension, Impaired activity tolerance, Impaired balance, Impaired insight/denial of deficits, Impaired functional cognition, Impaired sleep pattern, Impulsive, Limited mobility, Visual impairment, Poor family support (lives alone)    Plan  Aquatic therapy as possible  Continue prism trial, STS + midline control vs NMES to LLE quad/glut in standing vs NuStep for pregait training/priming  TM training  Continue LPRW training, wean from rail for righting responses   F/U on AFO needs     DME  PT DME " "Recommendations  Wheelchair: 18\" Width  Cushion: Specialty (See other comments) (TBD pending ambulation progress)  Assistive Device: Tanvir Walker (TBD pending progress, currently 2 person assist for gait)  Additional Equipment: Other (Comments)    Passport items to be completed:  Get in/out of bed safely, in/out of a vehicle, safely use mobility device, walk or wheel around home/community, navigate up and down stairs, show how to get up/down from the ground, ensure home is accessible, demonstrate HEP, complete caregiver training    Physical Therapy Problems (Active)       Problem: Mobility       Dates: Start:  11/13/23         Goal: STG-Within one week, patient will ambulate distances >/= 30' c/ RHR and ModA or better.        Dates: Start:  11/13/23         Goal Note filed on 11/22/23 1102 by Awilda Dela Cruz, MSPT       Limited to 10ft at right hallway rail mod assist                 Problem: PT-Long Term Goals       Dates: Start:  11/13/23         Goal: LTG-By discharge, patient will propel wheelchair >/= 300' at [place] or better.        Dates: Start:  11/13/23            Goal: LTG-By discharge, patient will ambulate >/= 50' c/ LRAD at [place] or better.        Dates: Start:  11/13/23            Goal: LTG-By discharge, patient will transfer one surface to another c/ [place] or better.        Dates: Start:  11/13/23            Goal: LTG-By discharge, patient will ambulate up/down 1 step c/ LRAD at [place] or better.        Dates: Start:  11/13/23            Goal: LTG-By discharge, patient will transfer in/out of a car c/ ModA or better.        Dates: Start:  11/13/23              "

## 2023-12-08 NOTE — PROGRESS NOTES
"  Physical Medicine & Rehabilitation Progress Note    Encounter Date: 12/8/2023    Chief Complaint: Doing well     Interval Events (Subjective):  Vital signs stable  BM 12/6  Voiding volitionally  No new labs to review    Seen and examined in his room. Family at bedside. He is doing well, no complaints. Family would like him to stay as long as possible.      ROS: 14 point ROS negative unless otherwise specified in the HPI    Objective:  VITAL SIGNS: /79   Pulse 80   Temp 36.9 °C (98.4 °F) (Oral)   Resp 18   Ht 1.727 m (5' 8\")   Wt 87 kg (191 lb 12.8 oz)   SpO2 95%   BMI 29.16 kg/m²     GEN: No apparent distress  HEENT: Head normocephalic, atraumatic.  Sclera nonicteric bilaterally, no ocular discharge appreciated bilaterally.  CV: Extremities warm and well-perfused, no peripheral edema appreciated bilaterally.  PULMONARY: Breathing nonlabored on room air, no respiratory accessory muscle use.  Not requiring supplemental oxygen.  SKIN: No appreciable skin breakdown on exposed areas of skin.  PSYCH: Normal affect  NEURO: Awake alert.  Conversational.  Logical thought content. Dysarthria. Left Hemiparesis. Tone in left wrist, fingers 2/4 and in bicep 1+/4      Laboratory Values:  Recent Results (from the past 72 hour(s))   CBC WITH DIFFERENTIAL    Collection Time: 12/06/23  5:26 AM   Result Value Ref Range    WBC 4.5 (L) 4.8 - 10.8 K/uL    RBC 3.46 (L) 4.70 - 6.10 M/uL    Hemoglobin 10.7 (L) 14.0 - 18.0 g/dL    Hematocrit 31.1 (L) 42.0 - 52.0 %    MCV 89.9 81.4 - 97.8 fL    MCH 30.9 27.0 - 33.0 pg    MCHC 34.4 32.3 - 36.5 g/dL    RDW 40.8 35.9 - 50.0 fL    Platelet Count 199 164 - 446 K/uL    MPV 10.0 9.0 - 12.9 fL    Neutrophils-Polys 57.20 44.00 - 72.00 %    Lymphocytes 32.40 22.00 - 41.00 %    Monocytes 6.40 0.00 - 13.40 %    Eosinophils 3.10 0.00 - 6.90 %    Basophils 0.70 0.00 - 1.80 %    Immature Granulocytes 0.20 0.00 - 0.90 %    Nucleated RBC 0.00 0.00 - 0.20 /100 WBC    Neutrophils (Absolute) 2.57 " 1.82 - 7.42 K/uL    Lymphs (Absolute) 1.46 1.00 - 4.80 K/uL    Monos (Absolute) 0.29 0.00 - 0.85 K/uL    Eos (Absolute) 0.14 0.00 - 0.51 K/uL    Baso (Absolute) 0.03 0.00 - 0.12 K/uL    Immature Granulocytes (abs) 0.01 0.00 - 0.11 K/uL    NRBC (Absolute) 0.00 K/uL   Basic Metabolic Panel    Collection Time: 23  5:26 AM   Result Value Ref Range    Sodium 141 135 - 145 mmol/L    Potassium 3.9 3.6 - 5.5 mmol/L    Chloride 106 96 - 112 mmol/L    Co2 26 20 - 33 mmol/L    Glucose 157 (H) 65 - 99 mg/dL    Bun 29 (H) 8 - 22 mg/dL    Creatinine 2.76 (H) 0.50 - 1.40 mg/dL    Calcium 8.6 8.5 - 10.5 mg/dL    Anion Gap 9.0 7.0 - 16.0   ESTIMATED GFR    Collection Time: 23  5:26 AM   Result Value Ref Range    GFR (CKD-EPI) 25 (A) >60 mL/min/1.73 m 2           Medications:  Scheduled Medications   Medication Dose Frequency    baclofen  10 mg TID    amLODIPine  10 mg DAILY    sertraline  25 mg DAILY    apixaban  5 mg BID    hydrALAZINE  100 mg Q8HRS    traZODone  75 mg QHS    senna-docusate  2 Tablet BID    omeprazole  20 mg DAILY    atorvastatin  40 mg Nightly     PRN medications: carboxymethylcellulose, melatonin, [DISCONTINUED] insulin regular **AND** [CANCELED] POC blood glucose manual result **AND** NOTIFY MD and PharmD **AND** Administer 20 grams of glucose (approximately 8 ounces of fruit juice) every 15 minutes PRN FSBG less than 70 mg/dL **AND** dextrose bolus, Respiratory Therapy Consult, senna-docusate **AND** polyethylene glycol/lytes **AND** magnesium hydroxide **AND** bisacodyl, mag hydrox-al hydrox-simeth, ondansetron **OR** ondansetron, sodium chloride, [] acetaminophen **FOLLOWED BY** acetaminophen, oxyCODONE immediate-release **OR** oxyCODONE immediate-release, hydrALAZINE    Diet:  Current Diet Order   Procedures    Diet Order Diet: Consistent CHO (Diabetic) (2 gram sodium restriction; medications whole with thin liquids); Second Modifier: (optional): 2 Gram Sodium       Medical Decision Making  and Plan:  Right thalamic hemorrhagic stroke ~ last week October 2023  Originally presented with a headache and left-sided weakness to hospital in [facility]  Work-up at the outside hospital in [place] revealed a right thalamic hemorrhagic stroke  Repeat CT head done after return flight to the [place], 11/11 CT head shows right thalamic hemorrhage, decrease in density since prior studies from the outside hospital, slight asymmetric dilation of the left ventricular system including the left temporal horn with a possible component of hydrocephalus  Planning for BP less than 140, avoiding any kind of anticoagulation  Initiate comprehensive IRF level therapy with 3 days of therapy 5 days a week with services from PT/OT/SLP     Spasticity  OP follow up with Physiatry for consideration Botox   Has had resurgence of spasticity restart baclofen 5mg TID 11/30/2023 --> increase to 10mg TID  12/8/2023 continue Baclofen at 10mg TID, no significant side effects    ABLA  Now on Eliquis  Recheck 11/28 - improved 11.4--> 12.0--> 11.6 11/30/2023 12/6/2023 11.6-->10.7  Monitor     CKD  Follow up with OP nephrology  Renal function has been stable     Hypertension  Continue Amlodipine 10mg daily  Continue Hydralazine 25mg TID - increased by hospitalist 11/13/2023 from BID to TID--> increased to 50mg q8hrs 11/15  11/16 Hydralazine increased to 75mg q8hrs and 1x Hydralazine given  11/17 BP still elevated but hydralazine recently increased. Monitor  12/8/2023 VSS Continue Hydralazine 100mg q8hrs + Amlodipine 10mg daily.     Leukopenia  New, mild 12/6/2023   Monitor     Prediabetes  Discontinue SSI     HLD  Continue home dose satin      Bowel  Constipation 11/29/2023, resolved 11/30/2023   Continue with scheduled Colace and senna, as needed stool softeners  Miralax x3 doses 11/29/2023 --> Constipation Resolved 11/30/2023   Last BM 12/6     Insomnia  Secondary to recent jet lag, melatonin scheduled --> increase to home dose 11/13/2023  9mg  Utilize trazodone as needed insomnia continues  Schedule Trazodone 11/15/2023   11/16/2023 continue Trazodone as is. Thinks he slept better last night  11/17/2023 Still not sleeping well. Increase to 75mg nightly.   12/8/2023 continue trazodone at current dose     Pain -as needed Tylenol and oxycodone    Post-Stroke Depression  Consulted Pyschiatry   Start Prozac 11/28/2023 --> Switched to zoloft per psychiatry  Appreciate assistance with management  Mood improved, continue Zoloft 12/8    Right Foot Pain  H/o Gout  Xray with swelling 1st metatarsal head   Trial Colchicine for acute gout flare 11/28/2023   Topical Voltaren gel scheduled   Improved with less swelling, erythema 11/29/2023   Resolved      LABS CBC + BMP 12/11      DVT PROPHYLAXIS: NO - Hemorrhagic stroke. US + UE and LE for brachial and peroneal DVT. 11/21/2023 start Heparin 5000 units q8hrs SQ for further prophylaxis, if tolerates will increase to full AC. Consider repeat CTH to ensure stability bleed once on full AC. 11/24/2023 Start loading dose Eliquis 10mg BID x 7 days with transition to 5mg BID. CBC showing improvement in H/H 11/28/2023 no neurologic decline.     HOSPITALIST FOLLOWING: YES - d/w hospitalist 12/6 --> Signed off 12/6    CODE STATUS: FULL CODE    DISPO: Home alone. Vielka and patient not . D/w CM to give resources for private care giving. 11/29/2023 Patient making significant progress with therapy and would benefit from more time in acute to maximize neuro recovery. Family actively looking for Caregivers for 24/7 care. Discharge extended due to measurable progress.     MARCUS: 12/12/23    MEDS SENT TO: TBD    DISCHARGE FOLLOW UP: Neurology, Nephrology, PCP, Physiatry (Botox) - needs referral to all.   ____________________________________    Dr. Lisa Lee DO, MS  [facility] - Physical Medicine & Rehabilitation   ____________________________________

## 2023-12-08 NOTE — THERAPY
Occupational Therapy  Daily Treatment     Patient Name: Jason Lima  Age:  62 y.o., Sex:  male  Medical Record #: [id number]  Today's Date: 12/8/2023     Precautions  Precautions: (P) Fall Risk  Comments: (P) Left Hemiparesis, left visual inattention         Subjective    Pt encountered 2x for skilled therapy. See below. All encounters pt pleasant and agreeable to participate.      Objective       12/08/23 0831 12/08/23 1301   OT Charge Group   OT Electrical Stimulation Attended (Units)  --  2   OT Self Care / ADL (Units)  --  1   OT Neuromuscular Re-education / Balance (Units)  --  1   OT Therapeutic Exercise (Units) 2  --    OT Total Time Spent   OT Individual Total Time Spent (Mins) 30 60   Precautions   Precautions Fall Risk Fall Risk   Comments Left Hemiparesis, left visual inattention Left Hemiparesis, left visual inattention   Functional Level of Assist   Toileting  --  Moderate Assist   Toileting Description  --  Adaptive equipment;Assist to pull pants up;Assist to pull pants down;Assist for standing balance;Grab bar;Increased time;Supervision for safety   Toilet Transfers  --  Maximal Assist   Toilet Transfer Description  --  Grab bar;Increased time;Set-up of equipment;Supervision for safety;Verbal cueing  (stand step from WC to toilet)   Fine Motor / Dexterity    Fine Motor / Dexterity Yes  --    Fine Motor / Dexterity Interventions transferring blocks using the L arm while in [place].  --    Comments  Unable to perform volitional opening and closing of the L hand. Swelling noted along the posteral aspect of the hand, no pitting. Arm trough off. Applied retrograde massage with improvements in hand motion following 5 min. Pt able to slide up to 3 block from the R > L. Therapist applied compression sleeve to manage swelling. Pt educated on importance of UE positioning and exercising L hand imbetween session. Educated on stress ball exercises.  --    Neuro-Muscular Treatments   Neuro-Muscular Treatments  --   Electrical Stimulation;Quick Stretch;Sequencing;Vibration   Comments  --  See below   Interdisciplinary Plan of Care Collaboration   IDT Collaboration with  Therapy Tech Therapy Tech   Patient Position at End of Therapy Seated;Chair Alarm On;Call Light within Reach;Tray Table within Reach;Phone within Reach In Bed;Bed Alarm On;Call Light within Reach;Tray Table within Reach;Phone within Reach   Collaboration Comments tech assit for safety with transfers. teach assist for safety     1301: neuro re-ed / electrical stim:     -NMES; LUE extensors; 10 min; 35 hz, 200 pulse duration; Ramp up 2 sec; on:off (1:2); mild redness along the forarm at the site of the electrodes. Pt engaged in functional opening and closing the hand throughout treatment requiring moderate VC for attention.     -vibration along the L extensor martina to promote digital extension. Flexion > extension. Increased digital extension following passive ROM.     - L hand swelling: L DPC 8 3/4 inch; R 8 1/4 inch    - Educated pt on the importance of elevation of the L hand as well as daily adherence to hand exercises to manage swelling and spasticity.     Assessment    Overall, increased flexor tone and swelling of the L hand along the dorsal aspect may be impacting functional hand use. Good progress during toileting as pt is now able to engage in static sitting w/o external supports.       Strengths: Able to follow instructions, Independent prior level of function, Motivated for self care and independence, Pleasant and cooperative, Supportive family  Barriers: Decreased endurance, Fatigue, Generalized weakness, Hemiparesis, Impaired activity tolerance, Impaired balance, Limited mobility, Spasticity    Plan    Cont ADLs, functional transfers, neuro re-edu, and thera act/ex to maximize functional recovery for safe DC home.       DME  OT DME Recommendations  Bathroom Equipment: 3 in 1 Commode  Additional Equipment: Other (Comments)    Passport items to be  completed:  Perform bathroom transfers, complete dressing, complete feeding, get ready for the day, prepare a simple meal, participate in household tasks, adapt home for safety needs, demonstrate home exercise program, complete caregiver training     Occupational Therapy Goals (Active)       Problem: Bathing       Dates: Start:  12/06/23         Goal: STG-Within one week, patient will bathe at [facility] using LRD       Dates: Start:  12/06/23               Problem: Dressing       Dates: Start:  11/22/23         Goal: STG-Within one week, patient will dress LB at [place] using LRD       Dates: Start:  11/22/23            Goal: STG-Within one week, patient will dress [facility] SPV using LRD       Dates: Start:  12/06/23               Problem: Functional Transfers       Dates: Start:  12/06/23         Goal: STG-Within one week, patient will transfer to toilet at [facility] to [facility] using LRD       Dates: Start:  12/06/23            Goal: STG-Within one week, patient will transfer to step in shower at [place] to [facility] using LRD       Dates: Start:  12/06/23               Problem: OT Long Term Goals       Dates: Start:  11/13/23         Goal: LTG-By discharge, patient will complete basic self care tasks at Mod Independent level.       Dates: Start:  11/13/23            Goal: LTG-By discharge, patient will perform bathroom transfers at Mod Independent level.       Dates: Start:  11/13/23

## 2023-12-08 NOTE — CARE PLAN
The patient is Stable - Low risk of patient condition declining or worsening    Shift Goals  Clinical Goals: safety, BP mgmt, sleep  Patient Goals: sleep  Family Goals: increased pt strength, independence    Progress made toward(s) clinical / shift goals:      Problem: Skin Integrity  Goal: Skin integrity is maintained or improved  Outcome: Progressing  Note:   Patient's skin remains intact and free from new or accidental injury this shift; no s/s of infection. RN wound protocol checked. Encouraged hydration and educated about the importance of nutrition to keep skin integrity. Will continue to monitor.

## 2023-12-08 NOTE — CARE PLAN
Problem: Skin Integrity  Goal: Skin integrity is maintained or improved  Outcome: Progressing     Problem: Fall Risk - Rehab  Goal: Patient will remain free from falls  Outcome: Progressing   The patient is Stable - Low risk of patient condition declining or worsening    Shift Goals  Clinical Goals: safety, BP mgmt, sleep  Patient Goals: sleep  Family Goals: increased pt strength, independence

## 2023-12-08 NOTE — CONSULTS
"[facility]    INPATIENT ASSESSMENT    Name: Jason Lima  MRN: [id number]  : 1960  Age: 62 y.o.  Date of assessment: 2023  PCP: Humble Garcia D.O.  Persons in attendance: Patient    CHIEF COMPLAINT/PRESENTING ISSUE (as stated by Patient):    Jason Lima is a 62 year old male seen lying on hospital bed, alert, oriented, speech pronunciation appears to have improved. Patient denies auditory or visual hallucinations. Patient denies suicidal or homicidal ideations. Patient was pleasant and willing to participate in the psychotherapy process.       PSYCHOTHERAPY SESSION SUMMARY  Patient reports improved mood and increased motivation to work hard in therapy to improve his functional status. Patient expressed appreciation for the hard work of the therapists and states, \"I feel safe here\". Patient reports his future focus is less negative than previously. Patient is hopeful that his return home will be successful however patient is concerned his discharge date is prior to the date his girlfriend can come to the house to stay with him. Patient reports anxiety regarding his planned discharge date. Patient reports he was informed his planned discharge date is 2023, however his girlfriend, who has agreed to be is caregiver, arrives on 2023. Patient is concerned he will not have help at home at the time of discharge. Clinician discussed ways to manage anxiety. Also encouraged patient to speak to extended family and friends as well as the medical team to determine a safe discharge plan.    MENTAL STATUS              Participation: Active verbal participation  Grooming: Casual  Orientation: Alert  Behavior: Calm  Eye contact: Limited  Mood: Depressed, anxious  Affect: Flat  Thought process: Circumstantial  Thought content: Within normal limits  Speech: Latency of response  Perception: Within normal limits  Memory:  Recent:  Adequate  Insight: Adequate  Judgment:  " Adequate  Other:        Collateral information:   Source:   Significant other present in person:    Significant other by telephone   [facility]    [facility] Nursing Staff   [facility] Medical Record-reviewed   Other:      Unable to complete full assessment due to:   Acute intoxication   Patient declined to participate/engage   Patient verbally unresponsive   Significant cognitive deficits   Significant perceptual distortions or behavioral disorganization   Other:             CLINICAL IMPRESSIONS:  Primary:  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  Secondary:                                         IDENTIFIED NEEDS/PLAN:  [Trigger DISPOSITION list for any items marked]      Imminent safety risk - self  Imminent safety risk - others     Acute substance withdrawal   Psychosis/Impaired reality testing   xx  Mood/anxiety   Substance use/Addictive behavior   x  Maladaptive behavior   Parent/child conflict     Family/Couples conflict   Biomedical     Housing   Financial      Legal  Occupational/Educational     Domestic violence   Other:     Recommendations and Observation Level:      Legal Hold: N/A      Thank you,    Kriss Sanabria, Ph.D., [facility]  12/7/2023   Length of intervention: 30 minutes

## 2023-12-08 NOTE — CARE PLAN
"Shellie Hamilton Fall risk Assessment Score: 23    High fall risk Interventions   - Bed and strip alarm   - Yellow sign by the door   - Yellow wrist band \"Fall risk\"  - Room near to the nurse station  - Do not leave patient unattended in the bathroom  - Fall risk education provided  "

## 2023-12-09 ENCOUNTER — APPOINTMENT (OUTPATIENT)
Dept: PHYSICAL THERAPY | Facility: REHABILITATION | Age: 63
DRG: 057 | End: 2023-12-09
Attending: HOSPITALIST
Payer: COMMERCIAL

## 2023-12-09 ENCOUNTER — APPOINTMENT (OUTPATIENT)
Dept: PHYSICAL THERAPY | Facility: REHABILITATION | Age: 63
DRG: 057 | End: 2023-12-09
Attending: PHYSICAL MEDICINE & REHABILITATION
Payer: COMMERCIAL

## 2023-12-09 PROCEDURE — A9270 NON-COVERED ITEM OR SERVICE: HCPCS | Performed by: PHYSICAL MEDICINE & REHABILITATION

## 2023-12-09 PROCEDURE — A9270 NON-COVERED ITEM OR SERVICE: HCPCS | Performed by: HOSPITALIST

## 2023-12-09 PROCEDURE — 700102 HCHG RX REV CODE 250 W/ 637 OVERRIDE(OP): Performed by: PHYSICAL MEDICINE & REHABILITATION

## 2023-12-09 PROCEDURE — 770010 HCHG ROOM/CARE - REHAB SEMI PRIVAT*

## 2023-12-09 PROCEDURE — A9270 NON-COVERED ITEM OR SERVICE: HCPCS | Performed by: STUDENT IN AN ORGANIZED HEALTH CARE EDUCATION/TRAINING PROGRAM

## 2023-12-09 PROCEDURE — 97530 THERAPEUTIC ACTIVITIES: CPT

## 2023-12-09 PROCEDURE — 94760 N-INVAS EAR/PLS OXIMETRY 1: CPT

## 2023-12-09 PROCEDURE — 700102 HCHG RX REV CODE 250 W/ 637 OVERRIDE(OP): Performed by: HOSPITALIST

## 2023-12-09 PROCEDURE — 97112 NEUROMUSCULAR REEDUCATION: CPT

## 2023-12-09 PROCEDURE — 700102 HCHG RX REV CODE 250 W/ 637 OVERRIDE(OP): Performed by: STUDENT IN AN ORGANIZED HEALTH CARE EDUCATION/TRAINING PROGRAM

## 2023-12-09 RX ADMIN — BACLOFEN 10 MG: 10 TABLET ORAL at 21:44

## 2023-12-09 RX ADMIN — AMLODIPINE BESYLATE 10 MG: 5 TABLET ORAL at 05:03

## 2023-12-09 RX ADMIN — HYDRALAZINE HYDROCHLORIDE 100 MG: 50 TABLET, FILM COATED ORAL at 21:46

## 2023-12-09 RX ADMIN — TRAZODONE HYDROCHLORIDE 75 MG: 50 TABLET ORAL at 21:46

## 2023-12-09 RX ADMIN — HYDRALAZINE HYDROCHLORIDE 100 MG: 50 TABLET, FILM COATED ORAL at 14:56

## 2023-12-09 RX ADMIN — SENNOSIDES AND DOCUSATE SODIUM 2 TABLET: 50; 8.6 TABLET ORAL at 08:09

## 2023-12-09 RX ADMIN — SERTRALINE 50 MG: 50 TABLET, FILM COATED ORAL at 08:09

## 2023-12-09 RX ADMIN — HYDRALAZINE HYDROCHLORIDE 100 MG: 50 TABLET, FILM COATED ORAL at 05:03

## 2023-12-09 RX ADMIN — OMEPRAZOLE 20 MG: 20 CAPSULE, DELAYED RELEASE ORAL at 08:09

## 2023-12-09 RX ADMIN — BACLOFEN 10 MG: 10 TABLET ORAL at 08:09

## 2023-12-09 RX ADMIN — ATORVASTATIN CALCIUM 40 MG: 40 TABLET, FILM COATED ORAL at 21:45

## 2023-12-09 RX ADMIN — APIXABAN 5 MG: 5 TABLET, FILM COATED ORAL at 08:09

## 2023-12-09 RX ADMIN — APIXABAN 5 MG: 5 TABLET, FILM COATED ORAL at 21:44

## 2023-12-09 RX ADMIN — BACLOFEN 10 MG: 10 TABLET ORAL at 14:56

## 2023-12-09 NOTE — CONSULTS
Brief Behavioral Health Care Consultation Note    Patient observed in the room and in the gym with therapy. Patient actively participating in treatment. Will follow up with patient at another time.  Thank you,    Kriss Sanabria, Ph.D., [facility]

## 2023-12-09 NOTE — PROGRESS NOTES
NURSING DAILY NOTE    Name: Jason Lima   Date of Admission: 11/12/2023   Admitting Diagnosis: Thalamic hemorrhage (HCC)  Attending Physician: Lisa Lee D.o.  Allergies: Penicillins    Safety  Patient Assist  Max x 2  Patient Precautions  Fall Risk  Precaution Comments  Left Hemiparesis, left visual inattention  Bed Transfer Status  Maximal Assist (Min A toward right/ reach pivot. Max A to left SQPT.)  Toilet Transfer Status   Total Assist X 2 (Mod/ max SPT to right, 2 person to the left, with grab bar. Total assist pivoting left foot)  Assistive Devices  Rails, Wheelchair  Oxygen  Room air w/o2 available  Diet/Therapeutic Dining  Current Diet Order   Procedures    Diet Order Diet: Consistent CHO (Diabetic) (2 gram sodium restriction; medications whole with thin liquids); Second Modifier: (optional): 2 Gram Sodium     Pill Administration  whole  Agitated Behavioral Scale  14  ABS Level of Severity  No Agitation    Fall Risk  Has the patient had a fall this admission?   Yes  Shellie Hamilton Fall Risk Scoring  23, HIGH RISK  Fall Risk Safety Measures  bed alarm, chair alarm, and seatbelt alarm    Vitals  Temperature: 36.9 °C (98.4 °F)  Temp src: Temporal  Pulse: 89  Respiration: 17  Blood Pressure: (!) 150/82  Blood Pressure MAP (Calculated): 105 MM HG  BP Location: Right, Upper Arm  Patient BP Position: Sitting     Oxygen  Pulse Oximetry: 98 %  O2 (LPM): 0  FiO2%: 21 %  O2 Delivery Device: Room air w/o2 available    Bowel and Bladder  Last Bowel Movement  12/08/23 (per patient)  Stool Type  Type 4: Like a sausage or snake, smooth and soft  Bowel Device  Bathroom, Other (Comment)  Continent  Bladder: Continent void   Bowel: Continent movement  Bladder Function  Urine Void (mL): 500 ml  Number of Times Voided: 1  Urinary Options: Yes  Urine Color: Yellow  Number of Times Incontinent of Urine: 0  Genitourinary Assessment   Bladder Assessment (WDL):  WDL  Except  Echols Catheter: Not Applicable  Urine Color: Yellow  Number of Bladder Accidents: 0  Total Number of Bladder of Accidents in Last 7 Days: 0  Number of Times Incontinent of Urine: 0  Bladder Device: Urinal  Time Void: No  Bladder Scan: Post Void  $ Bladder Scan Results (mL): 26    Skin  Polo Score   15  Sensory Interventions   Bed Types: Standard/Trauma Mattress  Skin Preventative Measures: Pillows in Use for Support / Positioning  Moisture Interventions  Moisturizers/Barriers: Barrier Cream      Pain  Pain Rating Scale  0 - No Pain  Pain Location  Foot  Pain Location Orientation  Right, Left  Pain Interventions   Declines    ADLs    Bathing   Patient Refused Bathing (too tired)  Linen Change   Partial  Personal Hygiene  Moist Deja Wipes  Chlorhexidine Bath      Oral Care  Brushed Teeth  Teeth/Dentures     Shave  Self  Nutrition Percentage Eaten  *  * Meal *  *, Lunch, Between % Consumed  Environmental Precautions  Treaded Slipper Socks on Patient  Patient Turns/Positioning  Patient Turns Self from Side to Side  Patient Turns Assistance/Tolerance  Assistance of One  Bed Positions  Bed Controls On, Bed Locked  Head of Bed Elevated  Self regulated      Psychosocial/Neurologic Assessment  Psychosocial Assessment  Psychosocial (WDL):  Within Defined Limits  Patient Behaviors: Fatigue  Neurologic Assessment  Neuro (WDL): Exceptions to WDL  Level of Consciousness: Alert  Orientation Level: Oriented X4  Cognition: Follows commands  Speech: Clear, Slurred  Facial Symmetry: Left facial drooping  Pupil Assesment: Yes  R Pupil Size (mm): 3  R Pupil Shape / Description: Round  R Pupil Reaction: Brisk  L Pupil Size (mm): 3  L Pupil Shape / Description: Round  L Pupil Reaction: Brisk  Reflexes: Cough  Cough Reflex: Present  Motor Function/Sensation Assessment: Dorsiflexion  R Foot Dorsiflexion: Strong  L Foot Dorsiflexion: Absent  RUE Motor Response: Responds to commands  RUE Sensation: Full sensation  Muscle  Strength Right Arm: Normal Strength Against Gravity and Full Resistance  LUE Motor Response: Flaccid  LUE Sensation: Tingling, Numbness  Muscle Strength Left Arm: Weak Movement but Not Against Gravity or Resistance  RLE Motor Response: Responds to commands  RLE Sensation: Full sensation  Muscle Strength Right Leg: Good Strength Against Gravity and Moderate Resistance  LLE Motor Response: Flaccid  LLE Sensation: Tingling, Numbness  Muscle Strength Left Leg: Weak Movement but Not Against Gravity or Resistance  EENT (WDL):  WDL Except    Cardio/Pulmonary Assessment  Edema   RLE Edema: Trace  LLE Edema: Trace  Respiratory Breath Sounds  RUL Breath Sounds: Clear  RML Breath Sounds: Clear  RLL Breath Sounds: Diminished  MOHAMUD Breath Sounds: Clear  LLL Breath Sounds: Diminished  Cardiac Assessment   Cardiac (WDL):  WDL Except (hx-htn, hld)

## 2023-12-09 NOTE — FLOWSHEET NOTE
12/09/23 0704   Events/Summary/Plan   Events/Summary/Plan RA pulse ox check   Vital Signs   Pulse 94   Respiration 16   Pulse Oximetry 95 %   $ Pulse Oximetry (Spot Check) Yes   Respiratory Assessment   Level of Consciousness Alert   Chest Exam   Work Of Breathing / Effort Within Normal Limits   Oxygen   O2 Delivery Device Room air w/o2 available

## 2023-12-09 NOTE — CARE PLAN
The patient is Watcher - Medium risk of patient condition declining or worsening    Shift Goals  Clinical Goals: safety, BP mgmt, sleep  Patient Goals: sleep  Family Goals: increased pt strength, independence    Progress made toward(s) clinical / shift goals:    Problem: Knowledge Deficit - Standard  Goal: Patient and family/care givers will demonstrate understanding of plan of care, disease process/condition, diagnostic tests and medications  Outcome: Progressing     Problem: Skin Integrity  Goal: Skin integrity is maintained or improved  Outcome: Progressing     Problem: Fall Risk - Rehab  Goal: Patient will remain free from falls  Outcome: Progressing       VSS. BP's within patient's parameters. CPAP at night, Trazodone PO for sleep.

## 2023-12-09 NOTE — CONSULTS
"PSYCHIATRIC CONSULTATION:  *Reason for admission:   right thalamic hemorrhage  *Reason for consult:depression   *Requesting Physician:Lisa Lee  Supervising Physician:Rachele Colon         Legal status:  not applicable        *Interval history  The patient reports that his mood is \"ok\", unchanged from last week. He states he has been feeling more anxious lately because discharge is planned for next week but he does not feel ready to go home. He states he still has trouble standing on his own and would feel comfortable if he could get more rehab to be \"a little stronger\" before he leaves. Hr states he will be alone at home as his SO and siblings are working so he is concerned about managing on his own. HE states energy level is good and he is trying to do \"as much rehab as I can get.\" Appetite is \"ok\", denies N/V or abdominal pain. Sleep \"could be better.\" He is falling asleep without problems but wakes up in the middle of the night and starts worrying about his discharge and has a hard time falling asleep. He denies SI and states \"sometimes I feel like what's the point but then I remember that there is still so much to see and that I am daxa to have such a beautiful fiancee and I think life it worth living, I don't want to hurt myself.\" He denies HI. NO hallucinations or delusions.      *Medical Review of Systems: as reported by pt. All systems reviewed. Only those found to be + are noted below. All others are negative.   (+) foot pain, recently improved with treatment but still present        *Psychiatric (Physical) Examination: observed phenomenon:      Vitals:     11/30/23 1649   BP: 134/81   Pulse: 84   Resp: 18   Temp: 36.8 °C (98.2 °F)   SpO2: 93%            General: Awake, alert in no acute distress  Patient Appearance: Appropriate, fairly groomed, wearing glasses   Behavior: Appropriate Behavior, Calm, Cooperative  Speech.: Spontaneous, Normal rate and rhythm, Answers appropriately, " "normal  volume  Mood Comments: \"ok, I'm just anxious\"  Affect: appears euthymic,  Thought Content: Appropriate, No suicidal ideation, No thoughts of self harm,  No homicidal ideation, Contracts for safety, Feels life is worth living  Thought Process: Logical and goal directed, No loosening of associations  Psychosis: No delusions, No hallucinations  Insight: Good insight  Judgment: good judgment  Cognition: Memory appeared intact in interview., Concentration is intact for age  and education and Fully oriented to person, place, time and circumstance.  Language: Is able to repeat phrases. and Is able to name objects.  Fund of Knowledge: AS expected for age and level of education  Neuro: no tics, tremors, dyskinesias. no dystonias. Gait not observed         Allergies:   Allergies   Allergen Reactions    Penicillins             Medications (currently prescribed at [facility]):     Current Facility-Administered Medications:     [START ON 12/9/2023] sertraline (Zoloft) tablet 50 mg, 50 mg, Oral, DAILY, Rachele Del Rio D.O.    traZODone (Desyrel) tablet 25 mg, 25 mg, Oral, QHS PRN, Rachele Del Rio D.O.    baclofen (Lioresal) tablet 10 mg, 10 mg, Oral, TID, Lisa Lee D.O., 10 mg at 12/08/23 2002    amLODIPine (Norvasc) tablet 10 mg, 10 mg, Oral, DAILY, MAXWELL GomezO., 10 mg at 12/08/23 0626    carboxymethylcellulose (Refresh Tears) 0.5 % ophthalmic drops 1 Drop, 1 Drop, Both Eyes, PRN, MAXWELL GrossOWang, 1 Drop at 11/26/23 2005    apixaban (Eliquis) tablet 5 mg, 5 mg, Oral, BID, Lisa Lee D.O., 5 mg at 12/08/23 2002    hydrALAZINE (Apresoline) tablet 100 mg, 100 mg, Oral, Q8HRS, Sita Grewal M.D., 100 mg at 12/08/23 2001    traZODone (Desyrel) tablet 75 mg, 75 mg, Oral, QHS, Lisa Lee D.O., 75 mg at 12/08/23 2002    melatonin tablet 9 mg, 9 mg, Oral, QHS PRN, Lisa Lee D.O., 9 mg at 11/20/23 2122    [DISCONTINUED] insulin regular (HumuLIN R,NovoLIN R) injection, 2-12 " Units, Subcutaneous, 4X/DAY ACHS **AND** [CANCELED] POC blood glucose manual result, , , Q AC AND BEDTIME(S) **AND** NOTIFY MD and PharmD, , , Once **AND** Administer 20 grams of glucose (approximately 8 ounces of fruit juice) every 15 minutes PRN FSBG less than 70 mg/dL, , , PRN **AND** dextrose 50% (D50W) injection 25 g, 25 g, Intravenous, Q15 MIN PRN, Sita Grewal M.D.    Respiratory Therapy Consult, , Nebulization, Continuous RT, Peyton Watkins D.O.    senna-docusate (Pericolace Or Senokot S) 8.6-50 MG per tablet 2 Tablet, 2 Tablet, Oral, BID, 2 Tablet at 23 **AND** polyethylene glycol/lytes (Miralax) PACKET 1 Packet, 1 Packet, Oral, QDAY PRN, 1 Packet at 23 **AND** magnesium hydroxide (Milk Of Magnesia) suspension 30 mL, 30 mL, Oral, QDAY PRN, 30 mL at 23 0520 **AND** bisacodyl (Dulcolax) suppository 10 mg, 10 mg, Rectal, QDAY PRN, Peyton Watkins D.O.    omeprazole (PriLOSEC) capsule 20 mg, 20 mg, Oral, DAILY, MAXWELL DenisO., 20 mg at 23 0754    mag hydrox-al hydrox-simeth (Maalox Plus Es Or Mylanta Ds) suspension 20 mL, 20 mL, Oral, Q2HRS PRN, MAXWELL DenisO.    ondansetron (Zofran ODT) dispertab 4 mg, 4 mg, Oral, 4X/DAY PRN **OR** ondansetron (Zofran) syringe/vial injection 4 mg, 4 mg, Intramuscular, 4X/DAY PRN, Peyton Watkins D.O.    sodium chloride (Ocean) 0.65 % nasal spray 2 Spray, 2 Spray, Nasal, PRN, Peyton Watkins D.O.    [] acetaminophen (Tylenol) tablet 1,000 mg, 1,000 mg, Oral, Q6HRS, 1,000 mg at 23 0530 **FOLLOWED BY** acetaminophen (Tylenol) tablet 1,000 mg, 1,000 mg, Oral, Q6HRS PRN, Peyton Watkins D.O., 1,000 mg at 23 0839    oxyCODONE immediate-release (Roxicodone) tablet 2.5 mg, 2.5 mg, Oral, Q3HRS PRN **OR** oxyCODONE immediate-release (Roxicodone) tablet 5 mg, 5 mg, Oral, Q3HRS PRN, Peyton Watkins D.O., 5 mg at 23    hydrALAZINE (Apresoline) tablet 25 mg, 25 mg, Oral, Q8HRS PRN, LEANA Denis.O., 25 mg at  11/23/23 1639    atorvastatin (Lipitor) tablet 40 mg, 40 mg, Oral, Nightly, Peyton Watkins, D.O., 40 mg at 12/08/23 2002          *Labs:  Lab Results   Component Value Date/Time    WBC 4.5 (L) 12/06/2023 05:26 AM    RBC 3.46 (L) 12/06/2023 05:26 AM    HEMOGLOBIN 10.7 (L) 12/06/2023 05:26 AM    HEMATOCRIT 31.1 (L) 12/06/2023 05:26 AM    MCV 89.9 12/06/2023 05:26 AM    MCH 30.9 12/06/2023 05:26 AM    MCHC 34.4 12/06/2023 05:26 AM    MPV 10.0 12/06/2023 05:26 AM    NEUTSPOLYS 57.20 12/06/2023 05:26 AM    LYMPHOCYTES 32.40 12/06/2023 05:26 AM    MONOCYTES 6.40 12/06/2023 05:26 AM    EOSINOPHILS 3.10 12/06/2023 05:26 AM    BASOPHILS 0.70 12/06/2023 05:26 AM   Plt-199,000        Lab Results   Component Value Date/Time    SODIUM 141 12/06/2023 05:26 AM    POTASSIUM 3.9 12/06/2023 05:26 AM    CHLORIDE 106 12/06/2023 05:26 AM    CO2 26 12/06/2023 05:26 AM    GLUCOSE 157 (H) 12/06/2023 05:26 AM    BUN 29 (H) 12/06/2023 05:26 AM    CREATININE 2.76 (H) 12/06/2023 05:26 AM             11/21/23  TSH-0.640  [id number]     EKG 11/11 NSR QTC-436  Imaging  MRI 11/11/23  1.  Right thalamic hemorrhage, decreased in density since prior study  2.  Stranding vasogenic edema appears stable.  3.  Slight asymmetric dilatation of the left ventricular system including the left temporal horn, consider component of hydrocephalus.  4.  Low-density fluid collection in the left temporal fossa, appearance most compatible with arachnoid cyst.  5.  Nonspecific white matter changes, commonly associated with small vessel ischemic disease.  Associated mild cerebral atrophy is noted.  6.  Atherosclerosis.        *ASSESSMENT:   Adjustment disorder  -Patient endorsed increased anxiety and occasional feeling of hopelessness that began after his stroke. He had noted more disrupted sleep that he feels affects his energy levels and anxiety. However he had been participating in OT/PT and had been complaint with recommended medications and treatments. Denied SI.  "Sleep may also have been affected my discomfort from CPAP     The patient states mood is 'ok\" but he has been more anxious due to anticipation regarding pending discharge. He states sleep has been more disrupted lately due to anxiety. Appetite is fair. Energy level is good. HE denies SI/HI.      *Plan/Further Workup:   Legal status: not applicable     *Medication Managment:          -continue trazodone 75 mg QHS for insomnia , add trazodone 25 mg QHS PRN for insomnia  -increase Zoloft to 50 mg daily for depression.   -continue psychotherapy with patient     *Labs Reviewed        Will follow with patient     Thank you for the consult.     "

## 2023-12-09 NOTE — THERAPY
Physical Therapy   Daily Treatment     Patient Name: Jason Lima  Age:  62 y.o., Sex:  male  Medical Record #: [id number]  Today's Date: 12/9/2023     Precautions  Precautions: (P) Fall Risk  Comments: (P) Left Hemiparesis, left visual inattention    Subjective    Pt reported needing to use urinal upon PT arrival; pt reported needing to have a bowel movement upon entering the // bars to don Vector harness.      Objective       12/09/23 1031   PT Charge Group   PT Neuromuscular Re-Education / Balance (Units) 2   PT Therapeutic Activities (Units) 2   PT Total Time Spent   PT Individual Total Time Spent (Mins) 60   Precautions   Precautions Fall Risk   Comments Left Hemiparesis, left visual inattention   Transfer Functional Level of Assist   Bed, Chair, Wheelchair Transfer Maximal Assist  (Min A toward right/ reach pivot. Max A to left SQPT.)   Bed Chair Wheelchair Transfer Description Adaptive equipment;Increased time;Initial preparation for task;Set-up of equipment;Squat pivot transfer to wheelchair;Verbal cueing   Toilet Transfers Total Assist X 2  (Mod/ max SPT to right, 2 person to the left, with grab bar. Total assist pivoting left foot)   Sitting Lower Body Exercises   Comments MotoMed LE cycle 1.82 miles, 15 min (10 min fwd/ 10 min bwd), 11 W, 40%L 60%R, with tactile cues for head righting/ midline, elbow supported on pillow, with LUE gasp maintanence throughout.   Bed Mobility    Supine to Sit Standby Assist  (hook method)   Sit to Supine Standby Assist  (hook method)   Sit to Stand Minimal Assist   Scooting Minimal Assist   Rolling Standby Assist   Neuro-Muscular Treatments   Neuro-Muscular Treatments Weight Shift Left;Weight Shift Right;Verbal Cuing;Tactile Cuing;Sequencing;Postural Facilitation;Postural Changes;Joint Approximation   Comments Bridging in supine for assist doffing soiled clothing, and donning clean pants. Seated EOB for assist with changing into clean shirt, and donning shoes, CGA/SBA.  Seated  balance on commode SPV. 2 person assist during hygiene performance post toileting, and pants management, d/t left lateral lean. Forced use during hand washing for BUE cleaning/ drying.   Interdisciplinary Plan of Care Collaboration   IDT Collaboration with  Therapy Tech;Certified Nursing Assistant;Physician   Patient Position at End of Therapy Seated;Self Releasing Lap Belt Applied  (handoff to dining room for lunch)   Collaboration Comments Urine accident attempting to use urinal upon PT arrival, reqiring cleaning and changing. Tech assist with toilet transfer.     WC mob x 150 ft indoors SPV with jade technique.     Assessment    Pt was limited by urinal accident upon PT arrival, requiring cleaning and changing. After bed transfer, and clothing change, patient was taken to gym to don Vector harness, and reported having to have bowel movement. Pt was returned to room, and transferred to over the toilet commode. Pt participated in several sit <> stands, mini squats, and wt shifting to assist with management of clothing, hygiene performance, and transfers. Pt was asked to wash and dry BUE to practice forced use of LUE.  Pt propelled wc to gym using jade technique, and min/mod vc to attend to left side/ scan environment. Pt completed MotoMed x 15 min with 0-3 Nm resistance, with emphasis on symmetry training.     Strengths: Able to follow instructions, Independent prior level of function, Motivated for self care and independence, Pleasant and cooperative, Supportive family, Willingly participates in therapeutic activities  Barriers: Decreased endurance, Hemiparesis, Difficulty following instructions, Fatigue, Hypertension, Impaired activity tolerance, Impaired balance, Impaired insight/denial of deficits, Impaired functional cognition, Impaired sleep pattern, Impulsive, Limited mobility, Visual impairment, Poor family support (lives alone)    Plan    Aquatic therapy as possible  Continue prism trial, STS + midline  "control vs NMES to LLE quad/glut in standing vs NuStep for pregait training/priming  TM training  Continue LPRW training, wean from rail for righting responses   F/U on AFO needs     DME  PT DME Recommendations  Wheelchair: 18\" Width  Cushion: Specialty (See other comments) (TBD pending ambulation progress)  Assistive Device: Tanvir Walker (TBD pending progress, currently 2 person assist for gait)  Additional Equipment: Other (Comments)    Passport items to be completed:  Get in/out of bed safely, in/out of a vehicle, safely use mobility device, walk or wheel around home/community, navigate up and down stairs, show how to get up/down from the ground, ensure home is accessible, demonstrate HEP, complete caregiver training    Physical Therapy Problems (Active)       Problem: Mobility       Dates: Start:  11/13/23         Goal: STG-Within one week, patient will ambulate distances >/= 30' c/ RHR and ModA or better.        Dates: Start:  11/13/23         Goal Note filed on 11/22/23 1102 by Awilda Dela Cruz, MSPT       Limited to 10ft at right hallway rail mod assist                 Problem: PT-Long Term Goals       Dates: Start:  11/13/23         Goal: LTG-By discharge, patient will propel wheelchair >/= 300' at [place]o or better.        Dates: Start:  11/13/23            Goal: LTG-By discharge, patient will ambulate >/= 50' c/ LRAD at Renata or better.        Dates: Start:  11/13/23            Goal: LTG-By discharge, patient will transfer one surface to another c/ Renata or better.        Dates: Start:  11/13/23            Goal: LTG-By discharge, patient will ambulate up/down 1 step c/ LRAD at Renata or better.        Dates: Start:  11/13/23            Goal: LTG-By discharge, patient will transfer in/out of a car c/ ModA or better.        Dates: Start:  11/13/23              "

## 2023-12-09 NOTE — PROGRESS NOTES
NURSING DAILY NOTE    Name: Jason Lima   Date of Admission: 11/12/2023   Admitting Diagnosis: Thalamic hemorrhage (HCC)  Attending Physician: Lisa Lee D.o.  Allergies: Penicillins    Safety  Patient Assist  Max x 2  Patient Precautions  Fall Risk  Precaution Comments  Left Hemiparesis, left visual inattention  Bed Transfer Status  Maximal Assist (lifting, assist to WS, VC/MC to advance LLE, correct L lean/push for SPT/step transfer)  Toilet Transfer Status   Maximal Assist  Assistive Devices  Rails, Wheelchair  Oxygen  CPAP  Diet/Therapeutic Dining  Current Diet Order   Procedures    Diet Order Diet: Consistent CHO (Diabetic) (2 gram sodium restriction; medications whole with thin liquids); Second Modifier: (optional): 2 Gram Sodium     Pill Administration  whole  Agitated Behavioral Scale  14  ABS Level of Severity  No Agitation    Fall Risk  Has the patient had a fall this admission?   Yes  Shellie Hamilton Fall Risk Scoring  23, HIGH RISK  Fall Risk Safety Measures  bed alarm, chair alarm, and seatbelt alarm    Vitals  Temperature: 36.7 °C (98 °F)  Temp src: Oral  Pulse: 76  Respiration: 18  Blood Pressure: 136/75  Blood Pressure MAP (Calculated): 95 MM HG  BP Location: Right, Upper Arm  Patient BP Position: Supine     Oxygen  Pulse Oximetry: 96 %  O2 (LPM): 0  FiO2%: 21 %  O2 Delivery Device: CPAP    Bowel and Bladder  Last Bowel Movement  12/08/23 (per patient)  Stool Type  Type 4: Like a sausage or snake, smooth and soft  Bowel Device  Bathroom, Other (Comment)  Continent  Bladder: Continent void   Bowel: Continent movement  Bladder Function  Urine Void (mL): 200 ml  Number of Times Voided: 1  Urinary Options: Yes  Urine Color: Yellow  Number of Times Incontinent of Urine: 0  Genitourinary Assessment   Bladder Assessment (WDL):  WDL Except  Echols Catheter: Not Applicable  Urine Color: Yellow  Number of Bladder Accidents: 0  Total Number of  Bladder of Accidents in Last 7 Days: 0  Number of Times Incontinent of Urine: 0  Bladder Device: Urinal  Time Void: No  Bladder Scan: Post Void  $ Bladder Scan Results (mL): 26    Skin  Polo Score   15  Sensory Interventions   Bed Types: Standard/Trauma Mattress  Skin Preventative Measures: Pillows in Use for Support / Positioning  Moisture Interventions  Moisturizers/Barriers: Barrier Cream      Pain  Pain Rating Scale  0 - No Pain  Pain Location  Foot  Pain Location Orientation  Right, Left  Pain Interventions   Declines    ADLs    Bathing   Patient Refused Bathing (too tired)  Linen Change   Partial  Personal Hygiene  Change Deja Pads, Moist Deja Wipes, Perineal Care  Chlorhexidine Bath      Oral Care  Brushed Teeth  Teeth/Dentures     Shave  Self  Nutrition Percentage Eaten  *  * Meal *  *, Dinner, Between % Consumed  Environmental Precautions  Treaded Slipper Socks on Patient  Patient Turns/Positioning  Patient Turns Self from Side to Side  Patient Turns Assistance/Tolerance  Assistance of One  Bed Positions  Bed Controls On, Bed Locked  Head of Bed Elevated  Self regulated      Psychosocial/Neurologic Assessment  Psychosocial Assessment  Psychosocial (WDL):  Within Defined Limits  Patient Behaviors: Fatigue  Neurologic Assessment  Neuro (WDL): Exceptions to WDL  Level of Consciousness: Alert  Orientation Level: Oriented X4  Cognition: Follows commands  Speech: Clear, Slurred  Facial Symmetry: Left facial drooping  Pupil Assesment: Yes  R Pupil Size (mm): 3  R Pupil Shape / Description: Round  R Pupil Reaction: Brisk  L Pupil Size (mm): 3  L Pupil Shape / Description: Round  L Pupil Reaction: Brisk  Reflexes: Cough  Cough Reflex: Present  Motor Function/Sensation Assessment: Dorsiflexion  R Foot Dorsiflexion: Strong  L Foot Dorsiflexion: Absent  RUE Motor Response: Responds to commands  RUE Sensation: Full sensation  Muscle Strength Right Arm: Normal Strength Against Gravity and Full Resistance  LUE  Motor Response: Flaccid  LUE Sensation: Tingling, Numbness  Muscle Strength Left Arm: Weak Movement but Not Against Gravity or Resistance  RLE Motor Response: Responds to commands  RLE Sensation: Full sensation  Muscle Strength Right Leg: Good Strength Against Gravity and Moderate Resistance  LLE Motor Response: Flaccid  LLE Sensation: Tingling, Numbness  Muscle Strength Left Leg: Weak Movement but Not Against Gravity or Resistance  EENT (WDL):  WDL Except    Cardio/Pulmonary Assessment  Edema   RLE Edema: Trace  LLE Edema: Trace  Respiratory Breath Sounds  RUL Breath Sounds: Clear  RML Breath Sounds: Clear  RLL Breath Sounds: Diminished  MOHAMUD Breath Sounds: Clear  LLL Breath Sounds: Diminished  Cardiac Assessment   Cardiac (WDL):  WDL Except (hx-htn, hld)

## 2023-12-09 NOTE — FLOWSHEET NOTE
12/08/23 1613   Events/Summary/Plan   Events/Summary/Plan CPAP check   Non-Invasive Ventilation [facility]   Nocturnal CPAP or BIPAP CPAP - Home Unit  (12:58 hr use)

## 2023-12-10 ENCOUNTER — APPOINTMENT (OUTPATIENT)
Dept: OCCUPATIONAL THERAPY | Facility: REHABILITATION | Age: 63
DRG: 057 | End: 2023-12-10
Attending: PHYSICAL MEDICINE & REHABILITATION
Payer: COMMERCIAL

## 2023-12-10 ENCOUNTER — APPOINTMENT (OUTPATIENT)
Dept: PHYSICAL THERAPY | Facility: REHABILITATION | Age: 63
DRG: 057 | End: 2023-12-10
Attending: PHYSICAL MEDICINE & REHABILITATION
Payer: COMMERCIAL

## 2023-12-10 PROCEDURE — 90832 PSYTX W PT 30 MINUTES: CPT | Performed by: SOCIAL WORKER

## 2023-12-10 PROCEDURE — 700102 HCHG RX REV CODE 250 W/ 637 OVERRIDE(OP): Performed by: PHYSICAL MEDICINE & REHABILITATION

## 2023-12-10 PROCEDURE — 97112 NEUROMUSCULAR REEDUCATION: CPT

## 2023-12-10 PROCEDURE — A9270 NON-COVERED ITEM OR SERVICE: HCPCS | Performed by: PHYSICAL MEDICINE & REHABILITATION

## 2023-12-10 PROCEDURE — A9270 NON-COVERED ITEM OR SERVICE: HCPCS | Performed by: HOSPITALIST

## 2023-12-10 PROCEDURE — 97116 GAIT TRAINING THERAPY: CPT | Mod: CQ

## 2023-12-10 PROCEDURE — 770010 HCHG ROOM/CARE - REHAB SEMI PRIVAT*

## 2023-12-10 PROCEDURE — A9270 NON-COVERED ITEM OR SERVICE: HCPCS | Performed by: STUDENT IN AN ORGANIZED HEALTH CARE EDUCATION/TRAINING PROGRAM

## 2023-12-10 PROCEDURE — 700102 HCHG RX REV CODE 250 W/ 637 OVERRIDE(OP): Performed by: STUDENT IN AN ORGANIZED HEALTH CARE EDUCATION/TRAINING PROGRAM

## 2023-12-10 PROCEDURE — 94760 N-INVAS EAR/PLS OXIMETRY 1: CPT

## 2023-12-10 PROCEDURE — 97112 NEUROMUSCULAR REEDUCATION: CPT | Mod: CQ

## 2023-12-10 PROCEDURE — 700102 HCHG RX REV CODE 250 W/ 637 OVERRIDE(OP): Performed by: HOSPITALIST

## 2023-12-10 RX ADMIN — HYDRALAZINE HYDROCHLORIDE 100 MG: 50 TABLET, FILM COATED ORAL at 21:36

## 2023-12-10 RX ADMIN — APIXABAN 5 MG: 5 TABLET, FILM COATED ORAL at 09:17

## 2023-12-10 RX ADMIN — OMEPRAZOLE 20 MG: 20 CAPSULE, DELAYED RELEASE ORAL at 09:18

## 2023-12-10 RX ADMIN — TRAZODONE HYDROCHLORIDE 75 MG: 50 TABLET ORAL at 21:35

## 2023-12-10 RX ADMIN — APIXABAN 5 MG: 5 TABLET, FILM COATED ORAL at 21:36

## 2023-12-10 RX ADMIN — HYDRALAZINE HYDROCHLORIDE 100 MG: 50 TABLET, FILM COATED ORAL at 05:59

## 2023-12-10 RX ADMIN — BACLOFEN 10 MG: 10 TABLET ORAL at 21:36

## 2023-12-10 RX ADMIN — ATORVASTATIN CALCIUM 40 MG: 40 TABLET, FILM COATED ORAL at 21:36

## 2023-12-10 RX ADMIN — BACLOFEN 10 MG: 10 TABLET ORAL at 15:02

## 2023-12-10 RX ADMIN — AMLODIPINE BESYLATE 10 MG: 5 TABLET ORAL at 06:00

## 2023-12-10 RX ADMIN — HYDRALAZINE HYDROCHLORIDE 100 MG: 50 TABLET, FILM COATED ORAL at 15:04

## 2023-12-10 RX ADMIN — BACLOFEN 10 MG: 10 TABLET ORAL at 09:17

## 2023-12-10 RX ADMIN — SERTRALINE 50 MG: 50 TABLET, FILM COATED ORAL at 09:18

## 2023-12-10 ASSESSMENT — PATIENT HEALTH QUESTIONNAIRE - PHQ9
5. POOR APPETITE OR OVEREATING: NOT AT ALL
7. TROUBLE CONCENTRATING ON THINGS, SUCH AS READING THE NEWSPAPER OR WATCHING TELEVISION: NOT AT ALL
2. FEELING DOWN, DEPRESSED, IRRITABLE, OR HOPELESS: SEVERAL DAYS
1. LITTLE INTEREST OR PLEASURE IN DOING THINGS: NOT AT ALL
3. TROUBLE FALLING OR STAYING ASLEEP OR SLEEPING TOO MUCH: SEVERAL DAYS
6. FEELING BAD ABOUT YOURSELF - OR THAT YOU ARE A FAILURE OR HAVE LET YOURSELF OR YOUR FAMILY DOWN: NOT AL ALL
8. MOVING OR SPEAKING SO SLOWLY THAT OTHER PEOPLE COULD HAVE NOTICED. OR THE OPPOSITE, BEING SO FIGETY OR RESTLESS THAT YOU HAVE BEEN MOVING AROUND A LOT MORE THAN USUAL: NOT AT ALL
SUM OF ALL RESPONSES TO PHQ9 QUESTIONS 1 AND 2: 1
4. FEELING TIRED OR HAVING LITTLE ENERGY: NOT AT ALL
9. THOUGHTS THAT YOU WOULD BE BETTER OFF DEAD, OR OF HURTING YOURSELF: NOT AT ALL
SUM OF ALL RESPONSES TO PHQ QUESTIONS 1-9: 2

## 2023-12-10 ASSESSMENT — GAIT ASSESSMENTS
ASSISTIVE DEVICE: OTHER (COMMENTS)
DISTANCE (FEET): 30
GAIT LEVEL OF ASSIST: TOTAL ASSIST X 2

## 2023-12-10 NOTE — CARE PLAN
Problem: Fall Risk - Rehab  Goal: Patient will remain free from falls  Outcome: Progressing  Patient verbalized understanding to use call light in request assistance for needs, ambulation, and toileting.     Problem: Pain - Standard  Goal: Alleviation of pain or a reduction in pain to the patient’s comfort goal  Outcome: Progressing  Patient verbalized understanding to alert staff when in pain and to participate in his pain management regimen.    The patient is Stable - Low risk of patient condition declining or worsening    Shift Goals  Clinical Goals: Safety, sleep  Patient Goals: Sleep  Family Goals: increased pt strength, independence

## 2023-12-10 NOTE — THERAPY
"Physical Therapy   Daily Treatment     Patient Name: Jason Lima  Age:  62 y.o., Sex:  male  Medical Record #: [id number]  Today's Date: 12/10/2023     Precautions  Precautions: Fall Risk  Comments: Left Hemiparesis, left visual inattention    Subjective    Pt requested to \"work on my balance and walking\" vs pool this session    Per pt request gait training completed      Objective       12/10/23 1331   PT Charge Group   PT Gait Training (Units) 2   PT Neuromuscular Re-Education / Balance (Units) 2   Supervising Physical Therapist Cyndie Talley   PT Total Time Spent   PT Individual Total Time Spent (Mins) 60   Cognition    Safety Awareness Impaired;Impulsive   New Learning Impaired   Attention Impaired   Sequencing Impaired   Comments pt sated he does not need a seatbelt in the wc because he can \"balance\" himself   Gait Functional Level of Assist    Gait Level Of Assist Total Assist X 2  (max/total A at pelvis, facilitation + VC for ws to the R, max/total A for L LE advancement and L knee blocking in stance, wc follow for safety, mirror for visual FB)   Assistive Device Other (Comments)  (R hallway HR, L AFO)   Distance (Feet) 30   # of Times Distance was Traveled 3   Deviation Decreased Heel Strike;Decreased Toe Off;Bradykinetic;Step To   Transfer Functional Level of Assist   Bed, Chair, Wheelchair Transfer Moderate Assist  (nustep see note)   Bed Chair Wheelchair Transfer Description Increased time;Initial preparation for task;Supervision for safety;Verbal cueing;Set-up of equipment;Assist with one limb   Sitting Lower Body Exercises   Nustep Resistance Level 5;Time (See Comments)  (10 minutes, B LE/UE with L UE/LE assist from therapist for neutral hip and hand , max vc for attention to L side and sitting/midline orientation)   Bed Mobility    Sit to Stand Minimal Assist   Neuro-Muscular Treatments   Neuro-Muscular Treatments Anterior weight shift;Sequencing;Verbal Cuing;Postural Facilitation   Comments nustep " "used as pre gait and primig for amb, SQPT training using teach back method(wc <> nustep), pt asked to recount steps as he performed them, ultimately requiring > 50% vc for sequencing and set up. pt required light [facility] wc> stepper and modA stepper > wc due to impulsivity/dec safety. gait training as noted above in flow sheet   Interdisciplinary Plan of Care Collaboration   IDT Collaboration with  Family / Caregiver   Patient Position at End of Therapy Seated;Self Releasing Lap Belt Applied;Family / Friend in Room;Call Light within Reach   Collaboration Comments SP present for PT, wc follow         Assessment    Pt cont to present with significantly impaired safety awareness, requiring > 50% vc for sequencing with SQPT. Pt did recall head/hip principle, wc positioning was Fair and he recalled removal of arm rest. pt locked R side only of wc and did not move his L LE from foot rest. Recommended SO purchase wc seatbelt. at conclusion of session therapist asked pt to locate the seatbelt on the R side of his wc and he responded with \"I dont need the seatbelt, I can balance myself\"   Pt requires extensive assist to amb with hallway HR due to pushing/poor midline orientation and L hemiparesis. Attempted PFWW however unable due to therapist safety concerns  Strengths: Able to follow instructions, Independent prior level of function, Motivated for self care and independence, Pleasant and cooperative, Supportive family, Willingly participates in therapeutic activities  Barriers: Decreased endurance, Hemiparesis, Difficulty following instructions, Fatigue, Hypertension, Impaired activity tolerance, Impaired balance, Impaired insight/denial of deficits, Impaired functional cognition, Impaired sleep pattern, Impulsive, Limited mobility, Visual impairment, Poor family support (lives alone)    Plan    Aquatic therapy as possible  Continue prism trial, STS + midline control vs NMES to LLE quad/glut in standing vs NuStep for pregait " "training/priming  TM training  Continue LPRW training, wean from rail for righting responses   F/U on AFO needs     DME  PT DME Recommendations  Wheelchair: 18\" Width  Cushion: Specialty (See other comments) (TBD pending ambulation progress)  Assistive Device: Tanvir Walker (TBD pending progress, currently 2 person assist for gait)  Additional Equipment: Other (Comments)    Passport items to be completed:  Get in/out of bed safely, in/out of a vehicle, safely use mobility device, walk or wheel around home/community, navigate up and down stairs, show how to get up/down from the ground, ensure home is accessible, demonstrate HEP, complete caregiver training    Physical Therapy Problems (Active)       Problem: Mobility       Dates: Start:  11/13/23         Goal: STG-Within one week, patient will ambulate distances >/= 30' c/ RHR and ModA or better.        Dates: Start:  11/13/23         Goal Note filed on 11/22/23 1102 by Awilda Dela Cruz, MSPT       Limited to 10ft at right hallway rail mod assist                 Problem: PT-Long Term Goals       Dates: Start:  11/13/23         Goal: LTG-By discharge, patient will propel wheelchair >/= 300' at [place] or better.        Dates: Start:  11/13/23            Goal: LTG-By discharge, patient will ambulate >/= 50' c/ LRAD at [place] or better.        Dates: Start:  11/13/23            Goal: LTG-By discharge, patient will transfer one surface to another c/ [place] or better.        Dates: Start:  11/13/23            Goal: LTG-By discharge, patient will ambulate up/down 1 step c/ LRAD at [place] or better.        Dates: Start:  11/13/23            Goal: LTG-By discharge, patient will transfer in/out of a car c/ ModA or better.        Dates: Start:  11/13/23              "

## 2023-12-10 NOTE — PROGRESS NOTES
NURSING DAILY NOTE    Name: Jason Lima   Date of Admission: 11/12/2023   Admitting Diagnosis: Thalamic hemorrhage (HCC)  Attending Physician: Lisa Lee D.o.  Allergies: Penicillins    Safety  Patient Assist  Max x 2  Patient Precautions  Fall Risk  Precaution Comments  Left Hemiparesis, left visual inattention  Bed Transfer Status  Maximal Assist (Min A toward right/ reach pivot. Max A to left SQPT.)  Toilet Transfer Status   Total Assist X 2 (Mod/ max SPT to right, 2 person to the left, with grab bar. Total assist pivoting left foot)  Assistive Devices  Rails, Wheelchair  Oxygen  CPAP  Diet/Therapeutic Dining  Current Diet Order   Procedures    Diet Order Diet: Consistent CHO (Diabetic) (2 gram sodium restriction; medications whole with thin liquids); Second Modifier: (optional): 2 Gram Sodium     Pill Administration  whole  Agitated Behavioral Scale  14  ABS Level of Severity  No Agitation    Fall Risk  Has the patient had a fall this admission?   Yes  Shellie Hamilton Fall Risk Scoring  23, HIGH RISK  Fall Risk Safety Measures  bed alarm and poor balance    Vitals  Temperature: 36.1 °C (97 °F)  Temp src: Oral  Pulse: 94  Respiration: 18  Blood Pressure: 137/80  Blood Pressure MAP (Calculated): 99 MM HG  BP Location: Right, Upper Arm  Patient BP Position: Supine     Oxygen  Pulse Oximetry: 96 %  O2 (LPM): 0  FiO2%: 21 %  O2 Delivery Device: CPAP    Bowel and Bladder  Last Bowel Movement  12/08/23 (per patient)  Stool Type  Type 4: Like a sausage or snake, smooth and soft  Bowel Device  Bathroom, Other (Comment)  Continent  Bladder: Continent void   Bowel: Continent movement  Bladder Function  Urine Void (mL): 500 ml  Number of Times Voided: 1  Urinary Options: Yes  Urine Color: Unable To Evaluate  Number of Times Incontinent of Urine: 0  Genitourinary Assessment   Bladder Assessment (WDL):  WDL Except  Echols Catheter: Not Applicable  Urine Color:  Unable To Evaluate  Number of Bladder Accidents: 0  Total Number of Bladder of Accidents in Last 7 Days: 0  Number of Times Incontinent of Urine: 0  Bladder Device: Urinal  Time Void: No  Bladder Scan: Post Void  $ Bladder Scan Results (mL): 26    Skin  Polo Score   15  Sensory Interventions   Bed Types: Standard/Trauma Mattress  Skin Preventative Measures: Pillows in Use for Support / Positioning  Moisture Interventions  Moisturizers/Barriers: Barrier Cream      Pain  Pain Rating Scale  0 - No Pain  Pain Location  Foot  Pain Location Orientation  Right, Left  Pain Interventions   Declines    ADLs    Bathing   Patient Refused Bathing (too tired)  Linen Change   Complete  Personal Hygiene  Change Deja Pads, Moist Deja Wipes, Perineal Care  Chlorhexidine Bath      Oral Care  Brushed Teeth  Teeth/Dentures     Shave  Self  Nutrition Percentage Eaten  *  * Meal *  *, Lunch, Between % Consumed  Environmental Precautions  Treaded Slipper Socks on Patient, Bed in Low Position, Personal Belongings, Wastebasket, Call Bell etc. in Easy Reach  Patient Turns/Positioning  Patient Turns Self from Side to Side  Patient Turns Assistance/Tolerance  Assistance of One  Bed Positions  Bed Controls On, Bed Locked  Head of Bed Elevated  Self regulated      Psychosocial/Neurologic Assessment  Psychosocial Assessment  Psychosocial (WDL):  Within Defined Limits  Patient Behaviors: Fatigue  Neurologic Assessment  Neuro (WDL): Exceptions to WDL  Level of Consciousness: Alert  Orientation Level: Oriented X4  Cognition: Follows commands  Speech: Clear, Slurred  Facial Symmetry: Left facial drooping  Pupil Assesment: Yes  R Pupil Size (mm): 3  R Pupil Shape / Description: Round  R Pupil Reaction: Brisk  L Pupil Size (mm): 3  L Pupil Shape / Description: Round  L Pupil Reaction: Brisk  Reflexes: Cough  Cough Reflex: Present  Motor Function/Sensation Assessment: Dorsiflexion, Motor response, Sensation, Motor strength  R Foot Dorsiflexion:  Strong  L Foot Dorsiflexion: Absent  RUE Motor Response: Responds to commands  RUE Sensation: Full sensation  Muscle Strength Right Arm: Normal Strength Against Gravity and Full Resistance  LUE Motor Response: Flaccid  LUE Sensation: Tingling, Numbness  Muscle Strength Left Arm: Weak Movement but Not Against Gravity or Resistance  RLE Motor Response: Responds to commands  RLE Sensation: Full sensation  Muscle Strength Right Leg: Good Strength Against Gravity and Moderate Resistance  LLE Motor Response: Flaccid  LLE Sensation: Tingling, Numbness  Muscle Strength Left Leg: Weak Movement but Not Against Gravity or Resistance  EENT (WDL):  WDL Except    Cardio/Pulmonary Assessment  Edema   RLE Edema: Trace  LLE Edema: Trace  Respiratory Breath Sounds  RUL Breath Sounds: Clear  RML Breath Sounds: Clear  RLL Breath Sounds: Diminished  MOHAMUD Breath Sounds: Clear  LLL Breath Sounds: Diminished  Cardiac Assessment   Cardiac (WDL):  WDL Except (Hx of HTN, HLD)

## 2023-12-10 NOTE — THERAPY
"Occupational Therapy  Daily Treatment     Patient Name: Jason Lima  Age:  62 y.o., Sex:  male  Medical Record #: [id number]  Today's Date: 12/10/2023     Precautions  Precautions: Fall Risk  Comments: Left Hemiparesis, left visual inattention         Subjective  Patient seated in w/c upon arrival, agreeable to OT session. Patient declined use of NMES, \"I don't need the electrical stimulation\", requested to use MOTOmed.     \"This is interesting\" in regards to mirrorbox.     Objective     12/10/23 1031   OT Charge Group   OT Neuromuscular Re-education / Balance (Units) 4   OT Total Time Spent   OT Individual Total Time Spent (Mins) 60   Sitting Upper Body Exercises   Comments MOTOMed UE 5 minutes for reciprocal contraction with tactile and visual cues for midline   Neuro-Muscular Treatments   Neuro-Muscular Treatments Sensory Stimuli;Other (See Comments)  (Mirror therapy, see below for details)   Sitting Lower Body Exercises   Comments MOTOMed LE cycle 10 minutes forward for reciprocal contraction with tactile and visual cues for midline   Interdisciplinary Plan of Care Collaboration   IDT Collaboration with  Family / Caregiver   Patient Position at End of Therapy Seated;Family / Friend in Room  (handoff to SO for lunch time)   Collaboration Comments Pt's SO present and engaged throughout session     Patient engaged in mirror-box therapy for neuro re-ed to [facility]. Patient provided with handout and verbal education re: mirror box therapy. Patient was encouraged by mirror therapy session with focus on orientation to mirror box with good return demo of hand/digit/wrist exercises to complete with mirror box: -ab/adduction -digit isolation -finger flexion/extension -wrist pronation/supination -wrist flexion/extension    Assessment  Patient had good tolerance to session with focus on neuro re-ed for L side. He was encouraged by his participation in mirror therapy and is willing to continue to use mirror therapy in future " sessions.   Strengths: Able to follow instructions, Independent prior level of function, Motivated for self care and independence, Pleasant and cooperative, Supportive family  Barriers: Decreased endurance, Fatigue, Generalized weakness, Hemiparesis, Impaired activity tolerance, Impaired balance, Limited mobility, Spasticity    Plan  Cont ADLs, functional transfers, neuro re-edu, and thera act/ex to maximize functional recovery for safe DC home.        DME  OT DME Recommendations  Bathroom Equipment: 3 in 1 Commode  Additional Equipment: Other (Comments)    Passport items to be completed:  Perform bathroom transfers, complete dressing, complete feeding, get ready for the day, prepare a simple meal, participate in household tasks, adapt home for safety needs, demonstrate home exercise program, complete caregiver training     Occupational Therapy Goals (Active)       Problem: Bathing       Dates: Start:  12/06/23         Goal: STG-Within one week, patient will bathe at [facility] using LRD       Dates: Start:  12/06/23               Problem: Dressing       Dates: Start:  11/22/23         Goal: STG-Within one week, patient will dress LB at [facility] using LRD       Dates: Start:  11/22/23            Goal: STG-Within one week, patient will dress [facility] using LRD       Dates: Start:  12/06/23               Problem: Functional Transfers       Dates: Start:  12/06/23         Goal: STG-Within one week, patient will transfer to toilet at [facility] to [facility] using LRD       Dates: Start:  12/06/23            Goal: STG-Within one week, patient will transfer to step in shower at [facility] to [facility] using LRD       Dates: Start:  12/06/23               Problem: OT Long Term Goals       Dates: Start:  11/13/23         Goal: LTG-By discharge, patient will complete basic self care tasks at Mod Independent level.       Dates: Start:  11/13/23            Goal: LTG-By discharge, patient will perform bathroom transfers at Mod Independent level.       Dates:  Start:  11/13/23

## 2023-12-11 ENCOUNTER — APPOINTMENT (OUTPATIENT)
Dept: PHYSICAL THERAPY | Facility: REHABILITATION | Age: 63
DRG: 057 | End: 2023-12-11
Attending: PHYSICAL MEDICINE & REHABILITATION
Payer: COMMERCIAL

## 2023-12-11 ENCOUNTER — APPOINTMENT (OUTPATIENT)
Dept: OCCUPATIONAL THERAPY | Facility: REHABILITATION | Age: 63
DRG: 057 | End: 2023-12-11
Attending: HOSPITALIST
Payer: COMMERCIAL

## 2023-12-11 ENCOUNTER — APPOINTMENT (OUTPATIENT)
Dept: OCCUPATIONAL THERAPY | Facility: REHABILITATION | Age: 63
DRG: 057 | End: 2023-12-11
Attending: PHYSICAL MEDICINE & REHABILITATION
Payer: COMMERCIAL

## 2023-12-11 LAB
ANION GAP SERPL CALC-SCNC: 10 MMOL/L (ref 7–16)
BASOPHILS # BLD AUTO: 0.6 % (ref 0–1.8)
BASOPHILS # BLD: 0.03 K/UL (ref 0–0.12)
BUN SERPL-MCNC: 38 MG/DL (ref 8–22)
CALCIUM SERPL-MCNC: 9 MG/DL (ref 8.5–10.5)
CHLORIDE SERPL-SCNC: 103 MMOL/L (ref 96–112)
CO2 SERPL-SCNC: 26 MMOL/L (ref 20–33)
CREAT SERPL-MCNC: 3.04 MG/DL (ref 0.5–1.4)
EOSINOPHIL # BLD AUTO: 0.15 K/UL (ref 0–0.51)
EOSINOPHIL NFR BLD: 2.8 % (ref 0–6.9)
ERYTHROCYTE [DISTWIDTH] IN BLOOD BY AUTOMATED COUNT: 40.9 FL (ref 35.9–50)
GFR SERPLBLD CREATININE-BSD FMLA CKD-EPI: 22 ML/MIN/1.73 M 2
GLUCOSE SERPL-MCNC: 132 MG/DL (ref 65–99)
HCT VFR BLD AUTO: 34.9 % (ref 42–52)
HGB BLD-MCNC: 11.7 G/DL (ref 14–18)
IMM GRANULOCYTES # BLD AUTO: 0.01 K/UL (ref 0–0.11)
IMM GRANULOCYTES NFR BLD AUTO: 0.2 % (ref 0–0.9)
LYMPHOCYTES # BLD AUTO: 1.77 K/UL (ref 1–4.8)
LYMPHOCYTES NFR BLD: 33 % (ref 22–41)
MCH RBC QN AUTO: 30.5 PG (ref 27–33)
MCHC RBC AUTO-ENTMCNC: 33.5 G/DL (ref 32.3–36.5)
MCV RBC AUTO: 91.1 FL (ref 81.4–97.8)
MONOCYTES # BLD AUTO: 0.4 K/UL (ref 0–0.85)
MONOCYTES NFR BLD AUTO: 7.5 % (ref 0–13.4)
NEUTROPHILS # BLD AUTO: 3 K/UL (ref 1.82–7.42)
NEUTROPHILS NFR BLD: 55.9 % (ref 44–72)
NRBC # BLD AUTO: 0 K/UL
NRBC BLD-RTO: 0 /100 WBC (ref 0–0.2)
PLATELET # BLD AUTO: 198 K/UL (ref 164–446)
PMV BLD AUTO: 10.2 FL (ref 9–12.9)
POTASSIUM SERPL-SCNC: 3.6 MMOL/L (ref 3.6–5.5)
RBC # BLD AUTO: 3.83 M/UL (ref 4.7–6.1)
SODIUM SERPL-SCNC: 139 MMOL/L (ref 135–145)
WBC # BLD AUTO: 5.4 K/UL (ref 4.8–10.8)

## 2023-12-11 PROCEDURE — 85025 COMPLETE CBC W/AUTO DIFF WBC: CPT

## 2023-12-11 PROCEDURE — 99232 SBSQ HOSP IP/OBS MODERATE 35: CPT | Performed by: PHYSICAL MEDICINE & REHABILITATION

## 2023-12-11 PROCEDURE — 700102 HCHG RX REV CODE 250 W/ 637 OVERRIDE(OP): Performed by: STUDENT IN AN ORGANIZED HEALTH CARE EDUCATION/TRAINING PROGRAM

## 2023-12-11 PROCEDURE — 97110 THERAPEUTIC EXERCISES: CPT | Mod: CO

## 2023-12-11 PROCEDURE — 770010 HCHG ROOM/CARE - REHAB SEMI PRIVAT*

## 2023-12-11 PROCEDURE — 80048 BASIC METABOLIC PNL TOTAL CA: CPT

## 2023-12-11 PROCEDURE — 700102 HCHG RX REV CODE 250 W/ 637 OVERRIDE(OP): Performed by: PHYSICAL MEDICINE & REHABILITATION

## 2023-12-11 PROCEDURE — A9270 NON-COVERED ITEM OR SERVICE: HCPCS | Performed by: PHYSICAL MEDICINE & REHABILITATION

## 2023-12-11 PROCEDURE — 700102 HCHG RX REV CODE 250 W/ 637 OVERRIDE(OP): Performed by: HOSPITALIST

## 2023-12-11 PROCEDURE — 97116 GAIT TRAINING THERAPY: CPT

## 2023-12-11 PROCEDURE — 36415 COLL VENOUS BLD VENIPUNCTURE: CPT

## 2023-12-11 PROCEDURE — 97112 NEUROMUSCULAR REEDUCATION: CPT

## 2023-12-11 PROCEDURE — A9270 NON-COVERED ITEM OR SERVICE: HCPCS | Performed by: STUDENT IN AN ORGANIZED HEALTH CARE EDUCATION/TRAINING PROGRAM

## 2023-12-11 PROCEDURE — 97535 SELF CARE MNGMENT TRAINING: CPT | Mod: CO

## 2023-12-11 PROCEDURE — A9270 NON-COVERED ITEM OR SERVICE: HCPCS | Performed by: HOSPITALIST

## 2023-12-11 PROCEDURE — 97112 NEUROMUSCULAR REEDUCATION: CPT | Mod: CO

## 2023-12-11 PROCEDURE — 97110 THERAPEUTIC EXERCISES: CPT

## 2023-12-11 RX ADMIN — BACLOFEN 10 MG: 10 TABLET ORAL at 21:44

## 2023-12-11 RX ADMIN — SERTRALINE 50 MG: 50 TABLET, FILM COATED ORAL at 08:16

## 2023-12-11 RX ADMIN — AMLODIPINE BESYLATE 10 MG: 5 TABLET ORAL at 05:49

## 2023-12-11 RX ADMIN — HYDRALAZINE HYDROCHLORIDE 100 MG: 50 TABLET, FILM COATED ORAL at 15:44

## 2023-12-11 RX ADMIN — ATORVASTATIN CALCIUM 40 MG: 40 TABLET, FILM COATED ORAL at 21:44

## 2023-12-11 RX ADMIN — BACLOFEN 10 MG: 10 TABLET ORAL at 08:16

## 2023-12-11 RX ADMIN — APIXABAN 5 MG: 5 TABLET, FILM COATED ORAL at 21:43

## 2023-12-11 RX ADMIN — OMEPRAZOLE 20 MG: 20 CAPSULE, DELAYED RELEASE ORAL at 08:16

## 2023-12-11 RX ADMIN — APIXABAN 5 MG: 5 TABLET, FILM COATED ORAL at 08:17

## 2023-12-11 RX ADMIN — HYDRALAZINE HYDROCHLORIDE 100 MG: 50 TABLET, FILM COATED ORAL at 21:43

## 2023-12-11 RX ADMIN — BACLOFEN 10 MG: 10 TABLET ORAL at 15:44

## 2023-12-11 RX ADMIN — TRAZODONE HYDROCHLORIDE 75 MG: 50 TABLET ORAL at 21:43

## 2023-12-11 RX ADMIN — HYDRALAZINE HYDROCHLORIDE 100 MG: 50 TABLET, FILM COATED ORAL at 05:48

## 2023-12-11 RX ADMIN — SENNOSIDES AND DOCUSATE SODIUM 2 TABLET: 50; 8.6 TABLET ORAL at 08:16

## 2023-12-11 ASSESSMENT — GAIT ASSESSMENTS
ASSISTIVE DEVICE: OTHER (COMMENTS)
GAIT LEVEL OF ASSIST: TOTAL ASSIST
DISTANCE (FEET): 50
DEVIATION: SHUFFLED GAIT;DECREASED HEEL STRIKE;DECREASED TOE OFF;STEP TO

## 2023-12-11 ASSESSMENT — ACTIVITIES OF DAILY LIVING (ADL)
BED_CHAIR_WHEELCHAIR_TRANSFER_DESCRIPTION: INCREASED TIME;INITIAL PREPARATION FOR TASK;SET-UP OF EQUIPMENT;SQUAT PIVOT TRANSFER TO WHEELCHAIR;VERBAL CUEING

## 2023-12-11 NOTE — PROGRESS NOTES
NURSING DAILY NOTE    Name: Jason Lima   Date of Admission: 11/12/2023   Admitting Diagnosis: Thalamic hemorrhage (HCC)  Attending Physician: Lisa Lee D.o.  Allergies: Penicillins    Safety  Patient Assist  Max x 2  Patient Precautions  Fall Risk  Precaution Comments  Left Hemiparesis, left visual inattention  Bed Transfer Status  Moderate Assist (nustep see note)  Toilet Transfer Status   Total Assist X 2 (Mod/ max SPT to right, 2 person to the left, with grab bar. Total assist pivoting left foot)  Assistive Devices  Rails, Wheelchair, Wheelchair push  Oxygen  None - Room Air  Diet/Therapeutic Dining  Current Diet Order   Procedures    Diet Order Diet: Consistent CHO (Diabetic) (2 gram sodium restriction; medications whole with thin liquids); Second Modifier: (optional): 2 Gram Sodium     Pill Administration  whole  Agitated Behavioral Scale  14  ABS Level of Severity  No Agitation    Fall Risk  Has the patient had a fall this admission?   Yes  Shellie Hamilton Fall Risk Scoring  23, HIGH RISK  Fall Risk Safety Measures  bed alarm, chair alarm, seatbelt alarm, poor balance, and low vision/ hearing    Vitals  Temperature: 36.7 °C (98 °F)  Temp src: Oral  Pulse: 86  Respiration: 16  Blood Pressure: (!) 158/93  Blood Pressure MAP (Calculated): 115 MM HG  BP Location: Right, Upper Arm  Patient BP Position: Sitting     Oxygen  Pulse Oximetry: 94 %  O2 (LPM): 0  FiO2%: 21 %  O2 Delivery Device: None - Room Air    Bowel and Bladder  Last Bowel Movement  12/08/23 (per patient)  Stool Type  Type 4: Like a sausage or snake, smooth and soft  Bowel Device  Bathroom, Other (Comment)  Continent  Bladder: Continent void   Bowel: Continent movement  Bladder Function  Urine Void (mL): 450 ml  Number of Times Voided: 1  Urinary Options: Yes  Urine Color: Yellow  Number of Times Incontinent of Urine: 0  Genitourinary Assessment   Bladder Assessment (WDL):  WDL  Except  Echols Catheter: Not Applicable  Urine Color: Yellow  Number of Bladder Accidents: 0  Total Number of Bladder of Accidents in Last 7 Days: 0  Number of Times Incontinent of Urine: 0  Bladder Device: Urinal  Time Void: No  Bladder Scan: Post Void  $ Bladder Scan Results (mL): 26    Skin  Polo Score   15  Sensory Interventions   Bed Types: Standard/Trauma Mattress with Overlay  Skin Preventative Measures: Pillows in Use to Float Heels, Pillows in Use for Support / Positioning  Moisture Interventions  Moisturizers/Barriers: Barrier Cream, Barrier Wipes      Pain  Pain Rating Scale  0 - No Pain  Pain Location  Foot  Pain Location Orientation  Right, Left  Pain Interventions   Declines    ADLs    Bathing   Patient Refused Bathing (too tired)  Linen Change   Complete  Personal Hygiene  Change Deja Pads, Moist Deja Wipes, Perineal Care  Chlorhexidine Bath      Oral Care  Brushed Teeth  Teeth/Dentures     Shave  Self  Nutrition Percentage Eaten  *  * Meal *  *, Lunch, Between % Consumed  Environmental Precautions  Treaded Slipper Socks on Patient, Bed in Low Position, Personal Belongings, Wastebasket, Call Bell etc. in Easy Reach  Patient Turns/Positioning  Patient Turns Self from Side to Side, Reposition - LUE, Reposition - LLE  Patient Turns Assistance/Tolerance  Assistance of Two or More, Assistance of One  Bed Positions  Bed Controls On, Bed Locked  Head of Bed Elevated  Self regulated      Psychosocial/Neurologic Assessment  Psychosocial Assessment  Psychosocial (WDL):  Within Defined Limits  Patient Behaviors: Fatigue  Neurologic Assessment  Neuro (WDL): Exceptions to WDL  Level of Consciousness: Alert  Orientation Level: Oriented X4  Cognition: Follows commands  Speech: Clear, Slurred  Facial Symmetry: Left facial drooping  Pupil Assesment: Yes  R Pupil Size (mm): 3  R Pupil Shape / Description: Round  R Pupil Reaction: Brisk  L Pupil Size (mm): 3  L Pupil Shape / Description: Round  L Pupil Reaction:  Brisk  Reflexes: Cough  Cough Reflex: Present  Motor Function/Sensation Assessment: Dorsiflexion, Motor response  R Foot Dorsiflexion: Strong  L Foot Dorsiflexion: Absent  RUE Motor Response: Responds to commands  RUE Sensation: Full sensation  Muscle Strength Right Arm: Normal Strength Against Gravity and Full Resistance  LUE Motor Response: Flaccid  LUE Sensation: Numbness, Tingling  Muscle Strength Left Arm: Weak Movement but Not Against Gravity or Resistance  RLE Motor Response: Responds to commands  RLE Sensation: Full sensation  Muscle Strength Right Leg: Good Strength Against Gravity and Moderate Resistance  LLE Motor Response: Flaccid  LLE Sensation: Numbness, Tingling  Muscle Strength Left Leg: Weak Movement but Not Against Gravity or Resistance  EENT (WDL):  WDL Except    Cardio/Pulmonary Assessment  Edema   RLE Edema: Trace  LLE Edema: Trace  Respiratory Breath Sounds  RUL Breath Sounds: Clear  RML Breath Sounds: Clear  RLL Breath Sounds: Diminished  MOHAMUD Breath Sounds: Clear  LLL Breath Sounds: Diminished  Cardiac Assessment   Cardiac (WDL):  WDL Except (Hx of HTN, HLD)

## 2023-12-11 NOTE — THERAPY
"Physical Therapy   Daily Treatment     Patient Name: Jason Lima  Age:  62 y.o., Sex:  male  Medical Record #: [id number]  Today's Date: 12/11/2023     Precautions  Precautions: Fall Risk  Comments: Left Hemiparesis, left visual inattention    Subjective    \"I want to work on getting my legs strong.\"     Objective       12/11/23 1001   PT Charge Group   PT Gait Training (Units) 2   PT Neuromuscular Re-Education / Balance (Units) 4   PT Total Time Spent   PT Individual Total Time Spent (Mins) 90   Gait Functional Level of Assist    Gait Level Of Assist Total Assist  (MaxA x 1, Min<>ModA from 2nd person for gait without device)   Assistive Device Other (Comments)  (L AFO, LUE support only, gait belt and facilitation at pelvis, LLE)   Distance (Feet) 50   # of Times Distance was Traveled 1  (2 x 30')   Deviation Shuffled Gait;Decreased Heel Strike;Decreased Toe Off;Step To  (help for advancement and stance stability at LLE, WS to RLE, cues for reciprocal step length, timing, and midline postural control)   Transfer Functional Level of Assist   Bed, Chair, Wheelchair Transfer Moderate Assist  (stand step w/c<>bed; Renata for lateral scoot toward hemiside)   Bed Chair Wheelchair Transfer Description Increased time;Initial preparation for task;Set-up of equipment;Squat pivot transfer to wheelchair;Verbal cueing   Neuro-Muscular Treatments   Neuro-Muscular Treatments Biofeedback;Anterior weight shift;Co-Contraction;Compensatory Strategies;Facilitation;Postural Facilitation;Postural Changes;Sensory Stimuli;Sequencing;Tactile Cuing;Taping;Verbal Cuing;Tapping   Comments mirror gor visual FB throughout standing activities; see note for tx details   Interdisciplinary Plan of Care Collaboration   IDT Collaboration with  Family / Caregiver;Therapy Tech   Patient Position at End of Therapy Seated;Chair Alarm On;Self Releasing Lap Belt Applied;Other (Comments);Family / Friend in Room  (in dining room for lunch)     Visual " "re-screen:  Visual midline: noted to be at nose, holds head c/ L tilt and slight L rotation from midline  Cover/uncover: + exotropia bilaterally  Tracking: significant limitations in all planes R eye, c/ saccadic intrusions in horizontal, very limited AROM in diagonal and vertical planes; AROM WFL c/ exception of vertical plane on L eye with saccadic intrusions and decreased smoothness of movement (pt report \"I see the pen much better on this side.\")  Confrontation testing: WFL L; Impaired R to superior/inferior nasal fields; with double simultaneous stimulation, tendency toward loss of superior/inferior nasal fields, performance improved with weighting of nasal field and repeated testing.   Trialed standing midline control c/ L >R prisms, R eye patch, and L eye patch: noted significant improved ability to achieve and maintain midline c/ R eye patched and glasses donned.  NMRE: with variable visual conditions; for midline postural control, facilitated L > R WS, postural self correction, and trunk mm facilitation and coordination; mirror throughout for visual cue to midline; manual blocking and facilitation at L knee and hip for knee extension, hip extension, cues for neutral pelvic rotation in transverse plane  STS w/o UE support 1 x 10 c/ static hold at top of movement in midline  Standing WS to targets: R, midline, vertical, and posterior 2 x 5-10 each plane, c/ hand or ball for visual/tactile cue  Standing unilateral reaches c/ RUE vertical, horizontal, and c/ LUE using elbow and shoulder taps to ball x 10 each   Standing slow ball touches c/ RUE and toe taps on RLE c/ cues to regain midline between each rep  Gait training c/ 2 person assist, no UE support: gait belt and tactile cues on R for WS and advancement of weight over RLE in stance; maxA for stance stability on L, swing, and facilitation of WS to each limb in stance, finding midline; pt cued to verbalize each step to support sequencing and attention to " task, distances as above; mirror for targeting and visual midline; AFO to L foot (Xtern), initial shoe lift to RLE discontinued due to increased pushing to L in stance when donned; gait performed c/ R eye patched.     SPT <> w/c<>mat btw therapy activities, STS <> w/c btw gait and standing activities    Assessment    Jason c/ variable midline control in standing. With static standing, mirror and R eye patched, able to spontaneously correct to midline without PT cues. Continues to exhibit minimal functional WS to RLE in stance with max cues, but did show improvement with cues to self verbalize steps and tactile cues to R side during 3rd gait effort. Exhibits bilateral medial rectus weakness c/ + exotropia, poor AROM and tracking in R eye, decreased visual perception in R eye nasal fields with simultaneous stimulation which may be contributing toward poor midline awareness in standing. Greater awareness of L lean c/ L prisms donned, but best ability to self correct to midline c/ R eye patched.     Strengths: Able to follow instructions, Independent prior level of function, Motivated for self care and independence, Pleasant and cooperative, Supportive family, Willingly participates in therapeutic activities  Barriers: Decreased endurance, Hemiparesis, Difficulty following instructions, Fatigue, Hypertension, Impaired activity tolerance, Impaired balance, Impaired insight/denial of deficits, Impaired functional cognition, Impaired sleep pattern, Impulsive, Limited mobility, Visual impairment, Poor family support (lives alone)    Plan  KAYLYN for gait cues, continue gait c/ LUE support only, trunk facilitation; eliminate RUE support as able c/ gait to suppress pushing   Aquatic therapy as possible  Continue prism vs alternating patching trial for midline control. STS + midline control vs NMES to LLE quad/glut in standing vs NuStep for pregait training/priming  TM training  Continue LPRW training, wean from rail for  "righting responses   F/U on AFO needs     DME  PT DME Recommendations  Wheelchair: 18\" Width  Cushion: Specialty (See other comments) (TBD pending ambulation progress)  Assistive Device: Tanvir Walker (TBD pending progress, currently 2 person assist for gait)  Additional Equipment: Other (Comments)    Passport items to be completed:  Get in/out of bed safely, in/out of a vehicle, safely use mobility device, walk or wheel around home/community, navigate up and down stairs, show how to get up/down from the ground, ensure home is accessible, demonstrate HEP, complete caregiver training    Physical Therapy Problems (Active)       Problem: Mobility       Dates: Start:  11/13/23         Goal: STG-Within one week, patient will ambulate distances >/= 30' c/ RHR and ModA or better.        Dates: Start:  11/13/23         Goal Note filed on 11/22/23 1102 by Awilda Dela Cruz, MSPT       Limited to 10ft at right hallway rail mod assist                 Problem: PT-Long Term Goals       Dates: Start:  11/13/23         Goal: LTG-By discharge, patient will propel wheelchair >/= 300' at [place] or better.        Dates: Start:  11/13/23            Goal: LTG-By discharge, patient will ambulate >/= 50' c/ LRAD at [place] or better.        Dates: Start:  11/13/23            Goal: LTG-By discharge, patient will transfer one surface to another c/ Renata or better.        Dates: Start:  11/13/23            Goal: LTG-By discharge, patient will ambulate up/down 1 step c/ LRAD at [place] or better.        Dates: Start:  11/13/23            Goal: LTG-By discharge, patient will transfer in/out of a car c/ ModA or better.        Dates: Start:  11/13/23              "

## 2023-12-11 NOTE — CARE PLAN
Problem: Fall Risk - Rehab  Goal: Patient will remain free from falls  Outcome: Progressing  Patient calls for assist with all transfers. No unsafe behaviors noted.

## 2023-12-11 NOTE — THERAPY
Physical Therapy   Daily Treatment     Patient Name: Jason Lima  Age:  62 y.o., Sex:  male  Medical Record #: [id number]  Today's Date: 12/11/2023     Precautions  Precautions: Fall Risk  Comments: Left Hemiparesis, left visual inattention    Subjective    Patient asking for exercises he can work on on his own     Objective       12/11/23 0831   PT Charge Group   PT Therapeutic Exercise (Units) 1   PT Neuromuscular Re-Education / Balance (Units) 1   PT Total Time Spent   PT Individual Total Time Spent (Mins) 30   Precautions   Precautions Fall Risk   Comments Left Hemiparesis, left visual inattention   Sitting Lower Body Exercises   Sitting Lower Body Exercises   (encouraged for HEP)   Ankle Pumps 1 set of 10;Bilateral   Hip Abduction 1 set of 10;Bilateral   Hip Adduction 1 set of 10;Bilateral   Long Arc Quad 1 set of 10;Bilateral   Marching 1 set of 10   Hamstring Curl 1 set of 10;Bilateral   Standing Lower Body Exercises   Other Exercises weightshifting 5x 3 in standing to practice midline, 5 x 3 squats to address balance with STS   Bed Mobility    Sit to Stand   (5 trials to promote using bilateral LE, avoid right UE push/pulls)   Interdisciplinary Plan of Care Collaboration   IDT Collaboration with  Physical Therapist   Collaboration Comments treatment planning         Assessment    Able to demonstrate 3/5 trials on STS without UE support, requires max cues for midline throughout standing tasks    Strengths: Able to follow instructions, Independent prior level of function, Motivated for self care and independence, Pleasant and cooperative, Supportive family, Willingly participates in therapeutic activities  Barriers: Decreased endurance, Hemiparesis, Difficulty following instructions, Fatigue, Hypertension, Impaired activity tolerance, Impaired balance, Impaired insight/denial of deficits, Impaired functional cognition, Impaired sleep pattern, Impulsive, Limited mobility, Visual impairment, Poor family support  "(lives alone)    Plan    Aquatic therapy as possible  Continue prism trial, STS + midline control vs NMES to LLE quad/glut in standing vs NuStep for pregait training/priming  TM training  Continue LPRW training, wean from rail for righting responses   F/U on AFO needs      DME  PT DME Recommendations  Wheelchair: 18\" Width  Cushion: Specialty (See other comments) (TBD pending ambulation progress)  Assistive Device: Tanvir Walker (TBD pending progress, currently 2 person assist for gait)  Additional Equipment: Other (Comments)     Passport items to be completed:  Get in/out of bed safely, in/out of a vehicle, safely use mobility device, walk or wheel around home/community, navigate up and down stairs, show how to get up/down from the ground, ensure home is accessible, demonstrate HEP, complete caregiver training    Physical Therapy Problems (Active)       Problem: Mobility       Dates: Start:  11/13/23         Goal: STG-Within one week, patient will ambulate distances >/= 30' c/ RHR and ModA or better.        Dates: Start:  11/13/23         Goal Note filed on 11/22/23 1102 by Awilda Dela Cruz, MSPT       Limited to 10ft at right hallway rail mod assist                 Problem: PT-Long Term Goals       Dates: Start:  11/13/23         Goal: LTG-By discharge, patient will propel wheelchair >/= 300' at [place] or better.        Dates: Start:  11/13/23            Goal: LTG-By discharge, patient will ambulate >/= 50' c/ LRAD at Renata or better.        Dates: Start:  11/13/23            Goal: LTG-By discharge, patient will transfer one surface to another c/ Renata or better.        Dates: Start:  11/13/23            Goal: LTG-By discharge, patient will ambulate up/down 1 step c/ LRAD at [place] or better.        Dates: Start:  11/13/23            Goal: LTG-By discharge, patient will transfer in/out of a car c/ ModA or better.        Dates: Start:  11/13/23              "

## 2023-12-11 NOTE — THERAPY
"Occupational Therapy  Daily Treatment     Patient Name: Jason Lima  Age:  62 y.o., Sex:  male  Medical Record #: [id number]  Today's Date: 12/11/2023     Precautions  Precautions: Fall Risk  Comments: Left Hemiparesis, left visual inattention         Subjective    \"I can use more sleep, but I am ready for therapy.\" Pt was supine in bed upon arrival.     \"I like the arm bike\" regarding to [employer].      Objective       12/11/23 0701   OT Charge Group   OT Self Care / ADL (Units) 2   OT Neuromuscular Re-education / Balance (Units) 1   OT Therapeutic Exercise (Units) 1   OT Total Time Spent   OT Individual Total Time Spent (Mins) 60   Functional Level of Assist   Upper Body Dressing Minimal Assist   Upper Body Dressing Description Increased time;Set-up of equipment;Verbal cueing;Assit with threading arms through sleeves;Assist with pulling shirt over head   Lower Body Dressing Maximal Assist  (donning briefs, pants, and shoes)   Lower Body Dressing Description Assist with threading into pant leg;Increased time;Set-up of equipment;Verbal cueing   Bed, Chair, Wheelchair Transfer Modified Independent   Bed Chair Wheelchair Transfer Description Assist with one limb;Increased time;Set-up of equipment   Sitting Upper Body Exercises   Internal Shoulder Rotation 1 set of 10;Left   External Shoulder Rotation 1 set of 10;Left   Bicep Curls 1 set of 10;Left   Pronation / Supination 1 set of 10;Left   Wrist Flexion / Extension 1 set of 10;Left   Other Exercise digit 1-5 flex/ext on L hand   Comments [employer] UE 10min for reciprocal movement with V/T cues for midline orintation and contraction on L hand   Neuro-Muscular Treatments   Neuro-Muscular Treatments Sequencing;Tactile Cuing;Verbal Cuing   Comments mirror therapy   Interdisciplinary Plan of Care Collaboration   Patient Position at End of Therapy Seated;Chair Alarm On   Collaboration Comments in dinning area for breakfast     ADLs: Needed VC for UB jade dressing tech. Max A to " lift and feed pant leg into LLE. Attempted to use reacher to assist in LB dressing, however pt had poor dynamic sitting balance this morning.       UB exercises to UE exercises seated in w/c to increase strengthening and ROM in LUE for functional tasks. Needed assistance with keeping L hand on hand peddle.     Patient engaged in mirror-box therapy for neuro re-ed to LUE with good return demo of grasping and release activity. Needed VC to look at mirror to visual feedback      Assessment    Patient had good tolerance to session with focus on neuro re-ed and thera ex for L side. Continue to encourage pt participate in self AROM/AAROM on LUE.   Strengths: Able to follow instructions, Independent prior level of function, Motivated for self care and independence, Pleasant and cooperative, Supportive family  Barriers: Decreased endurance, Fatigue, Generalized weakness, Hemiparesis, Impaired activity tolerance, Impaired balance, Limited mobility, Spasticity    Plan    Functional transfers   ADLs  Neuro re-ed   Strengthening/endurance     DME  OT DME Recommendations  Bathroom Equipment: 3 in 1 Commode  Additional Equipment: Other (Comments)    Passport items to be completed:  Perform bathroom transfers, complete dressing, complete feeding, get ready for the day, prepare a simple meal, participate in household tasks, adapt home for safety needs, demonstrate home exercise program, complete caregiver training     Occupational Therapy Goals (Active)       Problem: Bathing       Dates: Start:  12/06/23         Goal: STG-Within one week, patient will bathe at [facility] using LRD       Dates: Start:  12/06/23               Problem: Dressing       Dates: Start:  11/22/23         Goal: STG-Within one week, patient will dress LB at [place] using LRD       Dates: Start:  11/22/23            Goal: STG-Within one week, patient will dress UB SPV using LRD       Dates: Start:  12/06/23               Problem: Functional Transfers       Dates:  Start:  12/06/23         Goal: STG-Within one week, patient will transfer to toilet at [place] to [facility] using LRD       Dates: Start:  12/06/23            Goal: STG-Within one week, patient will transfer to step in shower at [place] to [facility] using LRD       Dates: Start:  12/06/23               Problem: OT Long Term Goals       Dates: Start:  11/13/23         Goal: LTG-By discharge, patient will complete basic self care tasks at Mod Independent level.       Dates: Start:  11/13/23            Goal: LTG-By discharge, patient will perform bathroom transfers at Mod Independent level.       Dates: Start:  11/13/23

## 2023-12-11 NOTE — PROGRESS NOTES
"  Physical Medicine & Rehabilitation Progress Note    Encounter Date: 12/11/2023    Chief Complaint: Weekend slow, depression    Interval Events (Subjective):  VS: SBP more frequently in 140-150's   BM 12/8  Voiding volitionally    Seen and examined in his room. Weekend was slow. He is practicing exercises on his own. Has depression regarding his situation, can't believe he is in this situation.     ROS: 14 point ROS negative unless otherwise specified in the HPI    Objective:  VITAL SIGNS: BP (!) 147/81   Pulse 66   Temp 36.4 °C (97.6 °F) (Oral)   Resp 18   Ht 1.727 m (5' 8\")   Wt 87 kg (191 lb 12.8 oz)   SpO2 96%   BMI 29.16 kg/m²     GEN: No apparent distress  HEENT: Head normocephalic, atraumatic.  Sclera nonicteric bilaterally, no ocular discharge appreciated bilaterally.  CV: Extremities warm and well-perfused, no peripheral edema appreciated bilaterally.  PULMONARY: Breathing nonlabored on room air, no respiratory accessory muscle use.  Not requiring supplemental oxygen.  SKIN: No appreciable skin breakdown on exposed areas of skin.  PSYCH: Normal affect  NEURO: Awake alert.  Conversational.  Logical thought content. Dysarthria. Left Hemiparesis. Mild left clonus at ankle. No significant spasticity appreciated at rest.       Laboratory Values:  Recent Results (from the past 72 hour(s))   CBC WITH DIFFERENTIAL    Collection Time: 12/11/23  5:45 AM   Result Value Ref Range    WBC 5.4 4.8 - 10.8 K/uL    RBC 3.83 (L) 4.70 - 6.10 M/uL    Hemoglobin 11.7 (L) 14.0 - 18.0 g/dL    Hematocrit 34.9 (L) 42.0 - 52.0 %    MCV 91.1 81.4 - 97.8 fL    MCH 30.5 27.0 - 33.0 pg    MCHC 33.5 32.3 - 36.5 g/dL    RDW 40.9 35.9 - 50.0 fL    Platelet Count 198 164 - 446 K/uL    MPV 10.2 9.0 - 12.9 fL    Neutrophils-Polys 55.90 44.00 - 72.00 %    Lymphocytes 33.00 22.00 - 41.00 %    Monocytes 7.50 0.00 - 13.40 %    Eosinophils 2.80 0.00 - 6.90 %    Basophils 0.60 0.00 - 1.80 %    Immature Granulocytes 0.20 0.00 - 0.90 %    " Nucleated RBC 0.00 0.00 - 0.20 /100 WBC    Neutrophils (Absolute) 3.00 1.82 - 7.42 K/uL    Lymphs (Absolute) 1.77 1.00 - 4.80 K/uL    Monos (Absolute) 0.40 0.00 - 0.85 K/uL    Eos (Absolute) 0.15 0.00 - 0.51 K/uL    Baso (Absolute) 0.03 0.00 - 0.12 K/uL    Immature Granulocytes (abs) 0.01 0.00 - 0.11 K/uL    NRBC (Absolute) 0.00 K/uL   Basic Metabolic Panel    Collection Time: 23  5:45 AM   Result Value Ref Range    Sodium 139 135 - 145 mmol/L    Potassium 3.6 3.6 - 5.5 mmol/L    Chloride 103 96 - 112 mmol/L    Co2 26 20 - 33 mmol/L    Glucose 132 (H) 65 - 99 mg/dL    Bun 38 (H) 8 - 22 mg/dL    Creatinine 3.04 (H) 0.50 - 1.40 mg/dL    Calcium 9.0 8.5 - 10.5 mg/dL    Anion Gap 10.0 7.0 - 16.0   ESTIMATED GFR    Collection Time: 23  5:45 AM   Result Value Ref Range    GFR (CKD-EPI) 22 (A) >60 mL/min/1.73 m 2           Medications:  Scheduled Medications   Medication Dose Frequency    sertraline  50 mg DAILY    baclofen  10 mg TID    amLODIPine  10 mg DAILY    apixaban  5 mg BID    hydrALAZINE  100 mg Q8HRS    traZODone  75 mg QHS    senna-docusate  2 Tablet BID    omeprazole  20 mg DAILY    atorvastatin  40 mg Nightly     PRN medications: traZODone, carboxymethylcellulose, [DISCONTINUED] insulin regular **AND** [CANCELED] POC blood glucose manual result **AND** NOTIFY MD and PharmD **AND** Administer 20 grams of glucose (approximately 8 ounces of fruit juice) every 15 minutes PRN FSBG less than 70 mg/dL **AND** dextrose bolus, Respiratory Therapy Consult, senna-docusate **AND** polyethylene glycol/lytes **AND** magnesium hydroxide **AND** bisacodyl, mag hydrox-al hydrox-simeth, ondansetron **OR** ondansetron, sodium chloride, [] acetaminophen **FOLLOWED BY** acetaminophen, oxyCODONE immediate-release **OR** oxyCODONE immediate-release, hydrALAZINE    Diet:  Current Diet Order   Procedures    Diet Order Diet: Consistent CHO (Diabetic) (2 gram sodium restriction; medications whole with thin liquids);  Second Modifier: (optional): 2 Gram Sodium       Medical Decision Making and Plan:  Right thalamic hemorrhagic stroke ~ last week October 2023  Originally presented with a headache and left-sided weakness to hospital in [place]  Work-up at the outside hospital in [place] revealed a right thalamic hemorrhagic stroke  Repeat CT head done after return flight to the , 11/11 CT head shows right thalamic hemorrhage, decrease in density since prior studies from the outside hospital, slight asymmetric dilation of the left ventricular system including the left temporal horn with a possible component of hydrocephalus  Planning for BP less than 140, avoiding any kind of anticoagulation  Initiate comprehensive IRF level therapy with 3 days of therapy 5 days a week with services from PT/OT/SLP     Spasticity  OP follow up with Physiatry for consideration Botox   Has had resurgence of spasticity restart baclofen 5mg TID 11/30/2023 --> increase to 10mg TID  12/11/2023 continue Baclofen at 10mg TID, no significant side effects    ABLA  Now on Eliquis  Recheck 11/28 - improved 11.4--> 12.0--> 11.6 11/30/2023 12/6/2023 11.6-->10.7--> 11.7 12/11  Monitor     CKD  Follow up with OP nephrology  Renal function fluctuates 20-30's/2's-3's  Cr Baseline in past had been mid 1's     Hypertension  Continue Amlodipine 10mg daily  Continue Hydralazine 25mg TID - increased by hospitalist 11/13/2023 from BID to TID--> increased to 50mg q8hrs 11/15  11/16 Hydralazine increased to 75mg q8hrs and 1x Hydralazine given  11/17 BP still elevated but hydralazine recently increased. Monitor  12/11/2023 VS showing increase in 's-150's. Continue Hydralazine 100mg q8hrs + Amlodipine 10mg daily. Consider reconsult to hospitalist if needed.     Leukopenia  New, mild 12/6/2023   Resolved 12/11     Prediabetes  Discontinue SSI     HLD  Continue home dose satin      Bowel  Constipation 11/29/2023, resolved 11/30/2023   Continue with scheduled Colace  and senna, as needed stool softeners  Miralax x3 doses 11/29/2023 --> Constipation Resolved 11/30/2023   Last BM 12/6     Insomnia  Secondary to recent jet lag, melatonin scheduled --> increase to home dose 11/13/2023 9mg  Utilize trazodone as needed insomnia continues  Schedule Trazodone 11/15/2023   11/16/2023 continue Trazodone as is. Thinks he slept better last night  11/17/2023 Still not sleeping well. Increase to 75mg nightly.   12/11/2023 continue trazodone at current dose     Pain -as needed Tylenol and oxycodone    Post-Stroke Depression  Consulted Pyschiatry   Start Prozac 11/28/2023 --> Switched to zoloft per psychiatry  Appreciate assistance with management  Mood improved, continue Zoloft 12/11    Right Foot Pain  H/o Gout  Xray with swelling 1st metatarsal head   Trial Colchicine for acute gout flare 11/28/2023   Topical Voltaren gel scheduled   Improved with less swelling, erythema 11/29/2023   Resolved      LABS: [facility] 12/13      DVT PROPHYLAXIS: NO - Hemorrhagic stroke. US + UE and LE for brachial and peroneal DVT. 11/21/2023 start Heparin 5000 units q8hrs SQ for further prophylaxis, if tolerates will increase to full AC. Consider repeat CTH to ensure stability bleed once on full AC. 11/24/2023 Start loading dose Eliquis 10mg BID x 7 days with transition to 5mg BID. CBC showing improvement in H/H 11/28/2023 no neurologic decline.     HOSPITALIST FOLLOWING: YES - d/w hospitalist 12/6 --> Signed off 12/6.     CODE STATUS: FULL CODE    DISPO: Home alone. Vielka and patient not . D/w CM to give resources for private care giving. 11/29/2023 Patient making significant progress with therapy and would benefit from more time in acute to maximize neuro recovery. Family actively looking for Caregivers for 24/7 care. Discharge extended due to measurable progress.     MARCUS: 12/12/23     MEDS SENT TO: TBD    DISCHARGE FOLLOW UP: Neurology, Nephrology, PCP, Physiatry (Botox) - needs referral to all.    ____________________________________    Dr. Lisa Lee DO MS  [facility] - Physical Medicine & Rehabilitation   ____________________________________

## 2023-12-11 NOTE — FLOWSHEET NOTE
12/10/23 1622   Events/Summary/Plan   Events/Summary/Plan spot check done pt on room air uses cpap at noc tolerating it well   Vital Signs   Pulse 86   Respiration 16   Pulse Oximetry 94 %   $ Pulse Oximetry (Spot Check) Yes   Respiratory Assessment   Respiratory Pattern Within Normal Limits   Level of Consciousness Alert   Chest Exam   Work Of Breathing / Effort Within Normal Limits   Oxygen   O2 (LPM) 0   FiO2% 21 %   O2 Delivery Device None - Room Air   Non-Invasive Ventilation [facility]   Nocturnal CPAP or BIPAP CPAP - Home Unit  (wore for 7 hours)

## 2023-12-11 NOTE — CARE PLAN
"  Problem: Fall Risk - Rehab  Goal: Patient will remain free from falls  Outcome: Progressing   Hensley Alfonso Fall risk Assessment Score: 23    High fall risk Interventions   - Alarming seatbelt  - Wander guard  - Bed and strip alarm   - Yellow sign by the door   - Yellow wrist band \"Fall risk\"  - Room near to the nurse station  - Do not leave patient unattended in the bathroom  - Fall risk education provided      Problem: Pain - Standard  Goal: Alleviation of pain or a reduction in pain to the patient’s comfort goal  Outcome: Progressing   Patient is able to rate pain on a scale of 1-10.     "
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
## 2023-12-11 NOTE — FLOWSHEET NOTE
12/11/23 1148   Events/Summary/Plan   Events/Summary/Plan CPAP check   Non-Invasive Ventilation [facility]   Nocturnal CPAP or BIPAP CPAP - Home Unit  (10:31 hrs)

## 2023-12-11 NOTE — THERAPY
"Occupational Therapy  Daily Treatment     Patient Name: Jason Lima  Age:  62 y.o., Sex:  male  Medical Record #: [id number]  Today's Date: 12/11/2023     Precautions  Precautions: Fall Risk  Comments: Left Hemiparesis, left visual inattention         Subjective    \"Can we do the arm bike exercise because I feel like I'm really working my arms\"     Objective       12/11/23 1331   OT Charge Group   OT Neuromuscular Re-education / Balance (Units) 1   OT Therapeutic Exercise (Units) 1   OT Total Time Spent   OT Individual Total Time Spent (Mins) 30   Sitting Upper Body Exercises   Comments Motomed UE 10 min w/ VC to contract Lhand.   Neuro-Muscular Treatments   Neuro-Muscular Treatments Biofeedback;Co-Contraction;Postural Facilitation;Tapping;Weight Shift Right   Comments standing balance using standing fram with VC for midline oritation using mirror 2 set x 2min each while WB-ing on BUE elbows and BLE   Interdisciplinary Plan of Care Collaboration   Patient Position at End of Therapy Seated;Chair Alarm On;Call Light within Reach;Tray Table within Reach;Phone within Reach     Thera-ex: Instructed pt in Motomed using BUE for 5min than instructed pt in using LUE only to increase strengthening and reciprocal movements for the remainder 5min. Needed assistance with keeping L hand on hand peddle.      Neuro re-ed: pt needed tactile and tapping during STS in the standing frame to extend LLE as well as VC for standing midline posture using mirror.     Assessment    Pt tolerated session well with focus on neuro re-ed (standing balance/tolerance) and thera ex for L side. Pt felt accomplished using motomed and tolerated it well.      Strengths: Able to follow instructions, Independent prior level of function, Motivated for self care and independence, Pleasant and cooperative, Supportive family  Barriers: Decreased endurance, Fatigue, Generalized weakness, Hemiparesis, Impaired activity tolerance, Impaired balance, Limited " mobility, Spasticity    Plan    Functional transfers   ADLs  Neuro re-ed   Strengthening/endurance     DME  OT DME Recommendations  Bathroom Equipment: 3 in 1 Commode  Additional Equipment: Other (Comments)    Passport items to be completed:  Perform bathroom transfers, complete dressing, complete feeding, get ready for the day, prepare a simple meal, participate in household tasks, adapt home for safety needs, demonstrate home exercise program, complete caregiver training     Occupational Therapy Goals (Active)       Problem: Bathing       Dates: Start:  12/06/23         Goal: STG-Within one week, patient will bathe at [facility] using LRD       Dates: Start:  12/06/23               Problem: Dressing       Dates: Start:  11/22/23         Goal: STG-Within one week, patient will dress LB at [facility] using LRD       Dates: Start:  11/22/23            Goal: STG-Within one week, patient will dress UB SPV using LRD       Dates: Start:  12/06/23               Problem: Functional Transfers       Dates: Start:  12/06/23         Goal: STG-Within one week, patient will transfer to toilet at [facility] to [facility] using LRD       Dates: Start:  12/06/23            Goal: STG-Within one week, patient will transfer to step in shower at [facility] to [facility] using LRD       Dates: Start:  12/06/23               Problem: OT Long Term Goals       Dates: Start:  11/13/23         Goal: LTG-By discharge, patient will complete basic self care tasks at Mod Independent level.       Dates: Start:  11/13/23            Goal: LTG-By discharge, patient will perform bathroom transfers at Mod Independent level.       Dates: Start:  11/13/23

## 2023-12-11 NOTE — PROGRESS NOTES
NURSING DAILY NOTE    Name: Jason Lima   Date of Admission: 11/12/2023   Admitting Diagnosis: Thalamic hemorrhage (HCC)  Attending Physician: Lisa Lee D.o.  Allergies: Penicillins    Safety  Patient Assist  Max 2  Patient Precautions  Fall Risk  Precaution Comments  Left Hemiparesis, left visual inattention  Bed Transfer Status  Moderate Assist (nustep see note)  Toilet Transfer Status   Total Assist X 2 (Mod/ max SPT to right, 2 person to the left, with grab bar. Total assist pivoting left foot)  Assistive Devices  Rails, Wheelchair  Oxygen  CPAP  Diet/Therapeutic Dining  Current Diet Order   Procedures    Diet Order Diet: Consistent CHO (Diabetic) (2 gram sodium restriction; medications whole with thin liquids); Second Modifier: (optional): 2 Gram Sodium     Pill Administration  whole  Agitated Behavioral Scale  14  ABS Level of Severity  No Agitation    Fall Risk  Has the patient had a fall this admission?   No  Shellie Hamilton Fall Risk Scoring  23, HIGH RISK  Fall Risk Safety Measures  bed alarm and chair alarm    Vitals  Temperature: 36.7 °C (98 °F)  Temp src: Oral  Pulse: 73  Respiration: 16  Blood Pressure: 134/85  Blood Pressure MAP (Calculated): 101 MM HG  BP Location: Right, Upper Arm  Patient BP Position: Supine     Oxygen  Pulse Oximetry: 94 %  O2 (LPM): 0  FiO2%: 21 %  O2 Delivery Device: CPAP    Bowel and Bladder  Last Bowel Movement  12/08/23  Stool Type  Type 4: Like a sausage or snake, smooth and soft  Bowel Device  Bathroom, Other (Comment)  Continent  Bladder: Continent void   Bowel: Continent movement  Bladder Function  Urine Void (mL): 300 ml  Number of Times Voided: 1  Urinary Options: Yes  Urine Color: Yellow  Number of Times Incontinent of Urine: 0  Genitourinary Assessment   Bladder Assessment (WDL):  WDL Except  Echols Catheter: Not Applicable  Urine Color: Yellow  Number of Bladder Accidents: 0  Total Number of Bladder of  Accidents in Last 7 Days: 0  Number of Times Incontinent of Urine: 0  Bladder Device: Urinal  Time Void: No  Bladder Scan: Post Void  $ Bladder Scan Results (mL): 26    Skin  Polo Score   15  Sensory Interventions   Bed Types: Standard/Trauma Mattress  Skin Preventative Measures: Pillows in Use to Float Heels, Pillows in Use for Support / Positioning  Moisture Interventions  Moisturizers/Barriers: Moisturizer       Pain  Pain Rating Scale  0 - No Pain  Pain Location  Foot  Pain Location Orientation  Right, Left  Pain Interventions   Declines    ADLs    Bathing   Patient Refused Bathing (too tired)  Linen Change   Complete  Personal Hygiene  Change Deja Pads, Moist Deja Wipes, Perineal Care  Chlorhexidine Bath      Oral Care  Brushed Teeth  Teeth/Dentures     Shave  Self  Nutrition Percentage Eaten  *  * Meal *  *, Dinner, Between % Consumed  Environmental Precautions  Treaded Slipper Socks on Patient, Bed in Low Position, Personal Belongings, Wastebasket, Call Bell etc. in Easy Reach, Transferred to Stronger Side  Patient Turns/Positioning  Patient Turns Self from Side to Side, Reposition - LUE, Reposition - LLE  Patient Turns Assistance/Tolerance  Assistance of Two or More  Bed Positions  Bed Controls On, Bed Locked  Head of Bed Elevated  Self regulated      Psychosocial/Neurologic Assessment  Psychosocial Assessment  Psychosocial (WDL):  Within Defined Limits  Patient Behaviors: Fatigue  Neurologic Assessment  Neuro (WDL): Exceptions to WDL  Level of Consciousness: Alert  Orientation Level: Oriented X4  Cognition: Follows commands  Speech: Clear, Slurred  Facial Symmetry: Left facial drooping  Pupil Assesment: Yes  R Pupil Size (mm): 3  R Pupil Shape / Description: Round  R Pupil Reaction: Brisk  L Pupil Size (mm): 3  L Pupil Shape / Description: Round  L Pupil Reaction: Brisk  Reflexes: Cough  Cough Reflex: Present  Motor Function/Sensation Assessment: Dorsiflexion, Motor response  R Foot Dorsiflexion:  Strong  L Foot Dorsiflexion: Absent  RUE Motor Response: Responds to commands  RUE Sensation: Full sensation  Muscle Strength Right Arm: Normal Strength Against Gravity and Full Resistance  LUE Motor Response: Flaccid  LUE Sensation: Numbness, Tingling  Muscle Strength Left Arm: Weak Movement but Not Against Gravity or Resistance  RLE Motor Response: Responds to commands  RLE Sensation: Full sensation  Muscle Strength Right Leg: Good Strength Against Gravity and Moderate Resistance  LLE Motor Response: Flaccid  LLE Sensation: Numbness, Tingling  Muscle Strength Left Leg: Weak Movement but Not Against Gravity or Resistance  EENT (WDL):  WDL Except    Cardio/Pulmonary Assessment  Edema   RLE Edema: Trace  LLE Edema: Trace  Respiratory Breath Sounds  RUL Breath Sounds: Clear  RML Breath Sounds: Clear  RLL Breath Sounds: Diminished  MOHAMUD Breath Sounds: Clear  LLL Breath Sounds: Diminished  Cardiac Assessment   Cardiac (WDL):  WDL Except (Hx of HTN, HLD)

## 2023-12-11 NOTE — CARE PLAN
The patient is Stable - Low risk of patient condition declining or worsening    Shift Goals  Clinical Goals: Safety, hydration  Patient Goals: safety  Family Goals: increased pt strength, independence    Progress made toward(s) clinical / shift goals:      Problem: Fall Risk - Rehab  Goal: Patient will remain free from falls  Outcome: Progressing     Problem: Mobility  Goal: Patient's capacity to carry out activities will improve  Outcome: Progressing       Patient is not progressing towards the following goals:none

## 2023-12-11 NOTE — PROGRESS NOTES
NURSING DAILY NOTE    Name: Jason Lima   Date of Admission: 11/12/2023   Admitting Diagnosis: Thalamic hemorrhage (HCC)  Attending Physician: Lisa Lee D.o.  Allergies: Penicillins    Safety  Patient Assist  Max 2  Patient Precautions  Fall Risk  Precaution Comments  Left Hemiparesis, left visual inattention  Bed Transfer Status  Moderate Assist (stand step w/c<>bed; Renata for lateral scoot toward hemiside)  Toilet Transfer Status   Total Assist X 2 (Mod/ max SPT to right, 2 person to the left, with grab bar. Total assist pivoting left foot)  Assistive Devices  Rails, Wheelchair  Oxygen  CPAP  Diet/Therapeutic Dining  Current Diet Order   Procedures    Diet Order Diet: Consistent CHO (Diabetic) (2 gram sodium restriction; medications whole with thin liquids); Second Modifier: (optional): 2 Gram Sodium     Pill Administration  whole  Agitated Behavioral Scale  14  ABS Level of Severity  No Agitation    Fall Risk  Has the patient had a fall this admission?   No  Shellie Hamilton Fall Risk Scoring  23, HIGH RISK  Fall Risk Safety Measures  bed alarm, chair alarm, and poor balance    Vitals  Temperature: 36.8 °C (98.2 °F)  Temp src: Oral  Pulse: 100  Respiration: 18  Blood Pressure: 135/89  Blood Pressure MAP (Calculated): 104 MM HG  BP Location: Right, Upper Arm  Patient BP Position: Sitting     Oxygen  Pulse Oximetry: 99 %  O2 (LPM): 0  FiO2%: 21 %  O2 Delivery Device: CPAP    Bowel and Bladder  Last Bowel Movement  12/08/23  Stool Type  Type 4: Like a sausage or snake, smooth and soft  Bowel Device  Bathroom, Other (Comment)  Continent  Bladder: Continent void   Bowel: Continent movement  Bladder Function  Urine Void (mL): 600 ml  Number of Times Voided: 1  Urinary Options: Yes  Urine Color: Straw  Number of Times Incontinent of Urine: 0  Genitourinary Assessment   Bladder Assessment (WDL):  WDL Except  Echols Catheter: Not Applicable  Urine Color:  Straw  Number of Bladder Accidents: 0  Total Number of Bladder of Accidents in Last 7 Days: 0  Number of Times Incontinent of Urine: 0  Bladder Device: Diaper  Time Void: No  Bladder Scan: Post Void  $ Bladder Scan Results (mL): 26    Skin  Polo Score   15  Sensory Interventions   Bed Types: Standard/Trauma Mattress  Skin Preventative Measures: Pillows in Use to Float Heels, Pillows in Use for Support / Positioning  Moisture Interventions  Moisturizers/Barriers: Moisturizer       Pain  Pain Rating Scale  0 - No Pain  Pain Location  Foot  Pain Location Orientation  Right, Left  Pain Interventions   Declines    ADLs    Bathing   Patient Refused Bathing (too tired)  Linen Change   Complete  Personal Hygiene  Change Deja Pads, Moist Deja Wipes, Perineal Care  Chlorhexidine Bath      Oral Care  Brushed Teeth  Teeth/Dentures     Shave  Self  Nutrition Percentage Eaten  *  * Meal *  *, Lunch, Between % Consumed  Environmental Precautions  Treaded Slipper Socks on Patient, Bed in Low Position, Personal Belongings, Wastebasket, Call Bell etc. in Easy Reach, Transferred to Stronger Side  Patient Turns/Positioning  Patient Turns Self from Side to Side, Reposition - LUE, Reposition - LLE  Patient Turns Assistance/Tolerance  Assistance of Two or More  Bed Positions  Bed Controls On, Bed Locked  Head of Bed Elevated  Self regulated      Psychosocial/Neurologic Assessment  Psychosocial Assessment  Psychosocial (WDL):  Within Defined Limits  Patient Behaviors: Fatigue  Neurologic Assessment  Neuro (WDL): Exceptions to WDL  Level of Consciousness: Alert  Orientation Level: Oriented X4  Cognition: Follows commands  Speech: Clear, Slurred  Facial Symmetry: Left facial drooping  Pupil Assesment: Yes  R Pupil Size (mm): 3  R Pupil Shape / Description: Round  R Pupil Reaction: Brisk  L Pupil Size (mm): 3  L Pupil Shape / Description: Round  L Pupil Reaction: Brisk  Reflexes: Cough  Cough Reflex: Present  Motor Function/Sensation  Assessment: Dorsiflexion, Motor response  R Foot Dorsiflexion: Strong  L Foot Dorsiflexion: Absent  RUE Motor Response: Responds to commands  RUE Sensation: Full sensation  Muscle Strength Right Arm: Normal Strength Against Gravity and Full Resistance  LUE Motor Response: Flaccid  LUE Sensation: Numbness, Tingling  Muscle Strength Left Arm: Weak Movement but Not Against Gravity or Resistance  RLE Motor Response: Responds to commands  RLE Sensation: Full sensation  Muscle Strength Right Leg: Good Strength Against Gravity and Moderate Resistance  LLE Motor Response: Flaccid  LLE Sensation: Numbness, Tingling  Muscle Strength Left Leg: Weak Movement but Not Against Gravity or Resistance  EENT (WDL):  WDL Except    Cardio/Pulmonary Assessment  Edema   RLE Edema: Trace  LLE Edema: Trace  Respiratory Breath Sounds  RUL Breath Sounds: Clear  RML Breath Sounds: Clear  RLL Breath Sounds: Diminished  MOHAMUD Breath Sounds: Clear  LLL Breath Sounds: Diminished  Cardiac Assessment   Cardiac (WDL):  WDL Except (Hx of HTN, HLD)

## 2023-12-12 ENCOUNTER — APPOINTMENT (OUTPATIENT)
Dept: PHYSICAL THERAPY | Facility: REHABILITATION | Age: 63
DRG: 057 | End: 2023-12-12
Attending: PHYSICAL MEDICINE & REHABILITATION
Payer: COMMERCIAL

## 2023-12-12 ENCOUNTER — APPOINTMENT (OUTPATIENT)
Dept: OCCUPATIONAL THERAPY | Facility: REHABILITATION | Age: 63
DRG: 057 | End: 2023-12-12
Attending: PHYSICAL MEDICINE & REHABILITATION
Payer: COMMERCIAL

## 2023-12-12 ENCOUNTER — APPOINTMENT (OUTPATIENT)
Dept: OCCUPATIONAL THERAPY | Facility: REHABILITATION | Age: 63
DRG: 057 | End: 2023-12-12
Attending: HOSPITALIST
Payer: COMMERCIAL

## 2023-12-12 PROCEDURE — 700102 HCHG RX REV CODE 250 W/ 637 OVERRIDE(OP): Performed by: PHYSICAL MEDICINE & REHABILITATION

## 2023-12-12 PROCEDURE — 97535 SELF CARE MNGMENT TRAINING: CPT

## 2023-12-12 PROCEDURE — A9270 NON-COVERED ITEM OR SERVICE: HCPCS | Performed by: PHYSICAL MEDICINE & REHABILITATION

## 2023-12-12 PROCEDURE — 97116 GAIT TRAINING THERAPY: CPT

## 2023-12-12 PROCEDURE — 97112 NEUROMUSCULAR REEDUCATION: CPT

## 2023-12-12 PROCEDURE — 97110 THERAPEUTIC EXERCISES: CPT

## 2023-12-12 PROCEDURE — 700102 HCHG RX REV CODE 250 W/ 637 OVERRIDE(OP): Performed by: STUDENT IN AN ORGANIZED HEALTH CARE EDUCATION/TRAINING PROGRAM

## 2023-12-12 PROCEDURE — A9270 NON-COVERED ITEM OR SERVICE: HCPCS | Performed by: HOSPITALIST

## 2023-12-12 PROCEDURE — A9270 NON-COVERED ITEM OR SERVICE: HCPCS | Performed by: STUDENT IN AN ORGANIZED HEALTH CARE EDUCATION/TRAINING PROGRAM

## 2023-12-12 PROCEDURE — 99232 SBSQ HOSP IP/OBS MODERATE 35: CPT | Performed by: PHYSICAL MEDICINE & REHABILITATION

## 2023-12-12 PROCEDURE — 700102 HCHG RX REV CODE 250 W/ 637 OVERRIDE(OP): Performed by: HOSPITALIST

## 2023-12-12 PROCEDURE — 94760 N-INVAS EAR/PLS OXIMETRY 1: CPT

## 2023-12-12 PROCEDURE — 770010 HCHG ROOM/CARE - REHAB SEMI PRIVAT*

## 2023-12-12 PROCEDURE — 97530 THERAPEUTIC ACTIVITIES: CPT

## 2023-12-12 RX ADMIN — BACLOFEN 10 MG: 10 TABLET ORAL at 15:20

## 2023-12-12 RX ADMIN — BACLOFEN 10 MG: 10 TABLET ORAL at 20:20

## 2023-12-12 RX ADMIN — HYDRALAZINE HYDROCHLORIDE 100 MG: 50 TABLET, FILM COATED ORAL at 15:20

## 2023-12-12 RX ADMIN — HYDRALAZINE HYDROCHLORIDE 100 MG: 50 TABLET, FILM COATED ORAL at 20:19

## 2023-12-12 RX ADMIN — TRAZODONE HYDROCHLORIDE 75 MG: 50 TABLET ORAL at 20:20

## 2023-12-12 RX ADMIN — HYDRALAZINE HYDROCHLORIDE 100 MG: 50 TABLET, FILM COATED ORAL at 05:56

## 2023-12-12 RX ADMIN — APIXABAN 5 MG: 5 TABLET, FILM COATED ORAL at 08:01

## 2023-12-12 RX ADMIN — AMLODIPINE BESYLATE 10 MG: 5 TABLET ORAL at 05:56

## 2023-12-12 RX ADMIN — BACLOFEN 10 MG: 10 TABLET ORAL at 08:01

## 2023-12-12 RX ADMIN — OMEPRAZOLE 20 MG: 20 CAPSULE, DELAYED RELEASE ORAL at 08:01

## 2023-12-12 RX ADMIN — MAGNESIUM HYDROXIDE 30 ML: 400 SUSPENSION ORAL at 08:01

## 2023-12-12 RX ADMIN — SENNOSIDES AND DOCUSATE SODIUM 2 TABLET: 50; 8.6 TABLET ORAL at 21:00

## 2023-12-12 RX ADMIN — SERTRALINE 50 MG: 50 TABLET, FILM COATED ORAL at 08:01

## 2023-12-12 RX ADMIN — ATORVASTATIN CALCIUM 40 MG: 40 TABLET, FILM COATED ORAL at 20:20

## 2023-12-12 RX ADMIN — APIXABAN 5 MG: 5 TABLET, FILM COATED ORAL at 20:20

## 2023-12-12 ASSESSMENT — GAIT ASSESSMENTS
GAIT LEVEL OF ASSIST: TOTAL ASSIST
DEVIATION: SHUFFLED GAIT;DECREASED TOE OFF;DECREASED HEEL STRIKE
ASSISTIVE DEVICE: HEMI-WALKER
DISTANCE (FEET): 30

## 2023-12-12 ASSESSMENT — ACTIVITIES OF DAILY LIVING (ADL): TOILET_TRANSFER_DESCRIPTION: GRAB BAR;INITIAL PREPARATION FOR TASK;SUPERVISION FOR SAFETY;VERBAL CUEING

## 2023-12-12 ASSESSMENT — PAIN DESCRIPTION - PAIN TYPE: TYPE: ACUTE PAIN

## 2023-12-12 NOTE — DISCHARGE PLANNING
Case Management / Initial authorization:  Auth #  [id number]  Days authorized: 11/12-12/17  Clinical concurrent review faxed :  Name: Letty  Phone: [phone]  Fax:  Outcome: 11/12-12/17    Spoke with Letty and the case is showing colton authed from 11/12-12/3 with days after pending. She transferred the call to the [facility] and it went to [facility].    Received a fax with days authed until 12/17.

## 2023-12-12 NOTE — FLOWSHEET NOTE
12/12/23 0642   Events/Summary/Plan   Events/Summary/Plan RA pulse ox check. Wearing cpap mask   Vital Signs   Pulse 67   Respiration 18   Pulse Oximetry 91 %   $ Pulse Oximetry (Spot Check) Yes   Respiratory Assessment   Level of Consciousness Responds to voice  (sleeping)   Chest Exam   Work Of Breathing / Effort Within Normal Limits   Oxygen   O2 Delivery Device CPAP

## 2023-12-12 NOTE — CONSULTS
Checked in with patient to see if he is tolerating Zoloft 50 mg QD. HE reports no side effects. NO tremors, N/V, abdominal pain, diarrhea. He states that mood and anxiety are about the same. Denies SI/HI. No hallucinations or delusions. States he is planning on staying longer in rehab as he does not feel ready to go home yet. Sleeping better, has not needed PRN trazodone. Will continue to follow patient

## 2023-12-12 NOTE — THERAPY
"Physical Therapy   Daily Treatment     Patient Name: Jason Lima  Age:  62 y.o., Sex:  male  Medical Record #: [id number]  Today's Date: 12/12/2023     Precautions  Precautions: Fall Risk  Comments: Left Hemiparesis, left visual inattention    Subjective    \"I want to work on my arms and legs.\" Pt in w/c at arrival, agreeable to PT tx.      Objective       12/12/23 1331   PT Charge Group   PT Gait Training (Units) 4   PT Neuromuscular Re-Education / Balance (Units) 2   PT Total Time Spent   PT Individual Total Time Spent (Mins) 90   Gait Functional Level of Assist    Gait Level Of Assist Total Assist  (ModA<>MaxA x 1, 2nd person CGA<>SBA)   Assistive Device Tanvir-Walker   Distance (Feet) 30   # of Times Distance was Traveled 3  (1 x 60', 4 x 30' w/o UE support, 2-person assist)   Deviation Shuffled Gait;Decreased Toe Off;Decreased Heel Strike  (help for advancement and stance stability at LLE, WS to RLE, cues for reciprocal step length, timing, and midline postural control)   Transfer Functional Level of Assist   Bed, Chair, Wheelchair Transfer Moderate Assist   Bed Chair Wheelchair Transfer Description Squat pivot transfer to wheelchair;Verbal cueing;Set-up of equipment;Increased time;Initial preparation for task   Neuro-Muscular Treatments   Neuro-Muscular Treatments Biofeedback;Co-Contraction;Postural Facilitation;Tapping;Weight Shift Right;Verbal Cuing;Tactile Cuing;Sequencing;Electrical Stimulation;Postural Changes;Joint Approximation;Weight Shift Left   Comments see note   Interdisciplinary Plan of Care Collaboration   IDT Collaboration with  Therapy Tech   Patient Position at End of Therapy Seated;Call Light within Reach;Tray Table within Reach;Phone within Reach;Family / Friend in Room   Collaboration Comments assist c/ pt handling, equip needs during session       NMRE: pregait for standing postural control, facilitation of L trunk and hip mm c/ NMES  L back extensors and parascapular x 10 min  Us: 300  Hz: " "30  Pads: 2x3\" to parascapulars (LT), lumbar back extensors  Intensity: 61-75 scap, 80 lumbar extensors  On/off: 10/10  Ramp: 2 sec  L parascapulars and L glut x 30 min   Settings as above; glut intensity: 80 c/ + tetany   Seated midline control c/ BUE presses 1 x 20  Standing midline postural control c/ BUE presses 1 x 10, then lifts (shldr flexion) c/ manual resistance through ROM to facilitate trunk/hip extension, 3\" isometric hold at top of mvt  Pregait stepping c/ facilitated WS to R target, L knee maintained in stance c/ manual support 1 x 20   OG gait as above  no UE support and 2 person assist, gait belt and tactile cues on R for WS and advancement of weight over RLE in stance; maxA for stance stability on L, swing, and facilitation of WS to each limb in stance, finding midline; pt cued to verbalize each step to support sequencing and attention to task, distances as above; mirror for targeting and visual midline; AFO to L foot (Xtern). No glasses, no patching for gait.   Facilitation of WS and stepping for 180 deg turns x 2 btw gait efforts vs attempting to pivot on RLE only, MaxA x1, 2nd staff Renata<>CGA.  Hemiwalker in [facility],  for sequencing, facilitation to RLE in stance, 3 x 30'; initially MaxA x 1 c/ 2nd person CGA improving to ModA + 2nd person SBA for safety, no w/c follow      SPT <> w/c<>mat btw therapy activities, STS <> w/c btw gait and standing activities, cues for LE alignment and wider TRUMAN prior to stand initiation, finding midline prior to initiation of stepping.      Assessment    Jason able to improve functional WS to RLE in stance and RLE weight acceptance over serial gait training today, progressing to hemiwalker. Continues to need help for LLE advancement and stance stability but much improved ability to press through device vs pulling for support.     Strengths: Able to follow instructions, Independent prior level of function, Motivated for self care and independence, Pleasant and " "cooperative, Supportive family, Willingly participates in therapeutic activities  Barriers: Decreased endurance, Hemiparesis, Difficulty following instructions, Fatigue, Hypertension, Impaired activity tolerance, Impaired balance, Impaired insight/denial of deficits, Impaired functional cognition, Impaired sleep pattern, Impulsive, Limited mobility, Visual impairment, Poor family support (lives alone)    Plan  Continue gait c/ hemiwalker vs TM vs BUE support vs no UE support as able (Vector)  NMES to LLE quad/glut L back extensors/parascap mm in standing vs NuStep for pregait training/priming  KAYLYN for gait cues  Aquatic therapy as possible  Continue prism vs alternating patching trial for midline control. STS + midline control TM training  F/U on AFO needs     DME  PT DME Recommendations  Wheelchair: 18\" Width  Cushion: Specialty (See other comments) (TBD pending ambulation progress)  Assistive Device: Tanvir Walker (TBD pending progress, currently 2 person assist for gait)  Additional Equipment: Other (Comments)    Passport items to be completed:  Get in/out of bed safely, in/out of a vehicle, safely use mobility device, walk or wheel around home/community, navigate up and down stairs, show how to get up/down from the ground, ensure home is accessible, demonstrate HEP, complete caregiver training    Physical Therapy Problems (Active)       Problem: Mobility       Dates: Start:  11/13/23         Goal: STG-Within one week, patient will ambulate distances >/= 30' c/ RHR and ModA or better.        Dates: Start:  11/13/23         Goal Note filed on 11/22/23 1102 by Awilda Dela Cruz, MSPT       Limited to 10ft at right hallway rail mod assist                 Problem: PT-Long Term Goals       Dates: Start:  11/13/23         Goal: LTG-By discharge, patient will propel wheelchair >/= 300' at [place] or better.        Dates: Start:  11/13/23            Goal: LTG-By discharge, patient will ambulate >/= 50' c/ LRAD at Renata or " better.        Dates: Start:  11/13/23            Goal: LTG-By discharge, patient will transfer one surface to another c/ Renata or better.        Dates: Start:  11/13/23            Goal: LTG-By discharge, patient will ambulate up/down 1 step c/ LRAD at Renata or better.        Dates: Start:  11/13/23            Goal: LTG-By discharge, patient will transfer in/out of a car c/ ModA or better.        Dates: Start:  11/13/23

## 2023-12-12 NOTE — PROGRESS NOTES
NURSING DAILY NOTE    Name: Jason Lima   Date of Admission: 11/12/2023   Admitting Diagnosis: Thalamic hemorrhage (HCC)  Attending Physician: Lisa Lee D.o.  Allergies: Penicillins    Safety  Patient Assist  Max 2  Patient Precautions  Fall Risk  Precaution Comments  Left Hemiparesis, left visual inattention  Bed Transfer Status  Moderate Assist (stand step w/c<>bed; Renata for lateral scoot toward hemiside)  Toilet Transfer Status   Total Assist X 2 (Mod/ max SPT to right, 2 person to the left, with grab bar. Total assist pivoting left foot)  Assistive Devices  Rails, Wheelchair  Oxygen  CPAP  Diet/Therapeutic Dining  Current Diet Order   Procedures    Diet Order Diet: Consistent CHO (Diabetic) (2 gram sodium restriction; medications whole with thin liquids); Second Modifier: (optional): 2 Gram Sodium     Pill Administration  whole  Agitated Behavioral Scale  14  ABS Level of Severity  No Agitation    Fall Risk  Has the patient had a fall this admission?   No  Shellie Hamilton Fall Risk Scoring  23, HIGH RISK  Fall Risk Safety Measures  bed alarm and chair alarm    Vitals  Temperature: 36.7 °C (98.1 °F)  Temp src: Oral  Pulse: 70  Respiration: 18  Blood Pressure: (!) 145/85  Blood Pressure MAP (Calculated): 105 MM HG  BP Location: Right, Upper Arm  Patient BP Position: Supine     Oxygen  Pulse Oximetry: 96 %  O2 (LPM): 0  FiO2%: 21 %  O2 Delivery Device: CPAP    Bowel and Bladder  Last Bowel Movement  12/08/23  Stool Type  Type 4: Like a sausage or snake, smooth and soft  Bowel Device  Bathroom, Other (Comment)  Continent  Bladder: Continent void   Bowel: Continent movement  Bladder Function  Urine Void (mL): 275 ml  Number of Times Voided: 1  Urinary Options: Yes  Urine Color: Yellow  Number of Times Incontinent of Urine: 0  Genitourinary Assessment   Bladder Assessment (WDL):  WDL Except  Echols Catheter: Not Applicable  Urine Color: Yellow  Number of  Bladder Accidents: 0  Total Number of Bladder of Accidents in Last 7 Days: 0  Number of Times Incontinent of Urine: 0  Bladder Device: Urinal  Time Void: No  Bladder Scan: Post Void  $ Bladder Scan Results (mL): 26    Skin  Polo Score   15  Sensory Interventions   Bed Types: Standard/Trauma Mattress  Skin Preventative Measures: Pillows in Use to Float Heels, Pillows in Use for Support / Positioning  Moisture Interventions  Moisturizers/Barriers: Moisturizer       Pain  Pain Rating Scale  0 - No Pain  Pain Location  Foot  Pain Location Orientation  Right, Left  Pain Interventions   Declines    ADLs    Bathing   Patient Refused Bathing (too tired)  Linen Change   Complete  Personal Hygiene  Change Deja Pads, Moist Deja Wipes, Perineal Care  Chlorhexidine Bath      Oral Care  Brushed Teeth  Teeth/Dentures     Shave  Self  Nutrition Percentage Eaten  *  * Meal *  *, Lunch, Between % Consumed  Environmental Precautions  Treaded Slipper Socks on Patient, Bed in Low Position, Personal Belongings, Wastebasket, Call Bell etc. in Easy Reach, Transferred to Stronger Side  Patient Turns/Positioning  Patient Turns Self from Side to Side, Reposition - LUE, Reposition - LLE  Patient Turns Assistance/Tolerance  Assistance of Two or More  Bed Positions  Bed Controls On, Bed Locked  Head of Bed Elevated  Self regulated      Psychosocial/Neurologic Assessment  Psychosocial Assessment  Psychosocial (WDL):  Within Defined Limits  Patient Behaviors: Fatigue  Neurologic Assessment  Neuro (WDL): Exceptions to WDL  Level of Consciousness: Alert  Orientation Level: Oriented X4  Cognition: Follows commands  Speech: Clear, Slurred  Facial Symmetry: Left facial drooping  Pupil Assesment: Yes  R Pupil Size (mm): 3  R Pupil Shape / Description: Round  R Pupil Reaction: Brisk  L Pupil Size (mm): 3  L Pupil Shape / Description: Round  L Pupil Reaction: Brisk  Reflexes: Cough  Cough Reflex: Present  Motor Function/Sensation Assessment:  Dorsiflexion, Motor response  R Foot Dorsiflexion: Strong  L Foot Dorsiflexion: Absent  RUE Motor Response: Responds to commands  RUE Sensation: Full sensation  Muscle Strength Right Arm: Normal Strength Against Gravity and Full Resistance  LUE Motor Response: Flaccid  LUE Sensation: Numbness, Tingling  Muscle Strength Left Arm: Weak Movement but Not Against Gravity or Resistance  RLE Motor Response: Responds to commands  RLE Sensation: Full sensation  Muscle Strength Right Leg: Good Strength Against Gravity and Moderate Resistance  LLE Motor Response: Flaccid  LLE Sensation: Numbness, Tingling  Muscle Strength Left Leg: Weak Movement but Not Against Gravity or Resistance  EENT (WDL):  WDL Except    Cardio/Pulmonary Assessment  Edema   RLE Edema: Trace  LLE Edema: Trace  Respiratory Breath Sounds  RUL Breath Sounds: Clear  RML Breath Sounds: Clear  RLL Breath Sounds: Diminished  MOHAMUD Breath Sounds: Clear  LLL Breath Sounds: Diminished  Cardiac Assessment   Cardiac (WDL):  WDL Except (H/O HTN, HLD)

## 2023-12-12 NOTE — THERAPY
"Occupational Therapy  Daily Treatment     Patient Name: Jason Lima  Age:  62 y.o., Sex:  male  Medical Record #: [id number]  Today's Date: 12/12/2023     Precautions  Precautions: (P) Fall Risk  Comments: (P) Left Hemiparesis, left visual inattention         Subjective    Pt encountered 2x this day for skilled therapy. Pt pleasant and agreeable to participate both sessions. Following showering/dressing tasks at 0831, pt reported, \"this is the best day of my life.\"     During 1301 session, pt reported preferring motomed exercise over , \"I like this exercise a lot more, I am not fond of the ().\"     Objective       12/12/23 0831 12/12/23 1301   OT Charge Group   OT Self Care / ADL (Units) 4  --    OT Therapy Activity (Units)  --  1   OT Therapeutic Exercise (Units)  --  1   OT Total Time Spent   OT Individual Total Time Spent (Mins) 60 30   Precautions   Precautions Fall Risk Fall Risk   Comments Left Hemiparesis, left visual inattention Left Hemiparesis, left visual inattention   Functional Level of Assist   Grooming Supervision  --    Grooming Description Adaptive equipment;Seated in wheelchair at sink  --    Bathing Contact Guard Assist  --    Bathing Description Adaptive equipment;Grab bar;Hand held shower;Tub bench;Set-up of equipment;Supervision for safety  (1x CGA to  bar of soap that feel to the ground. Pt able to reach and siting back up, overall, at CGA for safety with dynamic sitting.)  --    Upper Body Dressing Minimal Assist  --    Upper Body Dressing Description Assit with threading arms through sleeves;Initial preparation for task;Verbal cueing  (Demo placing shirt on lap with L sleeve exposed open in prep for donning. Renata to bring shirt L shirt sleeve past elbow)  --    Lower Body Dressing Moderate Assist  --    Lower Body Dressing Description Assistive devices;Dressing stick;Reacher;Grab bar;Increased time;Set-up of equipment;Verbal cueing  (Pt able to thread pants legs with modA " using a hip hike approach and Kyara to don/doff socks using same approach. ModA to pull pants up standing d/t limited dynamic standing balance.)  --    Toileting Minimal Assist  --    Toileting Description Assist to pull pants up;Assist for standing balance;Grab bar;Supervision for safety  (Assistance needed to pull up pants d/t limited dynamic standing balance and limited function of the L UE.)  --    Toilet Transfers Moderate Assist  --    Toilet Transfer Description Grab bar;Initial preparation for task;Supervision for safety;Verbal cueing  (VC for WC safety prior to transfer. Stand step from WC to toilet using R-sided GB.)  --    Tub / Shower Transfers Moderate Assist  --    Tub Shower Transfer Description Grab bar;Shower bench;Initial preparation for task;Supervision for safety;Verbal cueing  (VC for WC safety prior to transfer. Stand step from WC to  bench using forward facing GB.)  --    Sitting Upper Body Exercises   Other Exercise  --  motomed   Comments  --  LUE ace wrapped to ; 10 min; in front of mirror to maintain an upright sitting posture; 4x VC to attend to mirror to correct L lateral lean   Interdisciplinary Plan of Care Collaboration   IDT Collaboration with  Family / Caregiver  --    Patient Position at End of Therapy Seated;Chair Alarm On;Call Light within Reach;Tray Table within Reach;Phone within Reach Seated;Chair Alarm On;Call Light within Reach;Tray Table within Reach;Phone within Reach   Collaboration Comments To schedule home eval  --      1301; thera act: stimulated pulling up pants standing at // in front of mirror. Max to modA for standing balance d/t L lean, VC to straighten L knee with good follow through once cued, but unable to maintain throughout activity.     Therapist contacted and spoke to SO, with pt's consent, to schedule home eval for Friday (12/15).     Pt placed on [facility] list for motomed.     Assessment    Overall, good progress towards ADL goals as pt progress  towards CGA for seated bathing and modA for LBD using AE. Pt is demonstrating good improvements with sitting balance which has contributed to is progress with seated ADLs, but does struggle with dynamic standing balance which impacts his participation with LBD management following toileting.       Strengths: Able to follow instructions, Independent prior level of function, Motivated for self care and independence, Pleasant and cooperative, Supportive family  Barriers: Decreased endurance, Fatigue, Generalized weakness, Hemiparesis, Impaired activity tolerance, Impaired balance, Limited mobility, Spasticity    Plan    Cont ADLs, functional transfers, and thera act/ex to maximize functional recovery for safe DC home. Home eval schedule for (12/15 at 8:30 am)    DME  OT DME Recommendations  Bathroom Equipment: 3 in 1 Commode  Additional Equipment: Other (Comments)    Passport items to be completed:  Perform bathroom transfers, complete dressing, complete feeding, get ready for the day, prepare a simple meal, participate in household tasks, adapt home for safety needs, demonstrate home exercise program, complete caregiver training     Occupational Therapy Goals (Active)       Problem: Bathing       Dates: Start:  12/06/23         Goal: STG-Within one week, patient will bathe at CGA using LRD       Dates: Start:  12/06/23               Problem: Dressing       Dates: Start:  11/22/23         Goal: STG-Within one week, patient will dress LB at Kyara using LRD       Dates: Start:  11/22/23            Goal: STG-Within one week, patient will dress UB SPV using LRD       Dates: Start:  12/06/23               Problem: Functional Transfers       Dates: Start:  12/06/23         Goal: STG-Within one week, patient will transfer to toilet at [place] to [facility] using LRD       Dates: Start:  12/06/23            Goal: STG-Within one week, patient will transfer to step in shower at [place]ara to [facility] using LRD       Dates: Start:  12/06/23                Problem: OT Long Term Goals       Dates: Start:  11/13/23         Goal: LTG-By discharge, patient will complete basic self care tasks at Mod Independent level.       Dates: Start:  11/13/23            Goal: LTG-By discharge, patient will perform bathroom transfers at Mod Independent level.       Dates: Start:  11/13/23

## 2023-12-12 NOTE — CARE PLAN
The patient is Watcher - Medium risk of patient condition declining or worsening    Shift Goals  Clinical Goals: bowel management    Problem: Bladder / Voiding  Goal: Patient will establish and maintain regular urinary output  Outcome: Progressing     Problem: Bowel Elimination  Goal: Patient will participate in bowel management program  Note: LBM 12/8, prn milk of mag given, no abdominal discomfort

## 2023-12-12 NOTE — PROGRESS NOTES
"  Physical Medicine & Rehabilitation Progress Note    Encounter Date: 12/12/2023    Chief Complaint: Happy to stay    Interval Events (Subjective):  VS: BP in 140's   BM 12/8  Voiding    Seen and examined in the café.  States that he is doing okay.  Blood pressure sometimes in the 140s.  Insurance has authorized.    ROS: 14 point ROS negative unless otherwise specified in the HPI    Objective:  VITAL SIGNS: BP (!) 145/85   Pulse 67   Temp 36.7 °C (98.1 °F) (Oral)   Resp 18   Ht 1.727 m (5' 8\")   Wt 87 kg (191 lb 12.8 oz)   SpO2 91%   BMI 29.16 kg/m²     GEN: No apparent distress  HEENT: Head normocephalic, atraumatic.  Sclera nonicteric bilaterally, no ocular discharge appreciated bilaterally.  CV: Extremities warm and well-perfused, no peripheral edema appreciated bilaterally.  PULMONARY: Breathing nonlabored on room air, no respiratory accessory muscle use.  Not requiring supplemental oxygen.  SKIN: No appreciable skin breakdown on exposed areas of skin.  PSYCH: Normal affect  NEURO: Awake alert.  Conversational.  Logical thought content. Dysarthria. Left Hemiparesis. Mild left clonus at ankle. No significant spasticity appreciated at rest.       Laboratory Values:  Recent Results (from the past 72 hour(s))   CBC WITH DIFFERENTIAL    Collection Time: 12/11/23  5:45 AM   Result Value Ref Range    WBC 5.4 4.8 - 10.8 K/uL    RBC 3.83 (L) 4.70 - 6.10 M/uL    Hemoglobin 11.7 (L) 14.0 - 18.0 g/dL    Hematocrit 34.9 (L) 42.0 - 52.0 %    MCV 91.1 81.4 - 97.8 fL    MCH 30.5 27.0 - 33.0 pg    MCHC 33.5 32.3 - 36.5 g/dL    RDW 40.9 35.9 - 50.0 fL    Platelet Count 198 164 - 446 K/uL    MPV 10.2 9.0 - 12.9 fL    Neutrophils-Polys 55.90 44.00 - 72.00 %    Lymphocytes 33.00 22.00 - 41.00 %    Monocytes 7.50 0.00 - 13.40 %    Eosinophils 2.80 0.00 - 6.90 %    Basophils 0.60 0.00 - 1.80 %    Immature Granulocytes 0.20 0.00 - 0.90 %    Nucleated RBC 0.00 0.00 - 0.20 /100 WBC    Neutrophils (Absolute) 3.00 1.82 - 7.42 K/uL "    Lymphs (Absolute) 1.77 1.00 - 4.80 K/uL    Monos (Absolute) 0.40 0.00 - 0.85 K/uL    Eos (Absolute) 0.15 0.00 - 0.51 K/uL    Baso (Absolute) 0.03 0.00 - 0.12 K/uL    Immature Granulocytes (abs) 0.01 0.00 - 0.11 K/uL    NRBC (Absolute) 0.00 K/uL   Basic Metabolic Panel    Collection Time: 23  5:45 AM   Result Value Ref Range    Sodium 139 135 - 145 mmol/L    Potassium 3.6 3.6 - 5.5 mmol/L    Chloride 103 96 - 112 mmol/L    Co2 26 20 - 33 mmol/L    Glucose 132 (H) 65 - 99 mg/dL    Bun 38 (H) 8 - 22 mg/dL    Creatinine 3.04 (H) 0.50 - 1.40 mg/dL    Calcium 9.0 8.5 - 10.5 mg/dL    Anion Gap 10.0 7.0 - 16.0   ESTIMATED GFR    Collection Time: 23  5:45 AM   Result Value Ref Range    GFR (CKD-EPI) 22 (A) >60 mL/min/1.73 m 2           Medications:  Scheduled Medications   Medication Dose Frequency    sertraline  50 mg DAILY    baclofen  10 mg TID    amLODIPine  10 mg DAILY    apixaban  5 mg BID    hydrALAZINE  100 mg Q8HRS    traZODone  75 mg QHS    senna-docusate  2 Tablet BID    omeprazole  20 mg DAILY    atorvastatin  40 mg Nightly     PRN medications: traZODone, carboxymethylcellulose, [DISCONTINUED] insulin regular **AND** [CANCELED] POC blood glucose manual result **AND** NOTIFY MD and PharmD **AND** Administer 20 grams of glucose (approximately 8 ounces of fruit juice) every 15 minutes PRN FSBG less than 70 mg/dL **AND** dextrose bolus, Respiratory Therapy Consult, senna-docusate **AND** polyethylene glycol/lytes **AND** magnesium hydroxide **AND** bisacodyl, mag hydrox-al hydrox-simeth, ondansetron **OR** ondansetron, sodium chloride, [] acetaminophen **FOLLOWED BY** acetaminophen, oxyCODONE immediate-release **OR** oxyCODONE immediate-release, hydrALAZINE    Diet:  Current Diet Order   Procedures    Diet Order Diet: Consistent CHO (Diabetic) (2 gram sodium restriction; medications whole with thin liquids); Second Modifier: (optional): 2 Gram Sodium       Medical Decision Making and  Plan:  Right thalamic hemorrhagic stroke ~ last week October 2023  Originally presented with a headache and left-sided weakness to hospital in [facility]  Work-up at the outside hospital in [place] revealed a right thalamic hemorrhagic stroke  Repeat CT head done [place] return flight to the [place], 11/11 CT head shows right thalamic hemorrhage, decrease in density since prior studies from the outside hospital, slight asymmetric dilation of the left ventricular system including the left temporal horn with a possible component of hydrocephalus  Planning for BP less than 140, avoiding any kind of anticoagulation  Initiate comprehensive IRF level therapy with 3 days of therapy 5 days a week with services from PT/OT/SLP     Spasticity  OP follow up with Physiatry for consideration Botox   Has had resurgence of spasticity restart baclofen 5mg TID 11/30/2023 --> increase to 10mg TID  12/12/2023 continue Baclofen at 10mg TID, no significant side effects    ABLA  Now on Eliquis  Recheck 11/28 - improved 11.4--> 12.0--> 11.6 11/30/2023 12/6/2023 11.6-->10.7--> 11.7 12/11  Monitor     CKD  Follow up with OP nephrology  Renal function fluctuates 20-30's/2's-3's  Cr Baseline in past had been mid 1's     Hypertension  Continue Amlodipine 10mg daily  Continue Hydralazine 25mg TID - increased by hospitalist 11/13/2023 from BID to TID--> increased to 50mg q8hrs 11/15  11/16 Hydralazine increased to 75mg q8hrs and 1x Hydralazine given  11/17 BP still elevated but hydralazine recently increased. Monitor  12/12/2023 VS showing increase in 's-150's. Continue Hydralazine 100mg q8hrs + Amlodipine 10mg daily. Consider reconsult to hospitalist if needed.     Leukopenia  New, mild 12/6/2023   Resolved 12/11     Prediabetes  Discontinue SSI     HLD  Continue home dose satin      Bowel  Constipation 11/29/2023, resolved 11/30/2023   Continue with scheduled Colace and senna, as needed stool softeners  Miralax x3 doses 11/29/2023 -->  Constipation Resolved 11/30/2023   Last BM 12/12 per patient report     Insomnia  Secondary to recent jet lag, melatonin scheduled --> increase to home dose 11/13/2023 9mg  Utilize trazodone as needed insomnia continues  Schedule Trazodone 11/15/2023   11/16/2023 continue Trazodone as is. Thinks he slept better last night  11/17/2023 Still not sleeping well. Increase to 75mg nightly.   12/12/2023 continue trazodone at current dose     Pain -as needed Tylenol and oxycodone    Post-Stroke Depression  Consulted Pyschiatry   Start Prozac 11/28/2023 --> Switched to zoloft per psychiatry  Appreciate assistance with management  Mood improved, continue Zoloft 12/11    Right Foot Pain  H/o Gout  Xray with swelling 1st metatarsal head   Trial Colchicine for acute gout flare 11/28/2023   Topical Voltaren gel scheduled   Improved with less swelling, erythema 11/29/2023   Resolved      LABS: BMP 12/13      DVT PROPHYLAXIS: NO - Hemorrhagic stroke. US + UE and LE for brachial and peroneal DVT. 11/21/2023 start Heparin 5000 units q8hrs SQ for further prophylaxis, if tolerates will increase to full AC. Consider repeat CTH to ensure stability bleed once on full AC. 11/24/2023 Start loading dose Eliquis 10mg BID x 7 days with transition to 5mg BID. CBC showing improvement in H/H 11/28/2023 no neurologic decline.     HOSPITALIST FOLLOWING: YES - d/w hospitalist 12/6 --> Signed off 12/6.     CODE STATUS: FULL CODE    DISPO: Home alone. Family can help starting 12/27    MARCUS: Auth through 12/17/23    MEDS SENT TO: TBD    DISCHARGE FOLLOW UP: Neurology, Nephrology, PCP, Physiatry (Botox) - needs referral to all.   ____________________________________    Dr. Lisa Lee DO, MS  [facility] & [facility]   ____________________________________

## 2023-12-12 NOTE — CARE PLAN
"  Problem: Knowledge Deficit - Standard  Goal: Patient and family/care givers will demonstrate understanding of plan of care, disease process/condition, diagnostic tests and medications  Outcome: Progressing   Patient verbalized understanding plan of care.         Problem: Fall Risk - Rehab  Goal: Patient will remain free from falls  Outcome: Progressing   Shellie Hamilton Fall risk Assessment Score: 23    High fall risk Interventions   - Alarming seatbelt  - Wander guard  - Bed and strip alarm   - Yellow sign by the door   - Yellow wrist band \"Fall risk\"  - Room near to the nurse station  - Do not leave patient unattended in the bathroom  - Fall risk education provided         "

## 2023-12-13 ENCOUNTER — APPOINTMENT (OUTPATIENT)
Dept: PHYSICAL THERAPY | Facility: REHABILITATION | Age: 63
DRG: 057 | End: 2023-12-13
Attending: PHYSICAL MEDICINE & REHABILITATION
Payer: COMMERCIAL

## 2023-12-13 ENCOUNTER — APPOINTMENT (OUTPATIENT)
Dept: OCCUPATIONAL THERAPY | Facility: REHABILITATION | Age: 63
DRG: 057 | End: 2023-12-13
Attending: PHYSICAL MEDICINE & REHABILITATION
Payer: COMMERCIAL

## 2023-12-13 LAB
ANION GAP SERPL CALC-SCNC: 10 MMOL/L (ref 7–16)
BUN SERPL-MCNC: 41 MG/DL (ref 8–22)
CALCIUM SERPL-MCNC: 9.1 MG/DL (ref 8.5–10.5)
CHLORIDE SERPL-SCNC: 102 MMOL/L (ref 96–112)
CO2 SERPL-SCNC: 27 MMOL/L (ref 20–33)
CREAT SERPL-MCNC: 2.76 MG/DL (ref 0.5–1.4)
GFR SERPLBLD CREATININE-BSD FMLA CKD-EPI: 25 ML/MIN/1.73 M 2
GLUCOSE SERPL-MCNC: 129 MG/DL (ref 65–99)
POTASSIUM SERPL-SCNC: 3.5 MMOL/L (ref 3.6–5.5)
SODIUM SERPL-SCNC: 139 MMOL/L (ref 135–145)

## 2023-12-13 PROCEDURE — 700102 HCHG RX REV CODE 250 W/ 637 OVERRIDE(OP): Performed by: HOSPITALIST

## 2023-12-13 PROCEDURE — A9270 NON-COVERED ITEM OR SERVICE: HCPCS | Performed by: HOSPITALIST

## 2023-12-13 PROCEDURE — 94760 N-INVAS EAR/PLS OXIMETRY 1: CPT

## 2023-12-13 PROCEDURE — A9270 NON-COVERED ITEM OR SERVICE: HCPCS | Performed by: STUDENT IN AN ORGANIZED HEALTH CARE EDUCATION/TRAINING PROGRAM

## 2023-12-13 PROCEDURE — 97110 THERAPEUTIC EXERCISES: CPT

## 2023-12-13 PROCEDURE — A9270 NON-COVERED ITEM OR SERVICE: HCPCS | Performed by: PHYSICAL MEDICINE & REHABILITATION

## 2023-12-13 PROCEDURE — 700102 HCHG RX REV CODE 250 W/ 637 OVERRIDE(OP): Performed by: PHYSICAL MEDICINE & REHABILITATION

## 2023-12-13 PROCEDURE — 97116 GAIT TRAINING THERAPY: CPT

## 2023-12-13 PROCEDURE — 97112 NEUROMUSCULAR REEDUCATION: CPT

## 2023-12-13 PROCEDURE — 36415 COLL VENOUS BLD VENIPUNCTURE: CPT

## 2023-12-13 PROCEDURE — 700102 HCHG RX REV CODE 250 W/ 637 OVERRIDE(OP): Performed by: STUDENT IN AN ORGANIZED HEALTH CARE EDUCATION/TRAINING PROGRAM

## 2023-12-13 PROCEDURE — 97530 THERAPEUTIC ACTIVITIES: CPT

## 2023-12-13 PROCEDURE — 770010 HCHG ROOM/CARE - REHAB SEMI PRIVAT*

## 2023-12-13 PROCEDURE — 80048 BASIC METABOLIC PNL TOTAL CA: CPT

## 2023-12-13 PROCEDURE — 99232 SBSQ HOSP IP/OBS MODERATE 35: CPT | Performed by: PHYSICAL MEDICINE & REHABILITATION

## 2023-12-13 RX ORDER — POTASSIUM CHLORIDE 20 MEQ/1
40 TABLET, EXTENDED RELEASE ORAL ONCE
Status: COMPLETED | OUTPATIENT
Start: 2023-12-13 | End: 2023-12-13

## 2023-12-13 RX ADMIN — HYDRALAZINE HYDROCHLORIDE 100 MG: 50 TABLET, FILM COATED ORAL at 20:14

## 2023-12-13 RX ADMIN — AMLODIPINE BESYLATE 10 MG: 5 TABLET ORAL at 05:04

## 2023-12-13 RX ADMIN — OMEPRAZOLE 20 MG: 20 CAPSULE, DELAYED RELEASE ORAL at 09:51

## 2023-12-13 RX ADMIN — SERTRALINE 50 MG: 50 TABLET, FILM COATED ORAL at 09:55

## 2023-12-13 RX ADMIN — SENNOSIDES AND DOCUSATE SODIUM 2 TABLET: 50; 8.6 TABLET ORAL at 20:15

## 2023-12-13 RX ADMIN — BACLOFEN 10 MG: 10 TABLET ORAL at 15:13

## 2023-12-13 RX ADMIN — APIXABAN 5 MG: 5 TABLET, FILM COATED ORAL at 20:15

## 2023-12-13 RX ADMIN — BACLOFEN 10 MG: 10 TABLET ORAL at 09:55

## 2023-12-13 RX ADMIN — HYDRALAZINE HYDROCHLORIDE 100 MG: 50 TABLET, FILM COATED ORAL at 05:04

## 2023-12-13 RX ADMIN — POTASSIUM CHLORIDE 40 MEQ: 1500 TABLET, EXTENDED RELEASE ORAL at 09:54

## 2023-12-13 RX ADMIN — HYDRALAZINE HYDROCHLORIDE 100 MG: 50 TABLET, FILM COATED ORAL at 15:13

## 2023-12-13 RX ADMIN — TRAZODONE HYDROCHLORIDE 75 MG: 50 TABLET ORAL at 20:15

## 2023-12-13 RX ADMIN — ATORVASTATIN CALCIUM 40 MG: 40 TABLET, FILM COATED ORAL at 20:15

## 2023-12-13 RX ADMIN — APIXABAN 5 MG: 5 TABLET, FILM COATED ORAL at 09:51

## 2023-12-13 RX ADMIN — BACLOFEN 10 MG: 10 TABLET ORAL at 20:15

## 2023-12-13 RX ADMIN — SENNOSIDES AND DOCUSATE SODIUM 2 TABLET: 50; 8.6 TABLET ORAL at 09:51

## 2023-12-13 ASSESSMENT — GAIT ASSESSMENTS
ASSISTIVE DEVICE: HEMI-WALKER
GAIT LEVEL OF ASSIST: MAXIMAL ASSIST
DEVIATION: SHUFFLED GAIT;DECREASED HEEL STRIKE;DECREASED TOE OFF
DISTANCE (FEET): 60

## 2023-12-13 ASSESSMENT — ACTIVITIES OF DAILY LIVING (ADL): BED_CHAIR_WHEELCHAIR_TRANSFER_DESCRIPTION: OTHER (COMMENT)

## 2023-12-13 NOTE — PROGRESS NOTES
"  Physical Medicine & Rehabilitation Progress Note    Encounter Date: 12/13/2023    Chief Complaint: Wish he would have done better.     Interval Events (Subjective):  VS: -147  BM 12/12  Voiding    Seen and examined in his room. Wishes he would have done better with therapy. Used jade-walker yesterday. Discussed potential discharge options and labs.     ROS: 14 point ROS negative unless otherwise specified in the HPI    Objective:  VITAL SIGNS: BP (!) 147/86   Pulse 74   Temp 36.5 °C (97.7 °F) (Oral)   Resp 18   Ht 1.727 m (5' 8\")   Wt 87 kg (191 lb 12.8 oz)   SpO2 94%   BMI 29.16 kg/m²     GEN: No apparent distress  HEENT: Head normocephalic, atraumatic.  Sclera nonicteric bilaterally, no ocular discharge appreciated bilaterally.  CV: Extremities warm and well-perfused, no peripheral edema appreciated bilaterally.  PULMONARY: Breathing nonlabored on room air, no respiratory accessory muscle use.  Not requiring supplemental oxygen.  SKIN: No appreciable skin breakdown on exposed areas of skin.  PSYCH: Normal affect  NEURO: Awake alert.  Conversational.  Logical thought content. Dysarthria. Left Hemiparesis. Mild left clonus at ankle. No significant spasticity appreciated at rest.       Laboratory Values:  Recent Results (from the past 72 hour(s))   CBC WITH DIFFERENTIAL    Collection Time: 12/11/23  5:45 AM   Result Value Ref Range    WBC 5.4 4.8 - 10.8 K/uL    RBC 3.83 (L) 4.70 - 6.10 M/uL    Hemoglobin 11.7 (L) 14.0 - 18.0 g/dL    Hematocrit 34.9 (L) 42.0 - 52.0 %    MCV 91.1 81.4 - 97.8 fL    MCH 30.5 27.0 - 33.0 pg    MCHC 33.5 32.3 - 36.5 g/dL    RDW 40.9 35.9 - 50.0 fL    Platelet Count 198 164 - 446 K/uL    MPV 10.2 9.0 - 12.9 fL    Neutrophils-Polys 55.90 44.00 - 72.00 %    Lymphocytes 33.00 22.00 - 41.00 %    Monocytes 7.50 0.00 - 13.40 %    Eosinophils 2.80 0.00 - 6.90 %    Basophils 0.60 0.00 - 1.80 %    Immature Granulocytes 0.20 0.00 - 0.90 %    Nucleated RBC 0.00 0.00 - 0.20 /100 WBC    " Neutrophils (Absolute) 3.00 1.82 - 7.42 K/uL    Lymphs (Absolute) 1.77 1.00 - 4.80 K/uL    Monos (Absolute) 0.40 0.00 - 0.85 K/uL    Eos (Absolute) 0.15 0.00 - 0.51 K/uL    Baso (Absolute) 0.03 0.00 - 0.12 K/uL    Immature Granulocytes (abs) 0.01 0.00 - 0.11 K/uL    NRBC (Absolute) 0.00 K/uL   Basic Metabolic Panel    Collection Time: 12/11/23  5:45 AM   Result Value Ref Range    Sodium 139 135 - 145 mmol/L    Potassium 3.6 3.6 - 5.5 mmol/L    Chloride 103 96 - 112 mmol/L    Co2 26 20 - 33 mmol/L    Glucose 132 (H) 65 - 99 mg/dL    Bun 38 (H) 8 - 22 mg/dL    Creatinine 3.04 (H) 0.50 - 1.40 mg/dL    Calcium 9.0 8.5 - 10.5 mg/dL    Anion Gap 10.0 7.0 - 16.0   ESTIMATED GFR    Collection Time: 12/11/23  5:45 AM   Result Value Ref Range    GFR (CKD-EPI) 22 (A) >60 mL/min/1.73 m 2   Basic Metabolic Panel    Collection Time: 12/13/23  5:20 AM   Result Value Ref Range    Sodium 139 135 - 145 mmol/L    Potassium 3.5 (L) 3.6 - 5.5 mmol/L    Chloride 102 96 - 112 mmol/L    Co2 27 20 - 33 mmol/L    Glucose 129 (H) 65 - 99 mg/dL    Bun 41 (H) 8 - 22 mg/dL    Creatinine 2.76 (H) 0.50 - 1.40 mg/dL    Calcium 9.1 8.5 - 10.5 mg/dL    Anion Gap 10.0 7.0 - 16.0   ESTIMATED GFR    Collection Time: 12/13/23  5:20 AM   Result Value Ref Range    GFR (CKD-EPI) 25 (A) >60 mL/min/1.73 m 2           Medications:  Scheduled Medications   Medication Dose Frequency    sertraline  50 mg DAILY    baclofen  10 mg TID    amLODIPine  10 mg DAILY    apixaban  5 mg BID    hydrALAZINE  100 mg Q8HRS    traZODone  75 mg QHS    senna-docusate  2 Tablet BID    omeprazole  20 mg DAILY    atorvastatin  40 mg Nightly     PRN medications: traZODone, carboxymethylcellulose, [DISCONTINUED] insulin regular **AND** [CANCELED] POC blood glucose manual result **AND** NOTIFY MD and PharmD **AND** Administer 20 grams of glucose (approximately 8 ounces of fruit juice) every 15 minutes PRN FSBG less than 70 mg/dL **AND** dextrose bolus, Respiratory Therapy Consult,  senna-docusate **AND** polyethylene glycol/lytes **AND** magnesium hydroxide **AND** bisacodyl, mag hydrox-al hydrox-simeth, ondansetron **OR** ondansetron, sodium chloride, [] acetaminophen **FOLLOWED BY** acetaminophen, oxyCODONE immediate-release **OR** oxyCODONE immediate-release, hydrALAZINE    Diet:  Current Diet Order   Procedures    Diet Order Diet: Consistent CHO (Diabetic) (2 gram sodium restriction; medications whole with thin liquids); Second Modifier: (optional): 2 Gram Sodium       Medical Decision Making and Plan:  Right thalamic hemorrhagic stroke ~ last week 2023  Originally presented with a headache and left-sided weakness to hospital in [facility]  Work-up at the outside hospital in [facility] revealed a right thalamic hemorrhagic stroke  Repeat CT head done after return flight to the ,  CT head shows right thalamic hemorrhage, decrease in density since prior studies from the outside hospital, slight asymmetric dilation of the left ventricular system including the left temporal horn with a possible component of hydrocephalus  Planning for BP less than 140, avoiding any kind of anticoagulation  Initiate comprehensive IRF level therapy with 3 days of therapy 5 days a week with services from PT/OT/SLP     Spasticity  OP follow up with Physiatry for consideration Botox   Has had resurgence of spasticity restart baclofen 5mg TID 2023 --> increase to 10mg TID  2023 continue Baclofen at 10mg TID, no significant side effects    ABLA  Now on Eliquis  Recheck  - improved 11.4--> 12.0--> 11.6 2023 11.6-->10.7--> 11.7   Monitor     CKD  Follow up with OP nephrology  Renal function fluctuates 20-30's/2's-3's  Cr Baseline in past had been mid 1's     Hypertension  Continue Amlodipine 10mg daily  Continue Hydralazine 25mg TID - increased by hospitalist 2023 from BID to TID--> increased to 50mg q8hrs 11/15  11/16 Hydralazine increased to 75mg q8hrs  and 1x Hydralazine given  11/17 BP still elevated but hydralazine recently increased. Monitor  12/13/2023 VS showing increase in -140's. Continue Hydralazine 100mg q8hrs + Amlodipine 10mg daily. Consider reconsult to hospitalist if needed.     Hypokalemia  Mild 12/13 supplement x1     Leukopenia  New, mild 12/6/2023   Resolved 12/11     Prediabetes  Discontinue SSI     HLD  Continue home dose satin      Bowel  Constipation 11/29/2023, resolved 11/30/2023   Continue with scheduled Colace and senna, as needed stool softeners  Miralax x3 doses 11/29/2023 --> Constipation Resolved 11/30/2023      Insomnia  Secondary to recent jet lag, melatonin scheduled --> increase to home dose 11/13/2023 9mg  Utilize trazodone as needed insomnia continues  Schedule Trazodone 11/15/2023   11/16/2023 continue Trazodone as is. Thinks he slept better last night  11/17/2023 Still not sleeping well. Increase to 75mg nightly.   12/13/2023 continue trazodone at current dose     Pain - as needed Tylenol and oxycodone    Post-Stroke Depression  Consulted Pyschiatry   Start Prozac 11/28/2023 --> Switched to zoloft per psychiatry  Appreciate assistance with management  Mood improved, continue Zoloft 12/11    Right Foot Pain  H/o Gout  Xray with swelling 1st metatarsal head   Trial Colchicine for acute gout flare 11/28/2023   Topical Voltaren gel scheduled   Improved with less swelling, erythema 11/29/2023   Resolved      LABS: BMP 12/15 - K+ and BUN/Cr      DVT PROPHYLAXIS: NO - Hemorrhagic stroke. US + UE and LE for brachial and peroneal DVT. 11/21/2023 start Heparin 5000 units q8hrs SQ for further prophylaxis, if tolerates will increase to full AC. Consider repeat CTH to ensure stability bleed once on full AC. 11/24/2023 Start loading dose Eliquis 10mg BID x 7 days with transition to 5mg BID. CBC showing improvement in H/H 11/28/2023 no neurologic decline.     HOSPITALIST FOLLOWING: YES - d/w hospitalist 12/6 --> Signed off 12/6.      CODE STATUS: FULL CODE    DISPO: Home alone. Family can help starting 12/27    MARCUS: Auth through 12/23/23    MEDS SENT TO: TBD    DISCHARGE FOLLOW UP: Neurology, Nephrology, PCP, Physiatry (Botox) - needs referral to all.   ____________________________________    Dr. Lisa Lee DO, MS  [facility] - Physical Medicine & Rehabilitation   ____________________________________    _____________________________________  Interdisciplinary Team Conference   Most recent IDT on 12/13/2023    I, Dr. Lisa Lee DO, MS, was present and led the interdisciplinary team conference on 12/13/2023.  I led the IDT conference and agree with the IDT conference documentation and plan of care as noted below.     Nursing:  Diet Current Diet Order   Procedures    Diet Order Diet: Consistent CHO (Diabetic) (2 gram sodium restriction; medications whole with thin liquids); Second Modifier: (optional): 2 Gram Sodium       Eating ADL Modified Independent  Set-up of equipment or meal/tube feeding   % of Last Meal  Oral Nutrition: *  * Meal *  *, Breakfast, Between % Consumed   Sleep    Bowel Last BM: 12/12/23   Bladder Continent       Physical Therapy:  Bed Mobility    Transfers Moderate Assist  Squat pivot transfer to wheelchair, Verbal cueing, Set-up of equipment, Increased time, Initial preparation for task   Mobility Total Assist (ModA<>MaxA x 1, 2nd person CGA<>SBA)   Stairs    Making good progress since last IDT    Occupational Therapy:  Grooming Supervision   Bathing Contact Guard Assist   UB Dressing Minimal Assist   LB Dressing Moderate Assist   Toileting Minimal Assist   Shower & Transfer    Making good progress since last IDT    Speech-Language Pathology:  Comprehension:  Modified Independent  Comprehension Description:  Glasses  Expression:  Modified Independent  Expression Description:   (extra time)  Social Interaction:  Modified Independent  Social Interaction Description:  Increased time, Verbal cues  Problem  Solving:  Minimal Assist  Problem Solving Description:  Increased time, Supervision, Bed/chair alarm, Therapy schedule, Verbal cueing  Memory:  Moderate Assist  Memory Description:  Increased time, Supervision, Bed/chair alarm, Therapy schedule, Verbal cueing    SLP not currently following to focus on PT/OT    Respiratory Therapy:  O2 (LPM): 0  O2 Delivery Device: None - Room Air    Case Management:  Continues to work on disposition and DME needs.     BARRIERS TO DISCHARGE HOME:  Home evaluation  Equipment     Discharge Date/Disposition:  12/23/23  _____________________________________

## 2023-12-13 NOTE — CARE PLAN
Problem: PT-Long Term Goals  Goal: LTG-By discharge, patient will propel wheelchair >/= 300' at [place] or better.   Outcome: Progressing  Goal: LTG-By discharge, patient will ambulate >/= 50' c/ LRAD at [place] or better.   Outcome: Progressing  Goal: LTG-By discharge, patient will transfer one surface to another c/ [place] or better.   Outcome: Progressing  Goal: LTG-By discharge, patient will ambulate up/down 1 step c/ LRAD at [place] or better.   Outcome: Progressing     Problem: Mobility  Goal: STG-Within one week, patient will ambulate distances >/= 30' c/ RHR and ModA or better.   Outcome: Met

## 2023-12-13 NOTE — CARE PLAN
Problem: Dressing  Goal: STG-Within one week, patient will dress LB at [place] using LRD  Outcome: Progressing  Goal: STG-Within one week, patient will dress UB SPV using LRD  Outcome: Progressing     Problem: Functional Transfers  Goal: STG-Within one week, patient will transfer to step in shower at [place] to [facility] using LRD  Outcome: Progressing     Problem: Bathing  Goal: STG-Within one week, patient will bathe at [facility] using LRD  Outcome: Met     Problem: Functional Transfers  Goal: STG-Within one week, patient will transfer to toilet at [place] to [facility] using LRD  Outcome: Met

## 2023-12-13 NOTE — FLOWSHEET NOTE
12/13/23 1322   Events/Summary/Plan   Events/Summary/Plan SpO2 check, CPAP check   Vital Signs   Pulse 72   Respiration 16   Pulse Oximetry 93 %   $ Pulse Oximetry (Spot Check) Yes   Oxygen   O2 Delivery Device None - Room Air   Non-Invasive Ventilation [employer]   Nocturnal CPAP or BIPAP CPAP - Home Unit   FiO2 or LPM Room air

## 2023-12-13 NOTE — CARE PLAN
"The patient is Stable - Low risk of patient condition declining or worsening    Shift Goals  Clinical Goals: safety  Patient Goals: safety, sleep  Family Goals: increased pt strength, independence      Problem: Fall Risk - Rehab  Goal: Patient will remain free from falls  Outcome: Progressing  Note: Shellie Hamilton Fall risk Assessment Score: 23    High fall risk Interventions   - Alarming seatbelt  - Bed and strip alarm   - Yellow sign by the door   - Yellow wrist band \"Fall risk\"  - Room near to the nurse station  - Do not leave patient unattended in the bathroom  - Fall risk education provided  Patient uses call light consistently and appropriately this shift.  Waits for assistance when needed and does not attempt self transfer.  Able to verbalize needs.  Will continue to monitor.     Problem: Pain - Standard  Goal: Alleviation of pain or a reduction in pain to the patient’s comfort goal  Outcome: Progressing  Note: Patient able to verbalize pain level and verbalize an acceptable level of pain.        "

## 2023-12-13 NOTE — THERAPY
"Occupational Therapy  Daily Treatment     Patient Name: Jason Lima  Age:  62 y.o., Sex:  male  Medical Record #: [id number]  Today's Date: 12/13/2023     Precautions  Precautions: (P) Fall Risk  Comments: (P) Left Hemiparesis, left visual inattention         Subjective    Pt encountered in main gym for OT. \"I really want to work on my arm and legs.\"     Objective       12/13/23 1001   OT Charge Group   OT Therapeutic Exercise (Units) 4   OT Total Time Spent   OT Individual Total Time Spent (Mins) 60   Precautions   Precautions Fall Risk   Comments Left Hemiparesis, left visual inattention   Supine Upper Body Exercises   Supine Upper Body Exercises Yes   Other Exercise AAROM supine on therapy mat using PVC pipe; L hand ace wrapped for . Pt performed 5x shoulder flex/ex and chest press. All at [facility]. Occasionally RB needed d/t fatigue.   Sitting Lower Body Exercises   Nustep Resistance Level 4;Time (See Comments)   Comments 10 min; gait belt aroun mid thighs to prevent L leg abd; L hand ace wrapped to increase .   Interdisciplinary Plan of Care Collaboration   IDT Collaboration with  Therapy Tech   Patient Position at End of Therapy Seated;Chair Alarm On;Call Light within Reach;Tray Table within Reach;Phone within Reach   Collaboration Comments safety with transfers     Thera act: dynamic standing in front of mirror with jade-walker for support. Participated in dynamic reaching task (reaching toward L/R hip to remove up to 10 yellow clips) to facilitate improvements with LBD tasks. Pt required moderate verbal/tactile cues to maintain an upright posture and maxA to regain standing balance in 10/10 trials.         Overall, modA for functional transfers from mat to WC. Better performance to the R-side.     Assessment    Overall, good progress with static standing, but fluctuating progress with dynamic standing balance to facilitate improvements with LBD tasks. May benefit from cont practice or training in pulling " up pants using a lateral lean in sitting.     Strengths: Able to follow instructions, Independent prior level of function, Motivated for self care and independence, Pleasant and cooperative, Supportive family  Barriers: Decreased endurance, Fatigue, Generalized weakness, Hemiparesis, Impaired activity tolerance, Impaired balance, Limited mobility, Spasticity    Plan    Cont ADLs, functional transfers, and thera act/ex to maximize functional recovery for safe DC home.       DME  OT DME Recommendations  Bathroom Equipment: 3 in 1 Commode  Additional Equipment: Other (Comments)    Passport items to be completed:  Perform bathroom transfers, complete dressing, complete feeding, get ready for the day, prepare a simple meal, participate in household tasks, adapt home for safety needs, demonstrate home exercise program, complete caregiver training     Occupational Therapy Goals (Active)       Problem: Bathing       Dates: Start:  12/06/23         Goal: STG-Within one week, patient will bathe at [facility] using LRD       Dates: Start:  12/06/23               Problem: Dressing       Dates: Start:  11/22/23         Goal: STG-Within one week, patient will dress LB at [facility] using LRD       Dates: Start:  11/22/23            Goal: STG-Within one week, patient will dress [facility] SP[facility] using LRD       Dates: Start:  12/06/23               Problem: Functional Transfers       Dates: Start:  12/06/23         Goal: STG-Within one week, patient will transfer to toilet at [facility] to [facility] using LRD       Dates: Start:  12/06/23            Goal: STG-Within one week, patient will transfer to step in shower at [facility] to [facility] using LRD       Dates: Start:  12/06/23               Problem: OT Long Term Goals       Dates: Start:  11/13/23         Goal: LTG-By discharge, patient will complete basic self care tasks at Mod Independent level.       Dates: Start:  11/13/23            Goal: LTG-By discharge, patient will perform bathroom transfers at Mod Independent  level.       Dates: Start:  11/13/23

## 2023-12-13 NOTE — PROGRESS NOTES
NURSING DAILY NOTE    Name: Jason Lima   Date of Admission: 11/12/2023   Admitting Diagnosis: Thalamic hemorrhage (HCC)  Attending Physician: Lisa Lee D.o.  Allergies: Penicillins    Safety  Patient Assist  Max 2  Patient Precautions  Fall Risk  Precaution Comments  Left Hemiparesis, left visual inattention  Bed Transfer Status  Moderate Assist  Toilet Transfer Status   Moderate Assist  Assistive Devices  Rails, Wheelchair  Oxygen  None - Room Air  Diet/Therapeutic Dining  Current Diet Order   Procedures    Diet Order Diet: Consistent CHO (Diabetic) (2 gram sodium restriction; medications whole with thin liquids); Second Modifier: (optional): 2 Gram Sodium     Pill Administration  whole  Agitated Behavioral Scale  14  ABS Level of Severity  No Agitation    Fall Risk  Has the patient had a fall this admission?   No  Shellie Hamilton Fall Risk Scoring  22, HIGH RISK  Fall Risk Safety Measures  bed alarm, chair alarm, poor balance, and low vision/ hearing    Vitals  Temperature: 36.5 °C (97.7 °F)  Temp src: Oral  Pulse: 74  Respiration: 18  Blood Pressure: (!) 147/86  Blood Pressure MAP (Calculated): 106 MM HG  BP Location: Right, Upper Arm  Patient BP Position: Supine     Oxygen  Pulse Oximetry: 94 %  O2 (LPM): 0  FiO2%: 21 %  O2 Delivery Device: None - Room Air    Bowel and Bladder  Last Bowel Movement  12/12/23  Stool Type  Patient stated, Not observed  Bowel Device  Bathroom, Other (Comment)  Continent  Bladder: Continent void   Bowel: Continent movement  Bladder Function  Urine Void (mL): 400 ml  Number of Times Voided: 1  Urinary Options: Yes  Urine Color: Yellow  Number of Times Incontinent of Urine: 0  Genitourinary Assessment   Bladder Assessment (WDL):  WDL Except  Echols Catheter: Not Applicable  Urine Color: Yellow  Number of Bladder Accidents: 0  Total Number of Bladder of Accidents in Last 7 Days: 0  Number of Times Incontinent of Urine:  0  Bladder Device: Urinal  Time Void: No  Bladder Scan: Post Void  $ Bladder Scan Results (mL): 26    Skin  Polo Score   15  Sensory Interventions   Bed Types: Standard/Trauma Mattress  Skin Preventative Measures: Pillows in Use for Support / Positioning  Moisture Interventions  Moisturizers/Barriers: Barrier Wipes      Pain  Pain Rating Scale  0 - No Pain  Pain Location  Foot  Pain Location Orientation  Right, Left  Pain Interventions   Declines    ADLs    Bathing   Shower (OT)  Linen Change   Partial  Personal Hygiene  Change Deja Pads, Moist Deja Wipes, Perineal Care  Chlorhexidine Bath      Oral Care  Brushed Teeth  Teeth/Dentures     Shave  Self  Nutrition Percentage Eaten  Lunch, Between % Consumed  Environmental Precautions  Treaded Slipper Socks on Patient, Bed in Low Position, Personal Belongings, Wastebasket, Call Bell etc. in Easy Reach, Transferred to Stronger Side  Patient Turns/Positioning  Sitting Up in Wheelchair  Patient Turns Assistance/Tolerance  Assistance of Two or More  Bed Positions  Bed Controls On, Bed Locked  Head of Bed Elevated  Self regulated      Psychosocial/Neurologic Assessment  Psychosocial Assessment  Psychosocial (WDL):  WDL Except  Patient Behaviors: Fatigue  Neurologic Assessment  Neuro (WDL): Exceptions to WDL  Level of Consciousness: Alert  Orientation Level: Oriented X4  Cognition: Follows commands, Appropriate attention/concentration  Speech: Clear  Facial Symmetry: Left facial drooping  Pupil Assesment: Yes  R Pupil Size (mm): 3  R Pupil Shape / Description: Round  R Pupil Reaction: Brisk  L Pupil Size (mm): 3  L Pupil Shape / Description: Round  L Pupil Reaction: Brisk  Reflexes: Cough  Cough Reflex: Present  Motor Function/Sensation Assessment: Dorsiflexion, Motor response  R Foot Dorsiflexion: Strong  L Foot Dorsiflexion: Absent  RUE Motor Response: Responds to commands  RUE Sensation: Full sensation  Muscle Strength Right Arm: Normal Strength Against Gravity and  Full Resistance  LUE Motor Response: Flaccid  LUE Sensation: Numbness, Tingling  Muscle Strength Left Arm: Weak Movement but Not Against Gravity or Resistance  RLE Motor Response: Responds to commands  RLE Sensation: Full sensation  Muscle Strength Right Leg: Good Strength Against Gravity and Moderate Resistance  LLE Motor Response: Flaccid  LLE Sensation: Numbness, Tingling  Muscle Strength Left Leg: Weak Movement but Not Against Gravity or Resistance  EENT (WDL):  WDL Except    Cardio/Pulmonary Assessment  Edema   RLE Edema: Trace  LLE Edema: Trace  Respiratory Breath Sounds  RUL Breath Sounds: Clear  RML Breath Sounds: Clear  RLL Breath Sounds: Diminished  MOHAMUD Breath Sounds: Clear  LLL Breath Sounds: Diminished  Cardiac Assessment   Cardiac (WDL):  WDL Except (htn, hld)

## 2023-12-13 NOTE — PROGRESS NOTES
"  THERAPY WHEELCHAIR & SEATING EVALUATION    Date of service: 12/13/2023  Patient Name: Jason Lima   MRN: [id number]   YOB: 1960  Primary Insurance: Payor: [employer] - MISCELLANEOUS / Plan: CIG[employer] - MISCELLANEOUS / Product Type: *No Product type* /    Secondary Insurance: N/A  Diagnoses:   Patient Active Problem List    Diagnosis Date Noted    DVT (deep venous thrombosis) ([facility]) 11/21/2023    Spasticity 11/12/2023    Hemorrhagic stroke (HCC) 11/12/2023    Thalamic hemorrhage (HCC) 11/11/2023    CKD (chronic kidney disease) 11/11/2023    Acute left-sided weakness 11/11/2023    Gram-negative bacteremia 01/06/2017    Hypertension 01/06/2017    Hyperlipidemia 01/06/2017    DM (diabetes mellitus) ([facility]) 01/06/2017        VENDOR: Tae Alvares, Phone: [phone]    Referring Physician:  Lisa Lee DO  Reason for Referral: Ambulation not independent, safe or timely.      Height:   Ht Readings from Last 1 Encounters:   11/12/23 1.727 m (5' 8\")     Weight:  Wt Readings from Last 1 Encounters:   11/26/23 87 kg (191 lb 12.8 oz)     BMI: Body mass index is 29.16 kg/m².      HISTORY OF PRESENT ILLNESS    CHIEF COMPLAINT:     The patient is a 62 y.o. right hand dominant male admitted to [facility] inpatient rehabilitation 11/12/2023 with severe functional debility after R thalamic hemorrhagic CVA.     Per Dr. Watikns's consult note from 11/11:  The patient is a 62 y.o. male with a past medical history of hypertension, diabetes, hyperlipidemia and questionable history of CKD;  who presented on 11/11/2023  1:07 AM with left-sided weakness after stroke in [place] 3 weeks ago.  Per documentation, patient was visiting [place] when patient had a sudden headache followed by left-sided weakness on 10/23/2023.  Initial work-up done at the hospital in [place] revealed hyper tension with /100 and creatinine 3.2.  Patient was deemed medically cleared to leave the hospital admitted after the stroke, he completed a " "flight to [place] and was admitted to the emergency department in preparation for work-up for admit to rehab.  Evaluation completed in the emergency department prior to rehab reveals a CT head notable for right thalamic hemorrhage.  At time of evaluation at the [facility] ED creatinine 3.59 and hypertension of 187/119, patient required rescue labetalol to decrease blood pressures.  Patient is now on amlodipine and hydralazine.\"  Patient was newly started on baclofen on 11/11, patient's blood pressures improved since original presentation to the emergency department from the airport.     Patient's sister reports he was in ICU for 8 days in [place], pt reports very limited mobilization during his acute hospitalization.     Rehabilitation stay has been complicated by contraversive pushing, visual deficits, and severe L hemiplegia. He currently requires skilled therapist handling and up to 2 person assist for household distance ambulation c/ hemiwalker, and is a very high fall risk. As such, patient is a candidate for wheelchair mobility for general locomotion and is being evaluated for seating and positioning needs.     Past Medical History:   Diagnosis Date    Diabetes (HCC)     High cholesterol     Hypertension     Stroke (HCC)     3 weeks ago in [place] (as of 11/11/2023)        PATIENT/FAMILY GOALS: to get around the house and get in and out of the car      INVENTORY OF PRESENT EQUIPMENT    Current mobility device: None    HOME ENVIRONMENT:    Prior Living Situation  Prior Services: Home-Independent  Housing / Facility: [address]  Steps Into Home: 1  Steps In Home: 0  Rail: None  Elevator: No  Bathroom Set up: Walk In Shower  Equipment Owned: None  Lives with - Patient's Self Care Capacity: Alone and Able to Care For Self  Comments: reports brother and sister live in [place]     Number of hours home without assistance: therapy team has recommended 24/7 supervision/assist at discharge. Family is hiring private caregivers to " supplement family support.     Most common indoor surfaces: tile, wood/laminate; carpets in bedroom     Home Safety/Access Assessment:    Home is accessible to patient: Yes , may need ramp to access front door   Wheelchair is stored in home? Yes     TRANSPORTATION    How will equipment be transported? Car, SUV, and Transport Service    Does user drive? No    Vehicle adaptations: None  Method of riding: Rides in w/c  and Rides in vehicle seat/car seat     Where is w/c stored during transport? Trunk/bed/cargo area and Vehicle lift  Caregiver can load/unload wheelchair: Yes    COMMUNITY ACCESS:   Employment/Volunteer:  N/A  School: N/A  Other Community Mobility: Medical Appointments, Orthodoxy, Civic Duties, and IADLs    CURRENT LEVEL OF FUNCTION:   Ability to complete Mobility-Related Activities of Daily Living (MRADLs) with Current Mobility Device    MRADL Level of Assist Comments   Bed mobility:  Mod assist Help to manage hemiside<>bed   Transfers:  Mod assist For stand or squat pivot transfers due to L lean, L hemiparesis, increased tone    Gait: Mod assist and Max assist Assistive Device:  R hemiwalker     Distance: 30'   ADLs:  See below  Min to ModA    Bowel management:  Continent Needs assist for pericare   Bladder management:  Continent Needs assist for urinal management      Functional Level of Assist ADLs   Grooming Seated;Minimal Assist   Grooming Description Seated in wheelchair at sink;Supervision for safety;Verbal cueing  (shaved, brushed teeth, min A sitting balance during oral care)   Bathing Minimal Assist   Bathing Description Grab bar;Hand held shower;Tub bench;Set-up of equipment;Supervision for safety;Verbal cueing  (min A sitting balance at times, min A to wash/dry R UE)   Upper Body Dressing Minimal Assist   Upper Body Dressing Description Set-up of equipment;Supervision for safety;Verbal cueing  (min A to don pullover tank top (assist to pull down in back and on L side))   Lower Body Dressing  Maximal Assist   Lower Body Dressing Description Grab bar;Set-up of equipment;Supervision for safety;Verbal cueing  (assist w/ 9/15 tasks)   Bed, Chair, Wheelchair Transfer Moderate Assist   Bed Chair Wheelchair Transfer Description Set-up of equipment;Supervision for safety;Verbal cueing  (mod A SPT bed to w/c to the R)   Tub / Shower Transfers Moderate Assist   Tub Shower Transfer Description Grab bar;Shower bench;Set-up of equipment;Supervision for safety;Verbal cueing;Requires lift  (mod A SPT w/c <> bench with cues and grab bar at times)       MRADLs IN THE HOME:   Client currently uses the following to perform MRADLs:  manual w/c in IPR facility ; propels c/ hemitechnique    PHYSICAL / FUNCTIONAL EVALUATION    VITALS:   Vitals:    12/13/23 1322   BP:    Pulse: 72   Resp: 16   Temp:    SpO2: 93%        VERBAL COMMUNICATION  Primary Language: English  Secondary Language: N/A  Communication provided by: Patient  Communication Description: Understandable     PROCESSING SKILLS  Visual Processing: Impaired, Compensated, and Comments: needs cues for L side attention  ; wears glasses all the time   Motor Planning and Execution: Impaired and Compensated, hemiparesis  Safety awareness of self and others: Impaired and Compensated, needs cues for attention, problem solving, prioritization for safety/efficiency  Attention to environment: Impaired  Behavioral status: Calm  Comments: The patient has the appropriate level of assistance available from a caregiver to safely operate the recommended device.    PAIN, SENSATION, & SKIN INTEGRITY   Pain (0-10 scale): moderate  Location: back  Impact on ADLs: limits tolerance for walking c/ assistance    Sensation: impaired: diminished at RUE/RLE to light touch; light touch absent below shoulder level on the LUE, impaired LLE grossly to light touch but can detect all points tested. Proprioception: absent from finger to shoulder on LUE, absent L gr toe, ankle, impaired L knee.      Skin integrity:   Current skin integrity: intact    History of skin breakdown? No  History of skin flap surgery? No  Factors contributing to skin breakdown risk: immobility, activity level, and Inadequate support surfaces  Pressure mapping completed: N/A    PRESSURE RELIEF    METHOD OF PRESSURE RELIEF: Yes, able to perform effective pressure relief/reperfusion at seated surface. Method: Stand up (independently without risk of falling) and Lean side to side (without risk of falling)    PRESSURE RELIEF LEVEL OF ASSIST: assistance and/or supervision. Neto for safety/balance.    FALL HISTORY: Yes, the patient has a history of falls. The patient typically falls monthly.  1 fall since hospital admit.  Number of falls in the past 6 months? 1   Number of near falls in the past 6 months? N/A    MAT EXAM:    LOWER EXTREMITY ROM:  HIP RLE LLE   Hip flexion Normal Abnormal - limited by tone    IR Normal Abnormal - tone limited, AROM: 0 PROM: 20   ER  Normal Normal   KNEE RLE  LLE   Knee flexion Normal Abnormal - AROM: 30 deg PROM: WFL    Knee extension Normal Abnormal AROM: -15, PROM: 0 deg   ANKLE RLE LLE   Dorsiflexion Normal Abnormal - AROM: 115, PROM: 97    NOTES:  Within Functional Limits  Grossly Impaired     LOWER EXTREMITY STRENGTH:  HIP RLE LLE   Hip flexion Normal Abnormal - 2+/5   IR Normal Abnormal - 2/5   ER  Normal Abnormal - 2+/5   KNEE RLE  LLE   Knee flexion Normal Abnormal - 2/5   Knee extension Normal Abnormal - 3/5   ANKLE RLE LLE   Dorsiflexion Abnormal - 3+/5 Abnormal - 2/5   NOTES:  Within Functional Limits  Grossly Impaired       ----------------------------------------------------------------------------------------------------------------------    UPPER EXTREMITY ROM:  Shoulder RUE LUE   FLX Normal Abnormal - AROM: 80 deg; PROM: 140   EXT Normal Abnormal - AROM: 0, PROM: 45   Elbow RUE  LUE   FLX Normal Abnormal - AROM: 126; PROM: 140   EXT Normal Abnormal - AROM: 160, PROM: 175   Wrist/Hand RUE  LUE   FLX Normal Abnormal - AROM: 55, PROM: 70   EXT Normal Abnormal - AROM: 40, PROM: 55    Normal Abnormal - decreased ROM in composite     NOTES:  Within Functional Limits  Grossly Impaired     UPPER EXTREMITY STRENGTH:  Shoulder RUE LUE   FLX Normal Abnormal - 3-/5   EXT Normal Abnormal - 3+/5   Elbow RUE  LUE   FLX Normal Abnormal - 3/5   EXT Normal Abnormal - 3-/5   Wrist/Hand RUE LUE   FLX Normal Abnormal - 3/5   EXT Normal Abnormal - 3-/5    Normal Abnormal - weak, 3-/5    NOTES:  Within Functional Limits  Grossly Impaired       BALANCE, GAIT, TRANSFERS:    BALANCE:  Level of Assist  Comments   Static Sitting Supervision Posterior pelvic tilt, obliquity   Dynamic Sitting Standby assist For steadying    Static Standing Min assist VC/MC for midline control   Dynamic Standing  Mod assist For functional WS, correction to midline      NEURO-MOTOR STATUS (Tone, Reflexive, Responses, etc.):     TONE: Spasticity/Hypertonicity and Fluctuating Tone, LUE/LLE c/ flexion synergy    SENSATION: diminished at RUE/RLE to light touch; light touch absent below shoulder level on the LUE, impaired LLE grossly to light touch but can detect all points tested. Proprioception: absent from finger to shoulder on LUE, absent L gr toe, ankle, impaired L knee.     MOTOR CONTROL: Impaired, LUE/LLE due to hemiparesis and sensory deficits as above     ENDURANCE: Good; Able to tolerate >/= 6 hours sitting daily.     POSTURE IN SITTING:   PELVIS  Direction Anterior/Posterior Obliquity Rotation- Pelvis   Position posterior tilt L low obliquity L anterior   Reducible?  Partly reducible with external force Partly reducible with external force Partly reducible with external force   Tonal influence High Tone and Spasticity   Comments No pain with manual reduction     TRUNK  Direction Anterior/Posterior Left/Right Scoliosis Rotation- Upper Shoulders & Trunk   Position thoracic kyphosis, increased WFL Right-anterior   Reducible?  Partly  reducible by self Partly reducible by self Partly reducible by self   Tonal influence High Tone   Comments L hemiplegia      HIPS  Position: Abduction, Partly reducible ; slight L forward pelvic rotation    Windswept?  Neutral   Tone/Movements LE:  Spasticity and Kicks into knee extension     KNEES & FEET   Right Left Comments   Knees WFL WFL    Feet/Ankles WFL Limitations, Inversion, Partly reducible Increases c/ effort during swing advancement in stance   Edema WFL RLE, +1 pitting on LLE       HEAD & NECK  Position Limitations, slight L rotation and L lateral tilt, Tendency away from neutral    Head control Good   Tone/Movement concerns?  Preferential shift to L, reports R eye blurred vision     UE    Right Left   Shoulders Limitations (describe): Protracted and Rotated, internal; Reducible (Flexible) Limitations (describe): Depressed, Protracted, and Rotated, internal; Partly reducible   Elbows/forearms WFL Limitations (describe): Pronated; Partly reducible   Wrists WFL Limitations (describe): Flexed, 2/2 hemiparesis, tone; Reducible (Flexible)   Hands/fingers WFL WFL   Hand dominance right    Edema None; WFL None; WFL       MOBILITY OUTCOME MEASURES:     12/15/23 1301   Outcome Measures   Outcome Measures Utilized Hassan Balance Scale   Hassan Balance Scale   Sitting Unsupported (Score 0-4) 4   Change Of Positon: Sitting To Standing (Score 0-4) 1   Change Of Positon: Standing To Sitting (Score 0-4) 1   Transfers (Score 0-4) 1   Standing Unsupported (Score 0-4) 1   Standing With Eyes Closed (Score 0-4) 0   Standing With Feet Together (Score 0-4) 0   Tandem Standing (Score 0-4) 0   Standing On One Leg (Score 0-4) 0   Turning Trunk (Feet Fixed) (Score 0-4) 0   Retrieving Objects From Floor (Score 0-4) 0   Turning 360 Degrees (Score 0-4) 0   Stool Stepping (Score 0-4) 0   Reaching Forward While Standing (Score 0-4) 0   Hassan Balance Total Score (0-56) 8       ASSESSMENT:    PROBLEM LIST: Pain, Impaired Mobility, Impaired  Transfers, Impaired Ambulation, Functional ROM Deficit, Functional Strength Deficit, Impaired Balance, Impaired Coordination, Decreased Activity Tolerance, Motor Control Deficits , Tone/Spasticity, Postural Deficits, Decreased safety awareness, and High falls risk        INTERPRETATION OF EVALUATION RESULTS:    Due to the above impairments, the patient is not a functional ambulator due to inability to complete MRADLs in a safe or efficient manner. . They require an appropriate assistive device for safe, functional and independent mobility. The following devices were trialed/considered and ruled out:   crutch/cane due to is not a safe, functional ambulator, high risk of falls while completing mobility, and lacks sufficient balance to use  walker due to is not a safe, functional ambulator, high risk of falls while completing mobility, and lacks sufficient balance to use  standard manual wheelchair due to insufficient UE strength/ROM, postural deviations requiring increased support while seated , cannot functionally propel MWC, tone and spasticity limits ability to maintain position or propel MWC, and medical condition/weight of standard MWC limits ability to self propel  scooter (POV) due to inability to safely transfer to/from scooter and inability to sit in and operate POV safely    Definitive recommendation is made for a High strength lightweight manual wheelchair (K4 jade-height).  This has been found to be the least costly option for safe, functional, and independent mobility.     GOALS:  Maximize independence with mobility in home with mobility related ADLs (MRADLs)  Maximize independence with mobility at school, work, and/or in the community  Provide support needed to facilitate function or safety  Provide corrective forces to assist with maintaining or improving posture  Manage tone/spasticity  Manage pain  Prevent medical complications and injury  Enhance ability to live in community rather than  institution  Decrease caregiver burden during assistance with mobility and ADLs  Other: Patient will self propel w/c in home environment c/ hemipropulsion technique     BARRIERS TO ATTAINMENT OF GOALS:   None anticipated.    THERAPIST EQUIPMENT RECOMMENDATIONS & JUSTIFICATION:    Definitive recommendation is made for a Manual Wheelchair.   A manual wheelchair base is required due to: is not a safe and functional ambulator, is a part-time wheelchair user for mobility, provides mobility to participate in ADLs, promotes independent mobility, limitation prevents completion of MRADLs within a reasonable time frame , and lower seat to floor height is needed to allow for hemipropulsion technique.     Recommended Manual Wheelchair Type: High strength lightweight manual wheelchair (K4 jade-height).   This wheelchair is indicated for this patient due to:   medical condition and weight of wheelchair affect ability to self-propel while engaging in MRADLs that cannot be performed in a standard or lightweight manual wheelchair  lower seat to floor height is required to propel wheelchair with feet; this is not available on lower level manual wheelchairs  needs specialized seat and/or back measurements which are unavailable on lower level manual wheelchairs  patient is willing and motivated to use the recommended equipment, and patient independently and functionally self-propels the recommended equipment. .     The following features are recommended:  Seat cushion type: Specialty Off-Shelf, adjustable pressure relieving and positioning cushion. This is required to: accommodate impaired sensation, improve pressure distribution, stabilize pelvis, prevent pelvic thrust, accommodate obliquity/rotation, accommodate multiple deformities/postural deviations, promote hip/femur alignment, accommodate ROM limitations, and stabilize/promote postural alignment .  Back support: Specialty Off-Shelf, positioning backrest with lumbar support. This  is required to: provide posterior trunk support, provide posterior/lateral trunk support, provide support of significant postural asymmetries , accommodate or decrease tone, provide lumbar/sacral support, support trunk in midline, and correct deformity due to L hemiplegia/lateral pushing.  Pelvic Positioner/Hip belt with mounting hardware. Single pull belt is required to: stabilize pelvis in neutral position, counteract rotation, counteract obliquity, maintain contact with w/c cushion, increase safety while seated in w/c , counteract postural instability exacerbated by tone , and provide postural support due to fatigue/endurance impairments .  Adjustable Height, flip-back, Desk Length Armrests with flip back padded left half lap tray. These are necessary to: accommodate seat-elbow measurement, provide support with elbow at 90 deg, provide postural control and trunk support, assist with pressure relief, change height/angle for ADLs, remove for transfers, improve access to table, counters, sinks, etc. , provide support for w/c tray, allow access to different parts of environment during day, and accommodate abnormal tone .  Elevating Footrests/Leg Rests: needed for dependent transport and allow for resting of LLE when fatigued, elevate LLE to decrease dependent edema, and to support appropriate positioning in wheelchair. Elevating leg rests are also necessary to accommodate a lower seat to floor height- standard swing away leg rests will not fit the needed dimensions.   Lateral wedge for pelvic obliquity: is recommended to bring pelvis into greater symmetry and prevent secondary complications of L hemiparesis and trunk/pelvic rotation, including decreased functional ROM and contracture, respiratory complications, UE pain and dysfunction due to poor postural alignment while hemipropelling wheelchair.   Solid tires: are recommended for decreased maintenance burden and flat resistance.   Plastic coated handrims: are  recommended to facilitate increased  for RUE manual propulsion of wheelchair.   Tanvir brake/wheel lock + extension, right side: to allow for brakes to be manipulated by RUE only, increasing safety and decreasing risk of loss of balance when attempting to set w/c brakes on the hemiside of the body.   Anti-tippers: to prevent loss of balance and falls when attempting to navigate uneven surfaces, thresholds, and curb cuts.     FOLLOW UP & PLAN OF CARE:  Jason Lima will continue their therapy upon discharge from inpatient rehabilitation with outpatient therapy services. They will discharge with  caregiver and family  assistance. Vendor to complete final fit and adjustments of equipment. Recommend final fit with outpatient PT to determine if chair and components fit patient's needs and for ongoing w/c mobility education.     Patient reports understanding and agreement with plan, certifies that they are willing and able to use the recommended equipment.     Therapist Name: Isidra Hamilton, PT      Isidra Hamilton, PT      Therapist signature: __________________________________________________      Lisa RIVERA D.o.  concur with the above findings and recommendations of therapist and supplier and agree with the plan of care.     Provider signature: __________________________________________________

## 2023-12-13 NOTE — PROGRESS NOTES
NURSING DAILY NOTE    Name: Jason Lima   Date of Admission: 11/12/2023   Admitting Diagnosis: Thalamic hemorrhage (HCC)  Attending Physician: Lisa Lee D.o.  Allergies: Penicillins    Safety  Patient Assist  Max 2  Patient Precautions  Fall Risk  Precaution Comments  Left Hemiparesis, left visual inattention  Bed Transfer Status  Moderate Assist  Toilet Transfer Status   Moderate Assist  Assistive Devices  Rails, Wheelchair  Oxygen  None - Room Air  Diet/Therapeutic Dining  Current Diet Order   Procedures    Diet Order Diet: Consistent CHO (Diabetic) (2 gram sodium restriction; medications whole with thin liquids); Second Modifier: (optional): 2 Gram Sodium     Pill Administration  whole  Agitated Behavioral Scale  14  ABS Level of Severity  No Agitation    Fall Risk  Has the patient had a fall this admission?   No  Shellie Hamilton Fall Risk Scoring  23, HIGH RISK  Fall Risk Safety Measures  bed alarm, chair alarm, fall during this hospitalization, and poor balance    Vitals  Temperature: 36.3 °C (97.3 °F)  Temp src: Temporal  Pulse: 88  Respiration: 17  Blood Pressure: (!) 141/101  Blood Pressure MAP (Calculated): 114 MM HG  BP Location: Right, Upper Arm  Patient BP Position: Sitting     Oxygen  Pulse Oximetry: 92 %  O2 (LPM): 0  FiO2%: 21 %  O2 Delivery Device: None - Room Air    Bowel and Bladder  Last Bowel Movement  12/12/23  Stool Type  Patient stated, Not observed  Bowel Device  Bathroom, Other (Comment)  Continent  Bladder: Continent void   Bowel: Continent movement  Bladder Function  Urine Void (mL): 250 ml  Number of Times Voided: 1  Urinary Options: Yes  Urine Color: Yellow  Number of Times Incontinent of Urine: 0  Genitourinary Assessment   Bladder Assessment (WDL):  WDL Except  Echols Catheter: Not Applicable  Urine Color: Yellow  Number of Bladder Accidents: 0  Total Number of Bladder of Accidents in Last 7 Days: 0  Number of Times  Incontinent of Urine: 0  Bladder Device: Urinal  Time Void: No  Bladder Scan: Post Void  $ Bladder Scan Results (mL): 26    Skin  Polo Score   15  Sensory Interventions   Bed Types: Standard/Trauma Mattress  Skin Preventative Measures: Pillows in Use for Support / Positioning  Moisture Interventions  Moisturizers/Barriers: Barrier Wipes      Pain  Pain Rating Scale  0 - No Pain  Pain Location  Foot  Pain Location Orientation  Right, Left  Pain Interventions   Declines    ADLs    Bathing   Shower (OT)  Linen Change   Partial  Personal Hygiene  Change Deja Pads, Moist Deja Wipes, Perineal Care  Chlorhexidine Bath      Oral Care  Brushed Teeth  Teeth/Dentures     Shave  Self  Nutrition Percentage Eaten  Lunch, Between % Consumed  Environmental Precautions  Treaded Slipper Socks on Patient, Bed in Low Position, Personal Belongings, Wastebasket, Call Bell etc. in Easy Reach, Transferred to Stronger Side  Patient Turns/Positioning  Sitting Up in Wheelchair  Patient Turns Assistance/Tolerance  Assistance of Two or More  Bed Positions  Bed Controls On, Bed Locked  Head of Bed Elevated  Self regulated      Psychosocial/Neurologic Assessment  Psychosocial Assessment  Psychosocial (WDL):  WDL Except  Patient Behaviors: Fatigue  Neurologic Assessment  Neuro (WDL): Exceptions to WDL  Level of Consciousness: Alert  Orientation Level: Oriented X4  Cognition: Follows commands, Appropriate attention/concentration  Speech: Clear  Facial Symmetry: Left facial drooping  Pupil Assesment: Yes  R Pupil Size (mm): 3  R Pupil Shape / Description: Round  R Pupil Reaction: Brisk  L Pupil Size (mm): 3  L Pupil Shape / Description: Round  L Pupil Reaction: Brisk  Reflexes: Cough  Cough Reflex: Present  Motor Function/Sensation Assessment: Dorsiflexion, Motor response  R Foot Dorsiflexion: Strong  L Foot Dorsiflexion: Absent  RUE Motor Response: Responds to commands  RUE Sensation: Full sensation  Muscle Strength Right Arm: Normal  Strength Against Gravity and Full Resistance  LUE Motor Response: Flaccid  LUE Sensation: Numbness, Tingling  Muscle Strength Left Arm: Weak Movement but Not Against Gravity or Resistance  RLE Motor Response: Responds to commands  RLE Sensation: Full sensation  Muscle Strength Right Leg: Good Strength Against Gravity and Moderate Resistance  LLE Motor Response: Flaccid  LLE Sensation: Numbness, Tingling  Muscle Strength Left Leg: Weak Movement but Not Against Gravity or Resistance  EENT (WDL):  WDL Except    Cardio/Pulmonary Assessment  Edema   RLE Edema: Trace  LLE Edema: Trace  Respiratory Breath Sounds  RUL Breath Sounds: Clear  RML Breath Sounds: Clear  RLL Breath Sounds: Diminished  MOHAMUD Breath Sounds: Clear  LLL Breath Sounds: Diminished  Cardiac Assessment   Cardiac (WDL):  WDL Except (htn, hld)

## 2023-12-14 ENCOUNTER — APPOINTMENT (OUTPATIENT)
Dept: PHYSICAL THERAPY | Facility: REHABILITATION | Age: 63
DRG: 057 | End: 2023-12-14
Attending: HOSPITALIST
Payer: COMMERCIAL

## 2023-12-14 ENCOUNTER — APPOINTMENT (OUTPATIENT)
Dept: OCCUPATIONAL THERAPY | Facility: REHABILITATION | Age: 63
DRG: 057 | End: 2023-12-14
Attending: HOSPITALIST
Payer: COMMERCIAL

## 2023-12-14 ENCOUNTER — APPOINTMENT (OUTPATIENT)
Dept: PHYSICAL THERAPY | Facility: REHABILITATION | Age: 63
DRG: 057 | End: 2023-12-14
Attending: PHYSICAL MEDICINE & REHABILITATION
Payer: COMMERCIAL

## 2023-12-14 PROCEDURE — 700102 HCHG RX REV CODE 250 W/ 637 OVERRIDE(OP): Performed by: PHYSICAL MEDICINE & REHABILITATION

## 2023-12-14 PROCEDURE — 700102 HCHG RX REV CODE 250 W/ 637 OVERRIDE(OP): Performed by: STUDENT IN AN ORGANIZED HEALTH CARE EDUCATION/TRAINING PROGRAM

## 2023-12-14 PROCEDURE — A9270 NON-COVERED ITEM OR SERVICE: HCPCS | Performed by: STUDENT IN AN ORGANIZED HEALTH CARE EDUCATION/TRAINING PROGRAM

## 2023-12-14 PROCEDURE — 770010 HCHG ROOM/CARE - REHAB SEMI PRIVAT*

## 2023-12-14 PROCEDURE — A9270 NON-COVERED ITEM OR SERVICE: HCPCS | Performed by: PHYSICAL MEDICINE & REHABILITATION

## 2023-12-14 PROCEDURE — 700102 HCHG RX REV CODE 250 W/ 637 OVERRIDE(OP): Performed by: HOSPITALIST

## 2023-12-14 PROCEDURE — 97112 NEUROMUSCULAR REEDUCATION: CPT

## 2023-12-14 PROCEDURE — 97110 THERAPEUTIC EXERCISES: CPT | Mod: CO

## 2023-12-14 PROCEDURE — 97032 APPL MODALITY 1+ESTIM EA 15: CPT

## 2023-12-14 PROCEDURE — 99232 SBSQ HOSP IP/OBS MODERATE 35: CPT | Performed by: PHYSICAL MEDICINE & REHABILITATION

## 2023-12-14 PROCEDURE — 97530 THERAPEUTIC ACTIVITIES: CPT

## 2023-12-14 PROCEDURE — A9270 NON-COVERED ITEM OR SERVICE: HCPCS | Performed by: HOSPITALIST

## 2023-12-14 PROCEDURE — 97116 GAIT TRAINING THERAPY: CPT

## 2023-12-14 RX ORDER — METOPROLOL SUCCINATE 25 MG/1
12.5 TABLET, EXTENDED RELEASE ORAL
Status: DISCONTINUED | OUTPATIENT
Start: 2023-12-14 | End: 2023-12-18

## 2023-12-14 RX ADMIN — HYDRALAZINE HYDROCHLORIDE 100 MG: 50 TABLET, FILM COATED ORAL at 05:17

## 2023-12-14 RX ADMIN — BACLOFEN 10 MG: 10 TABLET ORAL at 08:24

## 2023-12-14 RX ADMIN — BACLOFEN 10 MG: 10 TABLET ORAL at 20:20

## 2023-12-14 RX ADMIN — BACLOFEN 10 MG: 10 TABLET ORAL at 14:51

## 2023-12-14 RX ADMIN — AMLODIPINE BESYLATE 10 MG: 5 TABLET ORAL at 05:17

## 2023-12-14 RX ADMIN — METOPROLOL SUCCINATE 12.5 MG: 25 TABLET, EXTENDED RELEASE ORAL at 11:36

## 2023-12-14 RX ADMIN — OMEPRAZOLE 20 MG: 20 CAPSULE, DELAYED RELEASE ORAL at 08:24

## 2023-12-14 RX ADMIN — TRAZODONE HYDROCHLORIDE 75 MG: 50 TABLET ORAL at 20:20

## 2023-12-14 RX ADMIN — APIXABAN 5 MG: 5 TABLET, FILM COATED ORAL at 20:20

## 2023-12-14 RX ADMIN — SENNOSIDES AND DOCUSATE SODIUM 2 TABLET: 50; 8.6 TABLET ORAL at 08:24

## 2023-12-14 RX ADMIN — HYDRALAZINE HYDROCHLORIDE 100 MG: 50 TABLET, FILM COATED ORAL at 21:29

## 2023-12-14 RX ADMIN — SERTRALINE 50 MG: 50 TABLET, FILM COATED ORAL at 08:24

## 2023-12-14 RX ADMIN — SENNOSIDES AND DOCUSATE SODIUM 2 TABLET: 50; 8.6 TABLET ORAL at 20:17

## 2023-12-14 RX ADMIN — ATORVASTATIN CALCIUM 40 MG: 40 TABLET, FILM COATED ORAL at 20:21

## 2023-12-14 RX ADMIN — APIXABAN 5 MG: 5 TABLET, FILM COATED ORAL at 08:24

## 2023-12-14 RX ADMIN — HYDRALAZINE HYDROCHLORIDE 100 MG: 50 TABLET, FILM COATED ORAL at 13:41

## 2023-12-14 ASSESSMENT — GAIT ASSESSMENTS
GAIT LEVEL OF ASSIST: MAXIMAL ASSIST
GAIT LEVEL OF ASSIST: MAXIMAL ASSIST
DISTANCE (FEET): 30
ASSISTIVE DEVICE: HEMI-WALKER
DISTANCE (FEET): 40
ASSISTIVE DEVICE: HEMI-WALKER
DEVIATION: SHUFFLED GAIT;DECREASED HEEL STRIKE;DECREASED TOE OFF;STEP TO
DEVIATION: SHUFFLED GAIT;DECREASED HEEL STRIKE;DECREASED TOE OFF

## 2023-12-14 ASSESSMENT — ACTIVITIES OF DAILY LIVING (ADL)
BED_CHAIR_WHEELCHAIR_TRANSFER_DESCRIPTION: OTHER (COMMENT);VERBAL CUEING
BED_CHAIR_WHEELCHAIR_TRANSFER_DESCRIPTION: INCREASED TIME;INITIAL PREPARATION FOR TASK;VERBAL CUEING
TOILET_TRANSFER_DESCRIPTION: GRAB BAR;INCREASED TIME;INITIAL PREPARATION FOR TASK;VERBAL CUEING

## 2023-12-14 ASSESSMENT — PAIN DESCRIPTION - PAIN TYPE: TYPE: ACUTE PAIN

## 2023-12-14 NOTE — FLOWSHEET NOTE
12/14/23 0856   Events/Summary/Plan   Events/Summary/Plan CPAP check   Non-Invasive Ventilation [facility]   Nocturnal CPAP or BIPAP CPAP - Home Unit  (10:18 hrs)

## 2023-12-14 NOTE — PROGRESS NOTES
NURSING DAILY NOTE    Name: Jason Lima   Date of Admission: 11/12/2023   Admitting Diagnosis: Thalamic hemorrhage (HCC)  Attending Physician: Lisa Lee D.o.  Allergies: Penicillins    Safety  Patient Assist  max 2  Patient Precautions  Fall Risk  Precaution Comments  Left Hemiparesis, left visual inattention  Bed Transfer Status  Moderate Assist  Toilet Transfer Status   Moderate Assist  Assistive Devices  Rails, Other (Comments) (standing lift)  Oxygen  None - Room Air  Diet/Therapeutic Dining  Current Diet Order   Procedures    Diet Order Diet: Consistent CHO (Diabetic) (2 gram sodium restriction; medications whole with thin liquids); Second Modifier: (optional): 2 Gram Sodium     Pill Administration  whole  Agitated Behavioral Scale  14  ABS Level of Severity  No Agitation    Fall Risk  Has the patient had a fall this admission?   No  Shellie Hamilton Fall Risk Scoring  22, HIGH RISK  Fall Risk Safety Measures  bed alarm, chair alarm, seatbelt alarm, poor balance, and low vision/ hearing    Vitals  Temperature: 36.6 °C (97.9 °F)  Temp src: Oral  Pulse: 77  Respiration: 16  Blood Pressure: (!) 137/90  Blood Pressure MAP (Calculated): 106 MM HG  BP Location: Right, Upper Arm  Patient BP Position: Supine     Oxygen  Pulse Oximetry: 97 %  O2 (LPM): 0  FiO2%: 21 %  O2 Delivery Device: None - Room Air    Bowel and Bladder  Last Bowel Movement  12/12/23  Stool Type  Patient stated, Not observed  Bowel Device  Bathroom, Other (Comment)  Continent  Bladder: Continent void   Bowel: Continent movement  Bladder Function  Urine Void (mL): 300 ml (urinals)  Number of Times Voided: 1  Urinary Options: Yes  Urine Color: Yellow  Number of Times Incontinent of Urine: 0  Genitourinary Assessment   Bladder Assessment (WDL):  WDL Except  Echols Catheter: Not Applicable  Urine Color: Yellow  Number of Bladder Accidents: 0  Total Number of Bladder of Accidents in Last 7  Days: 0  Number of Times Incontinent of Urine: 0  Bladder Device: Urinal  Time Void: No  Bladder Scan: Post Void  $ Bladder Scan Results (mL): 26    Skin  Polo Score   15  Sensory Interventions   Bed Types: Standard/Trauma Mattress  Skin Preventative Measures: Pillows in Use for Support / Positioning  Moisture Interventions  Moisturizers/Barriers: Barrier Wipes      Pain  Pain Rating Scale  0 - No Pain  Pain Location  Foot  Pain Location Orientation  Right, Left  Pain Interventions   Declines    ADLs    Bathing   Shower, * * With Assistance from, Staff  Linen Change   Complete  Personal Hygiene  Change Deja Pads, Moist Deja Wipes, Perineal Care  Chlorhexidine Bath      Oral Care  Brushed Teeth (self)  Teeth/Dentures     Shave  Self  Nutrition Percentage Eaten  *  * Meal *  *, Dinner, Between % Consumed  Environmental Precautions  Bed in Low Position, Treaded Slipper Socks on Patient  Patient Turns/Positioning  Patient Turns Self from Side to Side  Patient Turns Assistance/Tolerance  Assistance of Two or More  Bed Positions  Bed Controls On  Head of Bed Elevated  Self regulated      Psychosocial/Neurologic Assessment  Psychosocial Assessment  Psychosocial (WDL):  WDL Except  Patient Behaviors: Fatigue  Neurologic Assessment  Neuro (WDL): Exceptions to WDL  Level of Consciousness: Alert  Orientation Level: Oriented X4  Cognition: Follows commands, Appropriate attention/concentration  Speech: Clear  Facial Symmetry: Left facial drooping  Pupil Assesment: Yes  R Pupil Size (mm): 3  R Pupil Shape / Description: Round  R Pupil Reaction: Brisk  L Pupil Size (mm): 3  L Pupil Shape / Description: Round  L Pupil Reaction: Brisk  Reflexes: Cough  Cough Reflex: Present  Motor Function/Sensation Assessment: Dorsiflexion, Motor response  R Foot Dorsiflexion: Strong  L Foot Dorsiflexion: Absent  RUE Motor Response: Responds to commands  RUE Sensation: Full sensation  Muscle Strength Right Arm: Normal Strength Against  Gravity and Full Resistance  LUE Motor Response: Flaccid  LUE Sensation: Numbness, Tingling  Muscle Strength Left Arm: Weak Movement but Not Against Gravity or Resistance  RLE Motor Response: Responds to commands  RLE Sensation: Full sensation  Muscle Strength Right Leg: Good Strength Against Gravity and Moderate Resistance  LLE Motor Response: Flaccid  LLE Sensation: Numbness, Tingling  Muscle Strength Left Leg: Weak Movement but Not Against Gravity or Resistance  EENT (WDL):  WDL Except    Cardio/Pulmonary Assessment  Edema   RLE Edema: Trace  LLE Edema: Trace  Respiratory Breath Sounds  RUL Breath Sounds: Clear  RML Breath Sounds: Clear  RLL Breath Sounds: Diminished  MOHAMUD Breath Sounds: Clear  LLL Breath Sounds: Diminished  Cardiac Assessment   Cardiac (WDL):  WDL Except (htn, hld)

## 2023-12-14 NOTE — PROGRESS NOTES
NURSING DAILY NOTE    Name: Jason Lima   Date of Admission: 11/12/2023   Admitting Diagnosis: Thalamic hemorrhage (HCC)  Attending Physician: Lisa Lee D.o.  Allergies: Penicillins    Safety  Patient Assist  max 2  Patient Precautions  Fall Risk  Precaution Comments  Left Hemiparesis, left visual inattention  Bed Transfer Status  Moderate Assist  Toilet Transfer Status   Moderate Assist  Assistive Devices  Rails, Other (Comments) (standing lift)  Oxygen  None - Room Air (cpap noc)  Diet/Therapeutic Dining  Current Diet Order   Procedures    Diet Order Diet: Consistent CHO (Diabetic) (2 gram sodium restriction; medications whole with thin liquids); Second Modifier: (optional): 2 Gram Sodium     Pill Administration  whole  Agitated Behavioral Scale  14  ABS Level of Severity  No Agitation    Fall Risk  Has the patient had a fall this admission?   No  Shellie Hamilton Fall Risk Scoring  22, HIGH RISK  Fall Risk Safety Measures  bed alarm, chair alarm, and poor balance    Vitals  Temperature: 36.8 °C (98.2 °F)  Temp src: Oral  Pulse: 70  Respiration: 18  Blood Pressure: (!) 152/85  Blood Pressure MAP (Calculated): 107 MM HG  BP Location: Right, Upper Arm  Patient BP Position: Sitting     Oxygen  Pulse Oximetry: 93 %  O2 (LPM): 0  FiO2%: 21 %  O2 Delivery Device: None - Room Air (cpap noc)    Bowel and Bladder  Last Bowel Movement  12/12/23  Stool Type  Patient stated, Not observed  Bowel Device  Bathroom, Other (Comment)  Continent  Bladder: Continent void   Bowel: Continent movement  Bladder Function  Urine Void (mL): 500 ml  Number of Times Voided: 1  Urinary Options: Yes  Urine Color: Yellow  Number of Times Incontinent of Urine: 0  Genitourinary Assessment   Bladder Assessment (WDL):  WDL Except  Echols Catheter: Not Applicable  Urine Color: Yellow  Number of Bladder Accidents: 0  Total Number of Bladder of Accidents in Last 7 Days: 0  Number of Times  Incontinent of Urine: 0  Bladder Device: Urinal  Time Void: No  Bladder Scan: Post Void  $ Bladder Scan Results (mL): 26    Skin  Polo Score   15  Sensory Interventions   Bed Types: Standard/Trauma Mattress  Skin Preventative Measures: Pillows in Use for Support / Positioning  Moisture Interventions  Moisturizers/Barriers: Barrier Wipes      Pain  Pain Rating Scale  0 - No Pain  Pain Location  Foot  Pain Location Orientation  Right, Left  Pain Interventions   Declines    ADLs    Bathing   Shower, * * With Assistance from, Staff  Linen Change   Complete  Personal Hygiene  Change Deja Pads, Moist Deja Wipes, Perineal Care  Chlorhexidine Bath      Oral Care  Brushed Teeth (self)  Teeth/Dentures     Shave  Self  Nutrition Percentage Eaten  Breakfast, Between 50-75% Consumed  Environmental Precautions  Bed in Low Position, Treaded Slipper Socks on Patient  Patient Turns/Positioning  Patient Turns Self from Side to Side  Patient Turns Assistance/Tolerance  Assistance of Two or More  Bed Positions  Bed Controls On  Head of Bed Elevated  Self regulated      Psychosocial/Neurologic Assessment  Psychosocial Assessment  Psychosocial (WDL):  WDL Except  Patient Behaviors: Fatigue  Neurologic Assessment  Neuro (WDL): Exceptions to WDL  Level of Consciousness: Alert  Orientation Level: Oriented X4  Cognition: Follows commands, Appropriate attention/concentration  Speech: Clear  Facial Symmetry: Left facial drooping  Pupil Assesment: Yes  R Pupil Size (mm): 3  R Pupil Shape / Description: Round  R Pupil Reaction: Brisk  L Pupil Size (mm): 3  L Pupil Shape / Description: Round  L Pupil Reaction: Brisk  Reflexes: Cough  Cough Reflex: Present  Motor Function/Sensation Assessment: Dorsiflexion, Motor response  R Foot Dorsiflexion: Strong  L Foot Dorsiflexion: Absent  RUE Motor Response: Responds to commands  RUE Sensation: Full sensation  Muscle Strength Right Arm: Normal Strength Against Gravity and Full Resistance  LUE Motor  Response: Flaccid  LUE Sensation: Numbness, Tingling  Muscle Strength Left Arm: Weak Movement but Not Against Gravity or Resistance  RLE Motor Response: Responds to commands  RLE Sensation: Full sensation  Muscle Strength Right Leg: Good Strength Against Gravity and Moderate Resistance  LLE Motor Response: Flaccid  LLE Sensation: Numbness, Tingling  Muscle Strength Left Leg: Weak Movement but Not Against Gravity or Resistance  EENT (WDL):  WDL Except    Cardio/Pulmonary Assessment  Edema   RLE Edema: Trace  LLE Edema: Trace  Respiratory Breath Sounds  RUL Breath Sounds: Clear  RML Breath Sounds: Clear  RLL Breath Sounds: Diminished  MOHAMUD Breath Sounds: Clear  LLL Breath Sounds: Diminished  Cardiac Assessment   Cardiac (WDL):  WDL Except (htn, hld)

## 2023-12-14 NOTE — PROGRESS NOTES
NURSING DAILY NOTE    Name: Jason Lima   Date of Admission: 11/12/2023   Admitting Diagnosis: Thalamic hemorrhage (HCC)  Attending Physician: Lisa Lee D.o.  Allergies: Penicillins    Safety  Patient Assist  max 2  Patient Precautions  Fall Risk  Precaution Comments  Left Hemiparesis, left visual inattention  Bed Transfer Status  Moderate Assist  Toilet Transfer Status   Moderate Assist  Assistive Devices  Rails, Wheelchair  Oxygen  None - Room Air (C pap box)  Diet/Therapeutic Dining  Current Diet Order   Procedures    Diet Order Diet: Consistent CHO (Diabetic) (2 gram sodium restriction; medications whole with thin liquids); Second Modifier: (optional): 2 Gram Sodium     Pill Administration  whole  Agitated Behavioral Scale  14  ABS Level of Severity  No Agitation    Fall Risk  Has the patient had a fall this admission?   No  Shellie Hamilton Fall Risk Scoring  22, HIGH RISK  Fall Risk Safety Measures  bed alarm, chair alarm, fall during this hospitalization, and poor balance    Vitals  Temperature: 36.6 °C (97.9 °F)  Temp src: Oral  Pulse: 88  Respiration: 19  Blood Pressure: (!) 158/96 (Rn notified )  Blood Pressure MAP (Calculated): 117 MM HG  BP Location: Right, Upper Arm  Patient BP Position: Sitting     Oxygen  Pulse Oximetry: 96 %  O2 (LPM): 0  FiO2%: 21 %  O2 Delivery Device: None - Room Air (C pap box)    Bowel and Bladder  Last Bowel Movement  12/12/23  Stool Type  Patient stated, Not observed  Bowel Device  Bathroom, Other (Comment)  Continent  Bladder: Continent void   Bowel: Continent movement  Bladder Function  Urine Void (mL): 450 ml  Number of Times Voided: 1  Urinary Options: Yes  Urine Color: Yellow  Number of Times Incontinent of Urine: 0  Genitourinary Assessment   Bladder Assessment (WDL):  WDL Except  Echols Catheter: Not Applicable  Urine Color: Yellow  Number of Bladder Accidents: 0  Total Number of Bladder of Accidents in Last  7 Days: 0  Number of Times Incontinent of Urine: 0  Bladder Device: Urinal  Time Void: No  Bladder Scan: Post Void  $ Bladder Scan Results (mL): 26    Skin  Polo Score   15  Sensory Interventions   Bed Types: Standard/Trauma Mattress  Skin Preventative Measures: Pillows in Use for Support / Positioning  Moisture Interventions  Moisturizers/Barriers: Barrier Wipes      Pain  Pain Rating Scale  0 - No Pain  Pain Location  Foot  Pain Location Orientation  Right, Left  Pain Interventions   Declines    ADLs    Bathing   Shower (OT)  Linen Change   Complete  Personal Hygiene  Change Deja Pads, Moist Deja Wipes, Perineal Care  Chlorhexidine Bath      Oral Care  Brushed Teeth (self)  Teeth/Dentures     Shave  Self  Nutrition Percentage Eaten  *  * Meal *  *, Dinner, Between % Consumed  Environmental Precautions  Treaded Slipper Socks on Patient, Bed in Low Position, Personal Belongings, Wastebasket, Call Bell etc. in Easy Reach, Transferred to Stronger Side  Patient Turns/Positioning  Sitting Up in Wheelchair  Patient Turns Assistance/Tolerance  Assistance of Two or More  Bed Positions  Bed Controls On, Bed Locked  Head of Bed Elevated  Self regulated      Psychosocial/Neurologic Assessment  Psychosocial Assessment  Psychosocial (WDL):  WDL Except  Patient Behaviors: Fatigue  Neurologic Assessment  Neuro (WDL): Exceptions to WDL  Level of Consciousness: Alert  Orientation Level: Oriented X4  Cognition: Follows commands, Appropriate attention/concentration  Speech: Clear  Facial Symmetry: Left facial drooping  Pupil Assesment: Yes  R Pupil Size (mm): 3  R Pupil Shape / Description: Round  R Pupil Reaction: Brisk  L Pupil Size (mm): 3  L Pupil Shape / Description: Round  L Pupil Reaction: Brisk  Reflexes: Cough  Cough Reflex: Present  Motor Function/Sensation Assessment: Dorsiflexion, Motor response  R Foot Dorsiflexion: Strong  L Foot Dorsiflexion: Absent  RUE Motor Response: Responds to commands  RUE Sensation: Full  sensation  Muscle Strength Right Arm: Normal Strength Against Gravity and Full Resistance  LUE Motor Response: Flaccid  LUE Sensation: Numbness, Tingling  Muscle Strength Left Arm: Weak Movement but Not Against Gravity or Resistance  RLE Motor Response: Responds to commands  RLE Sensation: Full sensation  Muscle Strength Right Leg: Good Strength Against Gravity and Moderate Resistance  LLE Motor Response: Flaccid  LLE Sensation: Numbness, Tingling  Muscle Strength Left Leg: Weak Movement but Not Against Gravity or Resistance  EENT (WDL):  WDL Except    Cardio/Pulmonary Assessment  Edema   RLE Edema: Trace  LLE Edema: Trace  Respiratory Breath Sounds  RUL Breath Sounds: Clear  RML Breath Sounds: Clear  RLL Breath Sounds: Diminished  MOHAMUD Breath Sounds: Clear  LLL Breath Sounds: Diminished  Cardiac Assessment   Cardiac (WDL):  WDL Except (htn, hld)

## 2023-12-14 NOTE — DISCHARGE PLANNING
Case Management/IDT follow up.   IDT continues to recommend IRF level of care as patient continue to make progress with all therapies.   Projected dc date set for 12/23/23.      DC needs:  Recommendations made for [facility]  for PT/OT/SLP (will be private pay d/t insurance not in network.)  Follow up with:     Met with patient and his brother Napoleon  providing update from IDT and discussed plan of care.    Plan:  Continue to follow

## 2023-12-14 NOTE — THERAPY
"Physical Therapy   Daily Treatment     Patient Name: Jason Lima  Age:  62 y.o., Sex:  male  Medical Record #: [id number]  Today's Date: 12/13/2023     Precautions  Precautions: Fall Risk  Comments: Left Hemiparesis, left visual inattention    Subjective    \"I'm doing good. I want to walk.\" Pt in w/c at arrival, agreeable to PT tx.      Objective       12/13/23 1401   PT Charge Group   PT Gait Training (Units) 3   PT Therapeutic Activities (Units) 5   PT Total Time Spent   PT Individual Total Time Spent (Mins) 120   Gait Functional Level of Assist    Gait Level Of Assist Maximal Assist  (help to advance LLE, VC/MC to facilitate WS to RLE, advancement of hemiwalker; ModA with up to MaxA for LOB x 3 during gait bouts)   Assistive Device Tanvir-Walker   Distance (Feet) 60   # of Times Distance was Traveled 1  (1 x 30')   Deviation Shuffled Gait;Decreased Heel Strike;Decreased Toe Off   Wheelchair Functional Level of Assist   Wheelchair Assist Supervised   Distance Wheelchair (Feet or Distance) 400   Wheelchair Description Extra time;Supervision for safety;Verbal cueing  (for attention to L side objects (doorways, corners))   Transfer Functional Level of Assist   Bed, Chair, Wheelchair Transfer Moderate Assist   Bed Chair Wheelchair Transfer Description Other (comment)  (stand pivot- steadying and VC/MC for functional WS for step around without device)   Neuro-Muscular Treatments   Neuro-Muscular Treatments Anterior weight shift;Verbal Cuing;Tactile Cuing;Sequencing;Co-Contraction;Postural Changes;Postural Facilitation;Weight Shift Right;Weight Shift Left   Comments c/ gait training, see note   Interdisciplinary Plan of Care Collaboration   IDT Collaboration with  Therapy Tech;Family / Caregiver;Other (See Comments);Nursing;  (PT from [facility])   Patient Position at End of Therapy Seated;Other (Comments)  (c/ brother present propelling from therapy session)   Collaboration Comments progress to date, " CLOF, med pass, d/c planning; tech assist for equipment, safety during session     60 minutes of session for w/c seating eval, see note dated 12/13/23.     Gait training c/ hemiwalker OG; facilitation of LLE swing, VC/MC for sequencing HW, WS to R, LLE swing and step through pattern, cue at L knee in stance for stability; tech incidental assist improving to SBA c/ w/c only over efforts; ModA with occasional MaxA for balance due to mis-timing of sequence resulting in L side LOB. L AFO donned (Xtern size L)   1 x 60' c/ turn  1 x 30'   Education: progress to date, role of cognition in improving overall functional capacity, [facility]hab [facility] consult and discussion of goals, purpose of continued therapy; pt reports understanding and engaged throughout conversation     Assessment    Jason with continued progress in midline control, functional WS, LLE swing and stability in stance, improving to ModA today with occasional MaxA for LOB, tech incidental assist for safety and w/c setup.     See w/c eval note for seating/positioning recs.     Strengths: Able to follow instructions, Independent prior level of function, Motivated for self care and independence, Pleasant and cooperative, Supportive family, Willingly participates in therapeutic activities  Barriers: Decreased endurance, Hemiparesis, Difficulty following instructions, Fatigue, Hypertension, Impaired activity tolerance, Impaired balance, Impaired insight/denial of deficits, Impaired functional cognition, Impaired sleep pattern, Impulsive, Limited mobility, Visual impairment, Poor family support (lives alone)    Plan  Continue gait c/ hemiwalker vs TM vs BUE support vs no UE support as able (Vector)  NMES to LLE quad/glut L back extensors/parascap mm in standing vs NuStep for pregait training/priming  KAYLYN for gait cues  Aquatic therapy as possible  Continue prism vs alternating patching trial for midline control. STS + midline control TM training  F/U on AFO needs  "    DME  PT DME Recommendations  Wheelchair: 18\" Width  Cushion: Specialty (See other comments) (TBD pending ambulation progress)  Assistive Device: Tanvir Walker (TBD pending progress, currently 2 person assist for gait)  Additional Equipment: Other (Comments)    Passport items to be completed:  Get in/out of bed safely, in/out of a vehicle, safely use mobility device, walk or wheel around home/community, navigate up and down stairs, show how to get up/down from the ground, ensure home is accessible, demonstrate HEP, complete caregiver training    Physical Therapy Problems (Active)       Problem: PT-Long Term Goals       Dates: Start:  11/13/23         Goal: LTG-By discharge, patient will propel wheelchair >/= 300' at [place] or better.        Dates: Start:  11/13/23            Goal: LTG-By discharge, patient will ambulate >/= 50' c/ LRAD at [place] or better.        Dates: Start:  11/13/23            Goal: LTG-By discharge, patient will transfer one surface to another c/ [place] or better.        Dates: Start:  11/13/23            Goal: LTG-By discharge, patient will ambulate up/down 1 step c/ LRAD at [place] or better.        Dates: Start:  11/13/23            Goal: LTG-By discharge, patient will transfer in/out of a car c/ ModA or better.        Dates: Start:  11/13/23              "

## 2023-12-14 NOTE — THERAPY
Physical Therapy   Daily Treatment     Patient Name: Jason Lima  Age:  62 y.o., Sex:  male  Medical Record #: [id number]  Today's Date: 12/13/2023     Precautions  Precautions: Fall Risk  Comments: Left Hemiparesis, left visual inattention    Subjective    Patient agreeable to PT.     Objective       12/13/23 0931   PT Charge Group   PT Neuromuscular Re-Education / Balance (Units) 2   PT Total Time Spent   PT Individual Total Time Spent (Mins) 30   Vitals   O2 (LPM) 0   O2 Delivery Device None - Room Air   Neuro-Muscular Treatments   Comments In front of mirror, NMRE, tech present for A and safety, focus on wt shifting laterally and A-P along with reorientation to midline using VCs but more effectively visual feedback. x30 min.  2x5 reps of STS with Mod A initially improving to Min A (and CGA from 2nd person throughout), 1-step cueing provided throughout.  4 standing reps with standard vs wide TRUMAN on firm surface and variable use of R HW, focus on lateral weight shifting to R target x5-10 reps each sets.  2 sets of 10 reps each stepping A-P with use of R HW but increased to Mod Ax2 depending on weight shift  quality.  Working on upright posture, shift of upper torso on a // and lateral to L in relation to B hip/pelvis.   Interdisciplinary Plan of Care Collaboration   IDT Collaboration with  Therapy Tech;Physical Therapist   Patient Position at End of Therapy Seated;Chair Alarm On;Self Releasing Lap Belt Applied;Other (Comments)  (in gym for OT)   Collaboration Comments Tech A throughout; treatment plan; urinary accident during urinal usage just prior to session         Assessment    Patient still requires increased blocked practice with lateral and A-P weight shifts; lateral postural obliquity with shoulders to L of pelvic position; minimal use of LUE during therapy; improving visual awareness during education and trials with use of mirror; highly motivated and receptive.    Strengths: Able to follow instructions,  "Independent prior level of function, Motivated for self care and independence, Pleasant and cooperative, Supportive family, Willingly participates in therapeutic activities  Barriers: Decreased endurance, Hemiparesis, Difficulty following instructions, Fatigue, Hypertension, Impaired activity tolerance, Impaired balance, Impaired insight/denial of deficits, Impaired functional cognition, Impaired sleep pattern, Impulsive, Limited mobility, Visual impairment, Poor family support (lives alone)    Plan    Continue gait c/ hemiwalker vs TM vs BUE support vs no UE support as able (Vector)  NMES to LLE quad/glut L back extensors/parascap mm in standing vs NuStep for pregait training/priming  KAYLYN for gait cues  Aquatic therapy as possible  Continue prism vs alternating patching trial for midline control. STS + midline control TM training  F/U on AFO needs     DME  PT DME Recommendations  Wheelchair: 18\" Width  Cushion: Specialty (See other comments) (TBD pending ambulation progress)  Assistive Device: Tanvir Walker (TBD pending progress, currently 2 person assist for gait)  Additional Equipment: Other (Comments)    Passport items to be completed:  Get in/out of bed safely, in/out of a vehicle, safely use mobility device, walk or wheel around home/community, navigate up and down stairs, show how to get up/down from the ground, ensure home is accessible, demonstrate HEP, complete caregiver training    Physical Therapy Problems (Active)       Problem: PT-Long Term Goals       Dates: Start:  11/13/23         Goal: LTG-By discharge, patient will propel wheelchair >/= 300' at [place] or better.        Dates: Start:  11/13/23            Goal: LTG-By discharge, patient will ambulate >/= 50' c/ LRAD at [place] or better.        Dates: Start:  11/13/23            Goal: LTG-By discharge, patient will transfer one surface to another c/ [place] or better.        Dates: Start:  11/13/23            Goal: LTG-By discharge, patient will ambulate " up/down 1 step c/ LRAD at [place] or better.        Dates: Start:  11/13/23            Goal: LTG-By discharge, patient will transfer in/out of a car c/ ModA or better.        Dates: Start:  11/13/23

## 2023-12-14 NOTE — THERAPY
"Occupational Therapy  Daily Treatment     Patient Name: Jason Lima  Age:  62 y.o., Sex:  male  Medical Record #: [id number]  Today's Date: 12/14/2023     Precautions  Precautions: Fall Risk  Comments: Left Hemiparesis, left visual inattention         Subjective    \"Can we do the arm bike again\" Pt seated in chair upon arrival ready for therapy.      Objective       12/14/23 1101   OT Charge Group   OT Therapeutic Exercise (Units) 2   OT Total Time Spent   OT Individual Total Time Spent (Mins) 30   Sitting Upper Body Exercises   Pronation / Supination 3 sets of 10;Left;Weight (See Comments for lbs)  (1lbs hand weight w/ dycem)   Comments Nustep ~15min level 3   Interdisciplinary Plan of Care Collaboration   IDT Collaboration with  Therapy Tech   Patient Position at End of Therapy Seated;Chair Alarm On;Phone within Reach;Tray Table within Reach;Call Light within Reach     Thera-ex: Instructed pt in nustep for 15min to increase strengthening and reciprocal movements for UE/LE and the remainder 5min instructed pt in using LUE/LE only to push and pull to challenge and increase strengthening on L side. Needed assistance with keeping L hand on hand peddle with frequent VC for contract hand.   Assessment    Pt tolerated session well with focus on thera ex for L side.       Strengths: Able to follow instructions, Independent prior level of function, Motivated for self care and independence, Pleasant and cooperative, Supportive family  Barriers: Decreased endurance, Fatigue, Generalized weakness, Hemiparesis, Impaired activity tolerance, Impaired balance, Limited mobility, Spasticity    Plan    Functional transfers   ADLs  Neuro re-ed   Strengthening/endurance     DME  OT DME Recommendations  Bathroom Equipment: 3 in 1 Commode  Additional Equipment: Other (Comments)    Passport items to be completed:  Perform bathroom transfers, complete dressing, complete feeding, get ready for the day, prepare a simple meal, participate in " household tasks, adapt home for safety needs, demonstrate home exercise program, complete caregiver training     Occupational Therapy Goals (Active)       Problem: Dressing       Dates: Start:  11/22/23         Goal: STG-Within one week, patient will dress LB at [place] using LRD       Dates: Start:  11/22/23            Goal: STG-Within one week, patient will dress UB SPV using LRD       Dates: Start:  12/06/23               Problem: Functional Transfers       Dates: Start:  12/06/23         Goal: STG-Within one week, patient will transfer to step in shower at [place] to [facility] using LRD       Dates: Start:  12/06/23               Problem: OT Long Term Goals       Dates: Start:  11/13/23         Goal: LTG-By discharge, patient will complete basic self care tasks at Mod Independent level.       Dates: Start:  11/13/23            Goal: LTG-By discharge, patient will perform bathroom transfers at Mod Independent level.       Dates: Start:  11/13/23

## 2023-12-14 NOTE — THERAPY
Occupational Therapy  Daily Treatment     Patient Name: Jason Lima  Age:  62 y.o., Sex:  male  Medical Record #: [id number]  Today's Date: 12/14/2023     Precautions  Precautions: Fall Risk  Comments: Left Hemiparesis, left visual inattention         Subjective    Pt was ready for 2nd OT session.      Objective       12/14/23 1301   OT Charge Group   OT Therapeutic Exercise (Units) 4   OT Total Time Spent   OT Individual Total Time Spent (Mins) 60   Sitting Upper Body Exercises   Bicep Curls Left;Weight (See Comments for lbs);3 sets of 10  (1lbs hand weight)   Tricep Press 1 set of 10;Left;Weight (See Comments for lbs)  (7lbs rickshaw with dycem)   Wrist Flexion / Extension 3 sets of 10;Left;Weight (See Comments for lbs)  (1lbs hand weight)   Other Exercise digit 1-5 flex/ext 3set of 10   Comments Nustep ~15min level 3   Interdisciplinary Plan of Care Collaboration   Patient Position at End of Therapy Seated;Chair Alarm On;Call Light within Reach;Tray Table within Reach;Phone within Reach     Needed assistance with keeping L hand on hand peddle with frequent VC for contract hand on nustep to increase strengthening and reciprocal movements, needed VC for sitting midline posture using mirror.   Needed [place] assistance w/ hand placement during tripcep press w/ dycem. Completed LUE thera ex in functional planes to increase tactile and prop stim as well as increase strengthening and endurance.     Assessment    Pt tolerated session well with focus on thera ex for L side. Pt is making continues to make progress with midline control as well as LUE movements and strengthening      Strengths: Able to follow instructions, Independent prior level of function, Motivated for self care and independence, Pleasant and cooperative, Supportive family  Barriers: Decreased endurance, Fatigue, Generalized weakness, Hemiparesis, Impaired activity tolerance, Impaired balance, Limited mobility, Spasticity    Plan    Functional transfers    ADLs  Neuro re-ed   Strengthening/endurance     DME  OT DME Recommendations  Bathroom Equipment: 3 in 1 Commode  Additional Equipment: Other (Comments)    Passport items to be completed:  Perform bathroom transfers, complete dressing, complete feeding, get ready for the day, prepare a simple meal, participate in household tasks, adapt home for safety needs, demonstrate home exercise program, complete caregiver training     Occupational Therapy Goals (Active)       Problem: Dressing       Dates: Start:  11/22/23         Goal: STG-Within one week, patient will dress LB at [place] using LRD       Dates: Start:  11/22/23            Goal: STG-Within one week, patient will dress UB SPV using LRD       Dates: Start:  12/06/23               Problem: Functional Transfers       Dates: Start:  12/06/23         Goal: STG-Within one week, patient will transfer to step in shower at [place] to [facility] using LRD       Dates: Start:  12/06/23               Problem: OT Long Term Goals       Dates: Start:  11/13/23         Goal: LTG-By discharge, patient will complete basic self care tasks at Mod Independent level.       Dates: Start:  11/13/23            Goal: LTG-By discharge, patient will perform bathroom transfers at Mod Independent level.       Dates: Start:  11/13/23

## 2023-12-14 NOTE — PROGRESS NOTES
"  Physical Medicine & Rehabilitation Progress Note    Encounter Date: 12/14/2023    Chief Complaint: Thankful for help in rehab    Interval Events (Subjective):  VS: -150's  BM 12/12  Voiding    Seen and examined in his room. Thankful for help today. He feels well. Ok with addition of blood pressure medication.     ROS: 14 point ROS negative unless otherwise specified in the HPI    Objective:  VITAL SIGNS: BP (!) 137/90   Pulse 77   Temp 36.6 °C (97.9 °F) (Oral)   Resp 16   Ht 1.727 m (5' 8\")   Wt 87 kg (191 lb 12.8 oz)   SpO2 97%   BMI 29.16 kg/m²     GEN: No apparent distress  HEENT: Head normocephalic, atraumatic.  Sclera nonicteric bilaterally, no ocular discharge appreciated bilaterally.  CV: Extremities warm and well-perfused, no peripheral edema appreciated bilaterally.  PULMONARY: Breathing nonlabored on room air, no respiratory accessory muscle use.  Not requiring supplemental oxygen.  SKIN: No appreciable skin breakdown on exposed areas of skin.  PSYCH: Normal affect  NEURO: Awake alert.  Conversational.  Logical thought content. Dysarthria. Left Hemiparesis. Mild left clonus at ankle. No significant spasticity appreciated at rest.       Laboratory Values:  Recent Results (from the past 72 hour(s))   Basic Metabolic Panel    Collection Time: 12/13/23  5:20 AM   Result Value Ref Range    Sodium 139 135 - 145 mmol/L    Potassium 3.5 (L) 3.6 - 5.5 mmol/L    Chloride 102 96 - 112 mmol/L    Co2 27 20 - 33 mmol/L    Glucose 129 (H) 65 - 99 mg/dL    Bun 41 (H) 8 - 22 mg/dL    Creatinine 2.76 (H) 0.50 - 1.40 mg/dL    Calcium 9.1 8.5 - 10.5 mg/dL    Anion Gap 10.0 7.0 - 16.0   ESTIMATED GFR    Collection Time: 12/13/23  5:20 AM   Result Value Ref Range    GFR (CKD-EPI) 25 (A) >60 mL/min/1.73 m 2           Medications:  Scheduled Medications   Medication Dose Frequency    sertraline  50 mg DAILY    baclofen  10 mg TID    amLODIPine  10 mg DAILY    apixaban  5 mg BID    hydrALAZINE  100 mg Q8HRS    " traZODone  75 mg QHS    senna-docusate  2 Tablet BID    omeprazole  20 mg DAILY    atorvastatin  40 mg Nightly     PRN medications: traZODone, carboxymethylcellulose, [DISCONTINUED] insulin regular **AND** [CANCELED] POC blood glucose manual result **AND** NOTIFY MD and PharmD **AND** Administer 20 grams of glucose (approximately 8 ounces of fruit juice) every 15 minutes PRN FSBG less than 70 mg/dL **AND** dextrose bolus, Respiratory Therapy Consult, senna-docusate **AND** polyethylene glycol/lytes **AND** magnesium hydroxide **AND** bisacodyl, mag hydrox-al hydrox-simeth, ondansetron **OR** ondansetron, sodium chloride, [] acetaminophen **FOLLOWED BY** acetaminophen, oxyCODONE immediate-release **OR** oxyCODONE immediate-release, hydrALAZINE    Diet:  Current Diet Order   Procedures    Diet Order Diet: Consistent CHO (Diabetic) (2 gram sodium restriction; medications whole with thin liquids); Second Modifier: (optional): 2 Gram Sodium       Medical Decision Making and Plan:  Right thalamic hemorrhagic stroke ~ last week 2023  Originally presented with a headache and left-sided weakness to hospital in [facility]  Work-up at the outside hospital in [facility] revealed a right thalamic hemorrhagic stroke  Repeat CT head done after return flight to the ,  CT head shows right thalamic hemorrhage, decrease in density since prior studies from the outside hospital, slight asymmetric dilation of the left ventricular system including the left temporal horn with a possible component of hydrocephalus  Planning for BP less than 140, avoiding any kind of anticoagulation  Initiate comprehensive IRF level therapy with 3 days of therapy 5 days a week with services from PT/OT/SLP     Spasticity  OP follow up with Physiatry for consideration Botox   Has had resurgence of spasticity restart baclofen 5mg TID 2023 --> increase to 10mg TID  2023 continue Baclofen at 10mg TID, no significant side  effects    ABLA  Now on Eliquis  Recheck 11/28 - improved 11.4--> 12.0--> 11.6 11/30/2023 12/6/2023 11.6-->10.7--> 11.7 12/11  Monitor     CKD  Follow up with OP nephrology  Renal function fluctuates 20-30's/2's-3's  Cr Baseline in past had been mid 1's     Hypertension  Continue Amlodipine 10mg daily  Continue Hydralazine 25mg TID - increased by hospitalist 11/13/2023 from BID to TID--> increased to 50mg q8hrs 11/15  11/16 Hydralazine increased to 75mg q8hrs and 1x Hydralazine given  11/17 BP still elevated but hydralazine recently increased. Monitor  12/13/2023 VS showing increase in -140's. Continue Hydralazine 100mg q8hrs + Amlodipine 10mg daily.  12/14/2023 consistently higher, start Metoprolol XL 12.5 mg daily     Hypokalemia  Mild 12/13 supplement x1     Leukopenia  New, mild 12/6/2023   Resolved 12/11     Prediabetes  Discontinue SSI     HLD  Continue home dose satin      Bowel  Constipation 11/29/2023, resolved 11/30/2023   Continue with scheduled Colace and senna, as needed stool softeners  Miralax x3 doses 11/29/2023 --> Constipation Resolved 11/30/2023      Insomnia  Secondary to recent jet lag, melatonin scheduled --> increase to home dose 11/13/2023 9mg  Utilize trazodone as needed insomnia continues  Schedule Trazodone 11/15/2023   11/16/2023 continue Trazodone as is. Thinks he slept better last night  11/17/2023 Still not sleeping well. Increase to 75mg nightly.   12/14/2023 continue trazodone at current dose     Pain - as needed Tylenol and oxycodone    Post-Stroke Depression  Consulted Pyschiatry   Start Prozac 11/28/2023 --> Switched to zoloft per psychiatry  Appreciate assistance with management  Mood improved, continue Zoloft 12/11    Right Foot Pain  H/o Gout  Xray with swelling 1st metatarsal head   Trial Colchicine for acute gout flare 11/28/2023   Topical Voltaren gel scheduled   Improved with less swelling, erythema 11/29/2023   Resolved      LABS: BMP 12/15 - K+ and  BUN/Cr      DVT PROPHYLAXIS: NO - Hemorrhagic stroke. US + UE and LE for brachial and peroneal DVT. 11/21/2023 start Heparin 5000 units q8hrs SQ for further prophylaxis, if tolerates will increase to full AC. Consider repeat CTH to ensure stability bleed once on full AC. 11/24/2023 Start loading dose Eliquis 10mg BID x 7 days with transition to 5mg BID. CBC showing improvement in H/H 11/28/2023 no neurologic decline.     HOSPITALIST FOLLOWING: YES - d/w hospitalist 12/6 --> Signed off 12/6.     CODE STATUS: FULL CODE    DISPO: Home alone. Family can help starting 12/27    MARCUS: Auth through 12/23/23    MEDS SENT TO: TBD    DISCHARGE FOLLOW UP: Neurology, Nephrology, PCP, Physiatry (Botox) - needs referral to all.   ____________________________________    Dr. Lisa Lee DO, MS  [facility] - Physical Medicine & Rehabilitation   ____________________________________

## 2023-12-14 NOTE — CARE PLAN
Problem: Knowledge Deficit - Standard  Goal: Patient and family/care givers will demonstrate understanding of plan of care, disease process/condition, diagnostic tests and medications  Outcome: Progressing   Pt education given regarding plan of care with emphasis on adequate hydration, pt shows moderate understanding, will continue to reinforce education and continue to monitor.         Problem: Fall Risk - Rehab  Goal: Patient will remain free from falls  Outcome: Progressing   Pt education given regarding fall precautions AND safety measures, pt shows good understanding, has not attempted to self transfer this shift, will continue to reinforce education and continue to monitor.

## 2023-12-14 NOTE — CARE PLAN
"The patient is Stable - Low risk of patient condition declining or worsening    Shift Goals  Clinical Goals: safety  Patient Goals: safety, sleep/rest  Family Goals: increased pt strength, independence      Problem: Fall Risk - Rehab  Goal: Patient will remain free from falls  Outcome: Progressing  Note: Shellie Hamilton Fall risk Assessment Score: 22    High fall risk Interventions   - Alarming seatbelt  - Bed and strip alarm   - Yellow sign by the door   - Yellow wrist band \"Fall risk\"  - Room near to the nurse station  - Do not leave patient unattended in the bathroom  - Fall risk education provided  Patient uses call light consistently and appropriately this shift.  Waits for assistance when needed and does not attempt self transfer.  Able to verbalize needs.  Will continue to monitor.     Problem: Pain - Standard  Goal: Alleviation of pain or a reduction in pain to the patient’s comfort goal  Outcome: Progressing  Note: Patient able to verbalize pain level and verbalize an acceptable level of pain.      "

## 2023-12-15 ENCOUNTER — APPOINTMENT (OUTPATIENT)
Dept: SPEECH THERAPY | Facility: REHABILITATION | Age: 63
DRG: 057 | End: 2023-12-15
Attending: PHYSICAL MEDICINE & REHABILITATION
Payer: COMMERCIAL

## 2023-12-15 ENCOUNTER — APPOINTMENT (OUTPATIENT)
Dept: OCCUPATIONAL THERAPY | Facility: REHABILITATION | Age: 63
DRG: 057 | End: 2023-12-15
Attending: HOSPITALIST
Payer: COMMERCIAL

## 2023-12-15 ENCOUNTER — APPOINTMENT (OUTPATIENT)
Dept: PHYSICAL THERAPY | Facility: REHABILITATION | Age: 63
DRG: 057 | End: 2023-12-15
Attending: PHYSICAL MEDICINE & REHABILITATION
Payer: COMMERCIAL

## 2023-12-15 LAB
ANION GAP SERPL CALC-SCNC: 10 MMOL/L (ref 7–16)
BUN SERPL-MCNC: 43 MG/DL (ref 8–22)
CALCIUM SERPL-MCNC: 8.8 MG/DL (ref 8.5–10.5)
CHLORIDE SERPL-SCNC: 107 MMOL/L (ref 96–112)
CO2 SERPL-SCNC: 25 MMOL/L (ref 20–33)
CREAT SERPL-MCNC: 3.05 MG/DL (ref 0.5–1.4)
GFR SERPLBLD CREATININE-BSD FMLA CKD-EPI: 22 ML/MIN/1.73 M 2
GLUCOSE SERPL-MCNC: 189 MG/DL (ref 65–99)
POTASSIUM SERPL-SCNC: 3.8 MMOL/L (ref 3.6–5.5)
SODIUM SERPL-SCNC: 142 MMOL/L (ref 135–145)

## 2023-12-15 PROCEDURE — A9270 NON-COVERED ITEM OR SERVICE: HCPCS | Performed by: PHYSICAL MEDICINE & REHABILITATION

## 2023-12-15 PROCEDURE — 97530 THERAPEUTIC ACTIVITIES: CPT

## 2023-12-15 PROCEDURE — 97129 THER IVNTJ 1ST 15 MIN: CPT

## 2023-12-15 PROCEDURE — 700102 HCHG RX REV CODE 250 W/ 637 OVERRIDE(OP): Performed by: PHYSICAL MEDICINE & REHABILITATION

## 2023-12-15 PROCEDURE — 97116 GAIT TRAINING THERAPY: CPT

## 2023-12-15 PROCEDURE — 99232 SBSQ HOSP IP/OBS MODERATE 35: CPT | Performed by: STUDENT IN AN ORGANIZED HEALTH CARE EDUCATION/TRAINING PROGRAM

## 2023-12-15 PROCEDURE — 97130 THER IVNTJ EA ADDL 15 MIN: CPT

## 2023-12-15 PROCEDURE — 94760 N-INVAS EAR/PLS OXIMETRY 1: CPT

## 2023-12-15 PROCEDURE — 80048 BASIC METABOLIC PNL TOTAL CA: CPT

## 2023-12-15 PROCEDURE — A9270 NON-COVERED ITEM OR SERVICE: HCPCS | Performed by: STUDENT IN AN ORGANIZED HEALTH CARE EDUCATION/TRAINING PROGRAM

## 2023-12-15 PROCEDURE — 36415 COLL VENOUS BLD VENIPUNCTURE: CPT

## 2023-12-15 PROCEDURE — A9270 NON-COVERED ITEM OR SERVICE: HCPCS | Performed by: HOSPITALIST

## 2023-12-15 PROCEDURE — 700102 HCHG RX REV CODE 250 W/ 637 OVERRIDE(OP): Performed by: HOSPITALIST

## 2023-12-15 PROCEDURE — 700101 HCHG RX REV CODE 250: Performed by: PHYSICAL MEDICINE & REHABILITATION

## 2023-12-15 PROCEDURE — 99232 SBSQ HOSP IP/OBS MODERATE 35: CPT | Performed by: PHYSICAL MEDICINE & REHABILITATION

## 2023-12-15 PROCEDURE — 770010 HCHG ROOM/CARE - REHAB SEMI PRIVAT*

## 2023-12-15 PROCEDURE — 700102 HCHG RX REV CODE 250 W/ 637 OVERRIDE(OP): Performed by: STUDENT IN AN ORGANIZED HEALTH CARE EDUCATION/TRAINING PROGRAM

## 2023-12-15 RX ORDER — CIPROFLOXACIN HYDROCHLORIDE 3.5 MG/ML
1 SOLUTION/ DROPS TOPICAL
Status: COMPLETED | OUTPATIENT
Start: 2023-12-15 | End: 2023-12-18

## 2023-12-15 RX ADMIN — APIXABAN 5 MG: 5 TABLET, FILM COATED ORAL at 08:12

## 2023-12-15 RX ADMIN — METOPROLOL SUCCINATE 12.5 MG: 25 TABLET, EXTENDED RELEASE ORAL at 05:12

## 2023-12-15 RX ADMIN — OMEPRAZOLE 20 MG: 20 CAPSULE, DELAYED RELEASE ORAL at 08:12

## 2023-12-15 RX ADMIN — CIPROFLOXACIN 1 DROP: 3 SOLUTION OPHTHALMIC at 14:51

## 2023-12-15 RX ADMIN — APIXABAN 5 MG: 5 TABLET, FILM COATED ORAL at 20:47

## 2023-12-15 RX ADMIN — HYDRALAZINE HYDROCHLORIDE 100 MG: 50 TABLET, FILM COATED ORAL at 14:51

## 2023-12-15 RX ADMIN — TRAZODONE HYDROCHLORIDE 75 MG: 50 TABLET ORAL at 20:47

## 2023-12-15 RX ADMIN — OXYCODONE 5 MG: 5 TABLET ORAL at 20:53

## 2023-12-15 RX ADMIN — CIPROFLOXACIN 1 DROP: 3 SOLUTION OPHTHALMIC at 21:00

## 2023-12-15 RX ADMIN — CIPROFLOXACIN 1 DROP: 3 SOLUTION OPHTHALMIC at 17:20

## 2023-12-15 RX ADMIN — AMLODIPINE BESYLATE 10 MG: 5 TABLET ORAL at 05:11

## 2023-12-15 RX ADMIN — BACLOFEN 10 MG: 10 TABLET ORAL at 20:48

## 2023-12-15 RX ADMIN — BACLOFEN 10 MG: 10 TABLET ORAL at 14:51

## 2023-12-15 RX ADMIN — HYDRALAZINE HYDROCHLORIDE 100 MG: 50 TABLET, FILM COATED ORAL at 05:11

## 2023-12-15 RX ADMIN — BACLOFEN 10 MG: 10 TABLET ORAL at 08:12

## 2023-12-15 RX ADMIN — SENNOSIDES AND DOCUSATE SODIUM 2 TABLET: 50; 8.6 TABLET ORAL at 08:12

## 2023-12-15 RX ADMIN — ATORVASTATIN CALCIUM 40 MG: 40 TABLET, FILM COATED ORAL at 20:48

## 2023-12-15 RX ADMIN — SERTRALINE 50 MG: 50 TABLET, FILM COATED ORAL at 08:12

## 2023-12-15 RX ADMIN — SENNOSIDES AND DOCUSATE SODIUM 2 TABLET: 50; 8.6 TABLET ORAL at 20:47

## 2023-12-15 RX ADMIN — HYDRALAZINE HYDROCHLORIDE 100 MG: 50 TABLET, FILM COATED ORAL at 21:00

## 2023-12-15 ASSESSMENT — BALANCE ASSESSMENTS
STANDING UNSUPPORTED ONE FOOT IN FRONT: 0
STANDING TO SITTING: 1
SITTING UNSUPPORTED: 4
TRANSFERS: 1
LONG VERSION TOTAL SCORE (MAX 56): 8
SITTING TO STANDING: 1
MEDICARE IMPAIRMENT PERCENTAGE: 86
STANDING UNSUPPORTED WITH EYES CLOSED: 0
LOOK OVER LEFT AND RIGHT SHOULDERS WHILE STANDING: 0
PICK UP OBJECT FROM THE FLOOR FROM A STANDING POSITION: 0
STANDING UNSUPPORTED WITH FEET TOGETHER: 0
PLACE ALTERNATE FOOT ON STEP OR STOOL WHILE STANDING UNSUPPORTED: 0
REACHING FORWARD WITH OUTSTRETCHED ARM WHILE STANDING: 0
STANDING ON ONE LEG: 0
STANDING UNSUPPORTED: 1
TURN 360 DEGREES: 0
LONG VERSION TOTAL SCORE (MAX 56): 8

## 2023-12-15 ASSESSMENT — GAIT ASSESSMENTS
ASSISTIVE DEVICE: HEMI-WALKER
GAIT LEVEL OF ASSIST: MODERATE ASSIST
DISTANCE (FEET): 54

## 2023-12-15 ASSESSMENT — PAIN DESCRIPTION - PAIN TYPE: TYPE: ACUTE PAIN

## 2023-12-15 ASSESSMENT — ACTIVITIES OF DAILY LIVING (ADL): BED_CHAIR_WHEELCHAIR_TRANSFER_DESCRIPTION: INCREASED TIME;VERBAL CUEING

## 2023-12-15 NOTE — CARE PLAN
"The patient is Watcher - Medium risk of patient condition declining or worsening    Shift Goals  Clinical Goals: safety    Problem: Pain - Standard  Goal: Alleviation of pain or a reduction in pain to the patient’s comfort goal  Outcome: Progressing     Problem: Fall Risk - Rehab  Goal: Patient will remain free from falls  Note: Shellie Hamilton Fall risk Assessment Score: 19    High fall risk Interventions   - Bed and strip alarm   - Yellow sign by the door   - Yellow wrist band \"Fall risk\"  - Room near to the nurse station  - Do not leave patient unattended in the bathroom  - Fall risk education provided     "

## 2023-12-15 NOTE — PROGRESS NOTES
"  Physical Medicine & Rehabilitation Progress Note    Encounter Date: 12/15/2023    Chief Complaint: High redness    Interval Events (Subjective):  VSS - improved on increased BP medication  BM 12/14  Voiding    Seen and examined in his bathroom with Vielka.  He states that he is doing well.  His blood pressure is much better controlled.  They are wondering about referrals to specialist.  Also still having left eye redness and discharge.    ROS: 14 point ROS negative unless otherwise specified in the HPI    Objective:  VITAL SIGNS: /66   Pulse 68   Temp 36.4 °C (97.5 °F) (Temporal)   Resp 18   Ht 1.727 m (5' 8\")   Wt 87 kg (191 lb 12.8 oz)   SpO2 96%   BMI 29.16 kg/m²     GEN: No apparent distress  HEENT: Head normocephalic, atraumatic.  Left eye discharge and erythematous sclera  CV: Extremities warm and well-perfused, no peripheral edema appreciated bilaterally.  PULMONARY: Breathing nonlabored on room air, no respiratory accessory muscle use.  Not requiring supplemental oxygen.  SKIN: No appreciable skin breakdown on exposed areas of skin.  PSYCH: Normal affect  NEURO: Awake alert.  Conversational.  Logical thought content. Dysarthria. Left Hemiparesis. Mild left clonus at ankle. No significant spasticity appreciated at rest.       Laboratory Values:  Recent Results (from the past 72 hour(s))   Basic Metabolic Panel    Collection Time: 12/13/23  5:20 AM   Result Value Ref Range    Sodium 139 135 - 145 mmol/L    Potassium 3.5 (L) 3.6 - 5.5 mmol/L    Chloride 102 96 - 112 mmol/L    Co2 27 20 - 33 mmol/L    Glucose 129 (H) 65 - 99 mg/dL    Bun 41 (H) 8 - 22 mg/dL    Creatinine 2.76 (H) 0.50 - 1.40 mg/dL    Calcium 9.1 8.5 - 10.5 mg/dL    Anion Gap 10.0 7.0 - 16.0   ESTIMATED GFR    Collection Time: 12/13/23  5:20 AM   Result Value Ref Range    GFR (CKD-EPI) 25 (A) >60 mL/min/1.73 m 2   Basic Metabolic Panel    Collection Time: 12/15/23  5:32 AM   Result Value Ref Range    Sodium 142 135 - 145 mmol/L    " Potassium 3.8 3.6 - 5.5 mmol/L    Chloride 107 96 - 112 mmol/L    Co2 25 20 - 33 mmol/L    Glucose 189 (H) 65 - 99 mg/dL    Bun 43 (H) 8 - 22 mg/dL    Creatinine 3.05 (H) 0.50 - 1.40 mg/dL    Calcium 8.8 8.5 - 10.5 mg/dL    Anion Gap 10.0 7.0 - 16.0   ESTIMATED GFR    Collection Time: 12/15/23  5:32 AM   Result Value Ref Range    GFR (CKD-EPI) 22 (A) >60 mL/min/1.73 m 2           Medications:  Scheduled Medications   Medication Dose Frequency    metoprolol SR  12.5 mg Q DAY    sertraline  50 mg DAILY    baclofen  10 mg TID    amLODIPine  10 mg DAILY    apixaban  5 mg BID    hydrALAZINE  100 mg Q8HRS    traZODone  75 mg QHS    senna-docusate  2 Tablet BID    omeprazole  20 mg DAILY    atorvastatin  40 mg Nightly     PRN medications: traZODone, carboxymethylcellulose, [DISCONTINUED] insulin regular **AND** [CANCELED] POC blood glucose manual result **AND** NOTIFY MD and PharmD **AND** Administer 20 grams of glucose (approximately 8 ounces of fruit juice) every 15 minutes PRN FSBG less than 70 mg/dL **AND** dextrose bolus, Respiratory Therapy Consult, senna-docusate **AND** polyethylene glycol/lytes **AND** magnesium hydroxide **AND** bisacodyl, mag hydrox-al hydrox-simeth, ondansetron **OR** ondansetron, sodium chloride, [] acetaminophen **FOLLOWED BY** acetaminophen, oxyCODONE immediate-release **OR** oxyCODONE immediate-release, hydrALAZINE    Diet:  Current Diet Order   Procedures    Diet Order Diet: Consistent CHO (Diabetic) (2 gram sodium restriction; medications whole with thin liquids); Second Modifier: (optional): 2 Gram Sodium       Medical Decision Making and Plan:  Right thalamic hemorrhagic stroke ~ last week 2023  Originally presented with a headache and left-sided weakness to hospital in [facility]  Work-up at the outside [facility] [place] revealed a right thalamic hemorrhagic stroke  Repeat CT head done after return flight to the ,  CT head shows right thalamic hemorrhage,  decrease in density since prior studies from the outside hospital, slight asymmetric dilation of the left ventricular system including the left temporal horn with a possible component of hydrocephalus  Planning for BP less than 140, avoiding any kind of anticoagulation  Initiate comprehensive IRF level therapy with 3 days of therapy 5 days a week with services from PT/OT/SLP     Spasticity  OP follow up with Physiatry for consideration Botox   Has had resurgence of spasticity restart baclofen 5mg TID 11/30/2023 --> increase to 10mg TID  12/13/2023 continue Baclofen at 10mg TID, no significant side effects    Conjunctivitis  12/15 start eyedrops    ABLA  Now on Eliquis  Recheck 11/28 - improved 11.4--> 12.0--> 11.6 11/30/2023 12/6/2023 11.6-->10.7--> 11.7 12/11  Monitor     CKD  Follow up with OP nephrology  Renal function fluctuates 20-30's/2's-3's  Cr Baseline in past had been mid 1's     Hypertension  Continue Amlodipine 10mg daily  Continue Hydralazine 25mg TID - increased by hospitalist 11/13/2023 from BID to TID--> increased to 50mg q8hrs 11/15  11/16 Hydralazine increased to 75mg q8hrs and 1x Hydralazine given  11/17 BP still elevated but hydralazine recently increased. Monitor  12/13/2023 VS showing increase in -140's. Continue Hydralazine 100mg q8hrs + Amlodipine 10mg daily.  12/14/2023 consistently higher, start Metoprolol XL 12.5 mg daily   12/15/2023 better controlled, continue Metoprolol daily     Hypokalemia  Mild 12/13 supplement x1   NML 12/15    Leukopenia  New, mild 12/6/2023   Resolved 12/11     Prediabetes  Discontinue SSI     HLD  Continue home dose satin      Bowel  Constipation 11/29/2023, resolved 11/30/2023   Continue with scheduled Colace and senna, as needed stool softeners  Miralax x3 doses 11/29/2023 --> Constipation Resolved 11/30/2023      Insomnia  Secondary to recent jet lag, melatonin scheduled --> increase to home dose 11/13/2023 9mg  Utilize trazodone as needed insomnia  continues  Schedule Trazodone 11/15/2023   11/16/2023 continue Trazodone as is. Thinks he slept better last night  11/17/2023 Still not sleeping well. Increase to 75mg nightly.   12/15/2023 continue trazodone at current dose     Pain - as needed Tylenol and oxycodone    Post-Stroke Depression  Consulted Pyschiatry   Start Prozac 11/28/2023 --> Switched to zoloft per psychiatry  Appreciate assistance with management  Mood improved, continue Zoloft 12/11    Right Foot Pain  H/o Gout  Xray with swelling 1st metatarsal head   Trial Colchicine for acute gout flare 11/28/2023   Topical Voltaren gel scheduled   Improved with less swelling, erythema 11/29/2023   Resolved      LABS: BMP 12/18 - K+ and BUN/Cr      DVT PROPHYLAXIS: NO - Hemorrhagic stroke. US + UE and LE for brachial and peroneal DVT. 11/21/2023 start Heparin 5000 units q8hrs SQ for further prophylaxis, if tolerates will increase to full AC. Consider repeat CTH to ensure stability bleed once on full AC. 11/24/2023 Start loading dose Eliquis 10mg BID x 7 days with transition to 5mg BID. CBC showing improvement in H/H 11/28/2023 no neurologic decline.     HOSPITALIST FOLLOWING: YES - d/w hospitalist 12/6 --> Signed off 12/6.     CODE STATUS: FULL CODE    DISPO: Home alone. Family can help starting 12/27    MARCUS: 12/23/23    MEDS SENT TO: TBD    DISCHARGE FOLLOW UP: Neurology, Nephrology, PCP, Physiatry (Botox) - needs referral to all.   ____________________________________    Dr. Lisa Lee DO, MS  [facility] - Physical Medicine & Rehabilitation   ____________________________________

## 2023-12-15 NOTE — THERAPY
"Physical Therapy   Daily Treatment     Patient Name: Jason Lima  Age:  62 y.o., Sex:  male  Medical Record #: [id number]  Today's Date: 12/14/2023     Precautions  Precautions: Fall Risk  Comments: Left Hemiparesis, left visual inattention    Subjective    \"I can't believe how straight I am sitting.\"   \"I think when someone is on my right it feels like there is a shadow, and I lean away to avoid it.\"   Pt in w/c at arrival, agreeable to PT tx. Seen [phone], [phone].      Objective       12/14/23 0831 12/14/23 1401   PT Charge Group   PT Electrical Stimulation Attended (Units) 1  --    PT Gait Training (Units) 1 1   PT Neuromuscular Re-Education / Balance (Units) 2  --    PT Therapeutic Activities (Units)  --  1   PT Total Time Spent   PT Individual Total Time Spent (Mins) 60 30   Gait Functional Level of Assist    Gait Level Of Assist Maximal Assist Maximal Assist  (help to advance LLE, VC/MC to facilitate WS to RLE, midline control, VC for sequencing advancement of hemiwalker)   Assistive Device Tanvir-Walker Tanvir-Walker   Distance (Feet) 30 40   # of Times Distance was Traveled 1 2   Deviation Shuffled Gait;Decreased Heel Strike;Decreased Toe Off  (help for functional WS to RLE, midline postural control, LLE advancement, cue at knee for stance stability) Shuffled Gait;Decreased Heel Strike;Decreased Toe Off;Step To   Wheelchair Functional Level of Assist   Wheelchair Assist Supervised  --    Distance Wheelchair (Feet or Distance) 300  --    Wheelchair Description Extra time  --    Transfer Functional Level of Assist   Bed, Chair, Wheelchair Transfer Moderate Assist Moderate Assist   Bed Chair Wheelchair Transfer Description Other (comment);Verbal cueing  (stand pivot- steadying and VC/MC for functional WS for step around without device) Increased time;Initial preparation for task;Verbal cueing  (stand pivot- steadying and VC/MC for functional WS for step around without device)   Toilet Transfers  --  Moderate " "Assist   Toilet Transfer Description  --  Grab bar;Increased time;Initial preparation for task;Verbal cueing  (stand pivot- steadying and VC/MC for functional WS for step around with grab bar)   Sitting Lower Body Exercises   Other Exercises  --  w/c foot pedaling (functional LAQ + ham curl) BLE, 1 x 10 on each LE   Neuro-Muscular Treatments   Neuro-Muscular Treatments Anterior weight shift;Verbal Cuing;Tactile Cuing;Sequencing;Co-Contraction;Postural Changes;Postural Facilitation;Weight Shift Right;Weight Shift Left Anterior weight shift;Verbal Cuing;Tactile Cuing;Sequencing;Co-Contraction;Postural Changes;Postural Facilitation;Weight Shift Right;Weight Shift Left   Comments see note c/ gait training, see note   Interdisciplinary Plan of Care Collaboration   IDT Collaboration with  Therapy Tech Therapy Tech   Patient Position at End of Therapy Seated;Call Light within Reach;Tray Table within Reach;Phone within Reach Seated;Chair Alarm On;Call Light within Reach;Tray Table within Reach;Phone within Reach   Collaboration Comments assist c/ equipment and handling during session tech assist during session c/ mirror, equipment, incidental steadying for transfers, clothing management for toileting     AM session:  NMRE/Pregait training, c/ NMES at L quad and gluts for cocontraction facilitation in stance, multimodal cues at hip, trunk and visual FB c/ mirror for midline control/awareness; trialed custom AFO for all gait.   NMES settings  Hz: 30  Pulse width: 300  On/off: 10/10  Intensity: 65-70 quads, 55-60 gluts   Ramp: 2 sec  Pads: 2 x 3 to gluts, 3 x 4 to quads  Static holds with and without UE support, progressed to   Single leg steps c/ RLE c/ cues to recover to midline btw each effort, 2 x 10; c/ LLE 1 x 10   Single leg step taps to 2\" step c/ RLE 2 x 8-10  STS btw efforts, cues for hand placement  Gait as above  PM Session:  SPT <>toilet, cues for sequencing and midline control, inc time to ensure lifting each " "foot to step around  BLE w/c propulsion as above, cues for full ROM on LLE, total distance 30'   Gait training c/ mirror in front, L AFO, HW; Max cues for midline postural control, WS to RLE, RLE hip abduction in stance to improve LLE clearance, incidental help for LLE swing for clearance and placement. Shoe lift to RLE, tape to toe of LLE to improve swing. Mulitmodal cues for postural control and attention to task.   Seated rests btw therapy activities.     Assessment    Jason c/ increased L side lean this AM that he reports coincides with being guarded on R, wanting to lean to avoid obstacle. Unclear if this is true but gait performance did improve this afternoon c/ PT only assist and positioned to pt's left. Able to trial custom AFO with good tolerance; needs ongoing assessment for skin tolerance.     Pt making gains in midline control and advancement of LLE with shoe lift, AFO, and cues for R hip abduction to improve functional WS to RLE stance.        Strengths: Able to follow instructions, Independent prior level of function, Motivated for self care and independence, Pleasant and cooperative, Supportive family, Willingly participates in therapeutic activities  Barriers: Decreased endurance, Hemiparesis, Difficulty following instructions, Fatigue, Hypertension, Impaired activity tolerance, Impaired balance, Impaired insight/denial of deficits, Impaired functional cognition, Impaired sleep pattern, Impulsive, Limited mobility, Visual impairment, Poor family support (lives alone)    Plan    Continue gait c/ hemiwalker vs TM vs BUE support vs no UE support as able (Vector)  NMES to LLE quad/glut L back extensors/parascap mm in standing vs NuStep for pregait training/priming  KAYLYN for gait cues  Aquatic therapy as possible  Continue prism vs alternating patching trial for midline control. STS + midline control TM training  F/U on AFO, skin checks  DME  PT DME Recommendations  Wheelchair: 18\" Width  Cushion: " Specialty (See other comments) (TBD pending ambulation progress)  Assistive Device: Tanvir Walker (TBD pending progress, currently 2 person assist for gait)  Additional Equipment: Other (Comments)    Passport items to be completed:  Get in/out of bed safely, in/out of a vehicle, safely use mobility device, walk or wheel around home/community, navigate up and down stairs, show how to get up/down from the ground, ensure home is accessible, demonstrate HEP, complete caregiver training    Physical Therapy Problems (Active)       Problem: PT-Long Term Goals       Dates: Start:  11/13/23         Goal: LTG-By discharge, patient will propel wheelchair >/= 300' at [place] or better.        Dates: Start:  11/13/23            Goal: LTG-By discharge, patient will ambulate >/= 50' c/ LRAD at [place] or better.        Dates: Start:  11/13/23            Goal: LTG-By discharge, patient will transfer one surface to another c/ [place] or better.        Dates: Start:  11/13/23            Goal: LTG-By discharge, patient will ambulate up/down 1 step c/ LRAD at [place] or better.        Dates: Start:  11/13/23            Goal: LTG-By discharge, patient will transfer in/out of a car c/ Mod[place] or better.        Dates: Start:  11/13/23

## 2023-12-15 NOTE — CARE PLAN
The patient is Stable - Low risk of patient condition declining or worsening    Shift Goals  Clinical Goals: safety  Patient Goals: safety, sleep/rest  Family Goals: increased pt strength, independence    Progress made toward(s) clinical / shift goals:  Pt's VSS, skin intact, denies pain, able to participate in therapy.

## 2023-12-15 NOTE — PROGRESS NOTES
NURSING DAILY NOTE    Name: Jason Lima   Date of Admission: 11/12/2023   Admitting Diagnosis: Thalamic hemorrhage (HCC)  Attending Physician: Lisa Lee D.o.  Allergies: Penicillins    Safety  Patient Assist  max 2  Patient Precautions  Fall Risk  Precaution Comments  Left Hemiparesis, left visual inattention  Bed Transfer Status  Moderate Assist  Toilet Transfer Status   Moderate Assist  Assistive Devices  Rails, Other (Comments) (standing lift)  Oxygen  None - Room Air  Diet/Therapeutic Dining  Current Diet Order   Procedures    Diet Order Diet: Consistent CHO (Diabetic) (2 gram sodium restriction; medications whole with thin liquids); Second Modifier: (optional): 2 Gram Sodium     Pill Administration  whole  Agitated Behavioral Scale  14  ABS Level of Severity  No Agitation    Fall Risk  Has the patient had a fall this admission?   No  Shellie Hamilton Fall Risk Scoring  22, HIGH RISK  Fall Risk Safety Measures  bed alarm, chair alarm, and poor balance    Vitals  Temperature: 36.4 °C (97.5 °F)  Temp src: Temporal  Pulse: 69  Respiration: 17  Blood Pressure: 115/66  Blood Pressure MAP (Calculated): 82 MM HG  BP Location: Right, Forearm, Upper Arm, Thigh  Patient BP Position: Supine     Oxygen  Pulse Oximetry: 94 %  O2 (LPM): 0  FiO2%: 21 %  O2 Delivery Device: None - Room Air    Bowel and Bladder  Last Bowel Movement  12/14/23  Stool Type  Patient stated, Not observed  Bowel Device  Bathroom, Other (Comment)  Continent  Bladder: Continent void   Bowel: Continent movement  Bladder Function  Urine Void (mL):  (Moderate)  Number of Times Voided: 1  Urinary Options: Yes  Urine Color: Yellow  Number of Times Incontinent of Urine: 0  Genitourinary Assessment   Bladder Assessment (WDL):  WDL Except  Echols Catheter: Not Applicable  Urine Color: Yellow  Number of Bladder Accidents: 0  Total Number of Bladder of Accidents in Last 7 Days: 0  Number of Times  Incontinent of Urine: 0  Bladder Device: Urinal  Time Void: No  Bladder Scan: Post Void  $ Bladder Scan Results (mL): 26    Skin  Polo Score   15  Sensory Interventions   Bed Types: Standard/Trauma Mattress  Skin Preventative Measures: Pillows in Use for Support / Positioning  Moisture Interventions  Moisturizers/Barriers: Barrier Wipes      Pain  Pain Rating Scale  0 - No Pain  Pain Location  Foot  Pain Location Orientation  Right, Left  Pain Interventions   Declines    ADLs    Bathing   Shower, * * With Assistance from, Staff  Linen Change   Complete  Personal Hygiene  Change Deja Pads, Moist Deja Wipes, Perineal Care  Chlorhexidine Bath      Oral Care  Brushed Teeth  Teeth/Dentures     Shave  Self  Nutrition Percentage Eaten  *  * Meal *  *, Dinner, Between % Consumed (pt had food from home)  Environmental Precautions  Treaded Slipper Socks on Patient, Bed in Low Position  Patient Turns/Positioning  Patient Turns Self from Side to Side  Patient Turns Assistance/Tolerance  Assistance of One  Bed Positions  Bed Controls On, Bed Locked  Head of Bed Elevated  Self regulated      Psychosocial/Neurologic Assessment  Psychosocial Assessment  Psychosocial (WDL):  WDL Except  Patient Behaviors: Fatigue  Neurologic Assessment  Neuro (WDL): Exceptions to WDL  Level of Consciousness: Alert  Orientation Level: Oriented X4  Cognition: Follows commands, Appropriate attention/concentration  Speech: Clear  Facial Symmetry: Left facial drooping  Pupil Assesment: Yes  R Pupil Size (mm): 3  R Pupil Shape / Description: Round  R Pupil Reaction: Brisk  L Pupil Size (mm): 3  L Pupil Shape / Description: Round  L Pupil Reaction: Brisk  Reflexes: Cough  Cough Reflex: Present  Motor Function/Sensation Assessment: Dorsiflexion, Motor response  R Foot Dorsiflexion: Strong  L Foot Dorsiflexion: Absent  RUE Motor Response: Responds to commands  RUE Sensation: Full sensation  Muscle Strength Right Arm: Normal Strength Against Gravity  and Full Resistance  LUE Motor Response: Flaccid  LUE Sensation: Numbness, Tingling  Muscle Strength Left Arm: Weak Movement but Not Against Gravity or Resistance  RLE Motor Response: Responds to commands  RLE Sensation: Full sensation  Muscle Strength Right Leg: Good Strength Against Gravity and Moderate Resistance  LLE Motor Response: Flaccid  LLE Sensation: Numbness, Tingling  Muscle Strength Left Leg: Weak Movement but Not Against Gravity or Resistance  EENT (WDL):  WDL Except    Cardio/Pulmonary Assessment  Edema   RLE Edema: Trace  LLE Edema: Trace  Respiratory Breath Sounds  RUL Breath Sounds: Clear  RML Breath Sounds: Clear  RLL Breath Sounds: Diminished  MOHAMUD Breath Sounds: Clear  LLL Breath Sounds: Diminished  Cardiac Assessment   Cardiac (WDL):  WDL Except (htn, hld)

## 2023-12-15 NOTE — CARE PLAN
"  Problem: Fall Risk - Rehab  Goal: Patient will remain free from falls  Outcome: Progressing  Note: Shellie Hamilton Fall risk Assessment Score: 22    High fall risk Interventions   - Bed and strip alarm   - Yellow sign by the door   - Yellow wrist band \"Fall risk\"  - Room near to the nurse station  - Do not leave patient unattended in the bathroom  - Fall risk education provided     Problem: Pain - Standard  Goal: Alleviation of pain or a reduction in pain to the patient’s comfort goal  Outcome: Progressing  Note: Assessed for pain and discomfort , pain under contro, CPAP in use at night , denies sob with ease in breathing.   The patient is Stable - Low risk of patient condition declining or worsening    Shift Goals  Clinical Goals: Safety, pain control  Patient Goals: Safety  Family Goals: Education    Progress made toward(s) clinical / shift goals:  Pt free from fall and injury.    "

## 2023-12-15 NOTE — DISCHARGE PLANNING
CM spoke with  [facility] ([facility]ka) and she will reach out to the patient to discuss private caregivers. Left a message for [facility] also. Spoke with Michael at [facility] and he will get back to me.Will continue to assist with the discharge planning.

## 2023-12-15 NOTE — THERAPY
"Physical Therapy   Daily Treatment     Patient Name: Jason Lima  Age:  62 y.o., Sex:  male  Medical Record #: [id number]  Today's Date: 12/15/2023     Precautions  Precautions: Fall Risk  Comments: Left Hemiparesis, left visual inattention    Subjective    \"I'm doing good today!\" Pt in w/c at arrival c/ SO Vielka present, agreeable to PT tx.      Objective       12/15/23 1301   PT Charge Group   PT Gait Training (Units) 2   PT Total Time Spent   PT Individual Total Time Spent (Mins) 30   Gait Functional Level of Assist    Gait Level Of Assist Moderate Assist  (for facilitation of midline postural control, L>R WS, occasional assist for LLE swing limb clearance, steadying, and VC/MC for sequencing; mirror for visual FB to midline)   Assistive Device Tanvir-Walker  (L AFO, R shoe lift (1/4\"))   Distance (Feet) 54   # of Times Distance was Traveled 1  (1 x 30', 1 x 48')   Deviation Decreased Base Of Support;Increased Base Of Support;Step To;Decreased Heel Strike;Decreased Toe Off  (cues for step through pattern, variable TRUMAN 2/2 decreased LLE swing length/amplitude)   Transfer Functional Level of Assist   Bed, Chair, Wheelchair Transfer Moderate Assist   Bed Chair Wheelchair Transfer Description Increased time;Verbal cueing  (stand step around without device, inc time and VC/MC for sequencing, stepping initiation c/ LLE vs pivot on RLE only)   Bed Mobility    Sit to Stand Minimal Assist   Scooting Minimal Assist   Neuro-Muscular Treatments   Neuro-Muscular Treatments Anterior weight shift;Verbal Cuing;Tactile Cuing;Sequencing;Co-Contraction;Postural Changes;Postural Facilitation;Weight Shift Right;Weight Shift Left   Comments c/ gait training c/ hemiwalker   Outcome Measures   Outcome Measures Utilized Ahssan Balance Scale   Hassan Balance Scale   Sitting Unsupported (Score 0-4) 4   Change Of Positon: Sitting To Standing (Score 0-4) 1   Change Of Positon: Standing To Sitting (Score 0-4) 1   Transfers (Score 0-4) 1   Standing " Unsupported (Score 0-4) 1   Standing With Eyes Closed (Score 0-4) 0   Standing With Feet Together (Score 0-4) 0   Tandem Standing (Score 0-4) 0   Standing On One Leg (Score 0-4) 0   Turning Trunk (Feet Fixed) (Score 0-4) 0   Retrieving Objects From Floor (Score 0-4) 0   Turning 360 Degrees (Score 0-4) 0   Stool Stepping (Score 0-4) 0   Reaching Forward While Standing (Score 0-4) 0   Hassan Balance Total Score (0-56) 8   Interdisciplinary Plan of Care Collaboration   IDT Collaboration with  Family / Caregiver;Speech Therapist;Therapy Tech   Patient Position at End of Therapy Seated;Family / Friend in Room;Other (Comments)  (handoff to SLP for next session)   Collaboration Comments progress to date, rationale for SLP intervention to improve dual task capacity     Session focused on progression of gait c/ R HW on level surfaces, step length, symmetry, and sequencing. Distances as above.     Assessment    Jason continues to demo significant improvement in midline postural control, functional WS to RLE in stance, and advancement of LLE for swing. Limited by decreased terminal stance and hip/knee/ankle flexion AROM on LLE.     Strengths: Able to follow instructions, Independent prior level of function, Motivated for self care and independence, Pleasant and cooperative, Supportive family, Willingly participates in therapeutic activities  Barriers: Decreased endurance, Hemiparesis, Difficulty following instructions, Fatigue, Hypertension, Impaired activity tolerance, Impaired balance, Impaired insight/denial of deficits, Impaired functional cognition, Impaired sleep pattern, Impulsive, Limited mobility, Visual impairment, Poor family support (lives alone)    Plan  Continue gait c/ hemiwalker vs TM vs BUE support vs no UE support as able (Vector)  NMES to LLE quad/glut L back extensors/parascap mm in standing vs NuStep for pregait training/priming  KAYLYN for gait cues  Aquatic therapy as possible  Continue prism vs alternating  "patching trial for midline control. STS + midline control TM training  F/U on AFO, skin checks    DME  PT DME Recommendations  Wheelchair: 18\" Width  Cushion: Specialty (See other comments) (TBD pending ambulation progress)  Assistive Device: Tanvir Walker (TBD pending progress, currently 2 person assist for gait)  Additional Equipment: Other (Comments)    Passport items to be completed:  Get in/out of bed safely, in/out of a vehicle, safely use mobility device, walk or wheel around home/community, navigate up and down stairs, show how to get up/down from the ground, ensure home is accessible, demonstrate HEP, complete caregiver training    Physical Therapy Problems (Active)       Problem: PT-Long Term Goals       Dates: Start:  11/13/23         Goal: LTG-By discharge, patient will propel wheelchair >/= 300' at [place] or better.        Dates: Start:  11/13/23            Goal: LTG-By discharge, patient will ambulate >/= 50' c/ LRAD at [place] or better.        Dates: Start:  11/13/23            Goal: LTG-By discharge, patient will transfer one surface to another c/ Ren[place] or better.        Dates: Start:  11/13/23            Goal: LTG-By discharge, patient will ambulate up/down 1 step c/ LRAD at [place] or better.        Dates: Start:  11/13/23            Goal: LTG-By discharge, patient will transfer in/out of a car c/ ModA or better.        Dates: Start:  11/13/23              "

## 2023-12-15 NOTE — FLOWSHEET NOTE
12/15/23 0714   Events/Summary/Plan   Events/Summary/Plan RA pulse ox check. Wearing cpap mask   Vital Signs   Pulse 68   Respiration 18   Pulse Oximetry 96 %   $ Pulse Oximetry (Spot Check) Yes   Respiratory Assessment   Level of Consciousness Responds to voice  (sleeping)   Chest Exam   Work Of Breathing / Effort Within Normal Limits   Oxygen   O2 Delivery Device CPAP

## 2023-12-15 NOTE — THERAPY
"Occupational Therapy  Daily Treatment     Patient Name: Jason Lima  Age:  62 y.o., Sex:  male  Medical Record #: [id number]  Today's Date: 12/15/2023     Precautions  Precautions: (P) Fall Risk  Comments: (P) Left Hemiparesis, left visual inattention         Subjective    Pt and SO present for home eval and provided verbal consent to therapist to take pictures to document home barriers.       Objective       12/15/23 0901   OT Charge Group   OT Therapy Activity (Units) 4   OT Total Time Spent   OT Individual Total Time Spent (Mins) 60   Precautions   Precautions Fall Risk   Comments Left Hemiparesis, left visual inattention   Interdisciplinary Plan of Care Collaboration   IDT Collaboration with  Family / Caregiver;Therapy Tech   Patient Position at End of Therapy Seated;Chair Alarm On;Call Light within Reach;Tray Table within Reach;Phone within Reach   Collaboration Comments Home eval with SO present and Tech A for safety with transfers   OT DME Recommendations   Bathroom Equipment 3 in 1 Commode;Tub Transfer Bench (Medicaid Only)     Home evaluation:  Measurements:  Front door threshold  Height 6.5\" (Per SO, family looking into getting a ramp. Tax2 to bump up WC over threshold)  Front bathroom (full bath)  Doorway 29.5\"  Toilet height 17\"  Pt demo squat pivot transfer from WC to toilet with commode at mod to [place].   Other AE: Family is looking to install horizontal GB to front of toilet  Master bathroom  Doorway to toilet: 23 1/4\"  Toilet height 17\"  Tub height 15 3/4\"  Photos:  Front door threshold :    Master Bathroom:    Master Bathroom (tub):    Master Bathroom (walk-in shower)    Master Bathroom (toilet)    Front Bathroom:    Front Bathroom (tub/shower combo):    Front Bathroom (toilet):    Front Bedroom:       Pt able to complete toilet transfer (SPT) with DME in front bathroom with mod-Kyara d/t L-sided weakness. Able to complete tub-transfer (SPT) in master bathroom using tub transfer bench at min to modA to " the R-side. Able to complete bed transfer (SPT) using bedside rail at modA to thr R-side. Tax2 to bump up WC through front door.  Pt will need ramp for safety d/t LE weakness. Family is actively working on installing.     Recommendations:  Install ramp over front door threshold for safe entry/exit into home  Install above toilet commode and front facing GB to front bathroom toilet   Complete showering in master bathroom using tub transfer bench and shower hose instead of master walk-in shower and front tub/shower combo d/t pt's inability at this time of to step over threshold and need/space for physical assistance.         Assessment    Pt would benefit from the above recommendations for safe D/C to home. Pt currently limited overall with in-home mobility d/t L-side weakness, impaired balance, and visual deficits requiring functional mobility and ADLs to be performed at the WC/seated level for safety. Functional transfers are recommended to be implement with caregiver support.      Strengths: Able to follow instructions, Independent prior level of function, Motivated for self care and independence, Pleasant and cooperative, Supportive family  Barriers: Decreased endurance, Fatigue, Generalized weakness, Hemiparesis, Impaired activity tolerance, Impaired balance, Limited mobility, Spasticity    Plan    Implement above recommendations. Cont transfer training. Cont thera ex to improve strength, balance, and coordination for safe DC home with 24-7 support.      DME  OT DME Recommendations  Bathroom Equipment: (P) 3 in 1 Commode, Tub Transfer Bench (Medicaid Only)  Additional Equipment: Other (Comments)    Passport items to be completed:  Perform bathroom transfers, complete dressing, complete feeding, get ready for the day, prepare a simple meal, participate in household tasks, adapt home for safety needs, demonstrate home exercise program, complete caregiver training     Occupational Therapy Goals (Active)        Problem: Dressing       Dates: Start:  11/22/23         Goal: STG-Within one week, patient will dress LB at [place] using LRD       Dates: Start:  11/22/23            Goal: STG-Within one week, patient will dress UB SPV using LRD       Dates: Start:  12/06/23               Problem: Functional Transfers       Dates: Start:  12/06/23         Goal: STG-Within one week, patient will transfer to step in shower at [place] to [facility] using LRD       Dates: Start:  12/06/23               Problem: OT Long Term Goals       Dates: Start:  11/13/23         Goal: LTG-By discharge, patient will complete basic self care tasks at Mod Independent level.       Dates: Start:  11/13/23            Goal: LTG-By discharge, patient will perform bathroom transfers at Mod Independent level.       Dates: Start:  11/13/23

## 2023-12-16 PROCEDURE — A9270 NON-COVERED ITEM OR SERVICE: HCPCS | Performed by: HOSPITALIST

## 2023-12-16 PROCEDURE — 700102 HCHG RX REV CODE 250 W/ 637 OVERRIDE(OP): Performed by: STUDENT IN AN ORGANIZED HEALTH CARE EDUCATION/TRAINING PROGRAM

## 2023-12-16 PROCEDURE — 700102 HCHG RX REV CODE 250 W/ 637 OVERRIDE(OP): Performed by: HOSPITALIST

## 2023-12-16 PROCEDURE — 94760 N-INVAS EAR/PLS OXIMETRY 1: CPT

## 2023-12-16 PROCEDURE — 700102 HCHG RX REV CODE 250 W/ 637 OVERRIDE(OP): Performed by: PHYSICAL MEDICINE & REHABILITATION

## 2023-12-16 PROCEDURE — A9270 NON-COVERED ITEM OR SERVICE: HCPCS | Performed by: STUDENT IN AN ORGANIZED HEALTH CARE EDUCATION/TRAINING PROGRAM

## 2023-12-16 PROCEDURE — A9270 NON-COVERED ITEM OR SERVICE: HCPCS | Performed by: PHYSICAL MEDICINE & REHABILITATION

## 2023-12-16 PROCEDURE — 770010 HCHG ROOM/CARE - REHAB SEMI PRIVAT*

## 2023-12-16 RX ADMIN — SERTRALINE 50 MG: 50 TABLET, FILM COATED ORAL at 08:11

## 2023-12-16 RX ADMIN — SENNOSIDES AND DOCUSATE SODIUM 2 TABLET: 50; 8.6 TABLET ORAL at 21:17

## 2023-12-16 RX ADMIN — BACLOFEN 10 MG: 10 TABLET ORAL at 14:31

## 2023-12-16 RX ADMIN — OMEPRAZOLE 20 MG: 20 CAPSULE, DELAYED RELEASE ORAL at 08:11

## 2023-12-16 RX ADMIN — TRAZODONE HYDROCHLORIDE 75 MG: 50 TABLET ORAL at 21:16

## 2023-12-16 RX ADMIN — METOPROLOL SUCCINATE 12.5 MG: 25 TABLET, EXTENDED RELEASE ORAL at 05:30

## 2023-12-16 RX ADMIN — APIXABAN 5 MG: 5 TABLET, FILM COATED ORAL at 08:11

## 2023-12-16 RX ADMIN — CIPROFLOXACIN 1 DROP: 3 SOLUTION OPHTHALMIC at 05:30

## 2023-12-16 RX ADMIN — HYDRALAZINE HYDROCHLORIDE 100 MG: 50 TABLET, FILM COATED ORAL at 14:31

## 2023-12-16 RX ADMIN — HYDRALAZINE HYDROCHLORIDE 100 MG: 50 TABLET, FILM COATED ORAL at 21:17

## 2023-12-16 RX ADMIN — APIXABAN 5 MG: 5 TABLET, FILM COATED ORAL at 21:16

## 2023-12-16 RX ADMIN — CIPROFLOXACIN 1 DROP: 3 SOLUTION OPHTHALMIC at 17:24

## 2023-12-16 RX ADMIN — SENNOSIDES AND DOCUSATE SODIUM 2 TABLET: 50; 8.6 TABLET ORAL at 08:11

## 2023-12-16 RX ADMIN — CIPROFLOXACIN 1 DROP: 3 SOLUTION OPHTHALMIC at 10:39

## 2023-12-16 RX ADMIN — CIPROFLOXACIN 1 DROP: 3 SOLUTION OPHTHALMIC at 14:32

## 2023-12-16 RX ADMIN — ATORVASTATIN CALCIUM 40 MG: 40 TABLET, FILM COATED ORAL at 21:17

## 2023-12-16 RX ADMIN — CIPROFLOXACIN 1 DROP: 3 SOLUTION OPHTHALMIC at 21:19

## 2023-12-16 RX ADMIN — BACLOFEN 10 MG: 10 TABLET ORAL at 21:17

## 2023-12-16 RX ADMIN — HYDRALAZINE HYDROCHLORIDE 100 MG: 50 TABLET, FILM COATED ORAL at 05:30

## 2023-12-16 RX ADMIN — AMLODIPINE BESYLATE 10 MG: 5 TABLET ORAL at 05:30

## 2023-12-16 RX ADMIN — ACETAMINOPHEN 1000 MG: 500 TABLET ORAL at 08:15

## 2023-12-16 RX ADMIN — BACLOFEN 10 MG: 10 TABLET ORAL at 08:11

## 2023-12-16 ASSESSMENT — PAIN DESCRIPTION - PAIN TYPE: TYPE: ACUTE PAIN

## 2023-12-16 NOTE — PROGRESS NOTES
NURSING DAILY NOTE    Name: Jason Lima   Date of Admission: 11/12/2023   Admitting Diagnosis: Thalamic hemorrhage (HCC)  Attending Physician: Lisa Lee D.o.  Allergies: Penicillins    Safety  Patient Assist  max 2  Patient Precautions  Fall Risk  Precaution Comments  Left Hemiparesis, left visual inattention  Bed Transfer Status  Moderate Assist  Toilet Transfer Status   Moderate Assist  Assistive Devices  Rails, Other (Comments) (standing lift)  Oxygen  CPAP  Diet/Therapeutic Dining  Current Diet Order   Procedures    Diet Order Diet: Consistent CHO (Diabetic) (2 gram sodium restriction; medications whole with thin liquids); Second Modifier: (optional): 2 Gram Sodium     Pill Administration  whole  Agitated Behavioral Scale  14  ABS Level of Severity  No Agitation    Fall Risk  Has the patient had a fall this admission?   Yes  Shellie Hamilton Fall Risk Scoring  19, HIGH RISK  Fall Risk Safety Measures  bed alarm and chair alarm    Vitals  Temperature: 36.4 °C (97.5 °F)  Temp src: Oral  Pulse: 65  Respiration: 16  Blood Pressure: (!) 149/86 (Rn notified )  Blood Pressure MAP (Calculated): 107 MM HG  BP Location: Right, Upper Arm  Patient BP Position: Supine     Oxygen  Pulse Oximetry: 95 %  O2 (LPM): 0  FiO2%: 21 %  O2 Delivery Device: CPAP    Bowel and Bladder  Last Bowel Movement  12/14/23  Stool Type  Patient stated, Not observed  Bowel Device  Bathroom  Continent  Bladder: Continent void   Bowel: Continent movement  Bladder Function  Urine Void (mL): 200 ml  Number of Times Voided: 1  Urinary Options: Yes  Urine Color: Yellow  Number of Times Incontinent of Urine: 0  Genitourinary Assessment   Bladder Assessment (WDL):  WDL Except  Echols Catheter: Not Applicable  Urine Color: Yellow  Number of Bladder Accidents: 0  Total Number of Bladder of Accidents in Last 7 Days: 0  Number of Times Incontinent of Urine: 0  Bladder Device: Urinal  Time Void:  No  Bladder Scan: Post Void  $ Bladder Scan Results (mL): 26    Skin  Polo Score   16  Sensory Interventions   Bed Types: Standard/Trauma Mattress  Skin Preventative Measures: Pillows in Use for Support / Positioning  Moisture Interventions  Moisturizers/Barriers: Barrier Wipes      Pain  Pain Rating Scale  0 - No Pain  Pain Location  Foot  Pain Location Orientation  Right, Left  Pain Interventions   Declines    ADLs    Bathing   Shower, * * With Assistance from, Staff  Linen Change   Complete  Personal Hygiene  Change Deja Pads, Moist Deja Wipes, Perineal Care  Chlorhexidine Bath      Oral Care  Brushed Teeth  Teeth/Dentures     Shave  Self  Nutrition Percentage Eaten  Lunch, Between % Consumed  Environmental Precautions  Treaded Slipper Socks on Patient  Patient Turns/Positioning  Sitting Up in Wheelchair  Patient Turns Assistance/Tolerance  Assistance of One  Bed Positions  Bed Controls On, Bed Locked  Head of Bed Elevated  Self regulated      Psychosocial/Neurologic Assessment  Psychosocial Assessment  Psychosocial (WDL):  WDL Except  Patient Behaviors: Fatigue  Neurologic Assessment  Neuro (WDL): Exceptions to WDL  Level of Consciousness: Alert  Orientation Level: Oriented X4  Cognition: Follows commands, Appropriate attention/concentration  Speech: Clear  Facial Symmetry: Left facial drooping  Pupil Assesment: Yes  R Pupil Size (mm): 3  R Pupil Shape / Description: Round  R Pupil Reaction: Brisk  L Pupil Size (mm): 3  L Pupil Shape / Description: Round  L Pupil Reaction: Brisk  Reflexes: Cough  Cough Reflex: Present  Motor Function/Sensation Assessment: Dorsiflexion, Motor response  R Foot Dorsiflexion: Strong  L Foot Dorsiflexion: Absent  RUE Motor Response: Responds to commands  RUE Sensation: Full sensation  Muscle Strength Right Arm: Normal Strength Against Gravity and Full Resistance  LUE Motor Response: Flaccid  LUE Sensation: Numbness, Tingling  Muscle Strength Left Arm: Weak Movement but Not  Against Gravity or Resistance  RLE Motor Response: Responds to commands  RLE Sensation: Full sensation  Muscle Strength Right Leg: Good Strength Against Gravity and Moderate Resistance  LLE Motor Response: Flaccid  LLE Sensation: Numbness, Tingling  Muscle Strength Left Leg: Weak Movement but Not Against Gravity or Resistance  EENT (WDL):  WDL Except    Cardio/Pulmonary Assessment  Edema   RLE Edema: Trace  LLE Edema: Trace  Respiratory Breath Sounds  RUL Breath Sounds: Clear  RML Breath Sounds: Clear  RLL Breath Sounds: Diminished  MOHAMUD Breath Sounds: Clear  LLL Breath Sounds: Diminished  Cardiac Assessment   Cardiac (WDL):  WDL Except (htn, hld)

## 2023-12-16 NOTE — CARE PLAN
"The patient is Watcher - Medium risk of patient condition declining or worsening    Shift Goals  Clinical Goals: safety    Problem: Bladder / Voiding  Goal: Patient will establish and maintain regular urinary output  Outcome: Progressing     Problem: Fall Risk - Rehab  Goal: Patient will remain free from falls  Note: Shellie Hamilton Fall risk Assessment Score: 23    High fall risk Interventions   - Bed and strip alarm   - Yellow sign by the door   - Yellow wrist band \"Fall risk\"  - Room near to the nurse station  - Do not leave patient unattended in the bathroom  - Fall risk education provided     "

## 2023-12-16 NOTE — FLOWSHEET NOTE
12/16/23 0734   Events/Summary/Plan   Events/Summary/Plan RA pulse ox check. Wearing cpap nasal mask   Vital Signs   Pulse 66   Respiration 18   Pulse Oximetry 94 %   $ Pulse Oximetry (Spot Check) Yes   Respiratory Assessment   Level of Consciousness Responds to voice  (sleeping)   Chest Exam   Work Of Breathing / Effort Within Normal Limits   Oxygen   O2 Delivery Device CPAP

## 2023-12-16 NOTE — THERAPY
"Speech Language Pathology  Daily Treatment     Patient Name: Jason Lima  Age:  62 y.o., Sex:  male  Medical Record #: [id number]  Today's Date: 12/15/2023     Precautions  Precautions: Fall Risk  Comments: Left Hemiparesis, left visual inattention    Subjective    Received order for ST.  Patient had previously been discharged from ST per patient request in order to target physical recovery.  He is now agreeable to resume ST services.  Discussed with patient and S.O. performing re-eval vs. Resuming targing attention and safety planning and problem solving.  Patient and S.O preferred to resume tx.      Objective       12/15/23 1331   Treatment Charges   SLP Cognitive Skill Development First 15 Minutes 1   SLP Cognitive Skill Development Additional 15 Minutes 3   SLP Total Time Spent   SLP Individual Total Time Spent (Mins) 60   Written Language Expression   Dominant Hand Right   Cognition   Moderate Attention Minimal (4)   Functional Memory Activities Minimal (4)   Executive Functioning / Organization Moderate (3)   Interdisciplinary Plan of Care Collaboration   IDT Collaboration with  Physical Therapist   Collaboration Comments Physical therapist reports that patient displays ongoing cognitive difficulty that effects ability to stand and walk.  She reports that he easily becomes distracted and then is not able to maintain balance when distracted in walking and standing. She also reports that patient displays impaired awareness of when movements result in loss of balance and how certain movements may increase fall risk.         Assessment        Patient worked on attention in several tasks:  Locating information on calendar- patient needed min cues to attention to left for 3/10 items,  He displayed need for one cue to keep place in trail making ex.   He performed locating \"the's tasks with 12/15 independently.  Introduced patient and S.O.  to computer based attention tasks patient was able to perform simple levels " with min A.  Patient expressed that he still does not like these tasks, that he finds these frustrating and feels that they make him feel stupid.    He is demonstrating impaired insight as to how attention tasks and planning problem solving task will help him with his goal of walking.  Reassured patient that we will continue to try to find tasks that are reinforcing for him.  Continued to educate patient on benefits of continuing to target attention, asked if he would agree to work on some math to target attention.       Strengths: Alert and oriented, Effective communication skills, Motivated for self care and independence, Pleasant and cooperative, Independent prior level of function, Making steady progress towards goals, Willingly participates in therapeutic activities  Barriers: Impaired functional cognition (depression, left neglect, difficulty self monitoring/regulating)    Plan    Target alternating attention, recall of safety sequencing, organization and planning / executive functions.    Passport items to be completed:  Express basic needs, understand food/liquid recommendations, consistently follow swallow precautions, manage finances, manage medications, arrive to therapy appointments on time, complete daily memory log entries, solve problems related to safety situations, review education related to hospitalization, complete caregiver training     Speech Therapy Problems (Active)       Problem: Comprehension STGs       Dates: Start:  11/13/23         Goal: STG-Within one week, patient will perform attention for comprehension in functional reading tasks with 75% acc.       Dates: Start:  11/13/23         Goal Note filed on 11/22/23 1231 by Tawny Forbes MS,CCC-SLP       80% in single page functional reading.                 Problem: Memory STGs       Dates: Start:  11/13/23         Goal: STG-Within one week, patient will recall daily events and safety sequencing with 60% acc with use of external memory  aids.       Dates: Start:  11/13/23         Goal Note filed on 11/22/23 1231 by Tawny Forbes MS,CCC-SLP       70%                 Problem: Memory STGs       Dates: Start:  11/22/23         Goal: STG-Within one week, patient will recall new training and safety sequencing with 80% acc with set up and external cues.       Dates: Start:  11/22/23         Goal Note filed on 12/05/23 1626 by Tawny Forbes MS,CCC-SLP       Mod A needed.                 Problem: Problem Solving STGs       Dates: Start:  11/13/23         Goal: STG-Within one week, patient will perform alternating attention tasks with 75% acc.       Dates: Start:  11/13/23         Goal Note filed on 11/22/23 1231 by Tawny Forbes MS,CCC-SLP       For simple                 Problem: Problem Solving STGs       Dates: Start:  11/22/23         Goal: STG-Within one week, patient will perform alternating attention tasks with 85% acc with set up.       Dates: Start:  11/22/23         Goal Note filed on 11/29/23 1121 by Neva Abbott MS,CCC-SLP       MOD A, continues to require cues               Goal: STG-Within one week, patient will organization and reasoning tasks with 80% acc with min cues.       Dates: Start:  11/22/23         Goal Note filed on 11/29/23 1121 by Neva Abbott MS,CCC-SLP       Will continue to target, continues to require MOD A                 Problem: Speech/Swallowing LTGs       Dates: Start:  11/13/23         Goal: LTG-By discharge, patient will solve complex problem       Dates: Start:  11/13/23       Description: For safety and self care with 80% acc mod I  for safe discharge home.      Goal Note filed on 12/05/23 1625 by Tawny Forbes MS,CCC-SLP       Mod A needed.              Goal: LTG-By discharge, patient will recall new training with 80% acc with min A and use of external memory aids.       Dates: Start:  11/13/23         Goal Note filed on 12/05/23 1625 by Tawny Forbes MS,CCC-SLP       Mod A needed.                  Problem: Swallowing STGs       Dates: Start:  11/13/23

## 2023-12-16 NOTE — THERAPY
Speech Language Pathology  Daily Treatment     Patient Name: Jason Lima  Age:  62 y.o., Sex:  male  Medical Record #: [id number]  Today's Date: 12/15/2023     Precautions  Precautions: Fall Risk  Comments: Left Hemiparesis, left visual inattention    Subjective    Received order for ST.  Patient had previously been discharged from ST per patient request in order to target physical recovery.  He is now agreeable to resume ST services.  Discussed with patient and S.O. performing re-eval vs. Resuming targing attention and safety planning and problem solving.  Patient and S.O preferred to resume tx.      Objective       12/15/23 1331   Treatment Charges   SLP Cognitive Skill Development First 15 Minutes 1   SLP Cognitive Skill Development Additional 15 Minutes 3   SLP Total Time Spent   SLP Individual Total Time Spent (Mins) 60   Written Language Expression   Dominant Hand Right   Cognition   Moderate Attention Minimal (4)   Functional Memory Activities Minimal (4)   Executive Functioning / Organization Moderate (3)   Interdisciplinary Plan of Care Collaboration   IDT Collaboration with  Physical Therapist   Collaboration Comments Physical therapist reports that patient displays ongoing cognitive difficulty that effects ability to stand and walk.  She reports that he easily becomes distracted and then is not able to maintain balance when distracted in walking and standing. She also reports that patient displays impaired awareness of when movements result in loss of balance and how certain movements may increase fall risk.         Assessment    Received order for ST.  Patient had previously been discharged from ST per patient request in order to target physical recovery.  He is now agreeable to resume ST services.  Discussed with patient and S.O. performing re-eval vs. Resuming targing attention and safety planning and problem solving.  Patient and S.O preferred to resume tx.     Patient worked on attention in several  "tasks:  Locating information on calendar- patient needed min cues to attention to left for 3/10 items,  He displayed need for one cue to keep place in trail making ex.   He performed locating \"the's tasks with 12/15 independently.  Introduced patient and S.O.  to computer based attention tasks patient was able to perform simple levels with min A.  Patient expressed that he still does not like these tasks, that he finds these frustrating and feels that they make him feel stupid.    He is demonstrating impaired insight as to how attention tasks and planning problem solving task will help him with his goal of walking.  Reassured patient that we will continue to try to find tasks that are reinforcing for him.  Continued to educate patient on benefits of continuing to target attention, asked if he would agree to work on some math to target attention.       Strengths: Alert and oriented, Effective communication skills, Motivated for self care and independence, Pleasant and cooperative, Independent prior level of function, Making steady progress towards goals, Willingly participates in therapeutic activities  Barriers: Impaired functional cognition (depression, left neglect, difficulty self monitoring/regulating)    Plan       12/15/23 1331   Treatment Charges   SLP Cognitive Skill Development First 15 Minutes 1   SLP Cognitive Skill Development Additional 15 Minutes 3   SLP Total Time Spent   SLP Individual Total Time Spent (Mins) 60   Written Language Expression   Dominant Hand Right   Cognition   Moderate Attention Minimal (4)   Functional Memory Activities Minimal (4)   Executive Functioning / Organization Moderate (3)   Interdisciplinary Plan of Care Collaboration   IDT Collaboration with  Physical Therapist   Collaboration Comments Physical therapist reports that patient displays ongoing cognitive difficulty that effects ability to stand and walk.  She reports that he easily becomes distracted and then is not able to " maintain balance when distracted in walking and standing. She also reports that patient displays impaired awareness of when movements result in loss of balance and how certain movements may increase fall risk.         Passport items to be completed:  {REHAB PASSPORT:38731}    Speech Therapy Problems (Active)       Problem: Swallowing STGs       Dates: Start:  11/13/23

## 2023-12-16 NOTE — CONSULTS
"PSYCHIATRIC CONSULTATION:  *Reason for admission:   right thalamic hemorrhage  *Reason for consult:depression   *Requesting Physician:Lisa Lee  Supervising Physician:Rachele Colon         Legal status:  not applicable        *Interval history  The patient reports that his mood is \"\"doing good.' He is pleased with the progress he has made with therapy this week. Denies feeling anxious. He states he is sleeping well and has not needed pRn trazodone. Appetite is good. He has not noted any side effects from zoloft (No N/V, stomach upset, diarrhea). He is more hopeful for the future given his progress this week. He had no further questions or concerns.      *Medical Review of Systems: as reported by pt. All systems reviewed. Only those found to be + are noted below. All others are negative.        *Psychiatric (Physical) Examination: observed phenomenon:  Vitals:    12/15/23 2048   BP: 118/70   Pulse: 67   Resp:    Temp:    SpO2:               General: Awake, alert in no acute distress  Patient Appearance: Appropriate, fairly groomed, wearing glasses   Behavior: Appropriate Behavior, Calm, Cooperative  Speech.: Spontaneous, Normal rate and rhythm, Answers appropriately, normal  volume  Mood Comments: \"good\"  Affect: appears euthymic,  Thought Content: Appropriate, No suicidal ideation, No thoughts of self harm,  No homicidal ideation, Contracts for safety, Feels life is worth living  Thought Process: Logical and goal directed, No loosening of associations  Psychosis: No delusions, No hallucinations  Insight: Good insight  Judgment: good judgment  Cognition: Memory appeared intact in interview., Concentration is intact for age  and education and Fully oriented to person, place, time and circumstance.  Language: Is able to repeat phrases. and Is able to name objects.  Fund of Knowledge: AS expected for age and level of education  Neuro: no tics, tremors, dyskinesias. no dystonias. Gait not observed      "   Allergies:  Allergies   Allergen Reactions    Penicillins                  Medications (currently prescribed at [facility]):       Current Facility-Administered Medications:     ciprofloxacin (Ciloxin) 0.3 % ophthalmic solution 1 Drop, 1 Drop, Both Eyes, Q4HRS WHILE AWAKE, Lisa Lee D.O., 1 Drop at 12/15/23 2100    metoprolol SR (Toprol XL) tablet 12.5 mg, 12.5 mg, Oral, Q DAY, MAXWELL MooreO., 12.5 mg at 12/15/23 0512    sertraline (Zoloft) tablet 50 mg, 50 mg, Oral, DAILY, MAXWELL DennisonOWang, 50 mg at 12/15/23 0812    traZODone (Desyrel) tablet 25 mg, 25 mg, Oral, QHS PRN, Rachele Del Rio D.O.    baclofen (Lioresal) tablet 10 mg, 10 mg, Oral, TID, MAXWELL MooreOWang, 10 mg at 12/15/23 2048    amLODIPine (Norvasc) tablet 10 mg, 10 mg, Oral, DAILY, MAXWELL GomezOWang, 10 mg at 12/15/23 0511    carboxymethylcellulose (Refresh Tears) 0.5 % ophthalmic drops 1 Drop, 1 Drop, Both Eyes, PRN, MAXWELL GrossOWang, 1 Drop at 11/26/23 2005    apixaban (Eliquis) tablet 5 mg, 5 mg, Oral, BID, MAXWLEL MooreOWang, 5 mg at 12/15/23 2047    hydrALAZINE (Apresoline) tablet 100 mg, 100 mg, Oral, Q8HRS, Sita Grewal M.D., 100 mg at 12/15/23 2100    traZODone (Desyrel) tablet 75 mg, 75 mg, Oral, QHS, MAXWELL MooreOWang, 75 mg at 12/15/23 2047    [DISCONTINUED] insulin regular (HumuLIN R,NovoLIN R) injection, 2-12 Units, Subcutaneous, 4X/DAY ACHS **AND** [CANCELED] POC blood glucose manual result, , , Q AC AND BEDTIME(S) **AND** NOTIFY MD and PharmD, , , Once **AND** Administer 20 grams of glucose (approximately 8 ounces of fruit juice) every 15 minutes PRN FSBG less than 70 mg/dL, , , PRN **AND** dextrose 50% (D50W) injection 25 g, 25 g, Intravenous, Q15 MIN PRN, Sita Grewal M.D.    Respiratory Therapy Consult, , Nebulization, Continuous RT, Peyton Watkins D.O.    senna-docusate (Pericolace Or Senokot S) 8.6-50 MG per tablet 2 Tablet, 2 Tablet, Oral, BID, 2 Tablet at 12/15/23 2047 **AND**  polyethylene glycol/lytes (Miralax) PACKET 1 Packet, 1 Packet, Oral, QDAY PRN, 1 Packet at 23 **AND** magnesium hydroxide (Milk Of Magnesia) suspension 30 mL, 30 mL, Oral, QDAY PRN, 30 mL at 23 0801 **AND** bisacodyl (Dulcolax) suppository 10 mg, 10 mg, Rectal, QDAY PRN, Peyton Watkins D.O.    omeprazole (PriLOSEC) capsule 20 mg, 20 mg, Oral, DAILY, LEANA Denis.O., 20 mg at 12/15/23 0812    mag hydrox-al hydrox-simeth (Maalox Plus Es Or Mylanta Ds) suspension 20 mL, 20 mL, Oral, Q2HRS PRN, MAXWELL DenisO.    ondansetron (Zofran ODT) dispertab 4 mg, 4 mg, Oral, 4X/DAY PRN **OR** ondansetron (Zofran) syringe/vial injection 4 mg, 4 mg, Intramuscular, 4X/DAY PRN, Peyton Watkins D.O.    sodium chloride (Ocean) 0.65 % nasal spray 2 Spray, 2 Spray, Nasal, PRN, Peyton Watkins D.O.    [] acetaminophen (Tylenol) tablet 1,000 mg, 1,000 mg, Oral, Q6HRS, 1,000 mg at 23 0530 **FOLLOWED BY** acetaminophen (Tylenol) tablet 1,000 mg, 1,000 mg, Oral, Q6HRS PRN, LEANA Denis.O., 1,000 mg at 23 0839    oxyCODONE immediate-release (Roxicodone) tablet 2.5 mg, 2.5 mg, Oral, Q3HRS PRN **OR** oxyCODONE immediate-release (Roxicodone) tablet 5 mg, 5 mg, Oral, Q3HRS PRN, LEANA Denis.O., 5 mg at 12/15/23 2053    hydrALAZINE (Apresoline) tablet 25 mg, 25 mg, Oral, Q8HRS PRN, LEANA Denis.O., 25 mg at 23 1639    atorvastatin (Lipitor) tablet 40 mg, 40 mg, Oral, Nightly, LEANA Denis.O., 40 mg at 12/15/23 2048          *Labs:  .  Lab Results   Component Value Date/Time    SODIUM 142 12/15/2023 05:32 AM    POTASSIUM 3.8 12/15/2023 05:32 AM    CHLORIDE 107 12/15/2023 05:32 AM    CO2 25 12/15/2023 05:32 AM    GLUCOSE 189 (H) 12/15/2023 05:32 AM    BUN 43 (H) 12/15/2023 05:32 AM    CREATININE 3.05 (H) 12/15/2023 05:32 AM      Lab Results   Component Value Date/Time    WBC 5.4 2023 05:45 AM    RBC 3.83 (L) 2023 05:45 AM    HEMOGLOBIN 11.7 (L) 2023 05:45 AM     "HEMATOCRIT 34.9 (L) 12/11/2023 05:45 AM    MCV 91.1 12/11/2023 05:45 AM    MCH 30.5 12/11/2023 05:45 AM    MCHC 33.5 12/11/2023 05:45 AM    MPV 10.2 12/11/2023 05:45 AM    NEUTSPOLYS 55.90 12/11/2023 05:45 AM    LYMPHOCYTES 33.00 12/11/2023 05:45 AM    MONOCYTES 7.50 12/11/2023 05:45 AM    EOSINOPHILS 2.80 12/11/2023 05:45 AM    BASOPHILS 0.60 12/11/2023 05:45 AM    Plt-198,000     11/21/23  TSH-0.640  [id number]     EKG 11/11 NSR QTC-436  Imaging  MRI 11/11/23  1.  Right thalamic hemorrhage, decreased in density since prior study  2.  Stranding vasogenic edema appears stable.  3.  Slight asymmetric dilatation of the left ventricular system including the left temporal horn, consider component of hydrocephalus.  4.  Low-density fluid collection in the left temporal fossa, appearance most compatible with arachnoid cyst.  5.  Nonspecific white matter changes, commonly associated with small vessel ischemic disease.  Associated mild cerebral atrophy is noted.  6.  Atherosclerosis.        *ASSESSMENT:   Adjustment disorder  -Patient endorsed increased anxiety and occasional feeling of hopelessness that began after his stroke. He had noted more disrupted sleep that he feels affects his energy levels and anxiety. However he had been participating in OT/PT and had been complaint with recommended medications and treatments. Denied SI. Sleep may also have been affected my discomfort from CPAP     The patient states mood is '\"good.\" HE feels he has made good progress this week. Denies feeling anxious. NO problems with sleep or appetite. Denies SI/HI.     *Plan/Further Workup:   Legal status: not applicable     *Medication Managment:          -continue trazodone 75 mg QHS for insomnia and trazodone 25 mg QHS PRN for insomnia  -continue Zoloft to 50 mg daily for depression.   -continue psychotherapy with patient     *Labs Reviewed        Will follow with patient     Thank you for the consult.     "

## 2023-12-16 NOTE — CARE PLAN
"The patient is Stable - Low risk of patient condition declining or worsening    Shift Goals  Clinical Goals: safety  Patient Goals: Safety  Family Goals: Education    Problem: Skin Integrity  Goal: Skin integrity is maintained or improved  Outcome: Progressing  Note:   Polo Score: 16    Patient's skin remains intact and free from new or accidental injury this shift; no s/s of infection. RN wound protocol checked. Encouraged hydration and educated about the importance of nutrition to keep skin integrity. Will continue to monitor.      Problem: Fall Risk - Rehab  Goal: Patient will remain free from falls  Outcome: Progressing  Note: Shellie Hamilton Fall risk Assessment Score: 19    High fall risk Interventions   - Bed and strip alarm   - Yellow sign by the door   - Yellow wrist band \"Fall risk\"  - Room near to the nurse station  - Do not leave patient unattended in the bathroom  - Fall risk education provided     "

## 2023-12-17 ENCOUNTER — APPOINTMENT (OUTPATIENT)
Dept: PHYSICAL THERAPY | Facility: REHABILITATION | Age: 63
DRG: 057 | End: 2023-12-17
Attending: PHYSICAL MEDICINE & REHABILITATION
Payer: COMMERCIAL

## 2023-12-17 ENCOUNTER — APPOINTMENT (OUTPATIENT)
Dept: PHYSICAL THERAPY | Facility: REHABILITATION | Age: 63
DRG: 057 | End: 2023-12-17
Attending: HOSPITALIST
Payer: COMMERCIAL

## 2023-12-17 PROCEDURE — 97116 GAIT TRAINING THERAPY: CPT | Mod: CQ

## 2023-12-17 PROCEDURE — A9270 NON-COVERED ITEM OR SERVICE: HCPCS | Performed by: HOSPITALIST

## 2023-12-17 PROCEDURE — A9270 NON-COVERED ITEM OR SERVICE: HCPCS | Performed by: PHYSICAL MEDICINE & REHABILITATION

## 2023-12-17 PROCEDURE — 94760 N-INVAS EAR/PLS OXIMETRY 1: CPT

## 2023-12-17 PROCEDURE — 700102 HCHG RX REV CODE 250 W/ 637 OVERRIDE(OP): Performed by: STUDENT IN AN ORGANIZED HEALTH CARE EDUCATION/TRAINING PROGRAM

## 2023-12-17 PROCEDURE — 97112 NEUROMUSCULAR REEDUCATION: CPT | Mod: CQ

## 2023-12-17 PROCEDURE — 700102 HCHG RX REV CODE 250 W/ 637 OVERRIDE(OP): Performed by: PHYSICAL MEDICINE & REHABILITATION

## 2023-12-17 PROCEDURE — 770010 HCHG ROOM/CARE - REHAB SEMI PRIVAT*

## 2023-12-17 PROCEDURE — A9270 NON-COVERED ITEM OR SERVICE: HCPCS | Performed by: STUDENT IN AN ORGANIZED HEALTH CARE EDUCATION/TRAINING PROGRAM

## 2023-12-17 PROCEDURE — 700102 HCHG RX REV CODE 250 W/ 637 OVERRIDE(OP): Performed by: HOSPITALIST

## 2023-12-17 RX ADMIN — HYDRALAZINE HYDROCHLORIDE 100 MG: 50 TABLET, FILM COATED ORAL at 20:48

## 2023-12-17 RX ADMIN — METOPROLOL SUCCINATE 12.5 MG: 25 TABLET, EXTENDED RELEASE ORAL at 05:40

## 2023-12-17 RX ADMIN — CIPROFLOXACIN 1 DROP: 3 SOLUTION OPHTHALMIC at 20:51

## 2023-12-17 RX ADMIN — SENNOSIDES AND DOCUSATE SODIUM 2 TABLET: 50; 8.6 TABLET ORAL at 08:21

## 2023-12-17 RX ADMIN — CIPROFLOXACIN 1 DROP: 3 SOLUTION OPHTHALMIC at 09:20

## 2023-12-17 RX ADMIN — HYDRALAZINE HYDROCHLORIDE 100 MG: 50 TABLET, FILM COATED ORAL at 13:58

## 2023-12-17 RX ADMIN — SERTRALINE 50 MG: 50 TABLET, FILM COATED ORAL at 08:21

## 2023-12-17 RX ADMIN — ATORVASTATIN CALCIUM 40 MG: 40 TABLET, FILM COATED ORAL at 20:48

## 2023-12-17 RX ADMIN — CIPROFLOXACIN 1 DROP: 3 SOLUTION OPHTHALMIC at 18:02

## 2023-12-17 RX ADMIN — BACLOFEN 10 MG: 10 TABLET ORAL at 14:11

## 2023-12-17 RX ADMIN — OMEPRAZOLE 20 MG: 20 CAPSULE, DELAYED RELEASE ORAL at 08:21

## 2023-12-17 RX ADMIN — HYDRALAZINE HYDROCHLORIDE 100 MG: 50 TABLET, FILM COATED ORAL at 05:41

## 2023-12-17 RX ADMIN — APIXABAN 5 MG: 5 TABLET, FILM COATED ORAL at 20:48

## 2023-12-17 RX ADMIN — BACLOFEN 10 MG: 10 TABLET ORAL at 20:48

## 2023-12-17 RX ADMIN — AMLODIPINE BESYLATE 10 MG: 5 TABLET ORAL at 05:41

## 2023-12-17 RX ADMIN — CIPROFLOXACIN 1 DROP: 3 SOLUTION OPHTHALMIC at 05:43

## 2023-12-17 RX ADMIN — APIXABAN 5 MG: 5 TABLET, FILM COATED ORAL at 08:21

## 2023-12-17 RX ADMIN — BACLOFEN 10 MG: 10 TABLET ORAL at 08:21

## 2023-12-17 RX ADMIN — TRAZODONE HYDROCHLORIDE 75 MG: 50 TABLET ORAL at 20:48

## 2023-12-17 RX ADMIN — CIPROFLOXACIN 1 DROP: 3 SOLUTION OPHTHALMIC at 14:12

## 2023-12-17 ASSESSMENT — PATIENT HEALTH QUESTIONNAIRE - PHQ9
6. FEELING BAD ABOUT YOURSELF - OR THAT YOU ARE A FAILURE OR HAVE LET YOURSELF OR YOUR FAMILY DOWN: NOT AL ALL
SUM OF ALL RESPONSES TO PHQ9 QUESTIONS 1 AND 2: 1
SUM OF ALL RESPONSES TO PHQ QUESTIONS 1-9: 2
2. FEELING DOWN, DEPRESSED, IRRITABLE, OR HOPELESS: SEVERAL DAYS
9. THOUGHTS THAT YOU WOULD BE BETTER OFF DEAD, OR OF HURTING YOURSELF: NOT AT ALL
4. FEELING TIRED OR HAVING LITTLE ENERGY: NOT AT ALL
3. TROUBLE FALLING OR STAYING ASLEEP OR SLEEPING TOO MUCH: SEVERAL DAYS
7. TROUBLE CONCENTRATING ON THINGS, SUCH AS READING THE NEWSPAPER OR WATCHING TELEVISION: NOT AT ALL
1. LITTLE INTEREST OR PLEASURE IN DOING THINGS: NOT AT ALL
5. POOR APPETITE OR OVEREATING: NOT AT ALL
8. MOVING OR SPEAKING SO SLOWLY THAT OTHER PEOPLE COULD HAVE NOTICED. OR THE OPPOSITE, BEING SO FIGETY OR RESTLESS THAT YOU HAVE BEEN MOVING AROUND A LOT MORE THAN USUAL: NOT AT ALL

## 2023-12-17 ASSESSMENT — PAIN DESCRIPTION - PAIN TYPE
TYPE: ACUTE PAIN
TYPE: ACUTE PAIN

## 2023-12-17 ASSESSMENT — FIBROSIS 4 INDEX: FIB4 SCORE: 1.06

## 2023-12-17 NOTE — THERAPY
"Physical Therapy   Daily Treatment     Patient Name: Jason Lima  Age:  62 y.o., Sex:  male  Medical Record #: [id number]  Today's Date: 12/17/2023     Precautions  Precautions: Fall Risk  Comments: Left Hemiparesis, left visual inattention    Subjective    \"I still dont understand why I need speech therapy\"     Objective       12/17/23 1031   PT Charge Group   PT Gait Training (Units) 2   PT Neuromuscular Re-Education / Balance (Units) 2   Supervising Physical Therapist Cyndie Talley   PT Total Time Spent   PT Individual Total Time Spent (Mins) 60   Standing Lower Body Exercises   Standing Lower Body Exercises Yes   Hip Flexion 1 set of 10;Left   Mini Squat Partial;1 set of 10   Comments in vector with // for UE support, mirror for visual fb   Neuro-Muscular Treatments   Neuro-Muscular Treatments Weight Shift Right;Weight Shift Left;Sequencing;Postural Changes;Facilitation   Comments static standing in // with vector harness for support, mirror for visual cuing on midline orientation, vc/tc for L TKE   Interdisciplinary Plan of Care Collaboration   IDT Collaboration with  Therapy Tech   Patient Position at End of Therapy Seated;Self Releasing Lap Belt Applied;Chair Alarm On;Other (Comments)  (tech transported pt to dining room for lunch)   Collaboration Comments tech assisted with tx, harness donning and wc follow       Neuro re ed/gait training-fall detection set to 6\" with 25lb dy BWS throughout  Gait training in vector harness(west trolley with size L harness)  Amb in // 2 x 10' with R UE support, vc/tc for attention to L LE/terminal ext in stance  Progressed to gait training with HW 40' x 2  Facilitated ws to the R to assist in L LE foot clearance  Moderate assist for L LE advancement as pt fatigues, assistance for decreased add and ER in initial contact  L AFO donned throughout    Assessment    Pt tolerated amb in vector harness well, able to improve eliot and L LE advancement with 2nd bout. Presents with " "impaired task attention and L hemiparesis. The patient was able to correct midline orientation in static standing during pre gait activities(in vector harness) pt had 1 significant LOB resulting in a fall detected when he over corrected to the L and was unable to return to midline  Strengths: Able to follow instructions, Independent prior level of function, Motivated for self care and independence, Pleasant and cooperative, Supportive family, Willingly participates in therapeutic activities  Barriers: Decreased endurance, Hemiparesis, Difficulty following instructions, Fatigue, Hypertension, Impaired activity tolerance, Impaired balance, Impaired insight/denial of deficits, Impaired functional cognition, Impaired sleep pattern, Impulsive, Limited mobility, Visual impairment, Poor family support (lives alone)    Plan    Continue gait c/ hemiwalker vs TM vs BUE support vs no UE support as able (Vector)  NMES to LLE quad/glut L back extensors/parascap mm in standing vs NuStep for pregait training/priming  KAYLYN for gait cues  Aquatic therapy as possible  Continue prism vs alternating patching trial for midline control. STS + midline control TM training  F/U on AFO, skin checks    DME  PT DME Recommendations  Wheelchair: 18\" Width  Cushion: Specialty (See other comments) (TBD pending ambulation progress)  Assistive Device: Tanvir Walker (TBD pending progress, currently 2 person assist for gait)  Additional Equipment: Other (Comments)    Passport items to be completed:  Get in/out of bed safely, in/out of a vehicle, safely use mobility device, walk or wheel around home/community, navigate up and down stairs, show how to get up/down from the ground, ensure home is accessible, demonstrate HEP, complete caregiver training    Physical Therapy Problems (Active)       Problem: PT-Long Term Goals       Dates: Start:  11/13/23         Goal: LTG-By discharge, patient will propel wheelchair >/= 300' at [place] or better.        Dates: " Start:  11/13/23    Expected End:  12/23/23            Goal: LTG-By discharge, patient will ambulate >/= 50' c/ LRAD at [place] or better.        Dates: Start:  11/13/23    Expected End:  12/23/23            Goal: LTG-By discharge, patient will transfer one surface to another c/ [place] or better.        Dates: Start:  11/13/23    Expected End:  12/23/23            Goal: LTG-By discharge, patient will ambulate up/down 1 step c/ LRAD at [place] or better.        Dates: Start:  11/13/23    Expected End:  12/23/23            Goal: LTG-By discharge, patient will transfer in/out of a car c/ Mod[place] or better.        Dates: Start:  11/13/23    Expected End:  12/23/23

## 2023-12-17 NOTE — CARE PLAN
"The patient is Stable - Low risk of patient condition declining or worsening    Shift Goals  Clinical Goals: safety  Patient Goals: Safety  Family Goals: Education    Problem: Skin Integrity  Goal: Skin integrity is maintained or improved  Outcome: Progressing  Note:   Polo Score: 16    Patient's skin remains intact and free from new or accidental injury this shift; no s/s of infection. RN wound protocol checked. Encouraged hydration and educated about the importance of nutrition to keep skin integrity. Will continue to monitor.      Problem: Fall Risk - Rehab  Goal: Patient will remain free from falls  Outcome: Progressing  Note: Shellie Hamilton Fall risk Assessment Score: 23    High fall risk Interventions   - Bed and strip alarm   - Yellow sign by the door   - Yellow wrist band \"Fall risk\"  - Room near to the nurse station  - Do not leave patient unattended in the bathroom  - Fall risk education provided     "

## 2023-12-17 NOTE — PROGRESS NOTES
NURSING DAILY NOTE    Name: Jason Lima   Date of Admission: 11/12/2023   Admitting Diagnosis: Thalamic hemorrhage (HCC)  Attending Physician: Lisa Lee D.o.  Allergies: Penicillins    Safety  Patient Assist  max assist  Patient Precautions  Fall Risk  Precaution Comments  Left Hemiparesis, left visual inattention  Bed Transfer Status  Moderate Assist  Toilet Transfer Status   Moderate Assist  Assistive Devices  Rails, Other (Comments) (standing lift)  Oxygen  CPAP  Diet/Therapeutic Dining  Current Diet Order   Procedures    Diet Order Diet: Consistent CHO (Diabetic) (2 gram sodium restriction; medications whole with thin liquids); Second Modifier: (optional): 2 Gram Sodium     Pill Administration  whole  Agitated Behavioral Scale  14  ABS Level of Severity  No Agitation    Fall Risk  Has the patient had a fall this admission?   Yes  Shellie Hamilton Fall Risk Scoring  23, HIGH RISK  Fall Risk Safety Measures  bed alarm and chair alarm    Vitals  Temperature: 36.6 °C (97.9 °F)  Temp src: Oral  Pulse: 68  Respiration: 18  Blood Pressure: (!) 138/92  Blood Pressure MAP (Calculated): 107 MM HG  BP Location: Right, Upper Arm  Patient BP Position: Supine     Oxygen  Pulse Oximetry: 98 %  O2 (LPM): 0  FiO2%: 21 %  O2 Delivery Device: CPAP    Bowel and Bladder  Last Bowel Movement  12/16/23  Stool Type  Not observed  Bowel Device  Bathroom  Continent  Bladder: Continent void   Bowel: Continent movement  Bladder Function  Urine Void (mL): 300 ml  Number of Times Voided: 1  Urinary Options: Yes  Urine Color: Straw  Number of Times Incontinent of Urine: 0  Genitourinary Assessment   Bladder Assessment (WDL):  WDL Except  Echols Catheter: Not Applicable  Urine Color: Straw  Number of Bladder Accidents: 0  Total Number of Bladder of Accidents in Last 7 Days: 0  Number of Times Incontinent of Urine: 0  Bladder Device: Urinal  Time Void: No  Bladder Scan: Post Void  $  Bladder Scan Results (mL): 26    Skin  Polo Score   16  Sensory Interventions   Bed Types: Standard/Trauma Mattress  Skin Preventative Measures: Pillows in Use for Support / Positioning  Moisture Interventions  Moisturizers/Barriers: Barrier Paste      Pain  Pain Rating Scale  0 - No Pain  Pain Location  Toe  Pain Location Orientation  Right  Pain Interventions   Declines    ADLs    Bathing   Shower, * * With Assistance from, Staff  Linen Change   Complete  Personal Hygiene  Change Deja Pads, Moist Deja Wipes, Perineal Care  Chlorhexidine Bath      Oral Care  Brushed Teeth  Teeth/Dentures     Shave  Self  Nutrition Percentage Eaten  *  * Meal *  *, Breakfast, Between % Consumed  Environmental Precautions  Bed in Low Position, Treaded Slipper Socks on Patient  Patient Turns/Positioning  Patient Turns Self from Side to Side  Patient Turns Assistance/Tolerance  Assistance of One  Bed Positions  Bed Controls On  Head of Bed Elevated  Self regulated      Psychosocial/Neurologic Assessment  Psychosocial Assessment  Psychosocial (WDL):  WDL Except  Patient Behaviors: Fatigue  Neurologic Assessment  Neuro (WDL): Exceptions to WDL  Level of Consciousness: Alert  Orientation Level: Oriented X4  Cognition: Follows commands, Appropriate attention/concentration  Speech: Clear  Facial Symmetry: Left facial drooping  Pupil Assesment: Yes  R Pupil Size (mm): 3  R Pupil Shape / Description: Round  R Pupil Reaction: Brisk  L Pupil Size (mm): 3  L Pupil Shape / Description: Round  L Pupil Reaction: Brisk  Reflexes: Cough  Cough Reflex: Present  Motor Function/Sensation Assessment: Dorsiflexion, Motor response  R Foot Dorsiflexion: Strong  L Foot Dorsiflexion: Absent  RUE Motor Response: Responds to commands  RUE Sensation: Full sensation  Muscle Strength Right Arm: Normal Strength Against Gravity and Full Resistance  LUE Motor Response: Flaccid  LUE Sensation: Numbness, Tingling  Muscle Strength Left Arm: Weak Movement but Not  Against Gravity or Resistance  RLE Motor Response: Responds to commands  RLE Sensation: Full sensation  Muscle Strength Right Leg: Good Strength Against Gravity and Moderate Resistance  LLE Motor Response: Flaccid  LLE Sensation: Numbness, Tingling  Muscle Strength Left Leg: Weak Movement but Not Against Gravity or Resistance  EENT (WDL):  WDL Except    Cardio/Pulmonary Assessment  Edema   RLE Edema: Trace  LLE Edema: Trace  Respiratory Breath Sounds  RUL Breath Sounds: Clear  RML Breath Sounds: Clear  RLL Breath Sounds: Diminished  MOHAMUD Breath Sounds: Clear  LLL Breath Sounds: Diminished  Cardiac Assessment   Cardiac (WDL):  WDL Except (htn, hld)

## 2023-12-17 NOTE — FLOWSHEET NOTE
12/17/23 1024   Events/Summary/Plan   Events/Summary/Plan spot check done pt on room air using cpap at noc, tolerating it well   Vital Signs   Pulse 77   Respiration 16   Pulse Oximetry 94 %   $ Pulse Oximetry (Spot Check) Yes   Respiratory Assessment   Respiratory Pattern Within Normal Limits   Level of Consciousness Alert   Chest Exam   Work Of Breathing / Effort Within Normal Limits   Oxygen   O2 (LPM) 0   FiO2% 21 %   O2 Delivery Device None - Room Air   Non-Invasive Ventilation [facility] Group   Nocturnal CPAP or BIPAP CPAP - Home Unit  (wore for 11 hours)

## 2023-12-18 ENCOUNTER — APPOINTMENT (OUTPATIENT)
Dept: PHYSICAL THERAPY | Facility: REHABILITATION | Age: 63
DRG: 057 | End: 2023-12-18
Attending: PHYSICAL MEDICINE & REHABILITATION
Payer: COMMERCIAL

## 2023-12-18 ENCOUNTER — APPOINTMENT (OUTPATIENT)
Dept: OCCUPATIONAL THERAPY | Facility: REHABILITATION | Age: 63
DRG: 057 | End: 2023-12-18
Attending: PHYSICAL MEDICINE & REHABILITATION
Payer: COMMERCIAL

## 2023-12-18 ENCOUNTER — APPOINTMENT (OUTPATIENT)
Dept: PHYSICAL THERAPY | Facility: REHABILITATION | Age: 63
DRG: 057 | End: 2023-12-18
Attending: HOSPITALIST
Payer: COMMERCIAL

## 2023-12-18 LAB
ANION GAP SERPL CALC-SCNC: 11 MMOL/L (ref 7–16)
BUN SERPL-MCNC: 33 MG/DL (ref 8–22)
CALCIUM SERPL-MCNC: 9 MG/DL (ref 8.5–10.5)
CHLORIDE SERPL-SCNC: 104 MMOL/L (ref 96–112)
CO2 SERPL-SCNC: 26 MMOL/L (ref 20–33)
CREAT SERPL-MCNC: 2.68 MG/DL (ref 0.5–1.4)
GFR SERPLBLD CREATININE-BSD FMLA CKD-EPI: 26 ML/MIN/1.73 M 2
GLUCOSE SERPL-MCNC: 127 MG/DL (ref 65–99)
POTASSIUM SERPL-SCNC: 3.8 MMOL/L (ref 3.6–5.5)
SODIUM SERPL-SCNC: 141 MMOL/L (ref 135–145)

## 2023-12-18 PROCEDURE — 97110 THERAPEUTIC EXERCISES: CPT | Mod: CO

## 2023-12-18 PROCEDURE — 700102 HCHG RX REV CODE 250 W/ 637 OVERRIDE(OP): Performed by: PHYSICAL MEDICINE & REHABILITATION

## 2023-12-18 PROCEDURE — A9270 NON-COVERED ITEM OR SERVICE: HCPCS | Performed by: PHYSICAL MEDICINE & REHABILITATION

## 2023-12-18 PROCEDURE — 97116 GAIT TRAINING THERAPY: CPT

## 2023-12-18 PROCEDURE — 700102 HCHG RX REV CODE 250 W/ 637 OVERRIDE(OP): Performed by: HOSPITALIST

## 2023-12-18 PROCEDURE — 97110 THERAPEUTIC EXERCISES: CPT

## 2023-12-18 PROCEDURE — 770010 HCHG ROOM/CARE - REHAB SEMI PRIVAT*

## 2023-12-18 PROCEDURE — 94760 N-INVAS EAR/PLS OXIMETRY 1: CPT

## 2023-12-18 PROCEDURE — 97535 SELF CARE MNGMENT TRAINING: CPT

## 2023-12-18 PROCEDURE — 36415 COLL VENOUS BLD VENIPUNCTURE: CPT

## 2023-12-18 PROCEDURE — A9270 NON-COVERED ITEM OR SERVICE: HCPCS | Performed by: STUDENT IN AN ORGANIZED HEALTH CARE EDUCATION/TRAINING PROGRAM

## 2023-12-18 PROCEDURE — A9270 NON-COVERED ITEM OR SERVICE: HCPCS | Performed by: HOSPITALIST

## 2023-12-18 PROCEDURE — 97112 NEUROMUSCULAR REEDUCATION: CPT

## 2023-12-18 PROCEDURE — 700102 HCHG RX REV CODE 250 W/ 637 OVERRIDE(OP): Performed by: STUDENT IN AN ORGANIZED HEALTH CARE EDUCATION/TRAINING PROGRAM

## 2023-12-18 PROCEDURE — 99232 SBSQ HOSP IP/OBS MODERATE 35: CPT | Performed by: PHYSICAL MEDICINE & REHABILITATION

## 2023-12-18 PROCEDURE — 80048 BASIC METABOLIC PNL TOTAL CA: CPT

## 2023-12-18 RX ORDER — METOPROLOL SUCCINATE 25 MG/1
25 TABLET, EXTENDED RELEASE ORAL
Status: DISCONTINUED | OUTPATIENT
Start: 2023-12-19 | End: 2023-12-27

## 2023-12-18 RX ADMIN — HYDRALAZINE HYDROCHLORIDE 100 MG: 50 TABLET, FILM COATED ORAL at 21:46

## 2023-12-18 RX ADMIN — OMEPRAZOLE 20 MG: 20 CAPSULE, DELAYED RELEASE ORAL at 08:14

## 2023-12-18 RX ADMIN — CIPROFLOXACIN 1 DROP: 3 SOLUTION OPHTHALMIC at 05:44

## 2023-12-18 RX ADMIN — OXYCODONE 5 MG: 5 TABLET ORAL at 05:45

## 2023-12-18 RX ADMIN — METOPROLOL SUCCINATE 12.5 MG: 25 TABLET, EXTENDED RELEASE ORAL at 05:43

## 2023-12-18 RX ADMIN — HYDRALAZINE HYDROCHLORIDE 100 MG: 50 TABLET, FILM COATED ORAL at 14:25

## 2023-12-18 RX ADMIN — BACLOFEN 10 MG: 10 TABLET ORAL at 14:26

## 2023-12-18 RX ADMIN — HYDRALAZINE HYDROCHLORIDE 100 MG: 50 TABLET, FILM COATED ORAL at 05:43

## 2023-12-18 RX ADMIN — APIXABAN 5 MG: 5 TABLET, FILM COATED ORAL at 21:46

## 2023-12-18 RX ADMIN — BACLOFEN 10 MG: 10 TABLET ORAL at 21:46

## 2023-12-18 RX ADMIN — SERTRALINE 50 MG: 50 TABLET, FILM COATED ORAL at 08:14

## 2023-12-18 RX ADMIN — AMLODIPINE BESYLATE 10 MG: 5 TABLET ORAL at 05:43

## 2023-12-18 RX ADMIN — BACLOFEN 10 MG: 10 TABLET ORAL at 08:14

## 2023-12-18 RX ADMIN — ATORVASTATIN CALCIUM 40 MG: 40 TABLET, FILM COATED ORAL at 21:45

## 2023-12-18 RX ADMIN — TRAZODONE HYDROCHLORIDE 75 MG: 50 TABLET ORAL at 21:45

## 2023-12-18 RX ADMIN — CIPROFLOXACIN 1 DROP: 3 SOLUTION OPHTHALMIC at 10:02

## 2023-12-18 RX ADMIN — SENNOSIDES AND DOCUSATE SODIUM 2 TABLET: 50; 8.6 TABLET ORAL at 08:14

## 2023-12-18 RX ADMIN — APIXABAN 5 MG: 5 TABLET, FILM COATED ORAL at 08:15

## 2023-12-18 ASSESSMENT — GAIT ASSESSMENTS
DEVIATION: DECREASED BASE OF SUPPORT;SHUFFLED GAIT;DECREASED TOE OFF;DECREASED HEEL STRIKE
GAIT LEVEL OF ASSIST: MODERATE ASSIST
ASSISTIVE DEVICE: HEMI-WALKER
DEVIATION: DECREASED BASE OF SUPPORT;SHUFFLED GAIT;DECREASED HEEL STRIKE;DECREASED TOE OFF
DISTANCE (FEET): 65
GAIT LEVEL OF ASSIST: MODERATE ASSIST
DISTANCE (FEET): 15

## 2023-12-18 ASSESSMENT — PATIENT HEALTH QUESTIONNAIRE - PHQ9
6. FEELING BAD ABOUT YOURSELF - OR THAT YOU ARE A FAILURE OR HAVE LET YOURSELF OR YOUR FAMILY DOWN: NOT AL ALL
2. FEELING DOWN, DEPRESSED, IRRITABLE, OR HOPELESS: SEVERAL DAYS
8. MOVING OR SPEAKING SO SLOWLY THAT OTHER PEOPLE COULD HAVE NOTICED. OR THE OPPOSITE, BEING SO FIGETY OR RESTLESS THAT YOU HAVE BEEN MOVING AROUND A LOT MORE THAN USUAL: NOT AT ALL
1. LITTLE INTEREST OR PLEASURE IN DOING THINGS: NOT AT ALL
9. THOUGHTS THAT YOU WOULD BE BETTER OFF DEAD, OR OF HURTING YOURSELF: NOT AT ALL
7. TROUBLE CONCENTRATING ON THINGS, SUCH AS READING THE NEWSPAPER OR WATCHING TELEVISION: NOT AT ALL
3. TROUBLE FALLING OR STAYING ASLEEP OR SLEEPING TOO MUCH: SEVERAL DAYS
SUM OF ALL RESPONSES TO PHQ QUESTIONS 1-9: 2
5. POOR APPETITE OR OVEREATING: NOT AT ALL
4. FEELING TIRED OR HAVING LITTLE ENERGY: NOT AT ALL
SUM OF ALL RESPONSES TO PHQ9 QUESTIONS 1 AND 2: 1

## 2023-12-18 ASSESSMENT — ACTIVITIES OF DAILY LIVING (ADL)
BED_CHAIR_WHEELCHAIR_TRANSFER_DESCRIPTION: ADAPTIVE EQUIPMENT;INCREASED TIME;VERBAL CUEING
BED_CHAIR_WHEELCHAIR_TRANSFER_DESCRIPTION: SET-UP OF EQUIPMENT;SUPERVISION FOR SAFETY;VERBAL CUEING
BED_CHAIR_WHEELCHAIR_TRANSFER_DESCRIPTION: SET-UP OF EQUIPMENT;VERBAL CUEING;INCREASED TIME

## 2023-12-18 ASSESSMENT — PAIN DESCRIPTION - PAIN TYPE: TYPE: ACUTE PAIN

## 2023-12-18 NOTE — PROGRESS NOTES
NURSING DAILY NOTE    Name: Jason Lima   Date of Admission: 11/12/2023   Admitting Diagnosis: Thalamic hemorrhage (HCC)  Attending Physician: Lisa Lee D.o.  Allergies: Penicillins    Safety  Patient Assist  mod assit  Patient Precautions  Fall Risk  Precaution Comments  Left Hemiparesis, left visual inattention  Bed Transfer Status  Moderate Assist  Toilet Transfer Status   Moderate Assist  Assistive Devices  Rails, Other (Comments)  Oxygen  None - Room Air  Diet/Therapeutic Dining  Current Diet Order   Procedures    Diet Order Diet: Consistent CHO (Diabetic) (2 gram sodium restriction; medications whole with thin liquids); Second Modifier: (optional): 2 Gram Sodium     Pill Administration  whole  Agitated Behavioral Scale  14  ABS Level of Severity  No Agitation    Fall Risk  Has the patient had a fall this admission?   Yes  Shellie Hamilton Fall Risk Scoring  23, HIGH RISK  Fall Risk Safety Measures  bed alarm and chair alarm    Vitals  Temperature: 36.7 °C (98.1 °F)  Temp src: Oral  Pulse: 71  Respiration: 18  Blood Pressure: (!) 147/83  Blood Pressure MAP (Calculated): 104 MM HG  BP Location: Right, Upper Arm  Patient BP Position: Supine     Oxygen  Pulse Oximetry: 93 %  O2 (LPM): 0  FiO2%: 21 %  O2 Delivery Device: None - Room Air    Bowel and Bladder  Last Bowel Movement  12/17/23  Stool Type  Type 5: Soft blob with clear cut edges (passed easily)  Bowel Device  Bathroom  Continent  Bladder: Continent void   Bowel: Continent movement  Bladder Function  Urine Void (mL): 300 ml (urinals)  Number of Times Voided: 2  Urinary Options: Yes  Urine Color: Justina  Number of Times Incontinent of Urine: 0  Genitourinary Assessment   Bladder Assessment (WDL):  WDL Except  Echols Catheter: Not Applicable  Urine Color: Justina  Number of Bladder Accidents: 0  Total Number of Bladder of Accidents in Last 7 Days: 0  Number of Times Incontinent of Urine: 0  Bladder  Device: Urinal  Time Void: No  Bladder Scan: Post Void  $ Bladder Scan Results (mL): 26    Skin  Polo Score   16  Sensory Interventions   Bed Types: Standard/Trauma Mattress  Skin Preventative Measures: Pillows in Use for Support / Positioning  Moisture Interventions  Moisturizers/Barriers: Barrier Paste      Pain  Pain Rating Scale  0 - No Pain  Pain Location  Toe  Pain Location Orientation  Right  Pain Interventions   Declines    ADLs    Bathing   Shower, * * With Assistance from, Staff  Linen Change   Complete  Personal Hygiene  Change Deja Pads, Moist Deja Wipes, Perineal Care  Chlorhexidine Bath      Oral Care  Brushed Teeth  Teeth/Dentures     Shave  Self  Nutrition Percentage Eaten  *  * Meal *  *, Lunch, Between % Consumed  Environmental Precautions  Bed in Low Position  Patient Turns/Positioning  Patient Turns Self from Side to Side  Patient Turns Assistance/Tolerance  Assistance of One  Bed Positions  Bed Controls On  Head of Bed Elevated  Self regulated      Psychosocial/Neurologic Assessment  Psychosocial Assessment  Psychosocial (WDL):  WDL Except  Patient Behaviors: Fatigue  Neurologic Assessment  Neuro (WDL): Exceptions to WDL  Level of Consciousness: Alert  Orientation Level: Oriented X4  Cognition: Follows commands, Appropriate attention/concentration  Speech: Clear  Facial Symmetry: Left facial drooping  Pupil Assesment: Yes  R Pupil Size (mm): 3  R Pupil Shape / Description: Round  R Pupil Reaction: Brisk  L Pupil Size (mm): 3  L Pupil Shape / Description: Round  L Pupil Reaction: Brisk  Reflexes: Cough  Cough Reflex: Present  Motor Function/Sensation Assessment: Dorsiflexion, Motor response  R Foot Dorsiflexion: Strong  L Foot Dorsiflexion: Absent  RUE Motor Response: Responds to commands  RUE Sensation: Full sensation  Muscle Strength Right Arm: Normal Strength Against Gravity and Full Resistance  LUE Motor Response: Flaccid  LUE Sensation: Numbness, Tingling  Muscle Strength Left Arm:  Weak Movement but Not Against Gravity or Resistance  RLE Motor Response: Responds to commands  RLE Sensation: Full sensation  Muscle Strength Right Leg: Good Strength Against Gravity and Moderate Resistance  LLE Motor Response: Flaccid  LLE Sensation: Numbness, Tingling  Muscle Strength Left Leg: Weak Movement but Not Against Gravity or Resistance  EENT (WDL):  WDL Except    Cardio/Pulmonary Assessment  Edema   RLE Edema: Trace  LLE Edema: Trace  Respiratory Breath Sounds  RUL Breath Sounds: Clear  RML Breath Sounds: Clear  RLL Breath Sounds: Diminished  MOHAMUD Breath Sounds: Clear  LLL Breath Sounds: Diminished  Cardiac Assessment   Cardiac (WDL):  WDL Except (htn, hld)

## 2023-12-18 NOTE — PROGRESS NOTES
"  Physical Medicine & Rehabilitation Progress Note    Encounter Date: 12/18/2023    Chief Complaint: Doing okay    Interval Events (Subjective):  VSS -systolic blood pressure in the 140s  BM 12/17  Voiding    Seen and examined in his room.  States that he is doing well.  He has some prognostic questions regarding his stroke.    ROS: 14 point ROS negative unless otherwise specified in the HPI    Objective:  VITAL SIGNS: BP (!) 141/81   Pulse 70   Temp 36.7 °C (98 °F) (Oral)   Resp 18   Ht 1.727 m (5' 8\")   Wt 81 kg (178 lb 9.2 oz)   SpO2 95%   BMI 27.15 kg/m²     GEN: No apparent distress  HEENT: Head normocephalic, atraumatic.  Left eye discharge and erythematous sclera  CV: Extremities warm and well-perfused, no peripheral edema appreciated bilaterally.  PULMONARY: Breathing nonlabored on room air, no respiratory accessory muscle use.  Not requiring supplemental oxygen.  SKIN: No appreciable skin breakdown on exposed areas of skin.  PSYCH: Normal affect  NEURO: Awake alert.  Conversational.  Logical thought content. Dysarthria. Left Hemiparesis. Mild left clonus at ankle. No significant spasticity appreciated at rest.       Laboratory Values:  Recent Results (from the past 72 hour(s))   Basic Metabolic Panel    Collection Time: 12/18/23  5:37 AM   Result Value Ref Range    Sodium 141 135 - 145 mmol/L    Potassium 3.8 3.6 - 5.5 mmol/L    Chloride 104 96 - 112 mmol/L    Co2 26 20 - 33 mmol/L    Glucose 127 (H) 65 - 99 mg/dL    Bun 33 (H) 8 - 22 mg/dL    Creatinine 2.68 (H) 0.50 - 1.40 mg/dL    Calcium 9.0 8.5 - 10.5 mg/dL    Anion Gap 11.0 7.0 - 16.0   ESTIMATED GFR    Collection Time: 12/18/23  5:37 AM   Result Value Ref Range    GFR (CKD-EPI) 26 (A) >60 mL/min/1.73 m 2           Medications:  Scheduled Medications   Medication Dose Frequency    metoprolol SR  12.5 mg Q DAY    sertraline  50 mg DAILY    baclofen  10 mg TID    amLODIPine  10 mg DAILY    apixaban  5 mg BID    hydrALAZINE  100 mg Q8HRS    " traZODone  75 mg QHS    senna-docusate  2 Tablet BID    omeprazole  20 mg DAILY    atorvastatin  40 mg Nightly     PRN medications: traZODone, carboxymethylcellulose, [DISCONTINUED] insulin regular **AND** [CANCELED] POC blood glucose manual result **AND** NOTIFY MD and PharmD **AND** Administer 20 grams of glucose (approximately 8 ounces of fruit juice) every 15 minutes PRN FSBG less than 70 mg/dL **AND** dextrose bolus, Respiratory Therapy Consult, senna-docusate **AND** polyethylene glycol/lytes **AND** magnesium hydroxide **AND** bisacodyl, mag hydrox-al hydrox-simeth, ondansetron **OR** ondansetron, sodium chloride, [] acetaminophen **FOLLOWED BY** acetaminophen, oxyCODONE immediate-release **OR** oxyCODONE immediate-release, hydrALAZINE    Diet:  Current Diet Order   Procedures    Diet Order Diet: Consistent CHO (Diabetic) (2 gram sodium restriction; medications whole with thin liquids); Second Modifier: (optional): 2 Gram Sodium       Medical Decision Making and Plan:  Right thalamic hemorrhagic stroke ~ last week 2023  Originally presented with a headache and left-sided weakness to hospital in [facility]  Work-up at the outside hospital in [facility] revealed a right thalamic hemorrhagic stroke  Repeat CT head done after return flight to the ,  CT head shows right thalamic hemorrhage, decrease in density since prior studies from the outside hospital, slight asymmetric dilation of the left ventricular system including the left temporal horn with a possible component of hydrocephalus  Planning for BP less than 140, avoiding any kind of anticoagulation  Initiate comprehensive IRF level therapy with 3 days of therapy 5 days a week with services from PT/OT/SLP     Spasticity  OP follow up with Physiatry for consideration Botox   Has had resurgence of spasticity restart baclofen 5mg TID 2023 --> increase to 10mg TID  2023 continue Baclofen at 10mg TID, no significant side  effects    Conjunctivitis  12/15 start eyedrops    ABLA  Now on Eliquis  Recheck 11/28 - improved 11.4--> 12.0--> 11.6 11/30/2023 12/6/2023 11.6-->10.7--> 11.7 12/11  Monitor     CKD  Follow up with OP nephrology  Renal function fluctuates 20-30's/2's-3's  Cr Baseline in past had been mid 1's     Hypertension  Continue Amlodipine 10mg daily  Continue Hydralazine 25mg TID - increased by hospitalist 11/13/2023 from BID to TID--> increased to 50mg q8hrs 11/15  11/16 Hydralazine increased to 75mg q8hrs and 1x Hydralazine given  11/17 BP still elevated but hydralazine recently increased. Monitor  12/13/2023 VS showing increase in -140's. Continue Hydralazine 100mg q8hrs + Amlodipine 10mg daily.  12/14/2023 consistently higher, start Metoprolol XL 12.5 mg daily   12/15/2023 better controlled, continue Metoprolol daily   12/18 increase metoprolol to 25 mg daily    Hypokalemia  Mild 12/13 supplement x1   NML 12/15  12/18 stable and normal at 3.8    Leukopenia  New, mild 12/6/2023   Resolved 12/11     Prediabetes  Discontinue SSI     HLD  Continue home dose satin      Bowel  Constipation 11/29/2023, resolved 11/30/2023   Continue with scheduled Colace and senna, as needed stool softeners  Miralax x3 doses 11/29/2023 --> Constipation Resolved 11/30/2023      Insomnia  Secondary to recent jet lag, melatonin scheduled --> increase to home dose 11/13/2023 9mg  Utilize trazodone as needed insomnia continues  Schedule Trazodone 11/15/2023   11/16/2023 continue Trazodone as is. Thinks he slept better last night  11/17/2023 Still not sleeping well. Increase to 75mg nightly.   12/15/2023 continue trazodone at current dose     Pain - as needed Tylenol and oxycodone    Post-Stroke Depression  Consulted Pyschiatry   Start Prozac 11/28/2023 --> Switched to zoloft per psychiatry  Appreciate assistance with management  Mood improved, continue Zoloft 12/11    Right Foot Pain  H/o Gout  Xray with swelling 1st metatarsal head   Trial  Colchicine for acute gout flare 11/28/2023   Topical Voltaren gel scheduled   Improved with less swelling, erythema 11/29/2023   Resolved      LABS: BMP 12/18 - K+ and BUN/Cr -renal function stable.  Potassium stable at 3.8      DVT PROPHYLAXIS: NO - Hemorrhagic stroke. US + UE and LE for brachial and peroneal DVT. 11/21/2023 start Heparin 5000 units q8hrs SQ for further prophylaxis, if tolerates will increase to full AC. Consider repeat CTH to ensure stability bleed once on full AC. 11/24/2023 Start loading dose Eliquis 10mg BID x 7 days with transition to 5mg BID. CBC showing improvement in H/H 11/28/2023 no neurologic decline.     HOSPITALIST FOLLOWING: YES - d/w hospitalist 12/6 --> Signed off 12/6.     CODE STATUS: FULL CODE    DISPO: Home alone. Family can help starting 12/27    MARCUS: 12/23/23    MEDS SENT TO: TBD    DISCHARGE FOLLOW UP: Neurology, Nephrology, PCP, Physiatry (Botox) - needs referral to all.   ____________________________________    Dr. Lisa Lee DO, MS  [facility] - Physical Medicine & Rehabilitation   ____________________________________

## 2023-12-18 NOTE — CARE PLAN
"The patient is Stable - Low risk of patient condition declining or worsening    Shift Goals  Clinical Goals: Safety  Patient Goals: Safety  Family Goals: Education    Progress made toward(s) clinical / shift goals:    Problem: Fall Risk - Rehab  Goal: Patient will remain free from falls  Outcome: Progressing     Shellie Hamilton Fall risk Assessment : 23    High fall risk Interventions   - Bed and strip alarm   - Yellow sign by the door   - Yellow wrist band \"Fall risk\"  - Room near to the nurse station  - Do not leave patient unattended in the bathroom  - Fall risk education provided    Pt uses call light consistently and appropriately. Waits for assistance does not attempt self transfer this shift. Able to verbalize needs.  "

## 2023-12-18 NOTE — THERAPY
Recreational Therapy  Daily Treatment     Patient Name: Jason Lima  AGE:  62 y.o., SEX:  male  Medical Record #: [id number]  Today's Date: 12/18/2023       Subjective    Pt was able to share that he felt he has made some progress. He was given compliments on his progress and initially was not receptive to compliment.      Objective       12/18/23 0931   Procedural Tracking   Procedural Tracking Gross Motor Functional Leisure Skills   Treatment Time   Total Time Spent (mins) 30   Functional Ability Status - Cognitive   Attention Span Remains on Task   Comprehension Follows Three Step Commands   Judgment Able to Make Independent Decisions   Functional Ability Status - Emotional    Affect Appropriate;Bright   Mood Appropriate   Behavior Appropriate   Skilled Intervention    Skilled Intervention Gross Motor Leisure;Cognitive Leisure   Skilled Intervention Comments 6inch puzzle pieces totaling 32   Interdisciplinary Plan of Care Collaboration   IDT Collaboration with  Physical Therapist   Patient Position at End of Therapy Seated;Tray Table within Reach;Call Light within Reach   Collaboration Comments Recieved care from PT in room. Requested to remain in his chair following session.   Strengths & Barriers   Strengths Able to follow instructions;Alert and oriented;Motivated for self care and independence;Pleasant and cooperative;Supportive family;Willingly participates in therapeutic activities   Barriers Hemiparesis;Decreased endurance;Fatigue;Impaired balance;Impaired activity tolerance         Assessment    He was given a 36 piece puzzle with 6 inch across pieces. He was to LUE and move the piece to match where is was to go prior to using RUE to put the puzzle piece all of the way in place.     Strengths: (P) Able to follow instructions, Alert and oriented, Motivated for self care and independence, Pleasant and cooperative, Supportive family, Willingly participates in therapeutic activities  Barriers: (P) Hemiparesis,  Decreased endurance, Fatigue, Impaired balance, Impaired activity tolerance    Plan    Dc following plan for leisure.     Passport items to be completed:  Verbalize two positive leisure activities, discuss returning to work, hobbies, community groups or volunteer activities, explore community resources

## 2023-12-18 NOTE — FLOWSHEET NOTE
12/18/23 1532   Events/Summary/Plan   Events/Summary/Plan SpO2 check, CPAP check   Vital Signs   Pulse 70   Respiration 18   Pulse Oximetry 95 %   $ Pulse Oximetry (Spot Check) Yes   Oxygen   O2 Delivery Device None - Room Air   Non-Invasive Ventilation [employer]   Nocturnal CPAP or BIPAP CPAP - Home Unit  (11:00 hrs use last night.)

## 2023-12-18 NOTE — THERAPY
Occupational Therapy  Daily Treatment     Patient Name: Jason Lima  Age:  62 y.o., Sex:  male  Medical Record #: [id number]  Today's Date: 12/18/2023     Precautions  Precautions: (P) Fall Risk  Comments: Left Hemiparesis, left visual inattention         Subjective    Patient was in bed awake and agreeable to work on ADL routine.       Objective       12/18/23 0701   OT Charge Group   OT Self Care / ADL (Units) 4   OT Total Time Spent   OT Individual Total Time Spent (Mins) 60   Precautions   Precautions Fall Risk   Cognition    Safety Awareness Impaired;Impulsive   New Learning Impaired   Attention Impaired   Functional Level of Assist   Grooming Seated;Minimal Assist   Grooming Description Seated in wheelchair at sink;Supervision for safety;Verbal cueing  (shaved, brushed teeth, min A sitting balance during oral care)   Bathing Minimal Assist   Bathing Description Grab bar;Hand held shower;Tub bench;Set-up of equipment;Supervision for safety;Verbal cueing  (min A sitting balance at times, min A to wash/dry R UE)   Upper Body Dressing Minimal Assist   Upper Body Dressing Description Set-up of equipment;Supervision for safety;Verbal cueing  (min A to don pullover tank top (assist to pull down in back and on L side))   Lower Body Dressing Maximal Assist   Lower Body Dressing Description Grab bar;Set-up of equipment;Supervision for safety;Verbal cueing  (assist w/ 9/15 tasks)   Bed, Chair, Wheelchair Transfer Moderate Assist   Bed Chair Wheelchair Transfer Description Set-up of equipment;Supervision for safety;Verbal cueing  (mod A SPT bed to w/c to the R)   Tub / Shower Transfers Moderate Assist   Tub Shower Transfer Description Grab bar;Shower bench;Set-up of equipment;Supervision for safety;Verbal cueing;Requires lift  (mod A SPT w/c <> bench with cues and grab bar at times)   Bed Mobility    Supine to Sit Moderate Assist   Scooting Contact Guard Assist   Skilled Intervention Verbal Cuing;Sequencing;Tactile Cuing    Interdisciplinary Plan of Care Collaboration   Patient Position at End of Therapy Seated;Chair Alarm On;Self Releasing Lap Belt Applied;Call Light within Reach;Tray Table within Reach;Phone within Reach         Assessment    Patient presents with poor sitting balance with leaning/falling to the left with impaired attention to his loss of balance.  He demonstrates impaired safety awareness and impulsivity at times with sudden leaning forward on shower bench to wash lower legs/feet.    Strengths: Able to follow instructions, Independent prior level of function, Motivated for self care and independence, Pleasant and cooperative, Supportive family  Barriers: Decreased endurance, Fatigue, Generalized weakness, Hemiparesis, Impaired activity tolerance, Impaired balance, Limited mobility, Spasticity    Plan    Implement above recommendations. Cont transfer training. Cont thera ex to improve strength, balance, and coordination for safe DC home with 24-7 support.       Occupational Therapy Goals (Active)       Problem: Dressing       Dates: Start:  11/22/23         Goal: STG-Within one week, patient will dress LB at [facility] using LRD       Dates: Start:  11/22/23    Expected End:  12/23/23            Goal: STG-Within one week, patient will dress UB SPV using LRD       Dates: Start:  12/06/23    Expected End:  12/23/23               Problem: Functional Transfers       Dates: Start:  12/06/23         Goal: STG-Within one week, patient will transfer to step in shower at [facility] to [facility] using LRD       Dates: Start:  12/06/23    Expected End:  12/23/23               Problem: OT Long Term Goals       Dates: Start:  11/13/23         Goal: LTG-By discharge, patient will complete basic self care tasks at Mod Independent level.       Dates: Start:  11/13/23    Expected End:  12/23/23            Goal: LTG-By discharge, patient will perform bathroom transfers at Mod Independent level.       Dates: Start:  11/13/23    Expected End:  12/23/23

## 2023-12-18 NOTE — THERAPY
Occupational Therapy  Daily Treatment     Patient Name: Jason Lima  Age:  62 y.o., Sex:  male  Medical Record #: [id number]  Today's Date: 12/18/2023     Precautions  Precautions: Fall Risk  Comments: Left Hemiparesis, left visual inattention         Subjective    Pt in room with family agreeable for therapy.      Objective       12/18/23 1101   OT Charge Group   OT Therapeutic Exercise (Units) 2   OT Total Time Spent   OT Individual Total Time Spent (Mins) 30   Sitting Upper Body Exercises   Internal Shoulder Rotation 2 sets of 10;Left;Weight (See Comments for lbs)  (1lbs hand weight)   External Shoulder Rotation 2 sets of 10;Left;Weight (See Comments for lbs)  (1lbs hand weight)   Bicep Curls 2 sets of 10;Left;Weight (See Comments for lbs)  (2lbs hand weight)   Wrist Flexion / Extension 2 sets of 10;Left;Weight (See Comments for lbs)  (1lbs hand weight)   Comments Nustep ~15min level 3   Interdisciplinary Plan of Care Collaboration   IDT Collaboration with  Family / Caregiver   Patient Position at End of Therapy Seated;Chair Alarm On;Tray Table within Reach;Phone within Reach;Call Light within Reach   Collaboration Comments Pt in dinning area for lunch with family         Assessment  Mod A for stand pivot transfer from w/c<>nustep with VC for LB sequencing. nustep for 15min to increase strengthening and reciprocal movements for UE/LE and the remainder 5min instructed pt in using LUE/LE only to push and pull to challenge and increase strengthening on L side. Pt was able to grasp hand bar using R hand for 5min unassisted, however still needed VC for contract hand.      Strengths: Able to follow instructions, Independent prior level of function, Motivated for self care and independence, Pleasant and cooperative, Supportive family  Barriers: Decreased endurance, Fatigue, Generalized weakness, Hemiparesis, Impaired activity tolerance, Impaired balance, Limited mobility, Spasticity    Plan    Functional transfers    ADLs  Neuro re-ed   Strengthening/endurance      DME  OT DME Recommendations  Bathroom Equipment: 3 in 1 Commode, Tub Transfer Bench (Medicaid Only)  Additional Equipment: Other (Comments)    Passport items to be completed:  Perform bathroom transfers, complete dressing, complete feeding, get ready for the day, prepare a simple meal, participate in household tasks, adapt home for safety needs, demonstrate home exercise program, complete caregiver training     Occupational Therapy Goals (Active)       Problem: Dressing       Dates: Start:  11/22/23         Goal: STG-Within one week, patient will dress LB at [facility] using LRD       Dates: Start:  11/22/23    Expected End:  12/23/23            Goal: STG-Within one week, patient will dress UB SPV using LRD       Dates: Start:  12/06/23    Expected End:  12/23/23               Problem: Functional Transfers       Dates: Start:  12/06/23         Goal: STG-Within one week, patient will transfer to step in shower at [facility] to [facility] using LRD       Dates: Start:  12/06/23    Expected End:  12/23/23               Problem: OT Long Term Goals       Dates: Start:  11/13/23         Goal: LTG-By discharge, patient will complete basic self care tasks at Mod Independent level.       Dates: Start:  11/13/23    Expected End:  12/23/23            Goal: LTG-By discharge, patient will perform bathroom transfers at Mod Independent level.       Dates: Start:  11/13/23    Expected End:  12/23/23

## 2023-12-19 ENCOUNTER — APPOINTMENT (OUTPATIENT)
Dept: OCCUPATIONAL THERAPY | Facility: REHABILITATION | Age: 63
DRG: 057 | End: 2023-12-19
Attending: HOSPITALIST
Payer: COMMERCIAL

## 2023-12-19 ENCOUNTER — APPOINTMENT (OUTPATIENT)
Dept: PHYSICAL THERAPY | Facility: REHABILITATION | Age: 63
DRG: 057 | End: 2023-12-19
Attending: HOSPITALIST
Payer: COMMERCIAL

## 2023-12-19 ENCOUNTER — APPOINTMENT (OUTPATIENT)
Dept: SPEECH THERAPY | Facility: REHABILITATION | Age: 63
DRG: 057 | End: 2023-12-19
Attending: PHYSICAL MEDICINE & REHABILITATION
Payer: COMMERCIAL

## 2023-12-19 LAB
ERYTHROCYTE [DISTWIDTH] IN BLOOD BY AUTOMATED COUNT: 41 FL (ref 35.9–50)
HCT VFR BLD AUTO: 34.3 % (ref 42–52)
HGB BLD-MCNC: 11.6 G/DL (ref 14–18)
MCH RBC QN AUTO: 30.9 PG (ref 27–33)
MCHC RBC AUTO-ENTMCNC: 33.8 G/DL (ref 32.3–36.5)
MCV RBC AUTO: 91.2 FL (ref 81.4–97.8)
PLATELET # BLD AUTO: 190 K/UL (ref 164–446)
PMV BLD AUTO: 10.9 FL (ref 9–12.9)
RBC # BLD AUTO: 3.76 M/UL (ref 4.7–6.1)
WBC # BLD AUTO: 5.3 K/UL (ref 4.8–10.8)

## 2023-12-19 PROCEDURE — 99232 SBSQ HOSP IP/OBS MODERATE 35: CPT | Performed by: PHYSICAL MEDICINE & REHABILITATION

## 2023-12-19 PROCEDURE — 97129 THER IVNTJ 1ST 15 MIN: CPT

## 2023-12-19 PROCEDURE — A9270 NON-COVERED ITEM OR SERVICE: HCPCS | Performed by: PHYSICAL MEDICINE & REHABILITATION

## 2023-12-19 PROCEDURE — 770010 HCHG ROOM/CARE - REHAB SEMI PRIVAT*

## 2023-12-19 PROCEDURE — 36415 COLL VENOUS BLD VENIPUNCTURE: CPT

## 2023-12-19 PROCEDURE — A9270 NON-COVERED ITEM OR SERVICE: HCPCS | Performed by: STUDENT IN AN ORGANIZED HEALTH CARE EDUCATION/TRAINING PROGRAM

## 2023-12-19 PROCEDURE — 97130 THER IVNTJ EA ADDL 15 MIN: CPT

## 2023-12-19 PROCEDURE — 97112 NEUROMUSCULAR REEDUCATION: CPT

## 2023-12-19 PROCEDURE — A9270 NON-COVERED ITEM OR SERVICE: HCPCS | Performed by: HOSPITALIST

## 2023-12-19 PROCEDURE — 700102 HCHG RX REV CODE 250 W/ 637 OVERRIDE(OP): Performed by: PHYSICAL MEDICINE & REHABILITATION

## 2023-12-19 PROCEDURE — 97110 THERAPEUTIC EXERCISES: CPT

## 2023-12-19 PROCEDURE — 97116 GAIT TRAINING THERAPY: CPT

## 2023-12-19 PROCEDURE — 700102 HCHG RX REV CODE 250 W/ 637 OVERRIDE(OP): Performed by: HOSPITALIST

## 2023-12-19 PROCEDURE — 85027 COMPLETE CBC AUTOMATED: CPT

## 2023-12-19 PROCEDURE — 700102 HCHG RX REV CODE 250 W/ 637 OVERRIDE(OP): Performed by: STUDENT IN AN ORGANIZED HEALTH CARE EDUCATION/TRAINING PROGRAM

## 2023-12-19 PROCEDURE — 97535 SELF CARE MNGMENT TRAINING: CPT

## 2023-12-19 RX ADMIN — BACLOFEN 10 MG: 10 TABLET ORAL at 08:19

## 2023-12-19 RX ADMIN — HYDRALAZINE HYDROCHLORIDE 100 MG: 50 TABLET, FILM COATED ORAL at 21:32

## 2023-12-19 RX ADMIN — SENNOSIDES AND DOCUSATE SODIUM 2 TABLET: 50; 8.6 TABLET ORAL at 21:27

## 2023-12-19 RX ADMIN — TRAZODONE HYDROCHLORIDE 75 MG: 50 TABLET ORAL at 21:26

## 2023-12-19 RX ADMIN — HYDRALAZINE HYDROCHLORIDE 100 MG: 50 TABLET, FILM COATED ORAL at 15:03

## 2023-12-19 RX ADMIN — ATORVASTATIN CALCIUM 40 MG: 40 TABLET, FILM COATED ORAL at 21:26

## 2023-12-19 RX ADMIN — BACLOFEN 10 MG: 10 TABLET ORAL at 21:26

## 2023-12-19 RX ADMIN — APIXABAN 5 MG: 5 TABLET, FILM COATED ORAL at 21:26

## 2023-12-19 RX ADMIN — HYDRALAZINE HYDROCHLORIDE 100 MG: 50 TABLET, FILM COATED ORAL at 05:46

## 2023-12-19 RX ADMIN — METOPROLOL SUCCINATE 25 MG: 25 TABLET, EXTENDED RELEASE ORAL at 05:46

## 2023-12-19 RX ADMIN — AMLODIPINE BESYLATE 10 MG: 5 TABLET ORAL at 05:46

## 2023-12-19 RX ADMIN — APIXABAN 5 MG: 5 TABLET, FILM COATED ORAL at 08:19

## 2023-12-19 RX ADMIN — BACLOFEN 10 MG: 10 TABLET ORAL at 15:03

## 2023-12-19 RX ADMIN — SERTRALINE 50 MG: 50 TABLET, FILM COATED ORAL at 08:19

## 2023-12-19 RX ADMIN — OMEPRAZOLE 20 MG: 20 CAPSULE, DELAYED RELEASE ORAL at 08:19

## 2023-12-19 ASSESSMENT — GAIT ASSESSMENTS
DEVIATION: DECREASED BASE OF SUPPORT;BRADYKINETIC;DECREASED HEEL STRIKE;DECREASED TOE OFF
ASSISTIVE DEVICE: HEMI-WALKER
DISTANCE (FEET): 50
GAIT LEVEL OF ASSIST: MODERATE ASSIST

## 2023-12-19 ASSESSMENT — ACTIVITIES OF DAILY LIVING (ADL): TOILET_TRANSFER_DESCRIPTION: GRAB BAR;INCREASED TIME;SUPERVISION FOR SAFETY

## 2023-12-19 NOTE — THERAPY
"Occupational Therapy  Daily Treatment     Patient Name: Jason Lima  Age:  62 y.o., Sex:  male  Medical Record #: [id number]  Today's Date: 12/19/2023     Precautions  Precautions: (P) Fall Risk  Comments: (P) Left Hemiparesis, left visual inattention         Subjective    Pt encountered for OT in room. \"I need to use the rest room.\"      Objective       12/19/23 0931   OT Charge Group   OT Self Care / ADL (Units) 2   OT Therapeutic Exercise (Units) 2   OT Total Time Spent   OT Individual Total Time Spent (Mins) 60   Precautions   Precautions Fall Risk   Comments Left Hemiparesis, left visual inattention   Functional Level of Assist   Toileting Moderate Assist   Toileting Description Assist for hygiene;Assist to pull pants up;Assist to pull pants down;Assist for standing balance;Grab bar;Set-up of equipment;Supervision for safety  (Pt attempted to wipe independently which resulted in a near fall to the left (see details below). Assistance with sitting balance during wiping and pulling pants up/down in standing.)   Bed Chair Wheelchair Transfer Description   (Stand step from WC to toilet using GB)   Toilet Transfers Moderate Assist   Toilet Transfer Description Grab bar;Increased time;Supervision for safety  (Stand step from WC <> toilet)   Sitting Lower Body Exercises   Nustep Resistance Level 3;Time (See Comments)   Comments 20 min total: 10 min with BU/LE w/ gait belt around thighs to prevent L hip abd. L hand volitional grasp (no ace wrap); 10 min L UE ONLY with breaks d/t fatigue.   Interdisciplinary Plan of Care Collaboration   IDT Collaboration with  Therapy Tech;Nursing  (Therapy Director)   Patient Position at End of Therapy Seated;Chair Alarm On;Call Light within Reach;Tray Table within Reach;Phone within Reach   Collaboration Comments Tech A for safety during transfers; Nursing regarding near fall with abrasion to L thigh     Pt near fall off toilet (fell into WC) while attempting to self-wipe seated on " toilet, w/o therapist SPV. Therapist outside door for pt privacy with instructions to pt to pull call light when ready to wipe. L thigh abrasion. Nursing notified. Recommended and discussed with pt to perform perineal hygiene with staff present for safety with dynamic sitting. Pt verbalized understanding.       Assessment    Overall, steady improvements towards LUE function as pt is now able to maintain a grasp on an exercises bike handle for up to 2-3 min prior to losing  d/t fatigue. Better tolerance when instructed to attend to hand. Poor dynamic sitting balance warrants continued SPV during toileting hygiene and assistance with sitting balance to prevent falls.       Strengths: Able to follow instructions, Independent prior level of function, Motivated for self care and independence, Pleasant and cooperative, Supportive family  Barriers: Decreased endurance, Fatigue, Generalized weakness, Hemiparesis, Impaired activity tolerance, Impaired balance, Limited mobility, Spasticity    Plan    Cont ADLs, functional transfers (dynamic sitting balance), and thera act/ex to maximize functional recovery for safe DC home.       DME  OT DME Recommendations  Bathroom Equipment: 3 in 1 Commode, Tub Transfer Bench (Medicaid Only)  Additional Equipment: Other (Comments)    Passport items to be completed:  Perform bathroom transfers, complete dressing, complete feeding, get ready for the day, prepare a simple meal, participate in household tasks, adapt home for safety needs, demonstrate home exercise program, complete caregiver training     Occupational Therapy Goals (Active)       Problem: Dressing       Dates: Start:  11/22/23         Goal: STG-Within one week, patient will dress LB at [facility] using LRD       Dates: Start:  11/22/23    Expected End:  12/23/23            Goal: STG-Within one week, patient will dress UB SPV using LRD       Dates: Start:  12/06/23    Expected End:  12/23/23               Problem: Functional  Transfers       Dates: Start:  12/06/23         Goal: STG-Within one week, patient will transfer to step in shower at [facility] to [facility] using LRD       Dates: Start:  12/06/23    Expected End:  12/23/23               Problem: OT Long Term Goals       Dates: Start:  11/13/23         Goal: LTG-By discharge, patient will complete basic self care tasks at Mod Independent level.       Dates: Start:  11/13/23    Expected End:  12/23/23            Goal: LTG-By discharge, patient will perform bathroom transfers at Mod Independent level.       Dates: Start:  11/13/23    Expected End:  12/23/23

## 2023-12-19 NOTE — PROGRESS NOTES
"  Physical Medicine & Rehabilitation Progress Note    Encounter Date: 12/19/2023    Chief Complaint: Feels okay    Interval Events (Subjective):  Vital signs: SBP ranges from 124-157 over the past 24 hours  BM 12/19  Voiding    Seen and examined in his room.  Wondering if we have an update regarding his discharge.  He is overall feeling well.    ROS: 14 point ROS negative unless otherwise specified in the HPI    Objective:  VITAL SIGNS: /72   Pulse 72   Temp 36.7 °C (98 °F) (Oral)   Resp 18   Ht 1.727 m (5' 8\")   Wt 81 kg (178 lb 9.2 oz)   SpO2 95%   BMI 27.15 kg/m²     GEN: No apparent distress  HEENT: Head normocephalic, atraumatic.  Left eye discharge and erythematous sclera  CV: Extremities warm and well-perfused, no peripheral edema appreciated bilaterally.  PULMONARY: Breathing nonlabored on room air, no respiratory accessory muscle use.  Not requiring supplemental oxygen.  SKIN: No appreciable skin breakdown on exposed areas of skin.  PSYCH: Normal affect  NEURO: Awake alert.  Conversational.  Logical thought content. Dysarthria. Left Hemiparesis. Mild left clonus at ankle. No significant spasticity appreciated at rest.       Laboratory Values:  Recent Results (from the past 72 hour(s))   Basic Metabolic Panel    Collection Time: 12/18/23  5:37 AM   Result Value Ref Range    Sodium 141 135 - 145 mmol/L    Potassium 3.8 3.6 - 5.5 mmol/L    Chloride 104 96 - 112 mmol/L    Co2 26 20 - 33 mmol/L    Glucose 127 (H) 65 - 99 mg/dL    Bun 33 (H) 8 - 22 mg/dL    Creatinine 2.68 (H) 0.50 - 1.40 mg/dL    Calcium 9.0 8.5 - 10.5 mg/dL    Anion Gap 11.0 7.0 - 16.0   ESTIMATED GFR    Collection Time: 12/18/23  5:37 AM   Result Value Ref Range    GFR (CKD-EPI) 26 (A) >60 mL/min/1.73 m 2   CBC WITHOUT DIFFERENTIAL    Collection Time: 12/19/23  5:17 AM   Result Value Ref Range    WBC 5.3 4.8 - 10.8 K/uL    RBC 3.76 (L) 4.70 - 6.10 M/uL    Hemoglobin 11.6 (L) 14.0 - 18.0 g/dL    Hematocrit 34.3 (L) 42.0 - 52.0 %    " MCV 91.2 81.4 - 97.8 fL    MCH 30.9 27.0 - 33.0 pg    MCHC 33.8 32.3 - 36.5 g/dL    RDW 41.0 35.9 - 50.0 fL    Platelet Count 190 164 - 446 K/uL    MPV 10.9 9.0 - 12.9 fL           Medications:  Scheduled Medications   Medication Dose Frequency    metoprolol SR  25 mg Q DAY    sertraline  50 mg DAILY    baclofen  10 mg TID    amLODIPine  10 mg DAILY    apixaban  5 mg BID    hydrALAZINE  100 mg Q8HRS    traZODone  75 mg QHS    senna-docusate  2 Tablet BID    omeprazole  20 mg DAILY    atorvastatin  40 mg Nightly     PRN medications: traZODone, carboxymethylcellulose, [DISCONTINUED] insulin regular **AND** [CANCELED] POC blood glucose manual result **AND** NOTIFY MD and PharmD **AND** Administer 20 grams of glucose (approximately 8 ounces of fruit juice) every 15 minutes PRN FSBG less than 70 mg/dL **AND** dextrose bolus, Respiratory Therapy Consult, senna-docusate **AND** polyethylene glycol/lytes **AND** magnesium hydroxide **AND** bisacodyl, mag hydrox-al hydrox-simeth, ondansetron **OR** ondansetron, sodium chloride, [] acetaminophen **FOLLOWED BY** acetaminophen, oxyCODONE immediate-release **OR** oxyCODONE immediate-release, hydrALAZINE    Diet:  Current Diet Order   Procedures    Diet Order Diet: Consistent CHO (Diabetic) (2 gram sodium restriction; medications whole with thin liquids); Second Modifier: (optional): 2 Gram Sodium       Medical Decision Making and Plan:  Right thalamic hemorrhagic stroke ~ last week 2023  Originally presented with a headache and left-sided weakness to hospital in [place]  Work-up at the outside hospital in [place] revealed a right thalamic hemorrhagic stroke  Repeat CT head done after return flight to the ,  CT head shows right thalamic hemorrhage, decrease in density since prior studies from the outside hospital, slight asymmetric dilation of the left ventricular system including the left temporal horn with a possible component of hydrocephalus  Planning  for BP less than 140, avoiding any kind of anticoagulation  Initiate comprehensive IRF level therapy with 3 days of therapy 5 days a week with services from PT/OT/SLP     Spasticity  OP follow up with Physiatry for consideration Botox   Has had resurgence of spasticity restart baclofen 5mg TID 11/30/2023 --> increase to 10mg TID  12/13/2023 continue Baclofen at 10mg TID, no significant side effects    Conjunctivitis  12/15 start eyedrops    ABLA  Now on Eliquis  Recheck 11/28 - improved 11.4--> 12.0--> 11.6 11/30/2023 12/6/2023 11.6-->10.7--> 11.7 12/11  Monitor     CKD  Follow up with OP nephrology  Renal function fluctuates 20-30's/2's-3's  Cr Baseline in past had been mid 1's     Hypertension  Continue Amlodipine 10mg daily  Continue Hydralazine 25mg TID - increased by hospitalist 11/13/2023 from BID to TID--> increased to 50mg q8hrs 11/15  11/16 Hydralazine increased to 75mg q8hrs and 1x Hydralazine given  11/17 BP still elevated but hydralazine recently increased. Monitor  12/13/2023 VS showing increase in -140's. Continue Hydralazine 100mg q8hrs + Amlodipine 10mg daily.  12/14/2023 consistently higher, start Metoprolol XL 12.5 mg daily   12/15/2023 better controlled, continue Metoprolol daily   12/18 increase metoprolol to 25 mg daily  12/19 continue metoprolol 25 mg daily, monitor for need to increase.    Hypokalemia  Mild 12/13 supplement x1   NML 12/15  12/18 stable and normal at 3.8    Leukopenia  New, mild 12/6/2023   Resolved 12/11     Prediabetes  Discontinue SSI     HLD  Continue home dose satin      Bowel  Constipation 11/29/2023, resolved 11/30/2023   Continue with scheduled Colace and senna, as needed stool softeners  Miralax x3 doses 11/29/2023 --> Constipation Resolved 11/30/2023      Insomnia  Secondary to recent jet lag, melatonin scheduled --> increase to home dose 11/13/2023 9mg  Utilize trazodone as needed insomnia continues  Schedule Trazodone 11/15/2023   11/16/2023 continue  Trazodone as is. Thinks he slept better last night  11/17/2023 Still not sleeping well. Increase to 75mg nightly.   12/15/2023 continue trazodone at current dose     Pain - as needed Tylenol and oxycodone    Post-Stroke Depression  Consulted Pyschiatry   Start Prozac 11/28/2023 --> Switched to zoloft per psychiatry  Appreciate assistance with management  Mood improved, continue Zoloft 12/11    Right Foot Pain  H/o Gout  Xray with swelling 1st metatarsal head   Trial Colchicine for acute gout flare 11/28/2023   Topical Voltaren gel scheduled   Improved with less swelling, erythema 11/29/2023   Resolved      LABS: BMP + CBC 12/22      DVT PROPHYLAXIS: NO - Hemorrhagic stroke. US + UE and LE for brachial and peroneal DVT. 11/21/2023 start Heparin 5000 units q8hrs SQ for further prophylaxis, if tolerates will increase to full AC. Consider repeat CTH to ensure stability bleed once on full AC. 11/24/2023 Start loading dose Eliquis 10mg BID x 7 days with transition to 5mg BID. CBC showing improvement in H/H 11/28/2023 no neurologic decline.     HOSPITALIST FOLLOWING: YES - d/w hospitalist 12/6 --> Signed off 12/6.     CODE STATUS: FULL CODE    DISPO: Home alone. Family can help starting 12/27    MARCUS: 12/23/23 - Ongoing reassessment    MEDS SENT TO: TBD    DISCHARGE FOLLOW UP: Neurology, Nephrology, PCP, Physiatry (Botox) - needs referral to all.   ____________________________________    Dr. Lisa eLe DO, MS  [facility] - Physical Medicine & Rehabilitation   ____________________________________

## 2023-12-19 NOTE — DISCHARGE PLANNING
Patient has private caregivers in place , home health is not in network. Ordered outpatient therapy through [facility]. Will continue to assist with the discharge planning.

## 2023-12-19 NOTE — CARE PLAN
Problem: Fall Risk - Rehab  Goal: Patient will remain free from falls  Outcome: Progressing     Problem: Self Care  Goal: Patient will have the ability to perform ADLs independently or with assistance  Outcome: Progressing     Problem: Mobility  Goal: Patient's capacity to carry out activities will improve  Outcome: Progressing       The patient is Stable - Low risk of patient condition declining or worsening    Shift Goals  Clinical Goals: Safety  Patient Goals: Rest  Family Goals: Education

## 2023-12-19 NOTE — PROGRESS NOTES
NURSING DAILY NOTE    Name: Jason Lima   Date of Admission: 11/12/2023   Admitting Diagnosis: Thalamic hemorrhage (HCC)  Attending Physician: Lisa Lee D.o.  Allergies: Penicillins    Safety  Patient Assist  Mod assist  Patient Precautions  Fall Risk  Precaution Comments  Left Hemiparesis, left visual inattention  Bed Transfer Status  Moderate Assist  Toilet Transfer Status   Moderate Assist  Assistive Devices  Rails, Other (Comments)  Oxygen  None - Room Air  Diet/Therapeutic Dining  Current Diet Order   Procedures    Diet Order Diet: Consistent CHO (Diabetic) (2 gram sodium restriction; medications whole with thin liquids); Second Modifier: (optional): 2 Gram Sodium     Pill Administration  whole  Agitated Behavioral Scale  14  ABS Level of Severity  No Agitation    Fall Risk  Has the patient had a fall this admission?   Yes  Shellie Hamilton Fall Risk Scoring  23, HIGH RISK  Fall Risk Safety Measures  bed alarm, chair alarm, and poor balance    Vitals  Temperature: 36.7 °C (98 °F)  Temp src: Oral  Pulse: 72  Respiration: 18  Blood Pressure: 134/72  Blood Pressure MAP (Calculated): 93 MM HG  BP Location: Right, Upper Arm  Patient BP Position: Supine     Oxygen  Pulse Oximetry: 95 %  O2 (LPM): 0  FiO2%: 21 %  O2 Delivery Device: None - Room Air    Bowel and Bladder  Last Bowel Movement  12/17/23  Stool Type  Type 5: Soft blob with clear cut edges (passed easily)  Bowel Device  Bathroom  Continent  Bladder: Continent void   Bowel: Continent movement  Bladder Function  Urine Void (mL): 200 ml  Number of Times Voided: 1  Urinary Options: Yes  Urine Color: Yellow  Number of Times Incontinent of Urine: 0  Genitourinary Assessment   Bladder Assessment (WDL):  WDL Except  Echols Catheter: Not Applicable  Urine Color: Yellow  Number of Bladder Accidents: 0  Total Number of Bladder of Accidents in Last 7 Days: 0  Number of Times Incontinent of Urine: 0  Bladder  Device: Urinal  Time Void: No  Bladder Scan: Post Void  $ Bladder Scan Results (mL): 26    Skin  Polo Score   16  Sensory Interventions   Bed Types: Standard/Trauma Mattress  Skin Preventative Measures: Pillows in Use to Float Heels, Pillows in Use for Support / Positioning  Moisture Interventions  Moisturizers/Barriers: Barrier Paste      Pain  Pain Rating Scale  0 - No Pain  Pain Location  Back  Pain Location Orientation  Mid, Lower  Pain Interventions   Declines    ADLs    Bathing   Shower, * * With Assistance from, Staff  Linen Change   Complete  Personal Hygiene  Change Deja Pads, Moist Deja Wipes, Perineal Care  Chlorhexidine Bath      Oral Care  Brushed Teeth  Teeth/Dentures     Shave  Self  Nutrition Percentage Eaten  Dinner, Between % Consumed  Environmental Precautions  Bed in Low Position, Treaded Slipper Socks on Patient, Personal Belongings, Wastebasket, Call Bell etc. in Easy Reach, Transferred to Stronger Side  Patient Turns/Positioning  Sitting Up in Wheelchair  Patient Turns Assistance/Tolerance  Assistance of One  Bed Positions  Bed Controls On  Head of Bed Elevated  Self regulated      Psychosocial/Neurologic Assessment  Psychosocial Assessment  Psychosocial (WDL):  WDL Except  Patient Behaviors: Fatigue  Neurologic Assessment  Neuro (WDL): Exceptions to WDL  Level of Consciousness: Alert  Orientation Level: Oriented X4  Cognition: Follows commands, Appropriate attention/concentration  Speech: Clear  Facial Symmetry: Left facial drooping  Pupil Assesment: Yes  R Pupil Size (mm): 3  R Pupil Shape / Description: Round  R Pupil Reaction: Brisk  L Pupil Size (mm): 3  L Pupil Shape / Description: Round  L Pupil Reaction: Brisk  Reflexes: Cough  Cough Reflex: Present  Motor Function/Sensation Assessment: Dorsiflexion, Motor response  R Foot Dorsiflexion: Strong  L Foot Dorsiflexion: Absent  RUE Motor Response: Responds to commands  RUE Sensation: Full sensation  Muscle Strength Right Arm: Normal  Strength Against Gravity and Full Resistance  LUE Motor Response: Flaccid  LUE Sensation: Numbness, Tingling  Muscle Strength Left Arm: Weak Movement but Not Against Gravity or Resistance  RLE Motor Response: Responds to commands  RLE Sensation: Full sensation  Muscle Strength Right Leg: Good Strength Against Gravity and Moderate Resistance  LLE Motor Response: Flaccid  LLE Sensation: Numbness, Tingling  Muscle Strength Left Leg: Weak Movement but Not Against Gravity or Resistance  EENT (WDL):  WDL Except    Cardio/Pulmonary Assessment  Edema   RLE Edema: Trace  LLE Edema: Trace  Respiratory Breath Sounds  RUL Breath Sounds: Clear  RML Breath Sounds: Clear  RLL Breath Sounds: Diminished  MOHAMUD Breath Sounds: Clear  LLL Breath Sounds: Diminished  Cardiac Assessment   Cardiac (WDL):  WDL Except (HTN, HLD.)

## 2023-12-19 NOTE — THERAPY
"Physical Therapy   Daily Treatment     Patient Name: Jason Lima  Age:  62 y.o., Sex:  male  Medical Record #: [id number]  Today's Date: 12/18/2023     Precautions  Precautions: Fall Risk  Comments: Left Hemiparesis, left visual inattention    Subjective    \"I'm getting stronger. Look at my hand and my leg.\" Pt seen from [id number] and [id number].     Objective     12/18/23 0831 12/18/23 1231   PT Charge Group   PT Gait Training (Units) 2 1   PT Therapeutic Exercise (Units) 1  --    PT Neuromuscular Re-Education / Balance (Units) 1 1   PT Total Time Spent   PT Individual Total Time Spent (Mins) 60 30   Gait Functional Level of Assist    Gait Level Of Assist Moderate Assist Moderate Assist   Assistive Device Tanvir-Walker Tanvir-Walker;Parallel Bars   Distance (Feet) 65 15   # of Times Distance was Traveled 2 1  (2 x 8' sidestepping, 2 x 8' forward, 1 x 8' retro in // bars)   Deviation Decreased Base Of Support;Shuffled Gait;Decreased Heel Strike;Decreased Toe Off Decreased Base Of Support;Shuffled Gait;Decreased Toe Off;Decreased Heel Strike   Transfer Functional Level of Assist   Bed, Chair, Wheelchair Transfer Maximal Assist Moderate Assist   Bed Chair Wheelchair Transfer Description Set-up of equipment;Verbal cueing;Increased time  (stand step around c/ HW) Adaptive equipment;Increased time;Verbal cueing  (stand step from // bars to w/c, inc time, VC for LE alignment and sequencing)   Supine Lower Body Exercise   Supine Lower Body Exercises  --  Yes   Bridges  --  Two Legged;2 sets of 10  (1 x10 hooklying, 1 x 10 on ball c/ FES to L hams)   Knee to Chest  --  2 sets of 10;Bilateral  (on large therapy ball, hams curls c/ FES and isometric hold 3-5\" on each rep)   Other Exercises  --  hams setting 1 x 10 @ 3\" holds   Sitting Lower Body Exercises   Nustep Resistance Level 2;Resistance Level 3;Resistance Level 4;Resistance Level 5  (1 x 1 min Lvl 5 c/ BLE only; 2x 1 min @ Lvl 6 c/ BUE/BLE; Lvl 2>4 for 7 min c/ BUE/BLE; " adduction support to LLE, manual support for LUE to maintain contact c/ handle; FES x 2 min to L deltoid to faciltate push stroke)  --    Neuro-Muscular Treatments   Neuro-Muscular Treatments  --  Weight Shift Right;Weight Shift Left;Sequencing;Postural Changes;Facilitation;Electrical Stimulation   Comments  --  FES to L hams during supine TE c/ ball: Pads 3 x5; Pusle wdith: 300, Hz: 30, Intensity: 70-78 for tetany; hand switch used for on/off during AROM hams curls   Interdisciplinary Plan of Care Collaboration   IDT Collaboration with  Therapy Tech;Recreation Therapist Therapy Tech   Patient Position at End of Therapy Seated;Other (Comments)  (handoff to RecT for next session) Seated;Other (Comments)  (c/ tech back to room at end of session)   Collaboration Comments tech assist c/ session  --      AM session: pregait on NuStep as above and gait training c/ HW over level surfaces; cues and emphasis on functional WS, weight acceptance LLE, timely advancement of HW, and eliot.     PM session: LE strength in modified CKC on mat for L hams, concurrent FES to facilitate AAROM, contraction.   Dynamic gait in // bars working on multi-plane stepping: lateral and retro as above. Tech assist to facilitate LUE hand placement on bar to increase functional WS to LLE in stance.   Stand step transfers c/ HW and // bars btw activities. Mod to MaxA for midline control, sequencing, LE management, HW management.     Assessment    Jason continues with improvement in gait quality, eliot, level of assist; initiated increased practice of lateral, retro stepping and turning to support increased midline control during transitional movements. Good tetany at L hams with FES, minimal contraction and AROM noted in L hams without external assist.     Strengths: Able to follow instructions, Independent prior level of function, Motivated for self care and independence, Pleasant and cooperative, Supportive family, Willingly participates in  "therapeutic activities  Barriers: Decreased endurance, Hemiparesis, Difficulty following instructions, Fatigue, Hypertension, Impaired activity tolerance, Impaired balance, Impaired insight/denial of deficits, Impaired functional cognition, Impaired sleep pattern, Impulsive, Limited mobility, Visual impairment, Poor family support (lives alone)    Plan    Continue high intensity gait c/ HW + L AFO, work on speed and from 3 point toward 2 point pattern, continue lateral stepping, backing, turning, and LE strength (use NMES/FES as needed), Nustep for pregait, prioritize hip flexion and knee flexion for LLE swing clearance.    Standing balance c/ perturbations, STS with decreased external correction (allow failure to L side) to facilitate improvement in midline control.     DME  PT DME Recommendations  Wheelchair: 18\" Width  Cushion: Specialty (See other comments) (TBD pending ambulation progress)  Assistive Device: Tanvir Walker (TBD pending progress, currently 2 person assist for gait)  Additional Equipment: Other (Comments)    Passport items to be completed:  Get in/out of bed safely, in/out of a vehicle, safely use mobility device, walk or wheel around home/community, navigate up and down stairs, show how to get up/down from the ground, ensure home is accessible, demonstrate HEP, complete caregiver training    Physical Therapy Problems (Active)       Problem: PT-Long Term Goals       Dates: Start:  11/13/23         Goal: LTG-By discharge, patient will propel wheelchair >/= 300' at [place] or better.        Dates: Start:  11/13/23    Expected End:  12/23/23            Goal: LTG-By discharge, patient will ambulate >/= 50' c/ LRAD at [facility] or better.        Dates: Start:  11/13/23    Expected End:  12/23/23            Goal: LTG-By discharge, patient will transfer one surface to another c/ [facility] or better.        Dates: Start:  11/13/23    Expected End:  12/23/23            Goal: LTG-By discharge, patient will ambulate " up/down 1 step c/ LRAD at [facility] or better.        Dates: Start:  11/13/23    Expected End:  12/23/23            Goal: LTG-By discharge, patient will transfer in/out of a car c/ ModA or better.        Dates: Start:  11/13/23    Expected End:  12/23/23

## 2023-12-19 NOTE — PROGRESS NOTES
NURSING DAILY NOTE    Name: Jason Lima   Date of Admission: 11/12/2023   Admitting Diagnosis: Thalamic hemorrhage (HCC)  Attending Physician: Lisa Lee D.o.  Allergies: Penicillins    Safety  Patient Assist  Mod assist  Patient Precautions  Fall Risk  Precaution Comments  Left Hemiparesis, left visual inattention  Bed Transfer Status  Moderate Assist  Toilet Transfer Status   Moderate Assist  Assistive Devices  Rails, Other (Comments)  Oxygen  None - Room Air  Diet/Therapeutic Dining  Current Diet Order   Procedures    Diet Order Diet: Consistent CHO (Diabetic) (2 gram sodium restriction; medications whole with thin liquids); Second Modifier: (optional): 2 Gram Sodium     Pill Administration  whole  Agitated Behavioral Scale  14  ABS Level of Severity  No Agitation    Fall Risk  Has the patient had a fall this admission?   Yes  Shellie Hamilton Fall Risk Scoring  23, HIGH RISK  Fall Risk Safety Measures  bed alarm and chair alarm    Vitals  Temperature: 36.7 °C (98 °F)  Temp src: Oral  Pulse: 70  Respiration: 18  Blood Pressure: (!) 141/81  Blood Pressure MAP (Calculated): 101 MM HG  BP Location: Right, Upper Arm  Patient BP Position: Sitting     Oxygen  Pulse Oximetry: 95 %  O2 (LPM): 0  FiO2%: 21 %  O2 Delivery Device: None - Room Air    Bowel and Bladder  Last Bowel Movement  12/17/23  Stool Type  Type 5: Soft blob with clear cut edges (passed easily)  Bowel Device  Bathroom  Continent  Bladder: Continent void   Bowel: Continent movement  Bladder Function  Urine Void (mL): 450 ml  Number of Times Voided: 1  Urinary Options: Yes  Urine Color: Yellow  Number of Times Incontinent of Urine: 0  Genitourinary Assessment   Bladder Assessment (WDL):  WDL Except  Echols Catheter: Not Applicable  Urine Color: Yellow  Number of Bladder Accidents: 0  Total Number of Bladder of Accidents in Last 7 Days: 0  Number of Times Incontinent of Urine: 0  Bladder Device:  Urinal  Time Void: No  Bladder Scan: Post Void  $ Bladder Scan Results (mL): 26    Skin  Polo Score   16  Sensory Interventions   Bed Types: Standard/Trauma Mattress  Skin Preventative Measures: Pillows in Use for Support / Positioning  Moisture Interventions  Moisturizers/Barriers: Barrier Paste      Pain  Pain Rating Scale  0 - No Pain  Pain Location  Back  Pain Location Orientation  Mid, Lower  Pain Interventions   Declines    ADLs    Bathing   Shower, * * With Assistance from, Staff  Linen Change   Complete  Personal Hygiene  Change Deja Pads, Moist Deja Wipes, Perineal Care  Chlorhexidine Bath      Oral Care  Brushed Teeth  Teeth/Dentures     Shave  Self  Nutrition Percentage Eaten  Dinner, Between % Consumed  Environmental Precautions  Bed in Low Position  Patient Turns/Positioning  Sitting Up in Wheelchair  Patient Turns Assistance/Tolerance  Assistance of One  Bed Positions  Bed Controls On  Head of Bed Elevated  Self regulated      Psychosocial/Neurologic Assessment  Psychosocial Assessment  Psychosocial (WDL):  WDL Except  Patient Behaviors: Fatigue  Neurologic Assessment  Neuro (WDL): Exceptions to WDL  Level of Consciousness: Alert  Orientation Level: Oriented X4  Cognition: Follows commands, Appropriate attention/concentration  Speech: Clear  Facial Symmetry: Left facial drooping  Pupil Assesment: Yes  R Pupil Size (mm): 3  R Pupil Shape / Description: Round  R Pupil Reaction: Brisk  L Pupil Size (mm): 3  L Pupil Shape / Description: Round  L Pupil Reaction: Brisk  Reflexes: Cough  Cough Reflex: Present  Motor Function/Sensation Assessment: Dorsiflexion, Motor response  R Foot Dorsiflexion: Strong  L Foot Dorsiflexion: Absent  RUE Motor Response: Responds to commands  RUE Sensation: Full sensation  Muscle Strength Right Arm: Normal Strength Against Gravity and Full Resistance  LUE Motor Response: Flaccid  LUE Sensation: Numbness, Tingling  Muscle Strength Left Arm: Weak Movement but Not Against  Gravity or Resistance  RLE Motor Response: Responds to commands  RLE Sensation: Full sensation  Muscle Strength Right Leg: Good Strength Against Gravity and Moderate Resistance  LLE Motor Response: Flaccid  LLE Sensation: Numbness, Tingling  Muscle Strength Left Leg: Weak Movement but Not Against Gravity or Resistance  EENT (WDL):  WDL Except    Cardio/Pulmonary Assessment  Edema   RLE Edema: Trace  LLE Edema: Trace  Respiratory Breath Sounds  RUL Breath Sounds: Clear  RML Breath Sounds: Clear  RLL Breath Sounds: Diminished  MOHAMUD Breath Sounds: Clear  LLL Breath Sounds: Diminished  Cardiac Assessment   Cardiac (WDL):  WDL Except (htn, hld)

## 2023-12-20 ENCOUNTER — APPOINTMENT (OUTPATIENT)
Dept: OCCUPATIONAL THERAPY | Facility: REHABILITATION | Age: 63
DRG: 057 | End: 2023-12-20
Attending: PHYSICAL MEDICINE & REHABILITATION
Payer: COMMERCIAL

## 2023-12-20 ENCOUNTER — APPOINTMENT (OUTPATIENT)
Dept: PHYSICAL THERAPY | Facility: REHABILITATION | Age: 63
DRG: 057 | End: 2023-12-20
Attending: HOSPITALIST
Payer: COMMERCIAL

## 2023-12-20 ENCOUNTER — APPOINTMENT (OUTPATIENT)
Dept: PHYSICAL THERAPY | Facility: REHABILITATION | Age: 63
DRG: 057 | End: 2023-12-20
Attending: PHYSICAL MEDICINE & REHABILITATION
Payer: COMMERCIAL

## 2023-12-20 ENCOUNTER — APPOINTMENT (OUTPATIENT)
Dept: SPEECH THERAPY | Facility: REHABILITATION | Age: 63
DRG: 057 | End: 2023-12-20
Attending: PHYSICAL MEDICINE & REHABILITATION
Payer: COMMERCIAL

## 2023-12-20 PROCEDURE — A9270 NON-COVERED ITEM OR SERVICE: HCPCS | Performed by: HOSPITALIST

## 2023-12-20 PROCEDURE — A9270 NON-COVERED ITEM OR SERVICE: HCPCS | Performed by: PHYSICAL MEDICINE & REHABILITATION

## 2023-12-20 PROCEDURE — 97116 GAIT TRAINING THERAPY: CPT

## 2023-12-20 PROCEDURE — 97530 THERAPEUTIC ACTIVITIES: CPT

## 2023-12-20 PROCEDURE — 97110 THERAPEUTIC EXERCISES: CPT

## 2023-12-20 PROCEDURE — 770010 HCHG ROOM/CARE - REHAB SEMI PRIVAT*

## 2023-12-20 PROCEDURE — 700102 HCHG RX REV CODE 250 W/ 637 OVERRIDE(OP): Performed by: STUDENT IN AN ORGANIZED HEALTH CARE EDUCATION/TRAINING PROGRAM

## 2023-12-20 PROCEDURE — 97112 NEUROMUSCULAR REEDUCATION: CPT

## 2023-12-20 PROCEDURE — A9270 NON-COVERED ITEM OR SERVICE: HCPCS | Performed by: STUDENT IN AN ORGANIZED HEALTH CARE EDUCATION/TRAINING PROGRAM

## 2023-12-20 PROCEDURE — 700102 HCHG RX REV CODE 250 W/ 637 OVERRIDE(OP): Performed by: PHYSICAL MEDICINE & REHABILITATION

## 2023-12-20 PROCEDURE — 97535 SELF CARE MNGMENT TRAINING: CPT

## 2023-12-20 PROCEDURE — 90832 PSYTX W PT 30 MINUTES: CPT | Performed by: SOCIAL WORKER

## 2023-12-20 PROCEDURE — 700102 HCHG RX REV CODE 250 W/ 637 OVERRIDE(OP): Performed by: HOSPITALIST

## 2023-12-20 PROCEDURE — 99232 SBSQ HOSP IP/OBS MODERATE 35: CPT | Performed by: PHYSICAL MEDICINE & REHABILITATION

## 2023-12-20 RX ADMIN — ATORVASTATIN CALCIUM 40 MG: 40 TABLET, FILM COATED ORAL at 20:47

## 2023-12-20 RX ADMIN — TRAZODONE HYDROCHLORIDE 75 MG: 50 TABLET ORAL at 20:47

## 2023-12-20 RX ADMIN — BACLOFEN 10 MG: 10 TABLET ORAL at 07:43

## 2023-12-20 RX ADMIN — HYDRALAZINE HYDROCHLORIDE 100 MG: 50 TABLET, FILM COATED ORAL at 20:56

## 2023-12-20 RX ADMIN — APIXABAN 5 MG: 5 TABLET, FILM COATED ORAL at 20:47

## 2023-12-20 RX ADMIN — METOPROLOL SUCCINATE 25 MG: 25 TABLET, EXTENDED RELEASE ORAL at 06:16

## 2023-12-20 RX ADMIN — HYDRALAZINE HYDROCHLORIDE 100 MG: 50 TABLET, FILM COATED ORAL at 14:08

## 2023-12-20 RX ADMIN — OMEPRAZOLE 20 MG: 20 CAPSULE, DELAYED RELEASE ORAL at 07:43

## 2023-12-20 RX ADMIN — BACLOFEN 10 MG: 10 TABLET ORAL at 14:09

## 2023-12-20 RX ADMIN — SERTRALINE 50 MG: 50 TABLET, FILM COATED ORAL at 07:44

## 2023-12-20 RX ADMIN — AMLODIPINE BESYLATE 10 MG: 5 TABLET ORAL at 06:16

## 2023-12-20 RX ADMIN — HYDRALAZINE HYDROCHLORIDE 100 MG: 50 TABLET, FILM COATED ORAL at 06:16

## 2023-12-20 RX ADMIN — APIXABAN 5 MG: 5 TABLET, FILM COATED ORAL at 07:44

## 2023-12-20 RX ADMIN — BACLOFEN 10 MG: 10 TABLET ORAL at 20:47

## 2023-12-20 ASSESSMENT — ACTIVITIES OF DAILY LIVING (ADL)
BED_CHAIR_WHEELCHAIR_TRANSFER_DESCRIPTION: SET-UP OF EQUIPMENT;SQUAT PIVOT TRANSFER TO WHEELCHAIR
TOILET_TRANSFER_DESCRIPTION: GRAB BAR;INCREASED TIME;VERBAL CUEING
BED_CHAIR_WHEELCHAIR_TRANSFER_DESCRIPTION: ADAPTIVE EQUIPMENT;SET-UP OF EQUIPMENT
TOILET_TRANSFER_DESCRIPTION: GRAB BAR;SUPERVISION FOR SAFETY;SET-UP OF EQUIPMENT;VERBAL CUEING

## 2023-12-20 NOTE — CARE PLAN
Problem: PT-Long Term Goals  Goal: LTG-By discharge, patient will ambulate >/= 50' c/ LRAD at [facility] or better.   Outcome: Progressing  Note: ModA c/ HW.   Goal: LTG-By discharge, patient will transfer one surface to another c/ [facility] or better.   Outcome: Progressing  Note: Partially met: [facility] c/ squat pivot, ModA for stand pivot/step.   Goal: LTG-By discharge, patient will ambulate up/down 1 step c/ LRAD at [facility] or better.   Outcome: Progressing  Note: Needs assessment.   Goal: LTG-By discharge, patient will transfer in/out of a car c/ [facility] or better.   Outcome: Progressing  Note: Needs assessment.

## 2023-12-20 NOTE — PROGRESS NOTES
NURSING DAILY NOTE    Name: Jason Lima   Date of Admission: 11/12/2023   Admitting Diagnosis: Thalamic hemorrhage (HCC)  Attending Physician: Lisa Lee D.o.  Allergies: Penicillins    Safety  Patient Assist  Mod assist  Patient Precautions  Fall Risk  Precaution Comments  Left Hemiparesis, left visual inattention  Bed Transfer Status  Minimal Assist (min A to R; mod A to L)  Toilet Transfer Status   Moderate Assist  Assistive Devices  Rails, Other (Comments)  Oxygen  None - Room Air  Diet/Therapeutic Dining  Current Diet Order   Procedures    Diet Order Diet: Consistent CHO (Diabetic) (2 gram sodium restriction; medications whole with thin liquids); Second Modifier: (optional): 2 Gram Sodium     Pill Administration  whole  Agitated Behavioral Scale  14  ABS Level of Severity  No Agitation    Fall Risk  Has the patient had a fall this admission?   Yes  Shellie Hamilton Fall Risk Scoring  23, HIGH RISK  Fall Risk Safety Measures  bed alarm and chair alarm    Vitals  Temperature: 36.6 °C (97.8 °F)  Temp src: Oral  Pulse: 68  Respiration: 20  Blood Pressure: (!) 142/86  Blood Pressure MAP (Calculated): 105 MM HG  BP Location: Right, Upper Arm  Patient BP Position: Supine     Oxygen  Pulse Oximetry: 94 %  O2 (LPM): 0  FiO2%: 21 %  O2 Delivery Device: None - Room Air    Bowel and Bladder  Last Bowel Movement  12/19/23  Stool Type  Type 5: Soft blob with clear cut edges (passed easily)  Bowel Device  Bathroom  Continent  Bladder: Continent void   Bowel: Continent movement  Bladder Function  Urine Void (mL): 600 ml  Number of Times Voided: 1  Urinary Options: Yes  Urine Color: Yellow  Number of Times Incontinent of Urine: 0  Genitourinary Assessment   Bladder Assessment (WDL):  WDL Except  Echols Catheter: Not Applicable  Urine Color: Yellow  Number of Bladder Accidents: 0  Total Number of Bladder of Accidents in Last 7 Days: 0  Number of Times Incontinent of  Urine: 0  Bladder Device: Urinal  Time Void: No  Bladder Scan: Post Void  $ Bladder Scan Results (mL): 26    Skin  Polo Score   16  Sensory Interventions   Bed Types: Standard/Trauma Mattress  Skin Preventative Measures: Pillows in Use for Support / Positioning  Moisture Interventions  Moisturizers/Barriers: Barrier Paste      Pain  Pain Rating Scale  0 - No Pain  Pain Location  Back  Pain Location Orientation  Mid, Lower  Pain Interventions   Declines    ADLs    Bathing   Shower, * * With Assistance from, Staff  Linen Change   Complete  Personal Hygiene  Change Deja Pads, Moist Deja Wipes, Perineal Care  Chlorhexidine Bath      Oral Care  Brushed Teeth  Teeth/Dentures     Shave  Self  Nutrition Percentage Eaten  Lunch, Between % Consumed  Environmental Precautions  Bed in Low Position  Patient Turns/Positioning  Sitting Up in Wheelchair  Patient Turns Assistance/Tolerance  Assistance of One  Bed Positions  Bed Controls On  Head of Bed Elevated  Self regulated      Psychosocial/Neurologic Assessment  Psychosocial Assessment  Psychosocial (WDL):  WDL Except  Patient Behaviors: Fatigue  Neurologic Assessment  Neuro (WDL): Exceptions to WDL  Level of Consciousness: Alert  Orientation Level: Oriented X4  Cognition: Follows commands, Appropriate attention/concentration  Speech: Clear  Facial Symmetry: Left facial drooping  Pupil Assesment: Yes  R Pupil Size (mm): 3  R Pupil Shape / Description: Round  R Pupil Reaction: Brisk  L Pupil Size (mm): 3  L Pupil Shape / Description: Round  L Pupil Reaction: Brisk  Reflexes: Cough  Cough Reflex: Present  Motor Function/Sensation Assessment: Dorsiflexion, Motor response  R Foot Dorsiflexion: Strong  L Foot Dorsiflexion: Absent  RUE Motor Response: Responds to commands  RUE Sensation: Full sensation  Muscle Strength Right Arm: Normal Strength Against Gravity and Full Resistance  LUE Motor Response: Flaccid  LUE Sensation: Numbness, Tingling  Muscle Strength Left Arm: Weak  Movement but Not Against Gravity or Resistance  RLE Motor Response: Responds to commands  RLE Sensation: Full sensation  Muscle Strength Right Leg: Good Strength Against Gravity and Moderate Resistance  LLE Motor Response: Flaccid  LLE Sensation: Numbness, Tingling  Muscle Strength Left Leg: Weak Movement but Not Against Gravity or Resistance  EENT (WDL):  WDL Except    Cardio/Pulmonary Assessment  Edema   RLE Edema: Trace  LLE Edema: Trace  Respiratory Breath Sounds  RUL Breath Sounds: Clear  RML Breath Sounds: Clear  RLL Breath Sounds: Diminished  MOHAMUD Breath Sounds: Clear  LLL Breath Sounds: Diminished  Cardiac Assessment   Cardiac (WDL):  WDL Except (htn, hld)

## 2023-12-20 NOTE — THERAPY
Speech Language Pathology  Daily Treatment     Patient Name: Jason Lima  Age:  62 y.o., Sex:  male  Medical Record #: [id number]  Today's Date: 12/19/2023     Precautions  Precautions: Fall Risk  Comments: Left Hemiparesis, left visual inattention    Subjective    Patient agreeable to therapy but needing reinforcement to understand benefit of therapy.     Objective       12/19/23 1231   Treatment Charges   SLP Cognitive Skill Development First 15 Minutes 1   SLP Cognitive Skill Development Additional 15 Minutes 1   SLP Total Time Spent   SLP Individual Total Time Spent (Mins) 30   Cognition   Simple Attention Minimal (4)   Functional Memory Activities Minimal (4)   Insight into Deficits Minimal (4)   Planning / Decision Making Moderate (3)         Assessment    Continued with locating med errors with min A still needed to be aware of missed targets. Provided patient education on how attention skills impact ability to maintain balance and safety.  Patient verbalizes that he understands that he struggles to keep balance when distracted, and that he responds too quickly.  He displays improved awareness of these deficits but still needs assistance with self monitoring and regulation to implement compensations.    Strengths: Alert and oriented, Effective communication skills, Motivated for self care and independence, Pleasant and cooperative, Independent prior level of function, Making steady progress towards goals, Willingly participates in therapeutic activities  Barriers: Impaired functional cognition (depression, left neglect, difficulty self monitoring/regulating)    Plan    Patient discharge is anticipated to be 12/23/23  with outcomes measures to be completed prior to discharge. Target attention, safety sequencing and problem solving, attention in functional reading.    Passport items to be completed:  Express basic needs, understand food/liquid recommendations, consistently follow swallow precautions, manage  finances, manage medications, arrive to therapy appointments on time, complete daily memory log entries, solve problems related to safety situations, review education related to hospitalization, complete caregiver training     Speech Therapy Problems (Active)       Problem: Comprehension STGs       Dates: Start:  11/13/23         Goal: STG-Within one week, patient will perform attention for comprehension in functional reading tasks with 75% acc.       Dates: Start:  11/13/23    Expected End:  12/23/23         Goal Note filed on 11/22/23 1231 by Tawny Forbes MS,CCC-SLP       80% in single page functional reading.                 Problem: Memory STGs       Dates: Start:  11/13/23         Goal: STG-Within one week, patient will recall daily events and safety sequencing with 60% acc with use of external memory aids.       Dates: Start:  11/13/23    Expected End:  12/23/23         Goal Note filed on 11/22/23 1231 by Tawny Forbes MS,CCC-SLP       70%                 Problem: Memory STGs       Dates: Start:  11/22/23         Goal: STG-Within one week, patient will recall new training and safety sequencing with 80% acc with set up and external cues.       Dates: Start:  11/22/23    Expected End:  12/23/23         Goal Note filed on 12/05/23 1626 by Tawny Forbes MS,CCC-SLP       Mod A needed.                 Problem: Problem Solving STGs       Dates: Start:  11/13/23         Goal: STG-Within one week, patient will perform alternating attention tasks with 75% acc.       Dates: Start:  11/13/23    Expected End:  12/23/23         Goal Note filed on 11/22/23 1231 by Tawny Forbes MS,CCC-SLP       For simple                 Problem: Problem Solving STGs       Dates: Start:  11/22/23         Goal: STG-Within one week, patient will perform alternating attention tasks with 85% acc with set up.       Dates: Start:  11/22/23    Expected End:  12/23/23         Goal Note filed on 11/29/23 1121 by Neva Abbott MS,CCC-SLP        MOD A, continues to require cues               Goal: STG-Within one week, patient will organization and reasoning tasks with 80% acc with min cues.       Dates: Start:  11/22/23    Expected End:  12/23/23         Goal Note filed on 11/29/23 1121 by Neva Abbott MS,CCC-SLP       Will continue to target, continues to require MOD A                 Problem: Speech/Swallowing LTGs       Dates: Start:  11/13/23         Goal: LTG-By discharge, patient will solve complex problem       Dates: Start:  11/13/23    Expected End:  12/23/23       Description: For safety and self care with 80% acc mod I  for safe discharge home.      Goal Note filed on 12/05/23 1625 by Tawny Forbes MS,CCC-SLP       Mod A needed.              Goal: LTG-By discharge, patient will recall new training with 80% acc with min A and use of external memory aids.       Dates: Start:  11/13/23    Expected End:  12/23/23         Goal Note filed on 12/05/23 1625 by Tawny Forbes MS,CCC-SLP       Mod A needed.                 Problem: Swallowing STGs       Dates: Start:  11/13/23

## 2023-12-20 NOTE — PROGRESS NOTES
NURSING DAILY NOTE    Name: Jason Lima   Date of Admission: 11/12/2023   Admitting Diagnosis: Thalamic hemorrhage (HCC)  Attending Physician: Lisa Lee D.o.  Allergies: Penicillins    Safety  Patient Assist  Mod assist  Patient Precautions  Fall Risk  Precaution Comments  Left Hemiparesis, left visual inattention  Bed Transfer Status  Minimal Assist (min A to R; mod A to L)  Toilet Transfer Status   Moderate Assist  Assistive Devices  Gait Belt, Rails, Wheelchair  Oxygen  None - Room Air  Diet/Therapeutic Dining  Current Diet Order   Procedures    Diet Order Diet: Consistent CHO (Diabetic) (2 gram sodium restriction; medications whole with thin liquids); Second Modifier: (optional): 2 Gram Sodium     Pill Administration  whole  Agitated Behavioral Scale  14  ABS Level of Severity  No Agitation    Fall Risk  Has the patient had a fall this admission?   Yes  Shellie Hamilton Fall Risk Scoring  23, HIGH RISK  Fall Risk Safety Measures  bed alarm and chair alarm    Vitals  Temperature: 36.6 °C (97.9 °F)  Temp src: Oral  Pulse: (!) 55  Respiration: 18  Blood Pressure: 124/80  Blood Pressure MAP (Calculated): 95 MM HG  BP Location: Right, Upper Arm  Patient BP Position: Supine     Oxygen  Pulse Oximetry: 96 %  O2 (LPM): 0  FiO2%: 21 %  O2 Delivery Device: None - Room Air    Bowel and Bladder  Last Bowel Movement  12/19/23  Stool Type  Type 5: Soft blob with clear cut edges (passed easily)  Bowel Device  Bathroom  Continent  Bladder: Continent void   Bowel: Continent movement  Bladder Function  Urine Void (mL): 300 ml  Number of Times Voided: 1  Urinary Options: Yes  Urine Color: Yellow  Number of Times Incontinent of Urine: 0  Genitourinary Assessment   Bladder Assessment (WDL):  WDL Except  Echols Catheter: Not Applicable  Urine Color: Yellow  Number of Bladder Accidents: 0  Total Number of Bladder of Accidents in Last 7 Days: 0  Number of Times Incontinent  of Urine: 0  Bladder Device: Urinal  Time Void: No  Bladder Scan: Post Void  $ Bladder Scan Results (mL): 26    Skin  Polo Score   16  Sensory Interventions   Bed Types: Standard/Trauma Mattress  Skin Preventative Measures: Pillows in Use to Float Heels, Pillows in Use for Support / Positioning  Moisture Interventions  Moisturizers/Barriers: Barrier Paste      Pain  Pain Rating Scale  0 - No Pain  Pain Location  Back  Pain Location Orientation  Mid, Lower  Pain Interventions   Declines    ADLs    Bathing    (Patient was tired, but agreed for sponge bath.)  Linen Change   Complete  Personal Hygiene  Change Deja Pads, Moist Deja Wipes, Perineal Care  Chlorhexidine Bath      Oral Care  Brushed Teeth  Teeth/Dentures     Shave  Self  Nutrition Percentage Eaten  *  * Meal *  *, Between % Consumed  Environmental Precautions  Bed in Low Position  Patient Turns/Positioning  Sitting Up in Wheelchair  Patient Turns Assistance/Tolerance  Assistance of One  Bed Positions  Bed Controls On  Head of Bed Elevated  Self regulated      Psychosocial/Neurologic Assessment  Psychosocial Assessment  Psychosocial (WDL):  WDL Except  Patient Behaviors: Fatigue  Neurologic Assessment  Neuro (WDL): Exceptions to WDL  Level of Consciousness: Alert  Orientation Level: Oriented X4  Cognition: Follows commands, Appropriate attention/concentration  Speech: Clear  Facial Symmetry: Left facial drooping  Pupil Assesment: Yes  R Pupil Size (mm): 3  R Pupil Shape / Description: Round  R Pupil Reaction: Brisk  L Pupil Size (mm): 3  L Pupil Shape / Description: Round  L Pupil Reaction: Brisk  Reflexes: Cough  Cough Reflex: Present  Motor Function/Sensation Assessment: Dorsiflexion, Motor response  R Foot Dorsiflexion: Strong  L Foot Dorsiflexion: Absent  RUE Motor Response: Responds to commands  RUE Sensation: Full sensation  Muscle Strength Right Arm: Normal Strength Against Gravity and Full Resistance  LUE Motor Response: Flaccid  LUE  Sensation: Numbness, Tingling  Muscle Strength Left Arm: Weak Movement but Not Against Gravity or Resistance  RLE Motor Response: Responds to commands  RLE Sensation: Full sensation  Muscle Strength Right Leg: Good Strength Against Gravity and Moderate Resistance  LLE Motor Response: Flaccid  LLE Sensation: Numbness, Tingling  Muscle Strength Left Leg: Weak Movement but Not Against Gravity or Resistance  EENT (WDL):  WDL Except    Cardio/Pulmonary Assessment  Edema   RLE Edema: Trace  LLE Edema: Trace  Respiratory Breath Sounds  RUL Breath Sounds: Clear  RML Breath Sounds: Clear  RLL Breath Sounds: Diminished  MOHAMUD Breath Sounds: Clear  LLL Breath Sounds: Diminished  Cardiac Assessment   Cardiac (WDL):  WDL Except (HTN,HLD)

## 2023-12-20 NOTE — CARE PLAN
Problem: Dressing  Goal: STG-Within one week, patient will dress LB at [facility] using LRD  Outcome: Not Met     Problem: Dressing  Goal: STG-Within one week, patient will dress [facility] using LRD  Outcome: Progressing     Problem: Functional Transfers  Goal: STG-Within one week, patient will transfer to step in shower at [facility] to [facility] using LRD  Outcome: Progressing

## 2023-12-20 NOTE — DISCHARGE PLANNING
Case Management / Initial authorization:  Auth # [id number]   Days authorized: 11/12-1/17  Clinical concurrent review faxed : 12/15/23.  Name: Magaly  Phone:   Fax:  Outcome:Clinicals sent on 12/15/23 and it is still pending.    12/21/23 Spoke with Magaly days denied 11/10-11/17.

## 2023-12-20 NOTE — CARE PLAN
Problem: Fall Risk - Rehab  Goal: Patient will remain free from falls  Outcome: Progressing     Problem: Mobility  Goal: Patient's capacity to carry out activities will improve  Outcome: Progressing       The patient is Stable - Low risk of patient condition declining or worsening    Shift Goals  Clinical Goals: Safety  Patient Goals: Rest  Family Goals: Education

## 2023-12-20 NOTE — THERAPY
Occupational Therapy  Daily Treatment     Patient Name: Jason Lima  Age:  62 y.o., Sex:  male  Medical Record #: [id number]  Today's Date: 12/20/2023     Precautions  Precautions: Fall Risk  Comments: Left Hemiparesis, left visual inattention         Subjective    Pt encountered for OT sitting in WC in room. Pleasant and agreeable to participate. Pt requested to use the bathroom.       Objective       12/20/23 1031   OT Charge Group   OT Self Care / ADL (Units) 1   OT Therapy Activity (Units) 2   OT Therapeutic Exercise (Units) 1   OT Total Time Spent   OT Individual Total Time Spent (Mins) 60   Precautions   Precautions Fall Risk   Comments Left Hemiparesis, left visual inattention   Functional Level of Assist   Toileting Moderate Assist   Toileting Description Assist to pull pants up;Assist to pull pants down;Assist for standing balance;Grab bar;Adaptive equipment;Set-up of equipment;Supervision for safety  (GCGA for sitting balance during perineal care following BM. ModA to pull pants up/down d/t poor dynamic standing balance.)   Toilet Transfers Minimal Assist   Toilet Transfer Description Grab bar;Supervision for safety;Set-up of equipment;Verbal cueing  (VC for WC safety. Stand step from WC to toilet using GB. R-sided transfer.)   Sitting Upper Body Exercises   Other Exercise Nustep; 10 reps of isolated LUE push and pull.   Sitting Lower Body Exercises   Nustep Resistance Level 3;Time (See Comments)   Comments 10 min; 70 SPM; 581 step; gait belt around thighs to prevent LLE abduction.   Interdisciplinary Plan of Care Collaboration   IDT Collaboration with  Family / Caregiver;Therapy Tech   Patient Position at End of Therapy Seated;Chair Alarm On;Family / Friend in Room   Collaboration Comments Therapy tech for assistance with transfer. Sister present for last 30 min of session. Hand off ot therapy tech to bring pt to lunch     Thera act: Dynamic sitting on commode to facilitate progress towards independence  with perineal care following toileting. Pt engage in L lateral lean with assist to  the armrest with the L hand while pt place 10 bean bags into commode.     Thera act: seated at EOM. Lateral R <> L lean while pulling up a gait belt to hip to stimulate LB dressing. Pt able to use L hand to pull up to mid hip with extra time. May benefit to cont d/t poor dynamic standing balance, at this time.     Assessment    Overall, good progress with LBD tech in sitting using a lateral lean tech vs in standing d/t high fall risk with dynamic standing. Good progress with L hand use as pt is now incorporating into functional tasks.       Strengths: Able to follow instructions, Independent prior level of function, Motivated for self care and independence, Pleasant and cooperative, Supportive family  Barriers: Decreased endurance, Fatigue, Generalized weakness, Hemiparesis, Impaired activity tolerance, Impaired balance, Limited mobility, Spasticity    Plan    Cont ADLs, functional transfers, and thera act/ex to maximize functional recovery for safe DC home.       DME  OT DME Recommendations  Bathroom Equipment: 3 in 1 Commode, Tub Transfer Bench (Medicaid Only)  Additional Equipment: Other (Comments)    Passport items to be completed:  Perform bathroom transfers, complete dressing, complete feeding, get ready for the day, prepare a simple meal, participate in household tasks, adapt home for safety needs, demonstrate home exercise program, complete caregiver training     Occupational Therapy Goals (Active)       Problem: Dressing       Dates: Start:  11/22/23         Goal: STG-Within one week, patient will dress LB at [facility] using LRD       Dates: Start:  11/22/23    Expected End:  12/23/23            Goal: STG-Within one week, patient will dress UB SPV using LRD       Dates: Start:  12/06/23    Expected End:  12/23/23               Problem: Functional Transfers       Dates: Start:  12/06/23         Goal: STG-Within one week,  patient will transfer to step in shower at [facility] to [facility] using LRD       Dates: Start:  12/06/23    Expected End:  12/23/23               Problem: OT Long Term Goals       Dates: Start:  11/13/23         Goal: LTG-By discharge, patient will complete basic self care tasks at Mod Independent level.       Dates: Start:  11/13/23    Expected End:  12/23/23            Goal: LTG-By discharge, patient will perform bathroom transfers at Mod Independent level.       Dates: Start:  11/13/23    Expected End:  12/23/23

## 2023-12-20 NOTE — PROGRESS NOTES
"  Physical Medicine & Rehabilitation Progress Note    Encounter Date: 12/20/2023    Chief Complaint: Feels okay    Interval Events (Subjective):  Vital signs improved and are within normal limits  Bowel movement 12/19  Voiding volitionally    Seen and examined in the café.  No systemic complaints.    ROS: 14 point ROS negative unless otherwise specified in the HPI    Objective:  VITAL SIGNS: BP (!) 141/78   Pulse 81   Temp 36.7 °C (98 °F) (Oral)   Resp 18   Ht 1.727 m (5' 8\")   Wt 81 kg (178 lb 9.2 oz)   SpO2 96%   BMI 27.15 kg/m²     GEN: No apparent distress  HEENT: Head normocephalic, atraumatic.  Left eye discharge and erythematous sclera  CV: Extremities warm and well-perfused, no peripheral edema appreciated bilaterally.  PULMONARY: Breathing nonlabored on room air, no respiratory accessory muscle use.  Not requiring supplemental oxygen.  SKIN: No appreciable skin breakdown on exposed areas of skin.  PSYCH: Normal affect  NEURO: Awake alert.  Conversational.  Logical thought content. Dysarthria. Left Hemiparesis. Mild left clonus at ankle. No significant spasticity appreciated at rest.       Laboratory Values:  Recent Results (from the past 72 hour(s))   Basic Metabolic Panel    Collection Time: 12/18/23  5:37 AM   Result Value Ref Range    Sodium 141 135 - 145 mmol/L    Potassium 3.8 3.6 - 5.5 mmol/L    Chloride 104 96 - 112 mmol/L    Co2 26 20 - 33 mmol/L    Glucose 127 (H) 65 - 99 mg/dL    Bun 33 (H) 8 - 22 mg/dL    Creatinine 2.68 (H) 0.50 - 1.40 mg/dL    Calcium 9.0 8.5 - 10.5 mg/dL    Anion Gap 11.0 7.0 - 16.0   ESTIMATED GFR    Collection Time: 12/18/23  5:37 AM   Result Value Ref Range    GFR (CKD-EPI) 26 (A) >60 mL/min/1.73 m 2   CBC WITHOUT DIFFERENTIAL    Collection Time: 12/19/23  5:17 AM   Result Value Ref Range    WBC 5.3 4.8 - 10.8 K/uL    RBC 3.76 (L) 4.70 - 6.10 M/uL    Hemoglobin 11.6 (L) 14.0 - 18.0 g/dL    Hematocrit 34.3 (L) 42.0 - 52.0 %    MCV 91.2 81.4 - 97.8 fL    MCH 30.9 27.0 " - 33.0 pg    MCHC 33.8 32.3 - 36.5 g/dL    RDW 41.0 35.9 - 50.0 fL    Platelet Count 190 164 - 446 K/uL    MPV 10.9 9.0 - 12.9 fL           Medications:  Scheduled Medications   Medication Dose Frequency    metoprolol SR  25 mg Q DAY    sertraline  50 mg DAILY    baclofen  10 mg TID    amLODIPine  10 mg DAILY    apixaban  5 mg BID    hydrALAZINE  100 mg Q8HRS    traZODone  75 mg QHS    senna-docusate  2 Tablet BID    omeprazole  20 mg DAILY    atorvastatin  40 mg Nightly     PRN medications: traZODone, carboxymethylcellulose, [DISCONTINUED] insulin regular **AND** [CANCELED] POC blood glucose manual result **AND** NOTIFY MD and PharmD **AND** Administer 20 grams of glucose (approximately 8 ounces of fruit juice) every 15 minutes PRN FSBG less than 70 mg/dL **AND** dextrose bolus, Respiratory Therapy Consult, senna-docusate **AND** polyethylene glycol/lytes **AND** magnesium hydroxide **AND** bisacodyl, mag hydrox-al hydrox-simeth, ondansetron **OR** ondansetron, sodium chloride, [] acetaminophen **FOLLOWED BY** acetaminophen, oxyCODONE immediate-release **OR** oxyCODONE immediate-release, hydrALAZINE    Diet:  Current Diet Order   Procedures    Diet Order Diet: Consistent CHO (Diabetic) (2 gram sodium restriction; medications whole with thin liquids); Second Modifier: (optional): 2 Gram Sodium       Medical Decision Making and Plan:  Right thalamic hemorrhagic stroke ~ last week 2023  Originally presented with a headache and left-sided weakness to hospital in [facility]  Work-up at the outside hospital in [facility] revealed a right thalamic hemorrhagic stroke  Repeat CT head done after return flight to the ,  CT head shows right thalamic hemorrhage, decrease in density since prior studies from the outside hospital, slight asymmetric dilation of the left ventricular system including the left temporal horn with a possible component of hydrocephalus  Planning for BP less than 140, avoiding any kind  of anticoagulation  Initiate comprehensive IRF level therapy with 3 days of therapy 5 days a week with services from PT/OT/SLP     Spasticity  OP follow up with Physiatry for consideration Botox   Has had resurgence of spasticity restart baclofen 5mg TID 11/30/2023 --> increase to 10mg TID  12/20/2023 continue Baclofen at 10mg TID, no significant side effects    Conjunctivitis  12/15 started eyedrops, completed    ABLA  Now on Eliquis  Recheck 11/28 - improved 11.4--> 12.0--> 11.6 11/30/2023 12/6/2023 11.6-->10.7--> 11.7 12/11  Monitor     CKD  Follow up with OP nephrology  Renal function fluctuates 20-30's/2's-3's  Cr Baseline in past had been mid 1's     Hypertension  Continue Amlodipine 10mg daily  Continue Hydralazine 25mg TID - increased by hospitalist 11/13/2023 from BID to TID--> increased to 50mg q8hrs 11/15  11/16 Hydralazine increased to 75mg q8hrs and 1x Hydralazine given  11/17 BP still elevated but hydralazine recently increased. Monitor  12/13/2023 VS showing increase in -140's. Continue Hydralazine 100mg q8hrs + Amlodipine 10mg daily.  12/14/2023 consistently higher, start Metoprolol XL 12.5 mg daily   12/15/2023 better controlled, continue Metoprolol daily   12/18 increase metoprolol to 25 mg daily  12/19 continue metoprolol 25 mg daily, monitor for need to increase.  12/20 blood pressure and heart rate improved and within normal limits, continue metoprolol 25 mg daily for now.    Hypokalemia  Mild 12/13 supplement x1   NML 12/15  12/18 stable and normal at 3.8    Leukopenia  New, mild 12/6/2023   Resolved 12/11     Prediabetes  Discontinue SSI     HLD  Continue home dose satin      Bowel  Constipation 11/29/2023, resolved 11/30/2023   Continue with scheduled Colace and senna, as needed stool softeners  Miralax x3 doses 11/29/2023 --> Constipation Resolved 11/30/2023      Insomnia  Secondary to recent jet lag, melatonin scheduled --> increase to home dose 11/13/2023 9mg  Utilize trazodone as  needed insomnia continues  Schedule Trazodone 11/15/2023   11/16/2023 continue Trazodone as is. Thinks he slept better last night  11/17/2023 Still not sleeping well. Increase to 75mg nightly.   12/20/2023 continue trazodone at current dose     Pain - as needed Tylenol and oxycodone    Post-Stroke Depression  Consulted Pyschiatry   Start Prozac 11/28/2023 --> Switched to zoloft per psychiatry  Appreciate assistance with management  Mood improved, continue Zoloft 12/20    Right Foot Pain  H/o Gout  Xray with swelling 1st metatarsal head   Trial Colchicine for acute gout flare 11/28/2023   Topical Voltaren gel scheduled   Improved with less swelling, erythema 11/29/2023   Resolved      LABS: BMP + CBC 12/22      DVT PROPHYLAXIS: NO - Hemorrhagic stroke. US + UE and LE for brachial and peroneal DVT. 11/21/2023 start Heparin 5000 units q8hrs SQ for further prophylaxis, if tolerates will increase to full AC. Consider repeat CTH to ensure stability bleed once on full AC. 11/24/2023 Start loading dose Eliquis 10mg BID x 7 days with transition to 5mg BID. CBC showing improvement in H/H 11/28/2023 no neurologic decline.     HOSPITALIST FOLLOWING: YES - d/w hospitalist 12/6 --> Signed off 12/6.     CODE STATUS: FULL CODE    DISPO: Home alone. Family can help starting 12/27    MARCUS: 12/27/23     MEDS SENT TO: TBD    DISCHARGE FOLLOW UP: Neurology, Nephrology, PCP, Physiatry (Botox) - needs referral to all.   ____________________________________    Dr. Lisa Lee DO, MS  [facility] - [facility] & Rehabilitation   ____________________________________    _____________________________________  Interdisciplinary Team Conference   Most recent IDT on 12/20/2023    I, Dr. Lisa Lee DO, MS, was present and led the interdisciplinary team conference on 12/20/2023.  I led the IDT conference and agree with the IDT conference documentation and plan of care as noted below.     Nursing:  Diet Current Diet Order   Procedures     Diet Order Diet: Consistent CHO (Diabetic) (2 gram sodium restriction; medications whole with thin liquids); Second Modifier: (optional): 2 Gram Sodium       Eating ADL Modified Independent  Set-up of equipment or meal/tube feeding   % of Last Meal  Oral Nutrition: Breakfast, Between % Consumed   Sleep    Bowel Last BM: 12/19/23   Bladder        Physical Therapy:  Bed Mobility    Transfers Minimal Assist (min A to R; mod A to L)  Squat pivot transfer to wheelchair, Supervision for safety, Set-up of equipment, Initial preparation for task, Increased time   Mobility Moderate Assist (+ WC follow by second person)   Stairs    Making good progress.  In past week made progress from 2 person assist to 1 person with walker  Has been more responsible with standing balance.    Occupational Therapy:  Grooming Seated, Minimal Assist   Bathing Minimal Assist   UB Dressing Minimal Assist   LB Dressing Maximal Assist   Toileting Moderate Assist   Shower & Transfer    Mod A towards min A towards transfers.   Attempted to wipe himself and had near fall.   More use of the LUE. Needs visual cues. Activity pushing the NuStep. Using hand with LB dressing.     Speech-Language Pathology:  Comprehension:  Modified Independent  Comprehension Description:  Glasses  Expression:  Modified Independent  Expression Description:   (extra time)  Social Interaction:  Modified Independent  Social Interaction Description:  Verbal cues, Increased time  Problem Solving:  Minimal Assist  Problem Solving Description:  Increased time, Seat belt, Supervision, Therapy schedule, Verbal cueing  Memory:  Minimal Assist  Memory Description:  Increased time, Seat belt, Supervision, Therapy schedule, Verbal cueing  Barrier is ability to evaluate his performance. Poor awareness.     Respiratory Therapy:  O2 (LPM): 0  O2 Delivery Device: None - Room Air    Case Management:  Continues to work on disposition and DME needs.     BARRIERS TO DISCHARGE  HOME:  Impulsivity  Poor proprioception     Discharge Date/Disposition:  12/27/23  _____________________________________

## 2023-12-20 NOTE — THERAPY
Physical Therapy   Daily Treatment     Patient Name: Jason Lima  Age:  62 y.o., Sex:  male  Medical Record #: [id number]  Today's Date: 12/20/2023     Precautions  Precautions: Fall Risk  Comments: Left Hemiparesis, left visual inattention    Subjective    Patient agreeable to work on mat work to target hamstring strengthening     Objective       12/20/23 0901   PT Charge Group   PT Therapeutic Exercise (Units) 1   PT Neuromuscular Re-Education / Balance (Units) 1   PT Total Time Spent   PT Individual Total Time Spent (Mins) 30   Precautions   Precautions Fall Risk   Comments Left Hemiparesis, left visual inattention   Transfer Functional Level of Assist   Bed, Chair, Wheelchair Transfer Minimal Assist   Bed Chair Wheelchair Transfer Description Set-up of equipment;Squat pivot transfer to wheelchair   Supine Lower Body Exercise   Short Arc Quad 2 sets of 10   Heel Slide 2 sets of 10   Ankle Pumps 2 sets of 10   Gluteal Isometrics 2 sets of 10   Bed Mobility    Sit to Stand Minimal Assist  (5x to work on midline and avoid left lean)   Interdisciplinary Plan of Care Collaboration   IDT Collaboration with  Therapy Tech;Physical Therapist   Collaboration Comments treatment planning, stand-by 2nd person for transfers         Assessment    Able to demonstrate 3 out of 5 trials sit to stand and maintain midline with min assist, 2 trials required tactile cues to avoid left lean    Strengths: Able to follow instructions, Independent prior level of function, Motivated for self care and independence, Pleasant and cooperative, Supportive family, Willingly participates in therapeutic activities  Barriers: Decreased endurance, Hemiparesis, Difficulty following instructions, Fatigue, Hypertension, Impaired activity tolerance, Impaired balance, Impaired insight/denial of deficits, Impaired functional cognition, Impaired sleep pattern, Impulsive, Limited mobility, Visual impairment, Poor family support (lives  "alone)    Plan  Continue high intensity gait c/ HW + L AFO, work on speed and from 3 point toward 2 point pattern, continue lateral stepping, backing, turning, and LE strength (use NMES/FES as needed), Nustep for pregait, prioritize hip flexion and knee flexion for LLE swing clearance.     Standing balance c/ perturbations, STS with decreased external correction (allow failure to L side) to facilitate improvement in midline control.      DME  PT DME Recommendations  Wheelchair: 18\" Width  Cushion: Specialty (See other comments) (TBD pending ambulation progress)  Assistive Device: Tanvir Walker (TBD pending progress, currently 2 person assist for gait)  Additional Equipment: Other (Comments)     Passport items to be completed:  Get in/out of bed safely, in/out of a vehicle, safely use mobility device, walk or wheel around home/community, navigate up and down stairs, show how to get up/down from the ground, ensure home is accessible, demonstrate HEP, complete caregiver training    Physical Therapy Problems (Active)       Problem: PT-Long Term Goals       Dates: Start:  11/13/23         Goal: LTG-By discharge, patient will ambulate >/= 50' c/ LRAD at [facility] or better.        Dates: Start:  11/13/23    Expected End:  12/23/23         Goal Note filed on 12/20/23 0846 by Isidra Hamilton, PT       ModA c/ HW.               Goal: LTG-By discharge, patient will transfer one surface to another c/ [facility] or better.        Dates: Start:  11/13/23    Expected End:  12/23/23         Goal Note filed on 12/20/23 0846 by Isidra Hamilton PT       Partially met: [facility] c/ squat pivot, ModA for stand pivot/step.               Goal: LTG-By discharge, patient will ambulate up/down 1 step c/ LRAD at [facility] or better.        Dates: Start:  11/13/23    Expected End:  12/23/23         Goal Note filed on 12/20/23 0846 by Isidra Hamilton PT       Needs assessment.               Goal: LTG-By discharge, patient will transfer in/out of a car c/ ModA or better.   "      Dates: Start:  11/13/23    Expected End:  12/23/23         Goal Note filed on 12/20/23 0846 by Isidra Hamilton PT       Needs assessment.

## 2023-12-20 NOTE — CARE PLAN
Problem: Problem Solving STGs  Goal: STG-Within one week, patient will perform alternating attention tasks with 75% acc.  Outcome: Met  Note: 75%  with min assist to improve.     Problem: Memory STGs  Goal: STG-Within one week, patient will recall daily events and safety sequencing with 60% acc with use of external memory aids.  Outcome: Met  Note: Patient is able to verbally  recall with 60-75% acc but has more difficulty implementing.     Problem: Comprehension STGs  Goal: STG-Within one week, patient will perform attention for comprehension in functional reading tasks with 75% acc.  Outcome: Met  Note: 75% with min A to improve.     Problem: Problem Solving STGs  Goal: STG-Within one week, patient will perform alternating attention tasks with 85% acc with set up.  Outcome: Not Met  Note: 75%  with min assist to improve.  Goal: STG-Within one week, patient will organization and reasoning tasks with 80% acc with min cues.  Outcome: Not Met  Note: Progressing toward this goal .  75-80% with min cues needed.     Problem: Memory STGs  Goal: STG-Within one week, patient will recall new training and safety sequencing with 80% acc with set up and external cues.  Outcome: Not Met  Note: Progressing toward this goal 75-80% with min cues to improve.

## 2023-12-20 NOTE — THERAPY
Physical Therapy   Daily Treatment     Patient Name: Jason Lima  Age:  62 y.o., Sex:  male  Medical Record #: [id number]  Today's Date: 12/19/2023     Precautions  Precautions: Fall Risk  Comments: Left Hemiparesis, left visual inattention    Subjective    Patient pleasant and very motivated to participate. Requests to work on walking.      Objective       12/19/23 1401   PT Charge Group   PT Gait Training (Units) 2   PT Neuromuscular Re-Education / Balance (Units) 2   PT Total Time Spent   PT Individual Total Time Spent (Mins) 60   Gait Functional Level of Assist    Gait Level Of Assist Moderate Assist  (+ WC follow by second person)   Assistive Device Tanvir-Walker  (L AFO)   Distance (Feet) 50   # of Times Distance was Traveled 3   Deviation Decreased Base Of Support;Bradykinetic;Decreased Heel Strike;Decreased Toe Off  (variable L step-length, occasional L LOB)   Wheelchair Functional Level of Assist   Wheelchair Assist Supervised   Distance Wheelchair (Feet or Distance) 200' x 2   Wheelchair Description Extra time  (hemitechnique)   Transfer Functional Level of Assist   Bed, Chair, Wheelchair Transfer Minimal Assist  (min A to R; mod A to L)   Bed Chair Wheelchair Transfer Description Squat pivot transfer to wheelchair;Supervision for safety;Set-up of equipment;Initial preparation for task;Increased time   Bed Mobility    Sit to Stand Minimal Assist   Neuro-Muscular Treatments   Comments   Blocked practice squat pivot and stand pivot transfers WC <> mat table  -x3 repetitions towards R and L  -min A towards the R, mod A towards L    Sit <> stand from mat table 5 x 5  -mirror for visual feedback  -emphasis on achieving immediate standing balance without UE support  -occasional mod-max A for LOB to the L     Interdisciplinary Plan of Care Collaboration   IDT Collaboration with  Therapy Tech   Patient Position at End of Therapy Seated;Chair Alarm On;Self Releasing Lap Belt Applied;Call Light within Reach;Tray Table  "within Reach;Phone within Reach   Collaboration Comments Therapy tech assist for WC follow       Assessment    Patient with good tolerance to activity and very motivated. He required cuing for safe set-up of sit <> stand from WC as well as review of sequencing with hemiwalker during ambulation. Patient had intermittent LOB towards L during ambulation and immediate standing balance during sit to stand transition, with little balance reaction to correct. Patient remains well-motivated to participate.     Strengths: Able to follow instructions, Independent prior level of function, Motivated for self care and independence, Pleasant and cooperative, Supportive family, Willingly participates in therapeutic activities  Barriers: Decreased endurance, Hemiparesis, Difficulty following instructions, Fatigue, Hypertension, Impaired activity tolerance, Impaired balance, Impaired insight/denial of deficits, Impaired functional cognition, Impaired sleep pattern, Impulsive, Limited mobility, Visual impairment, Poor family support (lives alone)    Plan    Continue high intensity gait c/ HW + L AFO, work on speed and from 3 point toward 2 point pattern, continue lateral stepping, backing, turning, and LE strength (use NMES/FES as needed), Nustep for pregait, prioritize hip flexion and knee flexion for LLE swing clearance.     Standing balance c/ perturbations, STS with decreased external correction (allow failure to L side) to facilitate improvement in midline control.     DME  PT DME Recommendations  Wheelchair: 18\" Width  Cushion: Specialty (See other comments) (TBD pending ambulation progress)  Assistive Device: Tanvir Walker (TBD pending progress, currently 2 person assist for gait)  Additional Equipment: Other (Comments)    Passport items to be completed:  Get in/out of bed safely, in/out of a vehicle, safely use mobility device, walk or wheel around home/community, navigate up and down stairs, show how to get up/down from the " ground, ensure home is accessible, demonstrate HEP, complete caregiver training    Physical Therapy Problems (Active)       Problem: PT-Long Term Goals       Dates: Start:  11/13/23         Goal: LTG-By discharge, patient will propel wheelchair >/= 300' at [place] or better.        Dates: Start:  11/13/23    Expected End:  12/23/23            Goal: LTG-By discharge, patient will ambulate >/= 50' c/ LRAD at [place] or better.        Dates: Start:  11/13/23    Expected End:  12/23/23            Goal: LTG-By discharge, patient will transfer one surface to another c/ Renata or better.        Dates: Start:  11/13/23    Expected End:  12/23/23            Goal: LTG-By discharge, patient will ambulate up/down 1 step c/ LRAD at [place] or better.        Dates: Start:  11/13/23    Expected End:  12/23/23            Goal: LTG-By discharge, patient will transfer in/out of a car c/ ModA or better.        Dates: Start:  11/13/23    Expected End:  12/23/23

## 2023-12-20 NOTE — THERAPY
Missed Therapy    Patient Name: Jason Lima  Age:  62 y.o., Sex:  male  Medical Record #: [id number]  Today's Date: 12/20/2023    Discussed missed therapy with therapy , manger of therapy services. Delay in kitchen/meal trays being delivered. Pt has not received breakfast at this time.        12/20/23 0834   Therapy Missed   Missed Therapy (Minutes) 30   Reason For Missed Therapy Non-Medical - Other (Please Comment)  (delays with meal trays)   Interdisciplinary Plan of Care Collaboration   Patient Position at End of Therapy Seated;Chair Alarm On;Self Releasing Lap Belt Applied  (in dining room, waiting for breakfast tray)

## 2023-12-21 ENCOUNTER — APPOINTMENT (OUTPATIENT)
Dept: SPEECH THERAPY | Facility: REHABILITATION | Age: 63
DRG: 057 | End: 2023-12-21
Attending: PHYSICAL MEDICINE & REHABILITATION
Payer: COMMERCIAL

## 2023-12-21 ENCOUNTER — APPOINTMENT (OUTPATIENT)
Dept: PHYSICAL THERAPY | Facility: REHABILITATION | Age: 63
DRG: 057 | End: 2023-12-21
Attending: PHYSICAL MEDICINE & REHABILITATION
Payer: COMMERCIAL

## 2023-12-21 ENCOUNTER — APPOINTMENT (OUTPATIENT)
Dept: OCCUPATIONAL THERAPY | Facility: REHABILITATION | Age: 63
DRG: 057 | End: 2023-12-21
Attending: PHYSICAL MEDICINE & REHABILITATION
Payer: COMMERCIAL

## 2023-12-21 PROCEDURE — 94760 N-INVAS EAR/PLS OXIMETRY 1: CPT

## 2023-12-21 PROCEDURE — A9270 NON-COVERED ITEM OR SERVICE: HCPCS | Performed by: STUDENT IN AN ORGANIZED HEALTH CARE EDUCATION/TRAINING PROGRAM

## 2023-12-21 PROCEDURE — 700102 HCHG RX REV CODE 250 W/ 637 OVERRIDE(OP): Performed by: HOSPITALIST

## 2023-12-21 PROCEDURE — 700102 HCHG RX REV CODE 250 W/ 637 OVERRIDE(OP): Performed by: PHYSICAL MEDICINE & REHABILITATION

## 2023-12-21 PROCEDURE — 97130 THER IVNTJ EA ADDL 15 MIN: CPT

## 2023-12-21 PROCEDURE — 99231 SBSQ HOSP IP/OBS SF/LOW 25: CPT | Performed by: PHYSICAL MEDICINE & REHABILITATION

## 2023-12-21 PROCEDURE — A9270 NON-COVERED ITEM OR SERVICE: HCPCS | Performed by: PHYSICAL MEDICINE & REHABILITATION

## 2023-12-21 PROCEDURE — 97116 GAIT TRAINING THERAPY: CPT

## 2023-12-21 PROCEDURE — A9270 NON-COVERED ITEM OR SERVICE: HCPCS | Performed by: HOSPITALIST

## 2023-12-21 PROCEDURE — 770010 HCHG ROOM/CARE - REHAB SEMI PRIVAT*

## 2023-12-21 PROCEDURE — 97530 THERAPEUTIC ACTIVITIES: CPT

## 2023-12-21 PROCEDURE — 97129 THER IVNTJ 1ST 15 MIN: CPT

## 2023-12-21 PROCEDURE — 700102 HCHG RX REV CODE 250 W/ 637 OVERRIDE(OP): Performed by: STUDENT IN AN ORGANIZED HEALTH CARE EDUCATION/TRAINING PROGRAM

## 2023-12-21 PROCEDURE — 97110 THERAPEUTIC EXERCISES: CPT | Mod: CO

## 2023-12-21 PROCEDURE — 97110 THERAPEUTIC EXERCISES: CPT

## 2023-12-21 PROCEDURE — 97112 NEUROMUSCULAR REEDUCATION: CPT

## 2023-12-21 RX ADMIN — OMEPRAZOLE 20 MG: 20 CAPSULE, DELAYED RELEASE ORAL at 07:55

## 2023-12-21 RX ADMIN — HYDRALAZINE HYDROCHLORIDE 100 MG: 50 TABLET, FILM COATED ORAL at 20:43

## 2023-12-21 RX ADMIN — AMLODIPINE BESYLATE 10 MG: 5 TABLET ORAL at 05:52

## 2023-12-21 RX ADMIN — BACLOFEN 10 MG: 10 TABLET ORAL at 07:55

## 2023-12-21 RX ADMIN — ACETAMINOPHEN 1000 MG: 500 TABLET ORAL at 08:00

## 2023-12-21 RX ADMIN — SENNOSIDES AND DOCUSATE SODIUM 2 TABLET: 50; 8.6 TABLET ORAL at 20:43

## 2023-12-21 RX ADMIN — TRAZODONE HYDROCHLORIDE 75 MG: 50 TABLET ORAL at 20:43

## 2023-12-21 RX ADMIN — ATORVASTATIN CALCIUM 40 MG: 40 TABLET, FILM COATED ORAL at 20:43

## 2023-12-21 RX ADMIN — APIXABAN 5 MG: 5 TABLET, FILM COATED ORAL at 07:55

## 2023-12-21 RX ADMIN — BACLOFEN 10 MG: 10 TABLET ORAL at 15:17

## 2023-12-21 RX ADMIN — HYDRALAZINE HYDROCHLORIDE 100 MG: 50 TABLET, FILM COATED ORAL at 15:16

## 2023-12-21 RX ADMIN — SERTRALINE 50 MG: 50 TABLET, FILM COATED ORAL at 07:55

## 2023-12-21 RX ADMIN — HYDRALAZINE HYDROCHLORIDE 100 MG: 50 TABLET, FILM COATED ORAL at 05:52

## 2023-12-21 RX ADMIN — APIXABAN 5 MG: 5 TABLET, FILM COATED ORAL at 20:43

## 2023-12-21 RX ADMIN — BACLOFEN 10 MG: 10 TABLET ORAL at 20:44

## 2023-12-21 RX ADMIN — METOPROLOL SUCCINATE 25 MG: 25 TABLET, EXTENDED RELEASE ORAL at 05:52

## 2023-12-21 ASSESSMENT — ACTIVITIES OF DAILY LIVING (ADL)
BED_CHAIR_WHEELCHAIR_TRANSFER_DESCRIPTION: ADAPTIVE EQUIPMENT;INCREASED TIME;SUPERVISION FOR SAFETY;VERBAL CUEING
BED_CHAIR_WHEELCHAIR_TRANSFER_DESCRIPTION: ADAPTIVE EQUIPMENT;INCREASED TIME;VERBAL CUEING

## 2023-12-21 ASSESSMENT — GAIT ASSESSMENTS
ASSISTIVE DEVICE: HEMI-WALKER
DISTANCE (FEET): 70
GAIT LEVEL OF ASSIST: MODERATE ASSIST

## 2023-12-21 NOTE — PROGRESS NOTES
NURSING DAILY NOTE    Name: Jason Lima   Date of Admission: 11/12/2023   Admitting Diagnosis: Thalamic hemorrhage (HCC)  Attending Physician: Lisa Lee D.o.  Allergies: Penicillins    Safety  Patient Assist  moderate to max 1  Patient Precautions  Fall Risk  Precaution Comments  Left Hemiparesis, left visual inattention  Bed Transfer Status  Moderate Assist (Min to ModA for stand-step transfer c/ HW)  Toilet Transfer Status   Minimal Assist  Assistive Devices  Gait Belt, Wheelchair, Rails  Oxygen  None - Room Air  Diet/Therapeutic Dining  Current Diet Order   Procedures    Diet Order Diet: Consistent CHO (Diabetic) (2 gram sodium restriction; medications whole with thin liquids); Second Modifier: (optional): 2 Gram Sodium     Pill Administration  whole  Agitated Behavioral Scale  14  ABS Level of Severity  No Agitation    Fall Risk  Has the patient had a fall this admission?   Yes  Shellie Hamilton Fall Risk Scoring  23, HIGH RISK  Fall Risk Safety Measures  bed alarm and chair alarm    Vitals  Temperature: 36.9 °C (98.5 °F)  Temp src: Oral  Pulse: 64  Respiration: 18  Blood Pressure: (!) 148/80  Blood Pressure MAP (Calculated): 103 MM HG  BP Location: Right, Upper Arm  Patient BP Position: Sitting     Oxygen  Pulse Oximetry: 96 %  O2 (LPM): 0  FiO2%: 21 %  O2 Delivery Device: None - Room Air    Bowel and Bladder  Last Bowel Movement  12/20/23  Stool Type  Type 5: Soft blob with clear cut edges (passed easily)  Bowel Device  Bathroom  Continent  Bladder: Continent void   Bowel: Continent movement  Bladder Function  Urine Void (mL): 300 ml  Number of Times Voided: 1  Urinary Options: Yes  Urine Color: Straw  Number of Times Incontinent of Urine: 0  Genitourinary Assessment   Bladder Assessment (WDL):  WDL Except  Echols Catheter: Not Applicable  Urine Color: Straw  Number of Bladder Accidents: 0  Total Number of Bladder of Accidents in Last 7 Days:  0  Number of Times Incontinent of Urine: 0  Bladder Device: Urinal  Time Void: No  Bladder Scan: Post Void  $ Bladder Scan Results (mL): 26    Skin  Polo Score   16  Sensory Interventions   Bed Types: Standard/Trauma Mattress  Skin Preventative Measures: Pillows in Use to Float Heels, Pillows in Use for Support / Positioning  Moisture Interventions  Moisturizers/Barriers: Barrier Paste      Pain  Pain Rating Scale  0 - No Pain  Pain Location  Back  Pain Location Orientation  Mid, Lower  Pain Interventions   Declines    ADLs    Bathing    (Patient was tired, but agreed for sponge bath.)  Linen Change   Complete (Patient's bed found too dirty and soaking wet. Took picture and shown to day Charge Nurse.)  Personal Hygiene  Perineal Care, Moist Deja Wipes  Chlorhexidine Bath      Oral Care  Brushed Teeth  Teeth/Dentures     Shave  Self  Nutrition Percentage Eaten  Dinner, Between % Consumed  Environmental Precautions  Bed in Low Position  Patient Turns/Positioning  Sitting Up in Wheelchair  Patient Turns Assistance/Tolerance  Assistance of One  Bed Positions  Bed Controls On  Head of Bed Elevated  Self regulated      Psychosocial/Neurologic Assessment  Psychosocial Assessment  Psychosocial (WDL):  WDL Except  Patient Behaviors: Fatigue  Neurologic Assessment  Neuro (WDL): Exceptions to WDL  Level of Consciousness: Alert  Orientation Level: Oriented X4  Cognition: Follows commands, Appropriate attention/concentration  Speech: Clear  Facial Symmetry: Left facial drooping  Pupil Assesment: Yes  R Pupil Size (mm): 3  R Pupil Shape / Description: Round  R Pupil Reaction: Brisk  L Pupil Size (mm): 3  L Pupil Shape / Description: Round  L Pupil Reaction: Brisk  Reflexes: Cough  Cough Reflex: Present  Motor Function/Sensation Assessment: Dorsiflexion, Motor response  R Foot Dorsiflexion: Strong  L Foot Dorsiflexion: Absent  RUE Motor Response: Responds to commands  RUE Sensation: Full sensation  Muscle Strength Right  Arm: Normal Strength Against Gravity and Full Resistance  LUE Motor Response: Flaccid  LUE Sensation: Numbness, Tingling  Muscle Strength Left Arm: Weak Movement but Not Against Gravity or Resistance  RLE Motor Response: Responds to commands  RLE Sensation: Full sensation  Muscle Strength Right Leg: Good Strength Against Gravity and Moderate Resistance  LLE Motor Response: Flaccid  LLE Sensation: Numbness, Tingling  Muscle Strength Left Leg: Weak Movement but Not Against Gravity or Resistance  EENT (WDL):  WDL Except    Cardio/Pulmonary Assessment  Edema   RLE Edema: Trace  LLE Edema: Trace  Respiratory Breath Sounds  RUL Breath Sounds: Clear  RML Breath Sounds: Clear  RLL Breath Sounds: Diminished  MOHAMUD Breath Sounds: Clear  LLL Breath Sounds: Diminished  Cardiac Assessment   Cardiac (WDL):  WDL Except

## 2023-12-21 NOTE — CONSULTS
"[facility]    INPATIENT ASSESSMENT    Name: Jason Lima  MRN: [id number]  : 1960  Age: 62 y.o.  Date of assessment: 2023  PCP: Humble Garcia D.O.  Persons in attendance: Patient      Brief Behavioral Health Note  Jason Lima is a 62 year male seen sitting in his wheelchair watching television. Patient was pleasant and easy to engage. Patient continues to experience feelings of depression related to his current medical condition. Patient continues to feel his progress is not to the place where he would like to see however patient states, \"I remember what you told me, remain hopeful, I still have hope\". Patient states , \"other people tell me they can see the difference\". Patient continues, \"I have see some differences, I can lift my arm\", patient demonstrated. Patient has an active support system which has been a positive contributor to patients recovery in addition to the quality care he receives at acute rehab. Patient reports appreciation for the therapy and wants to remain in rehab for as long as possible, until he is able to realize the recovery he desires which is, \"I want to be like I was before the stroke\".  Clinician provided support and encouragement to patient as he prepares to transition home.    Signing off  Thank you,      Kriss Sanabria, Ph.D., [facility]  2023   Length of intervention: 30 minutes  "

## 2023-12-21 NOTE — THERAPY
"Physical Therapy   Daily Treatment     Patient Name: Jason Lima  Age:  62 y.o., Sex:  male  Medical Record #: [id number]  Today's Date: 12/20/2023     Precautions  Precautions: Fall Risk  Comments: Left Hemiparesis, left visual inattention    Subjective    \"It's so hard to move my leg the way I want.\" Pt in w/c at arrival, agreeable to PT tx.      Objective       12/20/23 1401   PT Charge Group   PT Gait Training (Units) 1   PT Neuromuscular Re-Education / Balance (Units) 1   PT Therapeutic Activities (Units) 2   PT Total Time Spent   PT Individual Total Time Spent (Mins) 60   Stairs Functional Level of Assist   Level of Assist with Stairs Total Assist  (MaxA x 1, 2nd person SBA for safety)   # of Stairs Climbed 4   Stairs Description Extra time;Hand rails;Verbal cueing   Transfer Functional Level of Assist   Bed, Chair, Wheelchair Transfer Moderate Assist  (Min to ModA for stand-step transfer c/ HW)   Bed Chair Wheelchair Transfer Description Adaptive equipment;Set-up of equipment   Toilet Transfers Minimal Assist   Toilet Transfer Description Grab bar;Increased time;Verbal cueing  (cues for sequencing)   Neuro-Muscular Treatments   Neuro-Muscular Treatments Weight Shift Right;Weight Shift Left;Sequencing;Postural Changes   Comments High repetition practice of stand-step transfers to R/L c/ HW; total reps x 10; cues for foot placement, sequencing, HW management, postural correction; video FB used to support sequencing and pt cued to self identify mistakes to support development of intrinsic FB; pt also cued to verbalize each step to support ability to sequence transfer with decreased CG assist   Interdisciplinary Plan of Care Collaboration   IDT Collaboration with  Therapy Tech   Patient Position at End of Therapy Seated;Other (Comments)  (tech to transport back to room at end of session)     Session focused on high repetition practice of step around transfers w/c<>mat<>standard chair c/ HW, PT facilitated pt " verbalization and identification of key steps of transfer, with pt guiding PT practice, then performing full practice, and utilizing video FB of performance.     Stair training as above c/ RHR, step to pattern. VC for sequencing, attention to LLE hip flexion, knee ext, and functional WS to achieve midline on each step prior to continuing. Assist as above for steadying, incidental blocking of LLE, WS.     Assessment    Jason with improvement in sequencing with step around transfers. Performance limited by decreased sensation and proprioception on LLE, decreased strength and motor control resulting in narrowing of stance (tandem) during turns and tendency for LOB to L. Best effort: [place], worst effort up to MaxA for correction/return to midline. With cues he is able to widen TRUMAN but will benefit from continued high rep practice to improve consistency of performance.     Strengths: Able to follow instructions, Independent prior level of function, Motivated for self care and independence, Pleasant and cooperative, Supportive family, Willingly participates in therapeutic activities  Barriers: Decreased endurance, Hemiparesis, Difficulty following instructions, Fatigue, Hypertension, Impaired activity tolerance, Impaired balance, Impaired insight/denial of deficits, Impaired functional cognition, Impaired sleep pattern, Impulsive, Limited mobility, Visual impairment, Poor family support (lives alone)    Plan  Stand step transfers c/ HW. Cues to verbalize each step (move walker, then 1 foot, then the other; repeat)  Continue high intensity gait c/ HW + L AFO, work on speed and from 3 point toward 2 point pattern, continue lateral stepping, backing, turning, and LE strength (use NMES/FES as needed), Nustep for pregait, prioritize hip flexion and knee flexion for LLE swing clearance.     Standing balance c/ perturbations, STS with decreased external correction (allow failure to L side) to facilitate improvement in midline  "control.        DME  PT DME Recommendations  Wheelchair: 18\" Width  Cushion: Specialty (See other comments) (TBD pending ambulation progress)  Assistive Device: Tanvir Walker (TBD pending progress, currently 2 person assist for gait)  Additional Equipment: Other (Comments)    Passport items to be completed:  Get in/out of bed safely, in/out of a vehicle, safely use mobility device, walk or wheel around home/community, navigate up and down stairs, show how to get up/down from the ground, ensure home is accessible, demonstrate HEP, complete caregiver training    Physical Therapy Problems (Active)       Problem: PT-Long Term Goals       Dates: Start:  11/13/23         Goal: LTG-By discharge, patient will ambulate >/= 50' c/ LRAD at [place] or better.        Dates: Start:  11/13/23    Expected End:  12/23/23         Goal Note filed on 12/20/23 0846 by Isidra Hamilton, PT       ModA c/ HW.               Goal: LTG-By discharge, patient will transfer one surface to another c/ [place] or better.        Dates: Start:  11/13/23    Expected End:  12/23/23         Goal Note filed on 12/20/23 0846 by Isidra Hamilton, PT       Partially met: [place] c/ squat pivot, ModA for stand pivot/step.               Goal: LTG-By discharge, patient will ambulate up/down 1 step c/ LRAD at [place] or better.        Dates: Start:  11/13/23    Expected End:  12/23/23         Goal Note filed on 12/20/23 0846 by Isidra Hamilton, PT       Needs assessment.               Goal: LTG-By discharge, patient will transfer in/out of a car c/ ModA or better.        Dates: Start:  11/13/23    Expected End:  12/23/23         Goal Note filed on 12/20/23 0846 by Isidra Hamilton, PT       Needs assessment.                 "

## 2023-12-21 NOTE — DISCHARGE PLANNING
Case Management/IDT follow up.   IDT continues to recommend IRF level of care as patient continue to make progress with all therapies.   Projected dc date set for 12/27/23.      DC needs:  Recommendations made for outpatient for PT/OT/SLP  Follow up with: PCP neuro    Met with pt / family providing update from IDT and discussed plan of care.    Plan:  Continue to follow

## 2023-12-21 NOTE — PROGRESS NOTES
"  Physical Medicine & Rehabilitation Progress Note    Encounter Date: 12/21/2023    Chief Complaint: wondering if has any news    Interval Events (Subjective):  VS improved  Bowel movement 12/20  Voiding    Seen and examined in his room. Discussed insurance authorization. Otherwise patient doing well.     ROS: 14 point ROS negative unless otherwise specified in the HPI    Objective:  VITAL SIGNS: BP (!) 148/80   Pulse 65   Temp 36.9 °C (98.5 °F) (Oral)   Resp 16   Ht 1.727 m (5' 8\")   Wt 81 kg (178 lb 9.2 oz)   SpO2 96%   BMI 27.15 kg/m²     GEN: No apparent distress  HEENT: Head normocephalic, atraumatic.  Left eye discharge and erythematous sclera  CV: Extremities warm and well-perfused, no peripheral edema appreciated bilaterally.  PULMONARY: Breathing nonlabored on room air, no respiratory accessory muscle use.  Not requiring supplemental oxygen.  SKIN: No appreciable skin breakdown on exposed areas of skin.  PSYCH: Normal affect  NEURO: Awake alert.  Conversational.  Logical thought content. Dysarthria. Left Hemiparesis. Mild left clonus at ankle. No significant spasticity appreciated at rest.       Laboratory Values:  Recent Results (from the past 72 hour(s))   CBC WITHOUT DIFFERENTIAL    Collection Time: 12/19/23  5:17 AM   Result Value Ref Range    WBC 5.3 4.8 - 10.8 K/uL    RBC 3.76 (L) 4.70 - 6.10 M/uL    Hemoglobin 11.6 (L) 14.0 - 18.0 g/dL    Hematocrit 34.3 (L) 42.0 - 52.0 %    MCV 91.2 81.4 - 97.8 fL    MCH 30.9 27.0 - 33.0 pg    MCHC 33.8 32.3 - 36.5 g/dL    RDW 41.0 35.9 - 50.0 fL    Platelet Count 190 164 - 446 K/uL    MPV 10.9 9.0 - 12.9 fL           Medications:  Scheduled Medications   Medication Dose Frequency    metoprolol SR  25 mg Q DAY    sertraline  50 mg DAILY    baclofen  10 mg TID    amLODIPine  10 mg DAILY    apixaban  5 mg BID    hydrALAZINE  100 mg Q8HRS    traZODone  75 mg QHS    senna-docusate  2 Tablet BID    omeprazole  20 mg DAILY    atorvastatin  40 mg Nightly     PRN " medications: traZODone, carboxymethylcellulose, [DISCONTINUED] insulin regular **AND** [CANCELED] POC blood glucose manual result **AND** NOTIFY MD and PharmD **AND** Administer 20 grams of glucose (approximately 8 ounces of fruit juice) every 15 minutes PRN FSBG less than 70 mg/dL **AND** dextrose bolus, Respiratory Therapy Consult, senna-docusate **AND** polyethylene glycol/lytes **AND** magnesium hydroxide **AND** bisacodyl, mag hydrox-al hydrox-simeth, ondansetron **OR** ondansetron, sodium chloride, [] acetaminophen **FOLLOWED BY** acetaminophen, oxyCODONE immediate-release **OR** oxyCODONE immediate-release, hydrALAZINE    Diet:  Current Diet Order   Procedures    Diet Order Diet: Consistent CHO (Diabetic) (2 gram sodium restriction; medications whole with thin liquids); Second Modifier: (optional): 2 Gram Sodium       Medical Decision Making and Plan:  Right thalamic hemorrhagic stroke ~ last week 2023  Originally presented with a headache and left-sided weakness to hospital in [facility]  Work-up at the outside hospital in [facility] [place] revealed a right thalamic hemorrhagic stroke  Repeat CT head done after return flight to the ,  CT head shows right thalamic hemorrhage, decrease in density since prior studies from the outside hospital, slight asymmetric dilation of the left ventricular system including the left temporal horn with a possible component of hydrocephalus  Planning for BP less than 140, avoiding any kind of anticoagulation  Initiate comprehensive IRF level therapy with 3 days of therapy 5 days a week with services from PT/OT/SLP     Spasticity  OP follow up with Physiatry for consideration Botox   Has had resurgence of spasticity restart baclofen 5mg TID 2023 --> increase to 10mg TID  2023 continue Baclofen at 10mg TID, no significant side effects    Conjunctivitis  12/15 started eyedrops, completed    ABLA  Now on Eliquis  Recheck  - improved 11.4--> 12.0-->  11.6 11/30/2023 12/6/2023 11.6-->10.7--> 11.7 12/11  Monitor     CKD  Follow up with OP nephrology  Renal function fluctuates 20-30's/2's-3's  Cr Baseline in past had been mid 1's     Hypertension  Continue Amlodipine 10mg daily  Continue Hydralazine 25mg TID - increased by hospitalist 11/13/2023 from BID to TID--> increased to 50mg q8hrs 11/15  11/16 Hydralazine increased to 75mg q8hrs and 1x Hydralazine given  11/17 BP still elevated but hydralazine recently increased. Monitor  12/13/2023 VS showing increase in -140's. Continue Hydralazine 100mg q8hrs + Amlodipine 10mg daily.  12/14/2023 consistently higher, start Metoprolol XL 12.5 mg daily   12/15/2023 better controlled, continue Metoprolol daily   12/18 increase metoprolol to 25 mg daily  12/19 continue metoprolol 25 mg daily, monitor for need to increase.  12/21 blood pressure and heart rate improved and within normal limits, continue metoprolol 25 mg daily for now.    Hypokalemia  Mild 12/13 supplement x1   NML 12/15  12/18 stable and normal at 3.8    Leukopenia  New, mild 12/6/2023   Resolved 12/11     Prediabetes  Discontinue SSI     HLD  Continue home dose satin      Bowel  Constipation 11/29/2023, resolved 11/30/2023   Continue with scheduled Colace and senna, as needed stool softeners  Miralax x3 doses 11/29/2023 --> Constipation Resolved 11/30/2023      Insomnia  Secondary to recent jet lag, melatonin scheduled --> increase to home dose 11/13/2023 9mg  Utilize trazodone as needed insomnia continues  Schedule Trazodone 11/15/2023   11/16/2023 continue Trazodone as is. Thinks he slept better last night  11/17/2023 Still not sleeping well. Increase to 75mg nightly.   12/20/2023 continue trazodone at current dose     Pain - as needed Tylenol and oxycodone    Post-Stroke Depression  Consulted Pyschiatry   Start Prozac 11/28/2023 --> Switched to zoloft per psychiatry  Appreciate assistance with management  Mood improved, continue Zoloft  12/20    Right Foot Pain  H/o Gout  Xray with swelling 1st metatarsal head   Trial Colchicine for acute gout flare 11/28/2023   Topical Voltaren gel scheduled   Improved with less swelling, erythema 11/29/2023   Resolved      LABS: BMP + CBC 12/22      DVT PROPHYLAXIS: NO - Hemorrhagic stroke. US + UE and LE for brachial and peroneal DVT. 11/21/2023 start Heparin 5000 units q8hrs SQ for further prophylaxis, if tolerates will increase to full AC. Consider repeat CTH to ensure stability bleed once on full AC. 11/24/2023 Start loading dose Eliquis 10mg BID x 7 days with transition to 5mg BID. CBC showing improvement in H/H 11/28/2023 no neurologic decline.     HOSPITALIST FOLLOWING: YES - d/w hospitalist 12/6 --> Signed off 12/6.     CODE STATUS: FULL CODE    DISPO: Home alone. Family can help starting 12/27    MARCUS: TBD    MEDS SENT TO: TBD    DISCHARGE FOLLOW UP: Neurology, Nephrology, PCP, Physiatry (Botox) - needs referral to all.   ____________________________________    Dr. Lisa Lee DO, MS  [facility] - Physical Medicine & Rehabilitation   ____________________________________

## 2023-12-21 NOTE — PROGRESS NOTES
NURSING DAILY NOTE    Name: Jason Lima   Date of Admission: 11/12/2023   Admitting Diagnosis: Thalamic hemorrhage (HCC)  Attending Physician: Lisa Lee D.o.  Allergies: Penicillins    Safety  Patient Assist  Mod assist  Patient Precautions  Fall Risk  Precaution Comments  Left Hemiparesis, left visual inattention  Bed Transfer Status  Moderate Assist (Min to ModA for stand-step transfer c/ HW)  Toilet Transfer Status   Minimal Assist  Assistive Devices  Rails, Other (Comments)  Oxygen  None - Room Air  Diet/Therapeutic Dining  Current Diet Order   Procedures    Diet Order Diet: Consistent CHO (Diabetic) (2 gram sodium restriction; medications whole with thin liquids); Second Modifier: (optional): 2 Gram Sodium     Pill Administration  whole  Agitated Behavioral Scale  14  ABS Level of Severity  No Agitation    Fall Risk  Has the patient had a fall this admission?   Yes  Shellie Hamilton Fall Risk Scoring  23, HIGH RISK  Fall Risk Safety Measures  bed alarm and chair alarm    Vitals  Temperature: 36.9 °C (98.5 °F)  Temp src: Oral  Pulse: 74  Respiration: 18  Blood Pressure: (!) 148/78  Blood Pressure MAP (Calculated): 101 MM HG  BP Location: Right, Upper Arm  Patient BP Position: Sitting     Oxygen  Pulse Oximetry: 96 %  O2 (LPM): 0  FiO2%: 21 %  O2 Delivery Device: None - Room Air    Bowel and Bladder  Last Bowel Movement  12/20/23  Stool Type  Type 5: Soft blob with clear cut edges (passed easily)  Bowel Device  Bathroom  Continent  Bladder: Continent void   Bowel: Continent movement  Bladder Function  Urine Void (mL): 300 ml  Number of Times Voided: 1  Urinary Options: Yes  Urine Color: Straw  Number of Times Incontinent of Urine: 0  Genitourinary Assessment   Bladder Assessment (WDL):  WDL Except  Echols Catheter: Not Applicable  Urine Color: Straw  Number of Bladder Accidents: 0  Total Number of Bladder of Accidents in Last 7 Days: 0  Number of  Times Incontinent of Urine: 0  Bladder Device: Urinal  Time Void: No  Bladder Scan: Post Void  $ Bladder Scan Results (mL): 26    Skin  Polo Score   16  Sensory Interventions   Bed Types: Standard/Trauma Mattress  Skin Preventative Measures: Pillows in Use to Float Heels, Pillows in Use for Support / Positioning  Moisture Interventions  Moisturizers/Barriers: Barrier Paste      Pain  Pain Rating Scale  0 - No Pain  Pain Location  Back  Pain Location Orientation  Mid, Lower  Pain Interventions   Declines    ADLs    Bathing    (Patient was tired, but agreed for sponge bath.)  Linen Change   Complete  Personal Hygiene  Change Deja Pads, Moist Deja Wipes, Perineal Care  Chlorhexidine Bath      Oral Care  Brushed Teeth  Teeth/Dentures     Shave  Self  Nutrition Percentage Eaten  Dinner, Between % Consumed  Environmental Precautions  Bed in Low Position  Patient Turns/Positioning  Sitting Up in Wheelchair  Patient Turns Assistance/Tolerance  Assistance of One  Bed Positions  Bed Controls On  Head of Bed Elevated  Self regulated      Psychosocial/Neurologic Assessment  Psychosocial Assessment  Psychosocial (WDL):  WDL Except  Patient Behaviors: Fatigue  Neurologic Assessment  Neuro (WDL): Exceptions to WDL  Level of Consciousness: Alert  Orientation Level: Oriented X4  Cognition: Follows commands, Appropriate attention/concentration  Speech: Clear  Facial Symmetry: Left facial drooping  Pupil Assesment: Yes  R Pupil Size (mm): 3  R Pupil Shape / Description: Round  R Pupil Reaction: Brisk  L Pupil Size (mm): 3  L Pupil Shape / Description: Round  L Pupil Reaction: Brisk  Reflexes: Cough  Cough Reflex: Present  Motor Function/Sensation Assessment: Dorsiflexion, Motor response  R Foot Dorsiflexion: Strong  L Foot Dorsiflexion: Absent  RUE Motor Response: Responds to commands  RUE Sensation: Full sensation  Muscle Strength Right Arm: Normal Strength Against Gravity and Full Resistance  LUE Motor Response: Flaccid  LUE  Sensation: Numbness, Tingling  Muscle Strength Left Arm: Weak Movement but Not Against Gravity or Resistance  RLE Motor Response: Responds to commands  RLE Sensation: Full sensation  Muscle Strength Right Leg: Good Strength Against Gravity and Moderate Resistance  LLE Motor Response: Flaccid  LLE Sensation: Numbness, Tingling  Muscle Strength Left Leg: Weak Movement but Not Against Gravity or Resistance  EENT (WDL):  WDL Except    Cardio/Pulmonary Assessment  Edema   RLE Edema: Trace  LLE Edema: Trace  Respiratory Breath Sounds  RUL Breath Sounds: Clear  RML Breath Sounds: Clear  RLL Breath Sounds: Diminished  MOHAMUD Breath Sounds: Clear  LLL Breath Sounds: Diminished  Cardiac Assessment   Cardiac (WDL):  WDL Except (htn, hld)

## 2023-12-21 NOTE — THERAPY
Occupational Therapy  Daily Treatment     Patient Name: Jason Lima  Age:  62 y.o., Sex:  male  Medical Record #: [id number]  Today's Date: 12/21/2023     Precautions  Precautions: Fall Risk  Comments: Left Hemiparesis, left visual inattention         Subjective    Pt seated up in w/c agreeable with 2nd OT session      Objective       12/21/23 1431   OT Charge Group   OT Therapeutic Exercise (Units) 2   OT Total Time Spent   OT Individual Total Time Spent (Mins) 30   Sitting Upper Body Exercises   Bicep Curls 2 sets of 10;Left;Weight (See Comments for lbs)  (3lbs hand weight)   Tricep Press 3 sets of 10;Left;Weight (See Comments for lbs)  (10lbs rickshaw)   Other Exercise Nustep level 1 ~10min   Interdisciplinary Plan of Care Collaboration   IDT Collaboration with  Therapy Tech   Patient Position at End of Therapy Seated;Chair Alarm On;Call Light within Reach;Tray Table within Reach;Phone within Reach   Collaboration Comments TechA for safety with transfers.         Assessment  Mod A for stand pivot transfer from w/c<>nustep with VC for LB sequencing. nustep for 10min to increase strengthening and reciprocal movements for UE/LE and the remainder 5min instructed pt in using LUE/LE only to push and pull to challenge and increase strengthening on L side. Pt was able to grasp hand bar using R hand for 5min unassisted, however still needed VC for contract hand. Needed [place] assistance w/ hand placement and support during tripcep press w/ dycem.     Strengths: Able to follow instructions, Independent prior level of function, Motivated for self care and independence, Pleasant and cooperative, Supportive family  Barriers: Decreased endurance, Fatigue, Generalized weakness, Hemiparesis, Impaired activity tolerance, Impaired balance, Limited mobility, Spasticity    Plan    Functional transfers   ADLs  Neuro re-ed   Strengthening/endurance     DME  OT DME Recommendations  Bathroom Equipment: 3 in 1 Commode, Tub Transfer Bench  (Medicaid Only)  Additional Equipment: Other (Comments)    Passport items to be completed:  Perform bathroom transfers, complete dressing, complete feeding, get ready for the day, prepare a simple meal, participate in household tasks, adapt home for safety needs, demonstrate home exercise program, complete caregiver training     Occupational Therapy Goals (Active)       Problem: Dressing       Dates: Start:  11/22/23         Goal: STG-Within one week, patient will dress LB at [facility] using LRD       Dates: Start:  11/22/23    Expected End:  12/23/23            Goal: STG-Within one week, patient will dress UB SPV using LRD       Dates: Start:  12/06/23    Expected End:  12/23/23               Problem: Functional Transfers       Dates: Start:  12/06/23         Goal: STG-Within one week, patient will transfer to step in shower at [facility] to [facility] using LRD       Dates: Start:  12/06/23    Expected End:  12/23/23               Problem: OT Long Term Goals       Dates: Start:  11/13/23         Goal: LTG-By discharge, patient will complete basic self care tasks at Mod Independent level.       Dates: Start:  11/13/23    Expected End:  12/23/23            Goal: LTG-By discharge, patient will perform bathroom transfers at Mod Independent level.       Dates: Start:  11/13/23    Expected End:  12/23/23

## 2023-12-21 NOTE — FLOWSHEET NOTE
12/21/23 0720   Events/Summary/Plan   Events/Summary/Plan RA pulse ox check. Wearing cpap nasal mask   Vital Signs   Pulse 65   Respiration 16   Pulse Oximetry 96 %   $ Pulse Oximetry (Spot Check) Yes   Respiratory Assessment   Level of Consciousness Responds to voice  (sleeping)   Chest Exam   Work Of Breathing / Effort Within Normal Limits   Oxygen   O2 Delivery Device CPAP

## 2023-12-21 NOTE — THERAPY
"Occupational Therapy  Daily Treatment     Patient Name: Jason Lima  Age:  62 y.o., Sex:  male  Medical Record #: [id number]  Today's Date: 12/21/2023     Precautions  Precautions: (P) Fall Risk  Comments: (P) Left Hemiparesis, left visual inattention         Subjective    Pt encountered for OT sitting in room. Pleasant and agreeable to participate. \" Want to practice walking to the bed and doing transfers with the walker.\"      Objective       12/21/23 1031   OT Charge Group   OT Therapy Activity (Units) 2   OT Total Time Spent   OT Individual Total Time Spent (Mins) 30   Precautions   Precautions Fall Risk   Comments Left Hemiparesis, left visual inattention   Functional Level of Assist   Bed, Chair, Wheelchair Transfer Moderate Assist   Bed Chair Wheelchair Transfer Description Adaptive equipment;Increased time;Supervision for safety;Verbal cueing  (Mod to [facility] for functional transfers using bedrail/hemiwalker/WC.)   Balance   Standing Balance (Static) Poor   Standing Balance (Dynamic) Trace +   Skilled Intervention Compensatory Strategies;Postural Facilitation;Tactile Cuing;Verbal Cuing;Other (See Comments)  (See comments below)   Comments static standing balance using hemiwalker in front of mirror. Pt able to stand w/o assistance for up to 2 min (5 reps); [facility] to regain balance at times.   Interdisciplinary Plan of Care Collaboration   IDT Collaboration with  Therapy Tech   Patient Position at End of Therapy Seated;Chair Alarm On;Call Light within Reach;Tray Table within Reach;Phone within Reach   Collaboration Comments TechA for safety with transfers.     Functional mobility from WC (at foot of bed) to head of bed using jade-walker at modA for dynamic balance d/t L side weakness. Discussed and recommended SQT from WC to EOB w/ SPV as pt has demonstrated good progress toward mod-[facility] with this transfer. Pt verbalized understanding.       STS practice from WC using jade-walker. 4/5 trials at SBA. Remaining trials " at CGA to [facility] for standing balance.     Assessment    Overall, good progress with static balance using hemiwalker for support, but only able to tolerate up to 2 min prior to being limited by weakness and fatigue Good progress with STS practice as pt was able to demo at SBA in 4/5 trials.     Strengths: Able to follow instructions, Independent prior level of function, Motivated for self care and independence, Pleasant and cooperative, Supportive family  Barriers: Decreased endurance, Fatigue, Generalized weakness, Hemiparesis, Impaired activity tolerance, Impaired balance, Limited mobility, Spasticity    Plan    Cont ADLs, functional transfers, and thera act/ex to maximize functional recovery for safe DC home.       DME  OT DME Recommendations  Bathroom Equipment: 3 in 1 Commode, Tub Transfer Bench (Medicaid Only)  Additional Equipment: Other (Comments)    Passport items to be completed:  Perform bathroom transfers, complete dressing, complete feeding, get ready for the day, prepare a simple meal, participate in household tasks, adapt home for safety needs, demonstrate home exercise program, complete caregiver training     Occupational Therapy Goals (Active)       Problem: Dressing       Dates: Start:  11/22/23         Goal: STG-Within one week, patient will dress LB at [facility] using LRD       Dates: Start:  11/22/23    Expected End:  12/23/23            Goal: STG-Within one week, patient will dress UB SPV using LRD       Dates: Start:  12/06/23    Expected End:  12/23/23               Problem: Functional Transfers       Dates: Start:  12/06/23         Goal: STG-Within one week, patient will transfer to step in shower at [facility] to CGA using LRD       Dates: Start:  12/06/23    Expected End:  12/23/23               Problem: OT Long Term Goals       Dates: Start:  11/13/23         Goal: LTG-By discharge, patient will complete basic self care tasks at Mod Independent level.       Dates: Start:  11/13/23    Expected End:   12/23/23            Goal: LTG-By discharge, patient will perform bathroom transfers at Mod Independent level.       Dates: Start:  11/13/23    Expected End:  12/23/23

## 2023-12-22 ENCOUNTER — APPOINTMENT (OUTPATIENT)
Dept: PHYSICAL THERAPY | Facility: REHABILITATION | Age: 63
DRG: 057 | End: 2023-12-22
Attending: PHYSICAL MEDICINE & REHABILITATION
Payer: COMMERCIAL

## 2023-12-22 ENCOUNTER — APPOINTMENT (OUTPATIENT)
Dept: SPEECH THERAPY | Facility: REHABILITATION | Age: 63
DRG: 057 | End: 2023-12-22
Attending: PHYSICAL MEDICINE & REHABILITATION
Payer: COMMERCIAL

## 2023-12-22 ENCOUNTER — APPOINTMENT (OUTPATIENT)
Dept: OCCUPATIONAL THERAPY | Facility: REHABILITATION | Age: 63
DRG: 057 | End: 2023-12-22
Attending: PHYSICAL MEDICINE & REHABILITATION
Payer: COMMERCIAL

## 2023-12-22 ENCOUNTER — APPOINTMENT (OUTPATIENT)
Dept: PHYSICAL THERAPY | Facility: REHABILITATION | Age: 63
DRG: 057 | End: 2023-12-22
Attending: HOSPITALIST
Payer: COMMERCIAL

## 2023-12-22 LAB
ANION GAP SERPL CALC-SCNC: 12 MMOL/L (ref 7–16)
BASOPHILS # BLD AUTO: 0.4 % (ref 0–1.8)
BASOPHILS # BLD: 0.02 K/UL (ref 0–0.12)
BUN SERPL-MCNC: 37 MG/DL (ref 8–22)
CALCIUM SERPL-MCNC: 9 MG/DL (ref 8.5–10.5)
CHLORIDE SERPL-SCNC: 103 MMOL/L (ref 96–112)
CO2 SERPL-SCNC: 26 MMOL/L (ref 20–33)
CREAT SERPL-MCNC: 3.03 MG/DL (ref 0.5–1.4)
EOSINOPHIL # BLD AUTO: 0.16 K/UL (ref 0–0.51)
EOSINOPHIL NFR BLD: 2.8 % (ref 0–6.9)
ERYTHROCYTE [DISTWIDTH] IN BLOOD BY AUTOMATED COUNT: 41.2 FL (ref 35.9–50)
GFR SERPLBLD CREATININE-BSD FMLA CKD-EPI: 23 ML/MIN/1.73 M 2
GLUCOSE SERPL-MCNC: 129 MG/DL (ref 65–99)
HCT VFR BLD AUTO: 35.5 % (ref 42–52)
HGB BLD-MCNC: 11.8 G/DL (ref 14–18)
IMM GRANULOCYTES # BLD AUTO: 0.01 K/UL (ref 0–0.11)
IMM GRANULOCYTES NFR BLD AUTO: 0.2 % (ref 0–0.9)
LYMPHOCYTES # BLD AUTO: 1.71 K/UL (ref 1–4.8)
LYMPHOCYTES NFR BLD: 30.4 % (ref 22–41)
MCH RBC QN AUTO: 30.4 PG (ref 27–33)
MCHC RBC AUTO-ENTMCNC: 33.2 G/DL (ref 32.3–36.5)
MCV RBC AUTO: 91.5 FL (ref 81.4–97.8)
MONOCYTES # BLD AUTO: 0.45 K/UL (ref 0–0.85)
MONOCYTES NFR BLD AUTO: 8 % (ref 0–13.4)
NEUTROPHILS # BLD AUTO: 3.28 K/UL (ref 1.82–7.42)
NEUTROPHILS NFR BLD: 58.2 % (ref 44–72)
NRBC # BLD AUTO: 0 K/UL
NRBC BLD-RTO: 0 /100 WBC (ref 0–0.2)
PLATELET # BLD AUTO: 188 K/UL (ref 164–446)
PMV BLD AUTO: 10.8 FL (ref 9–12.9)
POTASSIUM SERPL-SCNC: 3.6 MMOL/L (ref 3.6–5.5)
RBC # BLD AUTO: 3.88 M/UL (ref 4.7–6.1)
SODIUM SERPL-SCNC: 141 MMOL/L (ref 135–145)
WBC # BLD AUTO: 5.6 K/UL (ref 4.8–10.8)

## 2023-12-22 PROCEDURE — 700102 HCHG RX REV CODE 250 W/ 637 OVERRIDE(OP): Performed by: HOSPITALIST

## 2023-12-22 PROCEDURE — 770010 HCHG ROOM/CARE - REHAB SEMI PRIVAT*

## 2023-12-22 PROCEDURE — A9270 NON-COVERED ITEM OR SERVICE: HCPCS | Performed by: PHYSICAL MEDICINE & REHABILITATION

## 2023-12-22 PROCEDURE — 700102 HCHG RX REV CODE 250 W/ 637 OVERRIDE(OP): Performed by: STUDENT IN AN ORGANIZED HEALTH CARE EDUCATION/TRAINING PROGRAM

## 2023-12-22 PROCEDURE — 85025 COMPLETE CBC W/AUTO DIFF WBC: CPT

## 2023-12-22 PROCEDURE — 80048 BASIC METABOLIC PNL TOTAL CA: CPT

## 2023-12-22 PROCEDURE — 97112 NEUROMUSCULAR REEDUCATION: CPT

## 2023-12-22 PROCEDURE — A9270 NON-COVERED ITEM OR SERVICE: HCPCS | Performed by: HOSPITALIST

## 2023-12-22 PROCEDURE — 36415 COLL VENOUS BLD VENIPUNCTURE: CPT

## 2023-12-22 PROCEDURE — A9270 NON-COVERED ITEM OR SERVICE: HCPCS | Performed by: STUDENT IN AN ORGANIZED HEALTH CARE EDUCATION/TRAINING PROGRAM

## 2023-12-22 PROCEDURE — 700102 HCHG RX REV CODE 250 W/ 637 OVERRIDE(OP): Performed by: PHYSICAL MEDICINE & REHABILITATION

## 2023-12-22 PROCEDURE — 97116 GAIT TRAINING THERAPY: CPT

## 2023-12-22 PROCEDURE — 99231 SBSQ HOSP IP/OBS SF/LOW 25: CPT | Performed by: PHYSICAL MEDICINE & REHABILITATION

## 2023-12-22 PROCEDURE — 97140 MANUAL THERAPY 1/> REGIONS: CPT

## 2023-12-22 RX ADMIN — APIXABAN 5 MG: 5 TABLET, FILM COATED ORAL at 20:08

## 2023-12-22 RX ADMIN — HYDRALAZINE HYDROCHLORIDE 100 MG: 50 TABLET, FILM COATED ORAL at 20:07

## 2023-12-22 RX ADMIN — BACLOFEN 10 MG: 10 TABLET ORAL at 08:27

## 2023-12-22 RX ADMIN — AMLODIPINE BESYLATE 10 MG: 5 TABLET ORAL at 05:23

## 2023-12-22 RX ADMIN — ACETAMINOPHEN 1000 MG: 500 TABLET ORAL at 08:27

## 2023-12-22 RX ADMIN — SENNOSIDES AND DOCUSATE SODIUM 2 TABLET: 50; 8.6 TABLET ORAL at 20:07

## 2023-12-22 RX ADMIN — OXYCODONE 5 MG: 5 TABLET ORAL at 14:33

## 2023-12-22 RX ADMIN — OMEPRAZOLE 20 MG: 20 CAPSULE, DELAYED RELEASE ORAL at 08:27

## 2023-12-22 RX ADMIN — BACLOFEN 10 MG: 10 TABLET ORAL at 20:08

## 2023-12-22 RX ADMIN — HYDRALAZINE HYDROCHLORIDE 100 MG: 50 TABLET, FILM COATED ORAL at 05:23

## 2023-12-22 RX ADMIN — TRAZODONE HYDROCHLORIDE 75 MG: 50 TABLET ORAL at 20:08

## 2023-12-22 RX ADMIN — SERTRALINE 50 MG: 50 TABLET, FILM COATED ORAL at 08:27

## 2023-12-22 RX ADMIN — ATORVASTATIN CALCIUM 40 MG: 40 TABLET, FILM COATED ORAL at 20:08

## 2023-12-22 RX ADMIN — APIXABAN 5 MG: 5 TABLET, FILM COATED ORAL at 08:27

## 2023-12-22 RX ADMIN — HYDRALAZINE HYDROCHLORIDE 100 MG: 50 TABLET, FILM COATED ORAL at 14:32

## 2023-12-22 RX ADMIN — BACLOFEN 10 MG: 10 TABLET ORAL at 14:32

## 2023-12-22 RX ADMIN — METOPROLOL SUCCINATE 25 MG: 25 TABLET, EXTENDED RELEASE ORAL at 05:23

## 2023-12-22 RX ADMIN — ACETAMINOPHEN 1000 MG: 500 TABLET ORAL at 20:09

## 2023-12-22 ASSESSMENT — GAIT ASSESSMENTS
DISTANCE (FEET): 51
GAIT LEVEL OF ASSIST: MODERATE ASSIST
ASSISTIVE DEVICE: HEMI-WALKER

## 2023-12-22 ASSESSMENT — PAIN DESCRIPTION - PAIN TYPE: TYPE: ACUTE PAIN

## 2023-12-22 ASSESSMENT — ACTIVITIES OF DAILY LIVING (ADL): BED_CHAIR_WHEELCHAIR_TRANSFER_DESCRIPTION: ADAPTIVE EQUIPMENT;INCREASED TIME;VERBAL CUEING

## 2023-12-22 NOTE — THERAPY
"Physical Therapy   Daily Treatment     Patient Name: Jason Lima  Age:  62 y.o., Sex:  male  Medical Record #: [id number]  Today's Date: 12/21/2023     Precautions  Precautions: Fall Risk  Comments: Left Hemiparesis, left visual inattention    Subjective    \"I'm depressed-- I have to start all over.\" Pt in w/c at arrival, agreeable to PT tx.      Objective       12/21/23 1231   PT Charge Group   PT Gait Training (Units) 1   PT Therapeutic Exercise (Units) 1   PT Neuromuscular Re-Education / Balance (Units) 2   PT Total Time Spent   PT Individual Total Time Spent (Mins) 60   Gait Functional Level of Assist    Gait Level Of Assist Moderate Assist   Assistive Device Tanvir-Walker   Distance (Feet) 70   # of Times Distance was Traveled 1   Transfer Functional Level of Assist   Bed, Chair, Wheelchair Transfer Maximal Assist   Bed Chair Wheelchair Transfer Description Adaptive equipment;Increased time;Verbal cueing  (stand step c/ HW)   Bed Mobility    Sit to Stand Minimal Assist   Scooting   (varies Min<>ModA from w/c)   Neuro-Muscular Treatments   Neuro-Muscular Treatments Weight Shift Right;Weight Shift Left;Sequencing;Postural Changes;Tactile Cuing;Verbal Cuing;Postural Facilitation   Comments see note for mat mobility manual therapy   Interdisciplinary Plan of Care Collaboration   IDT Collaboration with  Therapy Tech   Patient Position at End of Therapy Seated;Other (Comments)  (tech transporting back to room at end of session)   Collaboration Comments tech assist c/ mat mobility, incidental w/c assist c/ gait training     NMRE/TA:   Mat mobility:   Supine>sidelying>quadruped>tall kneel c/ BUE on peanut  RUE reaches c/ WS to RLE for cone retrieval x 18 reps  Presses c/ BUE on peanut x 8 reps (pulses)  Mini pressups from elbows to hands on peanut x 5 reps   Transitional practice sidelying<>prone x 3 reps, cues for sequencing and attention to LUE during roll (R sidelying<>prone)  Nerve glides/mobilization:   Prone on " "elbows c/ Grade 2-3 mobs to L spine and pelvis, manual stretch to B quads x 1 min each, then femoral n. Flossing to LLE x 10 reps  Supine:  Pelvic mobilizations into anterior and posterior rotations, L>R transverse rotations x 5-10 each  Neural tension test + flossing median, ulnar, and radial nerve LUE x 10 each  Passive hip extension stretch c/ LUE in shoulder abduction, horizontal abd, ER + supination x 2 m in  STS training c/ HW from w/c: 1 x 6 reps, cues to inc WS over RLE and \"land\" c/ RUE on HW to complete transitions    Gait as above c/ HW post mat mobility.    Assessment    Jason c/ significant improvement in midline control and UE/LE ROM post mat mobilization. Positive neural findings in LUE to all 3 neural tension tests, LLE to sciatic and femoral testing. Good response to glides/slides to improve resting tone and ROM.     Strengths: Able to follow instructions, Independent prior level of function, Motivated for self care and independence, Pleasant and cooperative, Supportive family, Willingly participates in therapeutic activities  Barriers: Decreased endurance, Hemiparesis, Difficulty following instructions, Fatigue, Hypertension, Impaired activity tolerance, Impaired balance, Impaired insight/denial of deficits, Impaired functional cognition, Impaired sleep pattern, Impulsive, Limited mobility, Visual impairment, Poor family support (lives alone)    Plan  Continue nerve glides and mobilization prior to gait  Mat mobility toward floor recovery components     Stand step transfers c/ HW. Cues to verbalize each step (move walker, then 1 foot, then the other; repeat)  Continue high intensity gait c/ HW + L AFO, work on speed and from 3 point toward 2 point pattern, continue lateral stepping, backing, turning, and LE strength (use NMES/FES as needed), Nustep for pregait, prioritize hip flexion and knee flexion for LLE swing clearance.     Standing balance c/ perturbations, STS with decreased external " "correction (allow failure to L side) to facilitate improvement in midline control.     DME  PT DME Recommendations  Wheelchair: 18\" Width  Cushion: Specialty (See other comments) (TBD pending ambulation progress)  Assistive Device: Tanvir Walker (TBD pending progress, currently 2 person assist for gait)  Additional Equipment: Other (Comments)    Passport items to be completed:  Get in/out of bed safely, in/out of a vehicle, safely use mobility device, walk or wheel around home/community, navigate up and down stairs, show how to get up/down from the ground, ensure home is accessible, demonstrate HEP, complete caregiver training    Physical Therapy Problems (Active)       Problem: PT-Long Term Goals       Dates: Start:  11/13/23         Goal: LTG-By discharge, patient will ambulate >/= 50' c/ LRAD at [facility] or better.        Dates: Start:  11/13/23    Expected End:  12/23/23         Goal Note filed on 12/20/23 0846 by Isidra Hamilton, PT       ModA c/ HW.               Goal: LTG-By discharge, patient will transfer one surface to another c/ Renata or better.        Dates: Start:  11/13/23    Expected End:  12/23/23         Goal Note filed on 12/20/23 0846 by Isidra Hamilton PT       Partially met: Renata c/ squat pivot, ModA for stand pivot/step.               Goal: LTG-By discharge, patient will ambulate up/down 1 step c/ LRAD at Renata or better.        Dates: Start:  11/13/23    Expected End:  12/23/23         Goal Note filed on 12/20/23 0846 by Isidra Hamilton PT       Needs assessment.               Goal: LTG-By discharge, patient will transfer in/out of a car c/ ModA or better.        Dates: Start:  11/13/23    Expected End:  12/23/23         Goal Note filed on 12/20/23 0846 by Isidra Hamilton PT       Needs assessment.                 " Partial Purse String (Intermediate) Text: Given the location of the defect and the characteristics of the surrounding skin an intermediate purse string closure was deemed most appropriate.  Undermining was performed circumfirentially around the surgical defect.  A purse string suture was then placed and tightened. Wound tension only allowed a partial closure of the circular defect.

## 2023-12-22 NOTE — PROGRESS NOTES
NURSING DAILY NOTE    Name: Jason Lima   Date of Admission: 11/12/2023   Admitting Diagnosis: Thalamic hemorrhage (HCC)  Attending Physician: Lisa Lee D.o.  Allergies: Penicillins    Safety  Patient Assist  moderate to max 1  Patient Precautions  Fall Risk  Precaution Comments  Left Hemiparesis, left visual inattention  Bed Transfer Status  Maximal Assist  Toilet Transfer Status   Minimal Assist  Assistive Devices  Gait Belt, Wheelchair, Rails  Oxygen  None - Room Air  Diet/Therapeutic Dining  Current Diet Order   Procedures    Diet Order Diet: Consistent CHO (Diabetic) (2 gram sodium restriction; medications whole with thin liquids); Second Modifier: (optional): 2 Gram Sodium     Pill Administration  whole  Agitated Behavioral Scale  14  ABS Level of Severity  No Agitation    Fall Risk  Has the patient had a fall this admission?   Yes  Shellie Hamilton Fall Risk Scoring  22, HIGH RISK  Fall Risk Safety Measures  bed alarm, chair alarm, seatbelt alarm, poor balance, and low vision/ hearing    Vitals  Temperature: 36.9 °C (98.4 °F)  Temp src: Temporal  Pulse: 98  Respiration: 18  Blood Pressure: 135/82  Blood Pressure MAP (Calculated): 100 MM HG  BP Location: Right, Upper Arm  Patient BP Position: Supine     Oxygen  Pulse Oximetry: 96 %  O2 (LPM): 0  FiO2%: 21 %  O2 Delivery Device: None - Room Air    Bowel and Bladder  Last Bowel Movement  12/20/23  Stool Type  Type 5: Soft blob with clear cut edges (passed easily)  Bowel Device  Bathroom  Continent  Bladder: Continent void   Bowel: Continent movement  Bladder Function  Urine Void (mL): 750 ml  Number of Times Voided: 2  Urinary Options: Yes  Urine Color: Yellow  Number of Times Incontinent of Urine: 0  Genitourinary Assessment   Bladder Assessment (WDL):  WDL Except  Echols Catheter: Not Applicable  Urine Color: Yellow  Number of Bladder Accidents: 0  Total Number of Bladder of Accidents in Last 7  Days: 0  Number of Times Incontinent of Urine: 0  Bladder Device: Urinal  Time Void: No  Bladder Scan: Post Void  $ Bladder Scan Results (mL): 26    Skin  Polo Score   16  Sensory Interventions   Bed Types: Standard/Trauma Mattress  Skin Preventative Measures: Pillows in Use for Support / Positioning  Moisture Interventions  Moisturizers/Barriers: Barrier Paste      Pain  Pain Rating Scale  0 - No Pain  Pain Location  Back  Pain Location Orientation  Mid, Lower  Pain Interventions   Declines    ADLs    Bathing   Shower  Linen Change   Complete  Personal Hygiene  Perineal Care, Moist Deja Wipes  Chlorhexidine Bath      Oral Care  Brushed Teeth  Teeth/Dentures     Shave  Self  Nutrition Percentage Eaten  *  * Meal *  *, Dinner, Between % Consumed  Environmental Precautions  Bed in Low Position  Patient Turns/Positioning  Sitting Up in Wheelchair  Patient Turns Assistance/Tolerance  Assistance of One  Bed Positions  Bed Controls On  Head of Bed Elevated  Self regulated      Psychosocial/Neurologic Assessment  Psychosocial Assessment  Psychosocial (WDL):  WDL Except  Patient Behaviors: Fatigue  Neurologic Assessment  Neuro (WDL): Exceptions to WDL  Level of Consciousness: Alert  Orientation Level: Oriented X4  Cognition: Follows commands, Appropriate attention/concentration  Speech: Clear  Facial Symmetry: Left facial drooping  Pupil Assesment: Yes  R Pupil Size (mm): 3  R Pupil Shape / Description: Round  R Pupil Reaction: Brisk  L Pupil Size (mm): 3  L Pupil Shape / Description: Round  L Pupil Reaction: Brisk  Reflexes: Cough  Cough Reflex: Present  Motor Function/Sensation Assessment: Dorsiflexion, Motor response  R Foot Dorsiflexion: Strong  L Foot Dorsiflexion: Absent  RUE Motor Response: Responds to commands  RUE Sensation: Full sensation  Muscle Strength Right Arm: Normal Strength Against Gravity and Full Resistance  LUE Motor Response: Flaccid  LUE Sensation: Numbness, Tingling  Muscle Strength Left Arm:  Weak Movement but Not Against Gravity or Resistance  RLE Motor Response: Responds to commands  RLE Sensation: Full sensation  Muscle Strength Right Leg: Good Strength Against Gravity and Moderate Resistance  LLE Motor Response: Flaccid  LLE Sensation: Numbness, Tingling  Muscle Strength Left Leg: Weak Movement but Not Against Gravity or Resistance  EENT (WDL):  WDL Except    Cardio/Pulmonary Assessment  Edema   RLE Edema: Trace  LLE Edema: Trace  Respiratory Breath Sounds  RUL Breath Sounds: Clear  RML Breath Sounds: Clear  RLL Breath Sounds: Diminished  MOHAMUD Breath Sounds: Clear  LLL Breath Sounds: Diminished  Cardiac Assessment   Cardiac (WDL):  WDL Except

## 2023-12-22 NOTE — CARE PLAN
"The patient is Stable - Low risk of patient condition declining or worsening    Shift Goals  Clinical Goals: safety  Patient Goals: safety, rest/sleep  Family Goals: Education      Problem: Fall Risk - Rehab  Goal: Patient will remain free from falls  Outcome: Progressing  Note: Shellie Hamilton Fall risk Assessment Score: 22    High fall risk Interventions   - Alarming seatbelt  - Wander guard  - Bed and strip alarm   - Yellow sign by the door   - Yellow wrist band \"Fall risk\"  - Room near to the nurse station  - Do not leave patient unattended in the bathroom  - Fall risk education provided  Patient uses call light consistently and appropriately this shift.  Waits for assistance when needed and does not attempt self transfer.  Able to verbalize needs.  Will continue to monitor.     Problem: Respiratory  Goal: Patient will understand use and administration of respiratory medications to improve respiratory function  Outcome: Progressing  Note:  CPAP in place with no oxygen needed. No c/o sob or any respiratory distress noted.  Continue on close monitoring.      "

## 2023-12-22 NOTE — CARE PLAN
"The patient is Watcher - Medium risk of patient condition declining or worsening    Shift Goals  Clinical Goals: safety  Patient Goals: safety, rest/sleep  Family Goals: Education      Problem: Knowledge Deficit - Standard  Goal: Patient and family/care givers will demonstrate understanding of plan of care, disease process/condition, diagnostic tests and medications  Outcome: Progressing     Patient educated and states understanding of POC and disease process.  All questions answered at this time.    Problem: Fall Risk - Rehab  Goal: Patient will remain free from falls  Outcome: Progressing    Shellie Hamilton Fall risk Assessment Score: 22    High fall risk Interventions   - Alarming seatbelt  - Wander guard  - Bed and strip alarm   - Yellow sign by the door   - Yellow wrist band \"Fall risk\"  - Room near to the nurse station  - Do not leave patient unattended in the bathroom  - Fall risk education provided    " SUBJECTIVE:  Patient presents self to clinic with caregiver. She is here for reduction of nails. She is noncommunicative. Caregiver says there is some peeling skin on inside of left heel.     OBJECTIVE:  Patient presents with thick hypertrophied dystrophic nails 1-5 bilateral feet with fungal involvement on nail beds. Some discomfort on palpating with patient pulling back. Patient presents with absent dorsalis pedis and posterior tib pulse bilateral feet. Patient presents with broad area of peeling skin consistent with a dried healing blister. On questioning caregiver says that she is in a type of boot at night in bed and moves in rubs it around. There is no acute inflammation or infection present but it appears to be a blister type skin peeling from friction.     ASSESSMENT:  Painful onychomycosis 1-5 bilateral feet.  Noninfected blister posterior medial left heel from rubbing in shoe.  PLAN:  Nails debrided with electric drill down to nailbed and borders debrided cut sharply back 1-5 bilateral feet. Caregiver is told that she can use an emery board across the front of the nails weekly to keep them shorter. Caregiver will monitor and make adjustments so that the shoe or protective boot stays on the foot properly. Return to see me 6 months.

## 2023-12-22 NOTE — THERAPY
"Recreational Therapy  Daily Treatment     Patient Name: Jason Lima  AGE:  62 y.o., SEX:  male  Medical Record #: [id number]  Today's Date: 12/22/2023       Subjective    \"I only want to do PT and OT.\"     Objective       12/22/23 0831   Procedural Tracking   Procedural Tracking Gross Motor Functional Leisure Skills   Treatment Time   Total Time Spent (mins) 0   Total Time Missed 30   Reasons for Time Missed Non-Medical-Patient  Refused  (Spoke about how he wanted to skip today)   Functional Ability Status - Emotional    Affect Flat   Mood Neutral   Behavior Resistant         Assessment    Pt was sharing that he would like to only participate in PT and OT. Pt was educated on the importance and goals of Recreation Therapy and remained determined that he would continue to refuse participation.     Strengths: Able to follow instructions, Alert and oriented, Motivated for self care and independence, Pleasant and cooperative, Supportive family, Willingly participates in therapeutic activities  Barriers: Hemiparesis, Decreased endurance, Fatigue, Impaired balance, Impaired activity tolerance    Plan    Will speak with Pt at a later date to see if he has changed his mind and is willing to participate.     Passport items to be completed:  Verbalize two positive leisure activities, discuss returning to work, hobbies, community groups or volunteer activities, explore community resources   "

## 2023-12-22 NOTE — PROGRESS NOTES
"  Physical Medicine & Rehabilitation Progress Note    Encounter Date: 12/22/2023    Chief Complaint: Does not want speech therapy    Interval Events (Subjective):  Vital signs stable and within normal limits  Bowel movement 12/22  Voiding volitionally    Seen and examined in his room.  States that he is considering being able to go home.    ROS: 14 point ROS negative unless otherwise specified in the HPI    Objective:  VITAL SIGNS: /74   Pulse 69   Temp 37.1 °C (98.7 °F) (Oral)   Resp 18   Ht 1.727 m (5' 8\")   Wt 81 kg (178 lb 9.2 oz)   SpO2 94%   BMI 27.15 kg/m²     GEN: No apparent distress  HEENT: Head normocephalic, atraumatic.  Left eye discharge and erythematous sclera  CV: Extremities warm and well-perfused, no peripheral edema appreciated bilaterally.  PULMONARY: Breathing nonlabored on room air, no respiratory accessory muscle use.  Not requiring supplemental oxygen.  SKIN: No appreciable skin breakdown on exposed areas of skin.  PSYCH: Normal affect  NEURO: Awake alert.  Conversational.  Logical thought content. Dysarthria. Left Hemiparesis. Mild left clonus at ankle. No significant spasticity appreciated at rest.       Laboratory Values:  Recent Results (from the past 72 hour(s))   CBC WITH DIFFERENTIAL    Collection Time: 12/22/23  5:26 AM   Result Value Ref Range    WBC 5.6 4.8 - 10.8 K/uL    RBC 3.88 (L) 4.70 - 6.10 M/uL    Hemoglobin 11.8 (L) 14.0 - 18.0 g/dL    Hematocrit 35.5 (L) 42.0 - 52.0 %    MCV 91.5 81.4 - 97.8 fL    MCH 30.4 27.0 - 33.0 pg    MCHC 33.2 32.3 - 36.5 g/dL    RDW 41.2 35.9 - 50.0 fL    Platelet Count 188 164 - 446 K/uL    MPV 10.8 9.0 - 12.9 fL    Neutrophils-Polys 58.20 44.00 - 72.00 %    Lymphocytes 30.40 22.00 - 41.00 %    Monocytes 8.00 0.00 - 13.40 %    Eosinophils 2.80 0.00 - 6.90 %    Basophils 0.40 0.00 - 1.80 %    Immature Granulocytes 0.20 0.00 - 0.90 %    Nucleated RBC 0.00 0.00 - 0.20 /100 WBC    Neutrophils (Absolute) 3.28 1.82 - 7.42 K/uL    Lymphs " (Absolute) 1.71 1.00 - 4.80 K/uL    Monos (Absolute) 0.45 0.00 - 0.85 K/uL    Eos (Absolute) 0.16 0.00 - 0.51 K/uL    Baso (Absolute) 0.02 0.00 - 0.12 K/uL    Immature Granulocytes (abs) 0.01 0.00 - 0.11 K/uL    NRBC (Absolute) 0.00 K/uL   Basic Metabolic Panel    Collection Time: 23  5:26 AM   Result Value Ref Range    Sodium 141 135 - 145 mmol/L    Potassium 3.6 3.6 - 5.5 mmol/L    Chloride 103 96 - 112 mmol/L    Co2 26 20 - 33 mmol/L    Glucose 129 (H) 65 - 99 mg/dL    Bun 37 (H) 8 - 22 mg/dL    Creatinine 3.03 (H) 0.50 - 1.40 mg/dL    Calcium 9.0 8.5 - 10.5 mg/dL    Anion Gap 12.0 7.0 - 16.0   ESTIMATED GFR    Collection Time: 23  5:26 AM   Result Value Ref Range    GFR (CKD-EPI) 23 (A) >60 mL/min/1.73 m 2           Medications:  Scheduled Medications   Medication Dose Frequency    metoprolol SR  25 mg Q DAY    sertraline  50 mg DAILY    baclofen  10 mg TID    amLODIPine  10 mg DAILY    apixaban  5 mg BID    hydrALAZINE  100 mg Q8HRS    traZODone  75 mg QHS    senna-docusate  2 Tablet BID    omeprazole  20 mg DAILY    atorvastatin  40 mg Nightly     PRN medications: traZODone, carboxymethylcellulose, [DISCONTINUED] insulin regular **AND** [CANCELED] POC blood glucose manual result **AND** NOTIFY MD and PharmD **AND** Administer 20 grams of glucose (approximately 8 ounces of fruit juice) every 15 minutes PRN FSBG less than 70 mg/dL **AND** dextrose bolus, Respiratory Therapy Consult, senna-docusate **AND** polyethylene glycol/lytes **AND** magnesium hydroxide **AND** bisacodyl, mag hydrox-al hydrox-simeth, ondansetron **OR** ondansetron, sodium chloride, [] acetaminophen **FOLLOWED BY** acetaminophen, oxyCODONE immediate-release **OR** oxyCODONE immediate-release, hydrALAZINE    Diet:  Current Diet Order   Procedures    Diet Order Diet: Consistent CHO (Diabetic) (2 gram sodium restriction; medications whole with thin liquids); Second Modifier: (optional): 2 Gram Sodium       Medical Decision  Making and Plan:  Right thalamic hemorrhagic stroke ~ last week October 2023  Originally presented with a headache and left-sided weakness to hospital in [facility]  Work-up at the outside hospital in [place] revealed a right thalamic hemorrhagic stroke  Repeat CT head done after return flight to the [place], 11/11 CT head shows right thalamic hemorrhage, decrease in density since prior studies from the outside hospital, slight asymmetric dilation of the left ventricular system including the left temporal horn with a possible component of hydrocephalus  Planning for BP less than 140, avoiding any kind of anticoagulation  Initiate comprehensive IRF level therapy with 3 days of therapy 5 days a week with services from PT/OT/SLP     Spasticity  OP follow up with Physiatry for consideration Botox   Has had resurgence of spasticity restart baclofen 5mg TID 11/30/2023 --> increase to 10mg TID  12/20/2023 continue Baclofen at 10mg TID, no significant side effects    Conjunctivitis  12/15 started eyedrops, completed    ABLA  Now on Eliquis  Recheck 11/28 - improved 11.4--> 12.0--> 11.6 11/30/2023 12/6/2023 11.6-->10.7--> 11.7 12/11 12/22 Stable in 11's     CKD  Follow up with OP nephrology  Renal function fluctuates 20-30's/2's-3's  Cr Baseline in past had been mid 1's  12/22 mild fluctuation, normal for this hospitalization.      Hypertension  Continue Amlodipine 10mg daily  Continue Hydralazine 25mg TID - increased by hospitalist 11/13/2023 from BID to TID--> increased to 50mg q8hrs 11/15  11/16 Hydralazine increased to 75mg q8hrs and 1x Hydralazine given  11/17 BP still elevated but hydralazine recently increased. Monitor  12/13/2023 VS showing increase in -140's. Continue Hydralazine 100mg q8hrs + Amlodipine 10mg daily.  12/14/2023 consistently higher, start Metoprolol XL 12.5 mg daily   12/15/2023 better controlled, continue Metoprolol daily   12/18 increase metoprolol to 25 mg daily  12/19 continue metoprolol 25  mg daily, monitor for need to increase.  12/22 blood pressure and heart rate improved and within normal limits, continue metoprolol 25 mg daily for now.    Hypokalemia  Mild 12/13 supplement x1   NML 12/15  12/18 stable and normal at 3.8  12/22 NML    Leukopenia  New, mild 12/6/2023   Resolved 12/11, 12/22     Prediabetes  Discontinue SSI     HLD  Continue home dose satin      Bowel  Constipation 11/29/2023, resolved 11/30/2023   Continue with scheduled Colace and senna, as needed stool softeners  Miralax x3 doses 11/29/2023 --> Constipation Resolved 11/30/2023      Insomnia  Secondary to recent jet lag, melatonin scheduled --> increase to home dose 11/13/2023 9mg  Utilize trazodone as needed insomnia continues  Schedule Trazodone 11/15/2023   11/16/2023 continue Trazodone as is. Thinks he slept better last night  11/17/2023 Still not sleeping well. Increase to 75mg nightly.   12/22/2023 continue trazodone at current dose     Pain - as needed Tylenol and oxycodone    Post-Stroke Depression  Consulted Pyschiatry   Start Prozac 11/28/2023 --> Switched to zoloft per psychiatry  Appreciate assistance with management  Mood improved, continue Zoloft 12/22    Right Foot Pain  H/o Gout  Xray with swelling 1st metatarsal head   Trial Colchicine for acute gout flare 11/28/2023   Topical Voltaren gel scheduled   Improved with less swelling, erythema 11/29/2023   Resolved      LABS: BMP + CBC 12/22 - non concerning, renal function stable  LABS: BMP 12/26      DVT PROPHYLAXIS: NO - Hemorrhagic stroke. US + UE and LE for brachial and peroneal DVT. 11/21/2023 start Heparin 5000 units q8hrs SQ for further prophylaxis, if tolerates will increase to full AC. Consider repeat CTH to ensure stability bleed once on full AC. 11/24/2023 Start loading dose Eliquis 10mg BID x 7 days with transition to 5mg BID. CBC showing improvement in H/H 11/28/2023 no neurologic decline.     HOSPITALIST FOLLOWING: YES - d/w hospitalist 12/6 --> Signed off  12/6.     CODE STATUS: FULL CODE    DISPO: Home alone. Family can help starting 12/27    MARCUS: TBD    MEDS SENT TO: TBD    DISCHARGE FOLLOW UP: Neurology, Nephrology, PCP, Physiatry (Botox) - needs referral to all.   ____________________________________    Dr. Lisa Lee DO, MS  [facility] - Physical Medicine & Rehabilitation   ____________________________________

## 2023-12-22 NOTE — THERAPY
Occupational Therapy  Daily Treatment     Patient Name: Jason Lima  Age:  62 y.o., Sex:  male  Medical Record #: [id number]  Today's Date: 12/22/2023     Precautions  Precautions: Fall Risk  Comments: Left Hemiparesis, left visual inattention    Subjective    Patient seated in w/c upon arrival, agreeable to participate in OT.      Objective     12/22/23 0931   OT Charge Group   OT Neuromuscular Re-education / Balance (Units) 2   OT Total Time Spent   OT Individual Total Time Spent (Mins) 30   Precautions   Precautions Fall Risk   Comments Left Hemiparesis, left visual inattention   Vitals   O2 Delivery Device None - Room Air   Sleep/Wake Cycle   Sleep & Rest Awake   Interdisciplinary Plan of Care Collaboration   IDT Collaboration with  Family / Caregiver   Patient Position at End of Therapy In Bed;Self Releasing Lap Belt Applied;Family / Friend in Room   Collaboration Comments Sister present during OT session     - LUE nerve glides and stretching in supine  - supine <> long sit w/ min A w/ focus on shoulder/elbow/wrist/finger extension while in long sit position  - R sidelying > EOM w/ min A to focus on LUE WB and facilitating independence w/ bed mobility tasks  - seated EOM; WB onto L forearm <> midline x 2 w/ min A  - Mod A for wc <> mat txfrs w/ jade walker w/ multimodal cues to advance LLE   Assessment    Patient highly motivated to participate in therapeutic activities to the best of his abilities and demo's consistent progress w/ LUE motor return. Receptive to all recommendations re: stretches, WB and functional activities he can incorporate into daily routine to facilitate ongoing neuro re-ed. Mod A required this session for safe completion of wc <> mat txfrs (w/ jade walker) due to impaired midline orientation, balance deficits and L jade.   Strengths: Able to follow instructions, Independent prior level of function, Motivated for self care and independence, Pleasant and cooperative, Supportive  family  Barriers: Decreased endurance, Fatigue, Generalized weakness, Hemiparesis, Impaired activity tolerance, Impaired balance, Limited mobility, Spasticity    Plan    Cont ADLs, functional transfers, and thera act/ex to maximize functional recovery for safe DC home.       DME  OT DME Recommendations  Bathroom Equipment: 3 in 1 Commode, Tub Transfer Bench (Medicaid Only)  Additional Equipment: Other (Comments)    Passport items to be completed:  Perform bathroom transfers, complete dressing, complete feeding, get ready for the day, prepare a simple meal, participate in household tasks, adapt home for safety needs, demonstrate home exercise program, complete caregiver training     Occupational Therapy Goals (Active)       Problem: Dressing       Dates: Start:  11/22/23         Goal: STG-Within one week, patient will dress LB at [facility] using LRD       Dates: Start:  11/22/23    Expected End:  12/23/23            Goal: STG-Within one week, patient will dress UB SPV using LRD       Dates: Start:  12/06/23    Expected End:  12/23/23               Problem: Functional Transfers       Dates: Start:  12/06/23         Goal: STG-Within one week, patient will transfer to step in shower at [facility] to CGA using LRD       Dates: Start:  12/06/23    Expected End:  12/23/23               Problem: OT Long Term Goals       Dates: Start:  11/13/23         Goal: LTG-By discharge, patient will complete basic self care tasks at Mod Independent level.       Dates: Start:  11/13/23    Expected End:  12/23/23            Goal: LTG-By discharge, patient will perform bathroom transfers at Mod Independent level.       Dates: Start:  11/13/23    Expected End:  12/23/23

## 2023-12-22 NOTE — THERAPY
Speech Language Pathology  Daily Treatment     Patient Name: Jason Lima  Age:  62 y.o., Sex:  male  Medical Record #: [id number]  Today's Date: 12/21/2023     Precautions  Precautions: Fall Risk  Comments: Left Hemiparesis, left visual inattention    Subjective    Patient agreeable to therapy.     Objective       12/21/23 0901   Treatment Charges   SLP Cognitive Skill Development First 15 Minutes 1   SLP Cognitive Skill Development Additional 15 Minutes 3   SLP Total Time Spent   SLP Individual Total Time Spent (Mins) 60   Cognition   Simple Attention Supervision (5)   Moderate Attention Minimal (4)   Insight into Deficits Minimal (4)   Planning / Decision Making Minimal (4)         Assessment    Patient recalled 8/12 steps of his w/c transfer sequencing with set up with min cues needed to recall remainder.   Patient Worked on attention locating medication errors with 80% acc with min cues needed to implement strategies to slow rate and double check.    Strengths: Alert and oriented, Effective communication skills, Motivated for self care and independence, Pleasant and cooperative, Independent prior level of function, Making steady progress towards goals, Willingly participates in therapeutic activities  Barriers: Impaired functional cognition (depression, left neglect, difficulty self monitoring/regulating)    Plan    Complete outcomes measures and BIMs/CAMs    Passport items to be completed:  Express basic needs, understand food/liquid recommendations, consistently follow swallow precautions, manage finances, manage medications, arrive to therapy appointments on time, complete daily memory log entries, solve problems related to safety situations, review education related to hospitalization, complete caregiver training     Speech Therapy Problems (Active)       Problem: Memory STGs       Dates: Start:  11/22/23         Goal: STG-Within one week, patient will recall new training and safety sequencing with 80% acc  with set up and external cues.       Dates: Start:  11/22/23    Expected End:  12/23/23         Goal Note filed on 12/19/23 1633 by Tawny Forbes MS,CCC-SLP       Progressing toward this goal 75-80% with min cues to improve.                 Problem: Problem Solving STGs       Dates: Start:  11/22/23         Goal: STG-Within one week, patient will perform alternating attention tasks with 85% acc with set up.       Dates: Start:  11/22/23    Expected End:  12/23/23         Goal Note filed on 12/19/23 1633 by Tawny Forbes MS,CCC-SLP       75%  with min assist to improve.              Goal: STG-Within one week, patient will organization and reasoning tasks with 80% acc with min cues.       Dates: Start:  11/22/23    Expected End:  12/23/23         Goal Note filed on 12/19/23 1633 by Tawny Forbes MS,CCC-SLP       Progressing toward this goal .  75-80% with min cues needed.                 Problem: Speech/Swallowing LTGs       Dates: Start:  11/13/23         Goal: LTG-By discharge, patient will solve complex problem       Dates: Start:  11/13/23    Expected End:  12/23/23       Description: For safety and self care with 80% acc mod I  for safe discharge home.      Goal Note filed on 12/05/23 1625 by Tawny Forbes MS,CCC-SLP       Mod A needed.              Goal: LTG-By discharge, patient will recall new training with 80% acc with min A and use of external memory aids.       Dates: Start:  11/13/23    Expected End:  12/23/23         Goal Note filed on 12/05/23 1625 by Tawny Forbes MS,CCC-SLP       Mod A needed.                 Problem: Swallowing STGs       Dates: Start:  11/13/23

## 2023-12-22 NOTE — PROGRESS NOTES
NURSING DAILY NOTE    Name: Jason Lima   Date of Admission: 11/12/2023   Admitting Diagnosis: Thalamic hemorrhage (HCC)  Attending Physician: Lisa Lee D.o.  Allergies: Penicillins    Safety  Patient Assist  moderate to max 1  Patient Precautions  Fall Risk  Precaution Comments  Left Hemiparesis, left visual inattention  Bed Transfer Status  Maximal Assist  Toilet Transfer Status   Minimal Assist  Assistive Devices  Gait Belt, Wheelchair, Rails  Oxygen  None - Room Air  Diet/Therapeutic Dining  Current Diet Order   Procedures    Diet Order Diet: Consistent CHO (Diabetic) (2 gram sodium restriction; medications whole with thin liquids); Second Modifier: (optional): 2 Gram Sodium     Pill Administration  whole  Agitated Behavioral Scale  14  ABS Level of Severity  No Agitation    Fall Risk  Has the patient had a fall this admission?   Yes  Shellie Hamilton Fall Risk Scoring  23, HIGH RISK  Fall Risk Safety Measures  bed alarm and chair alarm    Vitals  Temperature: 37.1 °C (98.7 °F)  Temp src: Oral  Pulse: 74  Respiration: 16  Blood Pressure: 135/85  Blood Pressure MAP (Calculated): 102 MM HG  BP Location: Right, Upper Arm  Patient BP Position: Sitting     Oxygen  Pulse Oximetry: 93 %  O2 (LPM): 0  FiO2%: 21 %  O2 Delivery Device: None - Room Air    Bowel and Bladder  Last Bowel Movement  12/20/23  Stool Type  Type 5: Soft blob with clear cut edges (passed easily)  Bowel Device  Bathroom  Continent  Bladder: Continent void   Bowel: Continent movement  Bladder Function  Urine Void (mL): 600 ml  Number of Times Voided: 1  Urinary Options: Yes  Urine Color: Yellow  Number of Times Incontinent of Urine: 0  Genitourinary Assessment   Bladder Assessment (WDL):  WDL Except  Echols Catheter: Not Applicable  Urine Color: Yellow  Number of Bladder Accidents: 0  Total Number of Bladder of Accidents in Last 7 Days: 0  Number of Times Incontinent of Urine: 0  Bladder  Device: Urinal  Time Void: No  Bladder Scan: Post Void  $ Bladder Scan Results (mL): 26    Skin  Polo Score   16  Sensory Interventions   Bed Types: Standard/Trauma Mattress  Skin Preventative Measures: Pillows in Use for Support / Positioning  Moisture Interventions  Moisturizers/Barriers: Barrier Paste      Pain  Pain Rating Scale  4 - Distracts me, can do usual activities  Pain Location  Back  Pain Location Orientation  Mid, Lower  Pain Interventions   Declines    ADLs    Bathing    (Patient was tired, but agreed for sponge bath.)  Linen Change   Complete (Patient's bed found too dirty and soaking wet. Took picture and shown to day Charge Nurse.)  Personal Hygiene  Perineal Care, Moist Deja Wipes  Chlorhexidine Bath      Oral Care  Brushed Teeth  Teeth/Dentures     Shave  Self  Nutrition Percentage Eaten  Lunch, Between % Consumed  Environmental Precautions  Bed in Low Position  Patient Turns/Positioning  Sitting Up in Wheelchair  Patient Turns Assistance/Tolerance  Assistance of One  Bed Positions  Bed Controls On  Head of Bed Elevated  Self regulated      Psychosocial/Neurologic Assessment  Psychosocial Assessment  Psychosocial (WDL):  WDL Except  Patient Behaviors: Fatigue  Neurologic Assessment  Neuro (WDL): Exceptions to WDL  Level of Consciousness: Alert  Orientation Level: Oriented X4  Cognition: Follows commands, Appropriate attention/concentration  Speech: Clear  Facial Symmetry: Left facial drooping  Pupil Assesment: Yes  R Pupil Size (mm): 3  R Pupil Shape / Description: Round  R Pupil Reaction: Brisk  L Pupil Size (mm): 3  L Pupil Shape / Description: Round  L Pupil Reaction: Brisk  Reflexes: Cough  Cough Reflex: Present  Motor Function/Sensation Assessment: Dorsiflexion, Motor response  R Foot Dorsiflexion: Strong  L Foot Dorsiflexion: Absent  RUE Motor Response: Responds to commands  RUE Sensation: Full sensation  Muscle Strength Right Arm: Normal Strength Against Gravity and Full  Resistance  LUE Motor Response: Flaccid  LUE Sensation: Numbness, Tingling  Muscle Strength Left Arm: Weak Movement but Not Against Gravity or Resistance  RLE Motor Response: Responds to commands  RLE Sensation: Full sensation  Muscle Strength Right Leg: Good Strength Against Gravity and Moderate Resistance  LLE Motor Response: Flaccid  LLE Sensation: Numbness, Tingling  Muscle Strength Left Leg: Weak Movement but Not Against Gravity or Resistance  EENT (WDL):  WDL Except    Cardio/Pulmonary Assessment  Edema   RLE Edema: Trace  LLE Edema: Trace  Respiratory Breath Sounds  RUL Breath Sounds: Clear  RML Breath Sounds: Clear  RLL Breath Sounds: Diminished  MOHAMUD Breath Sounds: Clear  LLL Breath Sounds: Diminished  Cardiac Assessment   Cardiac (WDL):  WDL Except

## 2023-12-22 NOTE — FLOWSHEET NOTE
12/22/23 1448   Events/Summary/Plan   Events/Summary/Plan CPAP check   Oxygen   O2 Delivery Device None - Room Air   Non-Invasive Ventilation [facility]   Nocturnal CPAP or BIPAP CPAP - Home Unit  (10:37 hrs hrs)

## 2023-12-22 NOTE — THERAPY
"Physical Therapy   Daily Treatment     Patient Name: Jason Lima  Age:  62 y.o., Sex:  male  Medical Record #: [id number]  Today's Date: 12/22/2023     Precautions  Precautions: Fall Risk  Comments: Left Hemiparesis, left visual inattention    Subjective    \"I'm a little sore today in my arms.\" Pt in w/c at arrival, agreeable to PT tx.      Objective       12/22/23 1031   PT Charge Group   PT Gait Training (Units) 1   PT Neuromuscular Re-Education / Balance (Units) 2   PT Manual Therapy (Units) 1   PT Total Time Spent   PT Individual Total Time Spent (Mins) 60   Gait Functional Level of Assist    Gait Level Of Assist Moderate Assist   Assistive Device Tanvir-Walker   Distance (Feet) 51   # of Times Distance was Traveled 1  (1 x 40' c/ turns)   Deviation Shuffled Gait;Decreased Heel Strike;Decreased Toe Off;Decreased Base Of Support;Increased Base Of Support  (tendency to adduct/scissor on swing, excessive ER 2/2 adductor recruitment to advance LLE in swing)   Transfer Functional Level of Assist   Bed, Chair, Wheelchair Transfer Moderate Assist  (stand step around c/ HW)   Bed Chair Wheelchair Transfer Description Adaptive equipment;Increased time;Verbal cueing  (steadying, inc time, cues for sequencing and midline control c/ HW)   Neuro-Muscular Treatments   Neuro-Muscular Treatments Weight Shift Right;Weight Shift Left;Sequencing;Postural Changes;Tactile Cuing;Verbal Cuing;Postural Facilitation   Comments see note   Interdisciplinary Plan of Care Collaboration   IDT Collaboration with  Family / Caregiver;Therapy Tech   Patient Position at End of Therapy Seated;Other (Comments)  (in dining room for lunch)   Collaboration Comments TechA for positioning on mat, facilitate functional reaching activity, w/c assist     NMRE/TA:   Mat mobility:   Supine>sidelying>quadruped>tall kneel c/ BUE on peanut  RUE reaches c/ WS to RLE for target reach/grasp 2 x 10, LUE reaches 2 x 8 + throw back to partner after each rep; ROM biased " "toward extension and L/R lateral shift to facilitate trunk activation on L side    Presses c/ BUE on peanut to push from semi-quadruped to tall kneeling x 4 reps   Transitional practice sidelying<>prone x 2 reps, cues for sequencing and attention to LUE during roll (L sidelying<>prone)  Nerve glides/mobilization:   Prone on elbows c/ pillow at abdomen c/ Grade 2-3 mobs to L spine and pelvis, manual stretch to B quads x 1 min each (x 5 contract relax on RLE), then femoral n. flossing to LLE x 10 reps  Prone press-ups on elbows/scap pushup 1 x 10   Supine:  Pelvic mobilizations into anterior and posterior rotations BLE on therapy peanut: knee/hip flexion in midline 1 x 10, to R side 1 x 10, to L side 1 x 10, then alt 1 x 10 (5 each side)  Hams setting therapy peanut 1 x 10   Pelvic L>R transverse rotations c/ OP from pt RLE crossed over L knee x 6 @ 10-20\" holds, concurrent BUE abduction, ER, supination (hands behind head) stretch   Neural tension test + flossing median, ulnar nerve LUE x 5 each  Sciatic nerve glides c/ ankle DF/PF/Inv/Ev x 10 LLE   Transfer/STS training c/ HW from w/c:   W/c>mat, <> w/c btw each gait bout vs w/c follow directly behind. Teach back of concepts and cues to inc WS over RLE and \"land\" c/ RUE on HW to complete transitions     Gait as above c/ HW post mat mobility.    Assessment    Jason continues with good response to trunk, UE, and LE mobilization. Heavy assist for hip activation in tall kneeling but improving trunk control during reaching activities compared to prior session. LUE use improved today after OT stretching and mobilization.     Pt exhibits small range volitional DF-- will benefit from increasing PF stretching and trial of Bioness to increase ankle extrinsic recruitment during gait.     Strengths: Able to follow instructions, Independent prior level of function, Motivated for self care and independence, Pleasant and cooperative, Supportive family, Willingly participates in " "therapeutic activities  Barriers: Decreased endurance, Hemiparesis, Difficulty following instructions, Fatigue, Hypertension, Impaired activity tolerance, Impaired balance, Impaired insight/denial of deficits, Impaired functional cognition, Impaired sleep pattern, Impulsive, Limited mobility, Visual impairment, Poor family support (lives alone)    Plan  Bioness to LLE gait training, PF strengthening   Continue nerve glides and mobilization prior to gait  Mat mobility toward floor recovery components      Stand step transfers c/ HW. Cues to verbalize each step (move walker, then 1 foot, then the other; repeat)  Continue high intensity gait c/ HW + L AFO, work on speed and from 3 point toward 2 point pattern, continue lateral stepping, backing, turning, and LE strength (use NMES/FES as needed), Nustep for pregait, prioritize hip flexion and knee flexion for LLE swing clearance.     Standing balance c/ perturbations, STS with decreased external correction, trial switching righting cues to R side to facilitate improvement in midline control.     DME  PT DME Recommendations  Wheelchair: 18\" Width  Cushion: Specialty (See other comments) (TBD pending ambulation progress)  Assistive Device: Tanvir Walker (TBD pending progress, currently 2 person assist for gait)  Additional Equipment: Other (Comments)    Passport items to be completed:  Get in/out of bed safely, in/out of a vehicle, safely use mobility device, walk or wheel around home/community, navigate up and down stairs, show how to get up/down from the ground, ensure home is accessible, demonstrate HEP, complete caregiver training    Physical Therapy Problems (Active)       Problem: PT-Long Term Goals       Dates: Start:  11/13/23         Goal: LTG-By discharge, patient will ambulate >/= 50' c/ LRAD at [facility] or better.        Dates: Start:  11/13/23    Expected End:  12/23/23         Goal Note filed on 12/20/23 2346 by Isidra Hamilton, PT       Stevie c/ HW.               " Goal: LTG-By discharge, patient will transfer one surface to another c/ [facility] or better.        Dates: Start:  11/13/23    Expected End:  12/23/23         Goal Note filed on 12/20/23 0846 by Isidra Hamilton PT       Partially met: [facility] c/ squat pivot, ModA for stand pivot/step.               Goal: LTG-By discharge, patient will ambulate up/down 1 step c/ LRAD at [facility] or better.        Dates: Start:  11/13/23    Expected End:  12/23/23         Goal Note filed on 12/20/23 0846 by Isidra Hamilton PT       Needs assessment.               Goal: LTG-By discharge, patient will transfer in/out of a car c/ ModA or better.        Dates: Start:  11/13/23    Expected End:  12/23/23         Goal Note filed on 12/20/23 0846 by Isidra Hamilton PT       Needs assessment.

## 2023-12-23 ENCOUNTER — APPOINTMENT (OUTPATIENT)
Dept: PHYSICAL THERAPY | Facility: REHABILITATION | Age: 63
DRG: 057 | End: 2023-12-23
Attending: PHYSICAL MEDICINE & REHABILITATION
Payer: COMMERCIAL

## 2023-12-23 PROCEDURE — 770010 HCHG ROOM/CARE - REHAB SEMI PRIVAT*

## 2023-12-23 PROCEDURE — A9270 NON-COVERED ITEM OR SERVICE: HCPCS | Performed by: HOSPITALIST

## 2023-12-23 PROCEDURE — 700102 HCHG RX REV CODE 250 W/ 637 OVERRIDE(OP): Performed by: PHYSICAL MEDICINE & REHABILITATION

## 2023-12-23 PROCEDURE — 94760 N-INVAS EAR/PLS OXIMETRY 1: CPT

## 2023-12-23 PROCEDURE — A9270 NON-COVERED ITEM OR SERVICE: HCPCS | Performed by: PHYSICAL MEDICINE & REHABILITATION

## 2023-12-23 PROCEDURE — 700102 HCHG RX REV CODE 250 W/ 637 OVERRIDE(OP): Performed by: STUDENT IN AN ORGANIZED HEALTH CARE EDUCATION/TRAINING PROGRAM

## 2023-12-23 PROCEDURE — 97116 GAIT TRAINING THERAPY: CPT | Mod: CQ

## 2023-12-23 PROCEDURE — A9270 NON-COVERED ITEM OR SERVICE: HCPCS | Performed by: STUDENT IN AN ORGANIZED HEALTH CARE EDUCATION/TRAINING PROGRAM

## 2023-12-23 PROCEDURE — 97530 THERAPEUTIC ACTIVITIES: CPT | Mod: CQ

## 2023-12-23 PROCEDURE — 97112 NEUROMUSCULAR REEDUCATION: CPT | Mod: CQ

## 2023-12-23 PROCEDURE — 700102 HCHG RX REV CODE 250 W/ 637 OVERRIDE(OP): Performed by: HOSPITALIST

## 2023-12-23 RX ADMIN — AMLODIPINE BESYLATE 10 MG: 5 TABLET ORAL at 05:22

## 2023-12-23 RX ADMIN — ACETAMINOPHEN 1000 MG: 500 TABLET ORAL at 05:26

## 2023-12-23 RX ADMIN — METOPROLOL SUCCINATE 25 MG: 25 TABLET, EXTENDED RELEASE ORAL at 05:22

## 2023-12-23 RX ADMIN — APIXABAN 5 MG: 5 TABLET, FILM COATED ORAL at 20:29

## 2023-12-23 RX ADMIN — OMEPRAZOLE 20 MG: 20 CAPSULE, DELAYED RELEASE ORAL at 08:51

## 2023-12-23 RX ADMIN — BACLOFEN 10 MG: 10 TABLET ORAL at 08:51

## 2023-12-23 RX ADMIN — HYDRALAZINE HYDROCHLORIDE 100 MG: 50 TABLET, FILM COATED ORAL at 20:29

## 2023-12-23 RX ADMIN — HYDRALAZINE HYDROCHLORIDE 100 MG: 50 TABLET, FILM COATED ORAL at 05:22

## 2023-12-23 RX ADMIN — TRAZODONE HYDROCHLORIDE 75 MG: 50 TABLET ORAL at 20:29

## 2023-12-23 RX ADMIN — SERTRALINE 50 MG: 50 TABLET, FILM COATED ORAL at 08:51

## 2023-12-23 RX ADMIN — APIXABAN 5 MG: 5 TABLET, FILM COATED ORAL at 08:51

## 2023-12-23 RX ADMIN — ATORVASTATIN CALCIUM 40 MG: 40 TABLET, FILM COATED ORAL at 20:29

## 2023-12-23 RX ADMIN — BACLOFEN 10 MG: 10 TABLET ORAL at 20:29

## 2023-12-23 RX ADMIN — HYDRALAZINE HYDROCHLORIDE 100 MG: 50 TABLET, FILM COATED ORAL at 14:55

## 2023-12-23 RX ADMIN — SENNOSIDES AND DOCUSATE SODIUM 2 TABLET: 50; 8.6 TABLET ORAL at 08:51

## 2023-12-23 RX ADMIN — BACLOFEN 10 MG: 10 TABLET ORAL at 14:55

## 2023-12-23 ASSESSMENT — GAIT ASSESSMENTS
DEVIATION: SHUFFLED GAIT
DISTANCE (FEET): 45
ASSISTIVE DEVICE: HEMI-WALKER
GAIT LEVEL OF ASSIST: MODERATE ASSIST

## 2023-12-23 ASSESSMENT — PAIN DESCRIPTION - PAIN TYPE
TYPE: ACUTE PAIN

## 2023-12-23 NOTE — CARE PLAN
"The patient is Stable - Low risk of patient condition declining or worsening    Shift Goals  Clinical Goals: safety  Patient Goals: safet, pain management, sleep  Family Goals: Education    Problem: Fall Risk - Rehab  Goal: Patient will remain free from falls  Outcome: Progressing  Note: Shellie Hamilton Fall risk Assessment Score: 22    High fall risk Interventions   - Alarming seatbelt  - Bed and strip alarm   - Yellow sign by the door   - Yellow wrist band \"Fall risk\"  - Room near to the nurse station  - Do not leave patient unattended in the bathroom  - Fall risk education provided  Patient uses call light consistently and appropriately this shift.  Waits for assistance when needed and does not attempt self transfer.  Able to verbalize needs.  Will continue to monitor.     Problem: Pain - Standard  Goal: Alleviation of pain or a reduction in pain to the patient’s comfort goal  Outcome: Progressing  Note: Patient able to verbalize pain level and verbalize an acceptable level of pain. Medicated with Tylenol for RLE pain with help. Continue on close monitoring.      "

## 2023-12-23 NOTE — PROGRESS NOTES
NURSING DAILY NOTE    Name: Jason Lima   Date of Admission: 11/12/2023   Admitting Diagnosis: Thalamic hemorrhage (HCC)  Attending Physician: Lisa Lee D.o.  Allergies: Penicillins    Safety  Patient Assist  moderate to max 1  Patient Precautions  Fall Risk  Precaution Comments  Left Hemiparesis, left visual inattention  Bed Transfer Status  Moderate Assist (stand step around c/ HW)  Toilet Transfer Status   Minimal Assist  Assistive Devices  Gait Belt, Rails, Wheelchair, Wheelchair push  Oxygen  None - Room Air  Diet/Therapeutic Dining  Current Diet Order   Procedures    Diet Order Diet: Consistent CHO (Diabetic) (2 gram sodium restriction; medications whole with thin liquids); Second Modifier: (optional): 2 Gram Sodium     Pill Administration  whole  Agitated Behavioral Scale  14  ABS Level of Severity  No Agitation    Fall Risk  Has the patient had a fall this admission?   Yes  Shellie Hamilton Fall Risk Scoring  22, HIGH RISK  Fall Risk Safety Measures  bed alarm, chair alarm, poor balance, and low vision/ hearing    Vitals  Temperature: 36.9 °C (98.5 °F)  Temp src: Oral  Pulse: 78  Respiration: 16  Blood Pressure: 129/87  Blood Pressure MAP (Calculated): 101 MM HG  BP Location: Right, Upper Arm  Patient BP Position: Supine     Oxygen  Pulse Oximetry: 93 %  O2 (LPM): 0  FiO2%: 21 %  O2 Delivery Device: None - Room Air    Bowel and Bladder  Last Bowel Movement  12/22/23  Stool Type  Type 4: Like a sausage or snake, smooth and soft  Bowel Device  Bathroom  Continent  Bladder: Continent void   Bowel: Continent movement  Bladder Function  Urine Void (mL): 350 ml  Number of Times Voided: 1  Urinary Options: Yes  Urine Color: Yellow  Number of Times Incontinent of Urine: 0  Genitourinary Assessment   Bladder Assessment (WDL):  WDL Except  Echols Catheter: Not Applicable  Urine Color: Yellow  Number of Bladder Accidents: 0  Total Number of Bladder of  Accidents in Last 7 Days: 0  Number of Times Incontinent of Urine: 0  Bladder Device: Urinal  Time Void: No  Bladder Scan: Post Void  $ Bladder Scan Results (mL): 26    Skin  Polo Score   15  Sensory Interventions   Bed Types: Standard/Trauma Mattress  Skin Preventative Measures: Pillows in Use for Support / Positioning  Moisture Interventions  Moisturizers/Barriers: Barrier Paste      Pain  Pain Rating Scale  4 - Distracts me, can do usual activities  Pain Location  Back  Pain Location Orientation  Lower  Pain Interventions   Medication (see MAR)    ADLs    Bathing   Shower  Linen Change   Complete  Personal Hygiene  Perineal Care, Moist Deja Wipes  Chlorhexidine Bath      Oral Care  Brushed Teeth  Teeth/Dentures     Shave  Self  Nutrition Percentage Eaten  Dinner, Between % Consumed  Environmental Precautions  Personal Belongings, Wastebasket, Call Bell etc. in Easy Reach, Treaded Slipper Socks on Patient, Transferred to Stronger Side, Report Given to Other Health Care Providers Regarding Fall Risk, Bed in Low Position  Patient Turns/Positioning  Sitting Up in Wheelchair  Patient Turns Assistance/Tolerance  Assistance of One  Bed Positions  Bed Controls On, Bed Locked  Head of Bed Elevated  Self regulated      Psychosocial/Neurologic Assessment  Psychosocial Assessment  Psychosocial (WDL):  WDL Except  Patient Behaviors: Fatigue  Neurologic Assessment  Neuro (WDL): Exceptions to WDL  Level of Consciousness: Alert  Orientation Level: Oriented X4  Cognition: Follows commands, Appropriate attention/concentration  Speech: Clear  Facial Symmetry: Left facial drooping  Pupil Assesment: Yes  R Pupil Size (mm): 3  R Pupil Shape / Description: Round  R Pupil Reaction: Brisk  L Pupil Size (mm): 3  L Pupil Shape / Description: Round  L Pupil Reaction: Brisk  Reflexes: Cough  Cough Reflex: Present  Motor Function/Sensation Assessment: Dorsiflexion, Motor response  R Foot Dorsiflexion: Strong  L Foot Dorsiflexion:  Absent  RUE Motor Response: Responds to commands  RUE Sensation: Full sensation  Muscle Strength Right Arm: Normal Strength Against Gravity and Full Resistance  LUE Motor Response: Flaccid  LUE Sensation: Numbness, Tingling  Muscle Strength Left Arm: Weak Movement but Not Against Gravity or Resistance  RLE Motor Response: Responds to commands  RLE Sensation: Full sensation  Muscle Strength Right Leg: Good Strength Against Gravity and Moderate Resistance  LLE Motor Response: Flaccid  LLE Sensation: Numbness, Tingling  Muscle Strength Left Leg: Weak Movement but Not Against Gravity or Resistance  EENT (WDL):  WDL Except    Cardio/Pulmonary Assessment  Edema   RLE Edema: Trace  LLE Edema: Trace  Respiratory Breath Sounds  RUL Breath Sounds: Clear  RML Breath Sounds: Clear  RLL Breath Sounds: Diminished  MOHAMUD Breath Sounds: Clear  LLL Breath Sounds: Diminished  Cardiac Assessment   Cardiac (WDL):  WDL Except

## 2023-12-23 NOTE — CARE PLAN
"The patient is Watcher - Medium risk of patient condition declining or worsening    Shift Goals  Clinical Goals: Safety  Patient Goals: Safety, pain management  Family Goals: Education      Problem: Skin Integrity  Goal: Skin integrity is maintained or improved  Outcome: Progressing    Skin CDI.  No s/s of pressure injuries or wounds noted.     Problem: Fall Risk - Rehab  Goal: Patient will remain free from falls  Outcome: Progressing    Shellie Hamilton Fall risk Assessment Score: 22    High fall risk Interventions   - Alarming seatbelt  - Wander guard  - Bed and strip alarm   - Yellow sign by the door   - Yellow wrist band \"Fall risk\"  - Room near to the nurse station  - Do not leave patient unattended in the bathroom  - Fall risk education provided      "

## 2023-12-23 NOTE — THERAPY
Physical Therapy   Daily Treatment     Patient Name: Jason Lima  Age:  62 y.o., Sex:  male  Medical Record #: [id number]  Today's Date: 12/23/2023     Precautions  Precautions: Fall Risk  Comments: Left Hemiparesis, left visual inattention    Subjective    Agreed to therapy. Pt requesting to wash his face before therapy, CODY jones present     Objective       12/23/23 1301   PT Charge Group   PT Gait Training (Units) 1   PT Neuromuscular Re-Education / Balance (Units) 2   PT Therapeutic Activities (Units) 1   Supervising Physical Therapist Larry Wolf   PT Total Time Spent   PT Individual Total Time Spent (Mins) 60   Gait Functional Level of Assist    Gait Level Of Assist Moderate Assist   Assistive Device Tanvir-Walker   Distance (Feet) 45  (straight path)   # of Times Distance was Traveled 1   Deviation Shuffled Gait  (assistance for L LE placement to prevent excessive ER/ADD, facilitated ws to the R to help L LE foot clearance)   Neuro-Muscular Treatments   Neuro-Muscular Treatments Weight Shift Right   Comments see note   Interdisciplinary Plan of Care Collaboration   IDT Collaboration with  Family / Caregiver;Therapy Tech   Patient Position at End of Therapy Seated;Chair Alarm On;Self Releasing Lap Belt Applied;Family / Friend in Room       Standing at sink, maxA for balance, pt able to use R UE to wash his face x 2 minutes, vc/manual assistance to inc TRUMAN and assist with posture    Car transfer in to [facility] using no AD and max/totalA due to heavy L lean once pt was seated on the car seat, pt instructed to use ar door to stabilize and push up from wc, significant LOB requiring totalA to correct, additional practice warranted    Neuro re-d/posture re ed facilitated ws to the R pt using R UE to reach for targets on the R   Touching sticky notes labeled 1-10 fwrd and bwrd x 2   Moved targets further out of TRUMAN same sequence    Progressed to sliding washcloth up/down wall with R UE in frontal plane 2 x 10   Pt able to  "slide wash cloth same method in scapular plane to facilitate fwrd/R ws  Min/modA throughout, vc for L LE TKE, blocked throughout         Assessment    Jeannette continues to progress with midline orientation, with standing and is able to ws to the R more, continue to require extensive assist for transfers and mobility due to poor midline orientation, absent righting reactions, pushing. Of note the patient SO Vielka reported that \"he will be alone fore just a few hours when she leaves for work at 530am before the caregiver arrives\"    *I informed her that it is not safe for jeannette to be alone at any point in time, vielka seemed surprised by this recommendation*      Strengths: Able to follow instructions, Independent prior level of function, Motivated for self care and independence, Pleasant and cooperative, Supportive family, Willingly participates in therapeutic activities  Barriers: Decreased endurance, Hemiparesis, Difficulty following instructions, Fatigue, Hypertension, Impaired activity tolerance, Impaired balance, Impaired insight/denial of deficits, Impaired functional cognition, Impaired sleep pattern, Impulsive, Limited mobility, Visual impairment, Poor family support (lives alone)    Plan    Cont to progress pt's POC, additional FT for transfers andrzej car transfer    DME  PT DME Recommendations  Wheelchair: 18\" Width  Cushion: Specialty (See other comments) (TBD pending ambulation progress)  Assistive Device: Tanvir Walker (TBD pending progress, currently 2 person assist for gait)  Additional Equipment: Other (Comments)    Passport items to be completed:  Get in/out of bed safely, in/out of a vehicle, safely use mobility device, walk or wheel around home/community, navigate up and down stairs, show how to get up/down from the ground, ensure home is accessible, demonstrate HEP, complete caregiver training    Physical Therapy Problems (Active)       Problem: PT-Long Term Goals       Dates: Start:  11/13/23         " Goal: LTG-By discharge, patient will ambulate >/= 50' c/ LRAD at [facility] or better.        Dates: Start:  11/13/23    Expected End:  12/23/23         Goal Note filed on 12/20/23 0846 by Isidra Hamilton, PT       ModA c/ HW.               Goal: LTG-By discharge, patient will transfer one surface to another c/ [facility] or better.        Dates: Start:  11/13/23    Expected End:  12/23/23         Goal Note filed on 12/20/23 0846 by Isidra Hamilton, PT       Partially met: [facility] c/ squat pivot, ModA for stand pivot/step.               Goal: LTG-By discharge, patient will ambulate up/down 1 step c/ LRAD at [facility] or better.        Dates: Start:  11/13/23    Expected End:  12/23/23         Goal Note filed on 12/20/23 0846 by Isidra Hamilton, PT       Needs assessment.               Goal: LTG-By discharge, patient will transfer in/out of a car c/ ModA or better.        Dates: Start:  11/13/23    Expected End:  12/23/23         Goal Note filed on 12/20/23 0846 by Isidra Hamilton, PT       Needs assessment.

## 2023-12-23 NOTE — FLOWSHEET NOTE
12/23/23 0808   Events/Summary/Plan   Events/Summary/Plan RA pulse ox check   Vital Signs   Pulse 76   Respiration 16   Pulse Oximetry 92 %   $ Pulse Oximetry (Spot Check) Yes   Respiratory Assessment   Level of Consciousness Alert   Chest Exam   Work Of Breathing / Effort Within Normal Limits   Oxygen   O2 Delivery Device Room air w/o2 available

## 2023-12-23 NOTE — CARE PLAN
Problem: Speech/Swallowing LTGs  Goal: LTG-By discharge, patient will solve complex problem  Description: For safety and self care with 80% acc mod I  for safe discharge home.  Outcome: Discharged - Not Met  Goal: LTG-By discharge, patient will recall new training with 80% acc with min A and use of external memory aids.  Outcome: Discharged - Not Met     Problem: Problem Solving STGs  Goal: STG-Within one week, patient will perform alternating attention tasks with 85% acc with set up.  Outcome: Discharged - Not Met  Note: 80% with set up.  Goal: STG-Within one week, patient will organization and reasoning tasks with 80% acc with min cues.  Outcome: Discharged - Not Met  Note: Min to mod cues needed.     Problem: Memory STGs  Goal: STG-Within one week, patient will recall new training and safety sequencing with 80% acc with set up and external cues.  Outcome: Discharged - Not Met  Note: Patient is able to recall verbally with 70-80% but does not yet implement at this level.

## 2023-12-24 PROCEDURE — 770010 HCHG ROOM/CARE - REHAB SEMI PRIVAT*

## 2023-12-24 PROCEDURE — 700102 HCHG RX REV CODE 250 W/ 637 OVERRIDE(OP): Performed by: STUDENT IN AN ORGANIZED HEALTH CARE EDUCATION/TRAINING PROGRAM

## 2023-12-24 PROCEDURE — A9270 NON-COVERED ITEM OR SERVICE: HCPCS | Performed by: STUDENT IN AN ORGANIZED HEALTH CARE EDUCATION/TRAINING PROGRAM

## 2023-12-24 PROCEDURE — 700102 HCHG RX REV CODE 250 W/ 637 OVERRIDE(OP): Performed by: PHYSICAL MEDICINE & REHABILITATION

## 2023-12-24 PROCEDURE — A9270 NON-COVERED ITEM OR SERVICE: HCPCS | Performed by: HOSPITALIST

## 2023-12-24 PROCEDURE — 700102 HCHG RX REV CODE 250 W/ 637 OVERRIDE(OP): Performed by: HOSPITALIST

## 2023-12-24 PROCEDURE — A9270 NON-COVERED ITEM OR SERVICE: HCPCS | Performed by: PHYSICAL MEDICINE & REHABILITATION

## 2023-12-24 RX ADMIN — HYDRALAZINE HYDROCHLORIDE 100 MG: 50 TABLET, FILM COATED ORAL at 05:11

## 2023-12-24 RX ADMIN — METOPROLOL SUCCINATE 25 MG: 25 TABLET, EXTENDED RELEASE ORAL at 05:11

## 2023-12-24 RX ADMIN — SENNOSIDES AND DOCUSATE SODIUM 2 TABLET: 50; 8.6 TABLET ORAL at 20:46

## 2023-12-24 RX ADMIN — HYDRALAZINE HYDROCHLORIDE 100 MG: 50 TABLET, FILM COATED ORAL at 20:46

## 2023-12-24 RX ADMIN — BACLOFEN 10 MG: 10 TABLET ORAL at 20:47

## 2023-12-24 RX ADMIN — OMEPRAZOLE 20 MG: 20 CAPSULE, DELAYED RELEASE ORAL at 08:44

## 2023-12-24 RX ADMIN — BACLOFEN 10 MG: 10 TABLET ORAL at 08:44

## 2023-12-24 RX ADMIN — SERTRALINE 50 MG: 50 TABLET, FILM COATED ORAL at 08:43

## 2023-12-24 RX ADMIN — ACETAMINOPHEN 1000 MG: 500 TABLET ORAL at 20:46

## 2023-12-24 RX ADMIN — BACLOFEN 10 MG: 10 TABLET ORAL at 15:09

## 2023-12-24 RX ADMIN — AMLODIPINE BESYLATE 10 MG: 5 TABLET ORAL at 05:11

## 2023-12-24 RX ADMIN — APIXABAN 5 MG: 5 TABLET, FILM COATED ORAL at 20:47

## 2023-12-24 RX ADMIN — APIXABAN 5 MG: 5 TABLET, FILM COATED ORAL at 08:43

## 2023-12-24 RX ADMIN — ATORVASTATIN CALCIUM 40 MG: 40 TABLET, FILM COATED ORAL at 20:47

## 2023-12-24 RX ADMIN — TRAZODONE HYDROCHLORIDE 75 MG: 50 TABLET ORAL at 20:46

## 2023-12-24 RX ADMIN — HYDRALAZINE HYDROCHLORIDE 100 MG: 50 TABLET, FILM COATED ORAL at 15:09

## 2023-12-24 ASSESSMENT — PAIN DESCRIPTION - PAIN TYPE: TYPE: ACUTE PAIN

## 2023-12-24 NOTE — PROGRESS NOTES
NURSING DAILY NOTE    Name: Jason Lima   Date of Admission: 11/12/2023   Admitting Diagnosis: Thalamic hemorrhage (HCC)  Attending Physician: Lisa Lee D.o.  Allergies: Penicillins    Safety  Patient Assist  moderate to max 1  Patient Precautions  Fall Risk  Precaution Comments  Left Hemiparesis, left visual inattention  Bed Transfer Status  Moderate Assist (stand step around c/ HW)  Toilet Transfer Status   Minimal Assist  Assistive Devices  Gait Belt, Rails, Wheelchair, Wheelchair push  Oxygen  None - Room Air  Diet/Therapeutic Dining  Current Diet Order   Procedures    Diet Order Diet: Consistent CHO (Diabetic) (2 gram sodium restriction; medications whole with thin liquids); Second Modifier: (optional): 2 Gram Sodium     Pill Administration  whole  Agitated Behavioral Scale  14  ABS Level of Severity  No Agitation    Fall Risk  Has the patient had a fall this admission?   Yes  Shellie Hamilton Fall Risk Scoring  22, HIGH RISK  Fall Risk Safety Measures  bed alarm, chair alarm, and poor balance    Vitals  Temperature: 37.1 °C (98.7 °F)  Temp src: Oral  Pulse: 75  Respiration: 18  Blood Pressure: (!) 159/87  Blood Pressure MAP (Calculated): 111 MM HG  BP Location: Left, Upper Arm  Patient BP Position: Sitting     Oxygen  Pulse Oximetry: 92 %  O2 (LPM): 0  FiO2%: 21 %  O2 Delivery Device: None - Room Air    Bowel and Bladder  Last Bowel Movement  12/23/23  Stool Type  Type 4: Like a sausage or snake, smooth and soft  Bowel Device  Bathroom  Continent  Bladder: Continent void   Bowel: Continent movement  Bladder Function  Urine Void (mL): 350 ml  Number of Times Voided: 1  Urinary Options: Yes  Urine Color: Yellow  Number of Times Incontinent of Urine: 0  Genitourinary Assessment   Bladder Assessment (WDL):  WDL Except  Echols Catheter: Not Applicable  Urine Color: Yellow  Number of Bladder Accidents: 0  Total Number of Bladder of Accidents in Last 7  Days: 0  Number of Times Incontinent of Urine: 0  Bladder Device: Urinal  Time Void: No  Bladder Scan: Post Void  $ Bladder Scan Results (mL): 26    Skin  Polo Score   15  Sensory Interventions   Bed Types: Standard/Trauma Mattress  Skin Preventative Measures: Pillows in Use for Support / Positioning  Moisture Interventions  Moisturizers/Barriers: Barrier Paste      Pain  Pain Rating Scale  0 - No Pain  Pain Location  Back  Pain Location Orientation  Lower  Pain Interventions   Declines    ADLs    Bathing   Shower  Linen Change   Complete  Personal Hygiene  Perineal Care, Moist Deja Wipes  Chlorhexidine Bath      Oral Care  Brushed Teeth  Teeth/Dentures     Shave  Self  Nutrition Percentage Eaten  Lunch, Between % Consumed (100%)  Environmental Precautions  Personal Belongings, Wastebasket, Call Bell etc. in Easy Reach, Treaded Slipper Socks on Patient, Transferred to Stronger Side, Report Given to Other Health Care Providers Regarding Fall Risk, Bed in Low Position  Patient Turns/Positioning  Sitting Up in Wheelchair  Patient Turns Assistance/Tolerance  Assistance of One  Bed Positions  Bed Controls On, Bed Locked  Head of Bed Elevated  Self regulated      Psychosocial/Neurologic Assessment  Psychosocial Assessment  Psychosocial (WDL):  WDL Except  Patient Behaviors: Fatigue  Neurologic Assessment  Neuro (WDL): Exceptions to WDL  Level of Consciousness: Alert  Orientation Level: Oriented X4  Cognition: Follows commands, Appropriate attention/concentration  Speech: Clear  Facial Symmetry: Left facial drooping  Pupil Assesment: Yes  R Pupil Size (mm): 3  R Pupil Shape / Description: Round  R Pupil Reaction: Brisk  L Pupil Size (mm): 3  L Pupil Shape / Description: Round  L Pupil Reaction: Brisk  Reflexes: Cough  Cough Reflex: Present  Motor Function/Sensation Assessment: Dorsiflexion, Motor response  R Foot Dorsiflexion: Strong  L Foot Dorsiflexion: Absent  RUE Motor Response: Responds to commands  RUE  Sensation: Full sensation  Muscle Strength Right Arm: Normal Strength Against Gravity and Full Resistance  LUE Motor Response: Flaccid  LUE Sensation: Numbness, Tingling  Muscle Strength Left Arm: Weak Movement but Not Against Gravity or Resistance  RLE Motor Response: Responds to commands  RLE Sensation: Full sensation  Muscle Strength Right Leg: Good Strength Against Gravity and Moderate Resistance  LLE Motor Response: Flaccid  LLE Sensation: Numbness, Tingling  Muscle Strength Left Leg: Weak Movement but Not Against Gravity or Resistance  EENT (WDL):  WDL Except    Cardio/Pulmonary Assessment  Edema   RLE Edema: Trace  LLE Edema: Trace  Respiratory Breath Sounds  RUL Breath Sounds: Clear  RML Breath Sounds: Clear  RLL Breath Sounds: Diminished  MOHAMUD Breath Sounds: Clear  LLL Breath Sounds: Diminished  Cardiac Assessment   Cardiac (WDL):  WDL Except

## 2023-12-24 NOTE — PROGRESS NOTES
NURSING DAILY NOTE    Name: Jason Lima   Date of Admission: 11/12/2023   Admitting Diagnosis: Thalamic hemorrhage (HCC)  Attending Physician: Lisa Lee D.o.  Allergies: Penicillins    Safety  Patient Assist  moderate to max 1  Patient Precautions  Fall Risk  Precaution Comments  Left Hemiparesis, left visual inattention  Bed Transfer Status  Moderate Assist (stand step around c/ HW)  Toilet Transfer Status   Minimal Assist  Assistive Devices  Gait Belt, Rails, Wheelchair, Wheelchair push  Oxygen  CPAP  Diet/Therapeutic Dining  Current Diet Order   Procedures    Diet Order Diet: Consistent CHO (Diabetic) (2 gram sodium restriction; medications whole with thin liquids); Second Modifier: (optional): 2 Gram Sodium     Pill Administration  whole  Agitated Behavioral Scale  14  ABS Level of Severity  No Agitation    Fall Risk  Has the patient had a fall this admission?   Yes  Shellie Hamilton Fall Risk Scoring  22, HIGH RISK  Fall Risk Safety Measures  bed alarm and chair alarm    Vitals  Temperature: 36.9 °C (98.5 °F)  Temp src: Temporal  Pulse: 68  Respiration: 17  Blood Pressure: 127/73  Blood Pressure MAP (Calculated): 91 MM HG  BP Location: Right, Upper Arm  Patient BP Position: Supine     Oxygen  Pulse Oximetry: 94 %  O2 (LPM): 0  FiO2%: 21 %  O2 Delivery Device: CPAP    Bowel and Bladder  Last Bowel Movement  12/23/23  Stool Type  Type 6: Fluffy pieces with ragged edges, a mushy stool  Bowel Device  Bathroom  Continent  Bladder: Continent void   Bowel: Continent movement  Bladder Function  Urine Void (mL): 250 ml  Number of Times Voided: 1  Urinary Options: Yes  Urine Color: Yellow  Number of Times Incontinent of Urine: 0  Genitourinary Assessment   Bladder Assessment (WDL):  WDL Except  Echols Catheter: Not Applicable  Urine Color: Yellow  Number of Bladder Accidents: 0  Total Number of Bladder of Accidents in Last 7 Days: 0  Number of Times  Incontinent of Urine: 0  Bladder Device: Urinal  Time Void: No  Bladder Scan: Post Void  $ Bladder Scan Results (mL): 26    Skin  Polo Score   15  Sensory Interventions   Bed Types: Standard/Trauma Mattress  Skin Preventative Measures: Pillows in Use for Support / Positioning  Moisture Interventions  Moisturizers/Barriers: Barrier Paste      Pain  Pain Rating Scale  0 - No Pain  Pain Location  Back  Pain Location Orientation  Lower  Pain Interventions   Declines    ADLs    Bathing   Shower  Linen Change   Complete  Personal Hygiene  Perineal Care, Moist Deja Wipes  Chlorhexidine Bath      Oral Care  Brushed Teeth  Teeth/Dentures     Shave  Self  Nutrition Percentage Eaten  Dinner, Between 50-75% Consumed  Environmental Precautions  Treaded Slipper Socks on Patient, Personal Belongings, Wastebasket, Call Bell etc. in Easy Reach, Transferred to Stronger Side, Report Given to Other Health Care Providers Regarding Fall Risk, Bed in Low Position, Communication Sign for Patients & Families, Mobility Assessed & Appropriate Sign Placed  Patient Turns/Positioning  Supine  Patient Turns Assistance/Tolerance  Assistance of One  Bed Positions  Bed Controls On, Bed Locked  Head of Bed Elevated  Self regulated      Psychosocial/Neurologic Assessment  Psychosocial Assessment  Psychosocial (WDL):  WDL Except  Patient Behaviors: Fatigue  Neurologic Assessment  Neuro (WDL): Exceptions to WDL  Level of Consciousness: Alert  Orientation Level: Oriented X4  Cognition: Follows commands, Appropriate attention/concentration  Speech: Clear  Facial Symmetry: Left facial drooping  Pupil Assesment: Yes  R Pupil Size (mm): 3  R Pupil Shape / Description: Round  R Pupil Reaction: Brisk  L Pupil Size (mm): 3  L Pupil Shape / Description: Round  L Pupil Reaction: Brisk  Reflexes: Cough  Cough Reflex: Present  Motor Function/Sensation Assessment: Dorsiflexion, Motor response  R Foot Dorsiflexion: Strong  L Foot Dorsiflexion: Absent  RUE Motor  Response: Responds to commands  RUE Sensation: Full sensation  Muscle Strength Right Arm: Normal Strength Against Gravity and Full Resistance  LUE Motor Response: Flaccid  LUE Sensation: Numbness, Tingling  Muscle Strength Left Arm: Weak Movement but Not Against Gravity or Resistance  RLE Motor Response: Responds to commands  RLE Sensation: Full sensation  Muscle Strength Right Leg: Good Strength Against Gravity and Moderate Resistance  LLE Motor Response: Flaccid  LLE Sensation: Numbness, Tingling  Muscle Strength Left Leg: Weak Movement but Not Against Gravity or Resistance  EENT (WDL):  WDL Except    Cardio/Pulmonary Assessment  Edema   RLE Edema: Trace  LLE Edema: Trace  Respiratory Breath Sounds  RUL Breath Sounds: Clear  RML Breath Sounds: Clear  RLL Breath Sounds: Diminished  MOHAMUD Breath Sounds: Clear  LLL Breath Sounds: Diminished  Cardiac Assessment   Cardiac (WDL):  WDL Except

## 2023-12-24 NOTE — CARE PLAN
The patient is Stable - Low risk of patient condition declining or worsening    Shift Goals  Clinical Goals: Safety  Patient Goals: Safety, pain management  Family Goals: Education    Progress made toward(s) clinical / shift goals:    Problem: Knowledge Deficit - Standard  Goal: Patient and family/care givers will demonstrate understanding of plan of care, disease process/condition, diagnostic tests and medications  Outcome: Progressing   Patient educated on the POC and medications administered. All questions and concerns addressed at this time   Problem: Fall Risk - Rehab  Goal: Patient will remain free from falls  Outcome: Progressing   Bed is locked and in low position. Call light and belongings within reach  Problem: Pain - Standard  Goal: Alleviation of pain or a reduction in pain to the patient’s comfort goal  Outcome: Progressing   Use of 0-10 pain scale. Patient is able to verbalize pain and discomfort appropriately    Patient is not progressing towards the following goals:

## 2023-12-25 ENCOUNTER — APPOINTMENT (OUTPATIENT)
Dept: PHYSICAL THERAPY | Facility: REHABILITATION | Age: 63
DRG: 057 | End: 2023-12-25
Attending: PHYSICAL MEDICINE & REHABILITATION
Payer: COMMERCIAL

## 2023-12-25 PROCEDURE — 700102 HCHG RX REV CODE 250 W/ 637 OVERRIDE(OP): Performed by: STUDENT IN AN ORGANIZED HEALTH CARE EDUCATION/TRAINING PROGRAM

## 2023-12-25 PROCEDURE — A9270 NON-COVERED ITEM OR SERVICE: HCPCS | Performed by: STUDENT IN AN ORGANIZED HEALTH CARE EDUCATION/TRAINING PROGRAM

## 2023-12-25 PROCEDURE — A9270 NON-COVERED ITEM OR SERVICE: HCPCS | Performed by: PHYSICAL MEDICINE & REHABILITATION

## 2023-12-25 PROCEDURE — 97116 GAIT TRAINING THERAPY: CPT

## 2023-12-25 PROCEDURE — 700102 HCHG RX REV CODE 250 W/ 637 OVERRIDE(OP): Performed by: HOSPITALIST

## 2023-12-25 PROCEDURE — A9270 NON-COVERED ITEM OR SERVICE: HCPCS | Performed by: HOSPITALIST

## 2023-12-25 PROCEDURE — 700102 HCHG RX REV CODE 250 W/ 637 OVERRIDE(OP): Performed by: PHYSICAL MEDICINE & REHABILITATION

## 2023-12-25 PROCEDURE — 97110 THERAPEUTIC EXERCISES: CPT

## 2023-12-25 PROCEDURE — 770010 HCHG ROOM/CARE - REHAB SEMI PRIVAT*

## 2023-12-25 PROCEDURE — 94760 N-INVAS EAR/PLS OXIMETRY 1: CPT

## 2023-12-25 RX ADMIN — BACLOFEN 10 MG: 10 TABLET ORAL at 21:28

## 2023-12-25 RX ADMIN — METOPROLOL SUCCINATE 25 MG: 25 TABLET, EXTENDED RELEASE ORAL at 05:55

## 2023-12-25 RX ADMIN — BACLOFEN 10 MG: 10 TABLET ORAL at 09:13

## 2023-12-25 RX ADMIN — SENNOSIDES AND DOCUSATE SODIUM 2 TABLET: 50; 8.6 TABLET ORAL at 09:13

## 2023-12-25 RX ADMIN — SENNOSIDES AND DOCUSATE SODIUM 2 TABLET: 50; 8.6 TABLET ORAL at 21:28

## 2023-12-25 RX ADMIN — HYDRALAZINE HYDROCHLORIDE 100 MG: 50 TABLET, FILM COATED ORAL at 21:29

## 2023-12-25 RX ADMIN — HYDRALAZINE HYDROCHLORIDE 100 MG: 50 TABLET, FILM COATED ORAL at 15:14

## 2023-12-25 RX ADMIN — APIXABAN 5 MG: 5 TABLET, FILM COATED ORAL at 09:13

## 2023-12-25 RX ADMIN — OMEPRAZOLE 20 MG: 20 CAPSULE, DELAYED RELEASE ORAL at 09:13

## 2023-12-25 RX ADMIN — BACLOFEN 10 MG: 10 TABLET ORAL at 15:14

## 2023-12-25 RX ADMIN — TRAZODONE HYDROCHLORIDE 75 MG: 50 TABLET ORAL at 21:28

## 2023-12-25 RX ADMIN — ATORVASTATIN CALCIUM 40 MG: 40 TABLET, FILM COATED ORAL at 21:28

## 2023-12-25 RX ADMIN — SERTRALINE 50 MG: 50 TABLET, FILM COATED ORAL at 09:13

## 2023-12-25 RX ADMIN — HYDRALAZINE HYDROCHLORIDE 100 MG: 50 TABLET, FILM COATED ORAL at 05:55

## 2023-12-25 RX ADMIN — AMLODIPINE BESYLATE 10 MG: 5 TABLET ORAL at 05:55

## 2023-12-25 RX ADMIN — APIXABAN 5 MG: 5 TABLET, FILM COATED ORAL at 21:29

## 2023-12-25 ASSESSMENT — GAIT ASSESSMENTS
GAIT LEVEL OF ASSIST: MINIMAL ASSIST
DEVIATION: DECREASED BASE OF SUPPORT
DISTANCE (FEET): 50
ASSISTIVE DEVICE: HEMI-WALKER

## 2023-12-25 ASSESSMENT — ACTIVITIES OF DAILY LIVING (ADL)
BED_CHAIR_WHEELCHAIR_TRANSFER_DESCRIPTION: ADAPTIVE EQUIPMENT;INCREASED TIME;SUPERVISION FOR SAFETY;VERBAL CUEING;SET-UP OF EQUIPMENT

## 2023-12-25 NOTE — FLOWSHEET NOTE
12/25/23 0840   Events/Summary/Plan   Events/Summary/Plan RA pulse ox check   Vital Signs   Pulse 74   Respiration 16   Pulse Oximetry 92 %   $ Pulse Oximetry (Spot Check) Yes   Respiratory Assessment   Level of Consciousness Alert   Chest Exam   Work Of Breathing / Effort Within Normal Limits   Oxygen   O2 Delivery Device Room air w/o2 available

## 2023-12-25 NOTE — CARE PLAN
Problem: Knowledge Deficit - Standard  Goal: Patient and family/care givers will demonstrate understanding of plan of care, disease process/condition, diagnostic tests and medications  Outcome: Progressing   Pt education given regarding plan of care with emphasis on adequate hydration, pt shows good understanding, will continue to reinforce education and continue to monitor.         Problem: Fall Risk - Rehab  Goal: Patient will remain free from falls  Outcome: Progressing   Pt education given regarding fall precautions AND safety measures, pt shows good understanding, has not attempted to self transfer this shift, will continue to reinforce education and continue to monitor.

## 2023-12-25 NOTE — PROGRESS NOTES
NURSING DAILY NOTE    Name: Jason Lima   Date of Admission: 11/12/2023   Admitting Diagnosis: Thalamic hemorrhage (HCC)  Attending Physician: Lisa Lee D.o.  Allergies: Penicillins    Safety  Patient Assist  mod to max1  Patient Precautions  Fall Risk  Precaution Comments  Left Hemiparesis, left visual inattention  Bed Transfer Status  Moderate Assist (stand step around c/ HW)  Toilet Transfer Status   Minimal Assist  Assistive Devices  Rails, Wheelchair  Oxygen  None - Room Air  Diet/Therapeutic Dining  Current Diet Order   Procedures    Diet Order Diet: Consistent CHO (Diabetic) (2 gram sodium restriction; medications whole with thin liquids); Second Modifier: (optional): 2 Gram Sodium     Pill Administration  whole  Agitated Behavioral Scale  14  ABS Level of Severity  No Agitation    Fall Risk  Has the patient had a fall this admission?   Yes  Shellie Hamilton Fall Risk Scoring  22, HIGH RISK  Fall Risk Safety Measures  Bed strip alarm    Vitals  Temperature: 36.4 °C (97.6 °F)  Temp src: Oral  Pulse: 64  Respiration: 18  Blood Pressure: (!) 151/88  Blood Pressure MAP (Calculated): 109 MM HG  BP Location: Right, Upper Arm  Patient BP Position: Supine     Oxygen  Pulse Oximetry: 93 %  O2 (LPM): 0  FiO2%: 21 %  O2 Delivery Device: None - Room Air    Bowel and Bladder  Last Bowel Movement  12/23/23  Stool Type  Type 6: Fluffy pieces with ragged edges, a mushy stool  Bowel Device  Bathroom, Other (Comment) (Bowel Meds)  Continent  Bladder: Continent void   Bowel: Continent movement  Bladder Function  Urine Void (mL): 400 ml  Number of Times Voided: 1  Urinary Options: Yes  Urine Color: Yellow  Number of Times Incontinent of Urine: 0  Genitourinary Assessment   Bladder Assessment (WDL):  WDL Except  Echols Catheter: Not Applicable  Urine Color: Yellow  Number of Bladder Accidents: 0  Total Number of Bladder of Accidents in Last 7 Days: 0  Number of Times  Incontinent of Urine: 0  Bladder Device: Urinal  Time Void: No  Bladder Scan: Post Void  $ Bladder Scan Results (mL): 26    Skin  Polo Score   15  Sensory Interventions   Bed Types: Standard/Trauma Mattress  Skin Preventative Measures: Pillows in Use for Support / Positioning  Moisture Interventions  Moisturizers/Barriers: Barrier Wipes, Barrier Paste      Pain  Pain Rating Scale  0 - No Pain  Pain Location  Back  Pain Location Orientation  Lower  Pain Interventions   Declines    ADLs    Bathing   Shower  Linen Change   Complete  Personal Hygiene  Perineal Care, Moist Deja Wipes  Chlorhexidine Bath      Oral Care  Brushed Teeth  Teeth/Dentures     Shave  Self  Nutrition Percentage Eaten  Dinner, Between 50-75% Consumed  Environmental Precautions  Treaded Slipper Socks on Patient, Bed in Low Position  Patient Turns/Positioning  Patient Turns Self from Side to Side  Patient Turns Assistance/Tolerance  Assistance of One, General Weakness, Assistance of Two or More  Bed Positions  Bed Controls On, Bed Locked  Head of Bed Elevated  Self regulated      Psychosocial/Neurologic Assessment  Psychosocial Assessment  Psychosocial (WDL):  WDL Except  Patient Behaviors: Fatigue  Neurologic Assessment  Neuro (WDL): Exceptions to WDL  Level of Consciousness: Alert  Orientation Level: Oriented X4  Cognition: Follows commands, Appropriate attention/concentration  Speech: Clear  Facial Symmetry: Left facial drooping  Pupil Assesment: Yes  R Pupil Size (mm): 3  R Pupil Shape / Description: Round  R Pupil Reaction: Brisk  L Pupil Size (mm): 3  L Pupil Shape / Description: Round  L Pupil Reaction: Brisk  Reflexes: Cough  Cough Reflex: Present  Motor Function/Sensation Assessment: Dorsiflexion, Motor response  R Foot Dorsiflexion: Strong  L Foot Dorsiflexion: Absent  RUE Motor Response: Responds to commands  RUE Sensation: Full sensation  Muscle Strength Right Arm: Normal Strength Against Gravity and Full Resistance  LUE Motor  Response: Flaccid  LUE Sensation: Numbness, Tingling  Muscle Strength Left Arm: Weak Movement but Not Against Gravity or Resistance  RLE Motor Response: Responds to commands  RLE Sensation: Full sensation  Muscle Strength Right Leg: Good Strength Against Gravity and Moderate Resistance  LLE Motor Response: Flaccid  LLE Sensation: Numbness, Tingling  Muscle Strength Left Leg: Weak Movement but Not Against Gravity or Resistance  EENT (WDL):  WDL Except    Cardio/Pulmonary Assessment  Edema   RLE Edema: Trace  LLE Edema: Trace  Respiratory Breath Sounds  RUL Breath Sounds: Clear  RML Breath Sounds: Clear  RLL Breath Sounds: Clear, Diminished  MOHAMUD Breath Sounds: Clear  LLL Breath Sounds: Clear, Diminished  Cardiac Assessment   Cardiac (WDL):  WDL Except (H/O HTN.)

## 2023-12-25 NOTE — CARE PLAN
"  Problem: Knowledge Deficit - Standard  Goal: Patient and family/care givers will demonstrate understanding of plan of care, disease process/condition, diagnostic tests and medications  Outcome: Progressing  Note: Pt agrees with plan of care tonight regarding medications and safety.  Will continue to monitor patient.     Problem: Fall Risk - Rehab  Goal: Patient will remain free from falls  Outcome: Progressing  Note: Shellie Hamilton Fall risk Assessment Score:  22    High fall risk Interventions   - Alarming seatbelt  - Wander guard  - Bed and strip alarm   - Yellow sign by the door   - Yellow wrist band \"Fall risk\"  - Room near to the nurse station  - Do not leave patient unattended in the bathroom  - Fall risk education provided         The patient is Stable - Low risk of patient condition declining or worsening    Shift Goals  Clinical Goals: Safety  Patient Goals: Sleep well  Family Goals: Education    Progress made toward(s) clinical / shift goals:  progressing          "

## 2023-12-25 NOTE — PROGRESS NOTES
Received bedside shift report from Rhett PRIETO RN regarding patient and assumed care. Patient awake, calm and stable, currently positioned in bed for comfort and safety; call light within reach. Denies pain or discomfort at this time. Will continue to monitor.

## 2023-12-25 NOTE — THERAPY
"Physical Therapy   Daily Treatment     Patient Name: Jason Lima  Age:  62 y.o., Sex:  male  Medical Record #: [id number]  Today's Date: 12/25/2023     Precautions  Precautions: Fall Risk  Comments: Left Hemiparesis, left visual inattention    Subjective    \"I'm doing okay.\" Jason in w/c c/ partner Vielka present, requesting HEP review.      Objective       12/25/23 1001   PT Charge Group   PT Gait Training (Units) 1   PT Therapeutic Exercise (Units) 1   PT Total Time Spent   PT Individual Total Time Spent (Mins) 30   Gait Functional Level of Assist    Gait Level Of Assist Minimal Assist   Assistive Device Tanvir-Walker   Distance (Feet) 50   # of Times Distance was Traveled 1   Deviation Decreased Base Of Support  (occasional scissoring of LLE c/ excessive ER in L swing)   Transfer Functional Level of Assist   Bed, Chair, Wheelchair Transfer Minimal Assist   Bed Chair Wheelchair Transfer Description Adaptive equipment;Increased time;Supervision for safety;Verbal cueing;Set-up of equipment  (stand step c/ HW; cues to align self and prep for STS, Renata<>CGA for steadying + VC sequencing wide TRUMAN and LLE advancement during turn to prevent narrowing of TRUMAN)   Reason for Group Therapy   Reason for Group Therapy To Increase Active Participation through Peer Pressure   Supine Lower Body Exercise   Other Exercises AAROM hams curls on ball c/ lumbar rotations: center, R/L, and alt R/L x 10 each; nerve glides to LUE median, ulnar, radial n, LLE sciatic n x 10 each; prone femoral nerve glides to LLE  x 10, prone quad stretch to RLE x 30\"   Comments HEP practice c/ CG assist by Vielka   Bed Mobility    Supine to Sit Minimal Assist  (help for LLE)   Sit to Supine Contact Guard Assist   Sit to Stand Minimal Assist   Scooting Standby Assist     HEP focusing on spinal/trunk/pelvic mobility and peripheral nerve glides as above. Discussed positional stretching in supine daily as well as prone once pt improves in ability to manage LUE to " "prevent shoulder impingement in prone on elbows. Vielka taking video on personal phone and return demoing/practicing concepts c/ PT supervision throughout session. Discussed importance of ongoing mobilization to manage tone and support increased AROM for functional activity.     Gait c/ HW as above, no AFO donned, notable for increased foot inv on LLE during swing.     Assessment    Jason and Vielka able to return demo HEP concepts c/ good understanding. Will benefit from additional review prior to d/c to support carryover.       Strengths: Able to follow instructions, Independent prior level of function, Motivated for self care and independence, Pleasant and cooperative, Supportive family, Willingly participates in therapeutic activities  Barriers: Decreased endurance, Hemiparesis, Difficulty following instructions, Fatigue, Hypertension, Impaired activity tolerance, Impaired balance, Impaired insight/denial of deficits, Impaired functional cognition, Impaired sleep pattern, Impulsive, Limited mobility, Visual impairment, Poor family support (lives alone)    Plan    Family training: car transfers, transfers c/ HW  Bioness to LLE gait training, PF strengthening   Continue nerve glides and mobilization prior to gait  Mat mobility toward floor recovery components      Stand step transfers c/ HW. Cues to verbalize each step (move walker, then 1 foot, then the other; repeat)  Continue high intensity gait c/ HW + L AFO, work on speed and from 3 point toward 2 point pattern, continue lateral stepping, backing, turning, and LE strength (use NMES/FES as needed), Nustep for pregait, prioritize hip flexion and knee flexion for LLE swing clearance.     Standing balance c/ perturbations, STS with decreased external correction, trial switching righting cues to R side to facilitate improvement in midline control.       DME  PT DME Recommendations  Wheelchair: 18\" Width  Cushion: Specialty (See other comments) (TBD pending " ambulation progress)  Assistive Device: Tanvir Walker (TBD pending progress, currently 2 person assist for gait)  Additional Equipment: Other (Comments)    Passport items to be completed:  Get in/out of bed safely, in/out of a vehicle, safely use mobility device, walk or wheel around home/community, navigate up and down stairs, show how to get up/down from the ground, ensure home is accessible, demonstrate HEP, complete caregiver training    Physical Therapy Problems (Active)       Problem: PT-Long Term Goals       Dates: Start:  11/13/23         Goal: LTG-By discharge, patient will ambulate >/= 50' c/ LRAD at Renata or better.        Dates: Start:  11/13/23    Expected End:  12/23/23         Goal Note filed on 12/20/23 0846 by Isidra Hamilton, PT       ModA c/ HW.               Goal: LTG-By discharge, patient will transfer one surface to another c/ Renata or better.        Dates: Start:  11/13/23    Expected End:  12/23/23         Goal Note filed on 12/20/23 0846 by Isidra Hamilton, PT       Partially met: Renata c/ squat pivot, ModA for stand pivot/step.               Goal: LTG-By discharge, patient will ambulate up/down 1 step c/ LRAD at Renata or better.        Dates: Start:  11/13/23    Expected End:  12/23/23         Goal Note filed on 12/20/23 0846 by Isidra Hamilton, PT       Needs assessment.               Goal: LTG-By discharge, patient will transfer in/out of a car c/ ModA or better.        Dates: Start:  11/13/23    Expected End:  12/23/23         Goal Note filed on 12/20/23 0846 by Isidra Hamilton, PT       Needs assessment.

## 2023-12-25 NOTE — PROGRESS NOTES
NURSING DAILY NOTE    Name: Jason Lima   Date of Admission: 11/12/2023   Admitting Diagnosis: Thalamic hemorrhage (HCC)  Attending Physician: Lisa Lee D.o.  Allergies: Penicillins    Safety  Patient Assist  mod to max1  Patient Precautions  Fall Risk  Precaution Comments  Left Hemiparesis, left visual inattention  Bed Transfer Status  Moderate Assist (stand step around c/ HW)  Toilet Transfer Status   Minimal Assist  Assistive Devices  Gait Belt, Rails, Wheelchair, Wheelchair push  Oxygen  CPAP  Diet/Therapeutic Dining  Current Diet Order   Procedures    Diet Order Diet: Consistent CHO (Diabetic) (2 gram sodium restriction; medications whole with thin liquids); Second Modifier: (optional): 2 Gram Sodium     Pill Administration  whole  Agitated Behavioral Scale  14  ABS Level of Severity  No Agitation    Fall Risk  Has the patient had a fall this admission?   Yes  Shellie Hamilton Fall Risk Scoring  22, HIGH RISK  Fall Risk Safety Measures  bed alarm, chair alarm, and poor balance    Vitals  Temperature: 36.7 °C (98.1 °F)  Temp src: Oral  Pulse: 78  Respiration: 18  Blood Pressure: 103/69  Blood Pressure MAP (Calculated): 80 MM HG  BP Location: Right, Upper Arm  Patient BP Position: Supine     Oxygen  Pulse Oximetry: 94 %  O2 (LPM): 0  FiO2%: 21 %  O2 Delivery Device: CPAP    Bowel and Bladder  Last Bowel Movement  12/23/23  Stool Type  Type 6: Fluffy pieces with ragged edges, a mushy stool  Bowel Device  Bathroom  Continent  Bladder: Continent void   Bowel: Continent movement  Bladder Function  Urine Void (mL): 250 ml  Number of Times Voided: 1  Urinary Options: Yes  Urine Color: Yellow  Number of Times Incontinent of Urine: 0  Genitourinary Assessment   Bladder Assessment (WDL):  WDL Except  Echols Catheter: Not Applicable  Urine Color: Yellow  Number of Bladder Accidents: 0  Total Number of Bladder of Accidents in Last 7 Days: 0  Number of Times  Incontinent of Urine: 0  Bladder Device: Urinal  Time Void: No  Bladder Scan: Post Void  $ Bladder Scan Results (mL): 26    Skin  Polo Score   15  Sensory Interventions   Bed Types: Standard/Trauma Mattress  Skin Preventative Measures: Pillows in Use for Support / Positioning  Moisture Interventions  Moisturizers/Barriers: Barrier Paste, Barrier Wipes      Pain  Pain Rating Scale  0 - No Pain  Pain Location  Back  Pain Location Orientation  Lower  Pain Interventions   Declines    ADLs    Bathing   Shower  Linen Change   Complete  Personal Hygiene  Perineal Care, Moist Deja Wipes  Chlorhexidine Bath      Oral Care  Brushed Teeth  Teeth/Dentures     Shave  Self  Nutrition Percentage Eaten  Dinner, Between 50-75% Consumed  Environmental Precautions  Treaded Slipper Socks on Patient, Personal Belongings, Wastebasket, Call Bell etc. in Easy Reach, Transferred to Stronger Side, Report Given to Other Health Care Providers Regarding Fall Risk, Bed in Low Position, Communication Sign for Patients & Families, Mobility Assessed & Appropriate Sign Placed  Patient Turns/Positioning  Supine  Patient Turns Assistance/Tolerance  Assistance of One  Bed Positions  Bed Controls On, Bed Locked  Head of Bed Elevated  Self regulated      Psychosocial/Neurologic Assessment  Psychosocial Assessment  Psychosocial (WDL):  WDL Except  Patient Behaviors: Fatigue  Neurologic Assessment  Neuro (WDL): Exceptions to WDL  Level of Consciousness: Alert  Orientation Level: Oriented X4  Cognition: Follows commands, Appropriate attention/concentration  Speech: Clear  Facial Symmetry: Left facial drooping  Pupil Assesment: Yes  R Pupil Size (mm): 3  R Pupil Shape / Description: Round  R Pupil Reaction: Brisk  L Pupil Size (mm): 3  L Pupil Shape / Description: Round  L Pupil Reaction: Brisk  Reflexes: Cough  Cough Reflex: Present  Motor Function/Sensation Assessment: Dorsiflexion, Motor response  R Foot Dorsiflexion: Strong  L Foot Dorsiflexion:  Absent  RUE Motor Response: Responds to commands  RUE Sensation: Full sensation  Muscle Strength Right Arm: Normal Strength Against Gravity and Full Resistance  LUE Motor Response: Flaccid  LUE Sensation: Numbness, Tingling  Muscle Strength Left Arm: Weak Movement but Not Against Gravity or Resistance  RLE Motor Response: Responds to commands  RLE Sensation: Full sensation  Muscle Strength Right Leg: Good Strength Against Gravity and Moderate Resistance  LLE Motor Response: Flaccid  LLE Sensation: Numbness, Tingling  Muscle Strength Left Leg: Weak Movement but Not Against Gravity or Resistance  EENT (WDL):  WDL Except    Cardio/Pulmonary Assessment  Edema   RLE Edema: Trace  LLE Edema: Trace  Respiratory Breath Sounds  RUL Breath Sounds: Clear  RML Breath Sounds: Clear  RLL Breath Sounds: Diminished  MOHAMUD Breath Sounds: Clear  LLL Breath Sounds: Diminished  Cardiac Assessment   Cardiac (WDL):  WDL Except

## 2023-12-26 ENCOUNTER — APPOINTMENT (OUTPATIENT)
Dept: PHYSICAL THERAPY | Facility: REHABILITATION | Age: 63
DRG: 057 | End: 2023-12-26
Attending: PHYSICAL MEDICINE & REHABILITATION
Payer: COMMERCIAL

## 2023-12-26 ENCOUNTER — APPOINTMENT (OUTPATIENT)
Dept: OCCUPATIONAL THERAPY | Facility: REHABILITATION | Age: 63
DRG: 057 | End: 2023-12-26
Attending: HOSPITALIST
Payer: COMMERCIAL

## 2023-12-26 ENCOUNTER — APPOINTMENT (OUTPATIENT)
Dept: PHYSICAL THERAPY | Facility: REHABILITATION | Age: 63
DRG: 057 | End: 2023-12-26
Attending: HOSPITALIST
Payer: COMMERCIAL

## 2023-12-26 LAB
ANION GAP SERPL CALC-SCNC: 10 MMOL/L (ref 7–16)
BUN SERPL-MCNC: 32 MG/DL (ref 8–22)
CALCIUM SERPL-MCNC: 8.8 MG/DL (ref 8.5–10.5)
CHLORIDE SERPL-SCNC: 103 MMOL/L (ref 96–112)
CO2 SERPL-SCNC: 26 MMOL/L (ref 20–33)
CREAT SERPL-MCNC: 2.69 MG/DL (ref 0.5–1.4)
GFR SERPLBLD CREATININE-BSD FMLA CKD-EPI: 26 ML/MIN/1.73 M 2
GLUCOSE SERPL-MCNC: 133 MG/DL (ref 65–99)
POTASSIUM SERPL-SCNC: 3.5 MMOL/L (ref 3.6–5.5)
SODIUM SERPL-SCNC: 139 MMOL/L (ref 135–145)

## 2023-12-26 PROCEDURE — 700102 HCHG RX REV CODE 250 W/ 637 OVERRIDE(OP): Performed by: HOSPITALIST

## 2023-12-26 PROCEDURE — 770010 HCHG ROOM/CARE - REHAB SEMI PRIVAT*

## 2023-12-26 PROCEDURE — 700102 HCHG RX REV CODE 250 W/ 637 OVERRIDE(OP): Performed by: PHYSICAL MEDICINE & REHABILITATION

## 2023-12-26 PROCEDURE — 97112 NEUROMUSCULAR REEDUCATION: CPT

## 2023-12-26 PROCEDURE — 36415 COLL VENOUS BLD VENIPUNCTURE: CPT

## 2023-12-26 PROCEDURE — 700102 HCHG RX REV CODE 250 W/ 637 OVERRIDE(OP): Performed by: STUDENT IN AN ORGANIZED HEALTH CARE EDUCATION/TRAINING PROGRAM

## 2023-12-26 PROCEDURE — 80048 BASIC METABOLIC PNL TOTAL CA: CPT

## 2023-12-26 PROCEDURE — 97116 GAIT TRAINING THERAPY: CPT

## 2023-12-26 PROCEDURE — A9270 NON-COVERED ITEM OR SERVICE: HCPCS | Performed by: PHYSICAL MEDICINE & REHABILITATION

## 2023-12-26 PROCEDURE — 99232 SBSQ HOSP IP/OBS MODERATE 35: CPT | Performed by: PHYSICAL MEDICINE & REHABILITATION

## 2023-12-26 PROCEDURE — 97530 THERAPEUTIC ACTIVITIES: CPT

## 2023-12-26 PROCEDURE — A9270 NON-COVERED ITEM OR SERVICE: HCPCS | Performed by: HOSPITALIST

## 2023-12-26 PROCEDURE — A9270 NON-COVERED ITEM OR SERVICE: HCPCS | Performed by: STUDENT IN AN ORGANIZED HEALTH CARE EDUCATION/TRAINING PROGRAM

## 2023-12-26 PROCEDURE — 97535 SELF CARE MNGMENT TRAINING: CPT

## 2023-12-26 RX ORDER — POTASSIUM CHLORIDE 20 MEQ/1
40 TABLET, EXTENDED RELEASE ORAL ONCE
Status: COMPLETED | OUTPATIENT
Start: 2023-12-26 | End: 2023-12-26

## 2023-12-26 RX ADMIN — SERTRALINE 50 MG: 50 TABLET, FILM COATED ORAL at 09:08

## 2023-12-26 RX ADMIN — AMLODIPINE BESYLATE 10 MG: 5 TABLET ORAL at 05:12

## 2023-12-26 RX ADMIN — POTASSIUM CHLORIDE 40 MEQ: 1500 TABLET, EXTENDED RELEASE ORAL at 13:34

## 2023-12-26 RX ADMIN — APIXABAN 5 MG: 5 TABLET, FILM COATED ORAL at 09:08

## 2023-12-26 RX ADMIN — TRAZODONE HYDROCHLORIDE 75 MG: 50 TABLET ORAL at 20:32

## 2023-12-26 RX ADMIN — HYDRALAZINE HYDROCHLORIDE 100 MG: 50 TABLET, FILM COATED ORAL at 13:34

## 2023-12-26 RX ADMIN — HYDRALAZINE HYDROCHLORIDE 100 MG: 50 TABLET, FILM COATED ORAL at 05:12

## 2023-12-26 RX ADMIN — CARBOXYMETHYLCELLULOSE SODIUM 1 DROP: 5 SOLUTION/ DROPS OPHTHALMIC at 17:46

## 2023-12-26 RX ADMIN — APIXABAN 5 MG: 5 TABLET, FILM COATED ORAL at 20:32

## 2023-12-26 RX ADMIN — METOPROLOL SUCCINATE 25 MG: 25 TABLET, EXTENDED RELEASE ORAL at 05:12

## 2023-12-26 RX ADMIN — BACLOFEN 10 MG: 10 TABLET ORAL at 20:31

## 2023-12-26 RX ADMIN — BACLOFEN 10 MG: 10 TABLET ORAL at 16:18

## 2023-12-26 RX ADMIN — ATORVASTATIN CALCIUM 40 MG: 40 TABLET, FILM COATED ORAL at 20:32

## 2023-12-26 RX ADMIN — OMEPRAZOLE 20 MG: 20 CAPSULE, DELAYED RELEASE ORAL at 09:07

## 2023-12-26 RX ADMIN — BACLOFEN 10 MG: 10 TABLET ORAL at 09:07

## 2023-12-26 RX ADMIN — HYDRALAZINE HYDROCHLORIDE 100 MG: 50 TABLET, FILM COATED ORAL at 20:32

## 2023-12-26 ASSESSMENT — ACTIVITIES OF DAILY LIVING (ADL)
BED_CHAIR_WHEELCHAIR_TRANSFER_DESCRIPTION: INCREASED TIME;INITIAL PREPARATION FOR TASK;VERBAL CUEING;SUPERVISION FOR SAFETY;SET-UP OF EQUIPMENT
TOILET_TRANSFER_DESCRIPTION: GRAB BAR;INCREASED TIME;SET-UP OF EQUIPMENT;VERBAL CUEING;SUPERVISION FOR SAFETY
BED_CHAIR_WHEELCHAIR_TRANSFER_DESCRIPTION: ADAPTIVE EQUIPMENT;INCREASED TIME

## 2023-12-26 ASSESSMENT — GAIT ASSESSMENTS
ASSISTIVE DEVICE: HEMI-WALKER
DEVIATION: DECREASED BASE OF SUPPORT;DECREASED HEEL STRIKE;DECREASED TOE OFF
DISTANCE (FEET): 90
GAIT LEVEL OF ASSIST: MODERATE ASSIST

## 2023-12-26 ASSESSMENT — PAIN DESCRIPTION - PAIN TYPE: TYPE: ACUTE PAIN

## 2023-12-26 NOTE — PROGRESS NOTES
Rehab Fall Note    Date of fall: 12/26/2023     Time of incident/discovery? 0730 AM     Witnessed or Unwitnessed: witnessed     Assisted or unassisted?: assisted     Staff member present? Therapist      Head strike?: yes    What is the patient's orientation status? oriented x 4    Location of fall: patient's room     Was this a fall with therapy? Yes     Patient had a fall from: chair/wheelchair     Injury from fall: unknown at this time     Persons contacted regarding the fall: family/caregiver and physician     Post fall huddle called: no     Persons present at post fall huddle: N/A     Interventions to prevent another fall: alarms on and call light within reach     Was the call light used? No     Did the bed or chair alarm sound? No :Therapist in room     If no alarm sounded, do the alarms work? Yes     If alarm malfunctioned, was a maintenance request submitted? N/A     Was the pt. wearing a seatbelt prior to the fall? No     If wearing, did the pt. take off the seatbelt? N/A     What is the patient's transfer status? modA     Other fall details: Pt sitting in WC in front of room closet to retrieved clothes. Cell phone had fallen to the floor. Pt attempted to  cell phone from the floor, but was observed to lean to far left resulting in a fall towards the ground. Therapist was behind pt's WC. Therapist was able to grab onto pt's gait belt, but was unable to prevent fall, thus, therapist assisted pt down to floor. Once on the floor therapist was able to assist pt into a sitting position (on floor) for comfort. Therapist called tech for assist off the floor. Pt assisted by therapist and therapy tech to EOB. No notable abrasions/wounds. Nursing, MD, charge nurse, and rehab [facility] notified of pt fall.

## 2023-12-26 NOTE — PROGRESS NOTES
"  Physical Medicine & Rehabilitation Progress Note    Encounter Date: 12/26/2023    Chief Complaint: Doing okay    Interval Events (Subjective):  Vital signs stable-some elevated blood pressures low 140s, 150s, but otherwise low with systolic is 103.  Heart rate ranges from 62-86  Bowel movement 12/25  Voiding volitionally    Seen and examined in the family room with his sister and Vielka.  They have some questions regarding discharge date.  House has largely been set up.  Vielka has some questions regarding when she should have caregivers for him during the day.  They are thinking about a discharge date of January 2 at this time.    ROS: 14 point ROS negative unless otherwise specified in the HPI    Objective:  VITAL SIGNS: BP (!) 143/88   Pulse 72   Temp 37.1 °C (98.7 °F) (Temporal)   Resp 17   Ht 1.727 m (5' 8\")   Wt 81 kg (178 lb 9.2 oz)   SpO2 94%   BMI 27.15 kg/m²     GEN: No apparent distress  HEENT: Head normocephalic, atraumatic.  Left eye discharge and erythematous sclera  CV: Extremities warm and well-perfused, no peripheral edema appreciated bilaterally.  PULMONARY: Breathing nonlabored on room air, no respiratory accessory muscle use.  Not requiring supplemental oxygen.  SKIN: No appreciable skin breakdown on exposed areas of skin.  PSYCH: Normal affect  NEURO: Awake alert.  Conversational.  Logical thought content. Dysarthria. Left Hemiparesis. Mild left clonus at ankle. No significant spasticity appreciated at rest.       Laboratory Values:  Recent Results (from the past 72 hour(s))   Basic Metabolic Panel    Collection Time: 12/26/23  5:17 AM   Result Value Ref Range    Sodium 139 135 - 145 mmol/L    Potassium 3.5 (L) 3.6 - 5.5 mmol/L    Chloride 103 96 - 112 mmol/L    Co2 26 20 - 33 mmol/L    Glucose 133 (H) 65 - 99 mg/dL    Bun 32 (H) 8 - 22 mg/dL    Creatinine 2.69 (H) 0.50 - 1.40 mg/dL    Calcium 8.8 8.5 - 10.5 mg/dL    Anion Gap 10.0 7.0 - 16.0   ESTIMATED GFR    Collection Time: 12/26/23  " 5:17 AM   Result Value Ref Range    GFR (CKD-EPI) 26 (A) >60 mL/min/1.73 m 2           Medications:  Scheduled Medications   Medication Dose Frequency    metoprolol SR  25 mg Q DAY    sertraline  50 mg DAILY    baclofen  10 mg TID    amLODIPine  10 mg DAILY    apixaban  5 mg BID    hydrALAZINE  100 mg Q8HRS    traZODone  75 mg QHS    senna-docusate  2 Tablet BID    omeprazole  20 mg DAILY    atorvastatin  40 mg Nightly     PRN medications: traZODone, carboxymethylcellulose, [DISCONTINUED] insulin regular **AND** [CANCELED] POC blood glucose manual result **AND** NOTIFY MD and PharmD **AND** Administer 20 grams of glucose (approximately 8 ounces of fruit juice) every 15 minutes PRN FSBG less than 70 mg/dL **AND** dextrose bolus, Respiratory Therapy Consult, senna-docusate **AND** polyethylene glycol/lytes **AND** magnesium hydroxide **AND** bisacodyl, mag hydrox-al hydrox-simeth, ondansetron **OR** ondansetron, sodium chloride, [] acetaminophen **FOLLOWED BY** acetaminophen, oxyCODONE immediate-release **OR** oxyCODONE immediate-release, hydrALAZINE    Diet:  Current Diet Order   Procedures    Diet Order Diet: Consistent CHO (Diabetic) (2 gram sodium restriction; medications whole with thin liquids); Second Modifier: (optional): 2 Gram Sodium       Medical Decision Making and Plan:  Right thalamic hemorrhagic stroke ~ last week 2023  Originally presented with a headache and left-sided weakness to hospital in [place]  Work-up at the outside hospital in [facility] revealed a right thalamic hemorrhagic stroke  Repeat CT head done after return flight to the ,  CT head shows right thalamic hemorrhage, decrease in density since prior studies from the outside hospital, slight asymmetric dilation of the left ventricular system including the left temporal horn with a possible component of hydrocephalus  Planning for BP less than 140, avoiding any kind of anticoagulation  Initiate comprehensive IRF level  therapy with 3 days of therapy 5 days a week with services from PT/OT/SLP     Spasticity  OP follow up with Physiatry for consideration Botox   Has had resurgence of spasticity restart baclofen 5mg TID 11/30/2023 --> increase to 10mg TID  12/20/2023 continue Baclofen at 10mg TID, no significant side effects    Conjunctivitis  12/15 started eyedrops, completed    ABLA  Now on Eliquis  Recheck 11/28 - improved 11.4--> 12.0--> 11.6 11/30/2023 12/6/2023 11.6-->10.7--> 11.7 12/11 12/22 Stable in 11's     CKD  Follow up with OP nephrology  Renal function fluctuates 20-30's/2's-3's  Cr Baseline in past had been mid 1's  12/26 mild fluctuation, normal for this hospitalization.      Hypertension  Continue Amlodipine 10mg daily  Continue Hydralazine 25mg TID - increased by hospitalist 11/13/2023 from BID to TID--> increased to 50mg q8hrs 11/15  11/16 Hydralazine increased to 75mg q8hrs and 1x Hydralazine given  11/17 BP still elevated but hydralazine recently increased. Monitor  12/13/2023 VS showing increase in -140's. Continue Hydralazine 100mg q8hrs + Amlodipine 10mg daily.  12/14/2023 consistently higher, start Metoprolol XL 12.5 mg daily   12/15/2023 better controlled, continue Metoprolol daily   12/18 increase metoprolol to 25 mg daily  12/19 continue metoprolol 25 mg daily, monitor for need to increase.  12/26 blood pressure and heart rate improved and within normal limits, continue metoprolol 25 mg daily for now.    Hypokalemia  Mild 12/13 supplement x1   NML 12/15  12/18 stable and normal at 3.8  12/22 NML  12/26 supplement 40 mEq once for potassium of 3.5    Leukopenia  New, mild 12/6/2023   Resolved 12/11, 12/22     Prediabetes  Discontinue SSI     HLD  Continue home dose satin      Bowel  Constipation 11/29/2023, resolved 11/30/2023   Continue with scheduled Colace and senna, as needed stool softeners  Miralax x3 doses 11/29/2023 --> Constipation Resolved 11/30/2023      Insomnia  Secondary to recent jet  lag, melatonin scheduled --> increase to home dose 11/13/2023 9mg  Utilize trazodone as needed insomnia continues  Schedule Trazodone 11/15/2023   11/16/2023 continue Trazodone as is. Thinks he slept better last night  11/17/2023 Still not sleeping well. Increase to 75mg nightly.   12/22/2023 continue trazodone at current dose     Pain - as needed Tylenol and oxycodone    Post-Stroke Depression  Consulted Pyschiatry   Start Prozac 11/28/2023 --> Switched to zoloft per psychiatry  Appreciate assistance with management  Mood improved, continue Zoloft 12/22    Right Foot Pain  H/o Gout  Xray with swelling 1st metatarsal head   Trial Colchicine for acute gout flare 11/28/2023   Topical Voltaren gel scheduled   Improved with less swelling, erythema 11/29/2023   Resolved      LABS: BMP + CBC 12/22 - non concerning, renal function stable  LABS: BMP 12/26 -kidney function stable, potassium mildly low at 3.5, supplemented once  LABS: BMP 12/29      DVT PROPHYLAXIS: NO - Hemorrhagic stroke. US + UE and LE for brachial and peroneal DVT. 11/21/2023 start Heparin 5000 units q8hrs SQ for further prophylaxis, if tolerates will increase to full AC. Consider repeat CTH to ensure stability bleed once on full AC. 11/24/2023 Start loading dose Eliquis 10mg BID x 7 days with transition to 5mg BID. CBC showing improvement in H/H 11/28/2023 no neurologic decline.     HOSPITALIST FOLLOWING: YES - d/w hospitalist 12/6 --> Signed off 12/6.     CODE STATUS: FULL CODE    DISPO: Home alone. Family can help starting 1/2    MARCUS: TBD    MEDS SENT TO: [facility] or [facility] in [place] on [address]    DISCHARGE FOLLOW UP: Neurology, Nephrology, audiology, ophthalmology- needs referral to all.   ____________________________________    Dr. Lisa Lee DO, MS  [facility] - Physical Medicine & Rehabilitation   ____________________________________

## 2023-12-26 NOTE — THERAPY
Occupational Therapy  Daily Treatment     Patient Name: Jason Lima  Age:  63 y.o., Sex:  male  Medical Record #: [id number]  Today's Date: 12/26/2023     Precautions  Precautions: (P) Fall Risk  Comments: (P) Left Hemiparesis, left visual inattention         Subjective    Pt encountered for OT sleeping. Upon awaking pt pleasant and agreeable to participate in ADLs.      Objective     12/26/23 0701   OT Charge Group   OT Self Care / ADL (Units) 4   OT Total Time Spent   OT Individual Total Time Spent (Mins) 60   Precautions   Precautions Fall Risk   Comments Left Hemiparesis, left visual inattention   Functional Level of Assist   Grooming Standing;Maximal Assist  (moax to modA for standing balance.)   Grooming Description Increased time;Standing at sink;Supervision for safety  (Pt requested to trial standing at EOS for toothbrushing. Max to modA for standing balance d/t L lateral lean with dynamic movements. Pt able to set-up toothbrush prior to toothbrushing at SBA, seated at WC.)   Upper Body Dressing Minimal Assist   Upper Body Dressing Description Set-up of equipment;Supervision for safety;Verbal cueing  (assistance needed to thread L hand into t-shirt sleeve)   Lower Body Dressing Maximal Assist   Lower Body Dressing Description Grab bar;Assist with threading into pant leg;Set-up of equipment;Supervision for safety;Initial preparation for task;Increased time  (Assistance to thread LLE and [facility] to pull pants up in standing using GB and compensatory strat to prevent LOB. modA to don socks/shoes.)   Bed, Chair, Wheelchair Transfer Minimal Assist  (min to modA; x2)   Bed Chair Wheelchair Transfer Description Increased time;Initial preparation for task;Verbal cueing;Supervision for safety;Set-up of equipment  (Stand step from bed to WC. VC needed for sequencing steps (e.g. wide TRUMAN, pivot feet, upright posture).)   Toilet Transfers Minimal Assist   Toilet Transfer Description Grab bar;Increased time;Set-up of  equipment;Verbal cueing;Supervision for safety  (VC for sequencing stand step from WC to toilet using GB.)   Bed Mobility    Supine to Sit Minimal Assist  (VC to using RLE to transfer LLE to EOB.)   Scooting Standby Assist   Interdisciplinary Plan of Care Collaboration   IDT Collaboration with  Physician;Nursing;Therapy Tech;Liaison / Rehab Coordinator   Patient Position at End of Therapy Seated;Chair Alarm On;Other (Comments);Phone within Re[facility]  (in cafeteria for breakfast)   Collaboration Comments TechA for fall recovery. IDT RE AM fall.     Pt experienced an assisted fall during this session while attempting to retrieve his cell phone from the floor while sitting in his WC (see fall note for more info).     Discussed and problem solved with pt alternative methods for retrieving items from the floor to prevent future falls.  Pt verbalized asking for help. Therapist recommended using a reacher which would increase pt's independence.      Assessment    Overall, pt required frequent VC for sequencing movements prior and during functional transfers to ensure safe and steady movements. Pt would benefit from cont tx and strategies to facilitate slow and steady movements during transfers as well as education on the importance of seated ADLs, at this time, to ensure pt safety.      Strengths: Able to follow instructions, Independent prior level of function, Motivated for self care and independence, Pleasant and cooperative, Supportive family  Barriers: Decreased endurance, Fatigue, Generalized weakness, Hemiparesis, Impaired activity tolerance, Impaired balance, Limited mobility, Spasticity    Plan    Cont ADLs, functional transfers, and thera act/ex to maximize functional recovery for safe DC home.       DME  OT DME Recommendations  Bathroom Equipment: 3 in 1 Commode, Tub Transfer Bench (Medicaid Only)  Additional Equipment: Other (Comments)    Passport items to be completed:  Perform bathroom transfers, complete  dressing, complete feeding, get ready for the day, prepare a simple meal, participate in household tasks, adapt home for safety needs, demonstrate home exercise program, complete caregiver training     Occupational Therapy Goals (Active)       Problem: Dressing       Dates: Start:  11/22/23         Goal: STG-Within one week, patient will dress LB at [facility] using LRD       Dates: Start:  11/22/23    Expected End:  12/23/23            Goal: STG-Within one week, patient will dress UB SPV using LRD       Dates: Start:  12/06/23    Expected End:  12/23/23               Problem: Functional Transfers       Dates: Start:  12/06/23         Goal: STG-Within one week, patient will transfer to step in shower at [facility] to [facility] using LRD       Dates: Start:  12/06/23    Expected End:  12/23/23               Problem: OT Long Term Goals       Dates: Start:  11/13/23         Goal: LTG-By discharge, patient will complete basic self care tasks at Mod Independent level.       Dates: Start:  11/13/23    Expected End:  12/23/23            Goal: LTG-By discharge, patient will perform bathroom transfers at Mod Independent level.       Dates: Start:  11/13/23    Expected End:  12/23/23

## 2023-12-26 NOTE — PROGRESS NOTES
Pt had a fall this morning approx 0730, pt was with therapist, pt denies any pain, no change in LOC, RN sup notified, family notified, MD notified, see therapist note for further detail.

## 2023-12-26 NOTE — PROGRESS NOTES
NURSING DAILY NOTE    Name: Jason Lima   Date of Admission: 11/12/2023   Admitting Diagnosis: Thalamic hemorrhage (HCC)  Attending Physician: Lisa Lee D.o.  Allergies: Penicillins    Safety  Patient Assist  mod to max 1  Patient Precautions  Fall Risk  Precaution Comments  Left Hemiparesis, left visual inattention  Bed Transfer Status  Minimal Assist  Toilet Transfer Status   Minimal Assist  Assistive Devices  Rails, Wheelchair  Oxygen  Room air w/o2 available  Diet/Therapeutic Dining  Current Diet Order   Procedures    Diet Order Diet: Consistent CHO (Diabetic) (2 gram sodium restriction; medications whole with thin liquids); Second Modifier: (optional): 2 Gram Sodium     Pill Administration  whole  Agitated Behavioral Scale  14  ABS Level of Severity  No Agitation    Fall Risk  Has the patient had a fall this admission?   Yes  Shellie Hamilton Fall Risk Scoring  22, HIGH RISK  Fall Risk Safety Measures  bed alarm and chair alarm    Vitals  Temperature: 36.5 °C (97.7 °F)  Temp src: Oral  Pulse: 85  Respiration: 18  Blood Pressure: 135/80  Blood Pressure MAP (Calculated): 98 MM HG  BP Location: Right, Upper Arm  Patient BP Position: Sitting     Oxygen  Pulse Oximetry: 94 %  O2 (LPM): 0  FiO2%: 21 %  O2 Delivery Device: Room air w/o2 available    Bowel and Bladder  Last Bowel Movement  12/25/23  Stool Type  Type 6: Fluffy pieces with ragged edges, a mushy stool  Bowel Device  Bathroom  Continent  Bladder: Continent void   Bowel: Continent movement  Bladder Function  Urine Void (mL): 400 ml  Number of Times Voided: 1  Urinary Options: Yes  Urine Color: Yellow  Number of Times Incontinent of Urine: 0  Genitourinary Assessment   Bladder Assessment (WDL):  WDL Except  Echols Catheter: Not Applicable  Urine Color: Yellow  Number of Bladder Accidents: 0  Total Number of Bladder of Accidents in Last 7 Days: 0  Number of Times Incontinent of Urine: 0  Bladder  Device: Urinal  Time Void: No  Bladder Scan: Post Void  $ Bladder Scan Results (mL): 26    Skin  Polo Score   16  Sensory Interventions   Bed Types: Standard/Trauma Mattress  Skin Preventative Measures: Pillows in Use for Support / Positioning  Moisture Interventions  Moisturizers/Barriers: Barrier Wipes, Barrier Paste      Pain  Pain Rating Scale  0 - No Pain  Pain Location  Back  Pain Location Orientation  Lower  Pain Interventions   Repositioned, Rest    ADLs    Bathing   Shower  Linen Change   Complete  Personal Hygiene  Perineal Care, Moist Deja Wipes  Chlorhexidine Bath      Oral Care  Brushed Teeth  Teeth/Dentures     Shave  Self  Nutrition Percentage Eaten  *  * Meal *  *, Dinner, Between 50-75% Consumed  Environmental Precautions  Treaded Slipper Socks on Patient, Bed in Low Position  Patient Turns/Positioning  Patient Turns Self from Side to Side  Patient Turns Assistance/Tolerance  Assistance of One  Bed Positions  Bed Controls On, Bed Locked  Head of Bed Elevated  Self regulated      Psychosocial/Neurologic Assessment  Psychosocial Assessment  Psychosocial (WDL):  WDL Except  Patient Behaviors: Fatigue  Neurologic Assessment  Neuro (WDL): Exceptions to WDL  Level of Consciousness: Alert  Orientation Level: Oriented X4  Cognition: Follows commands, Appropriate attention/concentration  Speech: Clear  Facial Symmetry: Left facial drooping  Pupil Assesment: Yes  R Pupil Size (mm): 3  R Pupil Shape / Description: Round  R Pupil Reaction: Brisk  L Pupil Size (mm): 3  L Pupil Shape / Description: Round  L Pupil Reaction: Brisk  Reflexes: Cough  Cough Reflex: Present  Motor Function/Sensation Assessment: Dorsiflexion, Motor response  R Foot Dorsiflexion: Strong  L Foot Dorsiflexion: Absent  RUE Motor Response: Responds to commands  RUE Sensation: Full sensation  Muscle Strength Right Arm: Normal Strength Against Gravity and Full Resistance  LUE Motor Response: Flaccid  LUE Sensation: Numbness, Tingling  Muscle  Strength Left Arm: Weak Movement but Not Against Gravity or Resistance  RLE Motor Response: Responds to commands  RLE Sensation: Full sensation  Muscle Strength Right Leg: Good Strength Against Gravity and Moderate Resistance  LLE Motor Response: Flaccid  LLE Sensation: Numbness, Tingling  Muscle Strength Left Leg: Weak Movement but Not Against Gravity or Resistance  EENT (WDL):  WDL Except    Cardio/Pulmonary Assessment  Edema   RLE Edema: Trace  LLE Edema: Trace  Respiratory Breath Sounds  RUL Breath Sounds: Clear  RML Breath Sounds: Clear  RLL Breath Sounds: Clear, Diminished  MOHAMUD Breath Sounds: Clear  LLL Breath Sounds: Clear, Diminished  Cardiac Assessment   Cardiac (WDL):  WDL Except (H/O HTN.)

## 2023-12-26 NOTE — PROGRESS NOTES
NURSING DAILY NOTE    Name: Jason Lima   Date of Admission: 11/12/2023   Admitting Diagnosis: Thalamic hemorrhage (HCC)  Attending Physician: Lisa Lee D.o.  Allergies: Penicillins    Safety  Patient Assist  mod to max 1  Patient Precautions  Fall Risk  Precaution Comments  Left Hemiparesis, left visual inattention  Bed Transfer Status  Minimal Assist  Toilet Transfer Status   Minimal Assist  Assistive Devices  Rails, Wheelchair  Oxygen  Room air w/o2 available  Diet/Therapeutic Dining  Current Diet Order   Procedures    Diet Order Diet: Consistent CHO (Diabetic) (2 gram sodium restriction; medications whole with thin liquids); Second Modifier: (optional): 2 Gram Sodium     Pill Administration  whole  Agitated Behavioral Scale  14  ABS Level of Severity  No Agitation    Fall Risk  Has the patient had a fall this admission?   Yes  Shellie Hamilton Fall Risk Scoring  22, HIGH RISK  Fall Risk Safety Measures  bed alarm, chair alarm, and poor balance    Vitals  Temperature: 36.5 °C (97.7 °F)  Temp src: Oral  Pulse: 86  Respiration: 18  Blood Pressure: (!) 132/90  Blood Pressure MAP (Calculated): 104 MM HG  BP Location: Right, Upper Arm  Patient BP Position: Sitting     Oxygen  Pulse Oximetry: 94 %  O2 (LPM): 0  FiO2%: 21 %  O2 Delivery Device: Room air w/o2 available    Bowel and Bladder  Last Bowel Movement  12/25/23  Stool Type  Type 6: Fluffy pieces with ragged edges, a mushy stool  Bowel Device  Bathroom, Other (Comment) (Bowel Meds)  Continent  Bladder: Continent void   Bowel: Continent movement  Bladder Function  Urine Void (mL): 400 ml  Number of Times Voided: 1  Urinary Options: Yes  Urine Color: Yellow  Number of Times Incontinent of Urine: 0  Genitourinary Assessment   Bladder Assessment (WDL):  WDL Except  Echols Catheter: Not Applicable  Urine Color: Yellow  Number of Bladder Accidents: 0  Total Number of Bladder of Accidents in Last 7 Days:  0  Number of Times Incontinent of Urine: 0  Bladder Device: Urinal  Time Void: No  Bladder Scan: Post Void  $ Bladder Scan Results (mL): 26    Skin  Polo Score   15  Sensory Interventions   Bed Types: Standard/Trauma Mattress  Skin Preventative Measures: Pillows in Use for Support / Positioning  Moisture Interventions  Moisturizers/Barriers: Barrier Wipes, Barrier Paste      Pain  Pain Rating Scale  0 - No Pain  Pain Location  Back  Pain Location Orientation  Lower  Pain Interventions   Declines    ADLs    Bathing   Shower  Linen Change   Complete  Personal Hygiene  Perineal Care, Moist Deja Wipes  Chlorhexidine Bath      Oral Care  Brushed Teeth  Teeth/Dentures     Shave  Self  Nutrition Percentage Eaten  *  * Meal *  *, Dinner, Between 50-75% Consumed  Environmental Precautions  Treaded Slipper Socks on Patient, Bed in Low Position  Patient Turns/Positioning  Patient Turns Self from Side to Side  Patient Turns Assistance/Tolerance  Assistance of One  Bed Positions  Bed Controls On, Bed Locked  Head of Bed Elevated  Self regulated      Psychosocial/Neurologic Assessment  Psychosocial Assessment  Psychosocial (WDL):  WDL Except  Patient Behaviors: Fatigue  Neurologic Assessment  Neuro (WDL): Exceptions to WDL  Level of Consciousness: Alert  Orientation Level: Oriented X4  Cognition: Follows commands, Appropriate attention/concentration  Speech: Clear  Facial Symmetry: Left facial drooping  Pupil Assesment: Yes  R Pupil Size (mm): 3  R Pupil Shape / Description: Round  R Pupil Reaction: Brisk  L Pupil Size (mm): 3  L Pupil Shape / Description: Round  L Pupil Reaction: Brisk  Reflexes: Cough  Cough Reflex: Present  Motor Function/Sensation Assessment: Dorsiflexion, Motor response  R Foot Dorsiflexion: Strong  L Foot Dorsiflexion: Absent  RUE Motor Response: Responds to commands  RUE Sensation: Full sensation  Muscle Strength Right Arm: Normal Strength Against Gravity and Full Resistance  LUE Motor Response:  Flaccid  LUE Sensation: Numbness, Tingling  Muscle Strength Left Arm: Weak Movement but Not Against Gravity or Resistance  RLE Motor Response: Responds to commands  RLE Sensation: Full sensation  Muscle Strength Right Leg: Good Strength Against Gravity and Moderate Resistance  LLE Motor Response: Flaccid  LLE Sensation: Numbness, Tingling  Muscle Strength Left Leg: Weak Movement but Not Against Gravity or Resistance  EENT (WDL):  WDL Except    Cardio/Pulmonary Assessment  Edema   RLE Edema: Trace  LLE Edema: Trace  Respiratory Breath Sounds  RUL Breath Sounds: Clear  RML Breath Sounds: Clear  RLL Breath Sounds: Clear, Diminished  MOHAMUD Breath Sounds: Clear  LLL Breath Sounds: Clear, Diminished  Cardiac Assessment   Cardiac (WDL):  WDL Except (H/O HTN.)

## 2023-12-26 NOTE — DISCHARGE PLANNING
Spoke with patient and family in lobby. Discussed that [facility], home health declined, patient would like outpatient therapy at [facility] out in [place].

## 2023-12-26 NOTE — CARE PLAN
"  Problem: Fall Risk - Rehab  Goal: Patient will remain free from falls  Note: Shellie Hamilton Fall risk Assessment Score: 22  High fall risk Interventions   - Alarming seatbelt  - Bed and strip alarm   - Yellow sign by the door   - Yellow wrist band \"Fall risk\"  - Room near to the nurse station  - Do not leave patient unattended in the bathroom  - Fall risk education provided       Problem: Bowel Elimination  Goal: Patient will participate in bowel management program  Note: Scheduled senna given at hs.Continent of bowel per report.LBM 12/25.Will continue to monitor.         "

## 2023-12-27 ENCOUNTER — APPOINTMENT (OUTPATIENT)
Dept: PHYSICAL THERAPY | Facility: REHABILITATION | Age: 63
DRG: 057 | End: 2023-12-27
Attending: PHYSICAL MEDICINE & REHABILITATION
Payer: COMMERCIAL

## 2023-12-27 ENCOUNTER — APPOINTMENT (OUTPATIENT)
Dept: OCCUPATIONAL THERAPY | Facility: REHABILITATION | Age: 63
DRG: 057 | End: 2023-12-27
Attending: PHYSICAL MEDICINE & REHABILITATION
Payer: COMMERCIAL

## 2023-12-27 PROCEDURE — A9270 NON-COVERED ITEM OR SERVICE: HCPCS | Performed by: HOSPITALIST

## 2023-12-27 PROCEDURE — 770010 HCHG ROOM/CARE - REHAB SEMI PRIVAT*

## 2023-12-27 PROCEDURE — 97535 SELF CARE MNGMENT TRAINING: CPT

## 2023-12-27 PROCEDURE — A9270 NON-COVERED ITEM OR SERVICE: HCPCS | Performed by: STUDENT IN AN ORGANIZED HEALTH CARE EDUCATION/TRAINING PROGRAM

## 2023-12-27 PROCEDURE — A9270 NON-COVERED ITEM OR SERVICE: HCPCS | Performed by: PHYSICAL MEDICINE & REHABILITATION

## 2023-12-27 PROCEDURE — 99232 SBSQ HOSP IP/OBS MODERATE 35: CPT | Performed by: PHYSICAL MEDICINE & REHABILITATION

## 2023-12-27 PROCEDURE — 700102 HCHG RX REV CODE 250 W/ 637 OVERRIDE(OP): Performed by: PHYSICAL MEDICINE & REHABILITATION

## 2023-12-27 PROCEDURE — 99231 SBSQ HOSP IP/OBS SF/LOW 25: CPT | Performed by: STUDENT IN AN ORGANIZED HEALTH CARE EDUCATION/TRAINING PROGRAM

## 2023-12-27 PROCEDURE — 97112 NEUROMUSCULAR REEDUCATION: CPT

## 2023-12-27 PROCEDURE — 97530 THERAPEUTIC ACTIVITIES: CPT

## 2023-12-27 PROCEDURE — 97116 GAIT TRAINING THERAPY: CPT

## 2023-12-27 PROCEDURE — 700102 HCHG RX REV CODE 250 W/ 637 OVERRIDE(OP): Performed by: HOSPITALIST

## 2023-12-27 PROCEDURE — 97110 THERAPEUTIC EXERCISES: CPT

## 2023-12-27 PROCEDURE — 700102 HCHG RX REV CODE 250 W/ 637 OVERRIDE(OP): Performed by: STUDENT IN AN ORGANIZED HEALTH CARE EDUCATION/TRAINING PROGRAM

## 2023-12-27 RX ORDER — METOPROLOL SUCCINATE 25 MG/1
37.5 TABLET, EXTENDED RELEASE ORAL
Status: DISCONTINUED | OUTPATIENT
Start: 2023-12-28 | End: 2024-01-02 | Stop reason: HOSPADM

## 2023-12-27 RX ADMIN — SERTRALINE 50 MG: 50 TABLET, FILM COATED ORAL at 09:42

## 2023-12-27 RX ADMIN — APIXABAN 5 MG: 5 TABLET, FILM COATED ORAL at 09:42

## 2023-12-27 RX ADMIN — HYDRALAZINE HYDROCHLORIDE 100 MG: 50 TABLET, FILM COATED ORAL at 05:31

## 2023-12-27 RX ADMIN — HYDRALAZINE HYDROCHLORIDE 100 MG: 50 TABLET, FILM COATED ORAL at 15:06

## 2023-12-27 RX ADMIN — TRAZODONE HYDROCHLORIDE 75 MG: 50 TABLET ORAL at 20:41

## 2023-12-27 RX ADMIN — BACLOFEN 10 MG: 10 TABLET ORAL at 15:06

## 2023-12-27 RX ADMIN — SENNOSIDES AND DOCUSATE SODIUM 2 TABLET: 50; 8.6 TABLET ORAL at 09:40

## 2023-12-27 RX ADMIN — APIXABAN 5 MG: 5 TABLET, FILM COATED ORAL at 20:42

## 2023-12-27 RX ADMIN — METOPROLOL SUCCINATE 25 MG: 25 TABLET, EXTENDED RELEASE ORAL at 05:31

## 2023-12-27 RX ADMIN — HYDRALAZINE HYDROCHLORIDE 100 MG: 50 TABLET, FILM COATED ORAL at 20:41

## 2023-12-27 RX ADMIN — BACLOFEN 10 MG: 10 TABLET ORAL at 20:42

## 2023-12-27 RX ADMIN — ATORVASTATIN CALCIUM 40 MG: 40 TABLET, FILM COATED ORAL at 20:42

## 2023-12-27 RX ADMIN — OMEPRAZOLE 20 MG: 20 CAPSULE, DELAYED RELEASE ORAL at 09:42

## 2023-12-27 RX ADMIN — AMLODIPINE BESYLATE 10 MG: 5 TABLET ORAL at 05:31

## 2023-12-27 RX ADMIN — BACLOFEN 10 MG: 10 TABLET ORAL at 09:41

## 2023-12-27 ASSESSMENT — GAIT ASSESSMENTS
ASSISTIVE DEVICE: HEMI-WALKER
GAIT LEVEL OF ASSIST: MODERATE ASSIST
DISTANCE (FEET): 80

## 2023-12-27 ASSESSMENT — ACTIVITIES OF DAILY LIVING (ADL)
TUB_SHOWER_TRANSFER_DESCRIPTION: ADAPTIVE EQUIPMENT;GRAB BAR;SHOWER BENCH;SET-UP OF EQUIPMENT;SUPERVISION FOR SAFETY
BED_CHAIR_WHEELCHAIR_TRANSFER_DESCRIPTION: ADAPTIVE EQUIPMENT;INCREASED TIME;VERBAL CUEING
BED_CHAIR_WHEELCHAIR_TRANSFER_DESCRIPTION: INCREASED TIME;INITIAL PREPARATION FOR TASK;SUPERVISION FOR SAFETY

## 2023-12-27 NOTE — CARE PLAN
Problem: PT-Long Term Goals  Goal: LTG-By discharge, patient will transfer one surface to another c/ [facility] or better.   Outcome: Progressing  Note: Inconsistent due to L hemiparesis, occasionally needs up to ModA for L side LOB   Goal: LTG-By discharge, patient will ambulate up/down 1 step c/ LRAD at [facility] or better.   Outcome: Progressing  Note: Last performance: MaxA x 1, 2nd person CGA safety for 4 steps c/ [facility]   Goal: LTG-By discharge, patient will transfer in/out of a car c/ ModA or better.   Outcome: Progressing  Note: Needs assessment      Problem: PT-Long Term Goals  Goal: LTG-By discharge, patient will ambulate >/= 50' c/ LRAD at [facility] or better.   Outcome: Met

## 2023-12-27 NOTE — PROGRESS NOTES
NURSING DAILY NOTE    Name: Jason Lima   Date of Admission: 11/12/2023   Admitting Diagnosis: Thalamic hemorrhage (HCC)  Attending Physician: Lisa Lee D.o.  Allergies: Penicillins    Safety  Patient Assist  mod to max 1  Patient Precautions  Fall Risk  Precaution Comments  Left Hemiparesis, left visual inattention  Bed Transfer Status  Moderate Assist  Toilet Transfer Status   Minimal Assist  Assistive Devices  Rails, Wheelchair  Oxygen  CPAP  Diet/Therapeutic Dining  Current Diet Order   Procedures    Diet Order Diet: Consistent CHO (Diabetic) (2 gram sodium restriction; medications whole with thin liquids); Second Modifier: (optional): 2 Gram Sodium     Pill Administration  whole  Agitated Behavioral Scale  14  ABS Level of Severity  No Agitation    Fall Risk  Has the patient had a fall this admission?   Yes  Shellie Hamilton Fall Risk Scoring  22, HIGH RISK  Fall Risk Safety Measures  bed alarm and chair alarm    Vitals  Temperature: 36.3 °C (97.3 °F)  Temp src: Temporal  Pulse: 70  Respiration: 18  Blood Pressure: (!) 140/75  Blood Pressure MAP (Calculated): 97 MM HG  BP Location: Right, Upper Arm  Patient BP Position: Supine     Oxygen  Pulse Oximetry: 94 %  O2 (LPM): 0  FiO2%: 21 %  O2 Delivery Device: CPAP    Bowel and Bladder  Last Bowel Movement  12/25/23  Stool Type  Type 6: Fluffy pieces with ragged edges, a mushy stool  Bowel Device  Bathroom  Continent  Bladder: Continent void   Bowel: Continent movement  Bladder Function  Urine Void (mL): 300 ml  Number of Times Voided: 1  Urinary Options: Yes  Urine Color: Yellow  Number of Times Incontinent of Urine: 0  Genitourinary Assessment   Bladder Assessment (WDL):  WDL Except  Echols Catheter: Not Applicable  Urine Color: Yellow  Number of Bladder Accidents: 0  Total Number of Bladder of Accidents in Last 7 Days: 0  Number of Times Incontinent of Urine: 0  Bladder Device: Urinal  Time Void:  No  Bladder Scan: Post Void  $ Bladder Scan Results (mL): 26    Skin  Polo Score   16  Sensory Interventions   Bed Types: Standard/Trauma Mattress  Skin Preventative Measures: Pillows in Use for Support / Positioning  Moisture Interventions  Moisturizers/Barriers: Barrier Paste, Barrier Wipes      Pain  Pain Rating Scale  0 - No Pain  Pain Location  Back  Pain Location Orientation  Lower  Pain Interventions   Repositioned, Rest    ADLs    Bathing   Shower  Linen Change   Complete  Personal Hygiene  Perineal Care, Moist Deja Wipes  Chlorhexidine Bath      Oral Care  Brushed Teeth  Teeth/Dentures     Shave  Self  Nutrition Percentage Eaten  Lunch, Between % Consumed  Environmental Precautions  Treaded Slipper Socks on Patient, Personal Belongings, Wastebasket, Call Bell etc. in Easy Reach, Transferred to Stronger Side, Bed in Low Position  Patient Turns/Positioning  Patient Turns Self from Side to Side  Patient Turns Assistance/Tolerance  Assistance of One  Bed Positions  Bed Controls On, Bed Locked  Head of Bed Elevated  Self regulated      Psychosocial/Neurologic Assessment  Psychosocial Assessment  Psychosocial (WDL):  WDL Except  Patient Behaviors: Fatigue  Neurologic Assessment  Neuro (WDL): Exceptions to WDL  Level of Consciousness: Alert  Orientation Level: Oriented X4  Cognition: Follows commands, Appropriate attention/concentration  Speech: Clear  Facial Symmetry: Left facial drooping  Pupil Assesment: Yes  R Pupil Size (mm): 3  R Pupil Shape / Description: Round  R Pupil Reaction: Brisk  L Pupil Size (mm): 3  L Pupil Shape / Description: Round  L Pupil Reaction: Brisk  Reflexes: Cough  Cough Reflex: Present  Motor Function/Sensation Assessment: Dorsiflexion, Motor response  R Foot Dorsiflexion: Strong  L Foot Dorsiflexion: Absent  RUE Motor Response: Responds to commands  RUE Sensation: Full sensation  Muscle Strength Right Arm: Normal Strength Against Gravity and Full Resistance  LUE Motor Response:  Flaccid  LUE Sensation: Numbness, Tingling  Muscle Strength Left Arm: Weak Movement but Not Against Gravity or Resistance  RLE Motor Response: Responds to commands  RLE Sensation: Full sensation  Muscle Strength Right Leg: Good Strength Against Gravity and Moderate Resistance  LLE Motor Response: Flaccid  LLE Sensation: Numbness, Tingling  Muscle Strength Left Leg: Weak Movement but Not Against Gravity or Resistance  EENT (WDL):  WDL Except    Cardio/Pulmonary Assessment  Edema   RLE Edema: Trace  LLE Edema: Trace  Respiratory Breath Sounds  RUL Breath Sounds: Clear  RML Breath Sounds: Clear  RLL Breath Sounds: Clear, Diminished  MOHAMUD Breath Sounds: Clear  LLL Breath Sounds: Clear, Diminished  Cardiac Assessment   Cardiac (WDL):  WDL Except (H/O HTN.)

## 2023-12-27 NOTE — PROGRESS NOTES
"  Physical Medicine & Rehabilitation Progress Note    Encounter Date: 12/27/2023    Chief Complaint: Improving    Interval Events (Subjective):  Vital signs stable, occasionally intermittent systolic blood pressure at 140  Bowel movement 12/26  Voiding volitionally    Seen and examined in his room with his sister at bedside.  She is wondering when he will be able to walk with a walker.  Jason feels like he is doing a lot better.  He is using his left hand a lot more functionally and was able to dress and shower himself today.    ROS: 14 point ROS negative unless otherwise specified in the HPI    Objective:  VITAL SIGNS: /74   Pulse 67   Temp 36.3 °C (97.3 °F)   Resp 18   Ht 1.727 m (5' 8\")   Wt 81 kg (178 lb 9.2 oz)   SpO2 94%   BMI 27.15 kg/m²     GEN: No apparent distress  HEENT: Head normocephalic, atraumatic.  Left eye discharge and erythematous sclera  CV: Extremities warm and well-perfused, no peripheral edema appreciated bilaterally.  PULMONARY: Breathing nonlabored on room air, no respiratory accessory muscle use.  Not requiring supplemental oxygen.  SKIN: No appreciable skin breakdown on exposed areas of skin.  PSYCH: Normal affect  NEURO: Awake alert.  Conversational.  Logical thought content. Dysarthria. Left Hemiparesis. Mild left clonus at ankle. No significant spasticity appreciated at rest.     Laboratory Values:  Recent Results (from the past 72 hour(s))   Basic Metabolic Panel    Collection Time: 12/26/23  5:17 AM   Result Value Ref Range    Sodium 139 135 - 145 mmol/L    Potassium 3.5 (L) 3.6 - 5.5 mmol/L    Chloride 103 96 - 112 mmol/L    Co2 26 20 - 33 mmol/L    Glucose 133 (H) 65 - 99 mg/dL    Bun 32 (H) 8 - 22 mg/dL    Creatinine 2.69 (H) 0.50 - 1.40 mg/dL    Calcium 8.8 8.5 - 10.5 mg/dL    Anion Gap 10.0 7.0 - 16.0   ESTIMATED GFR    Collection Time: 12/26/23  5:17 AM   Result Value Ref Range    GFR (CKD-EPI) 26 (A) >60 mL/min/1.73 m 2       Medications:  Scheduled Medications "   Medication Dose Frequency    metoprolol SR  25 mg Q DAY    sertraline  50 mg DAILY    baclofen  10 mg TID    amLODIPine  10 mg DAILY    apixaban  5 mg BID    hydrALAZINE  100 mg Q8HRS    traZODone  75 mg QHS    senna-docusate  2 Tablet BID    omeprazole  20 mg DAILY    atorvastatin  40 mg Nightly     PRN medications: traZODone, carboxymethylcellulose, [DISCONTINUED] insulin regular **AND** [CANCELED] POC blood glucose manual result **AND** NOTIFY MD and PharmD **AND** Administer 20 grams of glucose (approximately 8 ounces of fruit juice) every 15 minutes PRN FSBG less than 70 mg/dL **AND** dextrose bolus, Respiratory Therapy Consult, senna-docusate **AND** polyethylene glycol/lytes **AND** magnesium hydroxide **AND** bisacodyl, mag hydrox-al hydrox-simeth, ondansetron **OR** ondansetron, sodium chloride, [] acetaminophen **FOLLOWED BY** acetaminophen, oxyCODONE immediate-release **OR** oxyCODONE immediate-release, hydrALAZINE    Diet:  Current Diet Order   Procedures    Diet Order Diet: Consistent CHO (Diabetic) (2 gram sodium restriction; medications whole with thin liquids); Second Modifier: (optional): 2 Gram Sodium       Medical Decision Making and Plan:  Right thalamic hemorrhagic stroke ~ last week 2023  Originally presented with a headache and left-sided weakness to hospital in [facility]  Work-up at the outside hospital in [facility] revealed a right thalamic hemorrhagic stroke  Repeat CT head done after return flight to the ,  CT head shows right thalamic hemorrhage, decrease in density since prior studies from the outside hospital, slight asymmetric dilation of the left ventricular system including the left temporal horn with a possible component of hydrocephalus  Planning for BP less than 140, avoiding any kind of anticoagulation  Initiate comprehensive IRF level therapy with 3 days of therapy 5 days a week with services from PT/OT/SLP     Spasticity  OP follow up with Physiatry for  consideration Botox   Has had resurgence of spasticity restart baclofen 5mg TID 11/30/2023 --> increase to 10mg TID  12/26/2023 continue Baclofen at 10mg TID, no significant side effects    Conjunctivitis  12/15 started eyedrops, completed    ABLA  Now on Eliquis  Recheck 11/28 - improved 11.4--> 12.0--> 11.6 11/30/2023 12/6/2023 11.6-->10.7--> 11.7 12/11 12/22 Stable in 11's     CKD  Follow up with OP nephrology  Renal function fluctuates 20-30's/2's-3's  Cr Baseline in past had been mid 1's  12/26 mild fluctuation, normal for this hospitalization.      Hypertension  Continue Amlodipine 10mg daily  Continue Hydralazine 25mg TID - increased by hospitalist 11/13/2023 from BID to TID--> increased to 50mg q8hrs 11/15  11/16 Hydralazine increased to 75mg q8hrs and 1x Hydralazine given  11/17 BP still elevated but hydralazine recently increased. Monitor  12/13/2023 VS showing increase in -140's. Continue Hydralazine 100mg q8hrs + Amlodipine 10mg daily.  12/14/2023 consistently higher, start Metoprolol XL 12.5 mg daily   12/15/2023 better controlled, continue Metoprolol daily   12/18 increase metoprolol to 25 mg daily  12/19 continue metoprolol 25 mg daily, monitor for need to increase.  12/27 blood pressure still intermittently elevated in the 140s, increase metoprolol to 37.5 mg daily    Hypokalemia  Mild 12/13 supplement x1   NML 12/15  12/18 stable and normal at 3.8  12/22 NML  12/26 supplement 40 mEq once for potassium of 3.5    Leukopenia  New, mild 12/6/2023   Resolved 12/11, 12/22     Prediabetes  Discontinue SSI     HLD  Continue home dose satin      Bowel  Constipation 11/29/2023, resolved 11/30/2023   Continue with scheduled Colace and senna, as needed stool softeners  Miralax x3 doses 11/29/2023 --> Constipation Resolved 11/30/2023      Insomnia  Secondary to recent jet lag, melatonin scheduled --> increase to home dose 11/13/2023 9mg  Utilize trazodone as needed insomnia continues  Schedule  Trazodone 11/15/2023   11/16/2023 continue Trazodone as is. Thinks he slept better last night  11/17/2023 Still not sleeping well. Increase to 75mg nightly.   12/22/2023 continue trazodone at current dose     Pain - as needed Tylenol and oxycodone    Post-Stroke Depression  Consulted Pyschiatry   Start Prozac 11/28/2023 --> Switched to zoloft per psychiatry  Appreciate assistance with management  Mood improved, continue Zoloft 12/22  Psychiatry signed off 12/27, discussed with them 12/27    Right Foot Pain  H/o Gout  Xray with swelling 1st metatarsal head   Trial Colchicine for acute gout flare 11/28/2023   Topical Voltaren gel scheduled   Improved with less swelling, erythema 11/29/2023   Resolved      LABS: BMP + CBC 12/22 - non concerning, renal function stable  LABS: BMP 12/26 -kidney function stable, potassium mildly low at 3.5, supplemented once  LABS: [facility] 12/29      DVT PROPHYLAXIS: NO - Hemorrhagic stroke. US + UE and LE for brachial and peroneal DVT. 11/21/2023 start Heparin 5000 units q8hrs SQ for further prophylaxis, if tolerates will increase to full AC. Consider repeat CTH to ensure stability bleed once on full AC. 11/24/2023 Start loading dose Eliquis 10mg BID x 7 days with transition to 5mg BID. CBC showing improvement in H/H 11/28/2023 no neurologic decline.     HOSPITALIST FOLLOWING: YES - d/w hospitalist 12/6 --> Signed off 12/6.     CODE STATUS: FULL CODE    DISPO: Home alone. Family can help starting 1/2    MARCUS: 1/2/2023    MEDS SENT TO: [facility] or [facility] in [place] on [address]    DISCHARGE FOLLOW UP: Neurology, Nephrology, audiology, ophthalmology- needs referral to all.   ____________________________________    Dr. Lisa Lee DO, MS  [facility] - Physical Medicine & Rehabilitation   ____________________________________    _____________________________________  Interdisciplinary Team Conference   Most recent IDT on 12/27/2023    IDr. Lisa DO, MS, was present and led the  interdisciplinary team conference on 12/27/2023.  I led the IDT conference and agree with the IDT conference documentation and plan of care as noted below.     Nursing:  Diet Current Diet Order   Procedures    Diet Order Diet: Consistent CHO (Diabetic) (2 gram sodium restriction; medications whole with thin liquids); Second Modifier: (optional): 2 Gram Sodium       Eating ADL Modified Independent  Set-up of equipment or meal/tube feeding   % of Last Meal  Oral Nutrition: Breakfast, Between % Consumed   Sleep    Bowel Last BM: 12/25/23   Bladder    Fall risk    Physical Therapy:  Bed Mobility    Transfers Moderate Assist  Adaptive equipment, Increased time (stand step around c/ HW; CG assist)   Mobility Moderate Assist (Min to ModA for steadying, help to advance LLE when fatigued)   Stairs        Occupational Therapy:  Grooming Standing, Maximal Assist (moax to modA for standing balance.)   Bathing Minimal Assist   UB Dressing Minimal Assist   LB Dressing Maximal Assist   Toileting Moderate Assist   Shower & Transfer        Speech-Language Pathology:  Comprehension:  Modified Independent  Comprehension Description:  Glasses  Expression:  Modified Independent  Expression Description:   (extra time)  Social Interaction:  Modified Independent  Social Interaction Description:  Verbal cues, Increased time  Problem Solving:  Minimal Assist  Problem Solving Description:  Increased time, Seat belt, Supervision, Therapy schedule, Verbal cueing  Memory:  Minimal Assist  Memory Description:  Increased time, Seat belt, Supervision, Therapy schedule, Verbal cueing    No longer following per patient preference.    Respiratory Therapy:  O2 (LPM): 0  O2 Delivery Device: CPAP    Case Management:  Continues to work on disposition and DME needs.     BARRIERS TO DISCHARGE HOME:  Care giving hours  Equipment  Family training    Discharge Date/Disposition:  1/2/23  _____________________________________

## 2023-12-27 NOTE — CARE PLAN
Problem: Bowel Elimination  Goal: Patient will participate in bowel management program  Note: Pt refused scheduled senna at hs.Continent of bowel per report.LBM 12/25.Will continue to monitor.

## 2023-12-27 NOTE — DISCHARGE PLANNING
Case Management/IDT follow up.   IDT continues to recommend IRF level of care as patient continue to make progress with all therapies.   Projected dc date set for 1/2/24.      DC needs:  Recommendations made for Rehab without walls for PT/OT/SLP  Follow up with: PCP neuro    Met with pt / family providing update from IDT and discussed plan of care.    Plan:  Continue to follow

## 2023-12-27 NOTE — CARE PLAN
Problem: Knowledge Deficit - Standard  Goal: Patient and family/care givers will demonstrate understanding of plan of care, disease process/condition, diagnostic tests and medications  Outcome: Progressing   Pt education given regarding plan of care with emphasis on adequate hydration, pt shows good understanding and better follow through this shift, will continue to reinforce education and continue to monitor.         Problem: Fall Risk - Rehab  Goal: Patient will remain free from falls  Outcome: Progressing   Pt education given regarding fall precautions AND safety measures, pt shows good understanding, has not attempted to self transfer this shift, will continue to reinforce education and continue to monitor.

## 2023-12-27 NOTE — THERAPY
Occupational Therapy  Daily Treatment     Patient Name: Jason Lima  Age:  63 y.o., Sex:  male  Medical Record #: [id number]  Today's Date: 12/27/2023     Precautions  Precautions: Fall Risk  Comments: Left Hemiparesis, left visual inattention         Subjective    Pt encountered 2x this day. Pt pleasant and agreeable to participate in ADLs.      Objective       12/27/23 0831 12/27/23 1401   OT Charge Group   OT Self Care / ADL (Units) 4 2   OT Total Time Spent   OT Individual Total Time Spent (Mins) 60 30   Precautions   Precautions Fall Risk Fall Risk   Comments Left Hemiparesis, left visual inattention Left Hemiparesis, left visual inattention   Functional Level of Assist   Grooming Seated;Supervision  --    Grooming Description Increased time;Seated in wheelchair at sink;Supervision for safety  (for toothbrushing and shaving)  --    Bathing Contact Guard Assist  --    Bathing Description Grab bar;Hand held shower;Long handled bath tool;Tub bench;Increased time;Set-up of equipment;Supervision for safety  (CGA for sfety with sitting balance. Pt frequently utilized GB on R side for support with sitting balance. Pt able to bath self using LHS (LLE + back) and wash clothe at CGA for safety.)  --    Upper Body Dressing Contact Guard Assist  --    Upper Body Dressing Description Increased time;Initial preparation for task;Verbal cueing  (CGA for UBD for safety. Pt able don loose t-shirt with extra time at CGA.)  --    Lower Body Dressing Moderate Assist Minimal Assist   Lower Body Dressing Description Assistive devices;Grab bar;Reacher;Assist with threading into pant leg;Increased time;Set-up of equipment;Supervision for safety  (Assistance to thread LLE into pant leg; pt able to complete the rest at CGA. Pt able to don/doff brief and doff pants at [place] for standing balance to pull up, pt able to thread legs into brief with extra time. CGA for donning socks dominique bailey.) Reacher;Sock aid;Dressing stick;Increased  time;Set-up of equipment;Verbal cueing  (Donning/doffing shoes and socks seated at EOM. [place] to hip hike LLE; pt able to don B socks using 1-handed approach. [place] for shoes using shoe horn.)   Tub / Shower Transfers Minimal Assist  --    Tub Shower Transfer Description Adaptive equipment;Grab bar;Shower bench;Set-up of equipment;Supervision for safety  ([place] for stand step from WC to shower bench using shower GB. x2.)  --    Interdisciplinary Plan of Care Collaboration   IDT Collaboration with  Therapy Tech  --    Patient Position at End of Therapy Seated;Chair Alarm On;Call Light within Reach;Tray Table within Reach;Phone within Reach  (hand off to nursing for AM meds.) Seated;Chair Alarm On;Call Light within Reach;Tray Table within Reach;Phone within Reach   Collaboration Comments TechA for safety with transfers prn  --      1400: supine on mat LLE hip flexion and abduction PROM to increase mobility and flexibility for donning shoes and socks. Supine on mat, pt able to flex hip and hike over contralateral leg at CGA to SBA in 4/5 trials.     Assessment    Good improvements towards LBD goals, as pt progressed towards [place] for donning shoes and socks using AE. Would benefit from cont practice in addition to threading pants.     Strengths: Able to follow instructions, Independent prior level of function, Motivated for self care and independence, Pleasant and cooperative, Supportive family  Barriers: Decreased endurance, Fatigue, Generalized weakness, Hemiparesis, Impaired activity tolerance, Impaired balance, Limited mobility, Spasticity    Plan    Cont ADLs, functional transfers, and thera act/ex to maximize functional recovery for safe DC home.       DME  OT DME Recommendations  Bathroom Equipment: 3 in 1 Commode, Tub Transfer Bench (Medicaid Only)  Additional Equipment: Other (Comments)    Passport items to be completed:  Perform bathroom transfers, complete dressing, complete feeding, get ready for the day,  prepare a simple meal, participate in household tasks, adapt home for safety needs, demonstrate home exercise program, complete caregiver training     Occupational Therapy Goals (Active)       Problem: Dressing       Dates: Start:  11/22/23         Goal: STG-Within one week, patient will dress LB at [place] using LRD       Dates: Start:  11/22/23    Expected End:  12/23/23            Goal: STG-Within one week, patient will dress UB SPV using LRD       Dates: Start:  12/06/23    Expected End:  12/23/23               Problem: Functional Transfers       Dates: Start:  12/06/23         Goal: STG-Within one week, patient will transfer to step in shower at [place] to [facility] using LRD       Dates: Start:  12/06/23    Expected End:  12/23/23               Problem: OT Long Term Goals       Dates: Start:  11/13/23         Goal: LTG-By discharge, patient will complete basic self care tasks at Mod Independent level.       Dates: Start:  11/13/23    Expected End:  12/23/23            Goal: LTG-By discharge, patient will perform bathroom transfers at Mod Independent level.       Dates: Start:  11/13/23    Expected End:  12/23/23

## 2023-12-27 NOTE — THERAPY
"Physical Therapy   Daily Treatment     Patient Name: Jason Lima  Age:  63 y.o., Sex:  male  Medical Record #: [id number]  Today's Date: 12/26/2023     Precautions  Precautions: Fall Risk  Comments: Left Hemiparesis, left visual inattention    Subjective    \"I'm doing okay.\" Pt in w/c at arrival, SO Vielka present. Agreeable to PT tx. Seen from [id number], [id number].      Objective       12/26/23 0931 12/26/23 1431   PT Charge Group   PT Gait Training (Units) 3  --    PT Neuromuscular Re-Education / Balance (Units) 1 1   PT Therapeutic Activities (Units)  --  1   PT Total Time Spent   PT Individual Total Time Spent (Mins) 60 30   Gait Functional Level of Assist    Gait Level Of Assist Moderate Assist  (Min to ModA for steadying, help to advance LLE when fatigued)  --    Assistive Device Tanvir-Walker  --    Distance (Feet) 90  --    # of Times Distance was Traveled 1  (2 x 65', 1 x 37' c/ Arjo for pushing drill)  --    Deviation Decreased Base Of Support;Decreased Heel Strike;Decreased Toe Off  (hip adduction + ER to advance LLE swing, L foot drop, decreased pelvic rotation, compensatory trunk rotation to advance LLE)  --    Transfer Functional Level of Assist   Bed, Chair, Wheelchair Transfer  --  Moderate Assist   Bed Chair Wheelchair Transfer Description  --  Adaptive equipment;Increased time  (stand step around c/ HW; CG assist)   Sitting Lower Body Exercises   Sit to Stand   (1 x 8 from w/c, cues for R WS during transition to stand and return to chair to dec L lateral lean)  --    Bed Mobility    Sit to Stand Contact Guard Assist  --    Neuro-Muscular Treatments   Neuro-Muscular Treatments Anterior weight shift;Verbal Cuing;Tapping;Tactile Cuing;Sequencing;Weight Shift Right;Weight Shift Left;Postural Changes;Postural Facilitation;Facilitation;Electrical Stimulation  (Time spent to program Bioness for facilitation of stance/loading response, DF during swing and initial contact; distances as above) Anterior weight " "shift;Verbal Cuing;Tapping;Tactile Cuing;Sequencing;Weight Shift Right;Weight Shift Left;Postural Changes;Postural Facilitation;Facilitation;Compensatory Strategies;Co-Contraction   Comments Bioness for gait training, sequencing WS for STS as above  (pushing/bimanual/BLE resistive exercise c/ Arjo used as sled resistance; max cues for cocontraction and facilitation of L side push and step 1 x 37', then stationary isometrics 5 x 10\" pushes) Full and part practice of floor recovery components: seated on EOM >tall kneeling> crawl to prone> push to sit, full practice x 3 reps; component practice of belly crawl c/ cues for WS and LUE management x 10 reps, rolling supine<>R sidelying x 5 reps, prone to supine to L side x 5 reps   Interdisciplinary Plan of Care Collaboration   IDT Collaboration with  Family / Caregiver;Therapy Tech Family / Caregiver   Patient Position at End of Therapy Seated;Chair Alarm On;Family / Friend in Room  (handoff to family members at end of session, SO and brother present for majority of session) Seated;Self Releasing Lap Belt Applied;Family / Friend in Room  (c/ SO assisting to propel back to room at end of session)   Collaboration Comments TechA for Arjo push, equipment management TechA for floor recovery components for handling and safety (guarding)     AM session: gait training c/ FES facilitation via Bioness. Increased time to adjust parameters for facilitation of DF during swing, stance stability on LLE. Distances as above.     Bimanual resisted pushing drill for BLE coordination, facilitation of trunk activation bilaterally and overflow from high intensity effort. As above in flowsheet.    PM session: initiation of stand-step transfer training c/ pt and SO, SO providing CGA<>ModA for balance and steadying w/c<>mat c/ gait belt. PT demo and cues for sequencing to pt and CG.     Floor recover components and practice as above. Multimodal cues for sequencing, WS, coordination of UE/LE, and " "LLE placement from tall kneel to half kneel to increase effective push onto mat. Side scooting, rolling, and kneeling practice as above. Supine rest breaks on mat btw efforts.     Assessment    Jason with improving performance of belly creep/crawl technique with blocked practice and external cues to facilitate WS and AAROM on L hemiside. Most successful transition from 1/2 kneel to prone on mat when LLE placed in upright position to allow pt to push toward R side. Recommend continued CG training to build competence c/ transfers. Vielka next here on Sat 12/30.     Strengths: Able to follow instructions, Independent prior level of function, Motivated for self care and independence, Pleasant and cooperative, Supportive family, Willingly participates in therapeutic activities  Barriers: Decreased endurance, Hemiparesis, Difficulty following instructions, Fatigue, Hypertension, Impaired activity tolerance, Impaired balance, Impaired insight/denial of deficits, Impaired functional cognition, Impaired sleep pattern, Impulsive, Limited mobility, Visual impairment, Poor family support (lives alone)    Plan  Family training: car transfers, transfers c/ HW  Bioness to LLE gait training, PF strengthening   Continue nerve glides and mobilization prior to gait  Mat mobility toward floor recovery components      Stand step transfers c/ HW. Cues to verbalize each step (move walker, then 1 foot, then the other; repeat)  Continue high intensity gait c/ HW + L AFO, work on speed and from 3 point toward 2 point pattern, continue lateral stepping, backing, turning, and LE strength (use NMES/FES as needed), Nustep for pregait, prioritize hip flexion and knee flexion for LLE swing clearance.     Standing balance c/ perturbations, STS with decreased external correction, trial switching righting cues to R side to facilitate improvement in midline contro    DME  PT DME Recommendations  Wheelchair: 18\" Width  Cushion: Specialty (See other " comments) (TBD pending ambulation progress)  Assistive Device: Tanvir Walker (TBD pending progress, currently 2 person assist for gait)  Additional Equipment: Other (Comments)    Passport items to be completed:  Get in/out of bed safely, in/out of a vehicle, safely use mobility device, walk or wheel around home/community, navigate up and down stairs, show how to get up/down from the ground, ensure home is accessible, demonstrate HEP, complete caregiver training    Physical Therapy Problems (Active)       Problem: PT-Long Term Goals       Dates: Start:  11/13/23         Goal: LTG-By discharge, patient will ambulate >/= 50' c/ LRAD at [facility] or better.        Dates: Start:  11/13/23    Expected End:  12/23/23         Goal Note filed on 12/20/23 0846 by Isidra Hamilton, PT       ModA c/ HW.               Goal: LTG-By discharge, patient will transfer one surface to another c/ [facility] or better.        Dates: Start:  11/13/23    Expected End:  12/23/23         Goal Note filed on 12/20/23 0846 by Isidra Hamilton, PT       Partially met: [facility] c/ squat pivot, ModA for stand pivot/step.               Goal: LTG-By discharge, patient will ambulate up/down 1 step c/ LRAD at [facility] or better.        Dates: Start:  11/13/23    Expected End:  12/23/23         Goal Note filed on 12/20/23 0846 by Isidra Hamilton, PT       Needs assessment.               Goal: LTG-By discharge, patient will transfer in/out of a car c/ ModA or better.        Dates: Start:  11/13/23    Expected End:  12/23/23         Goal Note filed on 12/20/23 0846 by Isidra Hamilton, PT       Needs assessment.

## 2023-12-27 NOTE — CARE PLAN
Problem: Dressing  Goal: STG-Within one week, patient will dress LB at [facility] using LRD  Outcome: Progressing  Goal: STG-Within one week, patient will dress [facility] using LRD  Outcome: Progressing     Problem: Functional Transfers  Goal: STG-Within one week, patient will transfer to step in shower at [facility] to [facility] using LRD  Outcome: Progressing

## 2023-12-27 NOTE — PROGRESS NOTES
NURSING DAILY NOTE    Name: Jason Lima   Date of Admission: 11/12/2023   Admitting Diagnosis: Thalamic hemorrhage (HCC)  Attending Physician: Lisa Lee D.o.  Allergies: Penicillins    Safety  Patient Assist  mod to max 1  Patient Precautions  Fall Risk  Precaution Comments  Left Hemiparesis, left visual inattention  Bed Transfer Status  Moderate Assist  Toilet Transfer Status   Minimal Assist  Assistive Devices  Rails, Wheelchair  Oxygen  CPAP  Diet/Therapeutic Dining  Current Diet Order   Procedures    Diet Order Diet: Consistent CHO (Diabetic) (2 gram sodium restriction; medications whole with thin liquids); Second Modifier: (optional): 2 Gram Sodium     Pill Administration  whole  Agitated Behavioral Scale  14  ABS Level of Severity  No Agitation    Fall Risk  Has the patient had a fall this admission?   Yes  Shellie Hamilton Fall Risk Scoring  22, HIGH RISK  Fall Risk Safety Measures  bed alarm, chair alarm, and poor balance    Vitals  Temperature: 36.3 °C (97.3 °F)  Temp src: Temporal  Pulse: 64  Respiration: 18  Blood Pressure: (!) 142/72  Blood Pressure MAP (Calculated): 95 MM HG  BP Location: Right, Upper Arm  Patient BP Position: Supine     Oxygen  Pulse Oximetry: 94 %  O2 (LPM): 0  FiO2%: 21 %  O2 Delivery Device: CPAP    Bowel and Bladder  Last Bowel Movement  12/25/23  Stool Type  Type 6: Fluffy pieces with ragged edges, a mushy stool  Bowel Device  Bathroom  Continent  Bladder: Continent void   Bowel: Continent movement  Bladder Function  Urine Void (mL): 250 ml  Number of Times Voided: 1  Urinary Options: Yes  Urine Color: Yellow  Number of Times Incontinent of Urine: 0  Genitourinary Assessment   Bladder Assessment (WDL):  WDL Except  Echols Catheter: Not Applicable  Urine Color: Yellow  Number of Bladder Accidents: 0  Total Number of Bladder of Accidents in Last 7 Days: 0  Number of Times Incontinent of Urine: 0  Bladder Device:  Urinal  Time Void: No  Bladder Scan: Post Void  $ Bladder Scan Results (mL): 26    Skin  Polo Score   16  Sensory Interventions   Bed Types: Standard/Trauma Mattress  Skin Preventative Measures: Pillows in Use for Support / Positioning  Moisture Interventions  Moisturizers/Barriers: Barrier Paste, Barrier Wipes      Pain  Pain Rating Scale  0 - No Pain  Pain Location  Back  Pain Location Orientation  Lower  Pain Interventions   Declines    ADLs    Bathing   Shower  Linen Change   Complete  Personal Hygiene  Perineal Care, Moist Deja Wipes  Chlorhexidine Bath      Oral Care  Brushed Teeth  Teeth/Dentures     Shave  Self  Nutrition Percentage Eaten  Lunch, Between % Consumed  Environmental Precautions  Treaded Slipper Socks on Patient, Personal Belongings, Wastebasket, Call Bell etc. in Easy Reach, Transferred to Stronger Side, Report Given to Other Health Care Providers Regarding Fall Risk, Bed in Low Position, Communication Sign for Patients & Families, Mobility Assessed & Appropriate Sign Placed  Patient Turns/Positioning  Patient Turns Self from Side to Side  Patient Turns Assistance/Tolerance  Assistance of One, General Weakness  Bed Positions  Bed Controls On, Bed Locked  Head of Bed Elevated  Self regulated      Psychosocial/Neurologic Assessment  Psychosocial Assessment  Psychosocial (WDL):  WDL Except  Patient Behaviors: Fatigue  Neurologic Assessment  Neuro (WDL): Exceptions to WDL  Level of Consciousness: Alert  Orientation Level: Oriented X4  Cognition: Follows commands, Appropriate attention/concentration  Speech: Clear  Facial Symmetry: Left facial drooping  Pupil Assesment: Yes  R Pupil Size (mm): 3  R Pupil Shape / Description: Round  R Pupil Reaction: Brisk  L Pupil Size (mm): 3  L Pupil Shape / Description: Round  L Pupil Reaction: Brisk  Reflexes: Cough  Cough Reflex: Present  Motor Function/Sensation Assessment: Dorsiflexion, Motor response  R Foot Dorsiflexion: Strong  L Foot Dorsiflexion:  Absent  RUE Motor Response: Responds to commands  RUE Sensation: Full sensation  Muscle Strength Right Arm: Normal Strength Against Gravity and Full Resistance  LUE Motor Response: Flaccid  LUE Sensation: Numbness, Tingling  Muscle Strength Left Arm: Weak Movement but Not Against Gravity or Resistance  RLE Motor Response: Responds to commands  RLE Sensation: Full sensation  Muscle Strength Right Leg: Good Strength Against Gravity and Moderate Resistance  LLE Motor Response: Flaccid  LLE Sensation: Numbness, Tingling  Muscle Strength Left Leg: Weak Movement but Not Against Gravity or Resistance  EENT (WDL):  WDL Except    Cardio/Pulmonary Assessment  Edema   RLE Edema: Trace  LLE Edema: Trace  Respiratory Breath Sounds  RUL Breath Sounds: Clear  RML Breath Sounds: Clear  RLL Breath Sounds: Clear, Diminished  MOHAMUD Breath Sounds: Clear  LLL Breath Sounds: Clear, Diminished  Cardiac Assessment   Cardiac (WDL):  WDL Except (H/O HTN.)

## 2023-12-27 NOTE — CONSULTS
"PSYCHIATRIC CONSULTATION:  *Reason for admission:   right thalamic hemorrhage  *Reason for consult:depression   *Requesting Physician:Lisa Lee  Supervising Physician:Rachele Colon         Legal status:  not applicable        *Interval history  The patient reports that his mood is \"good.\" Rate mood a a 9/10. 10 being happiest. HE states he is now able to walk with a walker and feel better about going home. Anxiety is manageable. No side effects noted from zoloft including nausea/vomiting, diarrhea, stomach upset or tremor. Would like to continue dose unchanged. Sleep is \"ok\" with current dose of trazodone. No problems falling asleep. He has not requested any PRN trazodone in the last week. Appetite is good, usually eats about 50% or greater of all meals. Does not feel too groggy/oversedated. Energy level is up and down, some days better than others. Denies SI/HI. NO delusions or hallucinations. He reports that he is looking forward to going home an is hopeful that he will continue to improve.    *Medical Review of Systems: as reported by pt. All systems reviewed. Only those found to be + are noted below. All others are negative.         *Psychiatric (Physical) Examination: observed phenomenon:  Vitals:    12/27/23 0531   BP: 123/74   Pulse: 67   Resp:    Temp:    SpO2:      General: Awake, alert in no acute distress  Patient Appearance: Appropriate, fairly groomed, lying in bed  Behavior: Appropriate Behavior, Calm, Cooperative  Speech.: Spontaneous, Normal rate and rhythm, Answers appropriately, normal  volume  Mood Comments: \"good\"  Affect: appears euthymic,  Thought Content: Appropriate, No suicidal ideation, No thoughts of self harm,  No homicidal ideation, Contracts for safety, Feels life is worth living  Thought Process: Logical and goal directed, No loosening of associations  Psychosis: No delusions, No hallucinations  Insight: Good insight  Judgment: good judgment  Cognition: Memory appeared " intact in interview., Concentration is intact for age  and education and Fully oriented to person, place, time and circumstance.  Language: Is able to repeat phrases. and Is able to name objects.  Fund of Knowledge: AS expected for age and level of education  Neuro: no tics, tremors, dyskinesias. no dystonias. Gait not observed        Allergies:  Allergies   Allergen Reactions    Penicillins            Medications (currently prescribed at [facility]):        Current Facility-Administered Medications:     ciprofloxacin (Ciloxin) 0.3 % ophthalmic solution 1 Drop, 1 Drop, Both Eyes, Q4HRS WHILE AWAKE, MAXWELL MooreOWang, 1 Drop at 12/15/23 2100    metoprolol SR (Toprol XL) tablet 12.5 mg, 12.5 mg, Oral, Q DAY, MAXWELL MooreO., 12.5 mg at 12/15/23 0512    sertraline (Zoloft) tablet 50 mg, 50 mg, Oral, DAILY, MAXWELL DennisonO., 50 mg at 12/15/23 0812    traZODone (Desyrel) tablet 25 mg, 25 mg, Oral, QHS PRN, MAXWELL DennisonO.    baclofen (Lioresal) tablet 10 mg, 10 mg, Oral, TID, MAXWELL MooreO., 10 mg at 12/15/23 2048    amLODIPine (Norvasc) tablet 10 mg, 10 mg, Oral, DAILY, Juancarlos Garvin D.O., 10 mg at 12/15/23 0511    carboxymethylcellulose (Refresh Tears) 0.5 % ophthalmic drops 1 Drop, 1 Drop, Both Eyes, PRN, LEANA Gross.O., 1 Drop at 11/26/23 2005    apixaban (Eliquis) tablet 5 mg, 5 mg, Oral, BID, MAXWELL MooreO., 5 mg at 12/15/23 2047    hydrALAZINE (Apresoline) tablet 100 mg, 100 mg, Oral, Q8HRS, Sita Grewal M.D., 100 mg at 12/15/23 2100    traZODone (Desyrel) tablet 75 mg, 75 mg, Oral, QHS, MAXWELL MooreOWang, 75 mg at 12/15/23 2047    [DISCONTINUED] insulin regular (HumuLIN R,NovoLIN R) injection, 2-12 Units, Subcutaneous, 4X/DAY ACHS **AND** [CANCELED] POC blood glucose manual result, , , Q AC AND BEDTIME(S) **AND** NOTIFY MD and PharmD, , , Once **AND** Administer 20 grams of glucose (approximately 8 ounces of fruit juice) every 15 minutes PRN FSBG less  than 70 mg/dL, , , PRN **AND** dextrose 50% (D50W) injection 25 g, 25 g, Intravenous, Q15 MIN PRN, Sita Grewal M.D.    Respiratory Therapy Consult, , Nebulization, Continuous RT, Peyton Watkins D.O.    senna-docusate (Pericolace Or Senokot S) 8.6-50 MG per tablet 2 Tablet, 2 Tablet, Oral, BID, 2 Tablet at 12/15/23 2047 **AND** polyethylene glycol/lytes (Miralax) PACKET 1 Packet, 1 Packet, Oral, QDAY PRN, 1 Packet at 23 **AND** magnesium hydroxide (Milk Of Magnesia) suspension 30 mL, 30 mL, Oral, QDAY PRN, 30 mL at 23 0801 **AND** bisacodyl (Dulcolax) suppository 10 mg, 10 mg, Rectal, QDAY PRN, Peyton Watkins D.O.    omeprazole (PriLOSEC) capsule 20 mg, 20 mg, Oral, DAILY, MAXWELL DenisO., 20 mg at 12/15/23 0812    mag hydrox-al hydrox-simeth (Maalox Plus Es Or Mylanta Ds) suspension 20 mL, 20 mL, Oral, Q2HRS PRN, Peyton Watkins D.O.    ondansetron (Zofran ODT) dispertab 4 mg, 4 mg, Oral, 4X/DAY PRN **OR** ondansetron (Zofran) syringe/vial injection 4 mg, 4 mg, Intramuscular, 4X/DAY PRN, Peyton Watkins D.O.    sodium chloride (Ocean) 0.65 % nasal spray 2 Spray, 2 Spray, Nasal, PRN, Peyton Watkins D.O.    [] acetaminophen (Tylenol) tablet 1,000 mg, 1,000 mg, Oral, Q6HRS, 1,000 mg at 23 0530 **FOLLOWED BY** acetaminophen (Tylenol) tablet 1,000 mg, 1,000 mg, Oral, Q6HRS PRN, MAXWELL DenisO., 1,000 mg at 23 0839    oxyCODONE immediate-release (Roxicodone) tablet 2.5 mg, 2.5 mg, Oral, Q3HRS PRN **OR** oxyCODONE immediate-release (Roxicodone) tablet 5 mg, 5 mg, Oral, Q3HRS PRN, LEANA Denis.O., 5 mg at 12/15/23 2053    hydrALAZINE (Apresoline) tablet 25 mg, 25 mg, Oral, Q8HRS PRN, Peyton Watkins D.O., 25 mg at 23 163    atorvastatin (Lipitor) tablet 40 mg, 40 mg, Oral, Nightly, Peyton Watkins D.O., 40 mg at 12/15/23 2048           *Labs:  Lab Results   Component Value Date/Time    SODIUM 139 2023 05:17 AM    POTASSIUM 3.5 (L) 2023 05:17 AM     "CHLORIDE 103 12/26/2023 05:17 AM    CO2 26 12/26/2023 05:17 AM    GLUCOSE 133 (H) 12/26/2023 05:17 AM    BUN 32 (H) 12/26/2023 05:17 AM    CREATININE 2.69 (H) 12/26/2023 05:17 AM      Lab Results   Component Value Date/Time    WBC 5.6 12/22/2023 05:26 AM    RBC 3.88 (L) 12/22/2023 05:26 AM    HEMOGLOBIN 11.8 (L) 12/22/2023 05:26 AM    HEMATOCRIT 35.5 (L) 12/22/2023 05:26 AM    MCV 91.5 12/22/2023 05:26 AM    MCH 30.4 12/22/2023 05:26 AM    MCHC 33.2 12/22/2023 05:26 AM    MPV 10.8 12/22/2023 05:26 AM    NEUTSPOLYS 58.20 12/22/2023 05:26 AM    LYMPHOCYTES 30.40 12/22/2023 05:26 AM    MONOCYTES 8.00 12/22/2023 05:26 AM    EOSINOPHILS 2.80 12/22/2023 05:26 AM    BASOPHILS 0.40 12/22/2023 05:26 AM   Plt-188,000    11/21/23  TSH-0.640  [id number]     EKG 11/11 NSR QTC-436  Imaging  MRI 11/11/23  1.  Right thalamic hemorrhage, decreased in density since prior study  2.  Stranding vasogenic edema appears stable.  3.  Slight asymmetric dilatation of the left ventricular system including the left temporal horn, consider component of hydrocephalus.  4.  Low-density fluid collection in the left temporal fossa, appearance most compatible with arachnoid cyst.  5.  Nonspecific white matter changes, commonly associated with small vessel ischemic disease.  Associated mild cerebral atrophy is noted.  6.  Atherosclerosis.        *ASSESSMENT:   Adjustment disorder  -Patient endorsed increased anxiety and occasional feeling of hopelessness that began after his stroke. He had noted more disrupted sleep that he feels affects his energy levels and anxiety. However he had been participating in OT/PT and had been complaint with recommended medications and treatments. Denied SI. Sleep may also have been affected my discomfort from CPAP     The patient states mood is '\"good.\" HE feels he has made good progress this week. Denies feeling excessively anxious. NO problems with sleep or appetite. Denies SI/HI. More hopeful for the future, would like " to continue zoloft unchanged     *Plan/Further Workup:   Legal status: not applicable     *Medication Managment:          -continue trazodone 75 mg QHS for insomnia and trazodone 25 mg QHS PRN for insomnia  -continue Zoloft to 50 mg daily for depression.   *Labs Reviewed        Will sign off, please provide the following information at discharge for patient to call and make a psychiatry appointment should he chose [facility]. Otherwise patient is aware that PCP can continue zoloft    Rachele Del Rio DO  [facility] [address], [place] [address]  Call [phone] to make an appointment    Thank you for the consult.

## 2023-12-27 NOTE — THERAPY
Physical Therapy   Daily Treatment     Patient Name: Jason Lima  Age:  63 y.o., Sex:  male  Medical Record #: [id number]  Today's Date: 12/27/2023     Precautions  Precautions: Fall Risk  Comments: Left Hemiparesis, left visual inattention    Subjective    Patient seated in w/c and agreeable to therapy.     Objective       12/27/23 1331   PT Charge Group   PT Therapeutic Exercise (Units) 1   PT Therapeutic Activities (Units) 1   PT Total Time Spent   PT Individual Total Time Spent (Mins) 30   Precautions   Precautions Fall Risk   Comments Left Hemiparesis, left visual inattention   Transfer Functional Level of Assist   Bed, Chair, Wheelchair Transfer Contact Guard Assist   Bed Chair Wheelchair Transfer Description Increased time;Initial preparation for task;Supervision for safety  (reach pivot transfer at w/c level)   Sitting Lower Body Exercises   Nustep Resistance Level 3  (to level 4; 10 minutes with BUEs and BLEs (wrap to facilitate L ), 0.21 miles)         Assessment    Patient tolerated session well, requesting to perform Nustep. Initially declined ace wrap to facilitate  however eventually agreeable after losing  several times.    Strengths: Able to follow instructions, Independent prior level of function, Motivated for self care and independence, Pleasant and cooperative, Supportive family, Willingly participates in therapeutic activities  Barriers: Decreased endurance, Hemiparesis, Difficulty following instructions, Fatigue, Hypertension, Impaired activity tolerance, Impaired balance, Impaired insight/denial of deficits, Impaired functional cognition, Impaired sleep pattern, Impulsive, Limited mobility, Visual impairment, Poor family support (lives alone)    Plan    Trial FWW c/ hand  for LUE for improved utilization of L hemiside.   PF stretching and sciatic nerve glides.    LLE, NMES for knee flex/ext on LLE.   Bioness? Try c/ AFO and only thigh cuff active.     DME  PT DME  "Recommendations  Wheelchair: 18\" Width  Cushion: Specialty (See other comments) (TBD pending ambulation progress)  Assistive Device: Tanvir Walker (TBD pending progress, currently 2 person assist for gait)  Additional Equipment: Other (Comments)     Passport items to be completed:  Get in/out of bed safely, in/out of a vehicle, safely use mobility device, walk or wheel around home/community, navigate up and down stairs, show how to get up/down from the ground, ensure home is accessible, demonstrate HEP, complete caregiver training    Physical Therapy Problems (Active)       Problem: PT-Long Term Goals       Dates: Start:  11/13/23         Goal: LTG-By discharge, patient will transfer one surface to another c/ Renata or better.        Dates: Start:  11/13/23    Expected End:  12/23/23         Goal Note filed on 12/27/23 1313 by Isidra Hamilton PT       Inconsistent due to L hemiparesis, occasionally needs up to ModA for L side LOB               Goal: LTG-By discharge, patient will ambulate up/down 1 step c/ LRAD at Renata or better.        Dates: Start:  11/13/23    Expected End:  12/23/23         Goal Note filed on 12/27/23 1313 by Isidra Hamilton PT       Last performance: MaxA x 1, 2nd person CGA safety for 4 steps c/ RHR               Goal: LTG-By discharge, patient will transfer in/out of a car c/ ModA or better.        Dates: Start:  11/13/23    Expected End:  12/23/23         Goal Note filed on 12/27/23 1313 by Isidra Hamilton PT       Needs assessment                 "

## 2023-12-27 NOTE — THERAPY
"Physical Therapy   Daily Treatment     Patient Name: Jsaon Lima  Age:  63 y.o., Sex:  male  Medical Record #: [id number]  Today's Date: 12/27/2023     Precautions  Precautions: Fall Risk  Comments: Left Hemiparesis, left visual inattention    Subjective    \"I get distracted and then I don't do as well when I try to move.\" Pt in w/c at arrival, agreeable to PT tx.      Objective       12/27/23 1031   PT Charge Group   PT Gait Training (Units) 1   PT Therapeutic Exercise (Units) 1   PT Neuromuscular Re-Education / Balance (Units) 2   PT Total Time Spent   PT Individual Total Time Spent (Mins) 60   Gait Functional Level of Assist    Gait Level Of Assist Moderate Assist  (Renata c/ L AFO donned)   Assistive Device Tanvir-Walker   Distance (Feet) 80   # of Times Distance was Traveled 1  (1 x 65')   Deviation Increased Base Of Support;Decreased Base Of Support;Decreased Heel Strike;Decreased Toe Off   Transfer Functional Level of Assist   Bed, Chair, Wheelchair Transfer Contact Guard Assist  (up to ModA due to variable midline control)   Bed Chair Wheelchair Transfer Description Adaptive equipment;Increased time;Verbal cueing  (stand step c/ HW <> w/c)   Supine Lower Body Exercise   Bridges Two Legged;1 set of 10  (Straight leg bridge on ball; then SL bridge hold c/ LLE, RLE leg lifts 2 x 5)   Other Exercises AAROM hams curls on ball 1 x 10 BLE, LLE only 1 x 10, isometrics on LLE only 1 x 10, then BLE curl isometrics 1 x 10   Sitting Lower Body Exercises   Sit to Stand   (1 x 8 from w/c, assist on L side for LOB)   Bed Mobility    Supine to Sit Standby Assist   Sit to Supine Minimal Assist  (for LLE management)   Sit to Stand Contact Guard Assist  (CGA<>ModA for intermittent L side LOB)   Scooting Standby Assist   Neuro-Muscular Treatments   Neuro-Muscular Treatments Anterior weight shift;Verbal Cuing;Tapping;Tactile Cuing;Sequencing;Weight Shift Right;Weight Shift Left;Postural Changes;Postural " "Facilitation;Facilitation;Co-Contraction;Joint Approximation   Comments Seated punches and ball pushes c/ alt then BUE x 10 each, then resisted BUE pushes x 10; then standing unilateral pushes LUE x 10, then BUE 2 x 8-10 c/ increased resistance over serial efforts; blocking at LLE and steadying assist     Increased time to discuss implications of cognitive distraction on mobility, strategies to \"reset\" attention and manage stress/feelings of pressure associated with performance of mobility tasks.     Reactive seated and standing activities c/ ball punches and pushes for stress management and change of activity to help prime postural control and redirect attention prior to gait training.     Mat mobility and hams strengthening as above.    Time spent to program Bioness for L hams and DF assist, then gait c/ HW 1 x 80' c/ Bioness for knee flexion assist, DF assist. Decreased foot clearance achieved despite multiple adjustments of pads and settings.     Gait c/ L AFO 1 x 65' to compare foot clearance and performance without FES assist. Manual facilitation of trunk extension and WS to RLE stance, pelvic rotation to improve swing through on LLE.     Stand step around turning/transfers c/ HW btw gait bouts, CGA c/ VC for sequencing foot placement.     Assessment    Jason c/ increased PF tone limiting efficacy of Bioness to achieve foot clearance during swing. Pt  Initially with heavy L lean during STS due to self identified stress and distraction associated with conversation with family prior to session. Improved to CGA by end of session from w/c and for stand-step around transfers.     Improving AROM and AAROM of LUE seen during pushing drills, also improving L hams c/ knee flexion in midrange, but poor ability to initiate knee flexion with substitution of hip flexion when attempting to initiate knee flexion.     Strengths: Able to follow instructions, Independent prior level of function, Motivated for self care and " "independence, Pleasant and cooperative, Supportive family, Willingly participates in therapeutic activities  Barriers: Decreased endurance, Hemiparesis, Difficulty following instructions, Fatigue, Hypertension, Impaired activity tolerance, Impaired balance, Impaired insight/denial of deficits, Impaired functional cognition, Impaired sleep pattern, Impulsive, Limited mobility, Visual impairment, Poor family support (lives alone)    Plan    Trial FWW c/ hand  for LUE for improved utilization of L hemiside.   PF stretching and sciatic nerve glides.    LLE, NMES for knee flex/ext on LLE.   Bioness? Try c/ AFO and only thigh cuff active.    DME  PT DME Recommendations  Wheelchair: 18\" Width  Cushion: Specialty (See other comments) (TBD pending ambulation progress)  Assistive Device: Tanvir Walker (TBD pending progress, currently 2 person assist for gait)  Additional Equipment: Other (Comments)    Passport items to be completed:  Get in/out of bed safely, in/out of a vehicle, safely use mobility device, walk or wheel around home/community, navigate up and down stairs, show how to get up/down from the ground, ensure home is accessible, demonstrate HEP, complete caregiver training    Physical Therapy Problems (Active)       Problem: PT-Long Term Goals       Dates: Start:  11/13/23         Goal: LTG-By discharge, patient will ambulate >/= 50' c/ LRAD at Renata or better.        Dates: Start:  11/13/23    Expected End:  12/23/23         Goal Note filed on 12/20/23 0846 by Isidra Hamilton, PT       ModA c/ HW.               Goal: LTG-By discharge, patient will transfer one surface to another c/ Renata or better.        Dates: Start:  11/13/23    Expected End:  12/23/23         Goal Note filed on 12/20/23 0846 by Isidra Hamilton, PT       Partially met: Renata c/ squat pivot, ModA for stand pivot/step.               Goal: LTG-By discharge, patient will ambulate up/down 1 step c/ LRAD at Renata or better.        Dates: Start:  11/13/23  "   Expected End:  12/23/23         Goal Note filed on 12/20/23 0846 by Isidra Hamilton PT       Needs assessment.               Goal: LTG-By discharge, patient will transfer in/out of a car c/ ModA or better.        Dates: Start:  11/13/23    Expected End:  12/23/23         Goal Note filed on 12/20/23 0846 by Isidra Hamilton PT       Needs assessment.

## 2023-12-28 ENCOUNTER — APPOINTMENT (OUTPATIENT)
Dept: PHYSICAL THERAPY | Facility: REHABILITATION | Age: 63
DRG: 057 | End: 2023-12-28
Attending: PHYSICAL MEDICINE & REHABILITATION
Payer: COMMERCIAL

## 2023-12-28 ENCOUNTER — APPOINTMENT (OUTPATIENT)
Dept: PHYSICAL THERAPY | Facility: REHABILITATION | Age: 63
DRG: 057 | End: 2023-12-28
Attending: HOSPITALIST
Payer: COMMERCIAL

## 2023-12-28 ENCOUNTER — APPOINTMENT (OUTPATIENT)
Dept: OCCUPATIONAL THERAPY | Facility: REHABILITATION | Age: 63
DRG: 057 | End: 2023-12-28
Attending: PHYSICAL MEDICINE & REHABILITATION
Payer: COMMERCIAL

## 2023-12-28 PROCEDURE — 700102 HCHG RX REV CODE 250 W/ 637 OVERRIDE(OP): Performed by: HOSPITALIST

## 2023-12-28 PROCEDURE — A9270 NON-COVERED ITEM OR SERVICE: HCPCS | Performed by: PHYSICAL MEDICINE & REHABILITATION

## 2023-12-28 PROCEDURE — A9270 NON-COVERED ITEM OR SERVICE: HCPCS | Performed by: STUDENT IN AN ORGANIZED HEALTH CARE EDUCATION/TRAINING PROGRAM

## 2023-12-28 PROCEDURE — 97110 THERAPEUTIC EXERCISES: CPT

## 2023-12-28 PROCEDURE — 97032 APPL MODALITY 1+ESTIM EA 15: CPT

## 2023-12-28 PROCEDURE — 97530 THERAPEUTIC ACTIVITIES: CPT

## 2023-12-28 PROCEDURE — 700102 HCHG RX REV CODE 250 W/ 637 OVERRIDE(OP): Performed by: PHYSICAL MEDICINE & REHABILITATION

## 2023-12-28 PROCEDURE — 97116 GAIT TRAINING THERAPY: CPT

## 2023-12-28 PROCEDURE — 770010 HCHG ROOM/CARE - REHAB SEMI PRIVAT*

## 2023-12-28 PROCEDURE — A9270 NON-COVERED ITEM OR SERVICE: HCPCS | Performed by: HOSPITALIST

## 2023-12-28 PROCEDURE — 700102 HCHG RX REV CODE 250 W/ 637 OVERRIDE(OP): Performed by: STUDENT IN AN ORGANIZED HEALTH CARE EDUCATION/TRAINING PROGRAM

## 2023-12-28 PROCEDURE — 99232 SBSQ HOSP IP/OBS MODERATE 35: CPT | Performed by: PHYSICAL MEDICINE & REHABILITATION

## 2023-12-28 PROCEDURE — 97535 SELF CARE MNGMENT TRAINING: CPT | Mod: CO

## 2023-12-28 PROCEDURE — 97112 NEUROMUSCULAR REEDUCATION: CPT

## 2023-12-28 RX ORDER — BACLOFEN 10 MG/1
5 TABLET ORAL 3 TIMES DAILY
Status: DISCONTINUED | OUTPATIENT
Start: 2023-12-28 | End: 2024-01-02 | Stop reason: HOSPADM

## 2023-12-28 RX ADMIN — OMEPRAZOLE 20 MG: 20 CAPSULE, DELAYED RELEASE ORAL at 07:50

## 2023-12-28 RX ADMIN — BACLOFEN 10 MG: 10 TABLET ORAL at 14:14

## 2023-12-28 RX ADMIN — HYDRALAZINE HYDROCHLORIDE 100 MG: 50 TABLET, FILM COATED ORAL at 14:14

## 2023-12-28 RX ADMIN — SERTRALINE 50 MG: 50 TABLET, FILM COATED ORAL at 07:50

## 2023-12-28 RX ADMIN — METOPROLOL SUCCINATE 37.5 MG: 25 TABLET, EXTENDED RELEASE ORAL at 05:26

## 2023-12-28 RX ADMIN — BACLOFEN 5 MG: 10 TABLET ORAL at 21:09

## 2023-12-28 RX ADMIN — AMLODIPINE BESYLATE 10 MG: 5 TABLET ORAL at 05:26

## 2023-12-28 RX ADMIN — BACLOFEN 10 MG: 10 TABLET ORAL at 07:50

## 2023-12-28 RX ADMIN — ATORVASTATIN CALCIUM 40 MG: 40 TABLET, FILM COATED ORAL at 21:09

## 2023-12-28 RX ADMIN — APIXABAN 5 MG: 5 TABLET, FILM COATED ORAL at 07:50

## 2023-12-28 RX ADMIN — HYDRALAZINE HYDROCHLORIDE 100 MG: 50 TABLET, FILM COATED ORAL at 21:09

## 2023-12-28 RX ADMIN — HYDRALAZINE HYDROCHLORIDE 100 MG: 50 TABLET, FILM COATED ORAL at 05:26

## 2023-12-28 RX ADMIN — APIXABAN 5 MG: 5 TABLET, FILM COATED ORAL at 21:09

## 2023-12-28 RX ADMIN — TRAZODONE HYDROCHLORIDE 75 MG: 50 TABLET ORAL at 21:08

## 2023-12-28 ASSESSMENT — GAIT ASSESSMENTS
GAIT LEVEL OF ASSIST: MINIMAL ASSIST
ASSISTIVE DEVICE: HEMI-WALKER
ASSISTIVE DEVICE: PARALLEL BARS
DISTANCE (FEET): 25
GAIT LEVEL OF ASSIST: TOTAL ASSIST
DISTANCE (FEET): 32

## 2023-12-28 ASSESSMENT — ACTIVITIES OF DAILY LIVING (ADL)
BED_CHAIR_WHEELCHAIR_TRANSFER_DESCRIPTION: ADAPTIVE EQUIPMENT;INCREASED TIME;VERBAL CUEING
TOILET_TRANSFER_DESCRIPTION: GRAB BAR;INCREASED TIME;SUPERVISION FOR SAFETY;VERBAL CUEING;SET-UP OF EQUIPMENT
TOILETING_LEVEL_OF_ASSIST_DESCRIPTION: INCREASED TIME
BED_CHAIR_WHEELCHAIR_TRANSFER_DESCRIPTION: ADAPTIVE EQUIPMENT;INITIAL PREPARATION FOR TASK;SUPERVISION FOR SAFETY
BED_CHAIR_WHEELCHAIR_TRANSFER_DESCRIPTION: ADAPTIVE EQUIPMENT;INCREASED TIME;SET-UP OF EQUIPMENT;VERBAL CUEING

## 2023-12-28 NOTE — CARE PLAN
Problem: Bowel Elimination  Goal: Patient will participate in bowel management program  Note: Pt refused scheduled senna at .Continent of bowel per report.LBM 12/26.Will continue to monitor.

## 2023-12-28 NOTE — THERAPY
Physical Therapy   Daily Treatment     Patient Name: Jason Lima  Age:  63 y.o., Sex:  male  Medical Record #: [id number]  Today's Date: 12/28/2023     Precautions  Precautions: Fall Risk  Comments: Left Hemiparesis, left visual inattention    Subjective    Pt asking if his clothes were in the laundry. Requested to perform NuStep however he had performed in previous PT session this day so opted for UE motomed.      Objective       12/27/23 1530   PT Charge Group   PT Therapeutic Exercise (Units) 1   PT Therapeutic Activities (Units) 1   PT Total Time Spent   PT Individual Total Time Spent (Mins) 30   Sitting Lower Body Exercises   Other Exercises B UE motomed level 5 10min with ace wrap assist to maintain L UE grasp   Interdisciplinary Plan of Care Collaboration   IDT Collaboration with  Certified Nursing Assistant   Patient Position at End of Therapy Seated;Chair Alarm On;Self Releasing Lap Belt Applied;Call Light within Reach;Tray Table within Reach;Phone within Reach   Collaboration Comments PM vitals     Unable to locate pt's clothes in laundry room. He believes he may have had an old note on his white board    Gait training deferred due to lack of tech assist, pt requesting to use bike    Assessment    Pt was unable to maintain L UE grasp on UE motomed without ace wrap assist. He was able to complete 10 min of continuous propulsion with even symmetry between UE.  Strengths: Able to follow instructions, Independent prior level of function, Motivated for self care and independence, Pleasant and cooperative, Supportive family, Willingly participates in therapeutic activities  Barriers: Decreased endurance, Hemiparesis, Difficulty following instructions, Fatigue, Hypertension, Impaired activity tolerance, Impaired balance, Impaired insight/denial of deficits, Impaired functional cognition, Impaired sleep pattern, Impulsive, Limited mobility, Visual impairment, Poor family support (lives alone)    Plan    Trial FW  "c/ hand  for LUE for improved utilization of L hemiside.   PF stretching and sciatic nerve glides.    LLE, NMES for knee flex/ext on LLE.   Bioness? Try c/ AFO and only thigh cuff active.    DME  PT DME Recommendations  Wheelchair: 18\" Width  Cushion: Specialty (See other comments)  Assistive Device: Tanvir Walker  Additional Equipment: Other (Comments)    Passport items to be completed:  Get in/out of bed safely, in/out of a vehicle, safely use mobility device, walk or wheel around home/community, navigate up and down stairs, show how to get up/down from the ground, ensure home is accessible, demonstrate HEP, complete caregiver training    Physical Therapy Problems (Active)       Problem: PT-Long Term Goals       Dates: Start:  11/13/23         Goal: LTG-By discharge, patient will transfer one surface to another c/ Renata or better.        Dates: Start:  11/13/23    Expected End:  12/23/23         Goal Note filed on 12/27/23 1313 by Isidra Hamilton PT       Inconsistent due to L hemiparesis, occasionally needs up to ModA for L side LOB               Goal: LTG-By discharge, patient will ambulate up/down 1 step c/ LRAD at Renata or better.        Dates: Start:  11/13/23    Expected End:  12/23/23         Goal Note filed on 12/27/23 1313 by Isidra Hamilton PT       Last performance: MaxA x 1, 2nd person CGA safety for 4 steps c/ RHR               Goal: LTG-By discharge, patient will transfer in/out of a car c/ ModA or better.        Dates: Start:  11/13/23    Expected End:  12/23/23         Goal Note filed on 12/27/23 1313 by Isidra Hamilton PT       Needs assessment                 "

## 2023-12-28 NOTE — PROGRESS NOTES
NURSING DAILY NOTE    Name: Jason Lima   Date of Admission: 11/12/2023   Admitting Diagnosis: Thalamic hemorrhage (HCC)  Attending Physician: Lisa Lee D.o.  Allergies: Penicillins    Safety  Patient Assist  mod to max 1  Patient Precautions  Fall Risk  Precaution Comments  Left Hemiparesis, left visual inattention  Bed Transfer Status  Contact Guard Assist  Toilet Transfer Status   Minimal Assist  Assistive Devices  Rails, Wheelchair  Oxygen  CPAP  Diet/Therapeutic Dining  Current Diet Order   Procedures    Diet Order Diet: Consistent CHO (Diabetic) (2 gram sodium restriction; medications whole with thin liquids); Second Modifier: (optional): 2 Gram Sodium     Pill Administration  whole  Agitated Behavioral Scale  14  ABS Level of Severity  No Agitation    Fall Risk  Has the patient had a fall this admission?   Yes  Shellie Hamilton Fall Risk Scoring  22, HIGH RISK  Fall Risk Safety Measures  bed alarm and chair alarm    Vitals  Temperature: 36.7 °C (98 °F)  Temp src: Tympanic  Pulse: 65  Respiration: 18  Blood Pressure: (!) 140/85  Blood Pressure MAP (Calculated): 103 MM HG  BP Location: Right, Upper Arm  Patient BP Position: Supine     Oxygen  Pulse Oximetry: 96 %  O2 (LPM): 0  FiO2%: 21 %  O2 Delivery Device: CPAP    Bowel and Bladder  Last Bowel Movement  12/26/23  Stool Type  Type 6: Fluffy pieces with ragged edges, a mushy stool  Bowel Device  Bathroom  Continent  Bladder: Continent void   Bowel: Continent movement  Bladder Function  Urine Void (mL): 500 ml  Number of Times Voided: 2  Urinary Options: Yes  Urine Color: Yellow  Number of Times Incontinent of Urine: 0  Genitourinary Assessment   Bladder Assessment (WDL):  WDL Except  Echols Catheter: Not Applicable  Urine Color: Yellow  Number of Bladder Accidents: 0  Total Number of Bladder of Accidents in Last 7 Days: 0  Number of Times Incontinent of Urine: 0  Bladder Device: Urinal  Time Void:  No  Bladder Scan: Post Void  $ Bladder Scan Results (mL): 26    Skin  Polo Score   16  Sensory Interventions   Bed Types: Standard/Trauma Mattress  Skin Preventative Measures: Pillows in Use for Support / Positioning  Moisture Interventions  Moisturizers/Barriers: Barrier Wipes, Barrier Paste      Pain  Pain Rating Scale  0 - No Pain  Pain Location  Back  Pain Location Orientation  Lower  Pain Interventions   Declines    ADLs    Bathing   Patient Refused Bathing (Pt took a shower earlier today during OT session)  Linen Change   Complete  Personal Hygiene  Perineal Care, Moist Deja Wipes  Chlorhexidine Bath      Oral Care  Brushed Teeth  Teeth/Dentures     Shave  Self  Nutrition Percentage Eaten  Lunch, Between % Consumed  Environmental Precautions  Treaded Slipper Socks on Patient, Personal Belongings, Wastebasket, Call Bell etc. in Easy Reach, Transferred to Stronger Side, Bed in Low Position  Patient Turns/Positioning  Patient Turns Self from Side to Side  Patient Turns Assistance/Tolerance  Assistance of One  Bed Positions  Bed Controls On, Bed Locked  Head of Bed Elevated  Self regulated      Psychosocial/Neurologic Assessment  Psychosocial Assessment  Psychosocial (WDL):  WDL Except  Patient Behaviors: Fatigue  Neurologic Assessment  Neuro (WDL): Exceptions to WDL  Level of Consciousness: Alert  Orientation Level: Oriented X4  Cognition: Follows commands, Appropriate attention/concentration  Speech: Clear  Facial Symmetry: Left facial drooping  Pupil Assesment: Yes  R Pupil Size (mm): 3  R Pupil Shape / Description: Round  R Pupil Reaction: Brisk  L Pupil Size (mm): 3  L Pupil Shape / Description: Round  L Pupil Reaction: Brisk  Reflexes: Cough  Cough Reflex: Present  Motor Function/Sensation Assessment: Dorsiflexion, Motor response  R Foot Dorsiflexion: Strong  L Foot Dorsiflexion: Absent  RUE Motor Response: Responds to commands  RUE Sensation: Full sensation  Muscle Strength Right Arm: Normal  Strength Against Gravity and Full Resistance  LUE Motor Response: Flaccid  LUE Sensation: Numbness, Tingling  Muscle Strength Left Arm: Weak Movement but Not Against Gravity or Resistance  RLE Motor Response: Responds to commands  RLE Sensation: Full sensation  Muscle Strength Right Leg: Good Strength Against Gravity and Moderate Resistance  LLE Motor Response: Flaccid  LLE Sensation: Numbness, Tingling  Muscle Strength Left Leg: Weak Movement but Not Against Gravity or Resistance  EENT (WDL):  WDL Except    Cardio/Pulmonary Assessment  Edema   RLE Edema: Trace  LLE Edema: Trace  Respiratory Breath Sounds  RUL Breath Sounds: Clear  RML Breath Sounds: Clear  RLL Breath Sounds: Clear, Diminished  MOHAMUD Breath Sounds: Clear  LLL Breath Sounds: Clear, Diminished  Cardiac Assessment   Cardiac (WDL):  WDL Except (H/O HTN.)

## 2023-12-28 NOTE — THERAPY
Physical Therapy   Daily Treatment     Patient Name: Jason Lima  Age:  63 y.o., Sex:  male  Medical Record #: [id number]  Today's Date: 12/28/2023     Precautions  Precautions: Fall Risk  Comments: Left Hemiparesis, left visual inattention    Subjective    Patient motivated by small improvements; responds well to demonstration during ambulation work     Objective       12/28/23 1501   PT Charge Group   PT Gait Training (Units) 1   PT Therapeutic Activities (Units) 1   PT Total Time Spent   PT Individual Total Time Spent (Mins) 30   Gait Functional Level of Assist    Gait Level Of Assist Minimal Assist   Assistive Device Tanvir-Walker   Distance (Feet) 25   # of Times Distance was Traveled 2   Deviation   (left circumduction with fatigue)   Transfer Functional Level of Assist   Bed, Chair, Wheelchair Transfer Moderate Assist  (mod towards left , min towards right)   Supine Lower Body Exercise   Supine Lower Body Exercises   (hand-out given)   Hip Abduction 1 set of 10;Bilateral   Hip Adduction  1 set of 10;Bilateral   Heel Slide 1 set of 10;Bilateral   Ankle Pumps 1 set of 10;Bilateral   Gluteal Isometrics 1 set of 10;Bilateral   Quadriceps Isometrics 10 times per hour;Bilateral   Bed Mobility    Sit to Stand Contact Guard Assist  (5x from elevated mat with mirror to hemiwalker with CGA for balance)   Interdisciplinary Plan of Care Collaboration   IDT Collaboration with  Physical Therapist;Therapy Tech   Collaboration Comments treatment planning, SBA for transfers, w/c follow during ambulation     Right sidelying stretch 5min    Assessment    Patient demonstrates left leg circumduction with fatigue; once demonstrated pattern able to correct for approx 15ft before fatigues and begins circumduction pattern due to fatigue    Strengths: Able to follow instructions, Independent prior level of function, Motivated for self care and independence, Pleasant and cooperative, Supportive family, Willingly participates in  "therapeutic activities  Barriers: Decreased endurance, Hemiparesis, Difficulty following instructions, Fatigue, Hypertension, Impaired activity tolerance, Impaired balance, Impaired insight/denial of deficits, Impaired functional cognition, Impaired sleep pattern, Impulsive, Limited mobility, Visual impairment, Poor family support (lives alone)    Plan    Gait training c/ L AFO and HW   for pregait priming, DF stretching, trunk stretching for improved midline     Retry STS c/ L > R prisms to assess for impact on midline control     Trial FWW c/ hand  for LUE for improved utilization of L hemiside.   PF stretching and sciatic nerve glides.    LLE, NMES for knee flex/ext on LLE.   Bioness? Try c/ AFO and only thigh cuff active.        DME  PT DME Recommendations  Wheelchair: 18\" Width  Cushion: Specialty (See other comments)  Assistive Device: Tanvir Walker  Additional Equipment: Other (Comments)     Passport items to be completed:  Get in/out of bed safely, in/out of a vehicle, safely use mobility device, walk or wheel around home/community, navigate up and down stairs, show how to get up/down from the ground, ensure home is accessible, demonstrate HEP, complete caregiver training    Physical Therapy Problems (Active)       Problem: PT-Long Term Goals       Dates: Start:  11/13/23         Goal: LTG-By discharge, patient will transfer one surface to another c/ Renata or better.        Dates: Start:  11/13/23    Expected End:  12/23/23         Goal Note filed on 12/27/23 1313 by Isidra Hamilton, PT       Inconsistent due to L hemiparesis, occasionally needs up to ModA for L side LOB               Goal: LTG-By discharge, patient will ambulate up/down 1 step c/ LRAD at Renata or better.        Dates: Start:  11/13/23    Expected End:  12/23/23         Goal Note filed on 12/27/23 1313 by Isidra Hamilton, PT       Last performance: MaxA x 1, 2nd person CGA safety for 4 steps c/ RHR               Goal: LTG-By discharge, " patient will transfer in/out of a car c/ ModA or better.        Dates: Start:  11/13/23    Expected End:  12/23/23         Goal Note filed on 12/27/23 1313 by Isidra Hamilton PT       Needs assessment

## 2023-12-28 NOTE — PROGRESS NOTES
NURSING DAILY NOTE    Name: Jason Lima   Date of Admission: 11/12/2023   Admitting Diagnosis: Thalamic hemorrhage (HCC)  Attending Physician: Lisa Lee D.o.  Allergies: Penicillins    Safety  Patient Assist  mod to max 1  Patient Precautions  Fall Risk  Precaution Comments  Left Hemiparesis, left visual inattention  Bed Transfer Status  Contact Guard Assist  Toilet Transfer Status   Minimal Assist  Assistive Devices  Rails, Wheelchair  Oxygen  CPAP  Diet/Therapeutic Dining  Current Diet Order   Procedures    Diet Order Diet: Consistent CHO (Diabetic) (2 gram sodium restriction; medications whole with thin liquids); Second Modifier: (optional): 2 Gram Sodium     Pill Administration  whole  Agitated Behavioral Scale  14  ABS Level of Severity  No Agitation    Fall Risk  Has the patient had a fall this admission?   Yes  Shellie Hamilton Fall Risk Scoring  22, HIGH RISK  Fall Risk Safety Measures  bed alarm, chair alarm, fall during this hospitalization, and poor balance    Vitals  Temperature: 36.7 °C (98 °F)  Temp src: Tympanic  Pulse: 60  Respiration: 18  Blood Pressure: (!) 140/88  Blood Pressure MAP (Calculated): 105 MM HG  BP Location: Right, Upper Arm  Patient BP Position: Supine     Oxygen  Pulse Oximetry: 96 %  O2 (LPM): 0  FiO2%: 21 %  O2 Delivery Device: CPAP    Bowel and Bladder  Last Bowel Movement  12/26/23  Stool Type  Type 6: Fluffy pieces with ragged edges, a mushy stool  Bowel Device  Bathroom  Continent  Bladder: Continent void   Bowel: Continent movement  Bladder Function  Urine Void (mL): 500 ml  Number of Times Voided: 2  Urinary Options: Yes  Urine Color: Pale  Number of Times Incontinent of Urine: 0  Genitourinary Assessment   Bladder Assessment (WDL):  WDL Except  Echols Catheter: Not Applicable  Urine Color: Pale  Number of Bladder Accidents: 0  Total Number of Bladder of Accidents in Last 7 Days: 0  Number of Times Incontinent of  Urine: 0  Bladder Device: Urinal  Time Void: No  Bladder Scan: Post Void  $ Bladder Scan Results (mL): 26    Skin  Polo Score   16  Sensory Interventions   Bed Types: Standard/Trauma Mattress  Skin Preventative Measures: Pillows in Use for Support / Positioning  Moisture Interventions  Moisturizers/Barriers: Barrier Paste, Barrier Wipes      Pain  Pain Rating Scale  0 - No Pain  Pain Location  Back  Pain Location Orientation  Lower  Pain Interventions   Declines    ADLs    Bathing   Shower  Linen Change   Complete  Personal Hygiene  Perineal Care, Moist Deja Wipes  Chlorhexidine Bath      Oral Care  Brushed Teeth  Teeth/Dentures     Shave  Self  Nutrition Percentage Eaten  Lunch, Between % Consumed  Environmental Precautions  Treaded Slipper Socks on Patient, Personal Belongings, Wastebasket, Call Bell etc. in Easy Reach, Transferred to Stronger Side, Bed in Low Position  Patient Turns/Positioning  Patient Turns Self from Side to Side  Patient Turns Assistance/Tolerance  Assistance of One  Bed Positions  Bed Controls On, Bed Locked  Head of Bed Elevated  Self regulated      Psychosocial/Neurologic Assessment  Psychosocial Assessment  Psychosocial (WDL):  WDL Except  Patient Behaviors: Fatigue  Neurologic Assessment  Neuro (WDL): Exceptions to WDL  Level of Consciousness: Alert  Orientation Level: Oriented X4  Cognition: Follows commands, Appropriate attention/concentration  Speech: Clear  Facial Symmetry: Left facial drooping  Pupil Assesment: Yes  R Pupil Size (mm): 3  R Pupil Shape / Description: Round  R Pupil Reaction: Brisk  L Pupil Size (mm): 3  L Pupil Shape / Description: Round  L Pupil Reaction: Brisk  Reflexes: Cough  Cough Reflex: Present  Motor Function/Sensation Assessment: Dorsiflexion, Motor response  R Foot Dorsiflexion: Strong  L Foot Dorsiflexion: Absent  RUE Motor Response: Responds to commands  RUE Sensation: Full sensation  Muscle Strength Right Arm: Normal Strength Against Gravity and  Full Resistance  LUE Motor Response: Flaccid  LUE Sensation: Numbness, Tingling  Muscle Strength Left Arm: Weak Movement but Not Against Gravity or Resistance  RLE Motor Response: Responds to commands  RLE Sensation: Full sensation  Muscle Strength Right Leg: Good Strength Against Gravity and Moderate Resistance  LLE Motor Response: Flaccid  LLE Sensation: Numbness, Tingling  Muscle Strength Left Leg: Weak Movement but Not Against Gravity or Resistance  EENT (WDL):  WDL Except    Cardio/Pulmonary Assessment  Edema   RLE Edema: Trace  LLE Edema: Trace  Respiratory Breath Sounds  RUL Breath Sounds: Clear  RML Breath Sounds: Clear  RLL Breath Sounds: Clear, Diminished  MOHAMUD Breath Sounds: Clear  LLL Breath Sounds: Clear, Diminished  Cardiac Assessment   Cardiac (WDL):  WDL Except (H/O HTN.)

## 2023-12-28 NOTE — THERAPY
"Occupational Therapy  Daily Treatment     Patient Name: Jason Lima  Age:  63 y.o., Sex:  male  Medical Record #: [id number]  Today's Date: 12/28/2023     Precautions  Precautions: (P) Fall Risk  Comments: (P) Left Hemiparesis, left visual inattention         Subjective    Pt encountered for OT in room, pleasant and agreeable to participate in UE exercises. \"I'm so tired. I had 4 therapy sessions today.\"     Objective       12/28/23 1430   OT Charge Group   OT Therapy Activity (Units) 1   OT Therapeutic Exercise (Units) 1   OT Total Time Spent   OT Individual Total Time Spent (Mins) 30   Precautions   Precautions Fall Risk   Comments Left Hemiparesis, left visual inattention   Functional Level of Assist   Bed, Chair, Wheelchair Transfer Minimal Assist  (min to modA; towards the R-side)   Bed Chair Wheelchair Transfer Description Adaptive equipment;Initial preparation for task;Supervision for safety  (stand pivot from WC to exercise bike/mat. Moderate VC for foot placement (wide TRUMAN) and midline.)   Supine Upper Body Exercises   Supine Upper Body Exercises Yes   Other Exercise AAROM of L shoulder supine on mat to increase flexability and reduce pain for functional recovery. Prone on B FA while WB through shoulder, tolerated up to 4 min.   Fine Motor / Dexterity    Fine Motor / Dexterity Yes   Fine Motor / Dexterity Interventions thumb/finger opposition   Comments  5 reps; able to complete thumb pad to 2-4D independently; 3/4 of the distance from 5D.   Sitting Lower Body Exercises   Nustep Resistance Level 1;Time (See Comments)  (4 min / 10 min; LUE push and pull using visual feedback to prevent  release. Pt limited by fatigue. \"I did a lot of arm exercises today.\")   Interdisciplinary Plan of Care Collaboration   IDT Collaboration with  Physical Therapist   Patient Position at End of Therapy Seated;Chair Alarm On;Other (Comments)  (hand off to PT in gym)   Collaboration Comments CLOF         Assessment    Pt with " cont improvements in LUE AROM, dexterity, and strength. Pt does better when visually attending to L hand to maintain grasp as well as VC to position wrist into slight extension. Good ability to maintain weight in prone on FA, but some assistance needed to reposition L shoulder into a neutral position.     Strengths: Able to follow instructions, Independent prior level of function, Motivated for self care and independence, Pleasant and cooperative, Supportive family  Barriers: Decreased endurance, Fatigue, Generalized weakness, Hemiparesis, Impaired activity tolerance, Impaired balance, Limited mobility, Spasticity    Plan    Cont ADLs, functional transfers, neuro re-ed, and thera act/ex to maximize functional recovery for safe DC home.       DME  OT DME Recommendations  Bathroom Equipment: 3 in 1 Commode, Tub Transfer Bench (Medicaid Only)  Additional Equipment: Other (Comments)    Passport items to be completed:  Perform bathroom transfers, complete dressing, complete feeding, get ready for the day, prepare a simple meal, participate in household tasks, adapt home for safety needs, demonstrate home exercise program, complete caregiver training     Occupational Therapy Goals (Active)       Problem: Dressing       Dates: Start:  11/22/23         Goal: STG-Within one week, patient will dress LB at [facility] using LRD       Dates: Start:  11/22/23    Expected End:  12/23/23            Goal: STG-Within one week, patient will dress UB SPV using LRD       Dates: Start:  12/06/23    Expected End:  12/23/23               Problem: Functional Transfers       Dates: Start:  12/06/23         Goal: STG-Within one week, patient will transfer to step in shower at [facility] to CGA using LRD       Dates: Start:  12/06/23    Expected End:  12/23/23               Problem: OT Long Term Goals       Dates: Start:  11/13/23         Goal: LTG-By discharge, patient will complete basic self care tasks at Mod Independent level.       Dates: Start:   11/13/23    Expected End:  12/23/23            Goal: LTG-By discharge, patient will perform bathroom transfers at Mod Independent level.       Dates: Start:  11/13/23    Expected End:  12/23/23

## 2023-12-28 NOTE — THERAPY
"Physical Therapy   Daily Treatment     Patient Name: Jason Lima  Age:  63 y.o., Sex:  male  Medical Record #: [id number]  Today's Date: 12/28/2023     Precautions  Precautions: Fall Risk  Comments: Left Hemiparesis, left visual inattention    Subjective    \"I want to work on getting my arm better so I can use it more.\" Pt in w/c at arrival, agreeable to PT tx.      Objective       12/28/23 1031   PT Charge Group   PT Electrical Stimulation Attended (Units) 2   PT Gait Training (Units) 1   PT Neuromuscular Re-Education / Balance (Units) 1   PT Total Time Spent   PT Individual Total Time Spent (Mins) 60   Gait Functional Level of Assist    Gait Level Of Assist Total Assist  (Help for sequencing hands and feet c/ BUE on bars, steadying, multimodal cues for sequencing and pacing)   Assistive Device Parallel Bars   Distance (Feet) 32   # of Times Distance was Traveled 1   Deviation Decreased Base Of Support;Increased Base Of Support;Decreased Heel Strike;Decreased Toe Off  (decreased push off LLE, foot drop and midfoot first contact without brace, tendency to cross midline during swing)   Transfer Functional Level of Assist   Bed, Chair, Wheelchair Transfer Moderate Assist  (increased steadying to L side for balance, VC/MC for sequencing and attention to LE placement to maintain TRUMAN)   Bed Chair Wheelchair Transfer Description Adaptive equipment;Increased time;Set-up of equipment;Verbal cueing   Toilet Transfers Minimal Assist  (Mod to MaxA for standing balance c/ L grab bar)   Toilet Transfer Description Grab bar;Increased time;Supervision for safety;Verbal cueing;Set-up of equipment   Standing Lower Body Exercises   Other Exercises standing LLE DF stretch on wedge c/ gastroc and soleus bias 1 x 1 min   Neuro-Muscular Treatments   Neuro-Muscular Treatments Co-Contraction;Electrical Stimulation;Facilitation;Other (See Comments)  (, see note)     Time spent to retest and set up on RT-300 FES for left LE neuro re-ed, " "ROM and sensory input for pregait priming.  Electrodes applied to left anterior tibialis, gastroc/soleus, quadriceps, hamstrings, and gluteals/hip extensors.     Adjusted  to improve overall functional positioning and posture for improved performance and comfort. Muscle stimulation parameters assessed for each muscle group to pt tolerance to e-stim and muscle contraction observed.     Pt tolerated well with no complaints of pain.    Minutes of Cycling   *including warmup and cool down 18 minutes   Distance Traveled 2.22 miles   Energy Expended 1.3 kCal   Energy Per Hour 4.8 kCal/hr   Avg Power 5.6 W   Avg Asymmetry 7 %, Left       Post gait training in // bars to trial bimanual support during gait, increased time and multimodal cues for sequencing attention to L side, help for joint approximation and maintain LUE contact with bar.     Seated rests as needed btw activities.     Vision rescreen:  EOM: decreased ROM in vertical gaze, able to move full ROM in horizontal plane but needs repeated cues and manual cue to prevent compensatory head movement.  Fields/Confrontation Testing: loss at OD temporal lower quadrant, intact OS grossly  Neglect: errors c/ simultaneous stim on OD, loss to medial nasal quadrant, but performance improved with repeated testing  Head posture: L lateral flexion and rotation preference  Subjective: \"It feels like there is a shadow over here on my R eye.\"    Assessment    Jason c/ good tolerance to  adjustments, with tetany achieved in all mm groups.  Improved swing limb advancement on LLE, impaired by foot drop.     Strengths: Able to follow instructions, Independent prior level of function, Motivated for self care and independence, Pleasant and cooperative, Supportive family, Willingly participates in therapeutic activities  Barriers: Decreased endurance, Hemiparesis, Difficulty following instructions, Fatigue, Hypertension, Impaired activity tolerance, Impaired balance, Impaired " "insight/denial of deficits, Impaired functional cognition, Impaired sleep pattern, Impulsive, Limited mobility, Visual impairment, Poor family support (lives alone)    Plan  Gait training c/ L AFO and HW   for pregait priming, DF stretching, trunk stretching for improved midline    Retry STS c/ L > R prisms to assess for impact on midline control    Trial FWW c/ hand  for LUE for improved utilization of L hemiside.   PF stretching and sciatic nerve glides.    LLE, NMES for knee flex/ext on LLE.   Bioness? Try c/ AFO and only thigh cuff active.       DME  PT DME Recommendations  Wheelchair: 18\" Width  Cushion: Specialty (See other comments)  Assistive Device: Tanvir Walker  Additional Equipment: Other (Comments)    Passport items to be completed:  Get in/out of bed safely, in/out of a vehicle, safely use mobility device, walk or wheel around home/community, navigate up and down stairs, show how to get up/down from the ground, ensure home is accessible, demonstrate HEP, complete caregiver training    Physical Therapy Problems (Active)       Problem: PT-Long Term Goals       Dates: Start:  11/13/23         Goal: LTG-By discharge, patient will transfer one surface to another c/ Renata or better.        Dates: Start:  11/13/23    Expected End:  12/23/23         Goal Note filed on 12/27/23 1313 by Isidra Hamilton PT       Inconsistent due to L hemiparesis, occasionally needs up to ModA for L side LOB               Goal: LTG-By discharge, patient will ambulate up/down 1 step c/ LRAD at [place] or better.        Dates: Start:  11/13/23    Expected End:  12/23/23         Goal Note filed on 12/27/23 1313 by Isidra Hamilton PT       Last performance: MaxA x 1, 2nd person CGA safety for 4 steps c/ RHR               Goal: LTG-By discharge, patient will transfer in/out of a car c/ ModA or better.        Dates: Start:  11/13/23    Expected End:  12/23/23         Goal Note filed on 12/27/23 1313 by Isidra Hamilton PT       Needs " assessment

## 2023-12-28 NOTE — PROGRESS NOTES
"  Physical Medicine & Rehabilitation Progress Note    Encounter Date: 12/28/2023    Chief Complaint: Anxious to go home    Interval Events (Subjective):  Vital signs stable: Blood pressure systolic max is at 140 and diastolic max 88  Bowel movement 12/28  Voiding volitionally    Seen and examined in his room.  States he is ready to go home.  He is okay making it through the weekend.  He is okay with having had the metoprolol increased.    ROS: 14 point ROS negative unless otherwise specified in the HPI    Objective:  VITAL SIGNS: /77   Pulse 67   Temp 36.6 °C (97.8 °F) (Temporal)   Resp 18   Ht 1.727 m (5' 8\")   Wt 81 kg (178 lb 9.2 oz)   SpO2 94%   BMI 27.15 kg/m²     GEN: No apparent distress  HEENT: Head normocephalic, atraumatic.  Left eye discharge and erythematous sclera  CV: Extremities warm and well-perfused, no peripheral edema appreciated bilaterally.  PULMONARY: Breathing nonlabored on room air, no respiratory accessory muscle use.  Not requiring supplemental oxygen.  SKIN: No appreciable skin breakdown on exposed areas of skin.  PSYCH: Normal affect  NEURO: Awake alert.  Conversational.  Logical thought content. Dysarthria. Left Hemiparesis. Mild left clonus at ankle. No significant spasticity appreciated at rest.     Laboratory Values:  Recent Results (from the past 72 hour(s))   Basic Metabolic Panel    Collection Time: 12/26/23  5:17 AM   Result Value Ref Range    Sodium 139 135 - 145 mmol/L    Potassium 3.5 (L) 3.6 - 5.5 mmol/L    Chloride 103 96 - 112 mmol/L    Co2 26 20 - 33 mmol/L    Glucose 133 (H) 65 - 99 mg/dL    Bun 32 (H) 8 - 22 mg/dL    Creatinine 2.69 (H) 0.50 - 1.40 mg/dL    Calcium 8.8 8.5 - 10.5 mg/dL    Anion Gap 10.0 7.0 - 16.0   ESTIMATED GFR    Collection Time: 12/26/23  5:17 AM   Result Value Ref Range    GFR (CKD-EPI) 26 (A) >60 mL/min/1.73 m 2       Medications:  Scheduled Medications   Medication Dose Frequency    metoprolol SR  37.5 mg Q DAY    sertraline  50 mg " DAILY    baclofen  10 mg TID    amLODIPine  10 mg DAILY    apixaban  5 mg BID    hydrALAZINE  100 mg Q8HRS    traZODone  75 mg QHS    senna-docusate  2 Tablet BID    omeprazole  20 mg DAILY    atorvastatin  40 mg Nightly     PRN medications: traZODone, carboxymethylcellulose, [DISCONTINUED] insulin regular **AND** [CANCELED] POC blood glucose manual result **AND** NOTIFY MD and PharmD **AND** Administer 20 grams of glucose (approximately 8 ounces of fruit juice) every 15 minutes PRN FSBG less than 70 mg/dL **AND** dextrose bolus, Respiratory Therapy Consult, senna-docusate **AND** polyethylene glycol/lytes **AND** magnesium hydroxide **AND** bisacodyl, mag hydrox-al hydrox-simeth, ondansetron **OR** ondansetron, sodium chloride, [] acetaminophen **FOLLOWED BY** acetaminophen, oxyCODONE immediate-release **OR** oxyCODONE immediate-release, hydrALAZINE    Diet:  Current Diet Order   Procedures    Diet Order Diet: Consistent CHO (Diabetic) (2 gram sodium restriction; medications whole with thin liquids); Second Modifier: (optional): 2 Gram Sodium       Medical Decision Making and Plan:  Right thalamic hemorrhagic stroke ~ last week 2023  Originally presented with a headache and left-sided weakness to hospital in [facility]  Work-up at the outside hospital in [facility] revealed a right thalamic hemorrhagic stroke  Repeat CT head done after return flight to the ,  CT head shows right thalamic hemorrhage, decrease in density since prior studies from the outside hospital, slight asymmetric dilation of the left ventricular system including the left temporal horn with a possible component of hydrocephalus  Planning for BP less than 140, avoiding any kind of anticoagulation  Initiate comprehensive IRF level therapy with 3 days of therapy 5 days a week with services from PT/OT/SLP     Spasticity  OP follow up with Physiatry for consideration Botox   Has had resurgence of spasticity restart baclofen 5mg TID  11/30/2023 --> increase to 10mg TID  12/28 reduce baclofen to 5 mg 3 times daily 12/2023      Conjunctivitis  12/15 started eyedrops, completed    ABLA  Now on Eliquis  Recheck 11/28 - improved 11.4--> 12.0--> 11.6 11/30/2023 12/6/2023 11.6-->10.7--> 11.7 12/11 12/22 Stable in 11's     CKD  Follow up with OP nephrology  Renal function fluctuates 20-30's/2's-3's  Cr Baseline in past had been mid 1's  12/26 mild fluctuation, normal for this hospitalization.      Hypertension  Continue Amlodipine 10mg daily  Continue Hydralazine 25mg TID - increased by hospitalist 11/13/2023 from BID to TID--> increased to 50mg q8hrs 11/15  11/16 Hydralazine increased to 75mg q8hrs and 1x Hydralazine given  11/17 BP still elevated but hydralazine recently increased. Monitor  12/13/2023 VS showing increase in -140's. Continue Hydralazine 100mg q8hrs + Amlodipine 10mg daily.  12/14/2023 consistently higher, start Metoprolol XL 12.5 mg daily   12/15/2023 better controlled, continue Metoprolol daily   12/18 increase metoprolol to 25 mg daily  12/19 continue metoprolol 25 mg daily, monitor for need to increase.  12/27 blood pressure still intermittently elevated in the 140s, increase metoprolol to 37.5 mg daily  12/28 BP better controlled with max 140/88    Hypokalemia  Mild 12/13 supplement x1   NML 12/15  12/18 stable and normal at 3.8  12/22 NML  12/26 supplement 40 mEq once for potassium of 3.5    Leukopenia  New, mild 12/6/2023   Resolved 12/11, 12/22     Prediabetes  Discontinue SSI     HLD  Continue home dose satin      Bowel  Constipation 11/29/2023, resolved 11/30/2023   Continue with scheduled Colace and senna, as needed stool softeners  Miralax x3 doses 11/29/2023 --> Constipation Resolved 11/30/2023      Insomnia  Secondary to recent jet lag, melatonin scheduled --> increase to home dose 11/13/2023 9mg  Utilize trazodone as needed insomnia continues  Schedule Trazodone 11/15/2023   11/16/2023 continue Trazodone as is.  Thinks he slept better last night  11/17/2023 Still not sleeping well. Increase to 75mg nightly.   12/22/2023 continue trazodone at current dose     Pain - as needed Tylenol and oxycodone    Post-Stroke Depression  Consulted Pyschiatry   Start Prozac 11/28/2023 --> Switched to zoloft per psychiatry  Appreciate assistance with management  Mood improved, continue Zoloft 12/22  Psychiatry signed off 12/27, discussed with them 12/27    Right Foot Pain  H/o Gout  Xray with swelling 1st metatarsal head   Trial Colchicine for acute gout flare 11/28/2023   Topical Voltaren gel scheduled   Improved with less swelling, erythema 11/29/2023   Resolved      LABS: BMP + CBC 12/22 - non concerning, renal function stable  LABS: BMP 12/26 -kidney function stable, potassium mildly low at 3.5, supplemented once  LABS: BMP + CBC 12/29      DVT PROPHYLAXIS: NO - Hemorrhagic stroke. US + UE and LE for brachial and peroneal DVT. 11/21/2023 start Heparin 5000 units q8hrs SQ for further prophylaxis, if tolerates will increase to full AC. Consider repeat CTH to ensure stability bleed once on full AC. 11/24/2023 Start loading dose Eliquis 10mg BID x 7 days with transition to 5mg BID. CBC showing improvement in H/H 11/28/2023 no neurologic decline.     HOSPITALIST FOLLOWING: YES - d/w hospitalist 12/6 --> Signed off 12/6.     CODE STATUS: FULL CODE    DISPO: Home alone. Family can help starting 1/2    MARCUS: 1/2/2023    MEDS SENT TO: [facility] or [facility] in [place] on [address]    DISCHARGE FOLLOW UP: Neurology, Nephrology, audiology, ophthalmology- needs referral to all.   ____________________________________    Dr. Lisa Lee DO, MS  [facility] - Physical Medicine & Rehabilitation   ____________________________________

## 2023-12-28 NOTE — CARE PLAN
Problem: Fall Risk - Rehab  Goal: Patient will remain free from falls  Outcome: Progressing     Problem: Self Care  Goal: Patient will have the ability to perform ADLs independently or with assistance  Outcome: Progressing     Problem: Mobility  Goal: Patient's capacity to carry out activities will improve  Outcome: Progressing       The patient is Stable - Low risk of patient condition declining or worsening    Shift Goals  Clinical Goals: Safety  Patient Goals: Sleep well  Family Goals: Education

## 2023-12-28 NOTE — THERAPY
Occupational Therapy  Daily Treatment     Patient Name: Jason Lima  Age:  63 y.o., Sex:  male  Medical Record #: [id number]  Today's Date: 12/28/2023     Precautions  Precautions: Fall Risk  Comments: Left Hemiparesis, left visual inattention         Subjective    Pt seated in w/c agreeable for therapy.      Objective       12/28/23 0931   OT Charge Group   OT Self Care / ADL (Units) 1   OT Therapeutic Exercise (Units) 3   OT Total Time Spent   OT Individual Total Time Spent (Mins) 60   Functional Level of Assist   Toileting Standby Assist  (using urinal)   Toileting Description Increased time   Bed, Chair, Wheelchair Transfer Moderate Assist   Bed Chair Wheelchair Transfer Description Adaptive equipment;Increased time;Verbal cueing   Sitting Upper Body Exercises   Front Arm Raise 3 sets of 10;Left   Side Arm Raise 3 sets of 10;Left   Tricep Press 3 sets of 10;Left;Weight (See Comments for lbs)  (12.5 rickshaw)   Wrist Flexion / Extension 3 sets of 10;Left  (2lbs hand weight)   Other Exercise Nustep level 3~15min   Interdisciplinary Plan of Care Collaboration   Patient Position at End of Therapy Seated;Chair Alarm On;Call Light within Reach;Phone within Reach;Tray Table within Reach         Assessment    Pt tolerated session well w/ focus on strengthening toileting.   Thera ex: Mod A for stand pivot transfer from w/c<>nustep with VC for LB sequencing. nustep for 15min to increase strengthening for UE/LE and the remainder 5min instructed pt in using LUE only to push and pull to challenge and increase strengthening on L side. Pt was able to grasp hand bar using R hand for ~15min, however still needed VC for contract hand.       ADLs: Pt was able to set equipment to urinate while seated using the urinal  w/ SBA for safety.     Strengths: Able to follow instructions, Independent prior level of function, Motivated for self care and independence, Pleasant and cooperative, Supportive family  Barriers: Decreased endurance,  Fatigue, Generalized weakness, Hemiparesis, Impaired activity tolerance, Impaired balance, Limited mobility, Spasticity    Plan    Functional transfers   ADLs  Neuro re-ed   Strengthening/endurance     DME  OT DME Recommendations  Bathroom Equipment: 3 in 1 Commode, Tub Transfer Bench (Medicaid Only)  Additional Equipment: Other (Comments)    Passport items to be completed:  Perform bathroom transfers, complete dressing, complete feeding, get ready for the day, prepare a simple meal, participate in household tasks, adapt home for safety needs, demonstrate home exercise program, complete caregiver training     Occupational Therapy Goals (Active)       Problem: Dressing       Dates: Start:  11/22/23         Goal: STG-Within one week, patient will dress LB at [facility] using LRD       Dates: Start:  11/22/23    Expected End:  12/23/23            Goal: STG-Within one week, patient will dress UB SPV using LRD       Dates: Start:  12/06/23    Expected End:  12/23/23               Problem: Functional Transfers       Dates: Start:  12/06/23         Goal: STG-Within one week, patient will transfer to step in shower at [facility] to [facility] using LRD       Dates: Start:  12/06/23    Expected End:  12/23/23               Problem: OT Long Term Goals       Dates: Start:  11/13/23         Goal: LTG-By discharge, patient will complete basic self care tasks at Mod Independent level.       Dates: Start:  11/13/23    Expected End:  12/23/23            Goal: LTG-By discharge, patient will perform bathroom transfers at Mod Independent level.       Dates: Start:  11/13/23    Expected End:  12/23/23

## 2023-12-29 ENCOUNTER — APPOINTMENT (OUTPATIENT)
Dept: OCCUPATIONAL THERAPY | Facility: REHABILITATION | Age: 63
DRG: 057 | End: 2023-12-29
Attending: PHYSICAL MEDICINE & REHABILITATION
Payer: COMMERCIAL

## 2023-12-29 ENCOUNTER — APPOINTMENT (OUTPATIENT)
Dept: PHYSICAL THERAPY | Facility: REHABILITATION | Age: 63
DRG: 057 | End: 2023-12-29
Attending: PHYSICAL MEDICINE & REHABILITATION
Payer: COMMERCIAL

## 2023-12-29 LAB
ANION GAP SERPL CALC-SCNC: 11 MMOL/L (ref 7–16)
BASOPHILS # BLD AUTO: 0.4 % (ref 0–1.8)
BASOPHILS # BLD: 0.02 K/UL (ref 0–0.12)
BUN SERPL-MCNC: 31 MG/DL (ref 8–22)
CALCIUM SERPL-MCNC: 8.9 MG/DL (ref 8.5–10.5)
CHLORIDE SERPL-SCNC: 105 MMOL/L (ref 96–112)
CO2 SERPL-SCNC: 25 MMOL/L (ref 20–33)
CREAT SERPL-MCNC: 2.61 MG/DL (ref 0.5–1.4)
EOSINOPHIL # BLD AUTO: 0.17 K/UL (ref 0–0.51)
EOSINOPHIL NFR BLD: 3.3 % (ref 0–6.9)
ERYTHROCYTE [DISTWIDTH] IN BLOOD BY AUTOMATED COUNT: 40.5 FL (ref 35.9–50)
GFR SERPLBLD CREATININE-BSD FMLA CKD-EPI: 27 ML/MIN/1.73 M 2
GLUCOSE SERPL-MCNC: 131 MG/DL (ref 65–99)
HCT VFR BLD AUTO: 35.2 % (ref 42–52)
HGB BLD-MCNC: 11.7 G/DL (ref 14–18)
IMM GRANULOCYTES # BLD AUTO: 0.02 K/UL (ref 0–0.11)
IMM GRANULOCYTES NFR BLD AUTO: 0.4 % (ref 0–0.9)
LYMPHOCYTES # BLD AUTO: 1.66 K/UL (ref 1–4.8)
LYMPHOCYTES NFR BLD: 32.3 % (ref 22–41)
MCH RBC QN AUTO: 30.4 PG (ref 27–33)
MCHC RBC AUTO-ENTMCNC: 33.2 G/DL (ref 32.3–36.5)
MCV RBC AUTO: 91.4 FL (ref 81.4–97.8)
MONOCYTES # BLD AUTO: 0.38 K/UL (ref 0–0.85)
MONOCYTES NFR BLD AUTO: 7.4 % (ref 0–13.4)
NEUTROPHILS # BLD AUTO: 2.89 K/UL (ref 1.82–7.42)
NEUTROPHILS NFR BLD: 56.2 % (ref 44–72)
NRBC # BLD AUTO: 0 K/UL
NRBC BLD-RTO: 0 /100 WBC (ref 0–0.2)
PLATELET # BLD AUTO: 197 K/UL (ref 164–446)
PMV BLD AUTO: 10.4 FL (ref 9–12.9)
POTASSIUM SERPL-SCNC: 4 MMOL/L (ref 3.6–5.5)
RBC # BLD AUTO: 3.85 M/UL (ref 4.7–6.1)
SODIUM SERPL-SCNC: 141 MMOL/L (ref 135–145)
WBC # BLD AUTO: 5.1 K/UL (ref 4.8–10.8)

## 2023-12-29 PROCEDURE — 97116 GAIT TRAINING THERAPY: CPT

## 2023-12-29 PROCEDURE — 700102 HCHG RX REV CODE 250 W/ 637 OVERRIDE(OP): Performed by: PHYSICAL MEDICINE & REHABILITATION

## 2023-12-29 PROCEDURE — 80048 BASIC METABOLIC PNL TOTAL CA: CPT

## 2023-12-29 PROCEDURE — 97530 THERAPEUTIC ACTIVITIES: CPT

## 2023-12-29 PROCEDURE — 97110 THERAPEUTIC EXERCISES: CPT

## 2023-12-29 PROCEDURE — A9270 NON-COVERED ITEM OR SERVICE: HCPCS | Performed by: PHYSICAL MEDICINE & REHABILITATION

## 2023-12-29 PROCEDURE — 97535 SELF CARE MNGMENT TRAINING: CPT

## 2023-12-29 PROCEDURE — 700102 HCHG RX REV CODE 250 W/ 637 OVERRIDE(OP): Performed by: HOSPITALIST

## 2023-12-29 PROCEDURE — 700102 HCHG RX REV CODE 250 W/ 637 OVERRIDE(OP): Performed by: STUDENT IN AN ORGANIZED HEALTH CARE EDUCATION/TRAINING PROGRAM

## 2023-12-29 PROCEDURE — A9270 NON-COVERED ITEM OR SERVICE: HCPCS | Performed by: HOSPITALIST

## 2023-12-29 PROCEDURE — 99232 SBSQ HOSP IP/OBS MODERATE 35: CPT | Performed by: PHYSICAL MEDICINE & REHABILITATION

## 2023-12-29 PROCEDURE — A9270 NON-COVERED ITEM OR SERVICE: HCPCS | Performed by: STUDENT IN AN ORGANIZED HEALTH CARE EDUCATION/TRAINING PROGRAM

## 2023-12-29 PROCEDURE — 36415 COLL VENOUS BLD VENIPUNCTURE: CPT

## 2023-12-29 PROCEDURE — 85025 COMPLETE CBC W/AUTO DIFF WBC: CPT

## 2023-12-29 PROCEDURE — 97032 APPL MODALITY 1+ESTIM EA 15: CPT

## 2023-12-29 PROCEDURE — 770010 HCHG ROOM/CARE - REHAB SEMI PRIVAT*

## 2023-12-29 RX ADMIN — OMEPRAZOLE 20 MG: 20 CAPSULE, DELAYED RELEASE ORAL at 08:08

## 2023-12-29 RX ADMIN — HYDRALAZINE HYDROCHLORIDE 100 MG: 50 TABLET, FILM COATED ORAL at 05:43

## 2023-12-29 RX ADMIN — HYDRALAZINE HYDROCHLORIDE 100 MG: 50 TABLET, FILM COATED ORAL at 21:15

## 2023-12-29 RX ADMIN — APIXABAN 5 MG: 5 TABLET, FILM COATED ORAL at 21:14

## 2023-12-29 RX ADMIN — AMLODIPINE BESYLATE 10 MG: 5 TABLET ORAL at 05:43

## 2023-12-29 RX ADMIN — SERTRALINE 50 MG: 50 TABLET, FILM COATED ORAL at 08:08

## 2023-12-29 RX ADMIN — METOPROLOL SUCCINATE 37.5 MG: 25 TABLET, EXTENDED RELEASE ORAL at 05:43

## 2023-12-29 RX ADMIN — APIXABAN 5 MG: 5 TABLET, FILM COATED ORAL at 08:08

## 2023-12-29 RX ADMIN — BACLOFEN 5 MG: 10 TABLET ORAL at 21:15

## 2023-12-29 RX ADMIN — ATORVASTATIN CALCIUM 40 MG: 40 TABLET, FILM COATED ORAL at 21:15

## 2023-12-29 RX ADMIN — HYDRALAZINE HYDROCHLORIDE 100 MG: 50 TABLET, FILM COATED ORAL at 14:28

## 2023-12-29 RX ADMIN — BACLOFEN 5 MG: 10 TABLET ORAL at 08:09

## 2023-12-29 RX ADMIN — BACLOFEN 5 MG: 10 TABLET ORAL at 14:28

## 2023-12-29 RX ADMIN — TRAZODONE HYDROCHLORIDE 75 MG: 50 TABLET ORAL at 21:14

## 2023-12-29 ASSESSMENT — ACTIVITIES OF DAILY LIVING (ADL)
BED_CHAIR_WHEELCHAIR_TRANSFER_DESCRIPTION: SET-UP OF EQUIPMENT;VERBAL CUEING;INCREASED TIME
BED_CHAIR_WHEELCHAIR_TRANSFER_DESCRIPTION: SET-UP OF EQUIPMENT;VERBAL CUEING;INCREASED TIME

## 2023-12-29 ASSESSMENT — GAIT ASSESSMENTS
ASSISTIVE DEVICE: HEMI-WALKER
DEVIATION: DECREASED BASE OF SUPPORT
DISTANCE (FEET): 30
GAIT LEVEL OF ASSIST: MINIMAL ASSIST

## 2023-12-29 ASSESSMENT — PAIN DESCRIPTION - PAIN TYPE: TYPE: ACUTE PAIN

## 2023-12-29 NOTE — PROGRESS NOTES
Patient care assumed. Report received from [facility] CLAYTON Garcia. Patient is alert and calm, resting in bed. Call light and bedside table within reach. Will continue to monitor.

## 2023-12-29 NOTE — CARE PLAN
"The patient is Stable - Low risk of patient condition declining or worsening      Problem: Fall Risk - Rehab  Goal: Patient will remain free from falls  Outcome: Progressing   Shellie Hamilton Fall risk Assessment Score: 22    High fall risk Interventions   - Alarming seatbelt  - Bed and strip alarm   - Yellow sign by the door   - Yellow wrist band \"Fall risk\"  - Room near to the nurse station  - Do not leave patient unattended in the bathroom  - Fall risk education provided          "

## 2023-12-29 NOTE — THERAPY
Physical Therapy   Daily Treatment     Patient Name: Jason Lima  Age:  63 y.o., Sex:  male  Medical Record #: [id number]  Today's Date: 12/29/2023     Precautions  Precautions: Fall Risk  Comments: Left Hemiparesis, left visual inattention    Subjective    Patient agreeable to work on walking; asking for additional exercises he can do on his own to promote left leg strengthening     Objective       12/29/23 0931   PT Charge Group   PT Electrical Stimulation Attended (Units) 2   PT Gait Training (Units) 2   PT Therapeutic Exercise (Units) 1   PT Therapeutic Activities (Units) 1   PT Total Time Spent   PT Individual Total Time Spent (Mins) 90   Precautions   Precautions Fall Risk   Comments Left Hemiparesis, left visual inattention   Gait Functional Level of Assist    Gait Level Of Assist Minimal Assist   Assistive Device Tanvir-Walker   Distance (Feet) 30   # of Times Distance was Traveled 4   Deviation Decreased Base Of Support   Transfer Functional Level of Assist   Bed, Chair, Wheelchair Transfer Minimal Assist   Bed Chair Wheelchair Transfer Description Set-up of equipment;Verbal cueing;Increased time   Supine Lower Body Exercise   Comments mat work- 10x 2 bilateral hamstring drags on blue peanut, 2 x 10 unilateral left hamstring curls with assist for knee alignment; hip abduction/adduction 1 set of 12 on powder board; gait belt assist PROM bilateral 30sec each- hamstring, groin, ITB, calves   Bed Mobility    Sit to Stand Contact Guard Assist  (2 sets of 5 to work on midline)   Interdisciplinary Plan of Care Collaboration   IDT Collaboration with  Physical Therapist   Collaboration Comments treatment planning     Added seated w/c bolster squats 10 bilateral squats, 10x left leg squats against therapist resistance; added w/c LE self propulsion 25 ft forward for left quad strengthening, backward LE propulsion 15ft to promote hamstring strengthening with discussion of being aware of left leg positioning by checking  "with eyes since he continues to have left LE diminished sensation       for LEFT LE 16min for 1.82mi, energy expended=1.1kcal, average power=5.6W, average asymmetry left 9%; used as warm-up prior to ambulation    Assessment    Patient limited to approx 30ft of step-to gait with hemiwalker and left AFO CGA, with fatigue begins to loose balance and eliot; able to continue after 1-2min rest break, reports he is limited by right leg fatigue with standing    Strengths: Able to follow instructions, Independent prior level of function, Motivated for self care and independence, Pleasant and cooperative, Supportive family, Willingly participates in therapeutic activities  Barriers: Decreased endurance, Hemiparesis, Difficulty following instructions, Fatigue, Hypertension, Impaired activity tolerance, Impaired balance, Impaired insight/denial of deficits, Impaired functional cognition, Impaired sleep pattern, Impulsive, Limited mobility, Visual impairment, Poor family support (lives alone)    Plan    Gait training c/ L AFO and HW   for pregait priming, DF stretching, trunk stretching for improved midline     Retry STS c/ L > R prisms to assess for impact on midline control     Trial FWW c/ hand  for LUE for improved utilization of L hemiside.   PF stretching and sciatic nerve glides.    LLE, NMES for knee flex/ext on LLE.   Bioness? Try c/ AFO and only thigh cuff active.        DME  PT DME Recommendations  Wheelchair: 18\" Width  Cushion: Specialty (See other comments)  Assistive Device: Tanvir Walker  Additional Equipment: Other (Comments)     Passport items to be completed:  Get in/out of bed safely, in/out of a vehicle, safely use mobility device, walk or wheel around home/community, navigate up and down stairs, show how to get up/down from the ground, ensure home is accessible, demonstrate HEP, complete caregiver training    Physical Therapy Problems (Active)       Problem: PT-Long Term Goals       " Dates: Start:  11/13/23         Goal: LTG-By discharge, patient will transfer one surface to another c/ [facility] or better.        Dates: Start:  11/13/23    Expected End:  12/23/23         Goal Note filed on 12/27/23 1313 by Isidra Hamilton PT       Inconsistent due to L hemiparesis, occasionally needs up to ModA for L side LOB               Goal: LTG-By discharge, patient will ambulate up/down 1 step c/ LRAD at [facility] or better.        Dates: Start:  11/13/23    Expected End:  12/23/23         Goal Note filed on 12/27/23 1313 by Isidra Hamilton PT       Last performance: MaxA x 1, 2nd person CGA safety for 4 steps c/ RHR               Goal: LTG-By discharge, patient will transfer in/out of a car c/ ModA or better.        Dates: Start:  11/13/23    Expected End:  12/23/23         Goal Note filed on 12/27/23 1313 by Isidra Hamilton PT       Needs assessment

## 2023-12-29 NOTE — PROGRESS NOTES
NURSING DAILY NOTE    Name: Jason Lima   Date of Admission: 11/12/2023   Admitting Diagnosis: Thalamic hemorrhage (HCC)  Attending Physician: Lisa Lee D.o.  Allergies: Penicillins    Safety  Patient Assist  mod to max 1  Patient Precautions  Fall Risk  Precaution Comments  Left Hemiparesis, left visual inattention  Bed Transfer Status  Moderate Assist (mod towards left , min towards right)  Toilet Transfer Status   Minimal Assist (Mod to MaxA for standing balance c/ L grab bar)  Assistive Devices  Rails, Wheelchair  Oxygen  CPAP  Diet/Therapeutic Dining  Current Diet Order   Procedures    Diet Order Diet: Consistent CHO (Diabetic) (2 gram sodium restriction; medications whole with thin liquids); Second Modifier: (optional): 2 Gram Sodium     Pill Administration  whole  Agitated Behavioral Scale  14  ABS Level of Severity  No Agitation    Fall Risk  Has the patient had a fall this admission?   Yes  Shellie Hamilton Fall Risk Scoring  22, HIGH RISK  Fall Risk Safety Measures  bed alarm, chair alarm, and fall during this hospitalization    Vitals  Temperature: 36.9 °C (98.4 °F)  Temp src: Temporal  Pulse: 64  Respiration: 18  Blood Pressure: 139/86  Blood Pressure MAP (Calculated): 104 MM HG  BP Location: Right, Upper Arm  Patient BP Position: Supine     Oxygen  Pulse Oximetry: 99 %  O2 (LPM): 0  FiO2%: 21 %  O2 Delivery Device: CPAP    Bowel and Bladder  Last Bowel Movement  12/28/23 (per patient)  Stool Type  Type 6: Fluffy pieces with ragged edges, a mushy stool  Bowel Device  Bathroom  Continent  Bladder: Continent void   Bowel: Continent movement  Bladder Function  Urine Void (mL): 400 ml  Number of Times Voided: 1  Urinary Options: Yes  Urine Color: Yellow  Number of Times Incontinent of Urine: 0  Genitourinary Assessment   Bladder Assessment (WDL):  WDL Except  Echols Catheter: Not Applicable  Urine Color: Yellow  Number of Bladder Accidents:  0  Total Number of Bladder of Accidents in Last 7 Days: 0  Number of Times Incontinent of Urine: 0  Bladder Device: Urinal  Time Void: No  Bladder Scan: Post Void  $ Bladder Scan Results (mL): 26    Skin  Polo Score   16  Sensory Interventions   Bed Types: Standard/Trauma Mattress  Skin Preventative Measures: Pillows in Use for Support / Positioning  Moisture Interventions  Moisturizers/Barriers: Barrier Wipes      Pain  Pain Rating Scale  0 - No Pain  Pain Location  Back  Pain Location Orientation  Lower  Pain Interventions   Declines    ADLs    Bathing   Patient Refused Bathing (Pt took a shower earlier today during OT session)  Linen Change   Complete  Personal Hygiene  Perineal Care, Moist Deja Wipes  Chlorhexidine Bath      Oral Care  Brushed Teeth  Teeth/Dentures     Shave  Self  Nutrition Percentage Eaten  Dinner, Between % Consumed  Environmental Precautions  Treaded Slipper Socks on Patient, Personal Belongings, Wastebasket, Call Bell etc. in Easy Reach, Transferred to Stronger Side, Bed in Low Position  Patient Turns/Positioning  Patient Turns Self from Side to Side  Patient Turns Assistance/Tolerance  Assistance of One  Bed Positions  Bed Controls On, Bed Locked  Head of Bed Elevated  Self regulated      Psychosocial/Neurologic Assessment  Psychosocial Assessment  Psychosocial (WDL):  WDL Except  Patient Behaviors: Fatigue  Neurologic Assessment  Neuro (WDL): Exceptions to WDL  Level of Consciousness: Alert  Orientation Level: Oriented X4  Cognition: Follows commands, Appropriate attention/concentration  Speech: Clear  Facial Symmetry: Left facial drooping  Pupil Assesment: Yes  R Pupil Size (mm): 3  R Pupil Shape / Description: Round  R Pupil Reaction: Brisk  L Pupil Size (mm): 3  L Pupil Shape / Description: Round  L Pupil Reaction: Brisk  Reflexes: Cough  Cough Reflex: Present  Motor Function/Sensation Assessment: Dorsiflexion, Motor response  R Foot Dorsiflexion: Strong  L Foot Dorsiflexion:  Absent  RUE Motor Response: Responds to commands  RUE Sensation: Full sensation  Muscle Strength Right Arm: Normal Strength Against Gravity and Full Resistance  LUE Motor Response: Flaccid  LUE Sensation: Numbness, Tingling  Muscle Strength Left Arm: Weak Movement but Not Against Gravity or Resistance  RLE Motor Response: Responds to commands  RLE Sensation: Full sensation  Muscle Strength Right Leg: Good Strength Against Gravity and Moderate Resistance  LLE Motor Response: Flaccid  LLE Sensation: Numbness, Tingling  Muscle Strength Left Leg: Weak Movement but Not Against Gravity or Resistance  EENT (WDL):  WDL Except    Cardio/Pulmonary Assessment  Edema   RLE Edema: Trace  LLE Edema: Trace  Respiratory Breath Sounds  RUL Breath Sounds: Clear  RML Breath Sounds: Clear  RLL Breath Sounds: Clear, Diminished  MOHAMUD Breath Sounds: Clear  LLL Breath Sounds: Clear, Diminished  Cardiac Assessment   Cardiac (WDL):  WDL Except (hx HTN)

## 2023-12-29 NOTE — FLOWSHEET NOTE
12/29/23 0934   Events/Summary/Plan   Events/Summary/Plan CPAP check   Non-Invasive Ventilation [facility]   Nocturnal CPAP or BIPAP CPAP - Home Unit  (10;28 Hrs use)

## 2023-12-29 NOTE — CARE PLAN
The patient is Stable - Low risk of patient condition declining or worsening    Shift Goals  Clinical Goals: safety  Patient Goals: safety  Family Goals: Education    Problem: Fall Risk - Rehab  Goal: Patient will remain free from falls  Outcome: Progressing Pt uses call light consistently and appropriately. Waits for assistance does not attempt self transfer this shift. Able to verbalize needs.     Problem: Pain - Standard  Goal: Alleviation of pain or a reduction in pain to the patient’s comfort goal  Outcome: Progressing Pt able to participate in therapies and activities this shift.

## 2023-12-29 NOTE — PROGRESS NOTES
"  Physical Medicine & Rehabilitation Progress Note    Encounter Date: 12/29/2023    Chief Complaint: Looking forward to going home    Interval Events (Subjective):  Vital signs stable  Last bowel movement 12/28  Voiding volitionally    Seen and examined in his room. Feels well overall. Is looking forward to going home. Discussed labs with him.     ROS: 14 point ROS negative unless otherwise specified in the HPI    Objective:  VITAL SIGNS: /86   Pulse 64   Temp 36.9 °C (98.4 °F) (Temporal)   Resp 18   Ht 1.727 m (5' 8\")   Wt 81 kg (178 lb 9.2 oz)   SpO2 99%   BMI 27.15 kg/m²     GEN: No apparent distress  HEENT: Head normocephalic, atraumatic.  Left eye discharge and erythematous sclera  CV: Extremities warm and well-perfused, no peripheral edema appreciated bilaterally.  PULMONARY: Breathing nonlabored on room air, no respiratory accessory muscle use.  Not requiring supplemental oxygen.  SKIN: No appreciable skin breakdown on exposed areas of skin.  PSYCH: Normal affect  NEURO: Awake alert.  Conversational.  Logical thought content. Dysarthria. Left Hemiparesis. Mild left clonus at ankle. No significant spasticity appreciated at rest.     Laboratory Values:  Recent Results (from the past 72 hour(s))   Basic Metabolic Panel    Collection Time: 12/29/23  5:06 AM   Result Value Ref Range    Sodium 141 135 - 145 mmol/L    Potassium 4.0 3.6 - 5.5 mmol/L    Chloride 105 96 - 112 mmol/L    Co2 25 20 - 33 mmol/L    Glucose 131 (H) 65 - 99 mg/dL    Bun 31 (H) 8 - 22 mg/dL    Creatinine 2.61 (H) 0.50 - 1.40 mg/dL    Calcium 8.9 8.5 - 10.5 mg/dL    Anion Gap 11.0 7.0 - 16.0   CBC WITH DIFFERENTIAL    Collection Time: 12/29/23  5:06 AM   Result Value Ref Range    WBC 5.1 4.8 - 10.8 K/uL    RBC 3.85 (L) 4.70 - 6.10 M/uL    Hemoglobin 11.7 (L) 14.0 - 18.0 g/dL    Hematocrit 35.2 (L) 42.0 - 52.0 %    MCV 91.4 81.4 - 97.8 fL    MCH 30.4 27.0 - 33.0 pg    MCHC 33.2 32.3 - 36.5 g/dL    RDW 40.5 35.9 - 50.0 fL    Platelet " Count 197 164 - 446 K/uL    MPV 10.4 9.0 - 12.9 fL    Neutrophils-Polys 56.20 44.00 - 72.00 %    Lymphocytes 32.30 22.00 - 41.00 %    Monocytes 7.40 0.00 - 13.40 %    Eosinophils 3.30 0.00 - 6.90 %    Basophils 0.40 0.00 - 1.80 %    Immature Granulocytes 0.40 0.00 - 0.90 %    Nucleated RBC 0.00 0.00 - 0.20 /100 WBC    Neutrophils (Absolute) 2.89 1.82 - 7.42 K/uL    Lymphs (Absolute) 1.66 1.00 - 4.80 K/uL    Monos (Absolute) 0.38 0.00 - 0.85 K/uL    Eos (Absolute) 0.17 0.00 - 0.51 K/uL    Baso (Absolute) 0.02 0.00 - 0.12 K/uL    Immature Granulocytes (abs) 0.02 0.00 - 0.11 K/uL    NRBC (Absolute) 0.00 K/uL   ESTIMATED GFR    Collection Time: 23  5:06 AM   Result Value Ref Range    GFR (CKD-EPI) 27 (A) >60 mL/min/1.73 m 2       Medications:  Scheduled Medications   Medication Dose Frequency    baclofen  5 mg TID    metoprolol SR  37.5 mg Q DAY    sertraline  50 mg DAILY    amLODIPine  10 mg DAILY    apixaban  5 mg BID    hydrALAZINE  100 mg Q8HRS    traZODone  75 mg QHS    senna-docusate  2 Tablet BID    omeprazole  20 mg DAILY    atorvastatin  40 mg Nightly     PRN medications: traZODone, carboxymethylcellulose, [DISCONTINUED] insulin regular **AND** [CANCELED] POC blood glucose manual result **AND** NOTIFY MD and PharmD **AND** Administer 20 grams of glucose (approximately 8 ounces of fruit juice) every 15 minutes PRN FSBG less than 70 mg/dL **AND** dextrose bolus, Respiratory Therapy Consult, senna-docusate **AND** polyethylene glycol/lytes **AND** magnesium hydroxide **AND** bisacodyl, mag hydrox-al hydrox-simeth, ondansetron **OR** ondansetron, sodium chloride, [] acetaminophen **FOLLOWED BY** acetaminophen, oxyCODONE immediate-release **OR** oxyCODONE immediate-release, hydrALAZINE    Diet:  Current Diet Order   Procedures    Diet Order Diet: Consistent CHO (Diabetic) (2 gram sodium restriction; medications whole with thin liquids); Second Modifier: (optional): 2 Gram Sodium       Medical Decision  Making and Plan:  Right thalamic hemorrhagic stroke ~ last week October 2023  Originally presented with a headache and left-sided weakness to hospital in [facility]  Work-up at the outside hospital in [place] revealed a right thalamic hemorrhagic stroke  Repeat CT head done after return flight to the [place], 11/11 CT head shows right thalamic hemorrhage, decrease in density since prior studies from the outside hospital, slight asymmetric dilation of the left ventricular system including the left temporal horn with a possible component of hydrocephalus  Planning for BP less than 140, avoiding any kind of anticoagulation  Initiate comprehensive IRF level therapy with 3 days of therapy 5 days a week with services from PT/OT/SLP     Spasticity  OP follow up with Physiatry for consideration Botox   Has had resurgence of spasticity restart baclofen 5mg TID 11/30/2023 --> increase to 10mg TID  12/28 reduce baclofen to 5 mg 3 times daily 12/2023 12/29 no obvious resurgence of spasticity, continue baclofen 5 mg 3 times daily.    Conjunctivitis  12/15 started eyedrops, completed    ABLA  Now on Eliquis  Recheck 11/28 - improved 11.4--> 12.0--> 11.6 11/30/2023 12/6/2023 11.6-->10.7--> 11.7 12/11 12/22 Stable in 11's 12/29 stable in the 11's     CKD  Follow up with OP nephrology  Renal function fluctuates 20-30's/2's-3's  Cr Baseline in past had been mid 1's  12/26 mild fluctuation, normal for this hospitalization.   12/29 renal function stable     Hypertension  Continue Amlodipine 10mg daily  Continue Hydralazine 25mg TID - increased by hospitalist 11/13/2023 from BID to TID--> increased to 50mg q8hrs 11/15  11/16 Hydralazine increased to 75mg q8hrs and 1x Hydralazine given  11/17 BP still elevated but hydralazine recently increased. Monitor  12/13/2023 VS showing increase in -140's. Continue Hydralazine 100mg q8hrs + Amlodipine 10mg daily.  12/14/2023 consistently higher, start Metoprolol XL 12.5 mg daily    12/15/2023 better controlled, continue Metoprolol daily   12/18 increase metoprolol to 25 mg daily  12/19 continue metoprolol 25 mg daily, monitor for need to increase.  12/27 blood pressure still intermittently elevated in the 140s, increase metoprolol to 37.5 mg daily  12/29 BP better controlled with max 140/88.  Continue metoprolol 37.5 mg daily    Hypokalemia  Mild 12/13 supplement x1   NML 12/15  12/18 stable and normal at 3.8  12/22 NML  12/26 supplement 40 mEq once for potassium of 3.5  12/29 normal at 4.9    Leukopenia  New, mild 12/6/2023   Resolved 12/11, 12/22, 12/29     Prediabetes  Discontinue SSI     HLD  Continue home dose satin      Bowel  Constipation 11/29/2023, resolved 11/30/2023   Continue with scheduled Colace and senna, as needed stool softeners  Miralax x3 doses 11/29/2023 --> Constipation Resolved 11/30/2023      Insomnia  Secondary to recent jet lag, melatonin scheduled --> increase to home dose 11/13/2023 9mg  Utilize trazodone as needed insomnia continues  Schedule Trazodone 11/15/2023   11/16/2023 continue Trazodone as is. Thinks he slept better last night  11/17/2023 Still not sleeping well. Increase to 75mg nightly.   12/29/2023 continue trazodone at current dose     Pain - as needed Tylenol and oxycodone    Post-Stroke Depression  Consulted Pyschiatry   Start Prozac 11/28/2023 --> Switched to zoloft per psychiatry  Appreciate assistance with management  Mood improved, continue Zoloft 12/22  Psychiatry signed off 12/27, discussed with them 12/27    Right Foot Pain  H/o Gout  Xray with swelling 1st metatarsal head   Trial Colchicine for acute gout flare 11/28/2023   Topical Voltaren gel scheduled   Improved with less swelling, erythema 11/29/2023   Resolved      LABS: BMP + CBC 12/22 - non concerning, renal function stable  LABS: BMP 12/26 -kidney function stable, potassium mildly low at 3.5, supplemented once  LABS: BMP + CBC 12/29 -normal/stable.  No concerning findings.  No new  findings.      DVT PROPHYLAXIS: NO - Hemorrhagic stroke. US + UE and LE for brachial and peroneal DVT. 11/21/2023 start Heparin 5000 units q8hrs SQ for further prophylaxis, if tolerates will increase to full AC. Consider repeat CTH to ensure stability bleed once on full AC. 11/24/2023 Start loading dose Eliquis 10mg BID x 7 days with transition to 5mg BID. CBC showing improvement in H/H 11/28/2023 no neurologic decline.     HOSPITALIST FOLLOWING: YES - d/w hospitalist 12/6 --> Signed off 12/6.     CODE STATUS: FULL CODE    DISPO: Home alone. Family can help starting 1/2    MARCUS: 1/2/2023    MEDS SENT TO: [facility] or [facility] in [place] on [address]    DISCHARGE FOLLOW UP: Neurology, Nephrology, audiology, ophthalmology- needs referral to all.   ____________________________________    Dr. Lisa Lee DO, MS  [facility] - Physical Medicine & Rehabilitation   ____________________________________

## 2023-12-29 NOTE — PROGRESS NOTES
NURSING DAILY NOTE    Name: Jason Lima   Date of Admission: 11/12/2023   Admitting Diagnosis: Thalamic hemorrhage (HCC)  Attending Physician: Lisa Lee D.o.  Allergies: Penicillins    Safety  Patient Assist  mod to max 1  Patient Precautions  Fall Risk  Precaution Comments  Left Hemiparesis, left visual inattention  Bed Transfer Status  Moderate Assist (mod towards left , min towards right)  Toilet Transfer Status   Minimal Assist (Mod to MaxA for standing balance c/ L grab bar)  Assistive Devices  Rails, Wheelchair  Oxygen  CPAP  Diet/Therapeutic Dining  Current Diet Order   Procedures    Diet Order Diet: Consistent CHO (Diabetic) (2 gram sodium restriction; medications whole with thin liquids); Second Modifier: (optional): 2 Gram Sodium     Pill Administration  whole  Agitated Behavioral Scale  14  ABS Level of Severity  No Agitation    Fall Risk  Has the patient had a fall this admission?   Yes  Shellie Hamilton Fall Risk Scoring  22, HIGH RISK  Fall Risk Safety Measures  bed alarm and chair alarm    Vitals  Temperature: 36.2 °C (97.2 °F)  Temp src: Tympanic  Pulse: 66  Respiration: 18  Blood Pressure: 127/81  Blood Pressure MAP (Calculated): 96 MM HG  BP Location: Right, Upper Arm  Patient BP Position: Supine     Oxygen  Pulse Oximetry: 96 %  O2 (LPM): 0  FiO2%: 21 %  O2 Delivery Device: CPAP    Bowel and Bladder  Last Bowel Movement  12/26/23  Stool Type  Type 6: Fluffy pieces with ragged edges, a mushy stool  Bowel Device  Bathroom  Continent  Bladder: Continent void   Bowel: Continent movement  Bladder Function  Urine Void (mL): 500 ml  Number of Times Voided: 2  Urinary Options: Yes  Urine Color: Yellow  Number of Times Incontinent of Urine: 0  Genitourinary Assessment   Bladder Assessment (WDL):  WDL Except  Echols Catheter: Not Applicable  Urine Color: Yellow  Number of Bladder Accidents: 0  Total Number of Bladder of Accidents in Last 7 Days:  0  Number of Times Incontinent of Urine: 0  Bladder Device: Urinal  Time Void: No  Bladder Scan: Post Void  $ Bladder Scan Results (mL): 26    Skin  Polo Score   16  Sensory Interventions   Bed Types: Standard/Trauma Mattress  Skin Preventative Measures: Pillows in Use for Support / Positioning  Moisture Interventions  Moisturizers/Barriers: Barrier Wipes      Pain  Pain Rating Scale  0 - No Pain  Pain Location  Back  Pain Location Orientation  Lower  Pain Interventions   Declines    ADLs    Bathing   Patient Refused Bathing (Pt took a shower earlier today during OT session)  Linen Change   Complete  Personal Hygiene  Perineal Care, Moist Deja Wipes  Chlorhexidine Bath      Oral Care  Brushed Teeth  Teeth/Dentures     Shave  Self  Nutrition Percentage Eaten  Dinner, Between % Consumed  Environmental Precautions  Treaded Slipper Socks on Patient, Personal Belongings, Wastebasket, Call Bell etc. in Easy Reach, Transferred to Stronger Side, Bed in Low Position  Patient Turns/Positioning  Patient Turns Self from Side to Side  Patient Turns Assistance/Tolerance  Assistance of One  Bed Positions  Bed Controls On, Bed Locked  Head of Bed Elevated  Self regulated      Psychosocial/Neurologic Assessment  Psychosocial Assessment  Psychosocial (WDL):  WDL Except  Patient Behaviors: Fatigue  Neurologic Assessment  Neuro (WDL): Exceptions to WDL  Level of Consciousness: Alert  Orientation Level: Oriented X4  Cognition: Follows commands, Appropriate attention/concentration  Speech: Clear  Facial Symmetry: Left facial drooping  Pupil Assesment: Yes  R Pupil Size (mm): 3  R Pupil Shape / Description: Round  R Pupil Reaction: Brisk  L Pupil Size (mm): 3  L Pupil Shape / Description: Round  L Pupil Reaction: Brisk  Reflexes: Cough  Cough Reflex: Present  Motor Function/Sensation Assessment: Dorsiflexion, Motor response  R Foot Dorsiflexion: Strong  L Foot Dorsiflexion: Absent  RUE Motor Response: Responds to commands  RUE  Sensation: Full sensation  Muscle Strength Right Arm: Normal Strength Against Gravity and Full Resistance  LUE Motor Response: Flaccid  LUE Sensation: Numbness, Tingling  Muscle Strength Left Arm: Weak Movement but Not Against Gravity or Resistance  RLE Motor Response: Responds to commands  RLE Sensation: Full sensation  Muscle Strength Right Leg: Good Strength Against Gravity and Moderate Resistance  LLE Motor Response: Flaccid  LLE Sensation: Numbness, Tingling  Muscle Strength Left Leg: Weak Movement but Not Against Gravity or Resistance  EENT (WDL):  WDL Except    Cardio/Pulmonary Assessment  Edema   RLE Edema: Trace  LLE Edema: Trace  Respiratory Breath Sounds  RUL Breath Sounds: Clear  RML Breath Sounds: Clear  RLL Breath Sounds: Clear, Diminished  MOHAMUD Breath Sounds: Clear  LLL Breath Sounds: Clear, Diminished  Cardiac Assessment   Cardiac (WDL):  WDL Except (H/O HTN.)

## 2023-12-29 NOTE — THERAPY
Occupational Therapy  Daily Treatment     Patient Name: Jason Lima  Age:  63 y.o., Sex:  male  Medical Record #: [id number]  Today's Date: 12/29/2023     Precautions  Precautions: (P) Fall Risk  Comments: (P) Left Hemiparesis, left visual inattention         Subjective    Pt encountered for OT seated in room. Agreeable to participate in ADLs and thera ex.      Objective       12/29/23 1331   OT Charge Group   OT Self Care / ADL (Units) 2   OT Therapeutic Exercise (Units) 2   OT Total Time Spent   OT Individual Total Time Spent (Mins) 60   Precautions   Precautions Fall Risk   Comments Left Hemiparesis, left visual inattention   Functional Level of Assist   Lower Body Dressing Minimal Assist   Lower Body Dressing Description Increased time;Other (comment)  (Mass practice for donning socks on L foot using hip hike. Pt able to maintain leg on contralateral leg in 7/10 trials. Emerging at using the L hand for support. Ptogressing from SBA using large socks to SBA using tighter ankle socks.)   Bed, Chair, Wheelchair Transfer Minimal Assist   Bed Chair Wheelchair Transfer Description Set-up of equipment;Verbal cueing;Increased time  (Stand pivot from WC to therapy bike, x2)   Sitting Upper Body Exercises   Tricep Press 2 sets of 10;Left;Weight (See Comments for lbs)  (rickshaw; 10#; dysem on )   Other Exercise nustep: LUE only; 1.39 min; 12 SPM   Comments Dysem on ; Pt encouraged to look at hand throughout to activity to prevent loose    Sitting Lower Body Exercises   Nustep Resistance Level 3;Time (See Comments)   Comments 15 min; 41 SPM; L hand wrapped for support   Interdisciplinary Plan of Care Collaboration   Patient Position at End of Therapy Seated;Call Light within Reach;Tray Table within Reach;Phone within Reach;Bed Alarm On         Assessment    Overall, good improvements towards Lorraine for donning and doffing socks w/o AE using a one handed approach. Pt appeared to benefit well from mass practice.  Cont improvements in LUE strength and functional use, would benefit to cont from LUE isolated exercises.      Strengths: Able to follow instructions, Independent prior level of function, Motivated for self care and independence, Pleasant and cooperative, Supportive family  Barriers: Decreased endurance, Fatigue, Generalized weakness, Hemiparesis, Impaired activity tolerance, Impaired balance, Limited mobility, Spasticity    Plan    Cont ADLs, functional transfers, and thera act/ex to maximize functional recovery for safe DC home.       DME  OT DME Recommendations  Bathroom Equipment: 3 in 1 Commode, Tub Transfer Bench (Medicaid Only)  Additional Equipment: Other (Comments)    Passport items to be completed:  Perform bathroom transfers, complete dressing, complete feeding, get ready for the day, prepare a simple meal, participate in household tasks, adapt home for safety needs, demonstrate home exercise program, complete caregiver training     Occupational Therapy Goals (Active)       Problem: Dressing       Dates: Start:  11/22/23         Goal: STG-Within one week, patient will dress LB at [facility] using LRD       Dates: Start:  11/22/23    Expected End:  12/23/23            Goal: STG-Within one week, patient will dress UB SPV using LRD       Dates: Start:  12/06/23    Expected End:  12/23/23               Problem: Functional Transfers       Dates: Start:  12/06/23         Goal: STG-Within one week, patient will transfer to step in shower at [facility] to [facility] using LRD       Dates: Start:  12/06/23    Expected End:  12/23/23               Problem: OT Long Term Goals       Dates: Start:  11/13/23         Goal: LTG-By discharge, patient will complete basic self care tasks at Mod Independent level.       Dates: Start:  11/13/23    Expected End:  12/23/23            Goal: LTG-By discharge, patient will perform bathroom transfers at Mod Independent level.       Dates: Start:  11/13/23    Expected End:  12/23/23

## 2023-12-30 ENCOUNTER — APPOINTMENT (OUTPATIENT)
Dept: PHYSICAL THERAPY | Facility: REHABILITATION | Age: 63
DRG: 057 | End: 2023-12-30
Attending: PHYSICAL MEDICINE & REHABILITATION
Payer: COMMERCIAL

## 2023-12-30 ENCOUNTER — APPOINTMENT (OUTPATIENT)
Dept: OCCUPATIONAL THERAPY | Facility: REHABILITATION | Age: 63
DRG: 057 | End: 2023-12-30
Attending: PHYSICAL MEDICINE & REHABILITATION
Payer: COMMERCIAL

## 2023-12-30 ENCOUNTER — APPOINTMENT (OUTPATIENT)
Dept: PHYSICAL THERAPY | Facility: REHABILITATION | Age: 63
DRG: 057 | End: 2023-12-30
Attending: HOSPITALIST
Payer: COMMERCIAL

## 2023-12-30 PROCEDURE — 97535 SELF CARE MNGMENT TRAINING: CPT

## 2023-12-30 PROCEDURE — 94760 N-INVAS EAR/PLS OXIMETRY 1: CPT

## 2023-12-30 PROCEDURE — A9270 NON-COVERED ITEM OR SERVICE: HCPCS | Performed by: PHYSICAL MEDICINE & REHABILITATION

## 2023-12-30 PROCEDURE — 770010 HCHG ROOM/CARE - REHAB SEMI PRIVAT*

## 2023-12-30 PROCEDURE — 97530 THERAPEUTIC ACTIVITIES: CPT

## 2023-12-30 PROCEDURE — A9270 NON-COVERED ITEM OR SERVICE: HCPCS | Performed by: HOSPITALIST

## 2023-12-30 PROCEDURE — 700102 HCHG RX REV CODE 250 W/ 637 OVERRIDE(OP): Performed by: STUDENT IN AN ORGANIZED HEALTH CARE EDUCATION/TRAINING PROGRAM

## 2023-12-30 PROCEDURE — 97116 GAIT TRAINING THERAPY: CPT

## 2023-12-30 PROCEDURE — 700102 HCHG RX REV CODE 250 W/ 637 OVERRIDE(OP): Performed by: PHYSICAL MEDICINE & REHABILITATION

## 2023-12-30 PROCEDURE — 700102 HCHG RX REV CODE 250 W/ 637 OVERRIDE(OP): Performed by: HOSPITALIST

## 2023-12-30 PROCEDURE — A9270 NON-COVERED ITEM OR SERVICE: HCPCS | Performed by: STUDENT IN AN ORGANIZED HEALTH CARE EDUCATION/TRAINING PROGRAM

## 2023-12-30 PROCEDURE — 97110 THERAPEUTIC EXERCISES: CPT

## 2023-12-30 RX ADMIN — AMLODIPINE BESYLATE 10 MG: 5 TABLET ORAL at 05:32

## 2023-12-30 RX ADMIN — APIXABAN 5 MG: 5 TABLET, FILM COATED ORAL at 09:02

## 2023-12-30 RX ADMIN — HYDRALAZINE HYDROCHLORIDE 100 MG: 50 TABLET, FILM COATED ORAL at 20:42

## 2023-12-30 RX ADMIN — TRAZODONE HYDROCHLORIDE 75 MG: 50 TABLET ORAL at 20:38

## 2023-12-30 RX ADMIN — SERTRALINE 50 MG: 50 TABLET, FILM COATED ORAL at 09:03

## 2023-12-30 RX ADMIN — HYDRALAZINE HYDROCHLORIDE 100 MG: 50 TABLET, FILM COATED ORAL at 15:32

## 2023-12-30 RX ADMIN — APIXABAN 5 MG: 5 TABLET, FILM COATED ORAL at 20:38

## 2023-12-30 RX ADMIN — OMEPRAZOLE 20 MG: 20 CAPSULE, DELAYED RELEASE ORAL at 09:02

## 2023-12-30 RX ADMIN — METOPROLOL SUCCINATE 37.5 MG: 25 TABLET, EXTENDED RELEASE ORAL at 05:32

## 2023-12-30 RX ADMIN — BACLOFEN 5 MG: 10 TABLET ORAL at 20:38

## 2023-12-30 RX ADMIN — BACLOFEN 5 MG: 10 TABLET ORAL at 15:32

## 2023-12-30 RX ADMIN — ATORVASTATIN CALCIUM 40 MG: 40 TABLET, FILM COATED ORAL at 20:38

## 2023-12-30 RX ADMIN — HYDRALAZINE HYDROCHLORIDE 100 MG: 50 TABLET, FILM COATED ORAL at 05:32

## 2023-12-30 RX ADMIN — BACLOFEN 5 MG: 10 TABLET ORAL at 09:03

## 2023-12-30 ASSESSMENT — PAIN DESCRIPTION - PAIN TYPE
TYPE: ACUTE PAIN
TYPE: ACUTE PAIN

## 2023-12-30 ASSESSMENT — GAIT ASSESSMENTS
ASSISTIVE DEVICE: HEMI-WALKER
DEVIATION: DECREASED BASE OF SUPPORT;BRADYKINETIC
GAIT LEVEL OF ASSIST: MINIMAL ASSIST
DISTANCE (FEET): 40

## 2023-12-30 ASSESSMENT — ACTIVITIES OF DAILY LIVING (ADL)
TOILET_TRANSFER_DESCRIPTION: GRAB BAR;INCREASED TIME;SET-UP OF EQUIPMENT;SUPERVISION FOR SAFETY;VERBAL CUEING
BED_CHAIR_WHEELCHAIR_TRANSFER_DESCRIPTION: SET-UP OF EQUIPMENT;SUPERVISION FOR SAFETY;VERBAL CUEING;SQUAT PIVOT TRANSFER TO WHEELCHAIR
BED_CHAIR_WHEELCHAIR_TRANSFER_DESCRIPTION: ADAPTIVE EQUIPMENT;INITIAL PREPARATION FOR TASK;SET-UP OF EQUIPMENT;SUPERVISION FOR SAFETY

## 2023-12-30 NOTE — THERAPY
Physical Therapy   Daily Treatment     Patient Name: Jason Lima  Age:  63 y.o., Sex:  male  Medical Record #: [id number]  Today's Date: 12/30/2023     Precautions  Precautions: Fall Risk  Comments: Left Hemiparesis, left visual inattention    Subjective    Patient pleasant and motivated to participate. Patient's spouse, Vielka, present and very supportive and engaged.      Objective       12/30/23 1001   PT Charge Group   PT Gait Training (Units) 1   PT Therapeutic Activities (Units) 3   PT Total Time Spent   PT Individual Total Time Spent (Mins) 60   Gait Functional Level of Assist    Gait Level Of Assist Minimal Assist  (occasional mod A during turn negotiation)   Assistive Device Tanvir-Walker  (L AFO)   Distance (Feet) 40   # of Times Distance was Traveled 2   Deviation Decreased Base Of Support;Bradykinetic  (L hip ER)   Wheelchair Functional Level of Assist   Wheelchair Assist Supervised  (spv-mod I)   Distance Wheelchair (Feet or Distance) 200'   Wheelchair Description Extra time  (hemitechnique)   Transfer Functional Level of Assist   Bed, Chair, Wheelchair Transfer Minimal Assist   Bed Chair Wheelchair Transfer Description Set-up of equipment;Supervision for safety;Verbal cueing;Squat pivot transfer to wheelchair   Bed Mobility    Sit to Stand Minimal Assist   Interdisciplinary Plan of Care Collaboration   IDT Collaboration with  Family / Caregiver;Occupational Therapist   Patient Position at End of Therapy Seated;Self Releasing Lap Belt Applied;Call Light within Reach;Tray Table within Reach;Phone within Reach;Family / Friend in Room   Collaboration Comments Patient's spouse present and very supportive; Colloaborated with primary OT re: CLOF and POC       Family training with patient's spouse, Vielka, on gait training with hemiwalker and car transfers (Vielka performed hands on for all tasks).     Ambulation with hemiwalker:  -edu on use of AFO for ambulation, WC as primary mode of locomotion with gait  performed with therapy and spouse only; use of gait belt at all times; appropriate positioning of spouse (on patient's L side, posterior to patient)  -cuing for walker placement, lateral weight-shift (spouse able to appropriately cue patient)    Car transfer training (performed in therapy gym vehicle; completed 6 repetitions with rest breaks between bouts)  -overall patient required max A from Vielka  -edu on WC placement for stand pivot transfer (opted to not use hemiwalker at this time), use of gait belt and technique for LE management into and out of vehicle  -patient required cuing during each repetition for foot and hand placement  -utilized stool on 2 repetitions to assist patient in scooting on vehicle seat (patient and spouse report their vehicle is lower and they likely will not need a stool)    Assessment    Session focused on family training with spouse. Patient completed car transfers in therapy gym vehicle (patient's vehicle not available today) and overall required max A. He required cuing during each repetition for technique, particularly ensuring he achieves sitting and standing balance before attempting to transfer. Patient's spouse able to assist and cue patient appropriately. They would benefit from short session before discharge for car transfer training in their own vehicle (Vielka reports she will be working the next two days but would like to practice the day of discharge if possible).     Strengths: Able to follow instructions, Independent prior level of function, Motivated for self care and independence, Pleasant and cooperative, Supportive family, Willingly participates in therapeutic activities  Barriers: Decreased endurance, Hemiparesis, Difficulty following instructions, Fatigue, Hypertension, Impaired activity tolerance, Impaired balance, Impaired insight/denial of deficits, Impaired functional cognition, Impaired sleep pattern, Impulsive, Limited mobility, Visual impairment, Poor family  "support (lives alone)    Plan    Gait training c/ L AFO and HW   for pregait priming, DF stretching, trunk stretching for improved midline     Retry STS c/ L > R prisms to assess for impact on midline control     Trial FWW c/ hand  for LUE for improved utilization of L hemiside.   PF stretching and sciatic nerve glides.    LLE, NMES for knee flex/ext on LLE.   Bioness? Try c/ AFO and only thigh cuff active.    DME  PT DME Recommendations  Wheelchair: 18\" Width  Cushion: Specialty (See other comments)  Assistive Device: Tanvir Walker  Additional Equipment: Other (Comments)    Passport items to be completed:  Get in/out of bed safely, in/out of a vehicle, safely use mobility device, walk or wheel around home/community, navigate up and down stairs, show how to get up/down from the ground, ensure home is accessible, demonstrate HEP, complete caregiver training    Physical Therapy Problems (Active)       Problem: PT-Long Term Goals       Dates: Start:  11/13/23         Goal: LTG-By discharge, patient will transfer one surface to another c/ Renata or better.        Dates: Start:  11/13/23    Expected End:  12/23/23         Goal Note filed on 12/27/23 1313 by Isidra Hamilton PT       Inconsistent due to L hemiparesis, occasionally needs up to ModA for L side LOB               Goal: LTG-By discharge, patient will ambulate up/down 1 step c/ LRAD at Renata or better.        Dates: Start:  11/13/23    Expected End:  12/23/23         Goal Note filed on 12/27/23 1313 by Isidra Hamilton PT       Last performance: MaxA x 1, 2nd person CGA safety for 4 steps c/ RHR               Goal: LTG-By discharge, patient will transfer in/out of a car c/ ModA or better.        Dates: Start:  11/13/23    Expected End:  12/23/23         Goal Note filed on 12/27/23 1313 by Isidra Hamilton PT       Needs assessment                 "

## 2023-12-30 NOTE — PROGRESS NOTES
NURSING DAILY NOTE    Name: Jason Lima   Date of Admission: 11/12/2023   Admitting Diagnosis: Thalamic hemorrhage (HCC)  Attending Physician: Lisa Lee D.o.  Allergies: Penicillins    Safety  Patient Assist  mod assist  Patient Precautions  Fall Risk  Precaution Comments  Left Hemiparesis, left visual inattention  Bed Transfer Status  Minimal Assist  Toilet Transfer Status   Minimal Assist (Mod to MaxA for standing balance c/ L grab bar)  Assistive Devices  Rails, Wheelchair  Oxygen  CPAP  Diet/Therapeutic Dining  Current Diet Order   Procedures    Diet Order Diet: Consistent CHO (Diabetic) (2 gram sodium restriction; medications whole with thin liquids); Second Modifier: (optional): 2 Gram Sodium     Pill Administration  whole  Agitated Behavioral Scale  14  ABS Level of Severity  No Agitation    Fall Risk  Has the patient had a fall this admission?   Yes  Shellie Hamilton Fall Risk Scoring  22, HIGH RISK  Fall Risk Safety Measures  bed alarm, chair alarm, fall during this hospitalization, poor balance, and low vision/ hearing    Vitals  Temperature: 36.8 °C (98.2 °F)  Temp src: Temporal  Pulse: 63  Respiration: 18  Blood Pressure: 130/80  Blood Pressure MAP (Calculated): 97 MM HG  BP Location: Right, Upper Arm  Patient BP Position: Supine     Oxygen  Pulse Oximetry: 96 %  O2 (LPM): 0  FiO2%: 21 %  O2 Delivery Device: CPAP    Bowel and Bladder  Last Bowel Movement  12/29/23  Stool Type  Type 6: Fluffy pieces with ragged edges, a mushy stool  Bowel Device  Bathroom  Continent  Bladder: Continent void   Bowel: Continent movement  Bladder Function  Urine Void (mL): 200 ml (urinals)  Number of Times Voided: 1  Urinary Options: Yes  Urine Color: Pale  Number of Times Incontinent of Urine: 0  Genitourinary Assessment   Bladder Assessment (WDL):  WDL Except  Echols Catheter: Not Applicable  Urine Color: Pale  Number of Bladder Accidents: 0  Total Number of  Bladder of Accidents in Last 7 Days: 0  Number of Times Incontinent of Urine: 0  Bladder Device: Urinal  Time Void: No  Bladder Scan: Post Void  $ Bladder Scan Results (mL): 26    Skin  Polo Score   16  Sensory Interventions   Bed Types: Standard/Trauma Mattress  Skin Preventative Measures: Pillows in Use for Support / Positioning  Moisture Interventions  Moisturizers/Barriers: Barrier Wipes      Pain  Pain Rating Scale  0 - No Pain  Pain Location  Back  Pain Location Orientation  Lower  Pain Interventions   Declines    ADLs    Bathing   Shower, * * With Assistance from, Staff  Linen Change   Complete  Personal Hygiene  Change Deja Pads, Moist Deja Wipes, Perineal Care  Chlorhexidine Bath      Oral Care  Brushed Teeth  Teeth/Dentures     Shave  Self  Nutrition Percentage Eaten  *  * Meal *  *, Dinner, Between % Consumed  Environmental Precautions  Bed in Low Position, Treaded Slipper Socks on Patient  Patient Turns/Positioning  Patient Turns Self from Side to Side  Patient Turns Assistance/Tolerance  Assistance of One  Bed Positions  Bed Controls On  Head of Bed Elevated  Self regulated      Psychosocial/Neurologic Assessment  Psychosocial Assessment  Psychosocial (WDL):  WDL Except  Patient Behaviors: Fatigue  Neurologic Assessment  Neuro (WDL): Exceptions to WDL  Level of Consciousness: Alert  Orientation Level: Oriented X4  Cognition: Follows commands, Appropriate attention/concentration  Speech: Clear  Facial Symmetry: Left facial drooping  Pupil Assesment: Yes  R Pupil Size (mm): 3  R Pupil Shape / Description: Round  R Pupil Reaction: Brisk  L Pupil Size (mm): 3  L Pupil Shape / Description: Round  L Pupil Reaction: Brisk  Reflexes: Cough  Cough Reflex: Present  Motor Function/Sensation Assessment: Dorsiflexion, Motor response  R Foot Dorsiflexion: Strong  L Foot Dorsiflexion: Absent  RUE Motor Response: Responds to commands  RUE Sensation: Full sensation  Muscle Strength Right Arm: Normal Strength  Against Gravity and Full Resistance  LUE Motor Response: Flaccid  LUE Sensation: Numbness, Tingling  Muscle Strength Left Arm: Weak Movement but Not Against Gravity or Resistance  RLE Motor Response: Responds to commands  RLE Sensation: Full sensation  Muscle Strength Right Leg: Good Strength Against Gravity and Moderate Resistance  LLE Motor Response: Flaccid  LLE Sensation: Numbness, Tingling  Muscle Strength Left Leg: Weak Movement but Not Against Gravity or Resistance  EENT (WDL):  WDL Except    Cardio/Pulmonary Assessment  Edema   RLE Edema: Trace  LLE Edema: Trace  Respiratory Breath Sounds  RUL Breath Sounds: Clear  RML Breath Sounds: Clear  RLL Breath Sounds: Clear, Diminished  MOHAMUD Breath Sounds: Clear  LLL Breath Sounds: Clear, Diminished  Cardiac Assessment   Cardiac (WDL):  WDL Except

## 2023-12-30 NOTE — PROGRESS NOTES
Assumed care of patient. Bedside report received from Lydia BOLDEN. Patient is in bed. Fall precautions in place. Call light and belongings within reach. Updated on POC, all questions and concerns answered at this time. No other needs at this time.

## 2023-12-30 NOTE — THERAPY
"Occupational Therapy  Daily Treatment     Patient Name: Jason Lima  Age:  63 y.o., Sex:  male  Medical Record #: [id number]  Today's Date: 12/30/2023     Precautions  Precautions: (P) Fall Risk  Comments: (P) Left Hemiparesis, left visual inattention         Subjective    Pt and SO present for FT in functional transfer training and thera act training to increase functional performance. SO states, \"I would really like a list of exercises he can do at home with me and his CG. I want to make a daily schedule of exercises for him.\"    SO shared with OTR pictures of current home modifications to bathrooms (e.g. ramp, GB, shower bench).      Objective       12/30/23 0901   OT Charge Group   OT Self Care / ADL (Units) 2   OT Therapy Activity (Units) 1   OT Therapeutic Exercise (Units) 1   OT Total Time Spent   OT Individual Total Time Spent (Mins) 60   Precautions   Precautions Fall Risk   Comments Left Hemiparesis, left visual inattention   Functional Level of Assist   Grooming Contact Guard Assist;Standing   Grooming Description Increased time;Standing at sink;Verbal cueing  (FT w/ SO; toothbrushing standing at sink.)   Bed, Chair, Wheelchair Transfer Minimal Assist  ([facility] to CGA)   Bed Chair Wheelchair Transfer Description Adaptive equipment;Initial preparation for task;Set-up of equipment;Supervision for safety  (FT w/ SO; stand pivot from non-hospital bed <> WC using bed rail; x4)   Toilet Transfers Minimal Assist   Toilet Transfer Description Grab bar;Increased time;Set-up of equipment;Supervision for safety;Verbal cueing  (FT w/ SO; Stand step from WC <> toilet using R-side GB; x2)   Sitting Upper Body Exercises   Bicep Curls 2 sets of 10;Left;Light Resistance Theraband   Comments Family requested additional thera ex for pt upon DC; unable to review more d/t time constraints. Family agreeable to recieving writted handout of exercises upon DC.   IADL Treatments   IADL Treatments Kitchen mobility education   Kitchen " Mobility Education Standing at counter cone stacking activity with LUE only. CGA for safety with dynamic balance.  (Forward and lateral reaching w/ L hand to obtain cones. 5 reps each at CGA.)   Interdisciplinary Plan of Care Collaboration   IDT Collaboration with  Physical Therapist;Family / Caregiver   Patient Position at End of Therapy Seated;Chair Alarm On;Call Light within Reach;Tray Table within Reach;Phone within Reach;Family / Friend in Room   Collaboration Comments SO present throughout session for FT.     Discussed a variety of thera act exercises with SO: Practicing STS from front bathroom toilet (front facing, horizontal GB) while incorporating LUE. Table top exercises bike for BU/LE, TB LUE exercises, kitchen mobility activities using cones (e.g. opening/closing cupboard doors to retrieve cones), don/doffing sock practice, and standing WB through LUE to clean counter tops and windows.       Recommended cont practice with WC safety and sequencing prior to functional mobility and to utilized gait belt during all transfers for pt safety.      Assessment    Overall, SO demo good understanding and safety awareness during functional transfers. Family appears to have all DME in place in prep for next weeks DC. Following FT, both pt and SO did not have any further questions nor additional training needs from OTR.     Strengths: Able to follow instructions, Independent prior level of function, Motivated for self care and independence, Pleasant and cooperative, Supportive family  Barriers: Decreased endurance, Fatigue, Generalized weakness, Hemiparesis, Impaired activity tolerance, Impaired balance, Limited mobility, Spasticity    Plan    Provide handout of thera act/ex for pt upon DC. [facility] shower scheduled for Monday, 01/01.Cont POC in prep for next weeks DC.      DME  OT DME Recommendations  Bathroom Equipment: 3 in 1 Commode, Tub Transfer Bench (Medicaid Only)  Additional Equipment: Other  (Comments)    Passport items to be completed:  Perform bathroom transfers, complete dressing, complete feeding, get ready for the day, prepare a simple meal, participate in household tasks, adapt home for safety needs, demonstrate home exercise program, complete caregiver training     Occupational Therapy Goals (Active)       Problem: Dressing       Dates: Start:  11/22/23         Goal: STG-Within one week, patient will dress LB at [facility] using LRD       Dates: Start:  11/22/23    Expected End:  12/23/23            Goal: STG-Within one week, patient will dress UB SPV using LRD       Dates: Start:  12/06/23    Expected End:  12/23/23               Problem: Functional Transfers       Dates: Start:  12/06/23         Goal: STG-Within one week, patient will transfer to step in shower at [facility] to [facility] using LRD       Dates: Start:  12/06/23    Expected End:  12/23/23               Problem: OT Long Term Goals       Dates: Start:  11/13/23         Goal: LTG-By discharge, patient will complete basic self care tasks at Mod Independent level.       Dates: Start:  11/13/23    Expected End:  12/23/23            Goal: LTG-By discharge, patient will perform bathroom transfers at Mod Independent level.       Dates: Start:  11/13/23    Expected End:  12/23/23

## 2023-12-30 NOTE — FLOWSHEET NOTE
12/30/23 0644   Events/Summary/Plan   Events/Summary/Plan RA pulse ox check. Wearing CPAP nasal mask   Vital Signs   Pulse 73   Respiration 16   Pulse Oximetry 94 %   $ Pulse Oximetry (Spot Check) Yes   Respiratory Assessment   Level of Consciousness Responds to voice  (sleeping)   Chest Exam   Work Of Breathing / Effort Within Normal Limits   Oxygen   O2 Delivery Device CPAP

## 2023-12-30 NOTE — THERAPY
Physical Therapy   Daily Treatment     Patient Name: Jason Lima  Age:  63 y.o., Sex:  male  Medical Record #: [id number]  Today's Date: 12/30/2023     Precautions  Precautions: (P) Fall Risk  Comments: (P) Left Hemiparesis, left visual inattention    Subjective    Pt received in wc at doorway to room with SO. He requests to work on his L arm and SO requests to practice getting on/off the floor because pt had significant difficulty with this transfer previously.     Objective       12/30/23 1401   PT Charge Group   PT Therapeutic Activities (Units) 2   PT Total Time Spent   PT Individual Total Time Spent (Mins) 30   Precautions   Precautions Fall Risk   Comments Left Hemiparesis, left visual inattention   Cognition    Level of Consciousness Alert   Sleep/Wake Cycle   Sleep & Rest Awake;Out of bed   Interdisciplinary Plan of Care Collaboration   IDT Collaboration with  Family / Caregiver;Therapy Tech   Patient Position at End of Therapy Seated;Family / Friend in Room   Collaboration Comments pt's CODY Vora presen throughout session; tx of care to Will therapy tech at completion of session     STS in // bars with CGA and assist for L hand placement on railing.    Neuroplasticity edu: functional use of affected L UE and LE, need for repetition, wc propulsion technique using B LEs.    Wc<>floor tx:   -Min A for positioning L LE in flexed position and lowering to the mat.   Pt requires Min A to transition to quadruped then prone. Cues for self-management of L UE.   -Prone->quadruped: Min A to facilitate R wt shift and flex L hip/knee, and Min A to push up to quadruped. L LE stabilized during the transition to quadruped.  -Quadruped->half kneeling->standing: Min-Mod A to position L foot forward in half kneeling. Pt able to place B hand on chair without physical assist. CGA-Min A to transition from half kneeling to standing with B hold support, and CGA to perform pivot turn to sit in seat.  *Pt's CODY Vora present and engaged  "in the floor tx training. Vielka suggested pt getting on floor at home to do exercises, which PT advised ok to do as long as he can safely get on/off the floor.     Assessment    Pt able to complete floor tx with Min-Mod A and increased time (~10-12 min to complete). SO present and supportive throughout session.     Strengths: Able to follow instructions, Independent prior level of function, Motivated for self care and independence, Pleasant and cooperative, Supportive family, Willingly participates in therapeutic activities  Barriers: Decreased endurance, Hemiparesis, Difficulty following instructions, Fatigue, Hypertension, Impaired activity tolerance, Impaired balance, Impaired insight/denial of deficits, Impaired functional cognition, Impaired sleep pattern, Impulsive, Limited mobility, Visual impairment, Poor family support (lives alone)    Plan    Prepare for DC to home with SO on 1/2/24.     *Floor <>chair transfer training per SO's request.    Gait training c/ L AFO and HW   for pregait priming, DF stretching, trunk stretching for improved midline     Retry STS c/ L > R prisms to assess for impact on midline control     Trial FWW c/ hand  for LUE for improved utilization of L hemiside.   PF stretching and sciatic nerve glides.    LLE, NMES for knee flex/ext on LLE.   Bioness? Try c/ AFO and only thigh cuff active.    DME  PT DME Recommendations  Wheelchair: 18\" Width  Cushion: Specialty (See other comments)  Assistive Device: Tanvir Walker  Additional Equipment: Other (Comments)        Physical Therapy Problems (Active)       Problem: PT-Long Term Goals       Dates: Start:  11/13/23         Goal: LTG-By discharge, patient will transfer one surface to another c/ Renata or better.        Dates: Start:  11/13/23    Expected End:  12/23/23         Goal Note filed on 12/27/23 1313 by Isidra Hamilton, PT       Inconsistent due to L hemiparesis, occasionally needs up to ModA for L side LOB               " Goal: LTG-By discharge, patient will ambulate up/down 1 step c/ LRAD at [facility] or better.        Dates: Start:  11/13/23    Expected End:  12/23/23         Goal Note filed on 12/27/23 1313 by Isidra Hamilton, PT       Last performance: MaxA x 1, 2nd person CGA safety for 4 steps c/ RHR               Goal: LTG-By discharge, patient will transfer in/out of a car c/ ModA or better.        Dates: Start:  11/13/23    Expected End:  12/23/23         Goal Note filed on 12/27/23 1313 by Isidra Hamilton, PT       Needs assessment

## 2023-12-30 NOTE — PROGRESS NOTES
NURSING DAILY NOTE    Name: Jason Lima   Date of Admission: 11/12/2023   Admitting Diagnosis: Thalamic hemorrhage (HCC)  Attending Physician: Lisa Lee D.o.  Allergies: Penicillins    Safety  Patient Assist  Mod/Max assist  Patient Precautions  Fall Risk  Precaution Comments  Left Hemiparesis, left visual inattention  Bed Transfer Status  Minimal Assist  Toilet Transfer Status   Minimal Assist (Mod to MaxA for standing balance c/ L grab bar)  Assistive Devices  Rails, Wheelchair  Oxygen  CPAP  Diet/Therapeutic Dining  Current Diet Order   Procedures    Diet Order Diet: Consistent CHO (Diabetic) (2 gram sodium restriction; medications whole with thin liquids); Second Modifier: (optional): 2 Gram Sodium     Pill Administration  whole  Agitated Behavioral Scale  14  ABS Level of Severity  No Agitation    Fall Risk  Has the patient had a fall this admission?   Yes  Shellie Hamilton Fall Risk Scoring  22, HIGH RISK  Fall Risk Safety Measures  bed alarm and chair alarm    Vitals  Temperature: 36.7 °C (98 °F)  Temp src: Oral  Pulse: 75  Respiration: 18  Blood Pressure: 116/80  Blood Pressure MAP (Calculated): 92 MM HG  BP Location: Right, Upper Arm  Patient BP Position: Supine     Oxygen  Pulse Oximetry: 93 %  O2 (LPM): 0  FiO2%: 21 %  O2 Delivery Device: CPAP    Bowel and Bladder  Last Bowel Movement  12/29/23  Stool Type  Type 6: Fluffy pieces with ragged edges, a mushy stool  Bowel Device  Bathroom  Continent  Bladder: Continent void   Bowel: Continent movement  Bladder Function  Urine Void (mL): 200 ml  Number of Times Voided: 1  Urinary Options: Yes  Urine Color: Pale  Number of Times Incontinent of Urine: 0  Genitourinary Assessment   Bladder Assessment (WDL):  WDL Except  Echols Catheter: Not Applicable  Urine Color: Pale  Number of Bladder Accidents: 0  Total Number of Bladder of Accidents in Last 7 Days: 0  Number of Times Incontinent of Urine:  0  Bladder Device: Urinal  Time Void: No  Bladder Scan: Post Void  $ Bladder Scan Results (mL): 26    Skin  Polo Score   16  Sensory Interventions   Bed Types: Standard/Trauma Mattress  Skin Preventative Measures: Pillows in Use for Support / Positioning  Moisture Interventions  Moisturizers/Barriers: Barrier Wipes      Pain  Pain Rating Scale  0 - No Pain  Pain Location  Back  Pain Location Orientation  Lower  Pain Interventions   Declines    ADLs    Bathing   Patient Refused Bathing (Pt took a shower earlier today during OT session)  Linen Change   Complete  Personal Hygiene  Perineal Care, Moist Deja Wipes  Chlorhexidine Bath      Oral Care  Brushed Teeth  Teeth/Dentures     Shave  Self  Nutrition Percentage Eaten  *  * Meal *  *, Dinner, Between % Consumed  Environmental Precautions  Treaded Slipper Socks on Patient, Personal Belongings, Wastebasket, Call Bell etc. in Easy Reach, Transferred to Stronger Side, Bed in Low Position  Patient Turns/Positioning  Patient Turns Self from Side to Side  Patient Turns Assistance/Tolerance  Assistance of One  Bed Positions  Bed Controls On, Bed Locked  Head of Bed Elevated  Self regulated      Psychosocial/Neurologic Assessment  Psychosocial Assessment  Psychosocial (WDL):  WDL Except  Patient Behaviors: Fatigue  Neurologic Assessment  Neuro (WDL): Exceptions to WDL  Level of Consciousness: Alert  Orientation Level: Oriented X4  Cognition: Follows commands, Appropriate attention/concentration  Speech: Clear  Facial Symmetry: Left facial drooping  Pupil Assesment: Yes  R Pupil Size (mm): 3  R Pupil Shape / Description: Round  R Pupil Reaction: Brisk  L Pupil Size (mm): 3  L Pupil Shape / Description: Round  L Pupil Reaction: Brisk  Reflexes: Cough  Cough Reflex: Present  Motor Function/Sensation Assessment: Dorsiflexion, Motor response  R Foot Dorsiflexion: Strong  L Foot Dorsiflexion: Absent  RUE Motor Response: Responds to commands  RUE Sensation: Full  sensation  Muscle Strength Right Arm: Normal Strength Against Gravity and Full Resistance  LUE Motor Response: Flaccid  LUE Sensation: Numbness, Tingling  Muscle Strength Left Arm: Weak Movement but Not Against Gravity or Resistance  RLE Motor Response: Responds to commands  RLE Sensation: Full sensation  Muscle Strength Right Leg: Good Strength Against Gravity and Moderate Resistance  LLE Motor Response: Flaccid  LLE Sensation: Numbness, Tingling  Muscle Strength Left Leg: Weak Movement but Not Against Gravity or Resistance  EENT (WDL):  WDL Except    Cardio/Pulmonary Assessment  Edema   RLE Edema: Trace  LLE Edema: Trace  Respiratory Breath Sounds  RUL Breath Sounds: Clear  RML Breath Sounds: Clear  RLL Breath Sounds: Clear, Diminished  MOHAMUD Breath Sounds: Clear  LLL Breath Sounds: Clear, Diminished  Cardiac Assessment   Cardiac (WDL):  WDL Except (HTN)

## 2023-12-30 NOTE — CARE PLAN
The patient is Stable - Low risk of patient condition declining or worsening    Shift Goals  Clinical Goals: safety  Patient Goals: safety  Family Goals: Education    Progress made toward(s) clinical / shift goals:    Problem: Knowledge Deficit - Standard  Goal: Patient and family/care givers will demonstrate understanding of plan of care, disease process/condition, diagnostic tests and medications  Outcome: Progressing   Patient educated on the POC and medications administered. All questions and concerns addressed at this time   Problem: Fall Risk - Rehab  Goal: Patient will remain free from falls  Outcome: Progressing   Bed is locked and in low position. Call light and belongings within reach  Problem: Pain - Standard  Goal: Alleviation of pain or a reduction in pain to the patient’s comfort goal  Outcome: Progressing   Use of 0-10 pain scale. Patient is able to verbalize pain and discomfort. Denies any pain or discomfort at this time    Patient is not progressing towards the following goals:

## 2023-12-31 ENCOUNTER — APPOINTMENT (OUTPATIENT)
Dept: PHYSICAL THERAPY | Facility: REHABILITATION | Age: 63
DRG: 057 | End: 2023-12-31
Attending: PHYSICAL MEDICINE & REHABILITATION
Payer: COMMERCIAL

## 2023-12-31 PROCEDURE — 97116 GAIT TRAINING THERAPY: CPT

## 2023-12-31 PROCEDURE — A9270 NON-COVERED ITEM OR SERVICE: HCPCS | Performed by: STUDENT IN AN ORGANIZED HEALTH CARE EDUCATION/TRAINING PROGRAM

## 2023-12-31 PROCEDURE — 770010 HCHG ROOM/CARE - REHAB SEMI PRIVAT*

## 2023-12-31 PROCEDURE — A9270 NON-COVERED ITEM OR SERVICE: HCPCS | Performed by: PHYSICAL MEDICINE & REHABILITATION

## 2023-12-31 PROCEDURE — 700102 HCHG RX REV CODE 250 W/ 637 OVERRIDE(OP): Performed by: PHYSICAL MEDICINE & REHABILITATION

## 2023-12-31 PROCEDURE — 97112 NEUROMUSCULAR REEDUCATION: CPT

## 2023-12-31 PROCEDURE — 700102 HCHG RX REV CODE 250 W/ 637 OVERRIDE(OP): Performed by: HOSPITALIST

## 2023-12-31 PROCEDURE — 700102 HCHG RX REV CODE 250 W/ 637 OVERRIDE(OP): Performed by: STUDENT IN AN ORGANIZED HEALTH CARE EDUCATION/TRAINING PROGRAM

## 2023-12-31 PROCEDURE — A9270 NON-COVERED ITEM OR SERVICE: HCPCS | Performed by: HOSPITALIST

## 2023-12-31 RX ADMIN — AMLODIPINE BESYLATE 10 MG: 5 TABLET ORAL at 05:42

## 2023-12-31 RX ADMIN — SERTRALINE 50 MG: 50 TABLET, FILM COATED ORAL at 09:33

## 2023-12-31 RX ADMIN — TRAZODONE HYDROCHLORIDE 75 MG: 50 TABLET ORAL at 20:52

## 2023-12-31 RX ADMIN — HYDRALAZINE HYDROCHLORIDE 100 MG: 50 TABLET, FILM COATED ORAL at 20:53

## 2023-12-31 RX ADMIN — BACLOFEN 5 MG: 10 TABLET ORAL at 20:53

## 2023-12-31 RX ADMIN — APIXABAN 5 MG: 5 TABLET, FILM COATED ORAL at 20:53

## 2023-12-31 RX ADMIN — METOPROLOL SUCCINATE 37.5 MG: 25 TABLET, EXTENDED RELEASE ORAL at 05:42

## 2023-12-31 RX ADMIN — HYDRALAZINE HYDROCHLORIDE 100 MG: 50 TABLET, FILM COATED ORAL at 14:58

## 2023-12-31 RX ADMIN — BACLOFEN 5 MG: 10 TABLET ORAL at 09:32

## 2023-12-31 RX ADMIN — APIXABAN 5 MG: 5 TABLET, FILM COATED ORAL at 09:33

## 2023-12-31 RX ADMIN — BACLOFEN 5 MG: 10 TABLET ORAL at 14:55

## 2023-12-31 RX ADMIN — HYDRALAZINE HYDROCHLORIDE 100 MG: 50 TABLET, FILM COATED ORAL at 05:42

## 2023-12-31 RX ADMIN — OMEPRAZOLE 20 MG: 20 CAPSULE, DELAYED RELEASE ORAL at 09:33

## 2023-12-31 RX ADMIN — ATORVASTATIN CALCIUM 40 MG: 40 TABLET, FILM COATED ORAL at 20:52

## 2023-12-31 ASSESSMENT — GAIT ASSESSMENTS
DEVIATION: DECREASED BASE OF SUPPORT;BRADYKINETIC
DISTANCE (FEET): 40
ASSISTIVE DEVICE: HEMI-WALKER
GAIT LEVEL OF ASSIST: MINIMAL ASSIST

## 2023-12-31 ASSESSMENT — FIBROSIS 4 INDEX: FIB4 SCORE: 1.08

## 2023-12-31 ASSESSMENT — PAIN DESCRIPTION - PAIN TYPE: TYPE: ACUTE PAIN

## 2023-12-31 NOTE — CARE PLAN
Problem: Knowledge Deficit - Standard  Goal: Patient and family/care givers will demonstrate understanding of plan of care, disease process/condition, diagnostic tests and medications  Outcome: Progressing     Problem: Skin Integrity  Goal: Skin integrity is maintained or improved  Outcome: Progressing   The patient is Stable - Low risk of patient condition declining or worsening    Shift Goals  Clinical Goals: Safety  Patient Goals: Safety, gain strength  Family Goals: Education

## 2023-12-31 NOTE — CARE PLAN
The patient is Stable - Low risk of patient condition declining or worsening    Shift Goals  Clinical Goals: Safety  Patient Goals: Safety, gain strength  Family Goals: Education    Progress made toward(s) clinical / shift goals:    Problem: Knowledge Deficit - Standard  Goal: Patient and family/care givers will demonstrate understanding of plan of care, disease process/condition, diagnostic tests and medications  Outcome: Progressing   Patient educated on the POC and medications administered. All questions and concerns addressed at this time   Problem: Fall Risk - Rehab  Goal: Patient will remain free from falls  Outcome: Progressing   Bed is locked and in low position. Call light and belongings within reach  Problem: Pain - Standard  Goal: Alleviation of pain or a reduction in pain to the patient’s comfort goal  Outcome: Progressing   Use of 0-10 pain scale. Patient denies any pain or discomfort at this time    Patient is not progressing towards the following goals:

## 2023-12-31 NOTE — PROGRESS NOTES
NURSING DAILY NOTE    Name: Jason Lima   Date of Admission: 11/12/2023   Admitting Diagnosis: Thalamic hemorrhage (HCC)  Attending Physician: Lisa Lee D.o.  Allergies: Penicillins    Safety  Patient Assist  mod assist  Patient Precautions  Fall Risk  Precaution Comments  Left Hemiparesis, left visual inattention  Bed Transfer Status  Minimal Assist  Toilet Transfer Status   Minimal Assist  Assistive Devices  Rails, Wheelchair  Oxygen  CPAP  Diet/Therapeutic Dining  Current Diet Order   Procedures    Diet Order Diet: Consistent CHO (Diabetic) (2 gram sodium restriction; medications whole with thin liquids); Second Modifier: (optional): 2 Gram Sodium     Pill Administration  whole  Agitated Behavioral Scale  14  ABS Level of Severity  No Agitation    Fall Risk  Has the patient had a fall this admission?   Yes  Shellie Hamilton Fall Risk Scoring  22, HIGH RISK  Fall Risk Safety Measures  bed alarm, chair alarm, fall during this hospitalization, poor balance, and low vision/ hearing    Vitals  Temperature: 36.8 °C (98.3 °F)  Temp src: Oral  Pulse: 65  Respiration: 18  Blood Pressure: (!) 141/82  Blood Pressure MAP (Calculated): 102 MM HG  BP Location: Right, Upper Arm  Patient BP Position: Supine     Oxygen  Pulse Oximetry: 97 %  O2 (LPM): 0  FiO2%: 21 %  O2 Delivery Device: CPAP    Bowel and Bladder  Last Bowel Movement  12/30/23 (per pt)  Stool Type  Type 6: Fluffy pieces with ragged edges, a mushy stool  Bowel Device  Bathroom  Continent  Bladder: Continent void   Bowel: Continent movement  Bladder Function  Urine Void (mL): 400 ml (urinals)  Number of Times Voided: 1  Urinary Options: Yes  Urine Color: Pale  Number of Times Incontinent of Urine: 0  Genitourinary Assessment   Bladder Assessment (WDL):  WDL Except  Echols Catheter: Not Applicable  Urine Color: Pale  Number of Bladder Accidents: 0  Total Number of Bladder of Accidents in Last 7 Days:  0  Number of Times Incontinent of Urine: 0  Bladder Device: Urinal  Time Void: No  Bladder Scan: Post Void  $ Bladder Scan Results (mL): 26    Skin  Polo Score   16  Sensory Interventions   Bed Types: Standard/Trauma Mattress  Skin Preventative Measures: Pillows in Use for Support / Positioning  Moisture Interventions  Moisturizers/Barriers: Barrier Wipes      Pain  Pain Rating Scale  0 - No Pain  Pain Location  Back, Generalized  Pain Location Orientation  Lower  Pain Interventions   Declines    ADLs    Bathing   Shower, * * With Assistance from, Staff  Linen Change   Complete  Personal Hygiene  Change Deja Pads, Moist Deja Wipes, Perineal Care  Chlorhexidine Bath      Oral Care  Brushed Teeth  Teeth/Dentures     Shave  Self  Nutrition Percentage Eaten  *  * Meal *  *, Dinner, Between % Consumed  Environmental Precautions  Bed in Low Position, Treaded Slipper Socks on Patient  Patient Turns/Positioning  Patient Turns Self from Side to Side  Patient Turns Assistance/Tolerance  Assistance of One  Bed Positions  Bed Controls On  Head of Bed Elevated  Self regulated      Psychosocial/Neurologic Assessment  Psychosocial Assessment  Psychosocial (WDL):  WDL Except  Patient Behaviors: Fatigue  Neurologic Assessment  Neuro (WDL): Exceptions to WDL  Level of Consciousness: Alert  Orientation Level: Oriented X4  Cognition: Follows commands, Appropriate attention/concentration  Speech: Clear  Facial Symmetry: Left facial drooping  Pupil Assesment: No  R Pupil Size (mm): 3  R Pupil Shape / Description: Round  R Pupil Reaction: Brisk  L Pupil Size (mm): 3  L Pupil Shape / Description: Round  L Pupil Reaction: Brisk  Reflexes: Cough  Cough Reflex: Present  Motor Function/Sensation Assessment: Dorsiflexion, Motor response  R Foot Dorsiflexion: Strong  L Foot Dorsiflexion: Absent  RUE Motor Response: Responds to commands  RUE Sensation: Full sensation  Muscle Strength Right Arm: Normal Strength Against Gravity and Full  Resistance  LUE Motor Response: Flaccid  LUE Sensation: Numbness, Tingling  Muscle Strength Left Arm: Weak Movement but Not Against Gravity or Resistance  RLE Motor Response: Responds to commands  RLE Sensation: Full sensation  Muscle Strength Right Leg: Good Strength Against Gravity and Moderate Resistance  LLE Motor Response: Flaccid  LLE Sensation: Numbness, Tingling  Muscle Strength Left Leg: Weak Movement but Not Against Gravity or Resistance  EENT (WDL):  WDL Except    Cardio/Pulmonary Assessment  Edema   RLE Edema: Trace  LLE Edema: Trace  Respiratory Breath Sounds  RUL Breath Sounds: Clear  RML Breath Sounds: Clear  RLL Breath Sounds: Clear, Diminished  MOHAMUD Breath Sounds: Clear  LLL Breath Sounds: Clear, Diminished  Cardiac Assessment   Cardiac (WDL):  WDL Except

## 2023-12-31 NOTE — THERAPY
"Physical Therapy   Daily Treatment     Patient Name: Jason Lima  Age:  63 y.o., Sex:  male  Medical Record #: [id number]  Today's Date: 12/31/2023     Precautions  Precautions: Fall Risk  Comments: Left Hemiparesis, left visual inattention    Subjective    \"I want to be able to use this left hand more\"     Objective       12/31/23 1501   PT Charge Group   PT Gait Training (Units) 1   PT Neuromuscular Re-Education / Balance (Units) 1   PT Total Time Spent   PT Individual Total Time Spent (Mins) 30   Gait Functional Level of Assist    Gait Level Of Assist Minimal Assist   Assistive Device Tanvir-Walker   Distance (Feet) 40   # of Times Distance was Traveled 1   Deviation Decreased Base Of Support;Bradykinetic  (L AFO, verbalized cues for wider step length, increased L hip/knee flexion in swing, and assist to limit L hip ER)   Transfer Functional Level of Assist   Bed, Chair, Wheelchair Transfer Minimal Assist  (with WC follow though likely not necessary)   Bed Chair Wheelchair Transfer Description   (squat pivot to R vs stand step HW to L)   Bed Mobility    Supine to Sit Standby Assist   Sit to Supine Standby Assist   Sit to Stand Minimal Assist  (for balance with HW)   Neuro-Muscular Treatments   Neuro-Muscular Treatments Co-Contraction;Facilitation;Quick Stretch;Tactile Cuing;Verbal Cuing   Comments L UE ext/add straight arm rhythmic initiation with good irradiation into trunk flexors  L UE assisted flex/add with elbow flexion  L elbow ext with shoulder locked at 90 deg flexion with emphasis on slow movements  L wrist extension isolated contraction against gravity with light manual resistance  L shoulder ER with abduction and scapular depression   Interdisciplinary Plan of Care Collaboration   IDT Collaboration with  Therapy Tech   Patient Position at End of Therapy Seated;Chair Alarm On;Self Releasing Lap Belt Applied;Call Light within Reach;Tray Table within Reach;Phone within Reach   Collaboration Comments WC " "follow     Encouraged pt directing care with transfer strategy (squat pivot vs HW)  Discussed POC with upcoming DC    Assessment    Pt has made significant progress while admitted. He is still limited by impaired dynamic standing balance, significantly delayed balance reactions, and decreased L sided coordination. He is very motivated to participate and reports practicing seated exercises independently between therapy sessions  Strengths: Able to follow instructions, Independent prior level of function, Motivated for self care and independence, Pleasant and cooperative, Supportive family, Willingly participates in therapeutic activities  Barriers: Decreased endurance, Hemiparesis, Difficulty following instructions, Fatigue, Hypertension, Impaired activity tolerance, Impaired balance, Impaired insight/denial of deficits, Impaired functional cognition, Impaired sleep pattern, Impulsive, Limited mobility, Visual impairment, Poor family support (lives alone)    Plan    DC IRF Genia, passport, review FT    DME  PT DME Recommendations  Wheelchair: 18\" Width  Cushion: Specialty (See other comments)  Assistive Device: Tanvir Walker  Additional Equipment: Other (Comments)    Passport items to be completed:  Get in/out of bed safely, in/out of a vehicle, safely use mobility device, walk or wheel around home/community, navigate up and down stairs, show how to get up/down from the ground, ensure home is accessible, demonstrate HEP, complete caregiver training    Physical Therapy Problems (Active)       Problem: PT-Long Term Goals       Dates: Start:  11/13/23         Goal: LTG-By discharge, patient will transfer one surface to another c/ Renata or better.        Dates: Start:  11/13/23    Expected End:  12/23/23         Goal Note filed on 12/27/23 1313 by Isidra Hamilton, PT       Inconsistent due to L hemiparesis, occasionally needs up to ModA for L side LOB               Goal: LTG-By discharge, patient will ambulate up/down 1 step c/ " LRAD at [facility]ata or better.        Dates: Start:  11/13/23    Expected End:  12/23/23         Goal Note filed on 12/27/23 1313 by Isidra Hamilton, PT       Last performance: MaxA x 1, 2nd person CGA safety for 4 steps c/ RHR               Goal: LTG-By discharge, patient will transfer in/out of a car c/ ModA or better.        Dates: Start:  11/13/23    Expected End:  12/23/23         Goal Note filed on 12/27/23 1313 by Isidra Hamilton, PT       Needs assessment

## 2023-12-31 NOTE — PROGRESS NOTES
Assumed care of patient. Bedside report received from Humble BOLDEN. Patient is in bed. Fall precautions in place. Call light and belongings within reach. Updated on POC, all questions and concerns answered at this time. No other needs at this time.

## 2023-12-31 NOTE — CARE PLAN
"The patient is Watcher - Medium risk of patient condition declining or worsening    Shift Goals  Clinical Goals: Safety  Patient Goals: Safety, gain strength  Family Goals: Education      Problem: Fall Risk - Rehab  Goal: Patient will remain free from falls  Outcome: Progressing    Shellie Hamilton Fall risk Assessment Score: 22    High fall risk Interventions   - Alarming seatbelt  - Wander guard  - Bed and strip alarm   - Yellow sign by the door   - Yellow wrist band \"Fall risk\"  - Room near to the nurse station  - Do not leave patient unattended in the bathroom  - Fall risk education provided      Problem: Psychosocial  Goal: Patient's level of anxiety will decrease  Outcome: Progressing    Patient is calm.  No s/s of anxiety or distress.   "

## 2023-12-31 NOTE — PROGRESS NOTES
NURSING DAILY NOTE    Name: Jason Lima   Date of Admission: 11/12/2023   Admitting Diagnosis: Thalamic hemorrhage (HCC)  Attending Physician: Lisa Lee D.o.  Allergies: Penicillins    Safety  Patient Assist  mod assist  Patient Precautions  Fall Risk  Precaution Comments  Left Hemiparesis, left visual inattention  Bed Transfer Status  Minimal Assist  Toilet Transfer Status   Minimal Assist  Assistive Devices  Rails, Wheelchair  Oxygen  CPAP  Diet/Therapeutic Dining  Current Diet Order   Procedures    Diet Order Diet: Consistent CHO (Diabetic) (2 gram sodium restriction; medications whole with thin liquids); Second Modifier: (optional): 2 Gram Sodium     Pill Administration  whole  Agitated Behavioral Scale  14  ABS Level of Severity  No Agitation    Fall Risk  Has the patient had a fall this admission?   Yes  Shellie Hamilton Fall Risk Scoring  22, HIGH RISK  Fall Risk Safety Measures  bed alarm, chair alarm, fall during this hospitalization, and poor balance    Vitals  Temperature: 36.8 °C (98.2 °F)  Temp src: Oral  Pulse: 76  Respiration: 16  Blood Pressure: 132/79  Blood Pressure MAP (Calculated): 97 MM HG  BP Location: Right, Upper Arm  Patient BP Position: Sitting     Oxygen  Pulse Oximetry: 95 %  O2 (LPM): 0  FiO2%: 21 %  O2 Delivery Device: CPAP    Bowel and Bladder  Last Bowel Movement  12/29/23  Stool Type  Type 6: Fluffy pieces with ragged edges, a mushy stool  Bowel Device  Bathroom  Continent  Bladder: Continent void   Bowel: Continent movement  Bladder Function  Urine Void (mL): 200 ml (urinals)  Number of Times Voided: 1  Urinary Options: Yes  Urine Color: Pale  Number of Times Incontinent of Urine: 0  Genitourinary Assessment   Bladder Assessment (WDL):  WDL Except  Echols Catheter: Not Applicable  Urine Color: Pale  Number of Bladder Accidents: 0  Total Number of Bladder of Accidents in Last 7 Days: 0  Number of Times Incontinent of  Urine: 0  Bladder Device: Urinal  Time Void: No  Bladder Scan: Post Void  $ Bladder Scan Results (mL): 26    Skin  Polo Score   16  Sensory Interventions   Bed Types: Standard/Trauma Mattress  Skin Preventative Measures: Pillows in Use for Support / Positioning  Moisture Interventions  Moisturizers/Barriers: Barrier Wipes      Pain  Pain Rating Scale  0 - No Pain  Pain Location  Back, Generalized  Pain Location Orientation  Lower  Pain Interventions   Declines    ADLs    Bathing   Shower, * * With Assistance from, Staff  Linen Change   Complete  Personal Hygiene  Change Deja Pads, Moist Deja Wipes, Perineal Care  Chlorhexidine Bath      Oral Care  Brushed Teeth  Teeth/Dentures     Shave  Self  Nutrition Percentage Eaten  *  * Meal *  *, Dinner, Between % Consumed  Environmental Precautions  Treaded Slipper Socks on Patient, Personal Belongings, Wastebasket, Call Bell etc. in Easy Reach, Bed in Low Position  Patient Turns/Positioning  Patient Turns Self from Side to Side  Patient Turns Assistance/Tolerance  Assistance of One  Bed Positions  Bed Controls On  Head of Bed Elevated  Self regulated      Psychosocial/Neurologic Assessment  Psychosocial Assessment  Psychosocial (WDL):  WDL Except  Patient Behaviors: Fatigue  Neurologic Assessment  Neuro (WDL): Exceptions to WDL  Level of Consciousness: Alert  Orientation Level: Oriented X4  Cognition: Follows commands, Appropriate attention/concentration  Speech: Clear  Facial Symmetry: Left facial drooping  Pupil Assesment: No  R Pupil Size (mm): 3  R Pupil Shape / Description: Round  R Pupil Reaction: Brisk  L Pupil Size (mm): 3  L Pupil Shape / Description: Round  L Pupil Reaction: Brisk  Reflexes: Cough  Cough Reflex: Present  Motor Function/Sensation Assessment: Dorsiflexion, Motor response  R Foot Dorsiflexion: Strong  L Foot Dorsiflexion: Absent  RUE Motor Response: Responds to commands  RUE Sensation: Full sensation  Muscle Strength Right Arm: Normal  Strength Against Gravity and Full Resistance  LUE Motor Response: Flaccid  LUE Sensation: Numbness, Tingling  Muscle Strength Left Arm: Weak Movement but Not Against Gravity or Resistance  RLE Motor Response: Responds to commands  RLE Sensation: Full sensation  Muscle Strength Right Leg: Good Strength Against Gravity and Moderate Resistance  LLE Motor Response: Flaccid  LLE Sensation: Numbness, Tingling  Muscle Strength Left Leg: Weak Movement but Not Against Gravity or Resistance  EENT (WDL):  WDL Except    Cardio/Pulmonary Assessment  Edema   RLE Edema: Trace  LLE Edema: Trace  Respiratory Breath Sounds  RUL Breath Sounds: Clear  RML Breath Sounds: Clear  RLL Breath Sounds: Clear, Diminished  MOHAMUD Breath Sounds: Clear  LLL Breath Sounds: Clear, Diminished  Cardiac Assessment   Cardiac (WDL):  WDL Except (HTN)

## 2024-01-01 ENCOUNTER — APPOINTMENT (OUTPATIENT)
Dept: OCCUPATIONAL THERAPY | Facility: REHABILITATION | Age: 64
DRG: 057 | End: 2024-01-01
Attending: PHYSICAL MEDICINE & REHABILITATION
Payer: COMMERCIAL

## 2024-01-01 ENCOUNTER — APPOINTMENT (OUTPATIENT)
Dept: PHYSICAL THERAPY | Facility: REHABILITATION | Age: 64
DRG: 057 | End: 2024-01-01
Attending: PHYSICAL MEDICINE & REHABILITATION
Payer: COMMERCIAL

## 2024-01-01 PROCEDURE — 700102 HCHG RX REV CODE 250 W/ 637 OVERRIDE(OP): Performed by: STUDENT IN AN ORGANIZED HEALTH CARE EDUCATION/TRAINING PROGRAM

## 2024-01-01 PROCEDURE — 97110 THERAPEUTIC EXERCISES: CPT

## 2024-01-01 PROCEDURE — 97140 MANUAL THERAPY 1/> REGIONS: CPT

## 2024-01-01 PROCEDURE — A9270 NON-COVERED ITEM OR SERVICE: HCPCS | Performed by: HOSPITALIST

## 2024-01-01 PROCEDURE — 700102 HCHG RX REV CODE 250 W/ 637 OVERRIDE(OP): Performed by: HOSPITALIST

## 2024-01-01 PROCEDURE — 770010 HCHG ROOM/CARE - REHAB SEMI PRIVAT*

## 2024-01-01 PROCEDURE — 97112 NEUROMUSCULAR REEDUCATION: CPT

## 2024-01-01 PROCEDURE — 700102 HCHG RX REV CODE 250 W/ 637 OVERRIDE(OP): Performed by: PHYSICAL MEDICINE & REHABILITATION

## 2024-01-01 PROCEDURE — A9270 NON-COVERED ITEM OR SERVICE: HCPCS | Performed by: PHYSICAL MEDICINE & REHABILITATION

## 2024-01-01 PROCEDURE — 94660 CPAP INITIATION&MGMT: CPT

## 2024-01-01 PROCEDURE — 97535 SELF CARE MNGMENT TRAINING: CPT

## 2024-01-01 PROCEDURE — 97110 THERAPEUTIC EXERCISES: CPT | Mod: CO

## 2024-01-01 PROCEDURE — A9270 NON-COVERED ITEM OR SERVICE: HCPCS | Performed by: STUDENT IN AN ORGANIZED HEALTH CARE EDUCATION/TRAINING PROGRAM

## 2024-01-01 PROCEDURE — 97116 GAIT TRAINING THERAPY: CPT

## 2024-01-01 PROCEDURE — 94760 N-INVAS EAR/PLS OXIMETRY 1: CPT

## 2024-01-01 PROCEDURE — 97530 THERAPEUTIC ACTIVITIES: CPT

## 2024-01-01 RX ADMIN — BACLOFEN 5 MG: 10 TABLET ORAL at 08:41

## 2024-01-01 RX ADMIN — HYDRALAZINE HYDROCHLORIDE 100 MG: 50 TABLET, FILM COATED ORAL at 21:48

## 2024-01-01 RX ADMIN — HYDRALAZINE HYDROCHLORIDE 100 MG: 50 TABLET, FILM COATED ORAL at 05:12

## 2024-01-01 RX ADMIN — HYDRALAZINE HYDROCHLORIDE 100 MG: 50 TABLET, FILM COATED ORAL at 15:38

## 2024-01-01 RX ADMIN — OMEPRAZOLE 20 MG: 20 CAPSULE, DELAYED RELEASE ORAL at 08:42

## 2024-01-01 RX ADMIN — METOPROLOL SUCCINATE 37.5 MG: 25 TABLET, EXTENDED RELEASE ORAL at 05:12

## 2024-01-01 RX ADMIN — TRAZODONE HYDROCHLORIDE 75 MG: 50 TABLET ORAL at 21:48

## 2024-01-01 RX ADMIN — BACLOFEN 5 MG: 10 TABLET ORAL at 15:38

## 2024-01-01 RX ADMIN — AMLODIPINE BESYLATE 10 MG: 5 TABLET ORAL at 05:12

## 2024-01-01 RX ADMIN — SENNOSIDES AND DOCUSATE SODIUM 2 TABLET: 50; 8.6 TABLET ORAL at 08:41

## 2024-01-01 RX ADMIN — BACLOFEN 5 MG: 10 TABLET ORAL at 21:48

## 2024-01-01 RX ADMIN — ATORVASTATIN CALCIUM 40 MG: 40 TABLET, FILM COATED ORAL at 21:47

## 2024-01-01 RX ADMIN — SERTRALINE 50 MG: 50 TABLET, FILM COATED ORAL at 08:41

## 2024-01-01 RX ADMIN — APIXABAN 5 MG: 5 TABLET, FILM COATED ORAL at 08:42

## 2024-01-01 RX ADMIN — APIXABAN 5 MG: 5 TABLET, FILM COATED ORAL at 21:47

## 2024-01-01 ASSESSMENT — BRIEF INTERVIEW FOR MENTAL STATUS (BIMS)
ASKED TO RECALL BED: YES, NO CUE REQUIRED
BIMS SUMMARY SCORE: 15
ASKED TO RECALL SOCK: YES, NO CUE REQUIRED
INITIAL REPETITION OF BED BLUE SOCK - FIRST ATTEMPT: 3
WHAT YEAR IS IT: CORRECT
WHAT MONTH IS IT: ACCURATE WITHIN 5 DAYS
WHAT DAY OF THE WEEK IS IT: CORRECT
ASKED TO RECALL BLUE: YES, NO CUE REQUIRED

## 2024-01-01 ASSESSMENT — ACTIVITIES OF DAILY LIVING (ADL)
TOILET_TRANSFER_LEVEL_OF_ASSIST: REQUIRES PHYSICAL ASSIST WITH TOILET TRANSFER
TUB_SHOWER_TRANSFER_DESCRIPTION: ADAPTIVE EQUIPMENT;GRAB BAR;SHOWER BENCH;SET-UP OF EQUIPMENT;SUPERVISION FOR SAFETY
SHOWER_TRANSFER_LEVEL_OF_ASSIST: REQUIRES PHYSICAL ASSIST WITH SHOWER TRANSFER
TOILETING_LEVEL_OF_ASSIST: REQUIRES SUPERVISION WITH TOILETING

## 2024-01-01 ASSESSMENT — GAIT ASSESSMENTS
GAIT LEVEL OF ASSIST: MINIMAL ASSIST
DISTANCE (FEET): 50
ASSISTIVE DEVICE: HEMI-WALKER

## 2024-01-01 ASSESSMENT — PAIN DESCRIPTION - PAIN TYPE
TYPE: ACUTE PAIN
TYPE: ACUTE PAIN

## 2024-01-01 ASSESSMENT — PATIENT HEALTH QUESTIONNAIRE - PHQ9
SUM OF ALL RESPONSES TO PHQ9 QUESTIONS 1 AND 2: 0
2. FEELING DOWN, DEPRESSED, IRRITABLE, OR HOPELESS: NOT AT ALL
1. LITTLE INTEREST OR PLEASURE IN DOING THINGS: NOT AT ALL

## 2024-01-01 NOTE — THERAPY
Occupational Therapy   Discharge Summary     Patient Name: Jason Lima  Age:  63 y.o., Sex:  male  Medical Record #: [id number]  Today's Date: 1/1/2024     Precautions  Precautions: Fall Risk  Comments: Left Hemiparesis, left visual inattention         Subjective    Pt encountered for OT seated in room. Pleasant and agreeable to complete ADLs.       Objective       01/01/24 0931   OT Charge Group   OT Self Care / ADL (Units) 4   OT Total Time Spent   OT Individual Total Time Spent (Mins) 60   Precautions   Precautions Fall Risk   Comments Left Hemiparesis, left visual inattention   Functional Level of Assist   Bathing Contact Guard Assist   Bathing Description Grab bar;Hand held shower;Increased time;Supervision for safety;Set-up of equipment   Upper Body Dressing Modified Independent   Upper Body Dressing Description Increased time  (Loose t-shirt; jade-tech)   Lower Body Dressing Minimal Assist   Lower Body Dressing Description Grab bar;Sock aid;Increased time;Set-up of equipment;Supervision for safety  (Pt able to thread legs into brief/pant using a combination of bending forward/hip hike. Kyara for standing balance to pull pants up with occasional assistance to pull pants up on the L side. Pt made increase attempts to use the L hand to pull pants up.)   Toileting Contact Guard Assist   Toileting Description Grab bar;Other (comment);Supervision for safety  (CGA for dynamic sitting balance during perineal care)   Toilet Transfers Minimal Assist   Toilet Transfer Description Grab bar;Increased time;Verbal cueing;Supervision for safety;Other (comment)  (VC for safe sequencing transfers. Stand step from WC <> toilet, x2)   Tub / Shower Transfers Minimal Assist   Tub Shower Transfer Description Adaptive equipment;Grab bar;Shower bench;Set-up of equipment;Supervision for safety  (Kyara for stand step from WC to shower bench using shower GB x2. VC for safe sequencing transfers.)   Interdisciplinary Plan of Care  "Collaboration   IDT Collaboration with  Therapy Tech   Patient Position at End of Therapy Seated;Chair Alarm On;Call Light within Reach;Tray Table within Reach;Phone within Reach   OT DME Recommendations   Bathroom Equipment 3 in 1 Commode   Additional Equipment Other (Comments)  (dressing stick, reacher)   Eating   Assistance Needed Set-up / clean-up   Physical Assistance Level No physical assistance   CARE Score - Eating 5   Oral Hygiene   Assistance Needed Adaptive equipment   Physical Assistance Level No physical assistance   CARE Score - Oral Hygiene 6   Toileting Hygiene   Assistance Needed Incidental touching   Physical Assistance Level No physical assistance   CARE Score - Toileting Hygiene 4   Shower/Bathe Self   Assistance Needed Incidental touching   Physical Assistance Level No physical assistance   CARE Score - Shower/Bathe Self 4   Upper Body Dressing   Assistance Needed Independent   Physical Assistance Level No physical assistance   CARE Score - Upper Body Dressing 6   Lower Body Dressing   Assistance Needed Physical assistance   Physical Assistance Level 25% or less   CARE Score - Lower Body Dressing 3   Putting On/Taking Off Footwear   Assistance Needed Adaptive equipment   Physical Assistance Level No physical assistance   CARE Score - Putting On/Taking Off Footwear 6   Toilet Transfer   Assistance Needed Physical assistance   Physical Assistance Level 25% or less   CARE Score - Toilet Transfer 3   Cognitive Pattern Assessment   Cognitive Pattern Assessment Used BIMS   Brief Interview for Mental Status (BIMS)   Repetition of Three Words (First Attempt) 3   Temporal Orientation: Year Correct   Temporal Orientation: Month Accurate within 5 days   Temporal Orientation: Day Correct   Recall: \"Sock\" Yes, no cue required   Recall: \"Blue\" Yes, no cue required   Recall: \"Bed\" Yes, no cue required   BIMS Summary Score 15   Confusion Assessment Method (CAM)   Is there evidence of an acute change in mental " status from the patient's baseline? No   Inattention Behavior not present   Disorganized thinking Behavior not present   Altered level of consciousness Behavior not present   Discharge Summary    Discharge Location  Home   Patient Discharging with Assist of Family ;Caregiver;Spouse / Significant Other   Level of Supervision Required 24 Hour Supervision   Recommended Equipment for Discharge Manual Wheelchair;Tub Transfer Bench;Grab Bars by Toilet;Grab Bars in Tub / Shower;Hand Held Shower Head;3 in 1 Commode;Reacher;Dressing Stick   Recommended Services Upon Discharge Home Health Occupational Therapy;Outpatient Occupational Therapy   Long Term Goals Met 0   Long Term Goals Not Met 2   Reason(s) for Goals Not Met Pt not yet able to safely transfer d/t balance difficulties. Pt progressed towards [place] for seated ADLs, but remains at [facility] for standing tasks.   Criteria for Termination of Services Maximum Function Achieved for Inpatient Rehabilitation   Comments Way to go Jason!   Discharge Instructions to Patient   Level of Assist Required for Eating Able to Complete Eating without Assist   Level of Assist Required for Grooming Able to Complete Grooming without Assist  (able to complete grooming tasks seated at WC, requires assistance with standing tasks.)   Level of Assist Required for Dressing Requires Supervision with Dressing   Equipment for Dressing Reacher;Dressing Stick;Other   (Slip on shoes. Loose clothing.)   Level of Assist Required for Toileting Requires Supervision with Toileting   Level of Assist Required for Toilet Transfer Requires Physical Assist with Toilet Transfer   Equipment for Toilet Transfer Grab Bars by Toilet   Level of Assist Required for Bathing Requires Supervision with Bathing   Equipment for Bathing Tub Transfer Bench;Grab Bars in Tub / Shower;Hand Held Shower Head;Long Handled Sponge   Level of Assist Required for Shower Transfer Requires Physical Assist with Shower Transfer   Equipment  for Shower Transfer Grab Bars in Tub / Shower;Tub Transfer Bench   Home Exercise Program Refer to Home Exercise Program Handout for Details       Assessment    Overall, pt was able to complete all dressing, bathing, and functional transfer tasks at Kyara to [place]. Pt cont to require Kyara for functional transfers and standing tasks for safety d/t difficulties with standing balance and dynamic movements that place pt at high risk for falls. Pt is now able to complete full body dressing at [facility] seated at WC with the exception of Kyara for standing balance to pull up/down pants.      Strengths: Able to follow instructions, Independent prior level of function, Motivated for self care and independence, Pleasant and cooperative, Supportive family  Barriers: Decreased endurance, Fatigue, Generalized weakness, Hemiparesis, Impaired activity tolerance, Impaired balance, Limited mobility, Spasticity    Plan    DC home with family support, 24-7 SPV, and DME/AE for ADLs.     Passport completed:  Perform bathroom transfers, complete dressing, complete feeding, get ready for the day, prepare a simple meal, participate in household tasks, adapt home for safety needs, demonstrate home exercise program, complete caregiver training     Occupational Therapy Goals (Active)       Problem: Functional Transfers       Dates: Start:  12/06/23         Goal: STG-Within one week, patient will transfer to step in shower at [place] to CGA using LRD       Dates: Start:  12/06/23    Expected End:  12/23/23               Problem: OT Long Term Goals       Dates: Start:  11/13/23         Goal: LTG-By discharge, patient will complete basic self care tasks at Mod Independent level.       Dates: Start:  11/13/23    Expected End:  12/23/23            Goal: LTG-By discharge, patient will perform bathroom transfers at Mod Independent level.       Dates: Start:  11/13/23    Expected End:  12/23/23

## 2024-01-01 NOTE — PROGRESS NOTES
..                                                         NURSING DAILY NOTE    Name: Jason Lima   Date of Admission: 11/12/2023   Admitting Diagnosis: Thalamic hemorrhage (HCC)  Attending Physician: Lisa Lee D.o.  Allergies: Penicillins    Safety  Patient Assist  md assist  Patient Precautions  Fall Risk  Precaution Comments  Left Hemiparesis, left visual inattention  Bed Transfer Status  Minimal Assist (with WC follow though likely not necessary)  Toilet Transfer Status   Minimal Assist  Assistive Devices  Gait Belt, Rails, Wheelchair  Oxygen  None - Room Air  Diet/Therapeutic Dining  Current Diet Order   Procedures    Diet Order Diet: Consistent CHO (Diabetic) (2 gram sodium restriction; medications whole with thin liquids); Second Modifier: (optional): 2 Gram Sodium     Pill Administration  whole  Agitated Behavioral Scale  14  ABS Level of Severity  No Agitation    Fall Risk  Has the patient had a fall this admission?   Yes  Shellie Hamilton Fall Risk Scoring  19, HIGH RISK  Fall Risk Safety Measures  bed alarm, chair alarm, seatbelt alarm, poor balance, and low vision/ hearing    Vitals  Temperature: 36.7 °C (98 °F)  Temp src: Oral  Pulse: 63  Respiration: 15  Blood Pressure: 119/74  Blood Pressure MAP (Calculated): 89 MM HG  BP Location: Right, Upper Arm  Patient BP Position: Supine     Oxygen  Pulse Oximetry: 96 %  O2 (LPM): 0  FiO2%: 21 %  O2 Delivery Device: None - Room Air    Bowel and Bladder  Last Bowel Movement  12/31/23  Stool Type  Type 6: Fluffy pieces with ragged edges, a mushy stool  Bowel Device  Bathroom  Continent  Bladder: Continent void   Bowel: Continent movement  Bladder Function  Urine Void (mL): 200 ml (urinals)  Number of Times Voided: 1  Urinary Options: Yes  Urine Color: Pale  Number of Times Incontinent of Urine: 0  Genitourinary Assessment   Bladder Assessment (WDL):  Within Defined Limits  Echols Catheter: Not Applicable  Urine Color: Pale  Number of Bladder Accidents:  0  Total Number of Bladder of Accidents in Last 7 Days: 0  Number of Times Incontinent of Urine: 0  Bladder Device: Urinal  Time Void: No  Bladder Scan: Post Void  $ Bladder Scan Results (mL): 26    Skin  Polo Score   17  Sensory Interventions   Bed Types: Standard/Trauma Mattress  Skin Preventative Measures: Pillows in Use for Support / Positioning  Moisture Interventions  Moisturizers/Barriers: Barrier Wipes      Pain  Pain Rating Scale  0 - No Pain  Pain Location  Back, Generalized  Pain Location Orientation  Lower  Pain Interventions   Declines    ADLs    Bathing   Shower, * * With Assistance from, Staff  Linen Change   Partial  Personal Hygiene  Change Deja Pads, Moist Deja Wipes, Perineal Care  Chlorhexidine Bath      Oral Care  Brushed Teeth  Teeth/Dentures     Shave  Self  Nutrition Percentage Eaten  *  * Meal *  *, Dinner, Between % Consumed  Environmental Precautions  Bed in Low Position, Treaded Slipper Socks on Patient  Patient Turns/Positioning  Patient Turns Self from Side to Side  Patient Turns Assistance/Tolerance  Assistance of One  Bed Positions  Bed Controls On  Head of Bed Elevated  Self regulated      Psychosocial/Neurologic Assessment  Psychosocial Assessment  Psychosocial (WDL):  Within Defined Limits  Patient Behaviors: Fatigue  Neurologic Assessment  Neuro (WDL): Exceptions to WDL  Level of Consciousness: Alert  Orientation Level: Oriented X4  Cognition: Follows commands, Appropriate attention/concentration  Speech: Clear  Facial Symmetry: Left facial drooping  Pupil Assesment: No  R Pupil Size (mm): 3  R Pupil Shape / Description: Round  R Pupil Reaction: Brisk  L Pupil Size (mm): 3  L Pupil Shape / Description: Round  L Pupil Reaction: Brisk  Reflexes: Cough  Cough Reflex: Present  Motor Function/Sensation Assessment: Dorsiflexion, Motor response  R Foot Dorsiflexion: Strong  L Foot Dorsiflexion: Absent  RUE Motor Response: Responds to commands  RUE Sensation: Full  sensation  Muscle Strength Right Arm: Normal Strength Against Gravity and Full Resistance  LUE Motor Response: Flaccid  LUE Sensation: Numbness, Tingling  Muscle Strength Left Arm: Weak Movement but Not Against Gravity or Resistance  RLE Motor Response: Responds to commands  RLE Sensation: Full sensation  Muscle Strength Right Leg: Good Strength Against Gravity and Moderate Resistance  LLE Motor Response: Flaccid  LLE Sensation: Numbness, Tingling  Muscle Strength Left Leg: Weak Movement but Not Against Gravity or Resistance  EENT (WDL):  WDL Except    Cardio/Pulmonary Assessment  Edema   RLE Edema: Trace  LLE Edema: Trace  Respiratory Breath Sounds  RUL Breath Sounds: Clear  RML Breath Sounds: Clear  RLL Breath Sounds: Clear, Diminished  MOHAMUD Breath Sounds: Clear  LLL Breath Sounds: Clear, Diminished  Cardiac Assessment   Cardiac (WDL):  Within Defined Limits

## 2024-01-01 NOTE — CARE PLAN
"The patient is Stable - Low risk of patient condition declining or worsening    Shift Goals  Clinical Goals: Safety  Patient Goals: Safety, gain strength  Family Goals: Education    Patient is not progressing towards the following goals:    Problem: Fall Risk - Rehab  Goal: Patient will remain free from falls  Outcome: Not Met  Note: Shellie Hamilton Fall risk Assessment Score: 19    High fall risk Interventions   - Alarming seatbelt  - Bed and strip alarm   - Yellow sign by the door   - Yellow wrist band \"Fall risk\"  - Do not leave patient unattended in the bathroom  - Fall risk education provided     "

## 2024-01-01 NOTE — THERAPY
Occupational Therapy  Daily Treatment     Patient Name: Jason Lima  Age:  63 y.o., Sex:  male  Medical Record #: [id number]  Today's Date: 1/1/2024     Precautions  Precautions: Fall Risk  Comments: Left Hemiparesis, left visual inattention         Subjective    Pt seated in w/c agreeable for therapy.      Objective       01/01/24 1301   OT Charge Group   OT Therapeutic Exercise (Units) 2   OT Total Time Spent   OT Individual Total Time Spent (Mins) 30   Sitting Upper Body Exercises   Front Arm Raise 3 sets of 10;Left   Side Arm Raise 3 sets of 10;Left   Tricep Press 3 sets of 10;Left  (10lbs rickshaw w/ dysem on )   Other Exercise Nustep ~15min level 3; LUE only for last 5 min at level 3   Interdisciplinary Plan of Care Collaboration   IDT Collaboration with  Therapy Tech   Patient Position at End of Therapy Seated;Edge of Bed;Call Light within Reach;Tray Table within Reach;Phone within Reach   Collaboration Comments w/c follow         Assessment    Pt tolerated session well w/ focus on strengthening and endurance.    Thera ex: Min A for stand pivot transfer from w/c<>nustep. nustep for 15min to increase strengthening for UE/LE and the remainder 5min instructed pt in using LUE only to push and pull to challenge and increase strengthening on L side. Pt was able to grasp hand bar using R hand for ~15min, however still needed VC for contract hand.       Strengths: Able to follow instructions, Independent prior level of function, Motivated for self care and independence, Pleasant and cooperative, Supportive family  Barriers: Decreased endurance, Fatigue, Generalized weakness, Hemiparesis, Impaired activity tolerance, Impaired balance, Limited mobility, Spasticity    Plan    DC home with family support, 24-7 SPV, and DME/AE for ADLs.      DME  OT DME Recommendations  Bathroom Equipment: 3 in 1 Commode  Additional Equipment: Other (Comments) (dressing stick, reacher)    Passport items to be completed:  Perform  bathroom transfers, complete dressing, complete feeding, get ready for the day, prepare a simple meal, participate in household tasks, adapt home for safety needs, demonstrate home exercise program, complete caregiver training     Occupational Therapy Goals (Active)       Problem: Functional Transfers       Dates: Start:  12/06/23         Goal: STG-Within one week, patient will transfer to step in shower at [facility] to [facility] using LRD       Dates: Start:  12/06/23    Expected End:  12/23/23               Problem: OT Long Term Goals       Dates: Start:  11/13/23         Goal: LTG-By discharge, patient will complete basic self care tasks at Mod Independent level.       Dates: Start:  11/13/23    Expected End:  12/23/23            Goal: LTG-By discharge, patient will perform bathroom transfers at Mod Independent level.       Dates: Start:  11/13/23    Expected End:  12/23/23

## 2024-01-01 NOTE — CARE PLAN
Problem: Dressing  Goal: STG-Within one week, patient will dress LB at [facility] using LRD  Outcome: Met  Goal: STG-Within one week, patient will dress [facility] SPV using LRD  Outcome: Met     Problem: OT Long Term Goals  Goal: LTG-By discharge, patient will complete basic self care tasks at Mod Independent level.  Outcome: Progressing  Goal: LTG-By discharge, patient will perform bathroom transfers at Mod Independent level.  Outcome: Progressing     Problem: Functional Transfers  Goal: STG-Within one week, patient will transfer to step in shower at [facility] to [facility] using LRD  Outcome: Progressing

## 2024-01-01 NOTE — CARE PLAN
Problem: PT-Long Term Goals  Goal: LTG-By discharge, patient will transfer one surface to another c/ [facility] or better.   Outcome: Met  Goal: LTG-By discharge, patient will transfer in/out of a car c/ ModA or better.   Outcome: Met     Problem: PT-Long Term Goals  Goal: LTG-By discharge, patient will ambulate up/down 1 step c/ LRAD at [facility] or better.   Outcome: Discharged - Not Met  Note: N/A: ramp installed to home.

## 2024-01-01 NOTE — FLOWSHEET NOTE
01/01/24 0750   Events/Summary/Plan   Events/Summary/Plan spot check done pt on room air doing good using home unit at [facility], tolerating it well   Vital Signs   Pulse 73   Respiration 16   Pulse Oximetry 94 %   $ Pulse Oximetry (Spot Check) Yes   Respiratory Assessment   Respiratory Pattern Within Normal Limits   Level of Consciousness Alert   Chest Exam   Work Of Breathing / Effort Within Normal Limits   Oxygen   O2 (LPM) 0   FiO2% 21 %   Non-Invasive Ventilation [employer]   Nocturnal CPAP or BIPAP CPAP - Home Unit   $ System Evaluation Yes   Cleanliness and Damage Inspection Performed Yes

## 2024-01-01 NOTE — CARE PLAN
The patient is Watcher - Medium risk of patient condition declining or worsening    Shift Goals  Clinical Goals: Safety  Patient Goals: Safety, gain strength  Family Goals: Education      Problem: Skin Integrity  Goal: Skin integrity is maintained or improved  1/1/2024 1512 by Mariam Leavitt R.N.  Outcome: Progressing    Patient's skin is CDI.  No s/s of pressure injuries or wounds.     Problem: Respiratory  Goal: Patient will understand use and administration of respiratory medications to improve respiratory function  Outcome: Progressing     Patient oxygen saturation above 95% on RA.  No s/s of dyspnea.

## 2024-01-02 ENCOUNTER — APPOINTMENT (OUTPATIENT)
Dept: PHYSICAL THERAPY | Facility: REHABILITATION | Age: 64
End: 2024-01-02
Attending: HOSPITALIST
Payer: COMMERCIAL

## 2024-01-02 ENCOUNTER — PHARMACY VISIT (OUTPATIENT)
Dept: PHARMACY | Facility: MEDICAL CENTER | Age: 64
End: 2024-01-02
Payer: COMMERCIAL

## 2024-01-02 VITALS
HEART RATE: 70 BPM | RESPIRATION RATE: 18 BRPM | TEMPERATURE: 98.2 F | OXYGEN SATURATION: 93 % | WEIGHT: 177 LBS | HEIGHT: 68 IN | SYSTOLIC BLOOD PRESSURE: 133 MMHG | BODY MASS INDEX: 26.83 KG/M2 | DIASTOLIC BLOOD PRESSURE: 80 MMHG

## 2024-01-02 PROBLEM — H54.7 VISION PROBLEM: Status: ACTIVE | Noted: 2024-01-02

## 2024-01-02 PROBLEM — H91.93 HEARING PROBLEM OF BOTH EARS: Status: ACTIVE | Noted: 2024-01-02

## 2024-01-02 PROBLEM — G47.9 DIFFICULTY SLEEPING: Status: ACTIVE | Noted: 2024-01-02

## 2024-01-02 PROBLEM — F32.9 REACTIVE DEPRESSION (SITUATIONAL): Status: ACTIVE | Noted: 2024-01-02

## 2024-01-02 PROCEDURE — A9270 NON-COVERED ITEM OR SERVICE: HCPCS | Performed by: PHYSICAL MEDICINE & REHABILITATION

## 2024-01-02 PROCEDURE — 700102 HCHG RX REV CODE 250 W/ 637 OVERRIDE(OP): Performed by: PHYSICAL MEDICINE & REHABILITATION

## 2024-01-02 PROCEDURE — RXMED WILLOW AMBULATORY MEDICATION CHARGE: Performed by: PHYSICAL MEDICINE & REHABILITATION

## 2024-01-02 PROCEDURE — A9270 NON-COVERED ITEM OR SERVICE: HCPCS | Performed by: HOSPITALIST

## 2024-01-02 PROCEDURE — 700102 HCHG RX REV CODE 250 W/ 637 OVERRIDE(OP): Performed by: HOSPITALIST

## 2024-01-02 PROCEDURE — 700102 HCHG RX REV CODE 250 W/ 637 OVERRIDE(OP): Performed by: STUDENT IN AN ORGANIZED HEALTH CARE EDUCATION/TRAINING PROGRAM

## 2024-01-02 PROCEDURE — 99239 HOSP IP/OBS DSCHRG MGMT >30: CPT | Performed by: PHYSICAL MEDICINE & REHABILITATION

## 2024-01-02 PROCEDURE — 97530 THERAPEUTIC ACTIVITIES: CPT

## 2024-01-02 PROCEDURE — A9270 NON-COVERED ITEM OR SERVICE: HCPCS | Performed by: STUDENT IN AN ORGANIZED HEALTH CARE EDUCATION/TRAINING PROGRAM

## 2024-01-02 RX ORDER — METOPROLOL SUCCINATE 25 MG/1
37.5 TABLET, EXTENDED RELEASE ORAL DAILY
Qty: 45 TABLET | Refills: 2 | Status: SHIPPED | OUTPATIENT
Start: 2024-01-03 | End: 2024-01-09 | Stop reason: SDUPTHER

## 2024-01-02 RX ORDER — TRAZODONE HYDROCHLORIDE 150 MG/1
75 TABLET ORAL
Qty: 15 TABLET | Refills: 2 | Status: SHIPPED | OUTPATIENT
Start: 2024-01-02 | End: 2024-01-09 | Stop reason: SDUPTHER

## 2024-01-02 RX ORDER — BACLOFEN 10 MG/1
5 TABLET ORAL 3 TIMES DAILY
Qty: 45 TABLET | Refills: 2 | Status: SHIPPED | OUTPATIENT
Start: 2024-01-02 | End: 2024-01-09 | Stop reason: SDUPTHER

## 2024-01-02 RX ORDER — ATORVASTATIN CALCIUM 40 MG/1
40 TABLET, FILM COATED ORAL NIGHTLY
Qty: 30 TABLET | Refills: 2 | Status: SHIPPED | OUTPATIENT
Start: 2024-01-02 | End: 2024-01-09 | Stop reason: SDUPTHER

## 2024-01-02 RX ORDER — HYDRALAZINE HYDROCHLORIDE 100 MG/1
100 TABLET, FILM COATED ORAL EVERY 8 HOURS
Qty: 90 TABLET | Refills: 2 | Status: SHIPPED | OUTPATIENT
Start: 2024-01-02 | End: 2024-03-26 | Stop reason: SDUPTHER

## 2024-01-02 RX ORDER — AMLODIPINE BESYLATE 10 MG/1
10 TABLET ORAL DAILY
Qty: 30 TABLET | Refills: 2 | Status: SHIPPED | OUTPATIENT
Start: 2024-01-02 | End: 2024-01-09 | Stop reason: SDUPTHER

## 2024-01-02 RX ADMIN — SERTRALINE 50 MG: 50 TABLET, FILM COATED ORAL at 09:11

## 2024-01-02 RX ADMIN — METOPROLOL SUCCINATE 37.5 MG: 25 TABLET, EXTENDED RELEASE ORAL at 05:10

## 2024-01-02 RX ADMIN — HYDRALAZINE HYDROCHLORIDE 100 MG: 50 TABLET, FILM COATED ORAL at 14:01

## 2024-01-02 RX ADMIN — HYDRALAZINE HYDROCHLORIDE 100 MG: 50 TABLET, FILM COATED ORAL at 05:09

## 2024-01-02 RX ADMIN — BACLOFEN 5 MG: 10 TABLET ORAL at 09:11

## 2024-01-02 RX ADMIN — OMEPRAZOLE 20 MG: 20 CAPSULE, DELAYED RELEASE ORAL at 09:10

## 2024-01-02 RX ADMIN — AMLODIPINE BESYLATE 10 MG: 5 TABLET ORAL at 05:10

## 2024-01-02 RX ADMIN — APIXABAN 5 MG: 5 TABLET, FILM COATED ORAL at 09:10

## 2024-01-02 RX ADMIN — BACLOFEN 5 MG: 10 TABLET ORAL at 14:01

## 2024-01-02 ASSESSMENT — PATIENT HEALTH QUESTIONNAIRE - PHQ9
5. POOR APPETITE OR OVEREATING: NOT AT ALL
6. FEELING BAD ABOUT YOURSELF - OR THAT YOU ARE A FAILURE OR HAVE LET YOURSELF OR YOUR FAMILY DOWN: NOT AL ALL
7. TROUBLE CONCENTRATING ON THINGS, SUCH AS READING THE NEWSPAPER OR WATCHING TELEVISION: NOT AT ALL
4. FEELING TIRED OR HAVING LITTLE ENERGY: NOT AT ALL
9. THOUGHTS THAT YOU WOULD BE BETTER OFF DEAD, OR OF HURTING YOURSELF: NOT AT ALL
1. LITTLE INTEREST OR PLEASURE IN DOING THINGS: NOT AT ALL
2. FEELING DOWN, DEPRESSED, IRRITABLE, OR HOPELESS: NOT AT ALL
8. MOVING OR SPEAKING SO SLOWLY THAT OTHER PEOPLE COULD HAVE NOTICED. OR THE OPPOSITE, BEING SO FIGETY OR RESTLESS THAT YOU HAVE BEEN MOVING AROUND A LOT MORE THAN USUAL: NOT AT ALL
SUM OF ALL RESPONSES TO PHQ9 QUESTIONS 1 AND 2: 0
3. TROUBLE FALLING OR STAYING ASLEEP OR SLEEPING TOO MUCH: SEVERAL DAYS
SUM OF ALL RESPONSES TO PHQ QUESTIONS 1-9: 1

## 2024-01-02 NOTE — PROGRESS NOTES
NURSING DAILY NOTE    Name: Jason Lima   Date of Admission: 11/12/2023   Admitting Diagnosis: Thalamic hemorrhage (HCC)  Attending Physician: Lisa Lee D.o.  Allergies: Penicillins    Safety  Patient Assist  mod assist  Patient Precautions  Fall Risk  Precaution Comments  Left Hemiparesis, left visual inattention  Bed Transfer Status  Contact Guard Assist (Min to ModA c/ SPT c/ HW)  Toilet Transfer Status   Minimal Assist  Assistive Devices  Gait Belt, Rails, Wheelchair  Oxygen  CPAP  Diet/Therapeutic Dining  Current Diet Order   Procedures    Diet Order Diet: Consistent CHO (Diabetic) (2 gram sodium restriction; medications whole with thin liquids); Second Modifier: (optional): 2 Gram Sodium     Pill Administration  whole  Agitated Behavioral Scale  14  ABS Level of Severity  No Agitation    Fall Risk  Has the patient had a fall this admission?   Yes  Shellie Hamilton Fall Risk Scoring  19, HIGH RISK  Fall Risk Safety Measures  bed alarm, chair alarm, and poor balance    Vitals  Temperature: 37 °C (98.6 °F)  Temp src: Oral  Pulse: 87  Respiration: 18  Blood Pressure: 130/76  Blood Pressure MAP (Calculated): 94 MM HG  BP Location: Right, Upper Arm  Patient BP Position: Sitting     Oxygen  Pulse Oximetry: 95 %  O2 (LPM): 0  FiO2%: 21 %  O2 Delivery Device: CPAP    Bowel and Bladder  Last Bowel Movement  12/31/23  Stool Type  Type 6: Fluffy pieces with ragged edges, a mushy stool  Bowel Device  Bathroom  Continent  Bladder: Continent void   Bowel: Continent movement  Bladder Function  Urine Void (mL): 300 ml  Number of Times Voided: 1  Urinary Options: Yes  Urine Color: Pale  Number of Times Incontinent of Urine: 0  Genitourinary Assessment   Bladder Assessment (WDL):  Within Defined Limits  Echols Catheter: Not Applicable  Urine Color: Pale  Number of Bladder Accidents: 0  Total Number of Bladder of Accidents in Last 7 Days: 0  Number of Times  Incontinent of Urine: 0  Bladder Device: Urinal  Time Void: No  Bladder Scan: Post Void  $ Bladder Scan Results (mL): 26    Skin  Polo Score   17  Sensory Interventions   Bed Types: Standard/Trauma Mattress  Skin Preventative Measures: Pillows in Use for Support / Positioning  Moisture Interventions  Moisturizers/Barriers: Barrier Wipes      Pain  Pain Rating Scale  0 - No Pain  Pain Location  Back, Generalized  Pain Location Orientation  Lower  Pain Interventions   Declines    ADLs    Bathing   Shower, * * With Assistance from, Staff  Linen Change   Partial  Personal Hygiene  Change Deja Pads, Moist Deja Wipes, Perineal Care  Chlorhexidine Bath      Oral Care  Brushed Teeth  Teeth/Dentures     Shave  Self  Nutrition Percentage Eaten  *  * Meal *  *, Lunch, Between % Consumed  Environmental Precautions  Bed in Low Position, Treaded Slipper Socks on Patient  Patient Turns/Positioning  Patient Turns Self from Side to Side  Patient Turns Assistance/Tolerance  Assistance of One  Bed Positions  Bed Controls On  Head of Bed Elevated  Self regulated      Psychosocial/Neurologic Assessment  Psychosocial Assessment  Psychosocial (WDL):  Within Defined Limits  Patient Behaviors: Fatigue  Neurologic Assessment  Neuro (WDL): Exceptions to WDL  Level of Consciousness: Alert  Orientation Level: Oriented X4  Cognition: Follows commands, Appropriate attention/concentration  Speech: Clear  Facial Symmetry: Left facial drooping  Pupil Assesment: No  R Pupil Size (mm): 3  R Pupil Shape / Description: Round  R Pupil Reaction: Brisk  L Pupil Size (mm): 3  L Pupil Shape / Description: Round  L Pupil Reaction: Brisk  Reflexes: Cough  Cough Reflex: Present  Motor Function/Sensation Assessment: Dorsiflexion, Motor response  R Foot Dorsiflexion: Strong  L Foot Dorsiflexion: Absent  RUE Motor Response: Responds to commands  RUE Sensation: Full sensation  Muscle Strength Right Arm: Normal Strength Against Gravity and Full  Resistance  LUE Motor Response: Flaccid  LUE Sensation: Numbness, Tingling  Muscle Strength Left Arm: Weak Movement but Not Against Gravity or Resistance  RLE Motor Response: Responds to commands  RLE Sensation: Full sensation  Muscle Strength Right Leg: Good Strength Against Gravity and Moderate Resistance  LLE Motor Response: Flaccid  LLE Sensation: Numbness, Tingling  Muscle Strength Left Leg: Weak Movement but Not Against Gravity or Resistance  EENT (WDL):  WDL Except    Cardio/Pulmonary Assessment  Edema   RLE Edema: Trace  LLE Edema: Trace  Respiratory Breath Sounds  RUL Breath Sounds: Clear  RML Breath Sounds: Clear  RLL Breath Sounds: Clear, Diminished  MOHAMUD Breath Sounds: Clear  LLL Breath Sounds: Clear, Diminished  Cardiac Assessment   Cardiac (WDL):  Within Defined Limits

## 2024-01-02 NOTE — THERAPY
Physical Therapy   Daily Treatment     Patient Name: Jason Lima  Age:  63 y.o., Sex:  male  Medical Record #: [id number]  Today's Date: 1/2/2024     Precautions  Precautions: Fall Risk  Comments: Left Hemiparesis, left visual inattention    Subjective    Pt and spouse agreeable to car transfer training. Requested to ring gong in standing with HW     Objective       01/02/24 1301   PT Charge Group   PT Therapeutic Activities (Units) 2   PT Total Time Spent   PT Individual Total Time Spent (Mins) 30   Wheelchair Functional Level of Assist   Wheelchair Assist Supervised   Distance Wheelchair (Feet or Distance) 150   Wheelchair Description Supervision for safety;Extra time;Impaired coordination   Transfer Functional Level of Assist   Bed, Chair, Wheelchair Transfer Minimal Assist   Bed Chair Wheelchair Transfer Description   (stand pivot car transfer)   Bed Mobility    Sit to Stand Minimal Assist   Interdisciplinary Plan of Care Collaboration   IDT Collaboration with  Family / Caregiver   Patient Position at End of Therapy Seated;Chair Alarm On;Call Light within Reach;Tray Table within Reach;Phone within Reach;Family / Friend in Room   Collaboration Comments FT for car transfer with spouse, Vielka     Demonstrated car transfer with PT assisting pt followed by spouse assisting pt. Vielka demonstrated competency     Assessment    Pt has made excellent progress while admitted and is ready to DC home with HHPT. Pt's spouse has completed family training and feels confident in assisting pt upon DC  Strengths: Able to follow instructions, Independent prior level of function, Motivated for self care and independence, Pleasant and cooperative, Supportive family, Willingly participates in therapeutic activities  Barriers: Decreased endurance, Hemiparesis, Difficulty following instructions, Fatigue, Hypertension, Impaired activity tolerance, Impaired balance, Impaired insight/denial of deficits, Impaired functional cognition,  "Impaired sleep pattern, Impulsive, Limited mobility, Visual impairment, Poor family support (lives alone)    Plan    DC home with HH PT    DME  PT DME Recommendations  Wheelchair: 18\" Width  Cushion: Specialty (See other comments)  Assistive Device: Tanvir Walker  Additional Equipment: Other (Comments) (dressing stick, reacher)        Physical Therapy Problems (Active)       There are no active problems.            "

## 2024-01-02 NOTE — THERAPY
"Physical Therapy   Discharge Summary     Patient Name: Jason Lima  Age:  63 y.o., Sex:  male  Medical Record #: [id number]  Today's Date: 1/1/2024     Precautions  Precautions: Fall Risk  Comments: Left Hemiparesis, left visual inattention    Subjective    \"I want to practice getting in and out of the car.\" Pt in w/c at arrival, agreeable to PT tx.      Objective       01/01/24 1401   PT Charge Group   PT Gait Training (Units) 1   PT Therapeutic Exercise (Units) 1   PT Neuromuscular Re-Education / Balance (Units) 2   PT Therapeutic Activities (Units) 1   PT Manual Therapy (Units) 1   PT Total Time Spent   PT Individual Total Time Spent (Mins) 90   Gait Functional Level of Assist    Gait Level Of Assist Minimal Assist   Assistive Device Tanvir-Walker  (L AFO)   Distance (Feet) 50   # of Times Distance was Traveled 1   Deviation Decreased Heel Strike;Decreased Toe Off;Decreased Base Of Support;Increased Base Of Support;Other (Comment)  (cues for HW sequencing)   Wheelchair Functional Level of Assist   Wheelchair Assist Supervised   Distance Wheelchair (Feet or Distance) 200   Wheelchair Description Extra time;Supervision for safety   Transfer Functional Level of Assist   Bed, Chair, Wheelchair Transfer Contact Guard Assist  (Min to ModA c/ SPT c/ HW)   Bed Chair Wheelchair Transfer Description Squat pivot transfer to wheelchair;Verbal cueing;Increased time;Initial preparation for task   Supine Lower Body Exercise   Bridges Two Legged;2 sets of 10  (hooklying 1 x 10, on peanut 1 x 10)   Hip Abduction Hook Lying  (1 x 6 isometrics)   Hip Adduction  1 set of 10;Bilateral  (isometric c/ belt at knees, then static holds c/ alt perturbations 2 x 10\")   Knee to Chest 1 set of 10  (c/ BUE on peanut, then 1 x 10 c/ LLE only on peanut)   Heel Slide Left  (1 x 6, c/ low friction board)   Other Exercises hams sets on ball 1 x 10 BLE, 1 x 10 LLE only;     Manual Therapy: sciatic nerve glides x 10 to LLE, L TFL stretch passive 1 x 1 " min, then self stretch c/ crossover 1 x 1 min, ankle mobs to L ankle A>P Grade 3 x 3 min, then L DF stretch in supine 1 x 2 min   Bed Mobility    Supine to Sit Standby Assist   Sit to Supine Standby Assist   Sit to Stand Minimal Assist   Scooting Standby Assist   Rolling Standby Assist   Neuro-Muscular Treatments   Neuro-Muscular Treatments Co-Contraction;Facilitation;Tactile Cuing;Verbal Cuing;Postural Changes;Postural Facilitation;Compensatory Strategies;Tapping;Sequencing;Weight Shift Right;Weight Shift Left   Comments NMRE: standing balance c/ vestibular challenge using L>R prism lenses c/ UE reach and grasp of ball in all planes 1x 15 each, then pointing target touches all planes c/ each index finger 1 x 20 each, ROM biased to increase functional WS and challenge eye-hand coordination, midline postural control; gait c/ L AFO, R HW 1 x 50', 1 x 25' c/ cues for reciprocal step pattern, sequencing, functional WS; high rep component and full practice of car transfers: full practice w/c<>vehicle x 5 reps, component practice of managing BLE <> seat x 8 reps; best effort CGA for car>w/c, [facility] w/c>car, help at LLE to lift into vehicle due to extensor tone limiting LLE ROM, and steadying assist for stand step into vehicle   Interdisciplinary Plan of Care Collaboration   IDT Collaboration with  Therapy Tech   Patient Position at End of Therapy Seated;Call Light within Reach;Tray Table within Reach;Phone within Reach;Chair Alarm On   Collaboration Comments assist c/ session, dual task standing + reaching, equipment management   Roll Left and Right   Assistance Needed Independent   CARE Score - Roll Left and Right 6   Sit to Lying   Assistance Needed Incidental touching   CARE Score - Sit to Lying 4   Lying to Sitting on Side of Bed   Assistance Needed Incidental touching;Supervision   CARE Score - Lying to Sitting on Side of Bed 4   Sit to Stand   Assistance Needed Physical assistance;Incidental touching   Physical  Assistance Level 25% or less   CARE Score - Sit to Stand 3   Chair/Bed-to-Chair Transfer   Assistance Needed Adaptive equipment;Supervision;Incidental touching   CARE Score - Chair/Bed-to-Chair Transfer 4   Car Transfer   Assistance Needed Physical assistance;Incidental touching;Adaptive equipment   Physical Assistance Level 25% or less   CARE Score - Car Transfer 3   Walk 10 Feet   Assistance Needed Physical assistance;Verbal cues;Adaptive equipment   Physical Assistance Level 25% or less   CARE Score - Walk 10 Feet 3   Walk 50 Feet with Two Turns   Assistance Needed Incidental touching;Physical assistance;Verbal cues;Adaptive equipment   Physical Assistance Level 25% or less   CARE Score - Walk 50 Feet with Two Turns 3   Walk 150 Feet   Reason if not Attempted Safety concerns   CARE Score - Walk 150 Feet 88   Walking 10 Feet on Uneven Surfaces   Reason if not Attempted Safety concerns   CARE Score - Walking 10 Feet on Uneven Surfaces 88   1 Step (Curb)   Reason if not Attempted Activity not applicable   CARE Score - 1 Step (Curb) 9   4 Steps   Reason if not Attempted Activity not applicable   CARE Score - 4 Steps 9   12 Steps   Reason if not Attempted Activity not applicable   CARE Score - 12 Steps 9   Picking Up Object   Assistance Needed Adaptive equipment;Physical assistance   Physical Assistance Level 25% or less   CARE Score - Picking Up Object 3   Wheel 50 Feet with Two Turns   Assistance Needed Independent;Adaptive equipment   CARE Score - Wheel 50 Feet with Two Turns 6   Wheel 150 Feet   Assistance Needed Independent;Adaptive equipment   CARE Score - Wheel 150 Feet 6   P.T. Discharge Summary   Discharge Location Home   Patient Discharging with Assist of Family;Caregiver;Spouse / Significant Other   Level of Supervision Required Upon Discharge Twenty Four Hour Supervision   Recommended Equipment for Discharge Tanvir-Walker;Ramp;Manual Wheelchair;Other (See Comments)  (L AFO)   Recommeded Services Upon  Discharge Outpatient Physical Therapy  (Rehab without walls)   Long Term Goals Met 2   Long Term Goals Not Met 1   Reason(s) for Goals Not Met no longer applicable, has ramp to access home, safety concerns   Criteria for Termination of Services Maximum Function Achieved for Inpatient Rehabilitation   Discharge Instructions to Patient   Level of Assist Required for Ambulation Physical Assist on Flat Surfaces;Should Not Attempt Curbs at This Time;Should Not Attempt Stairs at This Time  (Recommend training with therapist at next level of care to progress walking in the home wiht caregiver assistance)   Distance Patient May Ambulate with therapy only at this time   Device Recommended for Ambulation Tanvir-Walker   Level of Assist Required to Propel Wheelchair Supervision   Level of Assist Required for Transfers Physical Assist  (recommend contact guard for squat transfers, Min to moderate assist if standing and stepping c/ hemiwalker)   Device Recommended for Transfers Tanvir-Walker  (or wheelchair + bedrail for squat pivot)   Home Exercise Program Refer to Home Exercise Program Handout for Details   Prosthesis / Orthosis Recommendation / Location   (Left AFO for walking)     Completed mobility discharge [facility], high rep practice of car transfers in prep for d/c. Manual therapy: mat program and stretching for LE strength, ROM, and pregait prep.   Reviewed HEP with handout provided. Reviewed relevant precautions to maximize safety during home and community mobility. Plan for car transfer training c/ PT x 30 min in pt's care c/ SO prior to d/c 01/02/24.     Patient reports understanding and agreement c/ discharge plan.     Assessment:   The patient is a 62 y.o. right hand dominant male admitted to [facility] inpatient rehabilitation 11/12/2023 with severe functional debility after R thalamic hemorrhagic CVA.     Per Dr. Watkins's consult note from 11/11:  The patient is a 62 y.o. male with a past medical history of  "hypertension, diabetes, hyperlipidemia and questionable history of CKD;  who presented on 11/11/2023  1:07 AM with left-sided weakness after stroke in [place] 3 weeks ago.  Per documentation, patient was visiting [facility] when patient had a sudden headache followed by left-sided weakness on 10/23/2023.  Initial work-up done at the hospital in [place] revealed hyper tension with /100 and creatinine 3.2.  Patient was deemed medically cleared to leave the hospital admitted after the stroke, he completed a flight to [place] and was admitted to the emergency department in preparation for work-up for admit to rehab.  Evaluation completed in the emergency department prior to rehab reveals a CT head notable for right thalamic hemorrhage.  At time of evaluation at the [facility] ED creatinine 3.59 and hypertension of 187/119, patient required rescue labetalol to decrease blood pressures.  Patient is now on amlodipine and hydralazine.\"  Patient was newly started on baclofen on 11/11, patient's blood pressures improved since original presentation to the emergency department from the airport.     Patient's sister reports he was in ICU for 8 days in [place], pt reports very limited mobilization during his acute hospitalization.     Jason Lima has participated well in their inpatient rehabilitation program with functional gains as above in flowsheet. They are now appropriate for discharge to home with caregiver support.    Tentative discharge on 01/02/24.     Plan  HEP:   Program : Jason Bed program for Jason Lima as of 2024-01-02    Glute bridge (Sets:2-3 Repetitions:8-10 Frequency:Daily)  Hip abduction (Sets:2-3 Repetitions:10-15 Frequency:Daily)  Hip adduction (Sets:2-3 Repetitions:10-15 Frequency:Daily)  Slide heel on bed (Sets:2-3 Repetitions:6-10 Frequency:Daily)  Flank stretch for TFL (Repetitions:3 Frequency:Daily Hold:1 minute)  TA activation, hip flexion (Sets:2-3 Repetitions:10 Frequency:Daily)  Isometric hip extension, " ball (Sets:2-3 Repetitions:10 Frequency:Daily)  Hip extension, legs on ball (Sets:2-3 Repetitions:10 Frequency:Daily)  Unilateral knee flexion (Sets:2-3 Repetitions:10 Frequency:Daily)  Seated hip flexion (Sets:2-3 Repetitions:6-10 each Frequency:Daily)  Self-propulsion  w/ legs on wc (Frequency:Daily)  Knee extension (Sets:3 Repetitions:10-15 Frequency:5x/week)    D/C 01/02/24      Physical Therapy Problems (Active)       There are no active problems.

## 2024-01-02 NOTE — CARE PLAN
Problem: Knowledge Deficit - Standard  Goal: Patient and family/care givers will demonstrate understanding of plan of care, disease process/condition, diagnostic tests and medications  Outcome: Met     Problem: Skin Integrity  Goal: Skin integrity is maintained or improved  Outcome: Met     Problem: Fall Risk - Rehab  Goal: Patient will remain free from falls  Outcome: Met     Problem: Diabetes Management  Goal: Patient's ability to maintain appropriate glucose levels will be maintained or improve  Outcome: Met     Problem: Discharge Barriers/Planning  Goal: Patient's continuum of care needs are met  Outcome: Met     Problem: Psychosocial  Goal: Patient's level of anxiety will decrease  Outcome: Met  Goal: Patient's ability to verbalize feelings about condition will improve  Outcome: Met  Goal: Patient's ability to re-evaluate and adapt role responsibilities will improve  Outcome: Met  Goal: Patient and family will demonstrate ability to cope with life altering diagnosis and/or procedure  Outcome: Met  Goal: Spiritual and cultural needs incorporated into hospitalization  Outcome: Met     Problem: Bladder / Voiding  Goal: Patient will establish and maintain regular urinary output  Outcome: Met  Goal: Patient will establish and maintain bladder regimen  Outcome: Met     Problem: Bowel Elimination  Goal: Patient will participate in bowel management program  Outcome: Met     Problem: Skin Integrity  Goal: Patient's skin integrity will be maintained or improve  Outcome: Met     Problem: Nutrition  Goal: Patient's nutritional and fluid intake will be adequate or improve  Outcome: Met  Goal: Patient will display progressive weight gain toward goal have adequate food and fluid intake  Outcome: Met  Goal: Patient will demonstrate ability to administer respiratory medications  Outcome: Met     Problem: Self Care  Goal: Patient will have the ability to perform ADLs independently or with assistance  Outcome: Met     Problem:  Mobility  Goal: Patient's capacity to carry out activities will improve  Outcome: Met     Problem: Infection  Goal: Patient will remain free from infection  Outcome: Met     Problem: VTE Prevention  Goal: Patient will remain free from venous thromboembolism (VTE)  Outcome: Met     Problem: Pain - Standard  Goal: Alleviation of pain or a reduction in pain to the patient’s comfort goal  Outcome: Met   The patient is Stable - Low risk of patient condition declining or worsening    Shift Goals  Clinical Goals: safety  Patient Goals: sleep well, dc tomorrow  Family Goals: Education    Progress made toward(s) clinical / shift goals:  pt goals met, discharged home    Patient is not progressing towards the following goals:

## 2024-01-02 NOTE — PROGRESS NOTES
Patient discharged to home per order.  Discharge instructions reviewed with patient and significant other; they verbalize understanding and signed copies placed in chart.  Patient has all belongings; signed copy of form in chart.  Patient left facility at 1530 via wheelchair accompanied by rehab staff and significant other.  Have enjoyed working with this pleasant patient.

## 2024-01-02 NOTE — DISCHARGE SUMMARY
Physical Medicine & Rehabilitation Discharge Summary    Admission Date: 11/12/2023    Discharge Date: 1/2/2024    Attending Provider: Lisa Lee DO, MS    Admission Diagnosis:   Active Hospital Problems    Diagnosis     *Thalamic hemorrhage (HCC)     Vision problem     Hearing problem of both ears     Difficulty sleeping     Reactive depression (situational)     DVT (deep venous thrombosis) (HCC)     Spasticity     Hemorrhagic stroke (HCC)     Acute left-sided weakness     CKD (chronic kidney disease)     Hyperlipidemia     DM (diabetes mellitus) (HCC)     Hypertension        Discharge Diagnosis:  Active Hospital Problems    Diagnosis     *Thalamic hemorrhage (HCC)     Vision problem     Hearing problem of both ears     Difficulty sleeping     Reactive depression (situational)     DVT (deep venous thrombosis) (HCC)     Spasticity     Hemorrhagic stroke (HCC)     Acute left-sided weakness     CKD (chronic kidney disease)     Hyperlipidemia     DM (diabetes mellitus) (HCC)     Hypertension        HPI per Admission History & Physical:  Per my consult note from 11/11:  The patient is a 62 y.o. male with a past medical history of hypertension, diabetes, hyperlipidemia and questionable history of CKD;  who presented on 11/11/2023  1:07 AM with left-sided weakness after stroke in [facility] 3 weeks ago.  Per documentation, patient was visiting [facility] when patient had a sudden headache followed by left-sided weakness on 10/23/2023.  Initial work-up done at the hospital in [place] revealed hyper tension with /100 and creatinine 3.2.  Patient was deemed medically cleared to leave the hospital admitted after the stroke, he completed a flight to [place] and was admitted to the emergency department in preparation for work-up for admit to rehab.  Evaluation completed in the emergency department prior to rehab reveals a CT head notable for right thalamic hemorrhage.  At time of evaluation at the [facility] ED creatinine 3.59  "and hypertension of 187/119, patient required rescue labetalol to decrease blood pressures.  Patient is now on amlodipine and hydralazine.\"  Patient was newly started on baclofen on 11/11, patient's blood pressures improved since original presentation to the emergency department from the airport.     Patient seen and examined at bedside.  Patient reports he feels okay.  Patient is fatigued, has a component of jet lag.  Patient reports he has not slept well in a couple of days.  Otherwise patient denies headache, lightheadedness, chest pain or shortness of breath.  Patient does report having some blurry vision since his stroke.  Patient has spasticity in his left upper extremity and left lower extremity.  Patient has been able to void without the use of a Echols.  Denies constipation.  Is looking forward to starting rehab, discussed plan to improve his sleep-wake cycles.  Would like to start baclofen today for spasticity however that can affect his fatigue.  We will plan to start tomorrow.  Discussed with patient plan to stay awake during the day today so that he can sleep well tonight.    Patient was admitted to [facility] on 11/12/2023.     Hospital Course by Problem List:  Right thalamic hemorrhagic stroke ~ last week October 2023  Originally presented with a headache and left-sided weakness to hospital in [facility]  Work-up at the outside hospital in [facility] revealed a right thalamic hemorrhagic stroke  Repeat CT head done after return flight to the , 11/11 CT head shows right thalamic hemorrhage, decrease in density since prior studies from the outside hospital, slight asymmetric dilation of the left ventricular system including the left temporal horn with a possible component of hydrocephalus  Planning for BP less than 140, avoiding any kind of anticoagulation  Initiate comprehensive IRF level therapy with 3 days of therapy 5 days a week with services from PT/OT/SLP     Spasticity  OP " follow up with Physiatry for consideration Botox   Has had resurgence of spasticity restart baclofen 5mg TID 11/30/2023 --> increase to 10mg TID  12/28 reduce baclofen to 5 mg 3 times daily 12/2023 1/2/24 no obvious resurgence of spasticity, continue baclofen 5 mg 3 times daily.     Conjunctivitis  12/15 started eyedrops, completed     ABLA  Now on Eliquis  Recheck 11/28 - improved 11.4--> 12.0--> 11.6 11/30/2023 12/6/2023 11.6-->10.7--> 11.7 12/11 12/22 Stable in 11's 12/29 stable in the 11's     CKD  Follow up with OP nephrology  Renal function fluctuates 20-30's/2's-3's  Cr Baseline in past had been mid 1's 12/26 mild fluctuation, normal for this hospitalization.   12/29 renal function stable     Hypertension  Continue Amlodipine 10mg daily  Continue Hydralazine 25mg TID - increased by hospitalist 11/13/2023 from BID to TID--> increased to 50mg q8hrs 11/15  11/16 Hydralazine increased to 75mg q8hrs and 1x Hydralazine given  11/17 BP still elevated but hydralazine recently increased. Monitor  12/13/2023 VS showing increase in -140's. Continue Hydralazine 100mg q8hrs + Amlodipine 10mg daily.  12/14/2023 consistently higher, start Metoprolol XL 12.5 mg daily   12/15/2023 better controlled, continue Metoprolol daily   12/18 increase metoprolol to 25 mg daily  12/19 continue metoprolol 25 mg daily, monitor for need to increase.  12/27 blood pressure still intermittently elevated in the 140s, increase metoprolol to 37.5 mg daily  1/2/24 BP better controlled with max 140/88.  Continue metoprolol 37.5 mg daily     Hypokalemia  Mild 12/13 supplement x1   NML 12/15  12/18 stable and normal at 3.8  12/22 NML  12/26 supplement 40 mEq once for potassium of 3.5  12/29 normal at 4.9     Leukopenia  New, mild 12/6/2023   Resolved 12/11, 12/22, 12/29     Prediabetes  Discontinue SSI     HLD  Continue home dose satin      Bowel  Constipation 11/29/2023, resolved 11/30/2023   Continue with scheduled Colace and senna,  as needed stool softeners  Miralax x3 doses 11/29/2023 --> Constipation Resolved 11/30/2023      Insomnia  Secondary to recent jet lag, melatonin scheduled --> increase to home dose 11/13/2023 9mg  Utilize trazodone as needed insomnia continues  Schedule Trazodone 11/15/2023   11/16/2023 continue Trazodone as is. Thinks he slept better last night  11/17/2023 Still not sleeping well. Increase to 75mg nightly.   1/2/24 continue trazodone at current dose     Pain - as needed Tylenol and oxycodone     Post-Stroke Depression  Consulted Pyschiatry   Start Prozac 11/28/2023 --> Switched to zoloft per psychiatry  Appreciate assistance with management  Mood improved, continue Zoloft 12/22  Psychiatry signed off 12/27, discussed with them 12/27    Patient can follow up outpatient:   Rachele Del Rio DO  [facility] [address], [place] [address]  Call [phone] to make an appointment       Right Foot Pain  H/o Gout  Xray with swelling 1st metatarsal head   Trial Colchicine for acute gout flare 11/28/2023   Topical Voltaren gel scheduled   Improved with less swelling, erythema 11/29/2023   Resolved        LABS: BMP + CBC 12/22 - non concerning, renal function stable  LABS: BMP 12/26 -kidney function stable, potassium mildly low at 3.5, supplemented once  LABS: BMP + CBC 12/29 -normal/stable.  No concerning findings.  No new findings.        DVT PROPHYLAXIS: NO - Hemorrhagic stroke. US + UE and LE for brachial and peroneal DVT. 11/21/2023 start Heparin 5000 units q8hrs SQ for further prophylaxis, if tolerates will increase to full AC. Consider repeat CTH to ensure stability bleed once on full AC. 11/24/2023 Start loading dose Eliquis 10mg BID x 7 days with transition to 5mg BID. CBC showing improvement in H/H 11/28/2023 no neurologic decline.      HOSPITALIST FOLLOWING: YES - d/w hospitalist 12/6 --> Signed off 12/6.      CODE STATUS: FULL CODE     DISPO: Home alone. Family can help  starting 1/2     MARCUS: 1/2/2023     MEDS SENT TO: [facility] in [place] on [address]     DISCHARGE FOLLOW UP: Neurology, Nephrology, audiology, ophthalmology- referrals placed.   ____________________________________    Functional Status at Discharge  Eating:  Modified Independent  Eating Description:  Set-up of equipment or meal/tube feeding  Grooming:  Contact Guard Assist, Standing  Grooming Description:  Increased time, Standing at sink, Verbal cueing (FT w/ SO; toothbrushing standing at sink.)  Bathing:  Contact Guard Assist  Bathing Description:  Grab bar, Hand held shower, Increased time, Supervision for safety, Set-up of equipment  Upper Body Dressing:  Modified Independent  Upper Body Dressing Description:  Increased time (Loose t-shirt; jade-tech)  Lower Body Dressing:  Minimal Assist  Lower Body Dressing Description:  Grab bar, Sock aid, Increased time, Set-up of equipment, Supervision for safety (Pt able to thread legs into brief/pant using a combination of bending forward/hip hike. Kyara for standing balance to pull pants up with occasional assistance to pull pants up on the L side. Pt made increase attempts to use the L hand to pull pants up.)  Discharge Location : Home  Patient Discharging with Assist of: Family ;Caregiver;Spouse / Significant Other  Level of Supervision Required: 24 Hour Supervision  Recommended Equipment for Discharge: Manual Wheelchair;Tub Transfer Bench;Grab Bars by Toilet;Grab Bars in Tub / Shower;Hand Held Shower Head;3 in 1 Commode;Reacher;Dressing Stick  Recommended Services Upon Discharge: Home Health Occupational Therapy;Outpatient Occupational Therapy  Long Term Goals Met: 0  Long Term Goals Not Met: 2  Reason(s) for Goals Not Met: Pt not yet able to safely transfer d/t balance difficulties. Pt progressed towards [place] for seated ADLs, but remains at Kyara for standing tasks.  Criteria for Termination of Services: Maximum Function Achieved for Inpatient Rehabilitation  Comments: Way  to go Jason!  Walk:  Minimal Assist  Distance Walked:  50  Number of Times Distance Was Traveled:  1  Assistive Device:  Tanvir-Walker (L AFO)  Gait Deviation:  Decreased Heel Strike, Decreased Toe Off, Decreased Base Of Support, Increased Base Of Support, Other (Comment) (cues for HW sequencing)  Wheelchair:  Supervised  Distance Propelled:  200   Wheelchair Description:  Extra time, Supervision for safety  Stairs Total Assist (MaxA x 1, 2nd person SBA for safety)  Stairs Description Extra time, Hand rails, Verbal cueing  Discharge Location: Home  Patient Discharging with Assist of: Family;Caregiver;Spouse / Significant Other  Level of Supervision Required Upon Discharge: Twenty Four Hour Supervision  Recommended Equipment for Discharge: Tanvir-Walker;Ramp;Manual Wheelchair;Other (See Comments) (L AFO)  Recommeded Services Upon Discharge: Outpatient Physical Therapy (Rehab without walls)  Long Term Goals Met: 2  Long Term Goals Not Met: 1  Reason(s) for Goals Not Met: no longer applicable, has ramp to access home, safety concerns  Criteria for Termination of Services: Maximum Function Achieved for Inpatient Rehabilitation  Comprehension:  Modified Independent  Comprehension Description:  Glasses  Expression:  Modified Independent  Expression Description:   (extra time)  Social Interaction:  Modified Independent  Social Interaction Description:  Verbal cues, Increased time  Problem Solving:  Minimal Assist  Problem Solving Description:  Increased time, Seat belt, Supervision, Therapy schedule, Verbal cueing  Memory:  Minimal Assist  Memory Description:  Increased time, Seat belt, Supervision, Therapy schedule, Verbal cueing  Progress since Admit: Patient had agreed to resume ST, however, he again indicated to Dr. Lee that he no longer wanted ST intervention.   Patient has made slight improvements in attention skills,self monitoring and safety planning.  He still needs assistance in the form of  min cuing to initiate  safety planning and to use strategies such as breaking mult-step into single or smaller elements, slowing rate, double checking for accuracy.  He will require ongoing support and supervision with all IADL's.  Discharge Location : Home  Patient Discharging with Assist of: Spouse / Significant Other  Level of Supervision Required: Intermittent Supervision (Family is planning to hire care givers, and patient will be alone for 1 hour in morning and 1 hour in evening per case management. He needs direct supervision for safety in transfers, and assist with IADl's.)  Recommended Services Upon Discharge: Home Health Speech Therapy;Outpatient Speech Therapy (If willing to accept.)  Long Term Goals Met: 1/3  Long Term Goals Not Met: Patient will perform complex problem solving For safety and self care with 80% acc mod I  for safe discharge home.  Patient will   patient will recall new training with 80% acc with min A and use of external memory aids.  Reason(s) for Goals Not Met: Patient requesting again d/c of ST services prior to d/c from [facility] per Dr. Lee.  Criteria for Termination of Services: Maximum Function Achieved for Inpatient Rehabilitation    ILisa D.O., personally performed a complete drug regimen review and no potential clinically significant medication issues were identified.   Discharge Medication:     Medication List        START taking these medications        Instructions   apixaban 5mg Tabs  Commonly known as: Eliquis   Take 1 Tablet by mouth 2 times a day.  Dose: 5 mg     metoprolol SR 25 MG Tb24  Start taking on: January 3, 2024  Commonly known as: Toprol XL   Take 1.5 Tablets by mouth every day.  Dose: 37.5 mg     sertraline 50 MG Tabs  Commonly known as: Zoloft   Take 1 Tablet by mouth every day.  Dose: 50 mg     traZODone 150 MG Tabs  Commonly known as: Desyrel   Take 0.5 Tablets by mouth at bedtime.  Dose: 75 mg            CHANGE how you take these medications        Instructions    baclofen 10 MG Tabs  What changed:   medication strength  when to take this  Commonly known as: Lioresal   Take 0.5 Tablets by mouth 3 times a day.  Dose: 5 mg     hydrALAZINE 100 MG tablet  What changed:   medication strength  how much to take  when to take this  Commonly known as: Apresoline   Take 1 Tablet by mouth every 8 hours.  Dose: 100 mg            CONTINUE taking these medications        Instructions   amLODIPine 10 MG Tabs  Commonly known as: Norvasc   Take 1 Tablet by mouth every day.  Dose: 10 mg     atorvastatin 40 MG Tabs  Commonly known as: Lipitor   Take 1 Tablet by mouth every evening.  Dose: 40 mg            STOP taking these medications      zolpidem 5 MG Tabs  Commonly known as: Ambien              Discharge Diet:  Current Diet Order   Procedures    Diet Order Diet: Consistent CHO (Diabetic) (2 gram sodium restriction; medications whole with thin liquids); Second Modifier: (optional): 2 Gram Sodium       Discharge Activity:  Per PT/OT    Disposition:  Patient to discharge home with family support and community resources.    Equipment:  Per PT/OT    Follow-up & Discharge Instructions:  Follow up with your primary care provider (PCP) within 7-10 days of discharge to review your medications and take over your care.     If you develop chest pain, fever, chills, change in neurologic function (weakness, sensation changes, vision changes), or other concerning sxs, seek immediate medical attention or call 911.      Future Appointments   Date Time Provider Department Center   1/2/2024  1:00 PM Abe Pacheco, PT [facility] None   1/9/2024 10:00 AM Jose Briones P.A[facility]       Condition on Discharge:  Good    More than 35 minutes was spent on discharging this patient, including face-to-face time, prescription management, and the dictation of this note.    Dr. Lisa Lee DO, MS  Department of Physical Medicine & Rehabilitation    Date of Service: 1/2/2024

## 2024-01-02 NOTE — PROGRESS NOTES
NURSING DAILY NOTE    Name: Jason Lima  Date of Admission: 11/12/2023  Admitting Diagnosis: Thalamic hemorrhage (HCC)  Attending Physician: Lisa Lee D.o.  Allergies: Penicillins    Safety  Patient Assist  omod assist  Patient Precautions  oFall Risk  Precaution Comments  oLeft Hemiparesis, left visual inattention  Bed Transfer Status  oContact Guard Assist (Min to ModA c/ SPT c/ HW)  Toilet Transfer Status  oMinimal Assist  Assistive Devices  oGait Belt, Rails, Wheelchair  Oxygen  oCPAP  Diet/Therapeutic Dining  o  Current Diet Order   Procedures    Diet Order Diet: Consistent CHO (Diabetic) (2 gram sodium restriction; medications whole with thin liquids); Second Modifier: (optional): 2 Gram Sodium     Pill Administration  owhole  Agitated Behavioral Scale  o14  ABS Level of Severity  Gurpreet Agitation    Fall Risk  Has the patient had a fall this admission?  Jonatan Hamilton Fall Risk Scoring  o19, HIGH RISK  Fall Risk Safety Measures  maddie alarm and chair alarm    Vitals  Temperature: 36.6 °C (97.8 °F)  Temp src: Temporal  Pulse: 73  Respiration: 17  Blood Pressure: 135/78  Blood Pressure MAP (Calculated): 97 MM HG  BP Location: Right, Upper Arm  Patient BP Position: Supine    Oxygen  Pulse Oximetry: 92 %  O2 (LPM): 0  FiO2%: 21 %  O2 Delivery Device: CPAP    Bowel and Bladder  Last Bowel Movement  o12/31/23  Stool Type  oType 6: Fluffy pieces with ragged edges, a mushy stool  Bowel Device  oBathroom  Continent  oBladder: Continent void  oBowel: Continent movement  Bladder Function  oUrine Void (mL): 300 ml  Number of Times Voided: 1  Urinary Options: Yes  Urine Color: Pale  Number of Times Incontinent of Urine: 0  Genitourinary Assessment  oBladder Assessment (WDL):  Within Defined Limits  Echols Catheter: Not Applicable  Urine Color: Pale  Number of Bladder Accidents: 0  Total Number of Bladder of Accidents in Last 7 Days: 0  Number of Times Incontinent of Urine: 0  Bladder Device: Urinal  Time Void:  No  Bladder Scan: Post Void  $ Bladder Scan Results (mL): 26    Skin  Polo Score  o 17  Sensory Interventions  o Bed Types: Standard/Trauma Mattress  Skin Preventative Measures: Pillows in Use for Support / Positioning  Moisture Interventions  oMoisturizers/Barriers: Barrier Wipes      Pain  Pain Rating Scale  o0 - No Pain  Pain Location  oBack, Generalized  Pain Location Orientation  oLower  Pain Interventions  oDeclines    ADLs    Bathing  oPatient Refused Bathing (Pt took a shower earlier today during OT session)  Linen Change  oPartial  Personal Hygiene  oChange Deja Pads, Moist Deja Wipes, Perineal Care  Chlorhexidine Bath  o   Oral Care  oBrushed Teeth  Teeth/Dentures  o   Shave  oSelf  Nutrition Percentage Eaten  o*  * Meal *  *, Between % Consumed  Environmental Precautions  oTreaded Slipper Socks on Patient, Personal Belongings, Wastebasket, Call Bell etc. in Easy Reach, Transferred to Stronger Side, Bed in Low Position  Patient Turns/Positioning  oPatient Turns Self from Side to Side  Patient Turns Assistance/Tolerance  oAssistance of One  Bed Positions  Jessa Controls On  Head of Bed Elevated  oSelf regulated      Psychosocial/Neurologic Assessment  Psychosocial Assessment  oPsychosocial (WDL):  Within Defined Limits  Patient Behaviors: Fatigue  Neurologic Assessment  oNeuro (WDL): Exceptions to WDL  Level of Consciousness: Alert  Orientation Level: Oriented X4  Cognition: Follows commands, Appropriate attention/concentration  Speech: Clear  Facial Symmetry: Left facial drooping  Pupil Assesment: No  R Pupil Size (mm): 3  R Pupil Shape / Description: Round  R Pupil Reaction: Brisk  L Pupil Size (mm): 3  L Pupil Shape / Description: Round  L Pupil Reaction: Brisk  Reflexes: Cough  Cough Reflex: Present  Motor Function/Sensation Assessment: Dorsiflexion, Motor response  R Foot Dorsiflexion: Strong  L Foot Dorsiflexion: Absent  RUE Motor Response: Responds to commands  RUE Sensation: Full  sensation  Muscle Strength Right Arm: Normal Strength Against Gravity and Full Resistance  LUE Motor Response: Flaccid  LUE Sensation: Numbness, Tingling  Muscle Strength Left Arm: Weak Movement but Not Against Gravity or Resistance  RLE Motor Response: Responds to commands  RLE Sensation: Full sensation  Muscle Strength Right Leg: Good Strength Against Gravity and Moderate Resistance  LLE Motor Response: Flaccid  LLE Sensation: Numbness, Tingling  Muscle Strength Left Leg: Weak Movement but Not Against Gravity or Resistance  oEENT (WDL):  WDL Except    Cardio/Pulmonary Assessment  Edema  oRLE Edema: Trace  LLE Edema: Trace  Respiratory Breath Sounds  oRUL Breath Sounds: Clear  RML Breath Sounds: Clear  RLL Breath Sounds: Clear, Diminished  MOHAMUD Breath Sounds: Clear  LLL Breath Sounds: Clear, Diminished  Cardiac Assessment  oCardiac (WDL):  Within Defined Limits

## 2024-01-02 NOTE — CARE PLAN
The patient is Stable - Low risk of patient condition declining or worsening    Shift Goals  Clinical Goals: safety  Patient Goals: sleep well, dc tomorrow  Family Goals: Education    Progress made toward(s) clinical / shift goals:    Problem: Fall Risk - Rehab  Goal: Patient will remain free from falls  Outcome: Progressing. Patient bed in lowest locked position with bed alarm on and call light within reach. Patient calls appropriately with call light for needs and has not attempted to self transfer over shift.      Problem: Pain - Standard  Goal: Alleviation of pain or a reduction in pain to the patient’s comfort goal  Outcome: Progressing. Patient denies having any pain over shift. Patient has tylenol and prn oxycodone available for pain as needed, but declined.

## 2024-01-02 NOTE — DISCHARGE PLANNING
Patient is discharging home today with [facility] (paying privately). He is getting a loaner chair from [employer]. No further interventions needed.

## 2024-01-09 ENCOUNTER — OFFICE VISIT (OUTPATIENT)
Dept: MEDICAL GROUP | Facility: MEDICAL CENTER | Age: 64
End: 2024-01-09
Payer: COMMERCIAL

## 2024-01-09 ENCOUNTER — TELEPHONE (OUTPATIENT)
Dept: MEDICAL GROUP | Facility: MEDICAL CENTER | Age: 64
End: 2024-01-09

## 2024-01-09 VITALS
OXYGEN SATURATION: 94 % | RESPIRATION RATE: 16 BRPM | SYSTOLIC BLOOD PRESSURE: 110 MMHG | HEIGHT: 68 IN | WEIGHT: 175.6 LBS | DIASTOLIC BLOOD PRESSURE: 62 MMHG | TEMPERATURE: 97.4 F | BODY MASS INDEX: 26.61 KG/M2 | HEART RATE: 72 BPM

## 2024-01-09 DIAGNOSIS — F32.9 REACTIVE DEPRESSION (SITUATIONAL): ICD-10-CM

## 2024-01-09 DIAGNOSIS — R73.03 PREDIABETES: ICD-10-CM

## 2024-01-09 DIAGNOSIS — I82.452 ACUTE DEEP VEIN THROMBOSIS (DVT) OF LEFT PERONEAL VEIN (HCC): ICD-10-CM

## 2024-01-09 DIAGNOSIS — G47.9 DIFFICULTY SLEEPING: ICD-10-CM

## 2024-01-09 DIAGNOSIS — Z12.5 SCREENING PSA (PROSTATE SPECIFIC ANTIGEN): ICD-10-CM

## 2024-01-09 DIAGNOSIS — I61.0 THALAMIC HEMORRHAGE (HCC): ICD-10-CM

## 2024-01-09 DIAGNOSIS — I10 HYPERTENSION, UNSPECIFIED TYPE: ICD-10-CM

## 2024-01-09 DIAGNOSIS — R25.2 SPASTICITY: ICD-10-CM

## 2024-01-09 DIAGNOSIS — E78.00 PURE HYPERCHOLESTEROLEMIA: ICD-10-CM

## 2024-01-09 PROCEDURE — 99204 OFFICE O/P NEW MOD 45 MIN: CPT | Performed by: PHYSICIAN ASSISTANT

## 2024-01-09 PROCEDURE — 3074F SYST BP LT 130 MM HG: CPT | Performed by: PHYSICIAN ASSISTANT

## 2024-01-09 PROCEDURE — 3078F DIAST BP <80 MM HG: CPT | Performed by: PHYSICIAN ASSISTANT

## 2024-01-09 RX ORDER — BACLOFEN 10 MG/1
5 TABLET ORAL 3 TIMES DAILY
Qty: 135 TABLET | Refills: 3 | Status: SHIPPED | OUTPATIENT
Start: 2024-01-09 | End: 2025-01-03

## 2024-01-09 RX ORDER — AMLODIPINE BESYLATE 10 MG/1
10 TABLET ORAL DAILY
Qty: 90 TABLET | Refills: 3 | Status: SHIPPED | OUTPATIENT
Start: 2024-01-09 | End: 2025-01-03

## 2024-01-09 RX ORDER — METOPROLOL SUCCINATE 25 MG/1
37.5 TABLET, EXTENDED RELEASE ORAL DAILY
Qty: 135 TABLET | Refills: 3 | Status: SHIPPED | OUTPATIENT
Start: 2024-01-09 | End: 2025-01-03

## 2024-01-09 RX ORDER — TRAZODONE HYDROCHLORIDE 150 MG/1
75 TABLET ORAL
Qty: 45 TABLET | Refills: 3 | Status: SHIPPED | OUTPATIENT
Start: 2024-01-09 | End: 2025-01-03

## 2024-01-09 RX ORDER — ATORVASTATIN CALCIUM 40 MG/1
40 TABLET, FILM COATED ORAL NIGHTLY
Qty: 90 TABLET | Refills: 3 | Status: SHIPPED | OUTPATIENT
Start: 2024-01-09 | End: 2025-01-03

## 2024-01-09 SDOH — ECONOMIC STABILITY: INCOME INSECURITY: IN THE LAST 12 MONTHS, WAS THERE A TIME WHEN YOU WERE NOT ABLE TO PAY THE MORTGAGE OR RENT ON TIME?: NO

## 2024-01-09 SDOH — ECONOMIC STABILITY: FOOD INSECURITY: WITHIN THE PAST 12 MONTHS, THE FOOD YOU BOUGHT JUST DIDN'T LAST AND YOU DIDN'T HAVE MONEY TO GET MORE.: NEVER TRUE

## 2024-01-09 SDOH — ECONOMIC STABILITY: FOOD INSECURITY: WITHIN THE PAST 12 MONTHS, YOU WORRIED THAT YOUR FOOD WOULD RUN OUT BEFORE YOU GOT MONEY TO BUY MORE.: NEVER TRUE

## 2024-01-09 SDOH — ECONOMIC STABILITY: HOUSING INSECURITY
IN THE LAST 12 MONTHS, WAS THERE A TIME WHEN YOU DID NOT HAVE A STEADY PLACE TO SLEEP OR SLEPT IN A SHELTER (INCLUDING NOW)?: NO

## 2024-01-09 SDOH — HEALTH STABILITY: PHYSICAL HEALTH: ON AVERAGE, HOW MANY MINUTES DO YOU ENGAGE IN EXERCISE AT THIS LEVEL?: PATIENT DECLINED

## 2024-01-09 SDOH — HEALTH STABILITY: PHYSICAL HEALTH
ON AVERAGE, HOW MANY DAYS PER WEEK DO YOU ENGAGE IN MODERATE TO STRENUOUS EXERCISE (LIKE A BRISK WALK)?: PATIENT DECLINED

## 2024-01-09 SDOH — ECONOMIC STABILITY: HOUSING INSECURITY: IN THE LAST 12 MONTHS, HOW MANY PLACES HAVE YOU LIVED?: 1

## 2024-01-09 SDOH — HEALTH STABILITY: MENTAL HEALTH
STRESS IS WHEN SOMEONE FEELS TENSE, NERVOUS, ANXIOUS, OR CAN'T SLEEP AT NIGHT BECAUSE THEIR MIND IS TROUBLED. HOW STRESSED ARE YOU?: ONLY A LITTLE

## 2024-01-09 SDOH — ECONOMIC STABILITY: TRANSPORTATION INSECURITY
IN THE PAST 12 MONTHS, HAS LACK OF TRANSPORTATION KEPT YOU FROM MEETINGS, WORK, OR FROM GETTING THINGS NEEDED FOR DAILY LIVING?: NO

## 2024-01-09 SDOH — ECONOMIC STABILITY: INCOME INSECURITY: HOW HARD IS IT FOR YOU TO PAY FOR THE VERY BASICS LIKE FOOD, HOUSING, MEDICAL CARE, AND HEATING?: NOT HARD AT ALL

## 2024-01-09 ASSESSMENT — SOCIAL DETERMINANTS OF HEALTH (SDOH)
HOW OFTEN DO YOU ATTEND CHURCH OR RELIGIOUS SERVICES?: MORE THAN 4 TIMES PER YEAR
HOW OFTEN DO YOU GET TOGETHER WITH FRIENDS OR RELATIVES?: ONCE A WEEK
DO YOU BELONG TO ANY CLUBS OR ORGANIZATIONS SUCH AS CHURCH GROUPS UNIONS, FRATERNAL OR ATHLETIC GROUPS, OR SCHOOL GROUPS?: NO
HOW OFTEN DO YOU HAVE SIX OR MORE DRINKS ON ONE OCCASION: NEVER
IN A TYPICAL WEEK, HOW MANY TIMES DO YOU TALK ON THE PHONE WITH FAMILY, FRIENDS, OR NEIGHBORS?: MORE THAN THREE TIMES A WEEK
DO YOU BELONG TO ANY CLUBS OR ORGANIZATIONS SUCH AS CHURCH GROUPS UNIONS, FRATERNAL OR ATHLETIC GROUPS, OR SCHOOL GROUPS?: NO
HOW MANY DRINKS CONTAINING ALCOHOL DO YOU HAVE ON A TYPICAL DAY WHEN YOU ARE DRINKING: 1 OR 2
HOW OFTEN DO YOU GET TOGETHER WITH FRIENDS OR RELATIVES?: ONCE A WEEK
IN A TYPICAL WEEK, HOW MANY TIMES DO YOU TALK ON THE PHONE WITH FAMILY, FRIENDS, OR NEIGHBORS?: MORE THAN THREE TIMES A WEEK
HOW OFTEN DO YOU ATTEND CHURCH OR RELIGIOUS SERVICES?: MORE THAN 4 TIMES PER YEAR
HOW OFTEN DO YOU ATTENT MEETINGS OF THE CLUB OR ORGANIZATION YOU BELONG TO?: PATIENT DECLINED
WITHIN THE PAST 12 MONTHS, YOU WORRIED THAT YOUR FOOD WOULD RUN OUT BEFORE YOU GOT THE MONEY TO BUY MORE: NEVER TRUE
ARE YOU MARRIED, WIDOWED, DIVORCED, SEPARATED, NEVER MARRIED, OR LIVING WITH A PARTNER?: LIVING WITH PARTNER
HOW OFTEN DO YOU ATTENT MEETINGS OF THE CLUB OR ORGANIZATION YOU BELONG TO?: PATIENT DECLINED
HOW OFTEN DO YOU HAVE A DRINK CONTAINING ALCOHOL: MONTHLY OR LESS
ARE YOU MARRIED, WIDOWED, DIVORCED, SEPARATED, NEVER MARRIED, OR LIVING WITH A PARTNER?: LIVING WITH PARTNER
HOW HARD IS IT FOR YOU TO PAY FOR THE VERY BASICS LIKE FOOD, HOUSING, MEDICAL CARE, AND HEATING?: NOT HARD AT ALL

## 2024-01-09 ASSESSMENT — FIBROSIS 4 INDEX: FIB4 SCORE: 1.08

## 2024-01-09 ASSESSMENT — LIFESTYLE VARIABLES
AUDIT-C TOTAL SCORE: 1
SKIP TO QUESTIONS 9-10: 1
HOW OFTEN DO YOU HAVE A DRINK CONTAINING ALCOHOL: MONTHLY OR LESS
HOW MANY STANDARD DRINKS CONTAINING ALCOHOL DO YOU HAVE ON A TYPICAL DAY: 1 OR 2
HOW OFTEN DO YOU HAVE SIX OR MORE DRINKS ON ONE OCCASION: NEVER

## 2024-01-09 NOTE — ASSESSMENT & PLAN NOTE
This is a pleasant 63-year-old male accompanied by his wife, Vielka.  He is here today to establish care.  History of stroke in [place].  Spent a few weeks in [place] with poor health care.  Eventually came back in [place] and was hospitalized at [facility] for little over a day.  History of left-sided weakness status post hemorrhagic stroke.  He is currently going through outpatient rehab.  Slowly regaining strength on the left side of his body.  Has movement of his hand as well as his leg.  He is wheelchair-bound.  Requesting renewals of his medications.  Takes hypertensive and cholesterol medications.  Has a history of prediabetes.  Has not been on metformin for several months.  A1c in the hospital was at 6.4.  Has a history of depression as well related to the events and is doing well on Zoloft 50 mg.  He is also on trazodone 75 mg at bedtime.

## 2024-01-09 NOTE — TELEPHONE ENCOUNTER
DOCUMENTATION OF PAR STATUS:    1. Name of Medication & Dose: apixaban (ELIQUIS) 5mg Tab     2. Name of Prescription Coverage Company & phone #: [employer]    3. Date Prior Auth Submitted: 1/9/24    4. What information was given to obtain insurance decision? Clinical office notes    5. Prior Auth Status? Approved through 1/9/24 - 1/8/25.    6. Patient Notified: yes

## 2024-01-09 NOTE — PROGRESS NOTES
Subjective:   Jason Lima is a 63 y.o. male here today for history of hemorrhagic stroke and DVT.  Encounter to establish care.    DVT (deep venous thrombosis) ([facility])  This is a pleasant 63-year-old male accompanied by his wife, Vielka.  He is here today to establish care.  History of stroke in [place].  Spent a few weeks in [place] with poor health care.  Eventually came back in [place] and was hospitalized at [facility] for little over a day.  History of left-sided weakness status post hemorrhagic stroke.  He is currently going through outpatient rehab.  Slowly regaining strength on the left side of his body.  Has movement of his hand as well as his leg.  He is wheelchair-bound.  Requesting renewals of his medications.  Takes hypertensive and cholesterol medications.  Has a history of prediabetes.  Has not been on metformin for several months.  A1c in the hospital was at 6.4.  Has a history of depression as well related to the events and is doing well on Zoloft 50 mg.  He is also on trazodone 75 mg at bedtime.       Current medicines (including changes today)  Current Outpatient Medications   Medication Sig Dispense Refill    coenzyme Q-10 30 MG capsule Take 60 mg by mouth every day.      apixaban (ELIQUIS) 5mg Tab Take 1 Tablet by mouth 2 times a day for 180 days. 60 Tablet 5    metFORMIN (GLUCOPHAGE) 500 MG Tab Take 500 mg by mouth 2 times a day with meals.      traZODone (DESYREL) 150 MG Tab Take 0.5 Tablets by mouth at bedtime for 360 days. 45 Tablet 3    metoprolol SR (TOPROL XL) 25 MG TABLET SR 24 HR Take 1.5 Tablets by mouth every day for 360 days. 135 Tablet 3    amLODIPine (NORVASC) 10 MG Tab Take 1 Tablet by mouth every day for 360 days. 90 Tablet 3    sertraline (ZOLOFT) 50 MG Tab Take 1 Tablet by mouth every day for 360 days. 90 Tablet 3    baclofen (LIORESAL) 10 MG Tab Take 0.5 Tablets by mouth 3 times a day for 360 days. 135 Tablet 3    atorvastatin (LIPITOR) 40 MG Tab Take 1 Tablet by mouth every  "evening for 360 days. 90 Tablet 3    hydrALAZINE (APRESOLINE) 100 MG tablet Take 1 Tablet by mouth every 8 hours. 90 Tablet 2     No current facility-administered medications for this visit.     He  has a past medical history of Diabetes (HCC), High cholesterol, Hypertension, and Stroke (HCC).    Social History and Family History were reviewed and updated.    ROS   No chest pain, no shortness of breath, no abdominal pain and all other systems were reviewed and are negative.       Objective:     /62 (BP Location: Right arm, Patient Position: Sitting, BP Cuff Size: Adult)   Pulse 72   Temp 36.3 °C (97.4 °F) (Temporal)   Resp 16   Ht 1.727 m (5' 8\")   Wt 79.7 kg (175 lb 9.6 oz)   SpO2 94%  Body mass index is 26.7 kg/m².   Physical Exam:  Constitutional: Alert, no distress.  Sitting in wheelchair.  Skin: Warm, dry, good turgor, no rashes in visible areas.  Eye: Equal, round and reactive, conjunctiva clear, lids normal.  ENMT: Lips without lesions, good dentition, oropharynx clear.  Neck: Trachea midline, no masses.   Psych: Alert and oriented x3, normal affect and mood.        Assessment and Plan:   The following treatment plan was discussed    1. Acute deep vein thrombosis (DVT) of left peroneal vein (HCC)  Chronic condition.  Stable.  Renewed Eliquis.  Prior authorization already completed.  - apixaban (ELIQUIS) 5mg Tab; Take 1 Tablet by mouth 2 times a day for 180 days.  Dispense: 60 Tablet; Refill: 5    2. Thalamic hemorrhage (HCC)  Chronic condition.  Stable.  Reviewed medications.  Follow-up in neurology this week.  Continue rehab.    3. Spasticity  Chronic condition status post stroke.  Renewed baclofen as directed.  Follow-up with neurology.  - baclofen (LIORESAL) 10 MG Tab; Take 0.5 Tablets by mouth 3 times a day for 360 days.  Dispense: 135 Tablet; Refill: 3    4. Pure hypercholesterolemia  Chronic condition.  Stable.  Continue atorvastatin which I renewed.  Will check cholesterol profile prior to " next appointment.  - atorvastatin (LIPITOR) 40 MG Tab; Take 1 Tablet by mouth every evening for 360 days.  Dispense: 90 Tablet; Refill: 3  - Lipid Profile; Future    5. Hypertension, unspecified type  Chronic condition.  Controlled.  Continue BP medications which I renewed.  Ordered labs.  - metoprolol SR (TOPROL XL) 25 MG TABLET SR 24 HR; Take 1.5 Tablets by mouth every day for 360 days.  Dispense: 135 Tablet; Refill: 3  - amLODIPine (NORVASC) 10 MG Tab; Take 1 Tablet by mouth every day for 360 days.  Dispense: 90 Tablet; Refill: 3  - CBC WITH DIFFERENTIAL; Future  - Comp Metabolic Panel; Future  - MICROALBUMIN CREAT RATIO URINE; Future    6. Prediabetes  Chronic condition.  Appears to have prediabetes not diabetes.  Advised though to restart metformin at 500 mg twice a day.  Reduce risk of stroke.  - HEMOGLOBIN A1C; Future    7. Reactive depression (situational)  Chronic condition.  Stable.  Renewed Zoloft as directed.  - sertraline (ZOLOFT) 50 MG Tab; Take 1 Tablet by mouth every day for 360 days.  Dispense: 90 Tablet; Refill: 3    8. Difficulty sleeping  Chronic condition.  Stable.  Renewed trazodone as directed.  - traZODone (DESYREL) 150 MG Tab; Take 0.5 Tablets by mouth at bedtime for 360 days.  Dispense: 45 Tablet; Refill: 3    9. Screening PSA (prostate specific antigen)  PSA ordered.  Screening.  - PROSTATE SPECIFIC AG SCREENING; Future         Followup: Return in about 3 months (around 4/9/2024), or if symptoms worsen or fail to improve.    Please note that this dictation was created using voice recognition software. I have made every reasonable attempt to correct obvious errors, but I expect that there are errors of grammar and possibly content that I did not discover before finalizing the note.

## 2024-01-09 NOTE — LETTER
[facility]  Jose Briones P.A.-C.  [address]  [place] [address]  Fax: [phone]   Authorization for Release/Disclosure of   Protected Health Information   Name: MAXIME LIMA : 1960 SSN: [id number]   Address: [address] [place] [address] [place] [address] Phone:    [phone] (home)    I authorize the entity listed below to release/disclose the PHI below to:   [facility]/Jose Briones P.A.-C. and Jose Briones P.A.-C.   Provider or Entity Name:  Humble Garcia   Address   City, [facility]   Phone:  [phone]    Fax:  [phone]   Reason for request: continuity of care   Information to be released:    [ X ] LAST COLONOSCOPY,  including any PATH REPORT and follow-up  [  ] LAST FIT/COLOGUARD RESULT [  ] LAST DEXA  [  ] LAST MAMMOGRAM  [  ] LAST PAP  [  ] LAST LABS [  ] RETINA EXAM REPORT  [  ] IMMUNIZATION RECORDS  [X ] Release all info      [  ] Check here and initial the line next to each item to release ALL health information INCLUDING  _____ Care and treatment for drug and / or alcohol abuse  _____ HIV testing, infection status, or AIDS  _____ Genetic Testing    DATES OF SERVICE OR TIME PERIOD TO BE DISCLOSED: _____________  I understand and acknowledge that:  * This Authorization may be revoked at any time by you in writing, except if your health information has already been used or disclosed.  * Your health information that will be used or disclosed as a result of you signing this authorization could be re-disclosed by the recipient. If this occurs, your re-disclosed health information may no longer be protected by State or Federal laws.  * You may refuse to sign this Authorization. Your refusal will not affect your ability to obtain treatment.  * This Authorization becomes effective upon signing and will  on (date) __________.      If no date is indicated, this Authorization will  one (1) year from the signature date.    Name: Maxime Lima  Signature:  Date:   1/9/2024     PLEASE FAX REQUESTED RECORDS BACK TO: [phone]

## 2024-01-10 ENCOUNTER — TELEPHONE (OUTPATIENT)
Dept: NEUROLOGY | Facility: MEDICAL CENTER | Age: 64
End: 2024-01-10
Payer: COMMERCIAL

## 2024-01-10 NOTE — TELEPHONE ENCOUNTER
NEUROLOGY PATIENT PRE-VISIT PLANNING     Patient was NOT contacted to complete PVP.  Note: Patient will not be contacted if there is no indication to call.     Patient Appointment is scheduled as: New Patient     Is visit type and length scheduled correctly? Yes    [employer] Patient is checked in Patient Demographics? Yes    3.   Is referral attached to visit? Yes    4. Were records received from referring provider? Yes    4. Patient was NOT contacted to have someone accompany them to visit.     5. Is this appointment scheduled as a Hospital Follow-Up?  Yes    6. Does the patient require any pre procedure or post procedure follow up? No    7. If any orders were placed at last visit or intended to be done for this visit do we have Results/Consult Notes? No  Labs - Labs ordered, completed on 1.9.2023  and results are in chart.  Imaging - Imaging ordered, completed and results are in chart.  Referrals - No referrals were ordered at last office visit.  Note: If patient appointment is for lab or imaging review and patient did not complete the studies, check with provider if OK to reschedule patient until completed.    8. If patient appointment is for Botox - is order pended for provider? N/A    9. Was Plan Assessment from last Neurology Office Visit Reviewed?  Yes

## 2024-01-11 ENCOUNTER — TELEPHONE (OUTPATIENT)
Dept: HEALTH INFORMATION MANAGEMENT | Facility: OTHER | Age: 64
End: 2024-01-11

## 2024-01-11 ENCOUNTER — OFFICE VISIT (OUTPATIENT)
Dept: NEUROLOGY | Facility: MEDICAL CENTER | Age: 64
End: 2024-01-11
Attending: PSYCHIATRY & NEUROLOGY
Payer: COMMERCIAL

## 2024-01-11 VITALS
WEIGHT: 174 LBS | SYSTOLIC BLOOD PRESSURE: 142 MMHG | RESPIRATION RATE: 16 BRPM | BODY MASS INDEX: 26.37 KG/M2 | HEART RATE: 75 BPM | OXYGEN SATURATION: 96 % | DIASTOLIC BLOOD PRESSURE: 84 MMHG | TEMPERATURE: 97.1 F | HEIGHT: 68 IN

## 2024-01-11 DIAGNOSIS — I61.0 THALAMIC HEMORRHAGE (HCC): ICD-10-CM

## 2024-01-11 PROCEDURE — 99204 OFFICE O/P NEW MOD 45 MIN: CPT | Performed by: PSYCHIATRY & NEUROLOGY

## 2024-01-11 PROCEDURE — 99212 OFFICE O/P EST SF 10 MIN: CPT | Performed by: PSYCHIATRY & NEUROLOGY

## 2024-01-11 ASSESSMENT — FIBROSIS 4 INDEX: FIB4 SCORE: 1.08

## 2024-01-11 NOTE — PROGRESS NOTES
Chief Complaint   Patient presents with    New Patient     Stroke bridge       History of present illness:  Jason Lima 63 y.o. male with HTN, DM suffered right thalamic ICH in [place] after presenting for L sided weakness in Oct 2023.  He was hypertensive to 197/119.   He is on Eliquis after being diagnosed with a DVT in December. He previously was on 1 anti-HTN drug, but now he is on 3.       Past medical history:   Past Medical History:   Diagnosis Date    Diabetes (HCC)     High cholesterol     Hypertension     Stroke (HCC)     3 weeks ago in [place] (as of 11/11/2023)       Past surgical history:   Past Surgical History:   Procedure Laterality Date    CHELO BY LAPAROSCOPY N/A 1/6/2017    Procedure: CHELO BY LAPAROSCOPY;  Surgeon: Garrett Miller M.D.;  Location: SURGERY [facility];  Service:     ABDOMINAL ABSCESS DRAINAGE N/A 1/6/2017    Procedure: ABDOMINAL ABSCESS DRAINAGE;  Surgeon: Garrett Miller M.D.;  Location: SURGERY [facility];  Service:        Family history:   No family history on file.    Social history:   Social History     Socioeconomic History    Marital status:      Spouse name: Not on file    Number of children: Not on file    Years of education: Not on file    Highest education level: Associate degree: occupational, technical, or vocational program   Occupational History    Not on file   Tobacco Use    Smoking status: Never    Smokeless tobacco: Never   Vaping Use    Vaping Use: Never used   Substance and Sexual Activity    Alcohol use: Never    Drug use: Never    Sexual activity: Not on file     Comment: , 1 daughter, 2 grands   Other Topics Concern    Not on file   Social History Narrative    Not on file     Social Determinants of Health     Financial Resource Strain: Low Risk  (1/9/2024)    Overall Financial Resource Strain (CARDIA)     Difficulty of Paying Living Expenses: Not hard at all   Food Insecurity: No Food Insecurity (1/9/2024)    Hunger Vital Sign      Worried About Running Out of Food in the Last Year: Never true     Ran Out of Food in the Last Year: Never true   Transportation Needs: No Transportation Needs (1/9/2024)    PRAPARE - Transportation     Lack of Transportation (Medical): No     Lack of Transportation (Non-Medical): No   Physical Activity: Unknown (1/9/2024)    Exercise Vital Sign     Days of Exercise per Week: Patient refused     Minutes of Exercise per Session: Patient refused   Stress: No Stress Concern Present (1/9/2024)    [employer] - Occupational Stress Questionnaire     Feeling of Stress : Only a little   Social Connections: Moderately Integrated (1/9/2024)    Social Connection and Isolation Panel [NHANES]     Frequency of Communication with Friends and Family: More than three times a week     Frequency of Social Gatherings with Friends and Family: Once a week     Attends Hindu Services: More than 4 times per year     Active Member of Clubs or Organizations: No     Attends Club or Organization Meetings: Patient refused     Marital Status: Living with partner   Intimate Partner Violence: Not on file   Housing Stability: Low Risk  (1/9/2024)    Housing Stability Vital Sign     Unable to Pay for Housing in the Last Year: No     Number of Places Lived in the Last Year: 1     Unstable Housing in the Last Year: No       Current medications:   Current Outpatient Medications   Medication    coenzyme Q-10 30 MG capsule    apixaban (ELIQUIS) 5mg Tab    metFORMIN (GLUCOPHAGE) 500 MG Tab    traZODone (DESYREL) 150 MG Tab    metoprolol SR (TOPROL XL) 25 MG TABLET SR 24 HR    amLODIPine (NORVASC) 10 MG Tab    sertraline (ZOLOFT) 50 MG Tab    baclofen (LIORESAL) 10 MG Tab    atorvastatin (LIPITOR) 40 MG Tab    hydrALAZINE (APRESOLINE) 100 MG tablet     No current facility-administered medications for this visit.       Medication Allergy:  Allergies   Allergen Reactions    Penicillins Swelling       Physical examination:  "  Vitals:    24 0954   BP: (!) 142/84   BP Location: Right arm   Patient Position: Sitting   BP Cuff Size: Adult   Pulse: 75   Resp: 16   Temp: 36.2 °C (97.1 °F)   TempSrc: Temporal   SpO2: 96%   Weight: 78.9 kg (174 lb)   Height: 1.727 m (5' 8\")     Neurological Exam  Mental Status  Awake and alert. Moderate dysarthria present. Language is fluent with no aphasia.    Cranial Nerves  CN III, IV, VI: Extraocular movements intact bilaterally. No nystagmus. Normal smooth pursuit.  CN VII:  Left: There is central facial weakness. Slight lower left facial weakness .    Motor    4/5 strength of the left upper and lower extremity .    Sensory  Light touch abnormality: Markedly decreased sensation to touch on the left hemibody .     Coordination    Left arm severe dysmetria .    Gait    Left hemiparetic gait, unable to ambulate without holding examiner.      Labs:  I reviewed the following labs personally:  None     Imagin23 CT HEAD W/O   I reviewed the images personally and agree with the following read:     IMPRESSION:        1.  Right thalamic hemorrhage, decreased in density since prior study  2.  Stranding vasogenic edema appears stable.  3.  Slight asymmetric dilatation of the left ventricular system including the left temporal horn, consider component of hydrocephalus.  4.  Low-density fluid collection in the left temporal fossa, appearance most compatible with arachnoid cyst.  5.  Nonspecific white matter changes, commonly associated with small vessel ischemic disease.  Associated mild cerebral atrophy is noted.  6.  Atherosclerosis.    ASSESSMENT AND PLAN:  Problem List Items Addressed This Visit       Thalamic hemorrhage (HCC)       1. Thalamic hemorrhage (HCC)    63-year-old male with history of right thalamic hemorrhage in 2023, with residual left hemiparesis and left hemisensory loss.  Etiology of the ICH is hypertensive.  He currently is on 3 antihypertension drugs now, with borderline " hypertension with his blood pressure today 142/80.  He is going to [facility] rehab, but continues to have moderate hemiparesis 3 months following the intracranial hemorrhage therefore it is likely there will be residual weakness as a result of this event.   He was started on apixaban for deep vein thrombosis, which increases his intracranial bleeding risk, however this should be only short-term therapy for less than 6 months.  I will send this note to his PCP for review.    FOLLOW-UP:   Return if symptoms worsen or fail to improve.    Total time spent for the day for this patient unrelated to procedure time is: 25 minutes. I spent 15 minutes in face to face time and I spent 5 minutes pre-charting and 5 minutes in post-visit documentation.      MAXWELL NicholeO.  [facility]

## 2024-01-12 ENCOUNTER — OFFICE VISIT (OUTPATIENT)
Dept: NEPHROLOGY | Facility: MEDICAL CENTER | Age: 64
End: 2024-01-12
Payer: COMMERCIAL

## 2024-01-12 VITALS
RESPIRATION RATE: 18 BRPM | HEART RATE: 69 BPM | WEIGHT: 175 LBS | TEMPERATURE: 98 F | OXYGEN SATURATION: 93 % | HEIGHT: 67 IN | DIASTOLIC BLOOD PRESSURE: 68 MMHG | SYSTOLIC BLOOD PRESSURE: 124 MMHG | BODY MASS INDEX: 27.47 KG/M2

## 2024-01-12 DIAGNOSIS — N18.4 STAGE 4 CHRONIC KIDNEY DISEASE (HCC): ICD-10-CM

## 2024-01-12 PROCEDURE — 99214 OFFICE O/P EST MOD 30 MIN: CPT | Performed by: INTERNAL MEDICINE

## 2024-01-12 PROCEDURE — 3078F DIAST BP <80 MM HG: CPT | Performed by: INTERNAL MEDICINE

## 2024-01-12 PROCEDURE — 3074F SYST BP LT 130 MM HG: CPT | Performed by: INTERNAL MEDICINE

## 2024-01-12 ASSESSMENT — FIBROSIS 4 INDEX: FIB4 SCORE: 1.08

## 2024-01-12 NOTE — PROGRESS NOTES
NEPHROLOGY HISTORY AND PHYSICAL CONSULT NOTE    Chief Complaint   Patient presents with    New Patient    Chronic Kidney Disease           HPI:  Jason Lima is a 63 y.o. male who presents today fore evaluation of ckd.    Patient is here to establish care for further evaluation of known CKD.  He had been seen by nephrologist in [facility].  Patient has never been on dialysis.  He resumed metformin following his stroke that took place in November 2023 in September.    Denies NSAID use.    Most recent eGFR in 27 liters per minute per 1.73 m²      PAST MEDICAL HISTORY   DM about 12 years ago. Never on insulin.  HTN,    CVA   CKD    SOCIAL HISTORY  Never smoker. 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   Walks with walker and one person assistance.      FAMILY HISTORY  Denies family history.     Outpatient Encounter Medications as of 1/12/2024   Medication Sig Dispense Refill    coenzyme Q-10 30 MG capsule Take 60 mg by mouth every day.      apixaban (ELIQUIS) 5mg Tab Take 1 Tablet by mouth 2 times a day for 180 days. 60 Tablet 5    metFORMIN (GLUCOPHAGE) 500 MG Tab Take 500 mg by mouth 2 times a day with meals.      traZODone (DESYREL) 150 MG Tab Take 0.5 Tablets by mouth at bedtime for 360 days. 45 Tablet 3    metoprolol SR (TOPROL XL) 25 MG TABLET SR 24 HR Take 1.5 Tablets by mouth every day for 360 days. 135 Tablet 3    amLODIPine (NORVASC) 10 MG Tab Take 1 Tablet by mouth every day for 360 days. 90 Tablet 3    sertraline (ZOLOFT) 50 MG Tab Take 1 Tablet by mouth every day for 360 days. 90 Tablet 3    baclofen (LIORESAL) 10 MG Tab Take 0.5 Tablets by mouth 3 times a day for 360 days. 135 Tablet 3    atorvastatin (LIPITOR) 40 MG Tab Take 1 Tablet by mouth every evening for 360 days. 90 Tablet 3    hydrALAZINE (APRESOLINE) 100 MG tablet Take 1 Tablet by mouth every 8 hours. 90 Tablet 2     No facility-administered encounter medications on file as of 1/12/2024.        Allergies   Allergen Reactions     "Penicillins Swelling       ROS    /68 (BP Location: Right arm, Patient Position: Sitting, BP Cuff Size: Adult)   Pulse 69   Temp 36.7 °C (98 °F) (Temporal)   Resp 18   Ht 1.702 m (5' 7\")   Wt 79.4 kg (175 lb)   SpO2 93%   BMI 27.41 kg/m²     Physical Exam  GEN: alert and oriented. In no acute distress.   HEENT: moist oropharyngeal mucous membranes  CV:RRR  PULM: clear to auscultation bilaterally  ABD: soft non tender non distended  EXT: warm well perfused, no lower extremity edema.    Labs reviewed.  Recent Labs     11/11/23  0220 11/12/23  0813 11/13/23  0525 11/14/23  0533 11/18/23  0626 11/21/23  0532 11/24/23  0523 11/30/23  0518 12/02/23  0621 12/06/23  0526 12/22/23  0526 12/26/23  0517 12/29/23  0506   ALBUMIN 4.49  4.6 4.3 3.9  --   --  3.7  --  3.6  --   --   --   --   --    HDL 31*  --   --   --   --   --   --   --   --   --   --   --   --    TRIGLYCERIDE 227*  --   --   --   --   --   --   --   --   --   --   --   --    SODIUM 134* 139 139   < > 139 139   < > 140 138   < > 141 139 141   POTASSIUM 4.8 4.1 4.4   < > 4.0 4.0   < > 3.9 3.7   < > 3.6 3.5* 4.0   CHLORIDE 96 103 103   < > 104 104   < > 104 104   < > 103 103 105   CO2 25 26 25   < > 26 24   < > 27 25   < > 26 26 25   BUN 81* 61* 51*   < > 31* 32*   < > 37* 35*   < > 37* 32* 31*   CREATININE 3.59* 2.84* 2.81*   < > 2.49* 2.82*   < > 3.06* 2.58*   < > 3.03* 2.69* 2.61*   PHOSPHORUS  --  3.3  --   --  3.3  --   --   --  2.9  --   --   --   --     < > = values in this interval not displayed.       Lab Results   Component Value Date/Time    WBC 5.1 12/29/2023 05:06 AM    RBC 3.85 (L) 12/29/2023 05:06 AM    HEMOGLOBIN 11.7 (L) 12/29/2023 05:06 AM    HEMATOCRIT 35.2 (L) 12/29/2023 05:06 AM    MCV 91.4 12/29/2023 05:06 AM    MCH 30.4 12/29/2023 05:06 AM    MCHC 33.2 12/29/2023 05:06 AM    MPV 10.4 12/29/2023 05:06 AM              URINALYSIS:  No results found for: \"COLORURINE\", \"CLARITY\", \"SPECGRAVITY\", \"PHURINE\", \"KETONES\", \"PROTEINURIN\", " "\"BILIRUBINUR\", \"UROBILU\", \"NITRITE\", \"LEUKESTERAS\", \"OCCULTBLOOD\"  [facility]  Lab Results   Component Value Date/Time    TOTPROTUR 15.0 11/11/2023 1350      Lab Results   Component Value Date/Time    CREATININEU 110.14 11/11/2023 1350       Imaging report(s) reviewed  No orders to display         Assessment:         CKD likely  due to longstanding diabetes   -Obtain urinalysis   -Obtain renal ultrasound .  -Routine management of hypertension  - Routine management of diabetes   - Dose all medications for patient level of renal function  - Avoid nephrotoxic agents including contrast and NSAIDs  - Encourage adequate hydration.  - Continue to monitor renal function.  Obtain BMP, urine studies including urine protein quantification.    2. DM - metformin     3. HTN - Monitor blood pressure home.    Return Im 1 month  Deb Aguayo MD  Nephrology  [facility]      "

## 2024-02-07 ENCOUNTER — HOSPITAL ENCOUNTER (OUTPATIENT)
Dept: LAB | Facility: MEDICAL CENTER | Age: 64
End: 2024-02-07
Attending: INTERNAL MEDICINE
Payer: COMMERCIAL

## 2024-02-07 DIAGNOSIS — N18.4 STAGE 4 CHRONIC KIDNEY DISEASE (HCC): ICD-10-CM

## 2024-02-07 LAB
ANION GAP SERPL CALC-SCNC: 13 MMOL/L (ref 7–16)
APPEARANCE UR: CLEAR
BACTERIA #/AREA URNS HPF: NEGATIVE /HPF
BILIRUB UR QL STRIP.AUTO: NEGATIVE
BUN SERPL-MCNC: 25 MG/DL (ref 8–22)
CALCIUM SERPL-MCNC: 9.9 MG/DL (ref 8.5–10.5)
CHLORIDE SERPL-SCNC: 103 MMOL/L (ref 96–112)
CO2 SERPL-SCNC: 25 MMOL/L (ref 20–33)
COLOR UR: YELLOW
CREAT SERPL-MCNC: 2.25 MG/DL (ref 0.5–1.4)
CREAT UR-MCNC: 64.01 MG/DL
EPI CELLS #/AREA URNS HPF: NEGATIVE /HPF
ERYTHROCYTE [DISTWIDTH] IN BLOOD BY AUTOMATED COUNT: 39.8 FL (ref 35.9–50)
GFR SERPLBLD CREATININE-BSD FMLA CKD-EPI: 32 ML/MIN/1.73 M 2
GLUCOSE SERPL-MCNC: 140 MG/DL (ref 65–99)
GLUCOSE UR STRIP.AUTO-MCNC: NEGATIVE MG/DL
HCT VFR BLD AUTO: 40.3 % (ref 42–52)
HGB BLD-MCNC: 13.4 G/DL (ref 14–18)
HYALINE CASTS #/AREA URNS LPF: ABNORMAL /LPF
KETONES UR STRIP.AUTO-MCNC: NEGATIVE MG/DL
LEUKOCYTE ESTERASE UR QL STRIP.AUTO: NEGATIVE
MCH RBC QN AUTO: 29.8 PG (ref 27–33)
MCHC RBC AUTO-ENTMCNC: 33.3 G/DL (ref 32.3–36.5)
MCV RBC AUTO: 89.6 FL (ref 81.4–97.8)
MICRO URNS: ABNORMAL
MICROALBUMIN UR-MCNC: 8.2 MG/DL
MICROALBUMIN/CREAT UR: 128 MG/G (ref 0–30)
NITRITE UR QL STRIP.AUTO: NEGATIVE
PH UR STRIP.AUTO: 6 [PH] (ref 5–8)
PLATELET # BLD AUTO: 214 K/UL (ref 164–446)
PMV BLD AUTO: 10.8 FL (ref 9–12.9)
POTASSIUM SERPL-SCNC: 4.3 MMOL/L (ref 3.6–5.5)
PROT UR QL STRIP: 30 MG/DL
RBC # BLD AUTO: 4.5 M/UL (ref 4.7–6.1)
RBC # URNS HPF: ABNORMAL /HPF
RBC UR QL AUTO: NEGATIVE
SODIUM SERPL-SCNC: 141 MMOL/L (ref 135–145)
SP GR UR STRIP.AUTO: 1.01
UROBILINOGEN UR STRIP.AUTO-MCNC: 0.2 MG/DL
WBC # BLD AUTO: 7.1 K/UL (ref 4.8–10.8)
WBC #/AREA URNS HPF: ABNORMAL /HPF

## 2024-02-07 PROCEDURE — 82570 ASSAY OF URINE CREATININE: CPT

## 2024-02-07 PROCEDURE — 83970 ASSAY OF PARATHORMONE: CPT

## 2024-02-07 PROCEDURE — 36415 COLL VENOUS BLD VENIPUNCTURE: CPT

## 2024-02-07 PROCEDURE — 81001 URINALYSIS AUTO W/SCOPE: CPT

## 2024-02-07 PROCEDURE — 82043 UR ALBUMIN QUANTITATIVE: CPT

## 2024-02-07 PROCEDURE — 80048 BASIC METABOLIC PNL TOTAL CA: CPT

## 2024-02-07 PROCEDURE — 85027 COMPLETE CBC AUTOMATED: CPT

## 2024-02-08 LAB — PTH-INTACT SERPL-MCNC: 40.2 PG/ML (ref 14–72)

## 2024-02-09 ENCOUNTER — HOSPITAL ENCOUNTER (OUTPATIENT)
Dept: RADIOLOGY | Facility: MEDICAL CENTER | Age: 64
End: 2024-02-09
Attending: INTERNAL MEDICINE
Payer: COMMERCIAL

## 2024-02-09 DIAGNOSIS — N18.4 STAGE 4 CHRONIC KIDNEY DISEASE (HCC): ICD-10-CM

## 2024-02-09 PROCEDURE — 76775 US EXAM ABDO BACK WALL LIM: CPT

## 2024-02-14 ENCOUNTER — OFFICE VISIT (OUTPATIENT)
Dept: NEPHROLOGY | Facility: MEDICAL CENTER | Age: 64
End: 2024-02-14
Payer: COMMERCIAL

## 2024-02-14 VITALS
OXYGEN SATURATION: 98 % | HEIGHT: 67 IN | DIASTOLIC BLOOD PRESSURE: 82 MMHG | WEIGHT: 165 LBS | SYSTOLIC BLOOD PRESSURE: 142 MMHG | HEART RATE: 75 BPM | BODY MASS INDEX: 25.9 KG/M2 | TEMPERATURE: 97.8 F

## 2024-02-14 DIAGNOSIS — N18.4 STAGE 4 CHRONIC KIDNEY DISEASE (HCC): ICD-10-CM

## 2024-02-14 PROCEDURE — 3079F DIAST BP 80-89 MM HG: CPT | Performed by: INTERNAL MEDICINE

## 2024-02-14 PROCEDURE — 99214 OFFICE O/P EST MOD 30 MIN: CPT | Performed by: INTERNAL MEDICINE

## 2024-02-14 PROCEDURE — 3077F SYST BP >= 140 MM HG: CPT | Performed by: INTERNAL MEDICINE

## 2024-02-14 ASSESSMENT — FIBROSIS 4 INDEX: FIB4 SCORE: 1

## 2024-02-14 NOTE — PROGRESS NOTES
"NEPHROLOGY HISTORY AND PHYSICAL CONSULT NOTE    Chief Complaint   Patient presents with    Chronic Kidney Disease           HPI:  Jason Lima is a 63 y.o. male who presents today for further evaluation of chronic kidney disease.    Most recent creatinine improved from 2.6-3.0 in December 2023 to creatinine of 2.25 mg/dL.    Remains on amlodipine 10 mg daily, hydralazine 100 mg 3 times daily as well as metformin 500 mg twice daily.    Renal ultrasound performed on 2/9/2024 normal.    No chest pain, shortness of breath.  Denies nausea vomiting and loose stools.  Outpatient Encounter Medications as of 2/14/2024   Medication Sig Dispense Refill    coenzyme Q-10 30 MG capsule Take 60 mg by mouth every day.      apixaban (ELIQUIS) 5mg Tab Take 1 Tablet by mouth 2 times a day for 180 days. 60 Tablet 5    metFORMIN (GLUCOPHAGE) 500 MG Tab Take 500 mg by mouth 2 times a day with meals.      traZODone (DESYREL) 150 MG Tab Take 0.5 Tablets by mouth at bedtime for 360 days. 45 Tablet 3    metoprolol SR (TOPROL XL) 25 MG TABLET SR 24 HR Take 1.5 Tablets by mouth every day for 360 days. 135 Tablet 3    amLODIPine (NORVASC) 10 MG Tab Take 1 Tablet by mouth every day for 360 days. 90 Tablet 3    sertraline (ZOLOFT) 50 MG Tab Take 1 Tablet by mouth every day for 360 days. 90 Tablet 3    baclofen (LIORESAL) 10 MG Tab Take 0.5 Tablets by mouth 3 times a day for 360 days. 135 Tablet 3    atorvastatin (LIPITOR) 40 MG Tab Take 1 Tablet by mouth every evening for 360 days. 90 Tablet 3    hydrALAZINE (APRESOLINE) 100 MG tablet Take 1 Tablet by mouth every 8 hours. 90 Tablet 2     No facility-administered encounter medications on file as of 2/14/2024.        Allergies   Allergen Reactions    Penicillins Swelling       ROS    BP (!) 142/82 (BP Location: Right arm, Patient Position: Sitting, BP Cuff Size: Adult)   Pulse 75   Temp 36.6 °C (97.8 °F) (Temporal)   Ht 1.702 m (5' 7\")   Wt 74.8 kg (165 lb)   SpO2 98%   BMI 25.84 kg/m² "     Physical Exam  GEN: alert and oriented. In no acute distress.   HEENT: moist oropharyngeal mucous membranes  CV:RRR  PULM: clear to auscultation bilaterally  ABD: soft non tender non distended  EXT: warm well perfused, no lower extremity edema. Brace in place on left.     Labs reviewed.  Recent Labs     11/11/23  0220 11/12/23  0813 11/13/23  0525 11/14/23  0533 11/18/23  0626 11/21/23  0532 11/24/23  0523 11/30/23  0518 12/02/23  0621 12/06/23  0526 12/26/23  0517 12/29/23  0506 02/07/24  1036   ALBUMIN 4.49  4.6 4.3 3.9  --   --  3.7  --  3.6  --   --   --   --   --    HDL 31*  --   --   --   --   --   --   --   --   --   --   --   --    TRIGLYCERIDE 227*  --   --   --   --   --   --   --   --   --   --   --   --    SODIUM 134* 139 139   < > 139 139   < > 140 138   < > 139 141 141   POTASSIUM 4.8 4.1 4.4   < > 4.0 4.0   < > 3.9 3.7   < > 3.5* 4.0 4.3   CHLORIDE 96 103 103   < > 104 104   < > 104 104   < > 103 105 103   CO2 25 26 25   < > 26 24   < > 27 25   < > 26 25 25   BUN 81* 61* 51*   < > 31* 32*   < > 37* 35*   < > 32* 31* 25*   CREATININE 3.59* 2.84* 2.81*   < > 2.49* 2.82*   < > 3.06* 2.58*   < > 2.69* 2.61* 2.25*   PHOSPHORUS  --  3.3  --   --  3.3  --   --   --  2.9  --   --   --   --     < > = values in this interval not displayed.       Lab Results   Component Value Date/Time    WBC 7.1 02/07/2024 10:36 AM    RBC 4.50 (L) 02/07/2024 10:36 AM    HEMOGLOBIN 13.4 (L) 02/07/2024 10:36 AM    HEMATOCRIT 40.3 (L) 02/07/2024 10:36 AM    MCV 89.6 02/07/2024 10:36 AM    MCH 29.8 02/07/2024 10:36 AM    MCHC 33.3 02/07/2024 10:36 AM    MPV 10.8 02/07/2024 10:36 AM              URINALYSIS:  Lab Results   Component Value Date/Time    COLORURINE Yellow 02/07/2024 1036    CLARITY Clear 02/07/2024 1036    SPECGRAVITY 1.014 02/07/2024 1036    PHURINE 6.0 02/07/2024 1036    KETONES Negative 02/07/2024 1036    PROTEINURIN 30 (A) 02/07/2024 1036    BILIRUBINUR Negative 02/07/2024 1036    UROBILU 0.2 02/07/2024 1036     NITRITE Negative 02/07/2024 1036    LEUKESTERAS Negative 02/07/2024 1036    OCCULTBLOOD Negative 02/07/2024 1036     [facility]  Lab Results   Component Value Date/Time    TOTPROTUR 15.0 11/11/2023 1350      Lab Results   Component Value Date/Time    CREATININEU 110.14 11/11/2023 1350       Imaging report(s) reviewed  No orders to display         Assessment:  CKD IV likely  due to longstanding diabetes   -UA mild proteinuria.  - Microalbumin/Cr 128 mg/g  -Normal renal ultrasound 02/09/24  -Routine management of hypertension  - Routine management of diabetes   - Dose all medications for patient level of renal function  - Avoid nephrotoxic agents including contrast and NSAIDs  - Encourage adequate hydration.  - Continue to monitor renal function.  Obtain BMP, urine studies including urine protein quantification.     2. DM - metformin. Diabetic diet.      3. HTN - Monitor blood pressure at home. Bring in recordings to next visit.       RTC in 4 months.      Deb Aguayo MD  Nephrology  [facility]       Deb Aguayo MD  Nephrology  [facility]

## 2024-02-16 ENCOUNTER — PATIENT MESSAGE (OUTPATIENT)
Dept: HEALTH INFORMATION MANAGEMENT | Facility: OTHER | Age: 64
End: 2024-02-16

## 2024-03-26 ENCOUNTER — PATIENT MESSAGE (OUTPATIENT)
Dept: MEDICAL GROUP | Facility: MEDICAL CENTER | Age: 64
End: 2024-03-26
Payer: COMMERCIAL

## 2024-03-26 DIAGNOSIS — I10 HYPERTENSION, UNSPECIFIED TYPE: ICD-10-CM

## 2024-03-26 DIAGNOSIS — I61.9 HEMORRHAGIC STROKE (HCC): ICD-10-CM

## 2024-03-26 RX ORDER — HYDRALAZINE HYDROCHLORIDE 100 MG/1
100 TABLET, FILM COATED ORAL EVERY 8 HOURS
Qty: 90 TABLET | Refills: 2 | Status: SHIPPED | OUTPATIENT
Start: 2024-03-26

## 2024-03-26 NOTE — PATIENT COMMUNICATION
Received request via: Patient    Was the patient seen in the last year in this department? Yes    Does the patient have an active prescription (recently filled or refills available) for medication(s) requested? No    Pharmacy Name: [facility]    Does the patient have [facility] Plus and need 100 day supply (blood pressure, diabetes and cholesterol meds only)? Patient does not have SCP

## 2024-04-05 ENCOUNTER — HOSPITAL ENCOUNTER (OUTPATIENT)
Dept: LAB | Facility: MEDICAL CENTER | Age: 64
End: 2024-04-05
Attending: PHYSICIAN ASSISTANT
Payer: COMMERCIAL

## 2024-04-05 DIAGNOSIS — Z12.5 SCREENING PSA (PROSTATE SPECIFIC ANTIGEN): ICD-10-CM

## 2024-04-05 DIAGNOSIS — R73.03 PREDIABETES: ICD-10-CM

## 2024-04-05 DIAGNOSIS — E78.00 PURE HYPERCHOLESTEROLEMIA: ICD-10-CM

## 2024-04-05 DIAGNOSIS — I10 HYPERTENSION, UNSPECIFIED TYPE: ICD-10-CM

## 2024-04-05 LAB
ALBUMIN SERPL BCP-MCNC: 4.8 G/DL (ref 3.2–4.9)
ALBUMIN/GLOB SERPL: 1.7 G/DL
ALP SERPL-CCNC: 97 U/L (ref 30–99)
ALT SERPL-CCNC: 17 U/L (ref 2–50)
ANION GAP SERPL CALC-SCNC: 16 MMOL/L (ref 7–16)
AST SERPL-CCNC: 16 U/L (ref 12–45)
BASOPHILS # BLD AUTO: 0.4 % (ref 0–1.8)
BASOPHILS # BLD: 0.03 K/UL (ref 0–0.12)
BILIRUB SERPL-MCNC: 0.4 MG/DL (ref 0.1–1.5)
BUN SERPL-MCNC: 28 MG/DL (ref 8–22)
CALCIUM ALBUM COR SERPL-MCNC: 9.1 MG/DL (ref 8.5–10.5)
CALCIUM SERPL-MCNC: 9.7 MG/DL (ref 8.5–10.5)
CHLORIDE SERPL-SCNC: 104 MMOL/L (ref 96–112)
CHOLEST SERPL-MCNC: 96 MG/DL (ref 100–199)
CO2 SERPL-SCNC: 23 MMOL/L (ref 20–33)
CREAT SERPL-MCNC: 2.61 MG/DL (ref 0.5–1.4)
CREAT UR-MCNC: 83.28 MG/DL
EOSINOPHIL # BLD AUTO: 0.1 K/UL (ref 0–0.51)
EOSINOPHIL NFR BLD: 1.3 % (ref 0–6.9)
ERYTHROCYTE [DISTWIDTH] IN BLOOD BY AUTOMATED COUNT: 43 FL (ref 35.9–50)
EST. AVERAGE GLUCOSE BLD GHB EST-MCNC: 128 MG/DL
FASTING STATUS PATIENT QL REPORTED: NORMAL
GFR SERPLBLD CREATININE-BSD FMLA CKD-EPI: 27 ML/MIN/1.73 M 2
GLOBULIN SER CALC-MCNC: 2.8 G/DL (ref 1.9–3.5)
GLUCOSE SERPL-MCNC: 114 MG/DL (ref 65–99)
HBA1C MFR BLD: 6.1 % (ref 4–5.6)
HCT VFR BLD AUTO: 43 % (ref 42–52)
HDLC SERPL-MCNC: 42 MG/DL
HGB BLD-MCNC: 13.7 G/DL (ref 14–18)
IMM GRANULOCYTES # BLD AUTO: 0.01 K/UL (ref 0–0.11)
IMM GRANULOCYTES NFR BLD AUTO: 0.1 % (ref 0–0.9)
LDLC SERPL CALC-MCNC: 35 MG/DL
LYMPHOCYTES # BLD AUTO: 1.5 K/UL (ref 1–4.8)
LYMPHOCYTES NFR BLD: 19 % (ref 22–41)
MCH RBC QN AUTO: 29 PG (ref 27–33)
MCHC RBC AUTO-ENTMCNC: 31.9 G/DL (ref 32.3–36.5)
MCV RBC AUTO: 90.9 FL (ref 81.4–97.8)
MICROALBUMIN UR-MCNC: 9.4 MG/DL
MICROALBUMIN/CREAT UR: 113 MG/G (ref 0–30)
MONOCYTES # BLD AUTO: 0.46 K/UL (ref 0–0.85)
MONOCYTES NFR BLD AUTO: 5.8 % (ref 0–13.4)
NEUTROPHILS # BLD AUTO: 5.79 K/UL (ref 1.82–7.42)
NEUTROPHILS NFR BLD: 73.4 % (ref 44–72)
NRBC # BLD AUTO: 0 K/UL
NRBC BLD-RTO: 0 /100 WBC (ref 0–0.2)
PLATELET # BLD AUTO: 241 K/UL (ref 164–446)
PMV BLD AUTO: 10.4 FL (ref 9–12.9)
POTASSIUM SERPL-SCNC: 4.1 MMOL/L (ref 3.6–5.5)
PROT SERPL-MCNC: 7.6 G/DL (ref 6–8.2)
PSA SERPL-MCNC: 1.92 NG/ML (ref 0–4)
RBC # BLD AUTO: 4.73 M/UL (ref 4.7–6.1)
SODIUM SERPL-SCNC: 143 MMOL/L (ref 135–145)
TRIGL SERPL-MCNC: 96 MG/DL (ref 0–149)
WBC # BLD AUTO: 7.9 K/UL (ref 4.8–10.8)

## 2024-04-05 PROCEDURE — 84153 ASSAY OF PSA TOTAL: CPT

## 2024-04-05 PROCEDURE — 82043 UR ALBUMIN QUANTITATIVE: CPT

## 2024-04-05 PROCEDURE — 83036 HEMOGLOBIN GLYCOSYLATED A1C: CPT

## 2024-04-05 PROCEDURE — 36415 COLL VENOUS BLD VENIPUNCTURE: CPT

## 2024-04-05 PROCEDURE — 80061 LIPID PANEL: CPT

## 2024-04-05 PROCEDURE — 85025 COMPLETE CBC W/AUTO DIFF WBC: CPT

## 2024-04-05 PROCEDURE — 80053 COMPREHEN METABOLIC PANEL: CPT

## 2024-04-05 PROCEDURE — 82570 ASSAY OF URINE CREATININE: CPT

## 2024-04-12 ENCOUNTER — OFFICE VISIT (OUTPATIENT)
Dept: MEDICAL GROUP | Facility: MEDICAL CENTER | Age: 64
End: 2024-04-12
Payer: COMMERCIAL

## 2024-04-12 VITALS
SYSTOLIC BLOOD PRESSURE: 114 MMHG | HEIGHT: 67 IN | TEMPERATURE: 97.1 F | HEART RATE: 74 BPM | WEIGHT: 168.8 LBS | BODY MASS INDEX: 26.49 KG/M2 | OXYGEN SATURATION: 93 % | RESPIRATION RATE: 16 BRPM | DIASTOLIC BLOOD PRESSURE: 66 MMHG

## 2024-04-12 DIAGNOSIS — I61.0 THALAMIC HEMORRHAGE (HCC): ICD-10-CM

## 2024-04-12 DIAGNOSIS — E11.69 TYPE 2 DIABETES MELLITUS WITH OTHER SPECIFIED COMPLICATION, WITHOUT LONG-TERM CURRENT USE OF INSULIN (HCC): ICD-10-CM

## 2024-04-12 DIAGNOSIS — R53.1 LEFT-SIDED WEAKNESS: ICD-10-CM

## 2024-04-12 DIAGNOSIS — N52.9 VASCULOGENIC ERECTILE DYSFUNCTION, UNSPECIFIED VASCULOGENIC ERECTILE DYSFUNCTION TYPE: ICD-10-CM

## 2024-04-12 DIAGNOSIS — I82.452 ACUTE DEEP VEIN THROMBOSIS (DVT) OF LEFT PERONEAL VEIN (HCC): ICD-10-CM

## 2024-04-12 DIAGNOSIS — R19.00 ABDOMINAL WALL SWELLING: ICD-10-CM

## 2024-04-12 PROCEDURE — 99214 OFFICE O/P EST MOD 30 MIN: CPT | Performed by: PHYSICIAN ASSISTANT

## 2024-04-12 PROCEDURE — 3074F SYST BP LT 130 MM HG: CPT | Performed by: PHYSICIAN ASSISTANT

## 2024-04-12 PROCEDURE — 3078F DIAST BP <80 MM HG: CPT | Performed by: PHYSICIAN ASSISTANT

## 2024-04-12 RX ORDER — SILDENAFIL 100 MG/1
100 TABLET, FILM COATED ORAL
Qty: 30 TABLET | Refills: 3 | Status: SHIPPED | OUTPATIENT
Start: 2024-04-12 | End: 2024-05-12

## 2024-04-12 ASSESSMENT — FIBROSIS 4 INDEX: FIB4 SCORE: 1

## 2024-04-12 NOTE — ASSESSMENT & PLAN NOTE
He has been on Eliquis for at least 6 months.  History of DVTs.  Would like to get off of the medication.

## 2024-04-12 NOTE — ASSESSMENT & PLAN NOTE
This is a pleasant 63-year-old male accompanied by his wife, Vielka.  He is now using a 4-point cane.  Follows with PT at [facility].  Also follows with OT at [facility].  Wife would like updated referrals.  He is coming along slowly after his stroke.  Blood pressure today is well-controlled.  Last levels of his cholesterol were excellent.  He is taking metformin 500 mg twice a day.  His A1c was at 6.1.

## 2024-04-12 NOTE — ASSESSMENT & PLAN NOTE
History of ED.  Would like to restart Viagra.  Was on 100 mg.  Concerned about possible effects given his history of stroke.

## 2024-04-12 NOTE — PROGRESS NOTES
Subjective:   Jason Lima is a 63 y.o. male here today for left-sided weakness chronically status post stroke and abdominal wall swelling.    Left-sided weakness  This is a pleasant 63-year-old male accompanied by his wife, Vielka.  He is now using a 4-point cane.  Follows with PT at [facility].  Also follows with OT at [facility].  Wife would like updated referrals.  He is coming along slowly after his stroke.  Blood pressure today is well-controlled.  Last levels of his cholesterol were excellent.  He is taking metformin 500 mg twice a day.  His A1c was at 6.1.    Abdominal wall swelling  Since he has lost weight there is noticeable swelling of the left side of his abdomen.  No pain.    Vasculogenic erectile dysfunction  History of ED.  Would like to restart Viagra.  Was on 100 mg.  Concerned about possible effects given his history of stroke.    DVT (deep venous thrombosis) (HCC)  He has been on Eliquis for at least 6 months.  History of DVTs.  Would like to get off of the medication.       Current medicines (including changes today)  Current Outpatient Medications   Medication Sig Dispense Refill    metFORMIN (GLUCOPHAGE) 500 MG Tab Take 1 Tablet by mouth 2 times a day with meals for 360 days. 180 Tablet 3    sildenafil citrate (VIAGRA) 100 MG tablet Take 1 Tablet by mouth 1 time a day as needed for Erectile Dysfunction for up to 30 days. 1/2 hour prior to activity. 30 Tablet 3    hydrALAZINE (APRESOLINE) 100 MG tablet Take 1 Tablet by mouth every 8 hours. 90 Tablet 2    coenzyme Q-10 30 MG capsule Take 60 mg by mouth every day.      apixaban (ELIQUIS) 5mg Tab Take 1 Tablet by mouth 2 times a day for 180 days. 60 Tablet 5    traZODone (DESYREL) 150 MG Tab Take 0.5 Tablets by mouth at bedtime for 360 days. 45 Tablet 3    metoprolol SR (TOPROL XL) 25 MG TABLET SR 24 HR Take 1.5 Tablets by mouth every day for 360 days. 135 Tablet 3    amLODIPine (NORVASC) 10 MG Tab Take 1 Tablet by mouth every  "day for 360 days. 90 Tablet 3    sertraline (ZOLOFT) 50 MG Tab Take 1 Tablet by mouth every day for 360 days. 90 Tablet 3    baclofen (LIORESAL) 10 MG Tab Take 0.5 Tablets by mouth 3 times a day for 360 days. 135 Tablet 3    atorvastatin (LIPITOR) 40 MG Tab Take 1 Tablet by mouth every evening for 360 days. 90 Tablet 3     No current facility-administered medications for this visit.     He  has a past medical history of Diabetes (HCC), High cholesterol, Hypertension, and Stroke (HCC).    Social History and Family History were reviewed and updated.    ROS   No chest pain, no shortness of breath, no abdominal pain and all other systems were reviewed and are negative.       Objective:     /66 (BP Location: Right arm, Patient Position: Sitting, BP Cuff Size: Adult)   Pulse 74   Temp 36.2 °C (97.1 °F) (Temporal)   Resp 16   Ht 1.702 m (5' 7\")   Wt 76.6 kg (168 lb 12.8 oz)   SpO2 93%  Body mass index is 26.44 kg/m².   Physical Exam:  Constitutional: Alert, no distress.  Skin: Warm, dry, good turgor, no rashes in visible areas.  Eye: Equal, round and reactive, conjunctiva clear, lids normal.  ENMT: Lips without lesions, good dentition, oropharynx clear.  Neck: Trachea midline, no masses.   Abdomen: Soft, non-tender, no masses.  Psych: Alert and oriented x3, normal affect and mood.        Assessment and Plan:   The following treatment plan was discussed    1. Left-sided weakness  Chronic condition status post stroke.  Stable.  Improvement noted.  Referred to PT and OT at [facility].  - Referral to Physical Therapy  - Referral to Occupational Therapy    2. Thalamic hemorrhage (HCC)  Status post stroke.  Stable.  Referred to both PT and OT today.  Continue BP and cholesterol medication.  Continue diabetic medication.  May stop Eliquis today.  Provoked DVT history.  - Referral to Physical Therapy  - Referral to Occupational Therapy    3. Abdominal wall swelling  Acute, new onset condition.  No obvious masses " noted.  Large region on left side appears more swollen than the right.  Ultrasound ordered.  Contact imaging.  - US-ABDOMEN COMPLETE SURVEY; Future    4. Type 2 diabetes mellitus with other specified complication, without long-term current use of insulin (HCC)  Chronic condition.  Stable.  A1c at 6.1.  Continue metformin which I renewed.  - metFORMIN (GLUCOPHAGE) 500 MG Tab; Take 1 Tablet by mouth 2 times a day with meals for 360 days.  Dispense: 180 Tablet; Refill: 3    5. Acute deep vein thrombosis (DVT) of left peroneal vein (HCC)  Status post DVT.  Stable.  Asymptomatic.  Discontinued Eliquis.    6. Vasculogenic erectile dysfunction, unspecified vasculogenic erectile dysfunction type  New condition noted in chart.  Chronic.  Prescribed Viagra as directed.  Discussed side effects.  Advised to continue to take his medications as directed to control his blood pressure and risk for stroke.  Do not see Viagra having any effects on him.  But may start with 50 mg first given possible lowering of BP.  - sildenafil citrate (VIAGRA) 100 MG tablet; Take 1 Tablet by mouth 1 time a day as needed for Erectile Dysfunction for up to 30 days. 1/2 hour prior to activity.  Dispense: 30 Tablet; Refill: 3         Followup: Return if symptoms worsen or fail to improve.    Please note that this dictation was created using voice recognition software. I have made every reasonable attempt to correct obvious errors, but I expect that there are errors of grammar and possibly content that I did not discover before finalizing the note.

## 2024-04-12 NOTE — LETTER
[facility]  LÓPEZ Gibbs-C.  [address]  [place] [address]  Fax: [phone]   Authorization for Release/Disclosure of   Protected Health Information   Name: MAXIME MCKEON TJOA : 1960 SSN: [id number]   Address: [address] [place] [address]   [place] [address] Phone:    [phone] (home)    I authorize the entity listed below to release/disclose the PHI below to:   [employer] [facility]/LÓPEZ Gibbs-DANIELA. and Jose Briones P.A.-C.   Provider or Entity Name:  Dr. Garcia   Address   City, [facility], [place], [place] Phone:      Fax:     Reason for request: continuity of care   Information to be released: Would like colonoscopy information please.   [ X] LAST COLONOSCOPY,  including any PATH REPORT and follow-up  [  ] LAST FIT/COLOGUARD RESULT [  ] LAST DEXA  [  ] LAST MAMMOGRAM  [  ] LAST PAP  [  ] LAST LABS [  ] RETINA EXAM REPORT  [  ] IMMUNIZATION RECORDS  [  ] Release all info      [  ] Check here and initial the line next to each item to release ALL health information INCLUDING  _____ Care and treatment for drug and / or alcohol abuse  _____ HIV testing, infection status, or AIDS  _____ Genetic Testing    DATES OF SERVICE OR TIME PERIOD TO BE DISCLOSED: _____________  I understand and acknowledge that:  * This Authorization may be revoked at any time by you in writing, except if your health information has already been used or disclosed.  * Your health information that will be used or disclosed as a result of you signing this authorization could be re-disclosed by the recipient. If this occurs, your re-disclosed health information may no longer be protected by State or Federal laws.  * You may refuse to sign this Authorization. Your refusal will not affect your ability to obtain treatment.  * This Authorization becomes effective upon signing and will  on (date) __________.      If no date is indicated, this Authorization will  one (1) year from the signature date.    Name:  Jason Lima  Signature: Date:   4/12/2024     PLEASE FAX REQUESTED RECORDS BACK TO: [phone]

## 2024-07-16 DIAGNOSIS — I10 HYPERTENSION, UNSPECIFIED TYPE: ICD-10-CM

## 2024-07-16 DIAGNOSIS — I61.9 HEMORRHAGIC STROKE (HCC): ICD-10-CM

## 2024-07-17 RX ORDER — HYDRALAZINE HYDROCHLORIDE 100 MG/1
100 TABLET, FILM COATED ORAL EVERY 8 HOURS
Qty: 90 TABLET | Refills: 0 | Status: SHIPPED | OUTPATIENT
Start: 2024-07-17

## 2024-07-17 RX ORDER — HYDRALAZINE HYDROCHLORIDE 100 MG/1
100 TABLET, FILM COATED ORAL EVERY 8 HOURS
Qty: 90 TABLET | Refills: 2 | Status: SHIPPED | OUTPATIENT
Start: 2024-07-17

## 2024-08-01 ENCOUNTER — HOSPITAL ENCOUNTER (OUTPATIENT)
Dept: LAB | Facility: MEDICAL CENTER | Age: 64
End: 2024-08-01
Attending: INTERNAL MEDICINE
Payer: COMMERCIAL

## 2024-08-01 DIAGNOSIS — N18.4 STAGE 4 CHRONIC KIDNEY DISEASE (HCC): ICD-10-CM

## 2024-08-01 LAB
ANION GAP SERPL CALC-SCNC: 13 MMOL/L (ref 7–16)
APPEARANCE UR: CLEAR
BILIRUB UR QL STRIP.AUTO: NEGATIVE
BUN SERPL-MCNC: 29 MG/DL (ref 8–22)
CALCIUM SERPL-MCNC: 9.6 MG/DL (ref 8.5–10.5)
CHLORIDE SERPL-SCNC: 104 MMOL/L (ref 96–112)
CO2 SERPL-SCNC: 23 MMOL/L (ref 20–33)
COLOR UR: YELLOW
CREAT SERPL-MCNC: 2.09 MG/DL (ref 0.5–1.4)
CREAT UR-MCNC: 79.42 MG/DL
ERYTHROCYTE [DISTWIDTH] IN BLOOD BY AUTOMATED COUNT: 41.9 FL (ref 35.9–50)
GFR SERPLBLD CREATININE-BSD FMLA CKD-EPI: 35 ML/MIN/1.73 M 2
GLUCOSE SERPL-MCNC: 115 MG/DL (ref 65–99)
GLUCOSE UR STRIP.AUTO-MCNC: NEGATIVE MG/DL
HCT VFR BLD AUTO: 42 % (ref 42–52)
HGB BLD-MCNC: 13.6 G/DL (ref 14–18)
KETONES UR STRIP.AUTO-MCNC: NEGATIVE MG/DL
LEUKOCYTE ESTERASE UR QL STRIP.AUTO: NEGATIVE
MCH RBC QN AUTO: 29.2 PG (ref 27–33)
MCHC RBC AUTO-ENTMCNC: 32.4 G/DL (ref 32.3–36.5)
MCV RBC AUTO: 90.1 FL (ref 81.4–97.8)
MICRO URNS: NORMAL
NITRITE UR QL STRIP.AUTO: NEGATIVE
PH UR STRIP.AUTO: 6.5 [PH] (ref 5–8)
PLATELET # BLD AUTO: 221 K/UL (ref 164–446)
PMV BLD AUTO: 10.3 FL (ref 9–12.9)
POTASSIUM SERPL-SCNC: 4.4 MMOL/L (ref 3.6–5.5)
PROT UR QL STRIP: NEGATIVE MG/DL
PROT UR-MCNC: 14 MG/DL (ref 0–15)
PROT/CREAT UR: 176 MG/G (ref 15–68)
PTH-INTACT SERPL-MCNC: 44.7 PG/ML (ref 14–72)
RBC # BLD AUTO: 4.66 M/UL (ref 4.7–6.1)
RBC UR QL AUTO: NEGATIVE
SODIUM SERPL-SCNC: 140 MMOL/L (ref 135–145)
SP GR UR STRIP.AUTO: 1.01
UROBILINOGEN UR STRIP.AUTO-MCNC: 0.2 MG/DL
WBC # BLD AUTO: 7.8 K/UL (ref 4.8–10.8)

## 2024-08-01 PROCEDURE — 81003 URINALYSIS AUTO W/O SCOPE: CPT

## 2024-08-01 PROCEDURE — 84156 ASSAY OF PROTEIN URINE: CPT

## 2024-08-01 PROCEDURE — 82570 ASSAY OF URINE CREATININE: CPT

## 2024-08-01 PROCEDURE — 85027 COMPLETE CBC AUTOMATED: CPT

## 2024-08-01 PROCEDURE — 80048 BASIC METABOLIC PNL TOTAL CA: CPT

## 2024-08-01 PROCEDURE — 83970 ASSAY OF PARATHORMONE: CPT

## 2024-08-01 PROCEDURE — 36415 COLL VENOUS BLD VENIPUNCTURE: CPT

## 2024-08-22 ENCOUNTER — APPOINTMENT (OUTPATIENT)
Dept: NEPHROLOGY | Facility: MEDICAL CENTER | Age: 64
End: 2024-08-22
Payer: COMMERCIAL

## 2024-08-22 VITALS
WEIGHT: 160 LBS | OXYGEN SATURATION: 94 % | DIASTOLIC BLOOD PRESSURE: 70 MMHG | HEART RATE: 64 BPM | SYSTOLIC BLOOD PRESSURE: 118 MMHG | HEIGHT: 67 IN | TEMPERATURE: 98 F | BODY MASS INDEX: 25.11 KG/M2

## 2024-08-22 DIAGNOSIS — D64.9 ANEMIA, UNSPECIFIED TYPE: ICD-10-CM

## 2024-08-22 DIAGNOSIS — N18.32 STAGE 3B CHRONIC KIDNEY DISEASE: ICD-10-CM

## 2024-08-22 DIAGNOSIS — E11.21 TYPE 2 DIABETES MELLITUS WITH DIABETIC NEPHROPATHY, WITHOUT LONG-TERM CURRENT USE OF INSULIN (HCC): Chronic | ICD-10-CM

## 2024-08-22 DIAGNOSIS — I10 PRIMARY HYPERTENSION: ICD-10-CM

## 2024-08-22 DIAGNOSIS — Z87.440 HISTORY OF UTI: ICD-10-CM

## 2024-08-22 DIAGNOSIS — E55.9 VITAMIN D DEFICIENCY: ICD-10-CM

## 2024-08-22 PROBLEM — R73.03 PREDIABETES: Status: RESOLVED | Noted: 2024-01-09 | Resolved: 2024-08-22

## 2024-08-22 PROBLEM — R78.81 GRAM-NEGATIVE BACTEREMIA: Status: RESOLVED | Noted: 2017-01-06 | Resolved: 2024-08-22

## 2024-08-22 PROCEDURE — 99214 OFFICE O/P EST MOD 30 MIN: CPT | Performed by: INTERNAL MEDICINE

## 2024-08-22 PROCEDURE — 3078F DIAST BP <80 MM HG: CPT | Performed by: INTERNAL MEDICINE

## 2024-08-22 PROCEDURE — G2211 COMPLEX E/M VISIT ADD ON: HCPCS | Performed by: INTERNAL MEDICINE

## 2024-08-22 PROCEDURE — 3074F SYST BP LT 130 MM HG: CPT | Performed by: INTERNAL MEDICINE

## 2024-08-22 RX ORDER — LISINOPRIL 40 MG/1
40 TABLET ORAL DAILY
Qty: 90 TABLET | Refills: 3 | Status: SHIPPED | OUTPATIENT
Start: 2024-08-22

## 2024-08-22 RX ORDER — DAPAGLIFLOZIN 10 MG/1
10 TABLET, FILM COATED ORAL DAILY
Qty: 30 TABLET | Refills: 11 | Status: SHIPPED | OUTPATIENT
Start: 2024-08-22

## 2024-08-22 ASSESSMENT — FIBROSIS 4 INDEX: FIB4 SCORE: 1.09

## 2024-08-22 ASSESSMENT — ENCOUNTER SYMPTOMS
SHORTNESS OF BREATH: 0
FEVER: 0
ABDOMINAL PAIN: 0

## 2024-08-22 NOTE — PROGRESS NOTES
Chief Complaint   Patient presents with    Chronic Kidney Disease       CC: follow up CKD    HPI:  Jason Lima is a 63 y.o. male with history of type 2 diabetes, hypertension, hemorrhagic stroke in 10/2023, CKD 3-4 who presents today to re-establish nephrology care.  He was previously seen by Dr. Aguayo.    Re: DM2, diagnosed around 2004. Patient has had microalbuminuria since 2/2024. Patient has seen an eye doctor 1/2024 and does not have retinopathy. He diabetes was usually controlled except when he was stressed. He takes metformin alone for diabetes. Willing to try SGLT2-i    Re: HTN, diagnosed around 2009. BP control over the years has been high in the past, but better controlled more recently. He is on amlodipine, hydralazine, and metoprolol. Willing to try lisinopril.     Re: CKD. He was born full term. Denies chronic use of NSAIDs in the past. Denies LUTS, has 1x nocturia.       Past Medical History:   Diagnosis Date    Diabetes (HCC)     High cholesterol     Hypertension     Stroke (HCC)     3 weeks ago in [place] (as of 11/11/2023)       Past Surgical History:   Procedure Laterality Date    CHELO BY LAPAROSCOPY N/A 1/6/2017    Procedure: CHELO BY LAPAROSCOPY;  Surgeon: Garrett Miller M.D.;  Location: SURGERY [facility];  Service:     ABDOMINAL ABSCESS DRAINAGE N/A 1/6/2017    Procedure: ABDOMINAL ABSCESS DRAINAGE;  Surgeon: Garrett Miller M.D.;  Location: SURGERY [facility];  Service:         Outpatient Encounter Medications as of 8/22/2024   Medication Sig Dispense Refill    dapagliflozin propanediol (FARXIGA) 10 MG Tab Take 1 Tablet by mouth every day. 30 Tablet 11    lisinopril (PRINIVIL) 40 MG tablet Take 1 Tablet by mouth every day. 90 Tablet 3    coenzyme Q-10 30 MG capsule Take 60 mg by mouth every day.      traZODone (DESYREL) 150 MG Tab Take 0.5 Tablets by mouth at bedtime for 360 days. 45 Tablet 3    metoprolol SR (TOPROL XL) 25 MG TABLET SR 24 HR Take 1.5 Tablets by mouth  every day for 360 days. 135 Tablet 3    amLODIPine (NORVASC) 10 MG Tab Take 1 Tablet by mouth every day for 360 days. 90 Tablet 3    sertraline (ZOLOFT) 50 MG Tab Take 1 Tablet by mouth every day for 360 days. 90 Tablet 3    baclofen (LIORESAL) 10 MG Tab Take 0.5 Tablets by mouth 3 times a day for 360 days. (Patient taking differently: Take 5 mg by mouth 2 times a day.) 135 Tablet 3    atorvastatin (LIPITOR) 40 MG Tab Take 1 Tablet by mouth every evening for 360 days. 90 Tablet 3    [DISCONTINUED] hydrALAZINE (APRESOLINE) 100 MG tablet TAKE 1 TABLET BY MOUTH EVERY 8 HOURS (Patient taking differently: Take 100 mg by mouth 2 times a day.) 90 Tablet 0    [DISCONTINUED] hydrALAZINE (APRESOLINE) 100 MG tablet Take 1 Tablet by mouth every 8 hours. (Patient taking differently: Take 100 mg by mouth 2 times a day.) 90 Tablet 2    [DISCONTINUED] metFORMIN (GLUCOPHAGE) 500 MG Tab Take 1 Tablet by mouth 2 times a day with meals for 360 days. 180 Tablet 3     No facility-administered encounter medications on file as of 8/22/2024.        Allergies   Allergen Reactions    Penicillins Swelling       Social History     Socioeconomic History    Marital status:      Spouse name: Not on file    Number of children: Not on file    Years of education: Not on file    Highest education level: Associate degree: occupational, technical, or vocational program   Occupational History    Not on file   Tobacco Use    Smoking status: Never    Smokeless tobacco: Never   Vaping Use    Vaping status: Never Used   Substance and Sexual Activity    Alcohol use: Never    Drug use: Never    Sexual activity: Not on file     Comment: , 1 daughter, 2 grands   Other Topics Concern    Not on file   Social History Narrative    Not on file     Social Determinants of Health     Financial Resource Strain: Low Risk  (1/9/2024)    Overall Financial Resource Strain (CARDIA)     Difficulty of Paying Living Expenses: Not hard at all   Food Insecurity: No  "Food Insecurity (1/9/2024)    Hunger Vital Sign     Worried About Running Out of Food in the Last Year: Never true     Ran Out of Food in the Last Year: Never true   Transportation Needs: No Transportation Needs (1/9/2024)    PRAPARE - Transportation     Lack of Transportation (Medical): No     Lack of Transportation (Non-Medical): No   Physical Activity: Patient Declined (1/9/2024)    Exercise Vital Sign     Days of Exercise per Week: Patient declined     Minutes of Exercise per Session: Patient declined   Stress: No Stress Concern Present (1/9/2024)    [employer] [place] [employer] - Occupational Stress Questionnaire     Feeling of Stress : Only a little   Social Connections: Moderately Integrated (1/9/2024)    Social Connection and Isolation Panel [NHANES]     Frequency of Communication with Friends and Family: More than three times a week     Frequency of Social Gatherings with Friends and Family: Once a week     Attends Sikh Services: More than 4 times per year     Active Member of Clubs or Organizations: No     Attends Club or Organization Meetings: Patient declined     Marital Status: Living with partner   Intimate Partner Violence: Not on file   Housing Stability: Low Risk  (1/9/2024)    Housing Stability Vital Sign     Unable to Pay for Housing in the Last Year: No     Number of Places Lived in the Last Year: 1     Unstable Housing in the Last Year: No       Family History   Problem Relation Age of Onset    Kidney Disease Neg Hx        Review of Systems   Constitutional:  Negative for fever.   Respiratory:  Negative for shortness of breath.    Cardiovascular:  Negative for chest pain.   Gastrointestinal:  Negative for abdominal pain.   Genitourinary:  Negative for dysuria and hematuria.   All other systems reviewed and are negative.      /70 (BP Location: Right arm, Patient Position: Sitting, BP Cuff Size: Adult)   Pulse 64   Temp 36.7 °C (98 °F) (Temporal)   Ht 1.702 m (5' 7\")   " Wt 72.6 kg (160 lb)   SpO2 94%   BMI 25.06 kg/m²     Physical Exam  Constitutional:       General: He is not in acute distress.  HENT:      Mouth/Throat:      Pharynx: No oropharyngeal exudate.   Eyes:      General: No scleral icterus.  Neck:      Trachea: No tracheal deviation.   Cardiovascular:      Rate and Rhythm: Normal rate and regular rhythm.      Heart sounds: Normal heart sounds. No murmur heard.  Pulmonary:      Effort: Pulmonary effort is normal.      Breath sounds: Normal breath sounds. No stridor. No rales.   Abdominal:      General: Bowel sounds are normal.      Palpations: Abdomen is soft.      Tenderness: There is no abdominal tenderness.   Musculoskeletal:         General: Normal range of motion.      Cervical back: Neck supple.      Right lower leg: No edema.      Left lower leg: No edema.   Skin:     General: Skin is warm and dry.      Findings: No rash.   Neurological:      General: No focal deficit present.      Mental Status: He is alert and oriented to person, place, and time.   Psychiatric:         Mood and Affect: Mood and affect normal.         Behavior: Behavior normal.         Labs reviewed.    Recent Labs     11/11/23  0220 11/12/23  0813 11/13/23  0525 11/18/23  0626 11/21/23  0532 11/24/23  0523 11/30/23  0518 12/02/23  0621 12/06/23  0526 02/07/24  1036 04/05/24  0857 08/01/24  0855   ALBUMIN 4.49  4.6 4.3   < >  --  3.7  --  3.6  --   --   --  4.8  --    HDL 31*  --   --   --   --   --   --   --   --   --  42  --    TRIGLYCERIDE 227*  --   --   --   --   --   --   --   --   --  96  --    SODIUM 134* 139   < > 139 139   < > 140 138   < > 141 143 140   POTASSIUM 4.8 4.1   < > 4.0 4.0   < > 3.9 3.7   < > 4.3 4.1 4.4   CHLORIDE 96 103   < > 104 104   < > 104 104   < > 103 104 104   CO2 25 26   < > 26 24   < > 27 25   < > 25 23 23   BUN 81* 61*   < > 31* 32*   < > 37* 35*   < > 25* 28* 29*   CREATININE 3.59* 2.84*   < > 2.49* 2.82*   < > 3.06* 2.58*   < > 2.25* 2.61* 2.09*   PHOSPHORUS   --  3.3  --  3.3  --   --   --  2.9  --   --   --   --     < > = values in this interval not displayed.       Lab Results   Component Value Date/Time    WBC 7.8 08/01/2024 08:55 AM    RBC 4.66 (L) 08/01/2024 08:55 AM    HEMOGLOBIN 13.6 (L) 08/01/2024 08:55 AM    HEMATOCRIT 42.0 08/01/2024 08:55 AM    MCV 90.1 08/01/2024 08:55 AM    MCH 29.2 08/01/2024 08:55 AM    MCHC 32.4 08/01/2024 08:55 AM    MPV 10.3 08/01/2024 08:55 AM           URINALYSIS:  Lab Results   Component Value Date/Time    COLORURINE Yellow 08/01/2024 0855    CLARITY Clear 08/01/2024 0855    SPECGRAVITY 1.014 08/01/2024 0855    PHURINE 6.5 08/01/2024 0855    KETONES Negative 08/01/2024 0855    PROTEINURIN Negative 08/01/2024 0855    BILIRUBINUR Negative 08/01/2024 0855    UROBILU 0.2 08/01/2024 0855    NITRITE Negative 08/01/2024 0855    LEUKESTERAS Negative 08/01/2024 0855    OCCULTBLOOD Negative 08/01/2024 0855     UPC  Lab Results   Component Value Date/Time    TOTPROTUR 14.0 08/01/2024 0855      Lab Results   Component Value Date/Time    CREATININEU 79.42 08/01/2024 0855         Imaging report(s) reviewed  No orders to display         Assessment:  Jason Lima is a 63 y.o. male with history of type 2 diabetes, hypertension, hemorrhagic stroke in 10/2023, CKD 3-4 who presents today to re-establish nephrology care.  He was previously seen by Dr. Aguayo.    Plan:  1. Stage 3b chronic kidney disease  - Underlying CKD likely from diabetic nephropathy given over 20 years of diabetes, as well as hypertension and age-related kidney decline.  I recommend avoiding NSAIDs and other nephrotoxins.  I recommend a low-salt diet.  I explained the importance of blood pressure and glycemic control to help slow CKD progression. I recommend starting ACE inhibitor and SGLT2 inhibitor as below for long-term kidney protection.      2. Type 2 diabetes mellitus with diabetic nephropathy, without long-term current use of insulin (HCC)  -Fairly well-controlled.   However, with recent GFR less than 30, I recommend discontinuing metformin.  I recommend starting Farxiga 10 mg daily.  I also recommend ACE inhibitor as below.    3. Primary hypertension  -Blood pressure well-controlled in the clinic.  To help ease pill burden, I recommend discontinue hydralazine, start lisinopril 40 mg daily.  Maintain amlodipine for now.  If hypertension worsens in the future, I would recommend starting Lasix 80 mg once daily.  I recommend a whole food plant-based diet as a natural means to help control blood pressure.        Return to clinic in 4 months with pre-clinic labs    Jama Chaparro MD  Nephrology  [facility]

## 2024-08-22 NOTE — PATIENT INSTRUCTIONS
"\"Whole-food, plant-based diet\"    https://www.YouEarnedIt/african-peanut-stew-vegan/  https://www.YouEarnedIt/slow-cooker-coconut-garcia-lentils/    You can find some good whole-food, plant-based recipes at ForksOverKnives.com/recipes.   Here's a sample menu and shopping list for one week of plant-based meals.  https://www.Mangstor/how-tos/plant-based-budget-one-week-vegan-meal-plan/    The American College of Lifestyle Medicine (ACLM) recommends a whole-food, plant-based diet for prevention and treatment of many diseases including cardiovascular disease, type 2 diabetes, obesity. Plant-based diets can help slow down, and potentially improve, chronic kidney disease.   https://lifestylemedicine.org/articles/benefits-plant-based-nutrition-chronic-kidney-disease/    "

## 2024-09-19 NOTE — THERAPY
"Physical Therapy   Daily Treatment     Patient Name: Jason Lima  Age:  62 y.o., Sex:  male  Medical Record #: [id number]  Today's Date: 12/7/2023     Precautions  Precautions: Fall Risk  Comments: Left Hemiparesis, left visual inattention    Subjective    \"Why are you making me do hard stuff?\" Pt in w/c at arrival, agreeable to PT tx. Seen [phone], [phone].      Objective       12/07/23 1001 12/07/23 1431   PT Charge Group   PT Gait Training (Units)  --  4   PT Neuromuscular Re-Education / Balance (Units) 2  --    PT Total Time Spent   PT Individual Total Time Spent (Mins) 30 60   Transfer Functional Level of Assist   Bed, Chair, Wheelchair Transfer Maximal Assist  (steadying, cues to WS, advance LLE, attain/correct midline for SPT)  --    Bed Chair Wheelchair Transfer Description Set-up of equipment;Increased time;Initial preparation for task;Assist with one limb  --    Bed Mobility    Sit to Stand  --  Contact Guard Assist  (c/ grab bar)   Neuro-Muscular Treatments   Neuro-Muscular Treatments Anterior weight shift;Weight Shift Right;Weight Shift Left;Verbal Cuing;Tactile Cuing;Sequencing;Co-Contraction;Electrical Stimulation Anterior weight shift;Postural Changes;Tactile Cuing;Tapping;Weight Shift Right;Weight Shift Left;Co-Contraction;Postural Facilitation;Sequencing;Verbal Cuing   Comments NMES to L quad: 30 Hz, 324 us, intensity: 70-75, on/off: 10:5, 3\" x 4\" pads; c/ concurrent standing + WS activity + dual task reaching to collect targets 4 x 6; facilitated standing WS c/ cues to bump ball target c/ head, shoulder, elbow, hip x 10 reps to form fatigue see note for TM gait training   Interdisciplinary Plan of Care Collaboration   IDT Collaboration with  Therapy Tech Therapy Tech   Patient Position at End of Therapy Seated;Call Light within Reach;Tray Table within Reach;Phone within Reach Seated;Chair Alarm On   Collaboration Comments tech assist c/ session tech assist c/ setup and pt handling during " Pt's name and  confirmed at start of call.     Pt reports a sore pain on R side of abdomen. Symptoms began 4 days ago. Pain only present when twisting or taking a deep breath. No pain when resting comfortably. No known injury to the area. No change in diet. No bulging no swelling, no rash or discoloration. Area not tender, not painful to touch.     Afebrile. Pt denies SOB, chest pain, headache, dizziness or lightheadedness.    Denies vomiting and diarrhea, hydrating well. No urinary symptoms.     Pt still able to carry on with normal day to day activities.    Pt would like to be evaluated. Same day appt scheduled with NP in office. Pt will be in office within 20 mins. No further questions or concerns.     Reason for Disposition   Patient wants to be seen    Protocols used: Abdominal Pain - Male-A-[place]     "session; transport   Physical Therapist Assigned   Assigned PT / Treatment Time / Comments Isidra turner. Tech assist. prefer 90 min PT session when possible.  --      Gait training c/ BWS harness- no BWS, only for safety, L AFO, R shoe lift, tape to L toe box to assist c/ advancement; TotalA for LLE swing and placement; at trunk to facilitate reciprocal WS and step symmetry, stance time on L, swing length on R  Total time: 9'48\"; 1 x 2'00\", 2 x 3'00\", 1 x 1'48\" (limited by AFO becoming loose), speed 0.4 inc to 0.6 mph by last bout   STS <> w/c for donning/doffing harness, seated rest breaks btw efforts   L>R prism glasses 20PD trialed during seated, standing, and gait bouts to facilitate L side awareness    Assessment    Jason c/ improving initiation of LUE + trunk movement in seated and standing c/ trial of prism glasses. TM performance better without glasses donned, likely due to restrictive peripheral fields 2/2 glasses design. TM gait improved steadily over successive bouts with improved step symmetry and amplitude c/ cues for \"tall\" walking. Good tetany achieved at L quad c/ NMES today at above settings, well tolerated by pt in standing.    Strengths: Able to follow instructions, Independent prior level of function, Motivated for self care and independence, Pleasant and cooperative, Supportive family, Willingly participates in therapeutic activities  Barriers: Decreased endurance, Hemiparesis, Difficulty following instructions, Fatigue, Hypertension, Impaired activity tolerance, Impaired balance, Impaired insight/denial of deficits, Impaired functional cognition, Impaired sleep pattern, Impulsive, Limited mobility, Visual impairment, Poor family support (lives alone)    Plan  Continue prism trial   NMES to LLE quad/glut in standing vs NuStep for pregait training/priming  TM training  Continue LPRW training, wean from rail for righting responses   F/U on AFO needs     DME  PT DME Recommendations  Wheelchair: " "18\" Width  Cushion: Specialty (See other comments) (TBD pending ambulation progress)  Assistive Device: Tanvir Walker (TBD pending progress, currently 2 person assist for gait)  Additional Equipment: Other (Comments)    Passport items to be completed:  Get in/out of bed safely, in/out of a vehicle, safely use mobility device, walk or wheel around home/community, navigate up and down stairs, show how to get up/down from the ground, ensure home is accessible, demonstrate HEP, complete caregiver training    Physical Therapy Problems (Active)       Problem: Mobility       Dates: Start:  11/13/23         Goal: STG-Within one week, patient will ambulate distances >/= 30' c/ RHR and ModA or better.        Dates: Start:  11/13/23         Goal Note filed on 11/22/23 1102 by Awilda Dela Cruz, MSPT       Limited to 10ft at right hallway rail mod assist                 Problem: PT-Long Term Goals       Dates: Start:  11/13/23         Goal: LTG-By discharge, patient will propel wheelchair >/= 300' at [place] or better.        Dates: Start:  11/13/23            Goal: LTG-By discharge, patient will ambulate >/= 50' c/ LRAD at [place] or better.        Dates: Start:  11/13/23            Goal: LTG-By discharge, patient will transfer one surface to another c/ [place] or better.        Dates: Start:  11/13/23            Goal: LTG-By discharge, patient will ambulate up/down 1 step c/ LRAD at [place] or better.        Dates: Start:  11/13/23            Goal: LTG-By discharge, patient will transfer in/out of a car c/ ModA or better.        Dates: Start:  11/13/23              "

## 2024-10-30 ENCOUNTER — APPOINTMENT (OUTPATIENT)
Dept: MEDICAL GROUP | Facility: MEDICAL CENTER | Age: 64
End: 2024-10-30
Payer: COMMERCIAL

## 2024-11-27 ENCOUNTER — APPOINTMENT (OUTPATIENT)
Dept: MEDICAL GROUP | Facility: MEDICAL CENTER | Age: 64
End: 2024-11-27
Payer: COMMERCIAL

## 2024-11-27 VITALS
WEIGHT: 171.8 LBS | DIASTOLIC BLOOD PRESSURE: 70 MMHG | TEMPERATURE: 97.7 F | SYSTOLIC BLOOD PRESSURE: 116 MMHG | BODY MASS INDEX: 32.44 KG/M2 | HEIGHT: 61 IN | OXYGEN SATURATION: 91 % | HEART RATE: 62 BPM

## 2024-11-27 DIAGNOSIS — E11.21 TYPE 2 DIABETES MELLITUS WITH DIABETIC NEPHROPATHY, WITHOUT LONG-TERM CURRENT USE OF INSULIN (HCC): Chronic | ICD-10-CM

## 2024-11-27 DIAGNOSIS — F32.9 REACTIVE DEPRESSION (SITUATIONAL): ICD-10-CM

## 2024-11-27 DIAGNOSIS — I10 PRIMARY HYPERTENSION: ICD-10-CM

## 2024-11-27 DIAGNOSIS — Z12.11 SCREENING FOR COLORECTAL CANCER: ICD-10-CM

## 2024-11-27 DIAGNOSIS — I69.359 HISTORY OF HEMORRHAGIC STROKE WITH RESIDUAL HEMIPLEGIA (HCC): ICD-10-CM

## 2024-11-27 DIAGNOSIS — Z12.12 SCREENING FOR COLORECTAL CANCER: ICD-10-CM

## 2024-11-27 PROCEDURE — 3074F SYST BP LT 130 MM HG: CPT | Performed by: PHYSICIAN ASSISTANT

## 2024-11-27 PROCEDURE — 99214 OFFICE O/P EST MOD 30 MIN: CPT | Performed by: PHYSICIAN ASSISTANT

## 2024-11-27 PROCEDURE — 3078F DIAST BP <80 MM HG: CPT | Performed by: PHYSICIAN ASSISTANT

## 2024-11-27 RX ORDER — EMPAGLIFLOZIN 10 MG/1
1 TABLET, FILM COATED ORAL DAILY
COMMUNITY
Start: 2024-09-04 | End: 2024-11-27

## 2024-11-27 ASSESSMENT — FIBROSIS 4 INDEX: FIB4 SCORE: 1.09

## 2024-11-28 NOTE — PROGRESS NOTES
Subjective:     History of Present Illness  The patient presents for evaluation of multiple medical concerns. He is accompanied by his wife.    He reports a general improvement in his health. His nephrologist has recently adjusted his medication, switching him from hydralazine to lisinopril, which has resulted in a blood pressure reading of 140 at home. He is also taking amlodipine and metoprolol. He is not currently on any blood thinners.    His wife has reduced his Zoloft dosage by half, which he reports has made him feel more normal.    His nephrologist has also changed his diabetes medication from metformin to Jardiance, which is believed to be more beneficial for his kidneys. He is scheduled to have lab work done on 12/03/2024. His wife notes that his A1c levels have been satisfactory, but he was reintroduced to metformin after a period of discontinuation.      Current medicines (including changes today)  Current Outpatient Medications   Medication Sig Dispense Refill    dapagliflozin propanediol (FARXIGA) 10 MG Tab Take 1 Tablet by mouth every day. 30 Tablet 11    lisinopril (PRINIVIL) 40 MG tablet Take 1 Tablet by mouth every day. 90 Tablet 3    coenzyme Q-10 30 MG capsule Take 60 mg by mouth every day.      traZODone (DESYREL) 150 MG Tab Take 0.5 Tablets by mouth at bedtime for 360 days. 45 Tablet 3    metoprolol SR (TOPROL XL) 25 MG TABLET SR 24 HR Take 1.5 Tablets by mouth every day for 360 days. 135 Tablet 3    amLODIPine (NORVASC) 10 MG Tab Take 1 Tablet by mouth every day for 360 days. 90 Tablet 3    sertraline (ZOLOFT) 50 MG Tab Take 1 Tablet by mouth every day for 360 days. 90 Tablet 3    baclofen (LIORESAL) 10 MG Tab Take 0.5 Tablets by mouth 3 times a day for 360 days. (Patient taking differently: Take 5 mg by mouth 2 times a day.) 135 Tablet 3    atorvastatin (LIPITOR) 40 MG Tab Take 1 Tablet by mouth every evening for 360 days. 90 Tablet 3     No current facility-administered medications for this  "visit.     He  has a past medical history of Diabetes ([facility]), High cholesterol, Hypertension, and Stroke (HCC).    ROS   No chest pain, no shortness of breath, no abdominal pain  Positive ROS as per HPI.  All other systems reviewed and are negative.     Objective:     /70 (BP Location: Right arm, Patient Position: Sitting, BP Cuff Size: Adult)   Pulse 62   Temp 36.5 °C (97.7 °F) (Temporal)   Ht 1.549 m (5' 1\")   Wt 77.9 kg (171 lb 12.8 oz)   SpO2 91%  Body mass index is 32.46 kg/m².   Physical Exam  Vital Signs  Blood pressure measures 118/70.  Constitutional: Alert, no distress.  Skin: Warm, dry, good turgor, no rashes in visible areas.  Eye: Equal, round and reactive, conjunctiva clear, lids normal.  ENMT: Lips without lesions, good dentition, oropharynx clear.  Neck: Trachea midline, no masses, no thyromegaly.   Psych: Alert and oriented x3, normal affect and mood.      Results          Assessment and Plan:   The following treatment plan was discussed    Assessment & Plan  1. Hypertension.  Chronic condition.  Stable.  His blood pressure medication was changed from hydralazine to lisinopril by his nephrologist. Blood pressure readings at home have been in the 140s, but in the office, it was 116/68. He is also on amlodipine and metoprolol. Blood pressure was rechecked in the office and was 118/70. He is advised to continue monitoring his blood pressure at home.    2. Diabetes Mellitus.  Chronic condition.  Status unknown.  Likely stable.  He was switched from metformin to Jardiance by his nephrologist to help preserve kidney function. He is also on Farxiga. An A1c test was ordered to monitor his blood sugar levels, to be done on December 3rd. If the current dose of Jardiance (10 mg) is not effective, a higher dose may be considered.    3. Medication Management.  His Zoloft dosage was cut in half to help him feel more normal and less apathetic. He reports feeling better on the lower dose.    4. Health " Maintenance.  A Cologuard test was recommended for colon cancer screening. He was advised to check with his insurance regarding coverage for the test. A referral to a general surgeon for hemorrhoidectomy was also suggested. Dr. Stephen and Dr. Parks from [facility] were recommended.    Follow-up  Return in 6 months for follow up.      ORDERS:  1. History of hemorrhagic stroke with residual hemiplegia (HCC)      2. Type 2 diabetes mellitus with diabetic nephropathy, without long-term current use of insulin (HCC)    - HEMOGLOBIN A1C; Future    3. Primary hypertension      4. Screening for colorectal cancer    - Cologuard® colon cancer screening        Please note that this dictation was created using voice recognition software. I have made every reasonable attempt to correct obvious errors, but I expect that there are errors of grammar and possibly content that I did not discover before finalizing the note.      Attestation      Verbal consent was acquired by the patient to use [employer] ambient listening note generation during this visit Yes

## 2024-12-03 ENCOUNTER — HOSPITAL ENCOUNTER (OUTPATIENT)
Dept: LAB | Facility: MEDICAL CENTER | Age: 64
End: 2024-12-03
Attending: INTERNAL MEDICINE
Payer: COMMERCIAL

## 2024-12-03 DIAGNOSIS — N18.32 STAGE 3B CHRONIC KIDNEY DISEASE: ICD-10-CM

## 2024-12-03 DIAGNOSIS — E55.9 VITAMIN D DEFICIENCY: ICD-10-CM

## 2024-12-03 DIAGNOSIS — Z87.440 HISTORY OF UTI: ICD-10-CM

## 2024-12-03 DIAGNOSIS — E11.21 TYPE 2 DIABETES MELLITUS WITH DIABETIC NEPHROPATHY, WITHOUT LONG-TERM CURRENT USE OF INSULIN (HCC): Chronic | ICD-10-CM

## 2024-12-03 DIAGNOSIS — D64.9 ANEMIA, UNSPECIFIED TYPE: ICD-10-CM

## 2024-12-03 LAB
25(OH)D3 SERPL-MCNC: 48 NG/ML (ref 30–100)
ALBUMIN SERPL BCP-MCNC: 5.1 G/DL (ref 3.2–4.9)
ANION GAP SERPL CALC-SCNC: 14 MMOL/L (ref 7–16)
APPEARANCE UR: CLEAR
BILIRUB UR QL STRIP.AUTO: NEGATIVE
BUN SERPL-MCNC: 32 MG/DL (ref 8–22)
CALCIUM SERPL-MCNC: 9.9 MG/DL (ref 8.5–10.5)
CHLORIDE SERPL-SCNC: 102 MMOL/L (ref 96–112)
CO2 SERPL-SCNC: 23 MMOL/L (ref 20–33)
COLOR UR: YELLOW
CREAT SERPL-MCNC: 2.6 MG/DL (ref 0.5–1.4)
ERYTHROCYTE [DISTWIDTH] IN BLOOD BY AUTOMATED COUNT: 42.3 FL (ref 35.9–50)
FASTING STATUS PATIENT QL REPORTED: NORMAL
FERRITIN SERPL-MCNC: 207 NG/ML (ref 22–322)
GFR SERPLBLD CREATININE-BSD FMLA CKD-EPI: 27 ML/MIN/1.73 M 2
GLUCOSE SERPL-MCNC: 127 MG/DL (ref 65–99)
GLUCOSE UR STRIP.AUTO-MCNC: 500 MG/DL
HCT VFR BLD AUTO: 51.9 % (ref 42–52)
HGB BLD-MCNC: 17.2 G/DL (ref 14–18)
IRON SATN MFR SERPL: 22 % (ref 15–55)
IRON SERPL-MCNC: 66 UG/DL (ref 50–180)
KETONES UR STRIP.AUTO-MCNC: NEGATIVE MG/DL
LEUKOCYTE ESTERASE UR QL STRIP.AUTO: NEGATIVE
MCH RBC QN AUTO: 29 PG (ref 27–33)
MCHC RBC AUTO-ENTMCNC: 33.1 G/DL (ref 32.3–36.5)
MCV RBC AUTO: 87.5 FL (ref 81.4–97.8)
MICRO URNS: ABNORMAL
NITRITE UR QL STRIP.AUTO: NEGATIVE
PH UR STRIP.AUTO: 6 [PH] (ref 5–8)
PHOSPHATE SERPL-MCNC: 4.1 MG/DL (ref 2.5–4.5)
PLATELET # BLD AUTO: 210 K/UL (ref 164–446)
PMV BLD AUTO: 9.9 FL (ref 9–12.9)
POTASSIUM SERPL-SCNC: 4.1 MMOL/L (ref 3.6–5.5)
PROT UR QL STRIP: NEGATIVE MG/DL
PTH-INTACT SERPL-MCNC: 42 PG/ML (ref 14–72)
RBC # BLD AUTO: 5.93 M/UL (ref 4.7–6.1)
RBC UR QL AUTO: NEGATIVE
SODIUM SERPL-SCNC: 139 MMOL/L (ref 135–145)
SP GR UR STRIP.AUTO: 1.01
TIBC SERPL-MCNC: 297 UG/DL (ref 250–450)
UIBC SERPL-MCNC: 231 UG/DL (ref 110–370)
UROBILINOGEN UR STRIP.AUTO-MCNC: 0.2 EU/DL
WBC # BLD AUTO: 5.6 K/UL (ref 4.8–10.8)

## 2024-12-03 PROCEDURE — 84100 ASSAY OF PHOSPHORUS: CPT

## 2024-12-03 PROCEDURE — 82040 ASSAY OF SERUM ALBUMIN: CPT

## 2024-12-03 PROCEDURE — 81003 URINALYSIS AUTO W/O SCOPE: CPT

## 2024-12-03 PROCEDURE — 83540 ASSAY OF IRON: CPT

## 2024-12-03 PROCEDURE — 85027 COMPLETE CBC AUTOMATED: CPT

## 2024-12-03 PROCEDURE — 36415 COLL VENOUS BLD VENIPUNCTURE: CPT

## 2024-12-03 PROCEDURE — 80048 BASIC METABOLIC PNL TOTAL CA: CPT

## 2024-12-03 PROCEDURE — 82570 ASSAY OF URINE CREATININE: CPT | Mod: 91

## 2024-12-03 PROCEDURE — 83550 IRON BINDING TEST: CPT

## 2024-12-03 PROCEDURE — 83970 ASSAY OF PARATHORMONE: CPT

## 2024-12-03 PROCEDURE — 84156 ASSAY OF PROTEIN URINE: CPT

## 2024-12-03 PROCEDURE — 82043 UR ALBUMIN QUANTITATIVE: CPT

## 2024-12-03 PROCEDURE — 82306 VITAMIN D 25 HYDROXY: CPT

## 2024-12-03 PROCEDURE — 82728 ASSAY OF FERRITIN: CPT

## 2024-12-04 LAB
CREAT UR-MCNC: 80.51 MG/DL
CREAT UR-MCNC: 85.1 MG/DL
MICROALBUMIN UR-MCNC: 4.8 MG/DL
MICROALBUMIN/CREAT UR: 60 MG/G (ref 0–30)
PROT UR-MCNC: 13.7 MG/DL (ref 0–15)
PROT/CREAT UR: 161 MG/G (ref 15–68)

## 2024-12-04 RX ORDER — DAPAGLIFLOZIN 10 MG/1
10 TABLET, FILM COATED ORAL DAILY
Qty: 30 TABLET | Refills: 11 | Status: SHIPPED | OUTPATIENT
Start: 2024-12-04 | End: 2024-12-12 | Stop reason: SDUPTHER

## 2024-12-12 ENCOUNTER — OFFICE VISIT (OUTPATIENT)
Dept: NEPHROLOGY | Facility: MEDICAL CENTER | Age: 64
End: 2024-12-12
Payer: COMMERCIAL

## 2024-12-12 VITALS
TEMPERATURE: 97.6 F | DIASTOLIC BLOOD PRESSURE: 78 MMHG | HEIGHT: 67 IN | BODY MASS INDEX: 27.78 KG/M2 | SYSTOLIC BLOOD PRESSURE: 114 MMHG | WEIGHT: 177 LBS | HEART RATE: 63 BPM | OXYGEN SATURATION: 95 %

## 2024-12-12 DIAGNOSIS — E11.21 TYPE 2 DIABETES MELLITUS WITH DIABETIC NEPHROPATHY, WITHOUT LONG-TERM CURRENT USE OF INSULIN (HCC): Chronic | ICD-10-CM

## 2024-12-12 DIAGNOSIS — N18.32 STAGE 3B CHRONIC KIDNEY DISEASE: ICD-10-CM

## 2024-12-12 DIAGNOSIS — Z87.440 HISTORY OF UTI: ICD-10-CM

## 2024-12-12 DIAGNOSIS — I10 PRIMARY HYPERTENSION: ICD-10-CM

## 2024-12-12 PROCEDURE — 3078F DIAST BP <80 MM HG: CPT | Performed by: INTERNAL MEDICINE

## 2024-12-12 PROCEDURE — 3074F SYST BP LT 130 MM HG: CPT | Performed by: INTERNAL MEDICINE

## 2024-12-12 PROCEDURE — 99214 OFFICE O/P EST MOD 30 MIN: CPT | Performed by: INTERNAL MEDICINE

## 2024-12-12 RX ORDER — DAPAGLIFLOZIN 10 MG/1
10 TABLET, FILM COATED ORAL DAILY
Qty: 90 TABLET | Refills: 3 | Status: SHIPPED | OUTPATIENT
Start: 2024-12-12

## 2024-12-12 ASSESSMENT — ENCOUNTER SYMPTOMS
FEVER: 0
ABDOMINAL PAIN: 0
SHORTNESS OF BREATH: 0

## 2024-12-12 ASSESSMENT — FIBROSIS 4 INDEX: FIB4 SCORE: 1.15

## 2024-12-12 NOTE — PROGRESS NOTES
Chief Complaint   Patient presents with    Chronic Kidney Disease       CC: follow up CKD    HPI:  Jason Lima is a 63 y.o. male with history of type 2 diabetes, hypertension, hemorrhagic stroke in 10/2023, CKD 3-4 who presents today for follow up nephrology care.  He was previously seen by Dr. Aguayo.    Re: DM2, diagnosed around 2004. Patient has had microalbuminuria since 2/2024. Patient has seen an eye doctor 1/2024 and does not have retinopathy. He diabetes was usually controlled except when he was stressed. Not on metofrmin.  He is struggling to get the farxiga covered by insurance. I prescribed farxiga to reduce the risk of sustained eGFR decline, ESKD, CV death, and hospitalization for heart failure in this adult with CKD at risk of progression due to proteinuria.      Re: HTN, diagnosed around 2009. BP control over the years has been high in the past, but better controlled more recently. He is on amlodipine, lisinopril, and metoprolol. Checks home BP, usually 140's systolic, but better at doctor's visits.      Re: CKD. He was born full term. Denies chronic use of NSAIDs in the past. Denies LUTS, sometimes has 1-2x nocturia.       Past Medical History:   Diagnosis Date    Diabetes (HCC)     High cholesterol     Hypertension     Stroke (HCC)     3 weeks ago in [place] (as of 11/11/2023)       Past Surgical History:   Procedure Laterality Date    CHELO BY LAPAROSCOPY N/A 1/6/2017    Procedure: CHELO BY LAPAROSCOPY;  Surgeon: Garrett Miller M.D.;  Location: SURGERY [facility];  Service:     ABDOMINAL ABSCESS DRAINAGE N/A 1/6/2017    Procedure: ABDOMINAL ABSCESS DRAINAGE;  Surgeon: Garrett Miller M.D.;  Location: SURGERY [facility];  Service:         Outpatient Encounter Medications as of 12/12/2024   Medication Sig Dispense Refill    dapagliflozin propanediol (FARXIGA) 10 MG Tab Take 1 Tablet by mouth every day. 30 Tablet 11    sertraline (ZOLOFT) 50 MG Tab Take 0.5 Tablets by mouth every  "day for 360 days. 90 Tablet 3    lisinopril (PRINIVIL) 40 MG tablet Take 1 Tablet by mouth every day. 90 Tablet 3    coenzyme Q-10 30 MG capsule Take 60 mg by mouth every day.      traZODone (DESYREL) 150 MG Tab Take 0.5 Tablets by mouth at bedtime for 360 days. 45 Tablet 3    metoprolol SR (TOPROL XL) 25 MG TABLET SR 24 HR Take 1.5 Tablets by mouth every day for 360 days. 135 Tablet 3    amLODIPine (NORVASC) 10 MG Tab Take 1 Tablet by mouth every day for 360 days. 90 Tablet 3    baclofen (LIORESAL) 10 MG Tab Take 0.5 Tablets by mouth 3 times a day for 360 days. (Patient taking differently: Take 5 mg by mouth 2 times a day.) 135 Tablet 3    atorvastatin (LIPITOR) 40 MG Tab Take 1 Tablet by mouth every evening for 360 days. 90 Tablet 3     No facility-administered encounter medications on file as of 12/12/2024.        Allergies   Allergen Reactions    Penicillins Swelling             Review of Systems   Constitutional:  Negative for fever.   Respiratory:  Negative for shortness of breath.    Cardiovascular:  Negative for chest pain.   Gastrointestinal:  Negative for abdominal pain.   Genitourinary:  Negative for dysuria and hematuria.   All other systems reviewed and are negative.      /78 (BP Location: Right arm, Patient Position: Sitting, BP Cuff Size: Adult)   Pulse 63   Temp 36.4 °C (97.6 °F) (Temporal)   Ht 1.702 m (5' 7\")   Wt 80.3 kg (177 lb)   SpO2 95%   BMI 27.72 kg/m²     Physical Exam  Constitutional:       General: He is not in acute distress.  HENT:      Mouth/Throat:      Pharynx: No oropharyngeal exudate.   Eyes:      General: No scleral icterus.  Neck:      Trachea: No tracheal deviation.   Cardiovascular:      Rate and Rhythm: Normal rate and regular rhythm.      Heart sounds: Normal heart sounds. No murmur heard.  Pulmonary:      Effort: Pulmonary effort is normal.      Breath sounds: Normal breath sounds. No stridor. No rales.   Abdominal:      General: Bowel sounds are normal.      " Palpations: Abdomen is soft.      Tenderness: There is no abdominal tenderness.   Musculoskeletal:         General: Normal range of motion.      Cervical back: Neck supple.      Right lower leg: No edema.      Left lower leg: No edema.   Skin:     General: Skin is warm and dry.      Findings: No rash.   Neurological:      General: No focal deficit present.      Mental Status: He is alert and oriented to person, place, and time.   Psychiatric:         Mood and Affect: Mood and affect normal.         Behavior: Behavior normal.         Labs reviewed.    Recent Labs     04/05/24  0857 08/01/24  0855 12/03/24  0910   ALBUMIN 4.8  --  5.1*   HDL 42  --   --    TRIGLYCERIDE 96  --   --    SODIUM 143 140 139   POTASSIUM 4.1 4.4 4.1   CHLORIDE 104 104 102   CO2 23 23 23   BUN 28* 29* 32*   CREATININE 2.61* 2.09* 2.60*   PHOSPHORUS  --   --  4.1       Lab Results   Component Value Date/Time    WBC 5.6 12/03/2024 09:10 AM    RBC 5.93 12/03/2024 09:10 AM    HEMOGLOBIN 17.2 12/03/2024 09:10 AM    HEMATOCRIT 51.9 12/03/2024 09:10 AM    MCV 87.5 12/03/2024 09:10 AM    MCH 29.0 12/03/2024 09:10 AM    MCHC 33.1 12/03/2024 09:10 AM    MPV 9.9 12/03/2024 09:10 AM             URINALYSIS:  Lab Results   Component Value Date/Time    COLORURINE Yellow 12/03/2024 0911    CLARITY Clear 12/03/2024 0911    SPECGRAVITY 1.012 12/03/2024 0911    PHURINE 6.0 12/03/2024 0911    KETONES Negative 12/03/2024 0911    PROTEINURIN Negative 12/03/2024 0911    BILIRUBINUR Negative 12/03/2024 0911    UROBILU 0.2 12/03/2024 0911    NITRITE Negative 12/03/2024 0911    LEUKESTERAS Negative 12/03/2024 0911    OCCULTBLOOD Negative 12/03/2024 0911       UPC  Lab Results   Component Value Date/Time    TOTPROTUR 13.7 12/03/2024 0911      Lab Results   Component Value Date/Time    CREATININEU 85.10 12/03/2024 0911         Imaging report(s) reviewed  No orders to display         Assessment:  Jason Lima is a 63 y.o. male with history of type 2 diabetes,  hypertension, hemorrhagic stroke in 10/2023, CKD 3-4 who presents today for follow up nephrology care.     Plan:  1. Stage 3b-4 chronic kidney disease  -Current creatinine and GFR within stage IV CKD, but unable to officially classify patient as stage IV CKD until GFR remains within stage IV category consistently for 3 months or longer.  Creatinine and GFR have been wavering between stage IIIb and IV CKD.  Underlying CKD likely from diabetic nephropathy given over 20 years of diabetes, as well as hypertension and age-related kidney decline.  I recommend avoiding NSAIDs and other nephrotoxins.  I recommend a whole food, plant-based, low-salt diet. I explained the importance of glycemic and blood pressure control to help slow CKD progression.  I recommend maintaining lisinopril and Farxiga for long-term kidney protection.  Farxiga prescription is to reduce the risk of sustained eGFR decline, ESKD, CV death, and hospitalization for heart failure in this adult with CKD at risk of progression due to proteinuria.      2. Type 2 diabetes mellitus with diabetic nephropathy, without long-term current use of insulin (HCC)  -Fairly well-controlled.  Avoid metformin with GFR less than 30.  I recommend continuing Farxiga 10 mg daily to reduce the risk of sustained eGFR decline, ESKD, CV death, and hospitalization for heart failure in this adult with CKD at risk of progression due to proteinuria.  I also recommend continuing lisinopril for long-term kidney protection.    3. Primary hypertension  -Blood pressure well-controlled in the clinic.  Continue current regimen with amlodipine, lisinopril, metoprolol.  If hypertension worsens, especially if accompanied by lower extremity edema, I would recommend starting Lasix 80 mg once daily.  I recommend a whole food plant-based diet as a natural means to control blood pressure.      Return to clinic in 4 months with pre-clinic labs    Jama Chaparro MD  Nephrology  [facility]

## 2024-12-12 NOTE — PATIENT INSTRUCTIONS
"If you would like to learn more about kidney failure and the options for patients with kidney failure, I recommend viewing a YouTube video by Dr. Raj Spaulding about dialysis options.  You can find this easily by searching Google for \"YouTube, Dr. Raj Spaulding, dialysis.\"  The video is about 10 minutes long.       \"Whole-food, plant-based diet\"    The American College of Lifestyle Medicine (ACLM) recommends a whole-food, plant-based diet for prevention and treatment of many diseases including cardiovascular disease, type 2 diabetes, obesity. Plant-based diets can help slow down, and potentially improve, chronic kidney disease.   https://lifestylemedicine.org/articles/benefits-plant-based-nutrition-chronic-kidney-disease/   "

## 2024-12-30 ENCOUNTER — TELEPHONE (OUTPATIENT)
Dept: NEPHROLOGY | Facility: MEDICAL CENTER | Age: 64
End: 2024-12-30
Payer: COMMERCIAL

## 2024-12-30 NOTE — TELEPHONE ENCOUNTER
I attempted to call patient's wife Vielka on 12/23/24 @ 4:36pm, there was no answer so I left a voicemail. 12/24/24 there was a voicemail on my line returning the call. I called her back to which she answered. After speaking with her, she stated that the insurance wants different documentation stating what the medication has been doing for him and why he should continue taking it. I informed her that I sent the visit notes to their insurance. She stated that a lady named Ayse has been helping with this medication issue as well. I left a message on Ayse's voicemail ([phone]) stating that the notes were sent and that I received an approval from [facility] for the medication (scanned in media). Medication should be in the mail according to Vielka.

## 2025-01-09 DIAGNOSIS — R25.2 SPASTICITY: ICD-10-CM

## 2025-01-09 DIAGNOSIS — G47.9 DIFFICULTY SLEEPING: ICD-10-CM

## 2025-01-09 DIAGNOSIS — E78.00 PURE HYPERCHOLESTEROLEMIA: ICD-10-CM

## 2025-01-09 DIAGNOSIS — I10 HYPERTENSION, UNSPECIFIED TYPE: ICD-10-CM

## 2025-01-09 DIAGNOSIS — F32.9 REACTIVE DEPRESSION (SITUATIONAL): ICD-10-CM

## 2025-01-10 RX ORDER — METOPROLOL SUCCINATE 25 MG/1
37.5 TABLET, EXTENDED RELEASE ORAL DAILY
Qty: 135 TABLET | Refills: 1 | Status: SHIPPED | OUTPATIENT
Start: 2025-01-10 | End: 2025-01-17 | Stop reason: SDUPTHER

## 2025-01-10 RX ORDER — AMLODIPINE BESYLATE 10 MG/1
10 TABLET ORAL DAILY
Qty: 90 TABLET | Refills: 1 | Status: SHIPPED | OUTPATIENT
Start: 2025-01-10 | End: 2025-01-17 | Stop reason: SDUPTHER

## 2025-01-10 RX ORDER — ATORVASTATIN CALCIUM 40 MG/1
40 TABLET, FILM COATED ORAL EVERY EVENING
Qty: 90 TABLET | Refills: 1 | Status: SHIPPED | OUTPATIENT
Start: 2025-01-10 | End: 2025-01-17 | Stop reason: SDUPTHER

## 2025-01-10 RX ORDER — TRAZODONE HYDROCHLORIDE 150 MG/1
TABLET ORAL
Qty: 45 TABLET | Refills: 1 | Status: SHIPPED | OUTPATIENT
Start: 2025-01-10 | End: 2025-01-17 | Stop reason: SDUPTHER

## 2025-01-10 RX ORDER — BACLOFEN 10 MG/1
5 TABLET ORAL 2 TIMES DAILY
Qty: 90 TABLET | Refills: 1 | Status: SHIPPED | OUTPATIENT
Start: 2025-01-10 | End: 2025-01-17 | Stop reason: SDUPTHER

## 2025-01-16 ENCOUNTER — TELEPHONE (OUTPATIENT)
Dept: MEDICAL GROUP | Facility: MEDICAL CENTER | Age: 65
End: 2025-01-16
Payer: COMMERCIAL

## 2025-01-16 NOTE — TELEPHONE ENCOUNTER
VOICEMAIL  1. Caller Name: [facility]    Call Back Number: N/A    2. Message: Needs refill of amlodipine, sertraline, metropolol, and atorvastatin sent to [facility] in [facility]. We had the wrong Pharmacy listed     3. Patient approves office to leave a detailed voicemail/MyChart message: N\A

## 2025-01-17 DIAGNOSIS — I10 HYPERTENSION, UNSPECIFIED TYPE: ICD-10-CM

## 2025-01-17 DIAGNOSIS — E78.00 PURE HYPERCHOLESTEROLEMIA: ICD-10-CM

## 2025-01-17 DIAGNOSIS — G47.9 DIFFICULTY SLEEPING: ICD-10-CM

## 2025-01-17 DIAGNOSIS — R25.2 SPASTICITY: ICD-10-CM

## 2025-01-17 DIAGNOSIS — F32.9 REACTIVE DEPRESSION (SITUATIONAL): ICD-10-CM

## 2025-01-17 RX ORDER — BACLOFEN 10 MG/1
5 TABLET ORAL 2 TIMES DAILY
Qty: 90 TABLET | Refills: 1 | Status: SHIPPED | OUTPATIENT
Start: 2025-01-17 | End: 2025-07-16

## 2025-01-17 RX ORDER — AMLODIPINE BESYLATE 10 MG/1
10 TABLET ORAL DAILY
Qty: 90 TABLET | Refills: 1 | Status: SHIPPED | OUTPATIENT
Start: 2025-01-17

## 2025-01-17 RX ORDER — TRAZODONE HYDROCHLORIDE 150 MG/1
75 TABLET ORAL NIGHTLY
Qty: 45 TABLET | Refills: 1 | Status: SHIPPED | OUTPATIENT
Start: 2025-01-17

## 2025-01-17 RX ORDER — METOPROLOL SUCCINATE 25 MG/1
37.5 TABLET, EXTENDED RELEASE ORAL DAILY
Qty: 135 TABLET | Refills: 1 | Status: SHIPPED | OUTPATIENT
Start: 2025-01-17

## 2025-01-17 RX ORDER — ATORVASTATIN CALCIUM 40 MG/1
40 TABLET, FILM COATED ORAL EVERY EVENING
Qty: 90 TABLET | Refills: 1 | Status: SHIPPED | OUTPATIENT
Start: 2025-01-17

## 2025-02-08 LAB — NONINV COLON CA DNA+OCC BLD SCRN STL QL: NEGATIVE

## 2025-04-16 ENCOUNTER — HOSPITAL ENCOUNTER (OUTPATIENT)
Dept: LAB | Facility: MEDICAL CENTER | Age: 65
End: 2025-04-16
Attending: INTERNAL MEDICINE
Payer: COMMERCIAL

## 2025-04-16 DIAGNOSIS — N18.32 STAGE 3B CHRONIC KIDNEY DISEASE: ICD-10-CM

## 2025-04-16 DIAGNOSIS — E11.21 TYPE 2 DIABETES MELLITUS WITH DIABETIC NEPHROPATHY, WITHOUT LONG-TERM CURRENT USE OF INSULIN (HCC): Chronic | ICD-10-CM

## 2025-04-16 DIAGNOSIS — Z87.440 HISTORY OF UTI: ICD-10-CM

## 2025-04-16 LAB
ALBUMIN SERPL BCP-MCNC: 4.7 G/DL (ref 3.2–4.9)
ANION GAP SERPL CALC-SCNC: 12 MMOL/L (ref 7–16)
APPEARANCE UR: CLEAR
BACTERIA #/AREA URNS HPF: NORMAL /HPF
BILIRUB UR QL STRIP.AUTO: NEGATIVE
BUN SERPL-MCNC: 42 MG/DL (ref 8–22)
CALCIUM SERPL-MCNC: 9.6 MG/DL (ref 8.5–10.5)
CASTS URNS QL MICRO: NORMAL /LPF (ref 0–2)
CHLORIDE SERPL-SCNC: 106 MMOL/L (ref 96–112)
CO2 SERPL-SCNC: 22 MMOL/L (ref 20–33)
COLOR UR: YELLOW
CREAT SERPL-MCNC: 2.5 MG/DL (ref 0.5–1.4)
EPITHELIAL CELLS 1715: NORMAL /HPF (ref 0–5)
ERYTHROCYTE [DISTWIDTH] IN BLOOD BY AUTOMATED COUNT: 41.9 FL (ref 35.9–50)
GFR SERPLBLD CREATININE-BSD FMLA CKD-EPI: 28 ML/MIN/1.73 M 2
GLUCOSE SERPL-MCNC: 130 MG/DL (ref 65–99)
GLUCOSE UR STRIP.AUTO-MCNC: >=1000 MG/DL
HCT VFR BLD AUTO: 52.1 % (ref 42–52)
HGB BLD-MCNC: 17.2 G/DL (ref 14–18)
KETONES UR STRIP.AUTO-MCNC: NEGATIVE MG/DL
LEUKOCYTE ESTERASE UR QL STRIP.AUTO: NEGATIVE
MCH RBC QN AUTO: 29.2 PG (ref 27–33)
MCHC RBC AUTO-ENTMCNC: 33 G/DL (ref 32.3–36.5)
MCV RBC AUTO: 88.3 FL (ref 81.4–97.8)
MICRO URNS: ABNORMAL
NITRITE UR QL STRIP.AUTO: NEGATIVE
PH UR STRIP.AUTO: 6 [PH] (ref 5–8)
PHOSPHATE SERPL-MCNC: 3.9 MG/DL (ref 2.5–4.5)
PLATELET # BLD AUTO: 217 K/UL (ref 164–446)
PMV BLD AUTO: 10.2 FL (ref 9–12.9)
POTASSIUM SERPL-SCNC: 4.8 MMOL/L (ref 3.6–5.5)
PROT UR QL STRIP: 30 MG/DL
RBC # BLD AUTO: 5.9 M/UL (ref 4.7–6.1)
RBC # URNS HPF: NORMAL /HPF (ref 0–2)
RBC UR QL AUTO: NEGATIVE
SODIUM SERPL-SCNC: 140 MMOL/L (ref 135–145)
SP GR UR STRIP.AUTO: 1.02
UROBILINOGEN UR STRIP.AUTO-MCNC: 0.2 EU/DL
WBC # BLD AUTO: 7.3 K/UL (ref 4.8–10.8)
WBC #/AREA URNS HPF: NORMAL /HPF

## 2025-04-16 PROCEDURE — 82043 UR ALBUMIN QUANTITATIVE: CPT

## 2025-04-16 PROCEDURE — 80048 BASIC METABOLIC PNL TOTAL CA: CPT

## 2025-04-16 PROCEDURE — 82040 ASSAY OF SERUM ALBUMIN: CPT

## 2025-04-16 PROCEDURE — 84156 ASSAY OF PROTEIN URINE: CPT

## 2025-04-16 PROCEDURE — 85027 COMPLETE CBC AUTOMATED: CPT

## 2025-04-16 PROCEDURE — 84100 ASSAY OF PHOSPHORUS: CPT

## 2025-04-16 PROCEDURE — 81001 URINALYSIS AUTO W/SCOPE: CPT

## 2025-04-16 PROCEDURE — 82570 ASSAY OF URINE CREATININE: CPT | Mod: 91

## 2025-04-16 PROCEDURE — 36415 COLL VENOUS BLD VENIPUNCTURE: CPT

## 2025-04-17 LAB
CREAT UR-MCNC: 133 MG/DL
CREAT UR-MCNC: 139 MG/DL
MICROALBUMIN UR-MCNC: 8.6 MG/DL
MICROALBUMIN/CREAT UR: 62 MG/G (ref 0–30)
PROT UR-MCNC: 24.4 MG/DL (ref 0–15)
PROT/CREAT UR: 183 MG/G (ref 15–68)

## 2025-04-22 ENCOUNTER — OFFICE VISIT (OUTPATIENT)
Dept: NEPHROLOGY | Facility: MEDICAL CENTER | Age: 65
End: 2025-04-22
Payer: COMMERCIAL

## 2025-04-22 VITALS
BODY MASS INDEX: 28.12 KG/M2 | DIASTOLIC BLOOD PRESSURE: 60 MMHG | SYSTOLIC BLOOD PRESSURE: 110 MMHG | TEMPERATURE: 97.9 F | OXYGEN SATURATION: 95 % | HEART RATE: 59 BPM | WEIGHT: 179.2 LBS | HEIGHT: 67 IN

## 2025-04-22 DIAGNOSIS — N18.4 CKD (CHRONIC KIDNEY DISEASE) STAGE 4, GFR 15-29 ML/MIN (HCC): ICD-10-CM

## 2025-04-22 DIAGNOSIS — Z87.440 HISTORY OF UTI: ICD-10-CM

## 2025-04-22 DIAGNOSIS — E11.21 TYPE 2 DIABETES MELLITUS WITH DIABETIC NEPHROPATHY, WITHOUT LONG-TERM CURRENT USE OF INSULIN (HCC): Chronic | ICD-10-CM

## 2025-04-22 DIAGNOSIS — I10 PRIMARY HYPERTENSION: ICD-10-CM

## 2025-04-22 DIAGNOSIS — E55.9 VITAMIN D DEFICIENCY: ICD-10-CM

## 2025-04-22 PROCEDURE — 3078F DIAST BP <80 MM HG: CPT | Performed by: INTERNAL MEDICINE

## 2025-04-22 PROCEDURE — 3074F SYST BP LT 130 MM HG: CPT | Performed by: INTERNAL MEDICINE

## 2025-04-22 PROCEDURE — 99214 OFFICE O/P EST MOD 30 MIN: CPT | Performed by: INTERNAL MEDICINE

## 2025-04-22 ASSESSMENT — PATIENT HEALTH QUESTIONNAIRE - PHQ9: CLINICAL INTERPRETATION OF PHQ2 SCORE: 0

## 2025-04-22 ASSESSMENT — ENCOUNTER SYMPTOMS
ABDOMINAL PAIN: 0
SHORTNESS OF BREATH: 0
FEVER: 0

## 2025-04-22 ASSESSMENT — FIBROSIS 4 INDEX: FIB4 SCORE: 1.13

## 2025-04-22 NOTE — PROGRESS NOTES
Chief Complaint   Patient presents with    Chronic Kidney Disease       CC: follow up CKD    HPI:  Jason Lima is a 64 y.o. male with history of type 2 diabetes, hypertension, hemorrhagic stroke in 10/2023, CKD 3-4 who presents today for follow up nephrology care.  He was previously seen by Dr. Aguayo.    Re: DM2, diagnosed around 2004. Patient has had microalbuminuria since 2/2024. Patient has seen an eye doctor 1/2024 and does not have retinopathy. He diabetes was usually controlled except when he was stressed. Not on metofrmin.   I prescribed farxiga to reduce the risk of sustained eGFR decline, ESKD, CV death, and hospitalization for heart failure in this adult with CKD at risk of progression due to proteinuria.  Denies issues with farxiga.     Re: HTN, diagnosed around 2009. BP control over the years has been high in the past, but better controlled more recently. He remains on amlodipine, lisinopril, and metoprolol. Rarely checks home BP, thinks was 118 /70's last time checked at home.     Re: CKD. He was born full term. Denies chronic use of NSAIDs in the past or present. Denies LUTS, sometimes has 1x nocturia.       Past Medical History:   Diagnosis Date    Diabetes (HCC)     High cholesterol     Hypertension     Stroke (HCC)     3 weeks ago in [place] (as of 11/11/2023)       Past Surgical History:   Procedure Laterality Date    CHELO BY LAPAROSCOPY N/A 1/6/2017    Procedure: CHELO BY LAPAROSCOPY;  Surgeon: Garrett Miller M.D.;  Location: SURGERY [facility];  Service:     ABDOMINAL ABSCESS DRAINAGE N/A 1/6/2017    Procedure: ABDOMINAL ABSCESS DRAINAGE;  Surgeon: Garrett Miller M.D.;  Location: SURGERY [facility];  Service:         Outpatient Encounter Medications as of 4/22/2025   Medication Sig Dispense Refill    traZODone (DESYREL) 150 MG Tab Take 0.5 Tablets by mouth every evening. 45 Tablet 1    sertraline (ZOLOFT) 50 MG Tab Take 1 Tablet by mouth every day. 90 Tablet 1     "metoprolol SR (TOPROL XL) 25 MG TABLET SR 24 HR Take 1.5 Tablets by mouth every day. 135 Tablet 1    baclofen (LIORESAL) 10 MG Tab Take 0.5 Tablets by mouth 2 times a day for 180 days. 90 Tablet 1    atorvastatin (LIPITOR) 40 MG Tab Take 1 Tablet by mouth every evening. 90 Tablet 1    amLODIPine (NORVASC) 10 MG Tab Take 1 Tablet by mouth every day. 90 Tablet 1    dapagliflozin propanediol (FARXIGA) 10 MG Tab Take 1 Tablet by mouth every day. 90 Tablet 3    lisinopril (PRINIVIL) 40 MG tablet Take 1 Tablet by mouth every day. 90 Tablet 3    coenzyme Q-10 30 MG capsule Take 60 mg by mouth every day.       No facility-administered encounter medications on file as of 4/22/2025.        Allergies   Allergen Reactions    Penicillins Swelling             Review of Systems   Constitutional:  Negative for fever.   Respiratory:  Negative for shortness of breath.    Cardiovascular:  Negative for chest pain.   Gastrointestinal:  Negative for abdominal pain.   Genitourinary:  Negative for dysuria and hematuria.   All other systems reviewed and are negative.      /60 (BP Location: Right arm, Patient Position: Sitting, BP Cuff Size: Adult)   Pulse (!) 59   Temp 36.6 °C (97.9 °F) (Temporal)   Ht 1.702 m (5' 7\")   Wt 81.3 kg (179 lb 3.2 oz)   SpO2 95%   BMI 28.07 kg/m²     Physical Exam  Constitutional:       General: He is not in acute distress.  HENT:      Mouth/Throat:      Pharynx: No oropharyngeal exudate.   Eyes:      General: No scleral icterus.  Neck:      Trachea: No tracheal deviation.   Cardiovascular:      Rate and Rhythm: Normal rate and regular rhythm.      Heart sounds: Normal heart sounds. No murmur heard.  Pulmonary:      Effort: Pulmonary effort is normal.      Breath sounds: Normal breath sounds. No stridor. No rales.   Abdominal:      General: Bowel sounds are normal.      Palpations: Abdomen is soft.      Tenderness: There is no abdominal tenderness.   Musculoskeletal:         General: Normal range of " motion.      Cervical back: Neck supple.      Right lower leg: Edema (trace) present.      Left lower leg: Edema (trace) present.   Skin:     General: Skin is warm and dry.      Findings: No rash.   Neurological:      General: No focal deficit present.      Mental Status: He is alert and oriented to person, place, and time.   Psychiatric:         Mood and Affect: Mood and affect normal.         Behavior: Behavior normal.         Labs reviewed.    Recent Labs     08/01/24  0855 12/03/24  0910 04/16/25  1151   ALBUMIN  --  5.1* 4.7   SODIUM 140 139 140   POTASSIUM 4.4 4.1 4.8   CHLORIDE 104 102 106   CO2 23 23 22   BUN 29* 32* 42*   CREATININE 2.09* 2.60* 2.50*   PHOSPHORUS  --  4.1 3.9       Lab Results   Component Value Date/Time    WBC 7.3 04/16/2025 11:51 AM    RBC 5.90 04/16/2025 11:51 AM    HEMOGLOBIN 17.2 04/16/2025 11:51 AM    HEMATOCRIT 52.1 (H) 04/16/2025 11:51 AM    MCV 88.3 04/16/2025 11:51 AM    MCH 29.2 04/16/2025 11:51 AM    MCHC 33.0 04/16/2025 11:51 AM    MPV 10.2 04/16/2025 11:51 AM               URINALYSIS:  Lab Results   Component Value Date/Time    COLORURINE Yellow 04/16/2025 1152    CLARITY Clear 04/16/2025 1152    SPECGRAVITY 1.022 04/16/2025 1152    PHURINE 6.0 04/16/2025 1152    KETONES Negative 04/16/2025 1152    PROTEINURIN 30 (A) 04/16/2025 1152    BILIRUBINUR Negative 04/16/2025 1152    UROBILU 0.2 04/16/2025 1152    NITRITE Negative 04/16/2025 1152    LEUKESTERAS Negative 04/16/2025 1152    OCCULTBLOOD Negative 04/16/2025 1152       UPC  Lab Results   Component Value Date/Time    TOTPROTUR 24.4 (H) 04/16/2025 1152      Lab Results   Component Value Date/Time    CREATININEU 133.00 04/16/2025 1152         Imaging report(s) reviewed  No orders to display         Assessment:  Jason mendez Janie is a 64 y.o. male with history of type 2 diabetes, hypertension, hemorrhagic stroke in 10/2023, CKD 3-4 who presents today for follow up nephrology care.  He was previously seen by   Dede.    Plan:  1. Stage 3b-4 chronic kidney disease  -Current creatinine and GFR remained stable within stage IV CKD. Creatinine and GFR have been wavering between stage IIIb and IV CKD.  Underlying CKD likely from diabetic nephropathy given over 20 years of diabetes, as well as hypertension and age-related kidney decline.  I recommend avoiding NSAIDs and other nephrotoxins.  I recommend a whole food, plant-based, low-salt diet.  I explained the importance of glycemic and blood pressure control to help slow CKD progression.  I recommend maintaining lisinopril and Farxiga for long-term kidney protection.  Farxiga prescription is indicated to reduce the risk of sustained eGFR decline, ESKD, CV death, and hospitalization for heart failure in this adult with CKD at risk of progression due to proteinuria.      KFRE 2-Year: 5.1% at 4/16/2025 11:52 AM  Calculated from:  Serum Creatinine: 2.50 mg/dL at 4/16/2025 11:51 AM  Urine Albumin Creatinine Ratio: 62 mg/g at 4/16/2025 11:52 AM  Age: 64 years  Sex: Male at 4/16/2025 11:52 AM  Has CKD-3 to CKD-5: Yes    KFRE 5-Year: 14.9% at 4/16/2025 11:52 AM  Calculated from:  Serum Creatinine: 2.50 mg/dL at 4/16/2025 11:51 AM  Urine Albumin Creatinine Ratio: 62 mg/g at 4/16/2025 11:52 AM  Age: 64 years  Sex: Male at 4/16/2025 11:52 AM  Has CKD-3 to CKD-5: Yes      2. Type 2 diabetes mellitus with diabetic nephropathy, without long-term current use of insulin (HCC)  -Fairly well-controlled.  Avoid metformin with GFR less than 30.  I recommend continuing Farxiga 10 mg daily to reduce the risk of sustained eGFR decline, ESKD, CV death, and hospitalization for heart failure in this adult with CKD at risk of progression due to proteinuria.  I also recommend continuing lisinopril for long-term kidney protection.    3. Primary hypertension  -Blood pressure well-controlled in the office.  Maintain current regimen with amlodipine, lisinopril, metoprolol.  If patient has worsening  hypertension and/or lower extremity edema, I would recommend starting Lasix 80 mg once daily.  I recommend a whole food, plant-based, low-sodium diet to also help with hypertension.      Return to clinic in 4 months with pre-clinic labs    Jama Chaparro MD  Nephrology  [facility]

## 2025-05-21 ENCOUNTER — APPOINTMENT (OUTPATIENT)
Dept: MEDICAL GROUP | Facility: MEDICAL CENTER | Age: 65
End: 2025-05-21
Payer: COMMERCIAL

## 2025-05-21 ENCOUNTER — OFFICE VISIT (OUTPATIENT)
Dept: MEDICAL GROUP | Facility: MEDICAL CENTER | Age: 65
End: 2025-05-21
Payer: COMMERCIAL

## 2025-05-21 VITALS
TEMPERATURE: 97.5 F | HEIGHT: 67 IN | BODY MASS INDEX: 28.25 KG/M2 | OXYGEN SATURATION: 95 % | HEART RATE: 60 BPM | SYSTOLIC BLOOD PRESSURE: 120 MMHG | WEIGHT: 180 LBS | DIASTOLIC BLOOD PRESSURE: 70 MMHG

## 2025-05-21 DIAGNOSIS — Z12.5 ENCOUNTER FOR SCREENING PROSTATE SPECIFIC ANTIGEN (PSA) MEASUREMENT: ICD-10-CM

## 2025-05-21 DIAGNOSIS — E78.5 HYPERLIPIDEMIA, UNSPECIFIED HYPERLIPIDEMIA TYPE: ICD-10-CM

## 2025-05-21 DIAGNOSIS — R73.03 PREDIABETES: ICD-10-CM

## 2025-05-21 DIAGNOSIS — G47.30 SLEEP APNEA, UNSPECIFIED TYPE: ICD-10-CM

## 2025-05-21 DIAGNOSIS — R80.9 MICROALBUMINURIA: ICD-10-CM

## 2025-05-21 DIAGNOSIS — R25.2 SPASTICITY: ICD-10-CM

## 2025-05-21 DIAGNOSIS — F32.9 REACTIVE DEPRESSION (SITUATIONAL): ICD-10-CM

## 2025-05-21 DIAGNOSIS — H54.7 VISION PROBLEM: ICD-10-CM

## 2025-05-21 DIAGNOSIS — N18.4 CKD (CHRONIC KIDNEY DISEASE) STAGE 4, GFR 15-29 ML/MIN (HCC): ICD-10-CM

## 2025-05-21 DIAGNOSIS — I10 PRIMARY HYPERTENSION: ICD-10-CM

## 2025-05-21 PROBLEM — I61.0 THALAMIC HEMORRHAGE (HCC): Status: RESOLVED | Noted: 2023-11-11 | Resolved: 2025-05-21

## 2025-05-21 PROCEDURE — 3078F DIAST BP <80 MM HG: CPT | Performed by: PHYSICIAN ASSISTANT

## 2025-05-21 PROCEDURE — 3074F SYST BP LT 130 MM HG: CPT | Performed by: PHYSICIAN ASSISTANT

## 2025-05-21 PROCEDURE — 99214 OFFICE O/P EST MOD 30 MIN: CPT | Performed by: PHYSICIAN ASSISTANT

## 2025-05-21 RX ORDER — SERTRALINE HYDROCHLORIDE 25 MG/1
25 TABLET, FILM COATED ORAL DAILY
Qty: 90 TABLET | Refills: 1 | Status: SHIPPED | OUTPATIENT
Start: 2025-05-21 | End: 2025-08-19

## 2025-05-21 RX ORDER — BACLOFEN 10 MG/1
10 TABLET ORAL 2 TIMES DAILY
Qty: 180 TABLET | Refills: 3 | Status: SHIPPED | OUTPATIENT
Start: 2025-05-21 | End: 2026-05-16

## 2025-05-21 ASSESSMENT — FIBROSIS 4 INDEX: FIB4 SCORE: 1.13

## 2025-05-21 NOTE — PROGRESS NOTES
Subjective:     History of Present Illness  The patient presents for evaluation of hemiplegia, depression, chronic kidney disease, prediabetes, and sleep apnea. He is accompanied by his wife.    He has a history of hemiplegia from a stroke, which affects the left side of his body. He is no longer engaged in physical therapy. He uses an ankle-foot orthosis (AFO) during ambulation but not within the home due to his aversion to wearing shoes indoors. His wife reports that he frequently naps and experiences fatigue. He continues to take baclofen 5 mg twice daily. His wife is considering increasing the dosage due to difficulty in bending his leg when seated.    He is currently on sertraline 50 mg for depression, which he has been self-reducing to half the prescribed dose. His wife is attempting to gradually reduce his medication regimen.    He has chronic kidney disease and is currently on Farxiga and CoQ10. His wife is seeking a suitable multivitamin for him.    He is not on any specific medication for prediabetes or diabetes. His diet is not as healthy as it should be, but there are plans to consult a dietitian. They travel out of town for one week each month, which disrupts their dietary habits.    He uses a CPAP machine at home without issue, but there are concerns about the settings on the travel CPAP machine, which appears to be set too high. The CPAP provider refused to adjust the settings as the prescription was not current within the last 5 years. His wife is uncertain whether to consult a pulmonologist or a sleep specialist. He has not undergone a sleep study in over 5 years.    He experiences occasional blurry vision and watery eyes and has consulted an ophthalmologist.      Current medicines (including changes today)  Current Medications[1]  He  has a past medical history of Diabetes ([facility]), High cholesterol, Hypertension, and Stroke ([facility]).    ROS   No chest pain, no shortness of breath, no abdominal  "pain  Positive ROS as per HPI.  All other systems reviewed and are negative.     Objective:     /70 (BP Location: Right arm, Patient Position: Sitting, BP Cuff Size: Adult)   Pulse 60   Temp 36.4 °C (97.5 °F) (Temporal)   Ht 1.702 m (5' 7\")   Wt 81.6 kg (180 lb)   SpO2 95%  Body mass index is 28.19 kg/m².   Physical Exam    Constitutional: Alert, no distress.  Skin: Warm, dry, good turgor, no rashes in visible areas.  Eye: Equal, round and reactive, conjunctiva clear, lids normal.  ENMT: Lips without lesions, good dentition, oropharynx clear.  Neck: Trachea midline, no masses, no thyromegaly.   Psych: Alert and oriented x3, normal affect and mood.      Results  Labs   - Complete Blood Count: Normal   - Kidney Function Test: Stable        Assessment and Plan:   The following treatment plan was discussed    Assessment & Plan  1.  Left hemiplegia.  Stable.  - History of hemiplegia from a stroke affecting the left side of the body.  - Currently using an AFO only when walking outside the house.  - Baclofen will be adjusted to 10 mg twice a day to help with muscle stiffness and spasticity.  - Continued monitoring of mobility and muscle tone.    2. Depression.  Reactive.  Controlled.  - Currently taking sertraline 25 mg daily.  - Symptoms are being monitored, and the dose may be reduced to 12.5 mg daily if tolerated.  - Prescription for sertraline 25 mg will be sent to pharmacy.  - Advised to monitor symptoms and adjust the dose as needed.    3. Chronic kidney disease.  - Stage IV chronic kidney disease with stable kidney function.  - Currently on Farxiga to help manage the condition.  - Labs including PSA, A1c, and cholesterol profile will be checked.  - Advised to follow up with nephrologist as scheduled.    4. Prediabetes. Chronic condition.  - Not on any specific medication for prediabetes or diabetes.  - Advised to maintain a healthy diet and consider seeing a dietitian for further guidance.  - An A1c test " will be conducted to monitor blood sugar levels.  - Emphasis on lifestyle modifications to manage blood sugar levels.    5. Sleep apnea.  - Uses a CPAP machine at home without issue, but concerns about the settings on the travel CPAP machine.  - Referral to sleep medicine has been made for further evaluation.  - Advised to follow up with the referral department if there is no response within 5 business days.  - Monitoring of CPAP effectiveness and adjustment of settings as needed.      ORDERS:  1. Reactive depression (situational)    - sertraline (ZOLOFT) 25 MG tablet; Take 1 Tablet by mouth every day for 90 days.  Dispense: 90 Tablet; Refill: 1    2. Spasticity    - baclofen (LIORESAL) 10 MG Tab; Take 1 Tablet by mouth 2 times a day for 360 days.  Dispense: 180 Tablet; Refill: 3    3. Prediabetes    - HEMOGLOBIN A1C; Future    4. Vision problem      5. CKD (chronic kidney disease) stage 4, GFR 15-29 ml/min (HCC)      6. Microalbuminuria      7. Hyperlipidemia, unspecified hyperlipidemia type      8. Primary hypertension    - Lipid Profile; Future    9. Sleep apnea, unspecified type    - Referral to Pulmonary and Sleep Medicine    10. Encounter for screening prostate specific antigen (PSA) measurement    - PROSTATE SPECIFIC AG SCREENING; Future    () Today's E/M visit is associated with medical care services that serve as the continuing focal point for all needed health care services and/or with medical care services that are part of ongoing care related to a patient's single, serious condition or a complex condition: This includes furnishing services to patients on an ongoing basis that result in care that is personalized to the patient. The services result in a comprehensive, longitudinal, and continuous relationship with the patient and involve delivery of team-based care that is accessible, coordinated with other practitioners and providers, and integrated with the broader health care landscape.     Please  note that this dictation was created using voice recognition software. I have made every reasonable attempt to correct obvious errors, but I expect that there are errors of grammar and possibly content that I did not discover before finalizing the note.      Attestation      Verbal consent was acquired by the patient to use [employer] ambient listening note generation during this visit Yes                  [1]   Current Outpatient Medications   Medication Sig Dispense Refill    sertraline (ZOLOFT) 25 MG tablet Take 1 Tablet by mouth every day for 90 days. 90 Tablet 1    baclofen (LIORESAL) 10 MG Tab Take 1 Tablet by mouth 2 times a day for 360 days. 180 Tablet 3    traZODone (DESYREL) 150 MG Tab Take 0.5 Tablets by mouth every evening. 45 Tablet 1    metoprolol SR (TOPROL XL) 25 MG TABLET SR 24 HR Take 1.5 Tablets by mouth every day. 135 Tablet 1    atorvastatin (LIPITOR) 40 MG Tab Take 1 Tablet by mouth every evening. 90 Tablet 1    amLODIPine (NORVASC) 10 MG Tab Take 1 Tablet by mouth every day. 90 Tablet 1    dapagliflozin propanediol (FARXIGA) 10 MG Tab Take 1 Tablet by mouth every day. 90 Tablet 3    lisinopril (PRINIVIL) 40 MG tablet Take 1 Tablet by mouth every day. 90 Tablet 3    coenzyme Q-10 30 MG capsule Take 60 mg by mouth every day.       No current facility-administered medications for this visit.

## 2025-06-11 ENCOUNTER — TELEPHONE (OUTPATIENT)
Dept: SLEEP MEDICINE | Facility: MEDICAL CENTER | Age: 65
End: 2025-06-11
Payer: COMMERCIAL

## 2025-06-12 ENCOUNTER — OFFICE VISIT (OUTPATIENT)
Dept: SLEEP MEDICINE | Facility: MEDICAL CENTER | Age: 65
End: 2025-06-12
Attending: PHYSICIAN ASSISTANT
Payer: COMMERCIAL

## 2025-06-12 VITALS
HEIGHT: 67 IN | OXYGEN SATURATION: 93 % | WEIGHT: 172 LBS | RESPIRATION RATE: 16 BRPM | HEART RATE: 60 BPM | BODY MASS INDEX: 27 KG/M2 | DIASTOLIC BLOOD PRESSURE: 70 MMHG | SYSTOLIC BLOOD PRESSURE: 122 MMHG

## 2025-06-12 DIAGNOSIS — G47.33 OSA (OBSTRUCTIVE SLEEP APNEA): Primary | ICD-10-CM

## 2025-06-12 PROCEDURE — 99214 OFFICE O/P EST MOD 30 MIN: CPT | Performed by: PHYSICIAN ASSISTANT

## 2025-06-12 PROCEDURE — 3078F DIAST BP <80 MM HG: CPT | Performed by: PHYSICIAN ASSISTANT

## 2025-06-12 PROCEDURE — 3074F SYST BP LT 130 MM HG: CPT | Performed by: PHYSICIAN ASSISTANT

## 2025-06-12 ASSESSMENT — ENCOUNTER SYMPTOMS
HEADACHES: 0
HEARTBURN: 0
SHORTNESS OF BREATH: 0
DIZZINESS: 0
WHEEZING: 0
SPUTUM PRODUCTION: 0
SORE THROAT: 0
ORTHOPNEA: 0
PALPITATIONS: 0
SINUS PAIN: 0
FEVER: 0
CHILLS: 0
WEIGHT LOSS: 0
INSOMNIA: 0
COUGH: 0
TREMORS: 0

## 2025-06-12 ASSESSMENT — FIBROSIS 4 INDEX: FIB4 SCORE: 1.13

## 2025-06-12 NOTE — PROGRESS NOTES
"Chief Complaint   Patient presents with    Establish Care     Ref by  Jose Briones P.A.-C. Dx: G47.30 (ICD-10-CM) - Sleep apnea, unspecified type    On pap therapy - not wireless       HPI:  Jason Lima is a 64 y.o. year old male here today for follow-up on obstructive sleep apnea and establish with [facility] sleep medicine.  Patient is currently on CPAP therapy.  He does live with his daughter in [place] now.    Past Medical History: LUZ, primary hypertension, chronic kidney disease stage IV, history of hemorrhagic stroke with residual hemiplegia, left-sided weakness, spasticity, DVT, difficulty sleeping.    Vitals:  /70 (BP Location: Left arm, Patient Position: Sitting, BP Cuff Size: Adult)   Pulse 60   Resp 16   Ht 1.702 m (5' 7\")   Wt 78 kg (172 lb)   SpO2 93% BMI of 26.94 kg/m².    Recent Imaging: None    Echocardiogram obtained 11/11/2023 demonstrated normal left ventricular chamber size systolic and diastolic function.  Mild concentric left ventricular hypertrophy.  LVEF estimated at 60%.  Normal right ventricular size and systolic function.  No evidence of right-to-left shunt by agitated saline study.  Unable to estimate RVSP due to inadequate tricuspid regurgitant jet.    Currently using  Resmed auto CPAP @5-15 cm H20 pressure; compliance reviewed for 5/13/2025 through 6/11/2025, days used 24/30, average daily usage 7 hours 20 minutes, 77% of days greater than or equal to 4 hours, mask leak at 21.3 LPM at 95th percentile, AHI 6.5 per hour.  Patient did use device all 30 days but was on travel CPAP unit when not on home unit.    Device obtained 6/12/2024  DME provider [facility] this is a vendor change due to insurance  Mask interface nasal cushion    Patient last sleep study is greater than 5 years old, we do not have a copy of these records.  Sleep apnea events are slightly elevated on current settings.  Will make a pressure adjustment and reassess for benefit.  Will not require updated " "sleep study at this time but understands it may be necessary to do so at a future point.    Sleep schedule goes to bed 8 PM, wakens 6 AM , and gets up during the night bathroom x 1   Symptoms denies day time somnolence and denies morning headache      As per supplemental questionnaire to be scanned or imported into chart:    [place] Sleepiness Score: 6    Sleep Schedule  Bedtime: 8 PM  Wake time: 6 AM  Sleep-onset latency: 5 minutes or less  Awakenings from sleep: Bathroom x 1  Difficulty falling back asleep: no  Bedroom partner: no  Naps: Yes sometimes for one hour or less     DAYTIME SYMPTOMS:   Excessive daytime sleepiness: No   Daytime fatigue: No   Difficulty concentrating: Yes related to stroke  Memory problems: Yes  Irritability:No   Work/school performance issues: No  not applicable   Sleepiness with driving: No  not applicable   Caffeine/stimulant use: Yes 1 occasionally 2 cups coffee, soda occasionally at lunch   Alcohol use:No     SLEEP RELATED SYMPTOMS  Snoring: Yes if not on cpap quiet   Witnessed apnea or gasping/choking: Yes  Dry mouth or mouth breathing: Yes sometimes   Night Sweating: No   Teeth grinding/biting: No   Morning headaches: No   Refreshed Upon Awakening: Yes     SLEEP RELATED BEHAVIORS:  Parasomnias (walking, talking, eating, violence): Yes  Leg kicking: No   Restless legs - \"urge to move\": No   Nightmares: No  Recurrent: No   Dream enactment: No      NARCOLEPSY:  Cataplexy: No   Sleep paralysis: No   Sleep attacks: No   Hypnagogic/hypnopompic hallucinations: No       Review of Systems   Constitutional:  Negative for chills, fever, malaise/fatigue and weight loss.   HENT:  Negative for congestion, hearing loss, nosebleeds, sinus pain, sore throat and tinnitus.    Eyes:         Presc glasses    Respiratory:  Negative for cough, sputum production, shortness of breath and wheezing.    Cardiovascular:  Positive for leg swelling (trace). Negative for chest pain, palpitations and orthopnea. "   Gastrointestinal:  Negative for heartburn.        No dentures, no missing teeth, some swallowing issues post CVA   Neurological:  Negative for dizziness, tremors and headaches.   Psychiatric/Behavioral:  The patient does not have insomnia.        Past Medical History[1]    Past Surgical History[2]    Family History   Problem Relation Age of Onset    Kidney Disease Neg Hx        Social History     Socioeconomic History    Marital status:      Spouse name: Not on file    Number of children: Not on file    Years of education: Not on file    Highest education level: Associate degree: occupational, technical, or vocational program   Occupational History    Not on file   Tobacco Use    Smoking status: Never    Smokeless tobacco: Never   Vaping Use    Vaping status: Never Used   Substance and Sexual Activity    Alcohol use: Never    Drug use: Never    Sexual activity: Not on file     Comment: , 1 daughter, 2 grands   Other Topics Concern    Not on file   Social History Narrative    Not on file     Social Drivers of Health     Financial Resource Strain: Low Risk  (1/9/2024)    Overall Financial Resource Strain (CARDIA)     Difficulty of Paying Living Expenses: Not hard at all   Food Insecurity: No Food Insecurity (1/9/2024)    Hunger Vital Sign     Worried About Running Out of Food in the Last Year: Never true     Ran Out of Food in the Last Year: Never true   Transportation Needs: No Transportation Needs (1/9/2024)    PRAPARE - Transportation     Lack of Transportation (Medical): No     Lack of Transportation (Non-Medical): No   Physical Activity: Patient Declined (1/9/2024)    Exercise Vital Sign     Days of Exercise per Week: Patient declined     Minutes of Exercise per Session: Patient declined   Stress: No Stress Concern Present (1/9/2024)    [employer] [place] [employer] - Occupational Stress Questionnaire     Feeling of Stress : Only a little   Social Connections: Moderately Integrated  (1/9/2024)    Social Connection and Isolation Panel [NHANES]     Frequency of Communication with Friends and Family: More than three times a week     Frequency of Social Gatherings with Friends and Family: Once a week     Attends Temple Services: More than 4 times per year     Active Member of Clubs or Organizations: No     Attends Club or Organization Meetings: Patient declined     Marital Status: Living with partner   Intimate Partner Violence: Not on file   Housing Stability: Low Risk  (1/9/2024)    Housing Stability Vital Sign     Unable to Pay for Housing in the Last Year: No     Number of Places Lived in the Last Year: 1     Unstable Housing in the Last Year: No       Allergies as of 06/12/2025 - Reviewed 06/12/2025   Allergen Reaction Noted    Penicillins Swelling 01/05/2017          Current medications as of today Current Medications[3]      Physical Exam:   Gen:           Alert and oriented, No apparent distress. Mood and affect appropriate, normal interaction with examiner.   Hearing:     Grossly intact.  Nose:          Normal, no lesions or deformities.  Dentition:    Fair dentition.   Oropharynx:   Tongue normal, posterior pharynx without erythema or exudate.  Mallampati Classification: III  Neck:        Supple, trachea midline, no masses.  Respiratory Effort: No intercostal retractions or use of accessory muscles.   Gait and Station: Altered  Digits and Nails: No clubbing, cyanosis, petechiae, or nodes.   Skin:        No rashes, lesions or ulcers noted.               Ext:           No cyanosis or edema.      Immunizations:  Flu: Annual recommended  PCV 20: Recommended  SARS CoV2 Vaccine: 11/22/2021, 3/12/2021, 2/9/2021    Assessment / Plan:  1. LUZ (obstructive sleep apnea)  - DME Other  - DME Other    Reviewed compliance demonstrating use and benefit.  Apnea is slightly elevated primarily central, pressure adjustment made in clinic to narrow her pressure range.  Short-term follow-up to reassess for  tolerance.  Patient may need an updated titration sleep study.  Send updated order for mask and supplies to [facility], contact info provided.  Patient will have to bring device to clinic for follow-up visits as no wireless access is available.  Could check with [facility] whether they are able to provide wireless access.  Family will also check and see whether the travel CPAP could be adjusted from CP[facility] more as they purchased it there.  Copy of CPAP settings order provided to patient for this purpose.  Reviewed new patient info.  Reviewed use of distilled water only in humidifier chamber with fresh fill daily.  Reviewed equipment cleaning and equipment replacement schedule.    Follow-up:   Return in about 3 months (around 9/12/2025) for Return with Fany Quispe PA-C.    Please note that this dictation was created using voice recognition software. I have made every reasonable attempt to correct obvious errors, but it is possible there are errors of grammar and possibly content that I did not discover before finalizing the note.         [1]   Past Medical History:  Diagnosis Date    Diabetes (HCC)     High cholesterol     Hypertension     Stroke (HCC)     3 weeks ago in [place] (as of 11/11/2023)   [2]   Past Surgical History:  Procedure Laterality Date    CHELO BY LAPAROSCOPY N/A 1/6/2017    Procedure: CHELO BY LAPAROSCOPY;  Surgeon: Garrett Miller M.D.;  Location: SURGERY [facility];  Service:     ABDOMINAL ABSCESS DRAINAGE N/A 1/6/2017    Procedure: ABDOMINAL ABSCESS DRAINAGE;  Surgeon: Garrett Miller M.D.;  Location: SURGERY [facility];  Service:    [3]   Current Outpatient Medications   Medication Sig Dispense Refill    sertraline (ZOLOFT) 25 MG tablet Take 1 Tablet by mouth every day for 90 days. 90 Tablet 1    baclofen (LIORESAL) 10 MG Tab Take 1 Tablet by mouth 2 times a day for 360 days. 180 Tablet 3    traZODone (DESYREL) 150 MG Tab Take 0.5 Tablets by mouth every evening. 45 Tablet 1     metoprolol SR (TOPROL XL) 25 MG TABLET SR 24 HR Take 1.5 Tablets by mouth every day. 135 Tablet 1    atorvastatin (LIPITOR) 40 MG Tab Take 1 Tablet by mouth every evening. 90 Tablet 1    amLODIPine (NORVASC) 10 MG Tab Take 1 Tablet by mouth every day. 90 Tablet 1    dapagliflozin propanediol (FARXIGA) 10 MG Tab Take 1 Tablet by mouth every day. 90 Tablet 3    lisinopril (PRINIVIL) 40 MG tablet Take 1 Tablet by mouth every day. 90 Tablet 3    coenzyme Q-10 30 MG capsule Take 60 mg by mouth every day.       No current facility-administered medications for this visit.

## 2025-08-06 DIAGNOSIS — I10 PRIMARY HYPERTENSION: ICD-10-CM

## 2025-08-06 RX ORDER — LISINOPRIL 40 MG/1
40 TABLET ORAL DAILY
Qty: 90 TABLET | Refills: 3 | Status: SHIPPED | OUTPATIENT
Start: 2025-08-06

## 2025-08-11 DIAGNOSIS — R53.1 LEFT-SIDED WEAKNESS: ICD-10-CM

## 2025-08-11 DIAGNOSIS — R25.2 SPASTICITY: Primary | ICD-10-CM

## 2025-08-11 DIAGNOSIS — R26.2 DIFFICULTY WALKING: ICD-10-CM

## 2025-08-11 DIAGNOSIS — I69.359 HISTORY OF HEMORRHAGIC STROKE WITH RESIDUAL HEMIPLEGIA (HCC): ICD-10-CM

## 2025-08-20 ENCOUNTER — HOSPITAL ENCOUNTER (OUTPATIENT)
Dept: LAB | Facility: MEDICAL CENTER | Age: 65
End: 2025-08-20
Attending: INTERNAL MEDICINE
Payer: COMMERCIAL

## 2025-08-20 DIAGNOSIS — Z87.440 HISTORY OF UTI: ICD-10-CM

## 2025-08-20 DIAGNOSIS — E11.21 TYPE 2 DIABETES MELLITUS WITH DIABETIC NEPHROPATHY, WITHOUT LONG-TERM CURRENT USE OF INSULIN (HCC): Chronic | ICD-10-CM

## 2025-08-20 DIAGNOSIS — E55.9 VITAMIN D DEFICIENCY: ICD-10-CM

## 2025-08-20 DIAGNOSIS — N18.4 CKD (CHRONIC KIDNEY DISEASE) STAGE 4, GFR 15-29 ML/MIN (HCC): ICD-10-CM

## 2025-08-20 LAB
25(OH)D3 SERPL-MCNC: 44 NG/ML (ref 30–100)
ALBUMIN SERPL BCP-MCNC: 4.6 G/DL (ref 3.2–4.9)
ANION GAP SERPL CALC-SCNC: 12 MMOL/L (ref 7–16)
APPEARANCE UR: CLEAR
BACTERIA #/AREA URNS HPF: NORMAL /HPF
BILIRUB UR QL STRIP.AUTO: NEGATIVE
BUN SERPL-MCNC: 37 MG/DL (ref 8–22)
CALCIUM SERPL-MCNC: 9.6 MG/DL (ref 8.5–10.5)
CASTS URNS QL MICRO: NORMAL /LPF (ref 0–2)
CHLORIDE SERPL-SCNC: 106 MMOL/L (ref 96–112)
CO2 SERPL-SCNC: 21 MMOL/L (ref 20–33)
COLOR UR: YELLOW
CREAT SERPL-MCNC: 2.75 MG/DL (ref 0.5–1.4)
CREAT UR-MCNC: 109 MG/DL
CREAT UR-MCNC: 113 MG/DL
EPITHELIAL CELLS 1715: NORMAL /HPF (ref 0–5)
ERYTHROCYTE [DISTWIDTH] IN BLOOD BY AUTOMATED COUNT: 40.8 FL (ref 35.9–50)
GFR SERPLBLD CREATININE-BSD FMLA CKD-EPI: 25 ML/MIN/1.73 M 2
GLUCOSE SERPL-MCNC: 111 MG/DL (ref 65–99)
GLUCOSE UR STRIP.AUTO-MCNC: >=1000 MG/DL
HCT VFR BLD AUTO: 50.5 % (ref 42–52)
HGB BLD-MCNC: 17.2 G/DL (ref 14–18)
KETONES UR STRIP.AUTO-MCNC: NEGATIVE MG/DL
LEUKOCYTE ESTERASE UR QL STRIP.AUTO: NEGATIVE
MCH RBC QN AUTO: 29.2 PG (ref 27–33)
MCHC RBC AUTO-ENTMCNC: 34.1 G/DL (ref 32.3–36.5)
MCV RBC AUTO: 85.7 FL (ref 81.4–97.8)
MICRO URNS: ABNORMAL
MICROALBUMIN UR-MCNC: 10 MG/DL
MICROALBUMIN/CREAT UR: 88 MG/G (ref 0–30)
NITRITE UR QL STRIP.AUTO: NEGATIVE
PH UR STRIP.AUTO: 6 [PH] (ref 5–8)
PHOSPHATE SERPL-MCNC: 3.1 MG/DL (ref 2.5–4.5)
PLATELET # BLD AUTO: 210 K/UL (ref 164–446)
PMV BLD AUTO: 10.7 FL (ref 9–12.9)
POTASSIUM SERPL-SCNC: 4.7 MMOL/L (ref 3.6–5.5)
PROT UR QL STRIP: 30 MG/DL
PROT UR-MCNC: 23.4 MG/DL (ref 0–15)
PROT/CREAT UR: 215 MG/G (ref 15–68)
PTH-INTACT SERPL-MCNC: 80.7 PG/ML (ref 14–72)
RBC # BLD AUTO: 5.89 M/UL (ref 4.7–6.1)
RBC # URNS HPF: NORMAL /HPF (ref 0–2)
RBC UR QL AUTO: NEGATIVE
SODIUM SERPL-SCNC: 139 MMOL/L (ref 135–145)
SP GR UR STRIP.AUTO: 1.02
UROBILINOGEN UR STRIP.AUTO-MCNC: 0.2 EU/DL
WBC # BLD AUTO: 6.5 K/UL (ref 4.8–10.8)
WBC #/AREA URNS HPF: NORMAL /HPF

## 2025-08-20 PROCEDURE — 84156 ASSAY OF PROTEIN URINE: CPT

## 2025-08-20 PROCEDURE — 82043 UR ALBUMIN QUANTITATIVE: CPT

## 2025-08-20 PROCEDURE — 82306 VITAMIN D 25 HYDROXY: CPT

## 2025-08-20 PROCEDURE — 83970 ASSAY OF PARATHORMONE: CPT

## 2025-08-20 PROCEDURE — 82040 ASSAY OF SERUM ALBUMIN: CPT

## 2025-08-20 PROCEDURE — 84100 ASSAY OF PHOSPHORUS: CPT

## 2025-08-20 PROCEDURE — 80048 BASIC METABOLIC PNL TOTAL CA: CPT

## 2025-08-20 PROCEDURE — 85027 COMPLETE CBC AUTOMATED: CPT

## 2025-08-20 PROCEDURE — 36415 COLL VENOUS BLD VENIPUNCTURE: CPT

## 2025-08-20 PROCEDURE — 82570 ASSAY OF URINE CREATININE: CPT | Mod: 91

## 2025-08-20 PROCEDURE — 81001 URINALYSIS AUTO W/SCOPE: CPT

## 2025-08-26 ENCOUNTER — OFFICE VISIT (OUTPATIENT)
Dept: NEPHROLOGY | Facility: MEDICAL CENTER | Age: 65
End: 2025-08-26
Payer: COMMERCIAL

## 2025-08-26 VITALS
DIASTOLIC BLOOD PRESSURE: 64 MMHG | OXYGEN SATURATION: 95 % | TEMPERATURE: 98.2 F | WEIGHT: 175 LBS | HEART RATE: 62 BPM | BODY MASS INDEX: 27.47 KG/M2 | SYSTOLIC BLOOD PRESSURE: 114 MMHG | HEIGHT: 67 IN

## 2025-08-26 DIAGNOSIS — N18.4 CKD (CHRONIC KIDNEY DISEASE) STAGE 4, GFR 15-29 ML/MIN (HCC): Primary | ICD-10-CM

## 2025-08-26 DIAGNOSIS — I69.359 HISTORY OF HEMORRHAGIC STROKE WITH RESIDUAL HEMIPLEGIA (HCC): ICD-10-CM

## 2025-08-26 DIAGNOSIS — N25.81 SECONDARY HYPERPARATHYROIDISM OF RENAL ORIGIN (HCC): ICD-10-CM

## 2025-08-26 DIAGNOSIS — I10 PRIMARY HYPERTENSION: ICD-10-CM

## 2025-08-26 DIAGNOSIS — Z87.440 HISTORY OF UTI: ICD-10-CM

## 2025-08-26 PROCEDURE — 99214 OFFICE O/P EST MOD 30 MIN: CPT | Performed by: INTERNAL MEDICINE

## 2025-08-26 PROCEDURE — 3074F SYST BP LT 130 MM HG: CPT | Performed by: INTERNAL MEDICINE

## 2025-08-26 PROCEDURE — 3078F DIAST BP <80 MM HG: CPT | Performed by: INTERNAL MEDICINE

## 2025-08-26 ASSESSMENT — FIBROSIS 4 INDEX: FIB4 SCORE: 1.164171000174398273

## 2025-08-26 ASSESSMENT — LIFESTYLE VARIABLES
HOW MANY STANDARD DRINKS CONTAINING ALCOHOL DO YOU HAVE ON A TYPICAL DAY: PATIENT DOES NOT DRINK
HOW OFTEN DO YOU HAVE SIX OR MORE DRINKS ON ONE OCCASION: NEVER
HOW OFTEN DO YOU HAVE A DRINK CONTAINING ALCOHOL: NEVER
AUDIT-C TOTAL SCORE: 0
SKIP TO QUESTIONS 9-10: 1

## 2025-08-26 ASSESSMENT — ENCOUNTER SYMPTOMS
FEVER: 0
ABDOMINAL PAIN: 0
SHORTNESS OF BREATH: 0

## (undated) DEVICE — SHEAR CVD HARMONIC ACE 36CM - (6/BX) **REPLACED USE #11148 PART NUMBER HARH36***

## (undated) DEVICE — CLIP MED LG INTNL HRZN TI ESCP - (20/BX)

## (undated) DEVICE — CANISTER SUCTION 3000ML MECHANICAL FILTER AUTO SHUTOFF MEDI-VAC NONSTERILE LF DISP  (40EA/CA)

## (undated) DEVICE — PACK LAP CHOLE OR - (2EA/CA)

## (undated) DEVICE — CANNULA W/SEAL 5X100 Z-THRE - ADED KII (12/BX)

## (undated) DEVICE — HEAD HOLDER JUNIOR/ADULT

## (undated) DEVICE — MASK ANESTHESIA ADULT  - (100/CA)

## (undated) DEVICE — SET SUCTION/IRRIGATION WITH DISPOSABLE TIP (6/CA )PART #0250-070-520 IS A SUB

## (undated) DEVICE — SUCTION INSTRUMENT YANKAUER BULBOUS TIP W/O VENT (50EA/CA)

## (undated) DEVICE — DRAIN J-VAC 19FR. ROUND - (10/CS)

## (undated) DEVICE — GLOVE BIOGEL PI INDICATOR SZ 8.0 SURGICAL PF LF -(50/BX 4BX/CA)

## (undated) DEVICE — DETERGENT RENUZYME PLUS 10 OZ PACKET (50/BX)

## (undated) DEVICE — TROCAR Z THREAD12MM OPTICAL - NON BLADED (6/BX)

## (undated) DEVICE — SET LEADWIRE 5 LEAD BEDSIDE DISPOSABLE ECG (1SET OF 5/EA)

## (undated) DEVICE — TUBING INSUFFLATION - (10/BX)

## (undated) DEVICE — ADHESIVE DERMABOND HVD MINI (12EA/BX)

## (undated) DEVICE — DRAPESURG STERI-DRAPE LONG - (10/BX 4BX/CA)

## (undated) DEVICE — SUTURE 0 VICRYL PLUS UR-6 - 27 INCH (36/BX)

## (undated) DEVICE — TUBE CONNECT SUCTION CLEAR 120 X 1/4" (50EA/CA)"

## (undated) DEVICE — TROCAR 5X100 NON BLADED Z-TH - READ KII (6/BX)

## (undated) DEVICE — SENSOR SPO2 NEO LNCS ADHESIVE (20/BX) SEE USER NOTES

## (undated) DEVICE — TUBING CLEARLINK DUO-VENT - C-FLO (48EA/CA)

## (undated) DEVICE — NEEDLE INSFL 120MM 14GA VRRS - (20/BX)

## (undated) DEVICE — RESERVOIR SUCTION 100 CC - SILICONE (20EA/CA)

## (undated) DEVICE — ELECTRODE DUAL RETURN W/ CORD - (50/PK)

## (undated) DEVICE — SUTURE GENERAL

## (undated) DEVICE — WATER IRRIG. STER. 1500 ML - (9/CA)

## (undated) DEVICE — SET EXTENSION WITH 2 PORTS (48EA/CA) ***PART #2C8610 IS A SUBSTITUTE*****

## (undated) DEVICE — SUTURE 3-0 ETHILON FS-1 - (36/BX) 30 INCH

## (undated) DEVICE — GLOVE SURG LTX PWD ORTHO 7.5 - (40PR/BX)

## (undated) DEVICE — CHLORAPREP 26 ML APPLICATOR - ORANGE TINT(25/CA)

## (undated) DEVICE — PROTECTOR ULNA NERVE - (36PR/CA)

## (undated) DEVICE — ELECTRODE 850 FOAM ADHESIVE - HYDROGEL RADIOTRNSPRNT (50/PK)

## (undated) DEVICE — BAG RETRIEVAL 10ML (10EA/BX)

## (undated) DEVICE — SUTURE 4-0 MONOCRYL PLUS PS-2 - 27 INCH (36/BX)

## (undated) DEVICE — KIT ROOM DECONTAMINATION

## (undated) DEVICE — LACTATED RINGERS INJ 1000 ML - (14EA/CA 60CA/PF)

## (undated) DEVICE — SODIUM CHL IRRIGATION 0.9% 1000ML (12EA/CA)

## (undated) DEVICE — SPONGE DRAIN 4 X 4IN 6-PLY - (2/PK25PK/BX12BX/CS)

## (undated) DEVICE — NEPTUNE 4 PORT MANIFOLD - (20/PK)

## (undated) DEVICE — GLOVE BIOGEL SZ 8 SURGICAL PF LTX - (50PR/BX 4BX/CA)

## (undated) DEVICE — TUBE E-T HI-LO CUFF 7.5MM (10EA/PK)

## (undated) DEVICE — KIT ANESTHESIA W/CIRCUIT & 3/LT BAG W/FILTER (20EA/CA)

## (undated) DEVICE — DERMABOND ADVANCED - (12EA/BX)

## (undated) DEVICE — GOWN WARMING STANDARD FLEX - (30/CA)